# Patient Record
Sex: FEMALE | Race: WHITE | Employment: OTHER | URBAN - METROPOLITAN AREA
[De-identification: names, ages, dates, MRNs, and addresses within clinical notes are randomized per-mention and may not be internally consistent; named-entity substitution may affect disease eponyms.]

---

## 2014-01-13 LAB — COLONOSCOPY, EXTERNAL: NORMAL

## 2017-01-20 DIAGNOSIS — R60.0 BILATERAL EDEMA OF LOWER EXTREMITY: ICD-10-CM

## 2017-01-20 RX ORDER — FUROSEMIDE 20 MG/1
20 TABLET ORAL DAILY
Qty: 90 TAB | Refills: 1 | Status: SHIPPED | OUTPATIENT
Start: 2017-01-20 | End: 2017-07-03 | Stop reason: SDUPTHER

## 2017-01-20 NOTE — TELEPHONE ENCOUNTER
This pharmacy faxed over request for the following prescriptions to be filled:   Medication requested :   Requested Prescriptions     Pending Prescriptions Disp Refills    furosemide (LASIX) 20 mg tablet 90 Tab 1     Sig: Take 1 Tab by mouth daily.  Indications: Edema     PCP: Dr. Jose D Patiño or Print: [facility]  Mail order or Local pharmacy: [phone]    Scheduled appointment if not seen by current providers in office: LOV 10/26/16

## 2017-04-14 DIAGNOSIS — M62.838 MUSCLE SPASM: ICD-10-CM

## 2017-04-14 DIAGNOSIS — I10 ESSENTIAL HYPERTENSION WITH GOAL BLOOD PRESSURE LESS THAN 130/85: ICD-10-CM

## 2017-04-14 RX ORDER — MELOXICAM 15 MG/1
TABLET ORAL
Qty: 90 TAB | Refills: 0 | Status: SHIPPED | OUTPATIENT
Start: 2017-04-14 | End: 2017-07-13 | Stop reason: SDUPTHER

## 2017-04-14 RX ORDER — LISINOPRIL 10 MG/1
TABLET ORAL
Qty: 90 TAB | Refills: 0 | Status: SHIPPED | OUTPATIENT
Start: 2017-04-14 | End: 2017-07-13 | Stop reason: SDUPTHER

## 2017-05-22 ENCOUNTER — OFFICE VISIT (OUTPATIENT)
Dept: FAMILY MEDICINE CLINIC | Age: 67
End: 2017-05-22

## 2017-05-22 VITALS
RESPIRATION RATE: 14 BRPM | SYSTOLIC BLOOD PRESSURE: 122 MMHG | HEART RATE: 70 BPM | DIASTOLIC BLOOD PRESSURE: 70 MMHG | OXYGEN SATURATION: 98 % | TEMPERATURE: 97.6 F

## 2017-05-22 DIAGNOSIS — M25.50 ARTHRALGIA, UNSPECIFIED JOINT: Primary | ICD-10-CM

## 2017-05-22 RX ORDER — AMITRIPTYLINE HYDROCHLORIDE 25 MG/1
TABLET, FILM COATED ORAL
COMMUNITY
End: 2017-05-30

## 2017-05-22 RX ORDER — ACETAMINOPHEN AND CODEINE PHOSPHATE 300; 30 MG/1; MG/1
1 TABLET ORAL
COMMUNITY
End: 2017-06-01

## 2017-05-22 NOTE — PROGRESS NOTES
Sofie Rowan is a 69 y.o. female  presents for hip shoulder wrist and ankles and feet. She has appt with rheumatology in am.      Allergies   Allergen Reactions    Celebrex [Celecoxib] Swelling    Humira [Adalimumab] Other (comments)     Lupus    Optiray 320 [Ioversol] Hives and Itching     Pt states when she gets iv contrast she itches a little from hives?  Penicillins Hives    Sulfa (Sulfonamide Antibiotics) Hives    Sulfur Swelling     Outpatient Prescriptions Marked as Taking for the 5/22/17 encounter (Office Visit) with Grady Morris MD   Medication Sig Dispense Refill    amitriptyline (ELAVIL) 25 mg tablet Take  by mouth nightly.  acetaminophen-codeine (TYLENOL-CODEINE #3) 300-30 mg per tablet Take 1 Tab by mouth every four (4) hours as needed for Pain.  meloxicam (MOBIC) 15 mg tablet TAKE 1 TABLET DAILY 90 Tab 0    lisinopril (PRINIVIL, ZESTRIL) 10 mg tablet TAKE 1 TABLET DAILY 90 Tab 0    furosemide (LASIX) 20 mg tablet Take 1 Tab by mouth daily. Indications: Edema 90 Tab 1    TOFACITINIB CITRATE (XELJANZ PO) Take 10 mg by mouth two (2) times a day.  MELATONIN PO Take  by mouth. 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      Social History Social History    Marital status:      Spouse name: N/A    Number of children: N/A    Years of education: N/A     Social History Main Topics    Smoking status: Never Smoker    Smokeless tobacco: Never Used    Alcohol use No    Drug use: No    Sexual activity: Not Asked     Other Topics Concern    None     Social History Narrative     Family History   Problem Relation Age of Onset    Other Other      Orthopedic (Sister)    Arthritis-osteo Sister     Cancer Neg Hx     Diabetes Neg Hx     Heart Disease Neg Hx     Heart Attack Neg Hx     Hypertension Neg Hx     Stroke Neg Hx     Malignant Hyperthermia Neg Hx     Pseudocholinesterase Deficiency Neg Hx     Delayed Awakening Neg Hx     Post-op Nausea/Vomiting Neg Hx     Emergence Delirium Neg Hx     Post-op Cognitive Dysfunction Neg Hx         Review of Systems   Constitutional: Negative for chills and fever. Cardiovascular: Negative for chest pain. Gastrointestinal: Negative for nausea and vomiting. Neurological: Negative for headaches. Vitals:    05/22/17 1442   BP: 122/70   Pulse: 70   Resp: 14   Temp: 97.6 °F (36.4 °C)   TempSrc: Oral   SpO2: 98%   PainSc:  10 - Worst pain ever   PainLoc: Hip       Physical Exam   Constitutional: She is oriented to person, place, and time and well-developed, well-nourished, and in no distress. Neck: Normal range of motion. Neck supple. Cardiovascular: Normal rate, regular rhythm and normal heart sounds. Pulmonary/Chest: Effort normal and breath sounds normal.   Neurological: She is alert and oriented to person, place, and time. Skin: Skin is warm and dry. Nursing note and vitals reviewed. Assessment/Plan      ICD-10-CM ICD-9-CM    1. Arthralgia, unspecified joint M25.50 719.40      Follow up with rheumatology and ortho. I have discussed the diagnosis with the patient and the intended plan of care as seen in the above orders.  The patient has received an after-visit summary and questions were answered concerning future plans. I have discussed medication, side effects, and warnings with the patient in detail. The patient verbalized understanding and is in agreement with the plan of care. The patient will contact the office with any additional concerns.       Follow-up Disposition:  Return if symptoms worsen or fail to improve.  lab results and schedule of future lab studies reviewed with patient    Franc Murphy MD

## 2017-05-22 NOTE — PATIENT INSTRUCTIONS
Joint Pain: Care Instructions  Your Care Instructions  Many people have small aches and pains from overuse or injury to muscles and joints. Joint injuries often happen during sports or recreation, work tasks, or projects around the home. An overuse injury can happen when you put too much stress on a joint or when you do an activity that stresses the joint over and over, such as using the computer or rowing a boat. You can take action at home to help your muscles and joints get better. You should feel better in 1 to 2 weeks, but it can take 3 months or more to heal completely. Follow-up care is a key part of your treatment and safety. Be sure to make and go to all appointments, and call your doctor if you are having problems. It's also a good idea to know your test results and keep a list of the medicines you take. How can you care for yourself at home? · Do not put weight on the injured joint for at least a day or two. · For the first day or two after an injury, do not take hot showers or baths, and do not use hot packs. The heat could make swelling worse. · Put ice or a cold pack on the sore joint for 10 to 20 minutes at a time. Try to do this every 1 to 2 hours for the next 3 days (when you are awake) or until the swelling goes down. Put a thin cloth between the ice and your skin. · Wrap the injury in an elastic bandage. Do not wrap it too tightly because this can cause more swelling. · Prop up the sore joint on a pillow when you ice it or anytime you sit or lie down during the next 3 days. Try to keep it above the level of your heart. This will help reduce swelling. · Take an over-the-counter pain medicine, such as acetaminophen (Tylenol), ibuprofen (Advil, Motrin), or naproxen (Aleve). Read and follow all instructions on the label. · After 1 or 2 days of rest, begin moving the joint gently.  While the joint is still healing, you can begin to exercise using activities that do not strain or hurt the painful joint. When should you call for help? Call your doctor now or seek immediate medical care if:  · You have signs of infection, such as:  ¨ Increased pain, swelling, warmth, and redness. ¨ Red streaks leading from the joint. ¨ A fever. Watch closely for changes in your health, and be sure to contact your doctor if:  · Your movement or symptoms are not getting better after 1 to 2 weeks of home treatment. Where can you learn more? Go to http://camilo-robert.info/. Enter P205 in the search box to learn more about \"Joint Pain: Care Instructions. \"  Current as of: May 23, 2016  Content Version: 11.2  © 1003-1552 [employer]. Care instructions adapted under license by [employer] (which disclaims liability or warranty for this information). If you have questions about a medical condition or this instruction, always ask your healthcare professional. [address] any warranty or liability for your use of this information.

## 2017-05-22 NOTE — MR AVS SNAPSHOT
Visit Information Date & Time Provider Department Dept. Phone Encounter #  
 5/22/2017  2:15 PM Yoana Leonard MD [facility] [phone] [id number] Follow-up Instructions Return if symptoms worsen or fail to improve. Upcoming Health Maintenance Date Due Hepatitis C Screening 1950 DTaP/Tdap/Td series (1 - Tdap) 3/10/1969 FOBT Q 1 YEAR AGE 50-75 3/10/1998 GLAUCOMA SCREENING Q2Y 3/10/2013 Pneumococcal 65+ Low/Medium Risk (2 of 2 - PPSV23) 10/31/2015 BREAST CANCER SCRN MAMMOGRAM 6/5/2017 MEDICARE YEARLY EXAM 6/11/2017 INFLUENZA AGE 9 TO ADULT 8/1/2017 Allergies as of 5/22/2017  Review Complete On: 5/22/2017 By: Yoana Leonard MD  
  
 Severity Noted Reaction Type Reactions Celebrex [Celecoxib] High 11/06/2014   Systemic Swelling Humira [Adalimumab]  06/27/2013    Other (comments) Lupus Optiray 320 [Ioversol]  02/16/2013   Not Verified Hives, Itching Pt states when she gets iv contrast she itches a little from hives? Penicillins  02/19/2013    Hives Sulfa (Sulfonamide Antibiotics)  05/18/2015    Hives Sulfur  02/19/2013    Swelling Current Immunizations  Never Reviewed Name Date Pneumococcal Vaccine (Unspecified Type) 10/31/2010 Not reviewed this visit You Were Diagnosed With   
  
 Codes Comments Arthralgia, unspecified joint    -  Primary ICD-10-CM: M25.50 ICD-9-CM: 719.40 Vitals BP Pulse Temp Resp SpO2 OB Status 122/70 (BP 1 Location: Right arm, BP Patient Position: Sitting) 70 97.6 °F (36.4 °C) (Oral) 14 98% Hysterectomy Smoking Status Never Smoker Vitals History Preferred Pharmacy Pharmacy Name Phone [address] [facility] [phone] Your Updated Medication List  
  
   
This list is accurate as of: 5/22/17  3:04 PM.  Always use your most recent med list.  
  
  
  
  
 amitriptyline 25 mg tablet Commonly known as:  ELAVIL Take  by mouth nightly. BENADRYL 25 mg capsule Generic drug:  diphenhydrAMINE Take 25 mg by mouth every six (6) hours as needed. calcium carbonate 500 mg calcium (1,250 mg) tablet Commonly known as:  OS-BARBARA  
1,500 mg. COLACE 100 mg capsule Generic drug:  docusate sodium Take 100 mg by mouth daily as needed. cyclobenzaprine 10 mg tablet Commonly known as:  FLEXERIL Take 1 Tab by mouth three (3) times daily as needed. Indications: RA  
  
 ethinyl estradiol-etonogestrel 0.12-0.015 mg/24 hr vaginal ring Commonly known as:  NUVARING  
1 Each. folic acid 1 mg tablet Commonly known as:  Google Take 1 mg by mouth three (3) times daily. furosemide 20 mg tablet Commonly known as:  LASIX Take 1 Tab by mouth daily. Indications: Edema  
  
 linaclotide 290 mcg Cap capsule Commonly known as:  Alejandrina Noon Take 1 capsule by mouth daily. Indications: IBS  
  
 lisinopril 10 mg tablet Commonly known as:  PRINIVIL, ZESTRIL  
TAKE 1 TABLET DAILY MELATONIN PO Take  by mouth.  
  
 meloxicam 15 mg tablet Commonly known as:  MOBIC  
TAKE 1 TABLET DAILY  
  
 methotrexate 25 mg/mL chemo injection 12.5 mg.  
  
 omeprazole 20 mg capsule Commonly known as:  PRILOSEC Take 20 mg by mouth two (2) times a day. Pt instructed to take am of surgery. Indications: GASTROESOPHAGEAL REFLUX  
  
 ondansetron 8 mg disintegrating tablet Commonly known as:  ZOFRAN ODT Take 1 tablet by mouth every eight (8) hours as needed for Nausea. ONE-A-DAY MAXIMUM FORMULA PO Take 1 Tab by Mouth Once a Day. oxybutynin chloride XL 10 mg CR tablet Commonly known as:  DITROPAN XL Take 1 Tab by mouth daily. Indications: INCREASED URINARY FREQUENCY  
  
 sucralfate 100 mg/mL suspension Commonly known as:  Vera Nehemias Take 5 mL by mouth four (4) times daily. TYLENOL PO Take 200 mg by mouth. TYLENOL-CODEINE #3 300-30 mg per tablet Generic drug:  acetaminophen-codeine Take 1 Tab by mouth every four (4) hours as needed for Pain. VITAMIN D3 1,000 unit tablet Generic drug:  cholecalciferol Take 1,000 Units by mouth daily. XELJANZ PO Take 10 mg by mouth two (2) times a day. zolpidem 5 mg tablet Commonly known as:  AMBIEN Take 1 tablet by mouth nightly as needed for Sleep. Take 1 Tab by Mouth Nightly As Needed. ZyrTEC 10 mg Cap Generic drug:  Cetirizine Take  by mouth. Follow-up Instructions Return if symptoms worsen or fail to improve. Patient Instructions Joint Pain: Care Instructions Your Care Instructions Many people have small aches and pains from overuse or injury to muscles and joints. Joint injuries often happen during sports or recreation, work tasks, or projects around the home. An overuse injury can happen when you put too much stress on a joint or when you do an activity that stresses the joint over and over, such as using the computer or rowing a boat. You can take action at home to help your muscles and joints get better. You should feel better in 1 to 2 weeks, but it can take 3 months or more to heal completely. Follow-up care is a key part of your treatment and safety. Be sure to make and go to all appointments, and call your doctor if you are having problems. It's also a good idea to know your test results and keep a list of the medicines you take. How can you care for yourself at home? · Do not put weight on the injured joint for at least a day or two. · For the first day or two after an injury, do not take hot showers or baths, and do not use hot packs. The heat could make swelling worse. · Put ice or a cold pack on the sore joint for 10 to 20 minutes at a time.  Try to do this every 1 to 2 hours for the next 3 days (when you are awake) or until the swelling goes down. Put a thin cloth between the ice and your skin. · Wrap the injury in an elastic bandage. Do not wrap it too tightly because this can cause more swelling. · Prop up the sore joint on a pillow when you ice it or anytime you sit or lie down during the next 3 days. Try to keep it above the level of your heart. This will help reduce swelling. · Take an over-the-counter pain medicine, such as acetaminophen (Tylenol), ibuprofen (Advil, Motrin), or naproxen (Aleve). Read and follow all instructions on the label. · After 1 or 2 days of rest, begin moving the joint gently. While the joint is still healing, you can begin to exercise using activities that do not strain or hurt the painful joint. When should you call for help? Call your doctor now or seek immediate medical care if: 
· You have signs of infection, such as: 
¨ Increased pain, swelling, warmth, and redness. ¨ Red streaks leading from the joint. ¨ A fever. Watch closely for changes in your health, and be sure to contact your doctor if: 
· Your movement or symptoms are not getting better after 1 to 2 weeks of home treatment. Where can you learn more? Go to http://camilo-robert.info/. Enter P205 in the search box to learn more about \"Joint Pain: Care Instructions. \" Current as of: May 23, 2016 Content Version: 11.2 © 9228-7377 [employer]. Care instructions adapted under license by [employer] (which disclaims liability or warranty for this information). If you have questions about a medical condition or this instruction, always ask your healthcare professional. [address] any warranty or liability for your use of this information. Introducing [facility]! Dear Margret Class: Thank you for requesting a [employer] account. Our records indicate that you already have an active [employer] account.   You can access your account anytime at [email or web address] Did you know that you can access your hospital and ER discharge instructions at any time in [employer]? You can also review all of your test results from your hospital stay or ER visit. Additional Information If you have questions, please visit the Frequently Asked Questions section of the [employer] website at [email or web address] Remember, [employer] is NOT to be used for urgent needs. For medical emergencies, dial 911. Now available from your iPhone and Android! Please provide this summary of care documentation to your next provider. Your primary care clinician is listed as [address]. If you have any questions after today's visit, please call [phone].

## 2017-05-26 ENCOUNTER — HOSPITAL ENCOUNTER (OUTPATIENT)
Dept: PREADMISSION TESTING | Age: 67
Discharge: HOME | End: 2017-05-26
Attending: ORTHOPAEDIC SURGERY
Payer: MEDICARE

## 2017-05-26 LAB
ANION GAP BLD CALC-SCNC: 8 MMOL/L (ref 3–18)
APPEARANCE UR: CLEAR
BACTERIA SPEC CULT: NORMAL
BILIRUB UR QL: NEGATIVE
BUN SERPL-MCNC: 16 MG/DL (ref 7–18)
BUN/CREAT SERPL: 17 (ref 12–20)
CALCIUM SERPL-MCNC: 9 MG/DL (ref 8.5–10.1)
CHLORIDE SERPL-SCNC: 98 MMOL/L (ref 100–108)
CO2 SERPL-SCNC: 32 MMOL/L (ref 21–32)
COLOR UR: YELLOW
CREAT SERPL-MCNC: 0.94 MG/DL (ref 0.6–1.3)
ERYTHROCYTE [DISTWIDTH] IN BLOOD BY AUTOMATED COUNT: 12.8 % (ref 11.6–14.5)
GLUCOSE SERPL-MCNC: 96 MG/DL (ref 74–99)
GLUCOSE UR STRIP.AUTO-MCNC: NEGATIVE MG/DL
HCT VFR BLD AUTO: 40.6 % (ref 35–45)
HGB BLD-MCNC: 13.6 G/DL (ref 12–16)
HGB UR QL STRIP: NEGATIVE
KETONES UR QL STRIP.AUTO: NEGATIVE MG/DL
LEUKOCYTE ESTERASE UR QL STRIP.AUTO: NEGATIVE
MCH RBC QN AUTO: 31.6 PG (ref 24–34)
MCHC RBC AUTO-ENTMCNC: 33.5 G/DL (ref 31–37)
MCV RBC AUTO: 94.4 FL (ref 74–97)
NITRITE UR QL STRIP.AUTO: NEGATIVE
PH UR STRIP: 6.5 [PH] (ref 5–8)
PLATELET # BLD AUTO: 318 K/UL (ref 135–420)
PMV BLD AUTO: 9.1 FL (ref 9.2–11.8)
POTASSIUM SERPL-SCNC: 3.8 MMOL/L (ref 3.5–5.5)
PROT UR STRIP-MCNC: NEGATIVE MG/DL
RBC # BLD AUTO: 4.3 M/UL (ref 4.2–5.3)
SERVICE CMNT-IMP: NORMAL
SODIUM SERPL-SCNC: 138 MMOL/L (ref 136–145)
SP GR UR REFRACTOMETRY: 1.01 (ref 1–1.03)
UROBILINOGEN UR QL STRIP.AUTO: 0.2 EU/DL (ref 0.2–1)
WBC # BLD AUTO: 7 K/UL (ref 4.6–13.2)

## 2017-05-26 PROCEDURE — 85027 COMPLETE CBC AUTOMATED: CPT | Performed by: ORTHOPAEDIC SURGERY

## 2017-05-26 PROCEDURE — 87086 URINE CULTURE/COLONY COUNT: CPT | Performed by: ORTHOPAEDIC SURGERY

## 2017-05-26 PROCEDURE — 87641 MR-STAPH DNA AMP PROBE: CPT | Performed by: ORTHOPAEDIC SURGERY

## 2017-05-26 PROCEDURE — 81003 URINALYSIS AUTO W/O SCOPE: CPT | Performed by: ORTHOPAEDIC SURGERY

## 2017-05-26 PROCEDURE — 36415 COLL VENOUS BLD VENIPUNCTURE: CPT | Performed by: ORTHOPAEDIC SURGERY

## 2017-05-26 PROCEDURE — 80048 BASIC METABOLIC PNL TOTAL CA: CPT | Performed by: ORTHOPAEDIC SURGERY

## 2017-05-26 PROCEDURE — 86900 BLOOD TYPING SEROLOGIC ABO: CPT | Performed by: ORTHOPAEDIC SURGERY

## 2017-05-27 LAB
ABO + RH BLD: NORMAL
BLOOD GROUP ANTIBODIES SERPL: NORMAL
SPECIMEN EXP DATE BLD: NORMAL

## 2017-05-28 LAB
BACTERIA SPEC CULT: ABNORMAL
SERVICE CMNT-IMP: ABNORMAL

## 2017-05-30 ENCOUNTER — HOSPITAL ENCOUNTER (OUTPATIENT)
Dept: PREADMISSION TESTING | Age: 67
Discharge: HOME | DRG: 470 | End: 2017-05-30
Payer: MEDICARE

## 2017-05-30 DIAGNOSIS — M16.11 PRIMARY OSTEOARTHRITIS OF RIGHT HIP: ICD-10-CM

## 2017-05-30 PROBLEM — M35.00 SICCA SYNDROME (HCC): Chronic | Status: ACTIVE | Noted: 2017-05-30

## 2017-05-30 PROBLEM — Z87.39 H/O CALCIUM PYROPHOSPHATE DEPOSITION DISEASE (CPPD): Chronic | Status: ACTIVE | Noted: 2017-05-30

## 2017-05-30 PROBLEM — M06.9 RA (RHEUMATOID ARTHRITIS) (HCC): Chronic | Status: ACTIVE | Noted: 2017-05-30

## 2017-05-30 PROBLEM — K58.9 IBS (IRRITABLE BOWEL SYNDROME): Chronic | Status: ACTIVE | Noted: 2017-05-30

## 2017-05-30 LAB
ATRIAL RATE: 72 BPM
CALCULATED P AXIS, ECG09: 12 DEGREES
CALCULATED R AXIS, ECG10: 48 DEGREES
CALCULATED T AXIS, ECG11: 43 DEGREES
DIAGNOSIS, 93000: NORMAL
P-R INTERVAL, ECG05: 150 MS
Q-T INTERVAL, ECG07: 400 MS
QRS DURATION, ECG06: 76 MS
QTC CALCULATION (BEZET), ECG08: 438 MS
VENTRICULAR RATE, ECG03: 72 BPM

## 2017-05-30 RX ORDER — SODIUM CHLORIDE 0.9 % (FLUSH) 0.9 %
5-10 SYRINGE (ML) INJECTION EVERY 8 HOURS
Status: CANCELLED | OUTPATIENT
Start: 2017-05-30

## 2017-05-30 RX ORDER — SODIUM CHLORIDE 0.9 % (FLUSH) 0.9 %
5-10 SYRINGE (ML) INJECTION AS NEEDED
Status: CANCELLED | OUTPATIENT
Start: 2017-05-30

## 2017-05-31 ENCOUNTER — ANESTHESIA EVENT (OUTPATIENT)
Dept: SURGERY | Age: 67
DRG: 470 | End: 2017-05-31
Payer: MEDICARE

## 2017-05-31 ENCOUNTER — HOSPITAL ENCOUNTER (INPATIENT)
Age: 67
LOS: 1 days | Discharge: HOME HEALTH | DRG: 470 | End: 2017-06-01
Attending: ORTHOPAEDIC SURGERY | Admitting: ORTHOPAEDIC SURGERY
Payer: MEDICARE

## 2017-05-31 ENCOUNTER — APPOINTMENT (OUTPATIENT)
Dept: GENERAL RADIOLOGY | Age: 67
DRG: 470 | End: 2017-05-31
Attending: PHYSICIAN ASSISTANT
Payer: MEDICARE

## 2017-05-31 ENCOUNTER — ANESTHESIA (OUTPATIENT)
Dept: SURGERY | Age: 67
DRG: 470 | End: 2017-05-31
Payer: MEDICARE

## 2017-05-31 PROCEDURE — 77030018846 HC SOL IRR STRL H20 ICUM -A: Performed by: ORTHOPAEDIC SURGERY

## 2017-05-31 PROCEDURE — 74011000258 HC RX REV CODE- 258: Performed by: PHYSICIAN ASSISTANT

## 2017-05-31 PROCEDURE — 97530 THERAPEUTIC ACTIVITIES: CPT

## 2017-05-31 PROCEDURE — 97161 PT EVAL LOW COMPLEX 20 MIN: CPT

## 2017-05-31 PROCEDURE — 97116 GAIT TRAINING THERAPY: CPT

## 2017-05-31 PROCEDURE — 73501 X-RAY EXAM HIP UNI 1 VIEW: CPT

## 2017-05-31 PROCEDURE — 74011000250 HC RX REV CODE- 250

## 2017-05-31 PROCEDURE — 77030011640 HC PAD GRND REM COVD -A: Performed by: ORTHOPAEDIC SURGERY

## 2017-05-31 PROCEDURE — 77030018836 HC SOL IRR NACL ICUM -A: Performed by: ORTHOPAEDIC SURGERY

## 2017-05-31 PROCEDURE — 74011250637 HC RX REV CODE- 250/637: Performed by: PHYSICIAN ASSISTANT

## 2017-05-31 PROCEDURE — 77030027138 HC INCENT SPIROMETER -A: Performed by: ORTHOPAEDIC SURGERY

## 2017-05-31 PROCEDURE — 65270000029 HC RM PRIVATE

## 2017-05-31 PROCEDURE — 74011250636 HC RX REV CODE- 250/636

## 2017-05-31 PROCEDURE — 77030008459 HC STPLR SKN COOP -B: Performed by: ORTHOPAEDIC SURGERY

## 2017-05-31 PROCEDURE — 76210000016 HC OR PH I REC 1 TO 1.5 HR: Performed by: ORTHOPAEDIC SURGERY

## 2017-05-31 PROCEDURE — 74011250637 HC RX REV CODE- 250/637: Performed by: SPECIALIST

## 2017-05-31 PROCEDURE — 74011250636 HC RX REV CODE- 250/636: Performed by: PHYSICIAN ASSISTANT

## 2017-05-31 PROCEDURE — 77010033678 HC OXYGEN DAILY

## 2017-05-31 PROCEDURE — C1776 JOINT DEVICE (IMPLANTABLE): HCPCS | Performed by: ORTHOPAEDIC SURGERY

## 2017-05-31 PROCEDURE — 77030027355 HC HNDPC IRR SURGLAV STRY -B: Performed by: ORTHOPAEDIC SURGERY

## 2017-05-31 PROCEDURE — C9290 INJ, BUPIVACAINE LIPOSOME: HCPCS | Performed by: ORTHOPAEDIC SURGERY

## 2017-05-31 PROCEDURE — 77030012508 HC MSK AIRWY LMA AMBU -A: Performed by: SPECIALIST

## 2017-05-31 PROCEDURE — 77030018673: Performed by: ORTHOPAEDIC SURGERY

## 2017-05-31 PROCEDURE — 77030013728 HC IRR FEM CNL STRY -B: Performed by: ORTHOPAEDIC SURGERY

## 2017-05-31 PROCEDURE — 76060000034 HC ANESTHESIA 1.5 TO 2 HR: Performed by: ORTHOPAEDIC SURGERY

## 2017-05-31 PROCEDURE — 74011250636 HC RX REV CODE- 250/636: Performed by: SPECIALIST

## 2017-05-31 PROCEDURE — 76010000153 HC OR TIME 1.5 TO 2 HR: Performed by: ORTHOPAEDIC SURGERY

## 2017-05-31 PROCEDURE — 77030032490 HC SLV COMPR SCD KNE COVD -B: Performed by: ORTHOPAEDIC SURGERY

## 2017-05-31 PROCEDURE — 74011000250 HC RX REV CODE- 250: Performed by: PHYSICIAN ASSISTANT

## 2017-05-31 PROCEDURE — 0SR904Z REPLACEMENT OF RIGHT HIP JOINT WITH CERAMIC ON POLYETHYLENE SYNTHETIC SUBSTITUTE, OPEN APPROACH: ICD-10-PCS | Performed by: ORTHOPAEDIC SURGERY

## 2017-05-31 PROCEDURE — 77030034479 HC ADH SKN CLSR PRINEO J&J -B: Performed by: ORTHOPAEDIC SURGERY

## 2017-05-31 PROCEDURE — 77030016547 HC BLD SAW SAG1 STRY -B: Performed by: ORTHOPAEDIC SURGERY

## 2017-05-31 PROCEDURE — 77030031139 HC SUT VCRL2 J&J -A: Performed by: ORTHOPAEDIC SURGERY

## 2017-05-31 PROCEDURE — 77030010507 HC ADH SKN DERMBND J&J -B: Performed by: ORTHOPAEDIC SURGERY

## 2017-05-31 PROCEDURE — 77030037875 HC DRSG MEPILEX <16IN BORD MOLN -A: Performed by: ORTHOPAEDIC SURGERY

## 2017-05-31 PROCEDURE — 74011000250 HC RX REV CODE- 250: Performed by: ORTHOPAEDIC SURGERY

## 2017-05-31 PROCEDURE — 74011000258 HC RX REV CODE- 258: Performed by: ORTHOPAEDIC SURGERY

## 2017-05-31 PROCEDURE — 77030020782 HC GWN BAIR PAWS FLX 3M -B: Performed by: ORTHOPAEDIC SURGERY

## 2017-05-31 PROCEDURE — 74011250636 HC RX REV CODE- 250/636: Performed by: ORTHOPAEDIC SURGERY

## 2017-05-31 DEVICE — STEM FEM SZ 6 L107MM 130DEG STD OFFSET CALCAR HIP GRIPTION: Type: IMPLANTABLE DEVICE | Site: HIP | Status: FUNCTIONAL

## 2017-05-31 DEVICE — HEAD FEM DIA36MM +8MM OFFSET 12/14 TAPR HIP CERAMIC BIOLOX: Type: IMPLANTABLE DEVICE | Site: HIP | Status: FUNCTIONAL

## 2017-05-31 DEVICE — COMPONENT TOT HIP PRIMARY CERM ALTRX: Type: IMPLANTABLE DEVICE | Site: HIP | Status: FUNCTIONAL

## 2017-05-31 DEVICE — CUP ACET DIA52MM HIP GRIPTION PRI CEMENTLESS FIX SECT SER: Type: IMPLANTABLE DEVICE | Site: HIP | Status: FUNCTIONAL

## 2017-05-31 DEVICE — ELIMINATOR H APEX FOR 48-60MM PINN HIP SHELL: Type: IMPLANTABLE DEVICE | Site: HIP | Status: FUNCTIONAL

## 2017-05-31 DEVICE — LINER ACET OD52MM ID36MM HIP ALTRX PINN: Type: IMPLANTABLE DEVICE | Site: HIP | Status: FUNCTIONAL

## 2017-05-31 RX ORDER — SODIUM CHLORIDE, SODIUM LACTATE, POTASSIUM CHLORIDE, CALCIUM CHLORIDE 600; 310; 30; 20 MG/100ML; MG/100ML; MG/100ML; MG/100ML
125 INJECTION, SOLUTION INTRAVENOUS CONTINUOUS
Status: DISCONTINUED | OUTPATIENT
Start: 2017-05-31 | End: 2017-05-31 | Stop reason: HOSPADM

## 2017-05-31 RX ORDER — LIDOCAINE HYDROCHLORIDE 20 MG/ML
INJECTION, SOLUTION EPIDURAL; INFILTRATION; INTRACAUDAL; PERINEURAL AS NEEDED
Status: DISCONTINUED | OUTPATIENT
Start: 2017-05-31 | End: 2017-05-31 | Stop reason: HOSPADM

## 2017-05-31 RX ORDER — OXYCODONE AND ACETAMINOPHEN 5; 325 MG/1; MG/1
1 TABLET ORAL AS NEEDED
Status: DISCONTINUED | OUTPATIENT
Start: 2017-05-31 | End: 2017-05-31 | Stop reason: HOSPADM

## 2017-05-31 RX ORDER — ONDANSETRON 4 MG/1
4 TABLET, ORALLY DISINTEGRATING ORAL
Status: DISCONTINUED | OUTPATIENT
Start: 2017-05-31 | End: 2017-06-01 | Stop reason: HOSPADM

## 2017-05-31 RX ORDER — FENTANYL CITRATE 50 UG/ML
INJECTION, SOLUTION INTRAMUSCULAR; INTRAVENOUS AS NEEDED
Status: DISCONTINUED | OUTPATIENT
Start: 2017-05-31 | End: 2017-05-31 | Stop reason: HOSPADM

## 2017-05-31 RX ORDER — GLYCOPYRROLATE 0.2 MG/ML
INJECTION INTRAMUSCULAR; INTRAVENOUS AS NEEDED
Status: DISCONTINUED | OUTPATIENT
Start: 2017-05-31 | End: 2017-05-31 | Stop reason: HOSPADM

## 2017-05-31 RX ORDER — MIDAZOLAM HYDROCHLORIDE 1 MG/ML
INJECTION, SOLUTION INTRAMUSCULAR; INTRAVENOUS AS NEEDED
Status: DISCONTINUED | OUTPATIENT
Start: 2017-05-31 | End: 2017-05-31 | Stop reason: HOSPADM

## 2017-05-31 RX ORDER — LANOLIN ALCOHOL/MO/W.PET/CERES
3 CREAM (GRAM) TOPICAL
Status: DISCONTINUED | OUTPATIENT
Start: 2017-05-31 | End: 2017-06-01 | Stop reason: HOSPADM

## 2017-05-31 RX ORDER — SODIUM CHLORIDE 0.9 % (FLUSH) 0.9 %
5-10 SYRINGE (ML) INJECTION AS NEEDED
Status: DISCONTINUED | OUTPATIENT
Start: 2017-05-31 | End: 2017-05-31 | Stop reason: HOSPADM

## 2017-05-31 RX ORDER — KETOROLAC TROMETHAMINE 30 MG/ML
INJECTION, SOLUTION INTRAMUSCULAR; INTRAVENOUS AS NEEDED
Status: DISCONTINUED | OUTPATIENT
Start: 2017-05-31 | End: 2017-05-31 | Stop reason: HOSPADM

## 2017-05-31 RX ORDER — ZOLPIDEM TARTRATE 5 MG/1
5 TABLET ORAL
Status: DISCONTINUED | OUTPATIENT
Start: 2017-05-31 | End: 2017-06-01 | Stop reason: HOSPADM

## 2017-05-31 RX ORDER — ONDANSETRON 2 MG/ML
INJECTION INTRAMUSCULAR; INTRAVENOUS AS NEEDED
Status: DISCONTINUED | OUTPATIENT
Start: 2017-05-31 | End: 2017-05-31 | Stop reason: HOSPADM

## 2017-05-31 RX ORDER — SODIUM CHLORIDE 0.9 % (FLUSH) 0.9 %
5-10 SYRINGE (ML) INJECTION AS NEEDED
Status: DISCONTINUED | OUTPATIENT
Start: 2017-05-31 | End: 2017-06-01 | Stop reason: HOSPADM

## 2017-05-31 RX ORDER — DIPHENHYDRAMINE HCL 25 MG
25 CAPSULE ORAL
Status: DISCONTINUED | OUTPATIENT
Start: 2017-05-31 | End: 2017-06-01 | Stop reason: HOSPADM

## 2017-05-31 RX ORDER — HYDROMORPHONE HYDROCHLORIDE 2 MG/ML
INJECTION, SOLUTION INTRAMUSCULAR; INTRAVENOUS; SUBCUTANEOUS AS NEEDED
Status: DISCONTINUED | OUTPATIENT
Start: 2017-05-31 | End: 2017-05-31 | Stop reason: HOSPADM

## 2017-05-31 RX ORDER — ONDANSETRON 2 MG/ML
4 INJECTION INTRAMUSCULAR; INTRAVENOUS ONCE
Status: DISCONTINUED | OUTPATIENT
Start: 2017-05-31 | End: 2017-05-31 | Stop reason: HOSPADM

## 2017-05-31 RX ORDER — SODIUM CHLORIDE 0.9 % (FLUSH) 0.9 %
5-10 SYRINGE (ML) INJECTION EVERY 8 HOURS
Status: DISCONTINUED | OUTPATIENT
Start: 2017-05-31 | End: 2017-06-01 | Stop reason: HOSPADM

## 2017-05-31 RX ORDER — ACETAMINOPHEN 325 MG/1
650 TABLET ORAL
Status: DISCONTINUED | OUTPATIENT
Start: 2017-05-31 | End: 2017-06-01 | Stop reason: HOSPADM

## 2017-05-31 RX ORDER — HYDROMORPHONE HYDROCHLORIDE 2 MG/ML
0.5 INJECTION, SOLUTION INTRAMUSCULAR; INTRAVENOUS; SUBCUTANEOUS
Status: COMPLETED | OUTPATIENT
Start: 2017-05-31 | End: 2017-05-31

## 2017-05-31 RX ORDER — SODIUM CHLORIDE, SODIUM LACTATE, POTASSIUM CHLORIDE, CALCIUM CHLORIDE 600; 310; 30; 20 MG/100ML; MG/100ML; MG/100ML; MG/100ML
50 INJECTION, SOLUTION INTRAVENOUS CONTINUOUS
Status: DISCONTINUED | OUTPATIENT
Start: 2017-05-31 | End: 2017-05-31 | Stop reason: HOSPADM

## 2017-05-31 RX ORDER — HYDROMORPHONE HYDROCHLORIDE 2 MG/1
2-4 TABLET ORAL
Status: DISCONTINUED | OUTPATIENT
Start: 2017-05-31 | End: 2017-06-01 | Stop reason: HOSPADM

## 2017-05-31 RX ORDER — BISACODYL 5 MG
5 TABLET, DELAYED RELEASE (ENTERIC COATED) ORAL DAILY PRN
Status: DISCONTINUED | OUTPATIENT
Start: 2017-05-31 | End: 2017-06-01 | Stop reason: HOSPADM

## 2017-05-31 RX ORDER — DEXTROSE 50 % IN WATER (D50W) INTRAVENOUS SYRINGE
25-50 AS NEEDED
Status: DISCONTINUED | OUTPATIENT
Start: 2017-05-31 | End: 2017-05-31 | Stop reason: HOSPADM

## 2017-05-31 RX ORDER — OXYCODONE HYDROCHLORIDE 5 MG/1
5 TABLET ORAL ONCE
Status: COMPLETED | OUTPATIENT
Start: 2017-05-31 | End: 2017-05-31

## 2017-05-31 RX ORDER — MAGNESIUM SULFATE 100 %
4 CRYSTALS MISCELLANEOUS AS NEEDED
Status: DISCONTINUED | OUTPATIENT
Start: 2017-05-31 | End: 2017-05-31 | Stop reason: HOSPADM

## 2017-05-31 RX ORDER — SODIUM CHLORIDE, SODIUM LACTATE, POTASSIUM CHLORIDE, CALCIUM CHLORIDE 600; 310; 30; 20 MG/100ML; MG/100ML; MG/100ML; MG/100ML
75 INJECTION, SOLUTION INTRAVENOUS CONTINUOUS
Status: DISPENSED | OUTPATIENT
Start: 2017-05-31 | End: 2017-06-01

## 2017-05-31 RX ORDER — FENTANYL CITRATE 50 UG/ML
25 INJECTION, SOLUTION INTRAMUSCULAR; INTRAVENOUS
Status: DISCONTINUED | OUTPATIENT
Start: 2017-05-31 | End: 2017-05-31

## 2017-05-31 RX ORDER — FUROSEMIDE 20 MG/1
20 TABLET ORAL
Status: DISCONTINUED | OUTPATIENT
Start: 2017-06-01 | End: 2017-06-01 | Stop reason: HOSPADM

## 2017-05-31 RX ORDER — CALCIUM CARBONATE 500(1250)
1500 TABLET ORAL DAILY
Status: DISCONTINUED | OUTPATIENT
Start: 2017-05-31 | End: 2017-06-01 | Stop reason: HOSPADM

## 2017-05-31 RX ORDER — PROPOFOL 10 MG/ML
INJECTION, EMULSION INTRAVENOUS AS NEEDED
Status: DISCONTINUED | OUTPATIENT
Start: 2017-05-31 | End: 2017-05-31 | Stop reason: HOSPADM

## 2017-05-31 RX ORDER — DEXAMETHASONE SODIUM PHOSPHATE 4 MG/ML
INJECTION, SOLUTION INTRA-ARTICULAR; INTRALESIONAL; INTRAMUSCULAR; INTRAVENOUS; SOFT TISSUE AS NEEDED
Status: DISCONTINUED | OUTPATIENT
Start: 2017-05-31 | End: 2017-05-31 | Stop reason: HOSPADM

## 2017-05-31 RX ORDER — NALOXONE HYDROCHLORIDE 0.4 MG/ML
0.4 INJECTION, SOLUTION INTRAMUSCULAR; INTRAVENOUS; SUBCUTANEOUS AS NEEDED
Status: DISCONTINUED | OUTPATIENT
Start: 2017-05-31 | End: 2017-06-01 | Stop reason: HOSPADM

## 2017-05-31 RX ORDER — TRAMADOL HYDROCHLORIDE 100 MG/1
100 TABLET, EXTENDED RELEASE ORAL DAILY
COMMUNITY
End: 2017-06-22 | Stop reason: SDUPTHER

## 2017-05-31 RX ORDER — PREGABALIN 25 MG/1
25 CAPSULE ORAL
Status: COMPLETED | OUTPATIENT
Start: 2017-05-31 | End: 2017-05-31

## 2017-05-31 RX ORDER — LISINOPRIL 5 MG/1
10 TABLET ORAL DAILY
Status: DISCONTINUED | OUTPATIENT
Start: 2017-06-01 | End: 2017-06-01 | Stop reason: HOSPADM

## 2017-05-31 RX ORDER — TRAMADOL HYDROCHLORIDE 100 MG/1
100 TABLET, EXTENDED RELEASE ORAL DAILY
Status: DISCONTINUED | OUTPATIENT
Start: 2017-05-31 | End: 2017-06-01 | Stop reason: HOSPADM

## 2017-05-31 RX ORDER — ACETAMINOPHEN 10 MG/ML
1000 INJECTION, SOLUTION INTRAVENOUS ONCE
Status: COMPLETED | OUTPATIENT
Start: 2017-05-31 | End: 2017-05-31

## 2017-05-31 RX ORDER — CYCLOBENZAPRINE HCL 10 MG
10 TABLET ORAL
Status: DISCONTINUED | OUTPATIENT
Start: 2017-05-31 | End: 2017-06-01 | Stop reason: HOSPADM

## 2017-05-31 RX ORDER — NALOXONE HYDROCHLORIDE 0.4 MG/ML
0.1 INJECTION, SOLUTION INTRAMUSCULAR; INTRAVENOUS; SUBCUTANEOUS
Status: DISCONTINUED | OUTPATIENT
Start: 2017-05-31 | End: 2017-05-31 | Stop reason: HOSPADM

## 2017-05-31 RX ORDER — ASPIRIN 325 MG
325 TABLET ORAL 2 TIMES DAILY
Status: DISCONTINUED | OUTPATIENT
Start: 2017-05-31 | End: 2017-06-01 | Stop reason: HOSPADM

## 2017-05-31 RX ADMIN — HYDROMORPHONE HYDROCHLORIDE 4 MG: 2 TABLET ORAL at 18:13

## 2017-05-31 RX ADMIN — ASPIRIN 325 MG ORAL TABLET 325 MG: 325 PILL ORAL at 21:50

## 2017-05-31 RX ADMIN — SODIUM CHLORIDE, SODIUM LACTATE, POTASSIUM CHLORIDE, AND CALCIUM CHLORIDE 75 ML/HR: 600; 310; 30; 20 INJECTION, SOLUTION INTRAVENOUS at 21:51

## 2017-05-31 RX ADMIN — DEXAMETHASONE SODIUM PHOSPHATE 4 MG: 4 INJECTION, SOLUTION INTRA-ARTICULAR; INTRALESIONAL; INTRAMUSCULAR; INTRAVENOUS; SOFT TISSUE at 07:37

## 2017-05-31 RX ADMIN — CYCLOBENZAPRINE HYDROCHLORIDE 10 MG: 10 TABLET, FILM COATED ORAL at 21:50

## 2017-05-31 RX ADMIN — SODIUM CHLORIDE, SODIUM LACTATE, POTASSIUM CHLORIDE, AND CALCIUM CHLORIDE: 600; 310; 30; 20 INJECTION, SOLUTION INTRAVENOUS at 08:04

## 2017-05-31 RX ADMIN — ONDANSETRON 4 MG: 2 INJECTION INTRAMUSCULAR; INTRAVENOUS at 07:37

## 2017-05-31 RX ADMIN — GLYCOPYRROLATE 0.2 MG: 0.2 INJECTION INTRAMUSCULAR; INTRAVENOUS at 07:27

## 2017-05-31 RX ADMIN — KETOROLAC TROMETHAMINE 30 MG: 30 INJECTION, SOLUTION INTRAMUSCULAR; INTRAVENOUS at 08:52

## 2017-05-31 RX ADMIN — HYDROMORPHONE HYDROCHLORIDE 4 MG: 2 TABLET ORAL at 12:54

## 2017-05-31 RX ADMIN — FENTANYL CITRATE 50 MCG: 50 INJECTION, SOLUTION INTRAMUSCULAR; INTRAVENOUS at 08:22

## 2017-05-31 RX ADMIN — FENTANYL CITRATE 100 MCG: 50 INJECTION, SOLUTION INTRAMUSCULAR; INTRAVENOUS at 07:35

## 2017-05-31 RX ADMIN — HYDROMORPHONE HYDROCHLORIDE 1 MG: 2 INJECTION, SOLUTION INTRAMUSCULAR; INTRAVENOUS; SUBCUTANEOUS at 07:35

## 2017-05-31 RX ADMIN — CLINDAMYCIN PHOSPHATE 900 MG: 150 INJECTION, SOLUTION, CONCENTRATE INTRAVENOUS at 12:54

## 2017-05-31 RX ADMIN — HYDROMORPHONE HYDROCHLORIDE 0.5 MG: 2 INJECTION INTRAMUSCULAR; INTRAVENOUS; SUBCUTANEOUS at 09:55

## 2017-05-31 RX ADMIN — LIDOCAINE HYDROCHLORIDE 80 MG: 20 INJECTION, SOLUTION EPIDURAL; INFILTRATION; INTRACAUDAL; PERINEURAL at 07:35

## 2017-05-31 RX ADMIN — CLINDAMYCIN PHOSPHATE 900 MG: 150 INJECTION, SOLUTION, CONCENTRATE INTRAVENOUS at 21:49

## 2017-05-31 RX ADMIN — FENTANYL CITRATE 50 MCG: 50 INJECTION, SOLUTION INTRAMUSCULAR; INTRAVENOUS at 08:37

## 2017-05-31 RX ADMIN — HYDROMORPHONE HYDROCHLORIDE 0.5 MG: 2 INJECTION INTRAMUSCULAR; INTRAVENOUS; SUBCUTANEOUS at 09:35

## 2017-05-31 RX ADMIN — MIDAZOLAM HYDROCHLORIDE 2 MG: 1 INJECTION, SOLUTION INTRAMUSCULAR; INTRAVENOUS at 07:27

## 2017-05-31 RX ADMIN — FENTANYL CITRATE 50 MCG: 50 INJECTION, SOLUTION INTRAMUSCULAR; INTRAVENOUS at 08:17

## 2017-05-31 RX ADMIN — ACETAMINOPHEN 1000 MG: 10 INJECTION, SOLUTION INTRAVENOUS at 07:27

## 2017-05-31 RX ADMIN — CYCLOBENZAPRINE HYDROCHLORIDE 10 MG: 10 TABLET, FILM COATED ORAL at 11:13

## 2017-05-31 RX ADMIN — HYDROMORPHONE HYDROCHLORIDE 4 MG: 2 TABLET ORAL at 21:50

## 2017-05-31 RX ADMIN — SODIUM CHLORIDE, SODIUM LACTATE, POTASSIUM CHLORIDE, AND CALCIUM CHLORIDE 125 ML/HR: 600; 310; 30; 20 INJECTION, SOLUTION INTRAVENOUS at 06:21

## 2017-05-31 RX ADMIN — PREGABALIN 25 MG: 25 CAPSULE ORAL at 06:58

## 2017-05-31 RX ADMIN — PROPOFOL 200 MG: 10 INJECTION, EMULSION INTRAVENOUS at 07:35

## 2017-05-31 RX ADMIN — SODIUM CHLORIDE, SODIUM LACTATE, POTASSIUM CHLORIDE, AND CALCIUM CHLORIDE 50 ML/HR: 600; 310; 30; 20 INJECTION, SOLUTION INTRAVENOUS at 09:42

## 2017-05-31 RX ADMIN — OXYCODONE HYDROCHLORIDE 5 MG: 5 TABLET ORAL at 06:58

## 2017-05-31 RX ADMIN — HYDROMORPHONE HYDROCHLORIDE 0.5 MG: 2 INJECTION INTRAMUSCULAR; INTRAVENOUS; SUBCUTANEOUS at 09:45

## 2017-05-31 RX ADMIN — SODIUM CHLORIDE 900 MG: 900 INJECTION, SOLUTION INTRAVENOUS at 07:30

## 2017-05-31 RX ADMIN — HYDROMORPHONE HYDROCHLORIDE 0.5 MG: 2 INJECTION INTRAMUSCULAR; INTRAVENOUS; SUBCUTANEOUS at 09:25

## 2017-05-31 NOTE — PROGRESS NOTES
conducted an initial consultation and Spiritual Assessment for Jus Bobo, who is a 69 y.o.,female. According to the patients chart Hoah[employer]ism Affiliation is: Congregation. The reason the Patient came to the hospital is:   Patient Active Problem List    Diagnosis Date Noted    H/O calcium pyrophosphate deposition disease (CPPD) 05/30/2017    IBS (irritable bowel syndrome) 05/30/2017    RA (rheumatoid arthritis) ([facility] [address] 05/30/2017    Sicca syndrome ([facility] 05/30/2017    Osteoarthritis of right hip 05/30/2017    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 Provided information about Spiritual Care Services. Offered prayer and assurance of continued prayers on patients behalf. Plan:  Chaplains will continue to follow and will provide pastoral care as needed or requested.  recommends bedside caregivers page  on duty if patient shows signs of acute spiritual or emotional distress. Father CHARLY [address] - Office

## 2017-05-31 NOTE — PROGRESS NOTES
Problem: Mobility Impaired (Adult and Pediatric)  Goal: *Acute Goals and Plan of Care (Insert Text)  STG (2 days):  1. Pt will be independent with all bed mobility and supine<>sit transfers for d/c.  2. Pt will be independent w/ all sit<>stand transfers WBAT w/ RW so she can stand to get to the bathroom. 3. Pt will be able to demonstrate as well as verbalize understanding of HEP. LTG (5 days):  1. Pt will be able to amb 150ft WBAT w/ RW and supervision so she can get from her car to her home. 2. Pt will be able to ascend/descend at least 6 steps WBAT w/ supervision and using both railings so she can get into her home. PHYSICAL THERAPY EVALUATION     Patient: Tino Wagoner (64 y.o. female)  Date: 5/31/2017  Primary Diagnosis: RIGHT HIP OSTEOARTHRITIS  Osteoarthritis of right hip  Procedure(s) (LRB):  RIGHT TOTAL HIP ARTHROPLASTY -  ANTERIOR APPROACH    W/NAVIGATION - SPEC POP (Right) Day of Surgery   Precautions:  Fall, WBAT      ASSESSMENT :  Based on the objective data described below, the patient presents with increased pain, decreased AROM/strength, decreased bed mobility/transfers, and decreased ability to amb independently due to recent R KELLY. Pt rated her pain a 7/10 on numerical pain scale, however the pain was not in her hip it was in her back from her previous L1-L4 fusion surgery. Pt has also previously had a L total shld which she stated makes it hard for her to use a walker because she has \"ruined\" the surgery and normally has shld pain now. Pt has problems gripping onto the walker due to decreased hand sensation which is normal for her. Pt was able to gt train 60ft WBAT w/ RW, GB, WBAT and CGA with c/o increases in pain and stiffness in her back and L shld. Pt was returned to supine in the bed, all needs within reach, SCDs and ice pack applied to R hip. Nurse Chio woo and  present. Recommend [place] after d/c.      Patient will benefit from skilled intervention to address the above impairments. Patients rehabilitation potential is considered to be Good  Factors which may influence rehabilitation potential include:   [ ]         None noted  [ ]         Mental ability/status  [ ]         Medical condition  [ ]         Home/family situation and support systems  [ ]         Safety awareness  [X]         Pain tolerance/management  [ ]         Other:        PLAN :  Recommendations and Planned Interventions:  [X]           Bed Mobility Training             [ ]    Neuromuscular Re-Education  [X]           Transfer Training                   [ ]    Orthotic/Prosthetic Training  [X]           Gait Training                          [ ]    Modalities  [X]           Therapeutic Exercises          [ ]    Edema Management/Control  [X]           Therapeutic Activities            [X]    Patient and Family Training/Education  [ ]           Other (comment):     Frequency/Duration: Patient will be followed by physical therapy twice daily to address goals. Discharge Recommendations: Home Health  Further Equipment Recommendations for Discharge: N/A       SUBJECTIVE:   Patient stated My back just keeps getting tight from the surgery.       OBJECTIVE DATA SUMMARY:       Past Medical History:   Diagnosis Date    Abdominal abscess ([facility] [address] 2009    Arthritis     Rheumatoid    Autoimmune disease ([facility] [address] [facility]     Medically induced Lupus    Back pain     Chronic pain     lower back    Colitis     Diverticulitis     Failed back syndrome     GERD (gastroesophageal reflux disease)     Hypertension     when on cyclosporins    Ill-defined condition     large torus roof of mouth    Irritable bowel syndrome     Osteoporosis     Pneumonia     Seizures ([facility] [address] 6-1-2008    one episode- after high dose of epinepherine     Past Surgical History:   Procedure Laterality Date    HX ABDOMINAL WALL DEFECT REPAIR      HX APPENDECTOMY      HX BREAST REDUCTION      HX CATARACT REMOVAL Bilateral     HX GI      repair rectal prolaspe    HX GYN      dermoid cyst    HX HEENT      tonsillectomy    HX HYSTERECTOMY  1976    HX LUMBAR FUSION      x 2    HX ORTHOPAEDIC Bilateral     CTR    HX ORTHOPAEDIC Bilateral     arthroplasty thumbs    HX ORTHOPAEDIC Left     Ankle     HX SALPINGO-OOPHORECTOMY Bilateral     HX SHOULDER REPLACEMENT Left     HX UROLOGICAL      bladder repair     Barriers to Learning/Limitations: None  Compensate with: visual, verbal, tactile, kinesthetic cues/model  Prior Level of Function/Home Situation:   Home Situation  Home Environment: Private residence  # Steps to Enter: 6  Rails to Enter: Yes  Hand Rails : Bilateral  One/Two Story Residence: Two story, live on 1st floor  Living Alone: No  Support Systems: Spouse/Significant Other/Partner  Patient Expects to be Discharged to[de-identified] Private residence  Current DME Used/Available at Home: Walker, rolling  Critical Behavior:  Neurologic State: Appropriate for age  Orientation Level: Appropriate for age;Oriented X4  Cognition: Follows commands  Safety/Judgement: Awareness of environment; Insight into deficits  Psychosocial  Patient Behaviors: Calm; Cooperative; Talkative  Family  Behaviors: Calm; Cooperative;Supportive  Skin Condition/Temp: Dry;Warm  Family  Behaviors: Calm; Cooperative;Supportive  Skin Integrity: Incision (comment) (R hip)  Skin Integumentary  Skin Color: Appropriate for ethnicity  Skin Condition/Temp: Dry;Warm  Skin Integrity: Incision (comment) (R hip)  Turgor: Non-tenting   Strength:    Strength: Generally decreased, functional  Tone & Sensation:   Tone: Normal  Sensation: Intact  Range Of Motion:  AROM: Generally decreased, functional  Functional Mobility:  Bed Mobility:     Supine to Sit: Contact guard assistance; Additional time (vc)  Sit to Supine: Minimum assistance (vc)  Scooting: Contact guard assistance; Additional time (vc)  Transfers:  Sit to Stand: Minimum assistance; Additional time (vc)  Stand to Sit: Contact guard assistance; Additional time (vc)  Balance:   Sitting: Intact  Standing: Intact; With support  Ambulation/Gait Training:  Distance (ft): 60 Feet (ft)  Assistive Device: Walker, rolling;Gait belt  Ambulation - Level of Assistance: Contact guard assistance  Gait Abnormalities: Antalgic;Decreased step clearance  Right Side Weight Bearing: As tolerated   Base of Support: Shift to left  Stance: Right decreased  Speed/Socorro: Slow  Step Length: Left shortened;Right shortened  Swing Pattern: Right asymmetrical;Left asymmetrical   Interventions: Safety awareness training;Verbal cues  Therapeutic Exercises:   HEP issued per MD protocol. Pain:  Pain Scale 1: Numeric (0 - 10)  Pain Intensity 1: 7  Pain Location 1: Back  Pain Orientation 1: Lower  Pain Description 1: Tightness  Pain Intervention(s) 1: Medication (see MAR) (flexeril)  Activity Tolerance:   fair  Please refer to the flowsheet for vital signs taken during this treatment. After treatment:   [ ]         Patient left in no apparent distress sitting up in chair  [X]         Patient left in no apparent distress in bed  [X]         Call bell left within reach  [X]         Nursing notified  [X]         Caregiver present  [ ]         Bed alarm activated      COMMUNICATION/EDUCATION:   [X]         Fall prevention education was provided and the patient/caregiver indicated understanding. [X]         Patient/family have participated as able in goal setting and plan of care. [X]         Patient/family agree to work toward stated goals and plan of care. [ ]         Patient understands intent and goals of therapy, but is neutral about his/her participation. [ ]         Patient is unable to participate in goal setting and plan of care.      Thank you for this referral.  Mariia Cohen, PT   Time Calculation: 40 mins  Eval Complexity: History: HIGH Complexity :3+ comorbidities / personal factors will impact the outcome/ POC Exam:MEDIUM Complexity : 3 Standardized tests and measures addressing body structure, function, activity limitation and / or participation in recreation  Presentation: LOW Complexity : Stable, uncomplicated  Clinical Decision Making:Low Complexity amb >30ft Overall Complexity:LOW

## 2017-05-31 NOTE — ANESTHESIA PREPROCEDURE EVALUATION
Anesthetic History   No history of anesthetic complications            Review of Systems / Medical History  Patient summary reviewed, nursing notes reviewed and pertinent labs reviewed    Pulmonary  Within defined limits                 Neuro/Psych     seizures (isolated/med reaction/not an active issue - per chart): well controlled         Cardiovascular    Hypertension: well controlled              Exercise tolerance: >4 METS     GI/Hepatic/Renal     GERD: well controlled           Endo/Other        Arthritis     Other Findings   Comments: Autoimmune  Chronic pain/failed back syndrome           Physical Exam    Airway  Mallampati: II  TM Distance: 4 - 6 cm  Neck ROM: normal range of motion   Mouth opening: Normal     Cardiovascular               Dental    Dentition: Caps/crowns     Pulmonary                 Abdominal         Other Findings            Anesthetic Plan    ASA: 2  Anesthesia type: general          Induction: Intravenous  Anesthetic plan and risks discussed with: Patient and Spouse      Extensive caps/soft palate variant

## 2017-05-31 NOTE — INTERVAL H&P NOTE
H&P Update:  [place] Ciaran was seen and examined. History and physical has been reviewed. The patient has been examined. There have been no significant clinical changes since the completion of the originally dated History and Physical.  Patient identified by surgeon; surgical site was confirmed by patient and surgeon.     Signed By: Sujit Abbasi MD     May 31, 2017 7:15 AM

## 2017-05-31 NOTE — ANESTHESIA POSTPROCEDURE EVALUATION
Post-Anesthesia Evaluation and Assessment    Cardiovascular Function/Vital Signs  Visit Vitals    /63    Pulse 62    Temp 37.2 °C (99 °F)    Resp 14    Ht 5' 3.5\" (1.613 m)    Wt 70.3 kg (155 lb)    SpO2 97%    BMI 27.03 kg/m2       Patient is status post Procedure(s):  RIGHT TOTAL HIP ARTHROPLASTY -  ANTERIOR APPROACH    W/NAVIGATION - SPEC POP. Nausea/Vomiting: Controlled. Postoperative hydration reviewed and adequate. Pain:  Pain Scale 1: FLACC (05/31/17 1008)  Pain Intensity 1: 0 (05/31/17 1008)   Managed. Neurological Status:   Neuro (WDL): Within Defined Limits (05/31/17 0627)   At baseline. Mental Status and Level of Consciousness: Baseline and appropriate for discharge. Pulmonary Status:   O2 Device: Nasal cannula (05/31/17 1008)   Adequate oxygenation and airway patent. Complications related to anesthesia: None    Post-anesthesia assessment completed. No concerns. Patient has met all discharge requirements.     Signed By: Hawa Larson MD    May 31, 2017

## 2017-05-31 NOTE — PERIOP NOTES
Patient accepted by Luc Raman RN, no further questions, Blood pressure 105/59, pulse 76, temperature 98.6 °F (37 °C), resp. rate 12, height 5' 3.5\" (1.613 m), weight 70.3 kg (155 lb), SpO2 95 %.

## 2017-05-31 NOTE — ROUTINE PROCESS
TRANSFER - IN REPORT:    Verbal report received from Maryana Wilson RN on Terrell Vel  being received from PACU for routine post - op. Report consisted of patients Situation, Background, Assessment and   Recommendations(SBAR). Information from the following report(s) SBAR, Kardex, OR Summary, Intake/Output and MAR was reviewed with the receiving nurse. Opportunity for questions and clarification was provided. Assessment to be completed upon patients arrival to unit and care assumed.

## 2017-05-31 NOTE — OP NOTES
RIGHT COMPUTER NAVIGATED DIRECT ANTERIOR HIP REPLACEMENT    Patient: Shauna Nelson MRN: [id number]  CSN: [id number]   YOB: 1950  Age: 69 y.o. Sex: female      Date of Surgery:5/31/2017    PREOPERATIVE DIAGNOSES   RIGHT HIP OSTEOARTHRITIS      POSTOPERATIVE DIAGNOSES   RIGHT HIP OSTEOARTHRITIS    PROCEDURE: Right press fit computer navigated direct anterior total hip arthroplasty using the [employer]. IMPLANTS:   Implant Name Type Inv. Item Serial No.  Lot No. LRB No. Used Action   CUP ACET SECTOR GRIPTION 52MM -- TI - [id number]  CUP ACET SECTOR GRIPTION 52MM -- TI  [facility] [employer]TH[employer] [id number] Right 1 Implanted   LINER ACET PINN NEUT [id number] -- ALTRX - [id number]  LINER ACET PINN NEUT [id number] -- ALTRX  [facility] ORTHOPEDICS [id number] Right 1 Implanted   PLUG ACET APCL H ELIM POS STP --  - [id number]  PLUG ACET APCL H ELIM POS STP --   [facility] ORTHOPED[employer] [id number] Right 1 Implanted   STEM FEM BPS SZ 6 STD OFFSET -- TRI-LOCK - [id number]  STEM FEM BPS SZ 6 STD OFFSET -- TRI-LOCK  [facility] ORTHOPEDICS [id number] Right 1 Implanted   HEAD FEM CER ARTC EZ 36M+8.5 -- [employer] DELTA - [id number]   HEAD FEM CER ARTC EZ 36M+8.5 -- Denia Miyamoto   [facility] ORTHOPEDICS [id number] Right 1 Implanted       SURGEON: Kurtis Garsia MD    ASSISTANTS: Roxanne Lai PA-C    ANESTHESIA: General    SPECIMENS TO PATHOLOGY:  * No specimens in log *    ESTIMATED BLOOD LOSS: 150cc    FINDINGS: Same    COMPLICATIONS: None    INDICATIONS:  A 69 y.o.  female with right severe coxarthrosis documented by clinical exam and x-ray. TThe patient has bone-on-bone arthritis by x-rays and has failed all conservative interventions including medications, weight loss, physical therapy, relative rest, ambulatory aids and injections.   The patient now presents for a right total hip arthroplasty due to constant pain with activities of daily living and diminished safety due to patients pain.  The patients operative leg has a -3 mm leg length discrepency clinically. The patient does feel that the operative leg is [place] than the nonoperative leg. OPERATION:  The patient was brought into the operating theater, and after adequate appropriate anesthesia the patient was placed on the Hector table. The right hip was prepped and draped for typical computer navigated anterior hip surgery. The opposite iliac crest had 2 small incisions made for the two 4.0mm Shantz half pins that were placed in the iliac crest using the [employer] Navigation guide. The pelvis tracker was then mounted to the guide. The pelvis was registered as well as the left and the right ASIS. Another small incision was made on the lateral ipsilateral lower leg just above the knee. A 2.0mm drill bit was used to make a hole in the lateral cortex and then leg length were measured using this hole and the pointer tracker. This leg length was verified. The analgesic cocktail used for the case was 50cc of .25% Marcaine with epinephrine mixed with 20cc of Exparel and 30cc of NS ( total of 100cc ). This was injected in the skin as well as the various muscle muscle groups encountered during the procedure using all 100cc's. A 9 cm incision was then made, 3 cm lateral to the anterior superior iliac spine, and 1 cm distal. The incision was deepened down to the fascia of the tensor fascia esequiel muscle, where the fascia was incised the length of the wound. A Cobra was placed just lateral to the anterior inferior iliac spine and just lateral to the capsule of the hip. The tensor fascia muscle was then retracted with the Charly, and the rectus femoris was retracted medially with another Charly. The ascending branches of the lateral femoral circumflex vessels were then clamped and electrocauterized. Using a Basilio elevator, the soft tissue was elevated off the anterior part of the femoral capsule, and a Cobra retractor was placed medially.  An anterolateral capsulotomy was then performed, from the acetabulum to the intertrochanteric line. The lateral capsule was excised, and the anterior capsule was elevated off the medial neck. The leg was then slightly externally rotated with traction and cut 1 fingerbreadth above the lesser trochanter. The corkscrew was inserted into the femoral head and it was removed easily rotating the head 180 degrees. A Cobra retractor was placed behind the acetabulum. A skinny Hohmann retractor was placed on the anterior part of the acetabulum rim, and then the labrum and any osteophytes around the acetabulum were resected. The acetabulum was registered using the pointer tracker. The pulvinar in the fovea was resected, and then the acetabulum was reamed in 45 degrees of abduction and the patient's natural anteversion. The reamer was medialized to the base of the fovea and then widened to 1mm less than the actual cup to be implanted. There was good peripheral fit with good bleeding bone. An  appropriately sized acetabular cup was selected to be implanted. The acetabulum was Simpulse lavage irrigated and then dried with a sponge stick. The cup was then seated fully with excellent purchase and good stability. The [employer] Navigation system helped select the appropriate abduction and abduction as well as using the patients anatomic landmarks. The final abduction was 37 degrees and the anteversion was 16 degrees. The anterior lip of the cup was just inside the natural acetabulum. The hole eliminator was then placed, and the appropriately sized acetabular liner was then Kulkarni-tapered into position. No screws were used in the acetabular cup(6.5 cancellous screws). Attention was now turned to the femur. The femoral hook was placed behind the femur. Traction was taken off the leg, and the hip was maximally externally rotated, adducted and extended. The hip was then brought up into the wound as maximally as possible.  A Mccann retractor was placed on the medial neck, and a large Hohmann was placed around the greater trochanter. The capsule, the obturator internus and the piriformis were then released from the inside of the greater trochanter, from anterior to posterior. The patient had excellent mobility of the femur. The bone closest to the greater trochanter, at the femoral neck, was then removed with a rongeur. A box osteotome was then used to open the canal, then the lateralizer, the starter broach and finally by the zero broach. This was then broached up to the appropriately size progressively, following the anteversion of the posterior neck of the femur. Once the broach was seated completely the calcar was planed to make the femoral neck cut smooth and even. There was excellent stability on torquing the femoral broach in the femoral canal. The patient was trialed with a  neck and with different neck lengths. The femur was reduced in the acetabulum and the hip was checked for stability clinically and the [employer] Navigation system was used for leg lengths. The appropriately sized femoral head gave excellent anterior stability. The hip could be rotated externally 90 degrees at the knee and placing a bone hook behind the femoral neck it could not be dislocated anteriorly. The leg length was +4 mm compared to pre-incision measurement. These components were selected for implantation. All trial components were removed. The femur was Simpulse lavaged, and  the appropriately sized femoral stem was impacted down the femur, with excellent purchase and rotational stability. The appropriately sized femoral head was Kulkarni tapered on top of the femoral component, reduced into the socket, and the patient had the exact same stability characteristics and leg lengths as noted previously. The wound was irrigated out. The fascia of the tensor muscle was closed with a running locking #1 Vicryl.  The skin was closed with inverted 2-0 Vicryls and then dermabonded ( Dermabond Ivan ) in 2 layers. The wound was dressed with a Mepilex dressing. The 3 small stab incisions used for the [employer] system were Dermabonded closed. The patient returned to the recovery room awake and in stable condition. All instrument, sponge, and needle counts were correct.     Joaquín Cortes MD  5/31/2017

## 2017-05-31 NOTE — PERIOP NOTES
Consulted with patient on plan of care to go to room and switch over to PO pain meds after next dosage of IV Dilaudid and she agreed with this plan of care

## 2017-05-31 NOTE — PERIOP NOTES
Pt. Offered restroom while in pre-op, declined at this present time. Patient placed on Angelica Paws for a minimum of 30 min in  Preop.

## 2017-05-31 NOTE — IP AVS SNAPSHOT
Current Discharge Medication List  
  
START taking these medications Dose & Instructions Dispensing Information Comments Morning Noon Evening Bedtime  
 aspirin 325 mg tablet Commonly known as:  ASPIRIN Your last dose was: Your next dose is:    
   
   
 Dose:  325 mg Take 1 Tab by mouth two (2) times a day. Take for 3 weeks for DVT prophylaxis. Quantity:  42 Tab Refills:  0 HYDROmorphone 2 mg tablet Commonly known as:  DILAUDID Your last dose was: Your next dose is:    
   
   
 Dose:  2-4 mg Take 1-2 Tabs by mouth every four (4) hours as needed for Pain. Max Daily Amount: 24 mg. Quantity:  60 Tab Refills:  0 CONTINUE these medications which have CHANGED Dose & Instructions Dispensing Information Comments Morning Noon Evening Bedtime  
 cyclobenzaprine 10 mg tablet Commonly known as:  FLEXERIL What changed:  when to take this Your last dose was: Your next dose is:    
   
   
 Dose:  10 mg Take 1 Tab by mouth three (3) times daily as needed. Indications: RA  
 Quantity:  90 Tab Refills:  1  
     
   
   
   
  
 furosemide 20 mg tablet Commonly known as:  LASIX What changed:   
- when to take this 
- reasons to take this Your last dose was: Your next dose is:    
   
   
 Dose:  20 mg Take 1 Tab by mouth daily. Indications: Edema Quantity:  90 Tab Refills:  1 CONTINUE these medications which have NOT CHANGED Dose & Instructions Dispensing Information Comments Morning Noon Evening Bedtime  
 calcium carbonate 500 mg calcium (1,250 mg) tablet Commonly known as:  OS-BARBARA Your last dose was: Your next dose is:    
   
   
 Dose:  1500 mg Take 1,500 mg by mouth daily. Refills:  0  
     
   
   
   
  
 COLACE 100 mg capsule Generic drug:  docusate sodium Your last dose was: Your next dose is:    
   
   
 Dose:  100 mg Take 100 mg by mouth daily. Refills:  0  
     
   
   
   
  
 lisinopril 10 mg tablet Commonly known as:  Adithya Ojeda Your last dose was: Your next dose is: TAKE 1 TABLET DAILY Quantity:  90 Tab Refills:  0 MELATONIN PO Your last dose was: Your next dose is:    
   
   
 Dose:  3 mg Take 3 mg by mouth as needed. Refills:  0  
     
   
   
   
  
 meloxicam 15 mg tablet Commonly known as:  MOBIC Your last dose was: Your next dose is: TAKE 1 TABLET DAILY Quantity:  90 Tab Refills:  0  
     
   
   
   
  
 traMADol 100 mg Tb24 Commonly known as:  ULTRAM-ER Your last dose was: Your next dose is:    
   
   
 Dose:  100 mg Take 100 mg by mouth daily. Refills:  0  
     
   
   
   
  
 TYLENOL PO Your last dose was: Your next dose is:    
   
   
 Dose:  500 mg Take 500 mg by mouth every six (6) hours as needed (pain maintanence). Refills:  0 STOP taking these medications ONE-A-DAY MAXIMUM FORMULA PO  
   
  
 TYLENOL-CODEINE #3 300-30 mg per tablet Generic drug:  acetaminophen-codeine XELJANZ 5 mg tab Generic drug:  tofacitinib Where to Get Your Medications Information on where to get these meds will be given to you by the nurse or doctor. ! Ask your nurse or doctor about these medications  
  aspirin 325 mg tablet HYDROmorphone 2 mg tablet

## 2017-05-31 NOTE — IP AVS SNAPSHOT
Eliceo Dillon 
 
 
 [address] [facility] [address] 
[phone] Patient: Ronel Carreon MRN: [id number] You are allergic to the following Allergen Reactions Celebrex (Celecoxib) Swelling Shellfish Derived Swelling \"makes me feel puffy\" Sulfa (Sulfonamide Antibiotics) Hives Swelling Lips swelling Humira (Adalimumab) Other (comments) Lupus Optiray 320 (Ioversol) Hives Itching Pt states when she gets iv contrast she itches a little from hives? Penicillins Hives Recent Documentation Height Weight BMI OB Status Smoking Status 1.613 m 70.3 kg 27.03 kg/m2 Hysterectomy Never Smoker Emergency Contacts Name Discharge Info Relation Home Work Mobile Zia Jeffries DISCHARGE CAREGIVER [3] Spouse [3] [phone] About your hospitalization You were admitted on:  May 31, 2017 You last received care in the:  [facility] [address] You were discharged on:  June 1, 2017 Unit phone number:  [phone] Why you were hospitalized Your primary diagnosis was:  Osteoarthritis Of Right Hip Your diagnoses also included:  H/O Calcium Pyrophosphate Deposition Disease (Cppd), Ibs (Irritable Bowel Syndrome), Ra (Rheumatoid Arthritis) (Hcc), Sicca Syndrome (Hcc) Providers Seen During Your Hospitalizations Provider Role Specialty Primary office phone Emil Barillas MD Attending Provider Orthopedic Surgery [phone] Your Primary Care Physician (PCP) Primary Care Physician Office Phone Office Fax Ethel Pump [phone] Follow-up Information Follow up With Details Comments Contact Info Emil Barillas MD On 6/12/2017 Follow up appointment @ 11:00am [address] [place] [address] 
[phone] Imelda Edwards MD   [address] [facility] [address] [place] [address] [phone] Current Discharge Medication List  
  
START taking these medications Dose & Instructions Dispensing Information Comments Morning Noon Evening Bedtime  
 aspirin 325 mg tablet Commonly known as:  ASPIRIN Your last dose was: Your next dose is:    
   
   
 Dose:  325 mg Take 1 Tab by mouth two (2) times a day. Take for 3 weeks for DVT prophylaxis. Quantity:  42 Tab Refills:  0 HYDROmorphone 2 mg tablet Commonly known as:  DILAUDID Your last dose was: Your next dose is:    
   
   
 Dose:  2-4 mg Take 1-2 Tabs by mouth every four (4) hours as needed for Pain. Max Daily Amount: 24 mg. Quantity:  60 Tab Refills:  0 CONTINUE these medications which have CHANGED Dose & Instructions Dispensing Information Comments Morning Noon Evening Bedtime  
 cyclobenzaprine 10 mg tablet Commonly known as:  FLEXERIL What changed:  when to take this Your last dose was: Your next dose is:    
   
   
 Dose:  10 mg Take 1 Tab by mouth three (3) times daily as needed. Indications: RA  
 Quantity:  90 Tab Refills:  1  
     
   
   
   
  
 furosemide 20 mg tablet Commonly known as:  LASIX What changed:   
- when to take this 
- reasons to take this Your last dose was: Your next dose is:    
   
   
 Dose:  20 mg Take 1 Tab by mouth daily. Indications: Edema Quantity:  90 Tab Refills:  1 CONTINUE these medications which have NOT CHANGED Dose & Instructions Dispensing Information Comments Morning Noon Evening Bedtime  
 calcium carbonate 500 mg calcium (1,250 mg) tablet Commonly known as:  OS-BARBARA Your last dose was: Your next dose is:    
   
   
 Dose:  1500 mg Take 1,500 mg by mouth daily. Refills:  0  
     
   
   
   
  
 COLACE 100 mg capsule Generic drug:  docusate sodium Your last dose was: Your next dose is:    
   
   
 Dose:  100 mg Take 100 mg by mouth daily. Refills:  0  
     
   
   
   
  
 lisinopril 10 mg tablet Commonly known as:  Barbarann Plunk Your last dose was: Your next dose is: TAKE 1 TABLET DAILY Quantity:  90 Tab Refills:  0 MELATONIN PO Your last dose was: Your next dose is:    
   
   
 Dose:  3 mg Take 3 mg by mouth as needed. Refills:  0  
     
   
   
   
  
 meloxicam 15 mg tablet Commonly known as:  MOBIC Your last dose was: Your next dose is: TAKE 1 TABLET DAILY Quantity:  90 Tab Refills:  0  
     
   
   
   
  
 traMADol 100 mg Tb24 Commonly known as:  ULTRAM-ER Your last dose was: Your next dose is:    
   
   
 Dose:  100 mg Take 100 mg by mouth daily. Refills:  0  
     
   
   
   
  
 TYLENOL PO Your last dose was: Your next dose is:    
   
   
 Dose:  500 mg Take 500 mg by mouth every six (6) hours as needed (pain maintanence). Refills:  0 STOP taking these medications ONE-A-DAY MAXIMUM FORMULA PO  
   
  
 TYLENOL-CODEINE #3 300-30 mg per tablet Generic drug:  acetaminophen-codeine XELJANZ 5 mg tab Generic drug:  tofacitinib Where to Get Your Medications Information on where to get these meds will be given to you by the nurse or doctor. ! Ask your nurse or doctor about these medications  
  aspirin 325 mg tablet HYDROmorphone 2 mg tablet Discharge Instructions Susana Cosme III, MD Chad Alvine, PA-C Lower Extremity Surgery Discharge Instructions Please take the time to review the following instructions before you leave the hospital and use them as guidelines during your recovery from surgery. If you have any questions you may contact my office at [phone]. Wound Care/Dressing Changes: You may change your dressing as needed. Beginning the 2 days after you are discharged from the hospital you can change your dressing daily. A big, bulky dressing isn't necessary as long as there isn't any drainage from the incisions. You will be shown how to change the dressings properly by the home health aids as well. There will be a piece of tape over your incision that resembles gauze. This tape should stay on and you should not attempt to remove it. Once you begin to get this wet in the shower this will begin to fall off on its own, although it will take days. Do not apply antibiotic ointment to your incisions. Showering/Bathing: You may shower 2 days after surgery. Your dressing may be removed for showering. You may get your incisions wet in the shower. Don't vigorously scrub the area where your incisions are. Please pat dry the incision. Apply a clean, dry dressing after drying off the area of your incisions. Don't take a tub bath, get in a swimming pool or Jacuzzi until the incisions are completely healed, and you are seen in the office by Dr. Angela Turpin. Do not soak your incisions under water. Do not get the dressing wet. Once you remove it two days from surgery, you may get the incision wet if there is no drainage. Weight Bearing Status/Braces/Activity: If you have had a total knee replacement you may weight bear as tolerated with the knee immobilizer in place. Use crutches, cane, or walker as needed. You should sleep in your knee immobilizer. You need to begin working on range of motion early after your surgery. It is very important to work on extension (straighthening) the knee. You will be advanced with walking and range of motion by physical therapy at home. If you have had a total hip replacement you may weight bear as tolerated.   Physical therapy with come by your house to help you perform exercises and work on strength and range of motion. Ice/Elevation: 
 
Continue ice and elevation consistently for 48 hours after surgery. If you have had a total knee replacement when elevating your knee elevate it on about 4 pillows to be sure it is above your heart. After 48 hours, you should ice your knee 3 times per day, for 20 minutes at a time for the next 5 days. After one week from surgery, you may use ice and elevation as needed for pain and swelling. Diet: 
 
You may advance to your regular diet as tolerated. Medication: 
 
1. You will be given a prescription for pain medications when you are discharged from the hospital.  Take the medication as needed according to the directions on the prescription bottle. Possible side effects of the medication include dizziness, headache, nausea, vomiting, constipation and urinary retention. If you experience any of these side effects call the office so that we can assist you in relieving them. Discontinue the use of the pain medication if you develop itching, rash, shortness of breath or difficulties swallowing. If these symptoms become severe or aren't relieved by discontinuing the medication you should seek immediate medical attention. Refills of pain medication are authorized during office hours only (8AM - 5PM Mon thru Fri). 2. If you were prescribed Percocet/oxycodone or Dilaudid/hydromorphone you must have a written prescription. These medications legally cannot be called in to a pharmacy. 3. Do not take Tylenol in addition to your pain medication as most of the pain medication already contains Tylenol. Exceptions include Dilaudid/hydromorphone, Demerol/meperidine and roxicodone. Do not exceed 3000 mg of Tylenol per day. Ex:  (hydrocodone 5/325mg = 325 mg of Tylenol) 4. You should use Aspirin 325 mg twice daily for 21 days from the date of your surgery.   This will help to prevent blood clots from forming in your legs.  This needs to be started the day after your surgery and home healthcare will teach you how this is done. If you are taking another medication such as Xarelto as discussed with Dr. Shayla Soria this should also be started the day after the surgery. 5. You may resume the medications you were taking prior to your surgery, unless otherwise specified in your discharge instructions. Pain medication may change the effects of any antidepressant medication. If you have any questions about possible interactions between your regular medications and the pain medication you should consult the physician who prescribes your regular medications. 6. If you had a total joint as an outpatient procedure and did not spend the night in the hospital, Dr. Shayla Soria has written for an oral antibiotic that you should take as instructed. This antibiotic is generally Keflex or Clindamycin. If you spent the night in the hospital the antibiotic was given to you through the IV and there is nothing else to take orally. Follow Up Appointment: If you are unsure of your follow-up appointment date and time, please call [phone]. Please let our  know you are scheduling a post-op appointment. Most appointments should be between 7-14 days after your surgery. Physical Therapy: 
 
   Physical therapy will be discussed with you at your first follow-up appointment with Dr. Shayla Soria. You don't need to begin physical therapy prior to that visit. You are to participate with [address] [place] [address] as arranged pre-operatively in the convience of your own home. Important signs and symptoms: 
 
If any of the following signs and symptoms occurs, you should contact Dr. Shayla Soria' office. Please be advised if a problem arises which you feel required immediate medical attention or your are unable to contact Dr. Shayla Soria' office you should seek immediate medical attention. Signs and symptoms to watch for include: 1. A sudden increase in swelling and/or redness or warmth at the area your surgery was performed which isn't relieved by rest, ice and elevation. 2. Oral temperature greater than 101.5 degrees for 12 hours or more which isn't relieved by an increase in fluid intake and taking two Tylenol every 4-6 hours. Do not exceed 3000 mg of Tylenol per day. 3. Excessive drainage from your incisions or drainage that hasn't stopped by 72 hours. 4. Calf pian, tenderness, redness or swelling which isn't relieved with rest and elevation. 5. Fever, chills, shortness of breath, chest pain, nausea, vomiting or other signs and symptoms which are of concern to you. Discharge Orders None ACO Transitions of Care Introducing [facility] [address] Lidia Fischer offers a voluntary care coordination program to provide high quality service and care to [facility] fee-for-service beneficiaries. Isidra Guy was designed to help you enhance your health and well-being through the following services: ? Transitions of Care  support for individuals who are transitioning from one care setting to another (example: Hospital to home). ? Chronic and Complex Care Coordination  support for individuals and caregivers of those with serious or chronic illnesses or with more than one chronic (ongoing) condition and those who take a number of different medications. If you meet specific medical criteria, a [facility] [address] may call you directly to coordinate your care with your primary care physician and your other care providers. For questions about the [facility] programs, please, contact your physicians office. For general questions or additional information about Accountable Care Organizations: 
Please visit www.medicare.gov/acos. html or call 1-800-MEDICARE (2-965.958.2416) TTY users should call 3-587.307.9578. Introducing [facility]! Keaton Osorio: Thank you for requesting a [employer] account. Our records indicate that you already have an active [employer] account. You can access your account anytime at [email or web address] Did you know that you can access your hospital and ER discharge instructions at any time in [employer]? You can also review all of your test results from your hospital stay or ER visit. Additional Information If you have questions, please visit the Frequently Asked Questions section of the [employer] website at [email or web address] Remember, [employer] is NOT to be used for urgent needs. For medical emergencies, dial 911. Now available from your iPhone and Android! General Information Please provide this summary of care documentation to your next provider. Patient Signature:  ____________________________________________________________ Date:  ____________________________________________________________  
  
Lashell Sanabria Provider Signature:  ____________________________________________________________ Date:  ____________________________________________________________

## 2017-05-31 NOTE — PERIOP NOTES
Patient rates level of pain at 10/10, relaxed in body appearence, easy respirations, BP less then pre indcution

## 2017-05-31 NOTE — PERIOP NOTES
TRANSFER - OUT REPORT:    Verbal report given to [address] Farooq RN(name) on Louisa Ehrenberg  being transferred to [facility], (unit) for routine progression of care       Report consisted of patients Situation, Background, Assessment and   Recommendations(SBAR). Information from the following report(s) SBAR, Procedure Summary, Intake/Output, MAR and Recent Results was reviewed with the receiving nurse. Lines:   Peripheral IV 69/88/15 Left Cephalic (Active)   Site Assessment Clean, dry, & intact 5/31/2017  9:30 AM   Phlebitis Assessment 0 5/31/2017  9:30 AM   Infiltration Assessment 0 5/31/2017  9:30 AM   Dressing Status Clean, dry, & intact 5/31/2017  9:30 AM   Dressing Type Tape;Transparent 5/31/2017  9:30 AM   Hub Color/Line Status Infusing 5/31/2017  9:30 AM        Opportunity for questions and clarification was provided. Patient transported with:   O2 @ 2 liters   Also reviewed history as recorded in EMR, EBL and pain management in PACU.

## 2017-05-31 NOTE — PERIOP NOTES
Room is 218 and nurse reviewing Dony Martinez, is the nurse.  Family briefed via [address] PACU at least 1 hour

## 2017-06-01 VITALS
WEIGHT: 155 LBS | HEART RATE: 83 BPM | TEMPERATURE: 98.9 F | RESPIRATION RATE: 18 BRPM | SYSTOLIC BLOOD PRESSURE: 91 MMHG | BODY MASS INDEX: 26.46 KG/M2 | OXYGEN SATURATION: 93 % | DIASTOLIC BLOOD PRESSURE: 54 MMHG | HEIGHT: 64 IN

## 2017-06-01 LAB
ANION GAP BLD CALC-SCNC: 7 MMOL/L (ref 3–18)
BUN SERPL-MCNC: 16 MG/DL (ref 7–18)
BUN/CREAT SERPL: 20 (ref 12–20)
CALCIUM SERPL-MCNC: 7.7 MG/DL (ref 8.5–10.1)
CHLORIDE SERPL-SCNC: 100 MMOL/L (ref 100–108)
CO2 SERPL-SCNC: 28 MMOL/L (ref 21–32)
CREAT SERPL-MCNC: 0.82 MG/DL (ref 0.6–1.3)
GLUCOSE SERPL-MCNC: 116 MG/DL (ref 74–99)
HCT VFR BLD AUTO: 24.9 % (ref 35–45)
HGB BLD-MCNC: 8.4 G/DL (ref 12–16)
POTASSIUM SERPL-SCNC: 3.9 MMOL/L (ref 3.5–5.5)
SODIUM SERPL-SCNC: 135 MMOL/L (ref 136–145)

## 2017-06-01 PROCEDURE — 97530 THERAPEUTIC ACTIVITIES: CPT

## 2017-06-01 PROCEDURE — 97110 THERAPEUTIC EXERCISES: CPT

## 2017-06-01 PROCEDURE — 74011250636 HC RX REV CODE- 250/636: Performed by: ORTHOPAEDIC SURGERY

## 2017-06-01 PROCEDURE — 85018 HEMOGLOBIN: CPT | Performed by: PHYSICIAN ASSISTANT

## 2017-06-01 PROCEDURE — 97535 SELF CARE MNGMENT TRAINING: CPT

## 2017-06-01 PROCEDURE — 80048 BASIC METABOLIC PNL TOTAL CA: CPT | Performed by: PHYSICIAN ASSISTANT

## 2017-06-01 PROCEDURE — 36415 COLL VENOUS BLD VENIPUNCTURE: CPT | Performed by: PHYSICIAN ASSISTANT

## 2017-06-01 PROCEDURE — 77030027138 HC INCENT SPIROMETER -A

## 2017-06-01 PROCEDURE — 97165 OT EVAL LOW COMPLEX 30 MIN: CPT

## 2017-06-01 PROCEDURE — 74011250637 HC RX REV CODE- 250/637: Performed by: PHYSICIAN ASSISTANT

## 2017-06-01 PROCEDURE — 97116 GAIT TRAINING THERAPY: CPT

## 2017-06-01 RX ORDER — HYDROMORPHONE HYDROCHLORIDE 2 MG/1
2-4 TABLET ORAL
Qty: 60 TAB | Refills: 0 | Status: SHIPPED | OUTPATIENT
Start: 2017-06-01 | End: 2017-09-12 | Stop reason: ALTCHOICE

## 2017-06-01 RX ORDER — HYDROMORPHONE HYDROCHLORIDE 2 MG/ML
1 INJECTION, SOLUTION INTRAMUSCULAR; INTRAVENOUS; SUBCUTANEOUS
Status: DISCONTINUED | OUTPATIENT
Start: 2017-06-01 | End: 2017-06-01

## 2017-06-01 RX ORDER — ASPIRIN 325 MG
325 TABLET ORAL 2 TIMES DAILY
Qty: 42 TAB | Refills: 0 | Status: SHIPPED | OUTPATIENT
Start: 2017-06-01 | End: 2017-09-12 | Stop reason: ALTCHOICE

## 2017-06-01 RX ORDER — KETOROLAC TROMETHAMINE 15 MG/ML
15 INJECTION, SOLUTION INTRAMUSCULAR; INTRAVENOUS EVERY 6 HOURS
Status: DISCONTINUED | OUTPATIENT
Start: 2017-06-01 | End: 2017-06-01

## 2017-06-01 RX ORDER — KETOROLAC TROMETHAMINE 15 MG/ML
15 INJECTION, SOLUTION INTRAMUSCULAR; INTRAVENOUS
Status: DISCONTINUED | OUTPATIENT
Start: 2017-06-01 | End: 2017-06-01 | Stop reason: HOSPADM

## 2017-06-01 RX ADMIN — HYDROMORPHONE HYDROCHLORIDE 4 MG: 2 TABLET ORAL at 12:08

## 2017-06-01 RX ADMIN — CYCLOBENZAPRINE HYDROCHLORIDE 10 MG: 10 TABLET, FILM COATED ORAL at 08:47

## 2017-06-01 RX ADMIN — Medication 1500 MG: at 08:48

## 2017-06-01 RX ADMIN — KETOROLAC TROMETHAMINE 15 MG: 15 INJECTION, SOLUTION INTRAMUSCULAR; INTRAVENOUS at 08:48

## 2017-06-01 RX ADMIN — KETOROLAC TROMETHAMINE 15 MG: 15 INJECTION, SOLUTION INTRAMUSCULAR; INTRAVENOUS at 00:44

## 2017-06-01 RX ADMIN — HYDROMORPHONE HYDROCHLORIDE 4 MG: 2 TABLET ORAL at 06:40

## 2017-06-01 RX ADMIN — HYDROMORPHONE HYDROCHLORIDE 2 MG: 2 TABLET ORAL at 02:08

## 2017-06-01 RX ADMIN — ASPIRIN 325 MG ORAL TABLET 325 MG: 325 PILL ORAL at 08:47

## 2017-06-01 NOTE — PROGRESS NOTES
Progress Note      Patient: Ale Lemons               Sex: female          DOA: 5/31/2017     YOB: 1950      Age:  69 y.o.        LOS:  LOS: 1 day     Status Post: Procedure(s):  RIGHT TOTAL HIP ARTHROPLASTY -  ANTERIOR APPROACH    W/[facility] POP  Surgery Date: 5/31/2017            Subjective:     Ale Lemons is a 69 y.o. female without c/o nausea. Pain reasonably well controlled with PO medications. Patient denies any complaint of calf pain/ SOB or difficulty breathing. Objective:      Visit Vitals    /75 (BP 1 Location: Right arm, BP Patient Position: At rest)    Pulse 75    Temp 97.9 °F (36.6 °C)    Resp 18    Ht 5' 3.5\" (1.613 m)    Wt 70.3 kg (155 lb)    SpO2 98%    BMI 27.03 kg/m2       Physical Exam:   Dressing:  clean, dry, intact  Wiggles Toes/Ankle  Foot sensation intact to light touch  No foot edema/ +1 Posterior Tibial Pulse  Leg Lengths appear equal  Calf soft, supple and non tender. Negative Homans sign    Xray - Looks good    Intake and Output:  Current Shift:  05/31 1901 - 06/01 0700  In: 2090.2 [P.O.:450; I.V.:1640.2]  Out: 800 [Urine:800]  Last three shifts:  05/30 0701 - 05/31 1900  In: 3000 [P.O.:800; I.V.:2200]  Out: 12 [Urine:300]  Voiding Status:  + void without need for Paige catheter    Lab/Data Reviewed:  Recent Labs      06/01/17   0217   HGB  8.4*   HCT  24.9*   NA  135*   K  3.9   BUN  16   CREA  0.82   GLU  116*         Medications Reviewed    Assessment/Plan     Principal Problem:    Osteoarthritis of right hip (5/30/2017)    Active Problems:    H/O calcium pyrophosphate deposition disease (CPPD) (5/30/2017)      IBS (irritable bowel syndrome) (5/30/2017)      RA (rheumatoid arthritis) ([facility] [address] (5/30/2017)      Sicca syndrome ([facility] [address] (5/30/2017)        · Change IV to Saline Lock. · Discharge Planning for home. · Begin DVT Prophylaxis - Aspirin 325 mg twice daily   · Continue PT WBAT, ROM as tolerated.   Continue exercises hourly using the physical therapy exercises sheet. · Discharge home today. The patient was seen and examined by Dr. Deloris Finn today as well and he is in agreement with above.

## 2017-06-01 NOTE — DISCHARGE INSTRUCTIONS
Austen Dorsey III, MD Hank Moos, PA-C    Lower Extremity Surgery  Discharge Instructions      Please take the time to review the following instructions before you leave the hospital and use them as guidelines during your recovery from surgery. If you have any questions you may contact my office at [phone]. Wound Care/Dressing Changes:    [x]   You may change your dressing as needed. Beginning the 2 days after you are discharged from the hospital you can change your dressing daily. A big, bulky dressing isn't necessary as long as there isn't any drainage from the incisions. You will be shown how to change the dressings properly by the home health aids as well. There will be a piece of tape over your incision that resembles gauze. This tape should stay on and you should not attempt to remove it. Once you begin to get this wet in the shower this will begin to fall off on its own, although it will take days. Do not apply antibiotic ointment to your incisions. Showering/Bathing:    [x]   You may shower 2 days after surgery. Your dressing may be removed for showering. You may get your incisions wet in the shower. Don't vigorously scrub the area where your incisions are. Please pat dry the incision. Apply a clean, dry dressing after drying off the area of your incisions. Don't take a tub bath, get in a swimming pool or Jacuzzi until the incisions are completely healed, and you are seen in the office by Dr. Tiburcio Perez. Do not soak your incisions under water. []   Do not get the dressing wet. Once you remove it two days from surgery, you may get the incision wet if there is no drainage. Weight Bearing Status/Braces/Activity:    []   If you have had a total knee replacement you may weight bear as tolerated with the knee immobilizer in place. Use crutches, cane, or walker as needed. You should sleep in your knee immobilizer.   You need to begin working on range of motion early after your surgery. It is very important to work on extension (straighthening) the knee. You will be advanced with walking and range of motion by physical therapy at home. [x]   If you have had a total hip replacement you may weight bear as tolerated. Physical therapy with come by your house to help you perform exercises and work on strength and range of motion. Ice/Elevation:    Continue ice and elevation consistently for 48 hours after surgery. If you have had a total knee replacement when elevating your knee elevate it on about 4 pillows to be sure it is above your heart. After 48 hours, you should ice your knee 3 times per day, for 20 minutes at a time for the next 5 days. After one week from surgery, you may use ice and elevation as needed for pain and swelling. Diet:    You may advance to your regular diet as tolerated. Medication:    1. You will be given a prescription for pain medications when you are discharged from the hospital.  Take the medication as needed according to the directions on the prescription bottle. Possible side effects of the medication include dizziness, headache, nausea, vomiting, constipation and urinary retention. If you experience any of these side effects call the office so that we can assist you in relieving them. Discontinue the use of the pain medication if you develop itching, rash, shortness of breath or difficulties swallowing. If these symptoms become severe or aren't relieved by discontinuing the medication you should seek immediate medical attention. Refills of pain medication are authorized during office hours only (8AM - 5PM Mon thru Fri). 2. If you were prescribed Percocet/oxycodone or Dilaudid/hydromorphone you must have a written prescription. These medications legally cannot be called in to a pharmacy. 3. Do not take Tylenol in addition to your pain medication as most of the pain medication already contains Tylenol.   Exceptions include Dilaudid/hydromorphone, Demerol/meperidine and roxicodone. Do not exceed 3000 mg of Tylenol per day. Ex:  (hydrocodone 5/325mg = 325 mg of Tylenol)  4. You should use Aspirin 325 mg twice daily for 21 days from the date of your surgery. This will help to prevent blood clots from forming in your legs. This needs to be started the day after your surgery and home healthcare will teach you how this is done. If you are taking another medication such as Xarelto as discussed with Dr. Mariana Stokes this should also be started the day after the surgery. 5. You may resume the medications you were taking prior to your surgery, unless otherwise specified in your discharge instructions. Pain medication may change the effects of any antidepressant medication. If you have any questions about possible interactions between your regular medications and the pain medication you should consult the physician who prescribes your regular medications. 6. If you had a total joint as an outpatient procedure and did not spend the night in the hospital, Dr. Mariana Stokes has written for an oral antibiotic that you should take as instructed. This antibiotic is generally Keflex or Clindamycin. If you spent the night in the hospital the antibiotic was given to you through the IV and there is nothing else to take orally. Follow Up Appointment:    If you are unsure of your follow-up appointment date and time, please call [phone]. Please let our  know you are scheduling a post-op appointment. Most appointments should be between 7-14 days after your surgery. Physical Therapy:    [x]   Physical therapy will be discussed with you at your first follow-up appointment with Dr. Mariana Stokes. You don't need to begin physical therapy prior to that visit. You are to participate with [address] [place] [address] as arranged pre-operatively in the convience of your own home.     Important signs and symptoms:    If any of the following signs and symptoms occurs, you should contact Dr. Toña Montesinos' office. Please be advised if a problem arises which you feel required immediate medical attention or your are unable to contact Dr. Toña Montesinos' office you should seek immediate medical attention. Signs and symptoms to watch for include:  1. A sudden increase in swelling and/or redness or warmth at the area your surgery was performed which isn't relieved by rest, ice and elevation. 2. Oral temperature greater than 101.5 degrees for 12 hours or more which isn't relieved by an increase in fluid intake and taking two Tylenol every 4-6 hours. Do not exceed 3000 mg of Tylenol per day. 3. Excessive drainage from your incisions or drainage that hasn't stopped by 72 hours. 4. Calf pian, tenderness, redness or swelling which isn't relieved with rest and elevation. 5. Fever, chills, shortness of breath, chest pain, nausea, vomiting or other signs and symptoms which are of concern to you.

## 2017-06-01 NOTE — PROGRESS NOTES
2145 - Patient in bed at this time. Patient A&Ox4, RA. Denies chest pain and SOB. 39J IV to let cephalic  intact and patent. SCD compression device bilaterally. Mepilex dressing to right hip CDI. Denies numbness. Tingling to right foot prior to admission. Pain 6/10 with a tolerable level of 4/10, pain medication administered per MAR. Pt educated on IS use, q2h rounds, pain management, CHG wipes and \"Up for Meals\". Pt verbalized understanding, no concerns voiced. Call bell within reach, bed in lowest position. 2340 - Pt c/o pain to left hip, ankles and back. Pt stated she take medication for RA and has not been taking that due to surgery on top of other medical issues. Informed pt I will contact doctor to request Toradol. 2344 - Paged doctor at this time. 5 - Doctor paged again at this time. 1 - Spoke with Dr. Roopa Sultana at this time. Telephone orders given for Toradol 15mg IV q6h and Dilaudid 1mg IV q4h for breakthrough pain. 65 - Spoke with Pharmacy regarding allergy to Celebrex. Pt is taking Meloxicam at home which is in the same family. Instructed to ask pt what the exact reactions is to Celebrex. [address] - Spoke with pt regarding Celebrex allergy. Pt states she just has general swelling that she believes is due to the Sulfa in Celebrex. Informed pt of the issues with Celebrex, Toradol and Mobic being in the same family. Pt stated she is willing to try Toradol. Informed pt I will speak with pharmacy again regarding issue with allergy. 65 - Spoke with pharmacist \"Low\". Informed that Mobic is more structurally close to Celebrex then Toradol. Instructed that if  pt is able to take Motrin then pt should be able to take Toradol. 5 - Spoke with pt regarding Motrin, pt stated that she is able to take Motrin. She also stated that she had a lot of medical issues going on at the time when she was taking Celebrex and had already had hives from another medication she had been on.  Pt is unsure if Celebrex allergy is truly from the Celebrex. Pt is willing to try Toradol will monitor pt for side effects. 3785 - Pt instructed to alert nurse if she experiences any swelling or side effects. Pt verbalized understanding. Will continue to monitor pt.    0122 - Pt remains free of swelling or any side effects. Patient ambulated OOB to BR with steady gait. No concerns voiced. Call bell within reach, bed in lowest position. 0206 - Patient rated pain 4/10, pain medication administered per MAR. No other concerns voiced at this time. Call bell within reach, bed in lowest position. 9363 - Patient ambulated OOB to BR with steady gait. No concerns voiced. Call bell within reach, bed in lowest position. 8784 - Patient ambulated OOB to BR with steady gait. Pain rated 2/10. Pt medicated prior to PT. Up in chair. No concerns voiced. Call bell and telephone within reach.

## 2017-06-01 NOTE — ROUTINE PROCESS
Bedside and Verbal shift change report given to SANTIAGO Plata RN by Tonya Logan RN. Report included the following information SBAR, Kardex, OR Summary, Intake/Output and MAR.

## 2017-06-01 NOTE — PROGRESS NOTES
2247 [facility] care of pt at this time. Pt in  Bed , eating breakfast with no signs of distress. Pt left with call light within reach and encouraged to call for assistance. 0845 Head to toe assessment completed at this time  Patient is A&OX4. Pt denies N/V chest pain and SOB or distress. Pt is calm and cooperative. Pedal pulses are present. Capillary refill less than 3 seconds. Skin in warm and dry with mepilex dressing on right hip and is CDI. Lungs are clear bilaterally. Patient instructed on use and reason for incentive spirometer. Bowel sounds are active. Abdomen is soft and non-tender. SCDS in place bilaterally . Positive dorsalis pedis pulse, sensation, warm. Tingling to left leg prior to admission  , denies numbness to lower extremities. 18 g needle in the R Cephalic. Pain scale explained to patient. Reasons for taking PRN meds explained to patient. Patient instructed to call for prn when needed. Pain level is 2 and 6 while moving. And medicated per MAR. Patient was oriented to call bell and bed function. Will continue to monitor      1210 Pt state pain as 3/10. Pt received medication as per MAR. Potential medication side effects explained to patient, patient verbalizes understanding, opportunities for questions provided. Patient stable, no apparent distress at this time, bed in locked position, call bell within reach. 1217 Dual AVS reviewed with cee NELSON RN. All medications reviewed individually with patient. Opportunities for questions and concerns provided. Patient discharged via car  Patient's arm band appropriate.

## 2017-06-01 NOTE — PROGRESS NOTES
Met with pt prior to discharge. Pt has spouse available to assist once home and has RW. FOC offered and pt chose [facility]  4187 for follow up; referral placed with CMS. Care Management Interventions  PCP Verified by CM:  Yes  Transition of Care Consult (CM Consult): [address]: No  Reason Outside [place]: Physician referred to specific agency ([facility])  Discharge Durable Medical Equipment: No (has RW)  Physical Therapy Consult: Yes  Occupational Therapy Consult: Yes  Current Support Network: Lives with Spouse, Own Home  Confirm Follow Up Transport: Family  Plan discussed with Pt/Family/Caregiver: Yes  Freedom of Choice Offered: Yes  Discharge Location  Discharge Placement: Home with home health

## 2017-06-01 NOTE — PROGRESS NOTES
1031 - Patient arrives to unit at this time. Admission completed at this time. Patient is A/O x 4. IV to left arm intact and patent. SCDs applied bilaterally. Mepilex dressing to right hip CDI. Reports numbness to bilateral legs which is baseline due to past back surgery. Pedal pulses palpable. Pain 7/10. Patient was oriented to the room to include use of call bell, meal ordering, and use of incentive spirometer. Patient was given explanation of \" up for dinner\" program and has verbalized understanding. Phone and call bell left within reach. Plan of care for the day addressed with patient. Educated on pain medication availability and possible side effects. 1508 - Patient ambulated to toilet at this time. 1935 - Bedside and Verbal shift change report given to Shalom Cui RN by Parminder Bedolla RN. Report included the following information SBAR, Kardex, OR Summary, Intake/Output and MAR.

## 2017-06-01 NOTE — PROGRESS NOTES
Problem: Self Care Deficits Care Plan (Adult)  Goal: *Acute Goals and Plan of Care (Insert Text)  Outcome: Resolved/Met Date Met:  06/01/17  OCCUPATIONAL THERAPY EVALUATION/DISCHARGE     Patient: Rhianna Corrales (2 y.o. female)  Date: 6/1/2017  Primary Diagnosis: RIGHT HIP OSTEOARTHRITIS  Osteoarthritis of right hip  Procedure(s) (LRB):  RIGHT TOTAL HIP ARTHROPLASTY -  ANTERIOR APPROACH    W/NAVIGATION - SPEC POP (Right) 1 Day Post-Op   Precautions:   Fall, WBAT      ASSESSMENT AND RECOMMENDATIONS:  Based on the objective data described below, the patient presents with ability to perform ADL tasks at baseline level. Pt supine in bed upon entering, agreeable to therapy. Pt completed supine to sit transfer with supervision. While seated EOB, pt donned tank top with set up assistance. Pt thread BLEs into underwear with supervision. Pt completed sit to stand transfer with supervision. While standing, pt pulled underwear up around hips with supervision. Pt completed functional/bathroom mobility, toilet transfer, all aspects of toileting, and grooming while standing at sink with supervision and verbal cues using RW. Pt doffed underwear with supervision. Pt donned dress with set up assistance. Pt returned to supine in bed with needs in reach. Pt with no further OT/ADL concerns at this time. Skilled occupational therapy is not indicated at this time. Education: Role of OT in acute care, plan of care, home safety, safety using RW during bathroom ADLs/mobility     Discharge Recommendations: Home Health  Further Equipment Recommendations for Discharge: N/A        SUBJECTIVE:   Patient stated Since you are from Occupational Therapy, I have trouble with my hands, my writing is jerky.       OBJECTIVE DATA SUMMARY:       Past Medical History:   Diagnosis Date    Abdominal abscess ([facility] [address] 2009    Arthritis       Rheumatoid    Autoimmune disease ([facility] [address]       Medically induced Lupus    Back pain      Chronic pain       lower back    Colitis      Diverticulitis      Failed back syndrome      GERD (gastroesophageal reflux disease)      Hypertension       when on cyclosporins    Ill-defined condition       large torus roof of mouth    Irritable bowel syndrome      Osteoporosis      Pneumonia      Seizures ([facility] [address] 6-1-2008     one episode- after high dose of epinepherine     Past Surgical History:   Procedure Laterality Date    HX ABDOMINAL WALL DEFECT REPAIR        HX APPENDECTOMY        HX BREAST REDUCTION        HX CATARACT REMOVAL Bilateral      HX GI         repair rectal prolaspe    HX GYN         dermoid cyst    HX HEENT         tonsillectomy    HX HYSTERECTOMY   1976    HX LUMBAR FUSION         x 2    HX ORTHOPAEDIC Bilateral       CTR    HX ORTHOPAEDIC Bilateral       arthroplasty thumbs    HX ORTHOPAEDIC Left       Ankle     HX SALPINGO-OOPHORECTOMY Bilateral      HX SHOULDER REPLACEMENT Left      HX UROLOGICAL         bladder repair     Barriers to Learning/Limitations: None  Compensate with: visual, verbal, tactile, kinesthetic cues/model  GCODES(GO):Self Care  Current  CI= 1-19%   Goal  CI= 1-19%   D/C  CI= 1-19%. The severity rating is based on the Other modified barthel index  Prior Level of Function/Home Situation: I with ADLs  Home Situation  Home Environment: Private residence  # Steps to Enter: 6  Rails to Enter: Yes  Hand Rails : Bilateral  One/Two Story Residence: Two story, live on 1st floor  Living Alone: No  Support Systems: Spouse/Significant Other/Partner  Patient Expects to be Discharged to[de-identified] Private residence  Current DME Used/Available at Home: [employer]     Cognitive/Behavioral Status:  Neurologic State: Alert  Orientation Level: Oriented X4  Cognition: Follows commands  Safety/Judgement: Fall prevention  Coordination:  Coordination: Within functional limits  Fine Motor Skills-Upper: Left Intact; Right Intact    Gross Motor Skills-Upper: Left Intact; Right Intact  Balance:  Sitting: Intact  Standing: Intact; With support  Strength:  Strength: Within functional limits  Tone & Sensation:  Tone: Normal  Sensation: Intact     Range of Motion:  AROM: Within functional limits     Functional Mobility and Transfers for ADLs:  Bed Mobility:  Supine to Sit: Supervision  Sit to Supine: Supervision  Scooting: Supervision  Transfers:  Sit to Stand: Supervision              Toilet Transfer : Supervision              Bathroom Mobility: Supervision/set up  ADL Assessment:  Oral Facial Hygiene/Grooming: Supervision     Upper Body Dressing: Supervision     Lower Body Dressing: Supervision     Toileting: Supervision     ADL Intervention:  Grooming  Grooming Assistance: Supervision/set up  Washing Face: Supervision/set-up  Washing Hands: Supervision/set-up     Upper Body Dressing Assistance  Dressing Assistance: Supervision/set-up  Pullover Shirt: Supervision/set-up     Lower Body Dressing Assistance  Dressing Assistance: Supervision/set up  Underpants: Supervision/set-up  Leg Crossed Method Used: No  Position Performed: Bending forward method;Seated edge of bed     Toileting  Toileting Assistance: Supervision/set up  Bladder Hygiene: Supervision/set-up  Clothing Management: Supervision/set-up     Cognitive Retraining  Safety/Judgement: Fall prevention     Pain:  Pre-treatment: 5  Post-treatment: 5  Activity Tolerance:   good  Please refer to the flowsheet for vital signs taken during this treatment. After treatment:   [ ]  Patient left in no apparent distress sitting up in chair  [X]  Patient left in no apparent distress in bed  [X]  Call bell left within reach  [ ]  Nursing notified  [ ]  Caregiver present  [ ]  Bed alarm activated      COMMUNICATION/EDUCATION:   Communication/Collaboration:  [X]      Home safety education was provided and the patient/caregiver indicated understanding. [X]      Patient/family have participated as able and agree with findings and recommendations.   [ ] Patient is unable to participate in plan of care at this time.      David Mazariegos MS OTR/L  Time Calculation: 38 mins

## 2017-06-02 ENCOUNTER — PATIENT OUTREACH (OUTPATIENT)
Dept: FAMILY MEDICINE CLINIC | Age: 67
End: 2017-06-02

## 2017-06-02 NOTE — PROGRESS NOTES
S/P THE [facility] discharge on 6/1/17 post right hip replacement    Contacted patient for post hospital follow up. Spoke with , who report that patient is asleep. This writer introduce self, role and reason for call.  report that home health nurse visited patient this morning and patient is currently asleep because \"she had a rough night. \"  Requested that this NN call back after 1300 today.

## 2017-06-05 ENCOUNTER — PATIENT OUTREACH (OUTPATIENT)
Dept: FAMILY MEDICINE CLINIC | Age: 67
End: 2017-06-05

## 2017-06-05 NOTE — PROGRESS NOTES
NNTOCIP  Patient admitted to [facility] on 5/31/17 to 6/1/17 for total right hip replacement. .      Course of hospitalization:  Patient underwent total right hip replacement on 5/31/17. Discharged home on 6/1/17 with home health. Pertinent labs:  Please see labs from 5/31/17. Hgb = 8.4 on 5/31/17; was 13.6 on 5/26/17. HCT= 24.9 on 5/31/17; was 40.6 on 5/26/17. Inpatient Consults: PT & OT    Discharge diagnoses:   See above    RRAT Score: [address] [facility] admissions in past year: >/= 1  ED admissions in past year: </= 1    Contacted patient for hospital follow up. Introduced self, role and reason for call. Verified 2 patient identifiers. Patient reports: she is doing \"very well. \" Stated that she is ambulating with a cane. Reports night-time T-max of 100.2 that returns to normal after taking Tylenol. Stated that home health nurse visited on Friday, 62/17 and will be visiting again today, 6/5/17. Patient denies: persistent fever and /or temperature of 100.3 or greater, bleeding or other drainage from incision site and/or other issues or concerns. ADL's:  Feeds self: self  Ambulates: yes    Self grooming: yes  Toileting: yes    DME: cane, walker, raised toilet seats and shower stool. Resources/Support: spouse    AMD: not on file    Reconciled home medications and reviewed allergies. Instructed to bring all medications with her to next appointment. Educated patient to monitor and report the following Red flags: persistent fever and/or temperature of 100.3 or grater, bleeding or other drainage from incision site or any new or concerning symptoms. Patient verbalized understanding of information discussed and is aware of  when to seek medical attention from PCP, urgent care or ED. Opportunity to ask questions was provided. Contact information was offered for future reference or further questions; patient declined stating that she prefers if NN call her instead.     Appointments:  Orthopedic surgeon on 6/12/17 at 11:00 am.  Offered appointment with PCP. Patient declined. Reported that she saw PCP, Dr. Acacia Thomas a week ago and will call if she need to see him. Patient aware of appointment with orthopedic surgeon on 6/12/17 and will arrange transportation. Potential Barriers to care  No apparent barriers to care identified at this time. Adherence to previous treatment and likelihood for follow-up:  Patient verbalized understanding of discharge instructions and need for follow up. Plan of Care/Goals:  Patient will contact surgeon of night-time fever persists. This represents Transitions of Care. Nurse Navigator spoke with patient within 1-2 business day(s) of discharge. Patient decline transition of care follow- up appointment with PCP. Patient has  appointment scheduled to follow-up with orthopedic surgeon, Dr. Kendrick Mcpherson on 6/12/17 at 11:00 am which is within 7-14 days of discharge.

## 2017-06-13 ENCOUNTER — PATIENT OUTREACH (OUTPATIENT)
Dept: FAMILY MEDICINE CLINIC | Age: 67
End: 2017-06-13

## 2017-06-13 DIAGNOSIS — M62.838 MUSCLE SPASM: ICD-10-CM

## 2017-06-13 RX ORDER — TRAMADOL HYDROCHLORIDE 100 MG/1
100 TABLET, EXTENDED RELEASE ORAL DAILY
OUTPATIENT
Start: 2017-06-13

## 2017-06-13 RX ORDER — CYCLOBENZAPRINE HCL 10 MG
10 TABLET ORAL
Qty: 90 TAB | Refills: 1 | OUTPATIENT
Start: 2017-06-13

## 2017-06-13 NOTE — TELEPHONE ENCOUNTER
This patient contacted office for the following prescriptions to be filled:    Medication requested :   Requested Prescriptions     Pending Prescriptions Disp Refills    traMADol (ULTRAM-ER) 100 mg Tb24       Sig: Take 1 Tab by mouth daily. Max Daily Amount: 100 mg.  cyclobenzaprine (FLEXERIL) 10 mg tablet 90 Tab 1     Sig: Take 1 Tab by mouth three (3) times daily as needed. Indications: RA       PCP: Dr. Spangler Pages or Print: Pharmacy: [facility] for TRAMADOL,   Mail order or Local pharmacy: Mail order [facility] for Flexeril   And [facility] for [facility]. Scheduled appointment if not seen by current providers in office: last appointment was 5/22/17, no future appointment has been scheduled at this time.

## 2017-06-13 NOTE — PROGRESS NOTES
Patient admitted to [facility] for a total right hip replacement on 5/31/17 to 6/1/17. Indication: osteoarthritis of right hip     Contacted patient for two week follow up. Verified 2 patient identifiers. Introduced self, role and reason for call. Patient reports: that she is doing well. Stated that she is now walking without the aid of cane or walker and home health nurse is coming today to discharge her from home nursing service. Stated that she will be doing 2 more days of home PT on Wednesday and Friday before moving to outpatient rehab center. Report that she is looking forward to PT sessions at outpatient rehab center where she will have more equipment available for exercise. Additionally, stated that she kept appointment with orthopedic surgeon, Dr. Muriel Osuna on 6/12/17; reported that her incision site is completely closed. Stated that she will continue to have her  drive her around until she is no longer using Tramadol for pain. Patient denies: pain, fever, dizziness, fast heart beat. Denied questions or concerns. Patient encouraged to continue to monitor for any new or concerning symptoms and to notify surgeon or PCP with any cocnerns.

## 2017-06-14 NOTE — TELEPHONE ENCOUNTER
Spoke with patient. She c/o having a fever last night of 101 and she has recently had sugery. I advised patient to call the surgeon's office. She said 'well they don't seem concerned until it's 102.' Patient wanted to make an appt to be seen here. I let her know that dr Heather Fagan was out of the office until next week and we had no available appointments with the other provider. Pt was given number to [place] location. Pt expressed clear understanding and had no further questions. Pt was also instructed to go ER if sxs worsened.

## 2017-06-15 ENCOUNTER — PATIENT MESSAGE (OUTPATIENT)
Dept: FAMILY MEDICINE CLINIC | Age: 67
End: 2017-06-15

## 2017-06-15 ENCOUNTER — TELEPHONE (OUTPATIENT)
Dept: FAMILY MEDICINE CLINIC | Age: 67
End: 2017-06-15

## 2017-06-15 NOTE — TELEPHONE ENCOUNTER
From: Alva Cardona  To: Terrence Davenport MD  Sent: 6/15/2017 12:20 AM EDT  Subject: Non-Urgent Medical Question    I am continuing to have a 99.5 degree temperature 2 weeks after total hip replacement. This is at night, for a couple hours. .but ruins my sleep and frankly worrisome. My surgeon is out of office to June 30, you are out of office to next week which leaves me with few options other than ER visit to [facility]. I will not go to [facility] for care, its too far to drive. I have an appointment to see you about this FEVER next thursday at 10:45 and for pain medication to help with the discomfort. Also, should I have urine and blood work done PRIOR to visit, as Wellness check is scheduled. I may cancel the wellness if its not possible to have blood work for cholestrol done prior see chart for last cholestrol test(never).

## 2017-06-20 ENCOUNTER — PATIENT OUTREACH (OUTPATIENT)
Dept: FAMILY MEDICINE CLINIC | Age: 67
End: 2017-06-20

## 2017-06-20 NOTE — PROGRESS NOTES
NN health screenings:    Mrs. Francisco Javier Jensen acknowledged being due for mammogram and she has appointment with Dr. Kassie Wolfe this Thursday and will obtain referral for mammogram then. Has had total hysterectomy in the past so is \"no longer due for pap smears\". Had colonoscopy a few years ago by Dr. Susan Wray so i have requested a report copy be sent to Dr. Kassie Wolfe.

## 2017-06-22 ENCOUNTER — OFFICE VISIT (OUTPATIENT)
Dept: FAMILY MEDICINE CLINIC | Age: 67
End: 2017-06-22

## 2017-06-22 VITALS
OXYGEN SATURATION: 96 % | RESPIRATION RATE: 15 BRPM | HEART RATE: 87 BPM | TEMPERATURE: 98.9 F | WEIGHT: 157 LBS | SYSTOLIC BLOOD PRESSURE: 124 MMHG | DIASTOLIC BLOOD PRESSURE: 78 MMHG | HEIGHT: 64 IN | BODY MASS INDEX: 26.8 KG/M2

## 2017-06-22 DIAGNOSIS — Z02.89 PAIN MANAGEMENT CONTRACT AGREEMENT: ICD-10-CM

## 2017-06-22 DIAGNOSIS — M62.838 MUSCLE SPASM: ICD-10-CM

## 2017-06-22 DIAGNOSIS — R50.81 FEVER IN OTHER DISEASES: ICD-10-CM

## 2017-06-22 DIAGNOSIS — M25.551 PAIN OF RIGHT HIP JOINT: Primary | ICD-10-CM

## 2017-06-22 RX ORDER — TRAMADOL HYDROCHLORIDE 100 MG/1
100 TABLET, EXTENDED RELEASE ORAL DAILY
Qty: 90 TAB | Refills: 1 | Status: SHIPPED | OUTPATIENT
Start: 2017-06-22 | End: 2018-05-22 | Stop reason: SDUPTHER

## 2017-06-22 RX ORDER — CYCLOBENZAPRINE HCL 10 MG
10 TABLET ORAL
Qty: 90 TAB | Refills: 1 | Status: SHIPPED | OUTPATIENT
Start: 2017-06-22 | End: 2018-05-22 | Stop reason: ALTCHOICE

## 2017-06-22 NOTE — PATIENT INSTRUCTIONS
Joint Pain: Care Instructions  Your Care Instructions  Many people have small aches and pains from overuse or injury to muscles and joints. Joint injuries often happen during sports or recreation, work tasks, or projects around the home. An overuse injury can happen when you put too much stress on a joint or when you do an activity that stresses the joint over and over, such as using the computer or rowing a boat. You can take action at home to help your muscles and joints get better. You should feel better in 1 to 2 weeks, but it can take 3 months or more to heal completely. Follow-up care is a key part of your treatment and safety. Be sure to make and go to all appointments, and call your doctor if you are having problems. It's also a good idea to know your test results and keep a list of the medicines you take. How can you care for yourself at home? · Do not put weight on the injured joint for at least a day or two. · For the first day or two after an injury, do not take hot showers or baths, and do not use hot packs. The heat could make swelling worse. · Put ice or a cold pack on the sore joint for 10 to 20 minutes at a time. Try to do this every 1 to 2 hours for the next 3 days (when you are awake) or until the swelling goes down. Put a thin cloth between the ice and your skin. · Wrap the injury in an elastic bandage. Do not wrap it too tightly because this can cause more swelling. · Prop up the sore joint on a pillow when you ice it or anytime you sit or lie down during the next 3 days. Try to keep it above the level of your heart. This will help reduce swelling. · Take an over-the-counter pain medicine, such as acetaminophen (Tylenol), ibuprofen (Advil, Motrin), or naproxen (Aleve). Read and follow all instructions on the label. · After 1 or 2 days of rest, begin moving the joint gently.  While the joint is still healing, you can begin to exercise using activities that do not strain or hurt the painful joint. When should you call for help? Call your doctor now or seek immediate medical care if:  · You have signs of infection, such as:  ¨ Increased pain, swelling, warmth, and redness. ¨ Red streaks leading from the joint. ¨ A fever. Watch closely for changes in your health, and be sure to contact your doctor if:  · Your movement or symptoms are not getting better after 1 to 2 weeks of home treatment. Where can you learn more? Go to http://camilo-robert.info/. Enter P205 in the search box to learn more about \"Joint Pain: Care Instructions. \"  Current as of: March 21, 2017  Content Version: 11.3  © 8250-1484 [employer]. Care instructions adapted under license by [employer] (which disclaims liability or warranty for this information). If you have questions about a medical condition or this instruction, always ask your healthcare professional. [address] any warranty or liability for your use of this information.

## 2017-06-22 NOTE — MR AVS SNAPSHOT
Visit Information Date & Time Provider Department Dept. Phone Encounter #  
 6/22/2017 10:45 AM Rebeca Lara MD [facility] [phone] [id number] Follow-up Instructions Return if symptoms worsen or fail to improve. Upcoming Health Maintenance Date Due Hepatitis C Screening 1950 FOBT Q 1 YEAR AGE 50-75 3/10/1998 GLAUCOMA SCREENING Q2Y 3/10/2013 Pneumococcal 65+ Low/Medium Risk (2 of 2 - PPSV23) 10/31/2015 BREAST CANCER SCRN MAMMOGRAM 6/5/2017 MEDICARE YEARLY EXAM 6/11/2017 INFLUENZA AGE 9 TO ADULT 8/1/2017 DTaP/Tdap/Td series (2 - Td) 11/15/2017 Allergies as of 6/22/2017  Review Complete On: 6/22/2017 By: Rebeca Lara MD  
  
 Severity Noted Reaction Type Reactions Celebrex [Celecoxib] High 11/06/2014   Systemic Swelling Shellfish Derived High 05/30/2017   Systemic Swelling \"makes me feel puffy\" Sulfa (Sulfonamide Antibiotics) High 05/18/2015    Hives, Swelling Lips swelling Humira [Adalimumab]  06/27/2013    Other (comments) Lupus Optiray 320 [Ioversol]  02/16/2013   Not Verified Hives, Itching Pt states when she gets iv contrast she itches a little from hives? Penicillins  02/19/2013    Hives Current Immunizations  Never Reviewed Name Date Influenza Vaccine 11/15/2007 12:00 AM  
 Pneumococcal Vaccine (Unspecified Type) 10/31/2010 Tdap 11/15/2007 12:00 AM  
  
 Not reviewed this visit You Were Diagnosed With   
  
 Codes Comments Pain of right hip joint    -  Primary ICD-10-CM: M25.551 ICD-9-CM: 719.45 Muscle spasm     ICD-10-CM: Y66.890 ICD-9-CM: 728.85 Pain management contract agreement     ICD-10-CM: Z02.89 ICD-9-CM: V68.89 Vitals BP Pulse Temp Resp Height(growth percentile) 124/78 (BP 1 Location: Right arm, BP Patient Position: Sitting) 87 98.9 °F (37.2 °C) (Temporal) 15 5' 3.5\" (1.613 m) Weight(growth percentile) SpO2 BMI OB Status Smoking Status 157 lb (71.2 kg) 96% 27.38 kg/m2 Hysterectomy Never Smoker Vitals History BMI and BSA Data Body Mass Index Body Surface Area  
 27.38 kg/m 2 1.79 m 2 Preferred Pharmacy Pharmacy Name [address] [phone] Your Updated Medication List  
  
   
This list is accurate as of: 6/22/17 11:24 AM.  Always use your most recent med list.  
  
  
  
  
 aspirin 325 mg tablet Commonly known as:  ASPIRIN Take 1 Tab by mouth two (2) times a day. Take for 3 weeks for DVT prophylaxis. calcium carbonate 500 mg calcium (1,250 mg) tablet Commonly known as:  OS-BARBARA Take 1,500 mg by mouth daily. COLACE 100 mg capsule Generic drug:  docusate sodium Take 100 mg by mouth daily. cyclobenzaprine 10 mg tablet Commonly known as:  FLEXERIL Take 1 Tab by mouth three (3) times daily as needed. Indications: RA  
  
 furosemide 20 mg tablet Commonly known as:  LASIX Take 1 Tab by mouth daily. Indications: Edema HYDROmorphone 2 mg tablet Commonly known as:  DILAUDID Take 1-2 Tabs by mouth every four (4) hours as needed for Pain. Max Daily Amount: 24 mg.  
  
 lisinopril 10 mg tablet Commonly known as:  PRINIVIL, ZESTRIL  
TAKE 1 TABLET DAILY MELATONIN PO Take 3 mg by mouth as needed. meloxicam 15 mg tablet Commonly known as:  MOBIC  
TAKE 1 TABLET DAILY  
  
 traMADol 100 mg Tb24 Commonly known as:  ULTRAM-ER Take 1 Tab by mouth daily. Max Daily Amount: 100 mg. Indications: Chronic Pain TYLENOL PO Take 500 mg by mouth every six (6) hours as needed (pain maintanence). Prescriptions Printed Refills  
 traMADol (ULTRAM-ER) 100 mg Tb24 1 Sig: Take 1 Tab by mouth daily. Max Daily Amount: 100 mg. Indications: Chronic Pain Class: Print Route: Oral  
  
Prescriptions Sent to Pharmacy Refills  
 cyclobenzaprine (FLEXERIL) 10 mg tablet 1 Sig: Take 1 Tab by mouth three (3) times daily as needed. Indications: RA  
 Class: Normal  
 Pharmacy: [address], [address] Ph #: [phone] Route: Oral  
  
Follow-up Instructions Return if symptoms worsen or fail to improve. Patient Instructions Joint Pain: Care Instructions Your Care Instructions Many people have small aches and pains from overuse or injury to muscles and joints. Joint injuries often happen during sports or recreation, work tasks, or projects around the home. An overuse injury can happen when you put too much stress on a joint or when you do an activity that stresses the joint over and over, such as using the computer or rowing a boat. You can take action at home to help your muscles and joints get better. You should feel better in 1 to 2 weeks, but it can take 3 months or more to heal completely. Follow-up care is a key part of your treatment and safety. Be sure to make and go to all appointments, and call your doctor if you are having problems. It's also a good idea to know your test results and keep a list of the medicines you take. How can you care for yourself at home? · Do not put weight on the injured joint for at least a day or two. · For the first day or two after an injury, do not take hot showers or baths, and do not use hot packs. The heat could make swelling worse. · Put ice or a cold pack on the sore joint for 10 to 20 minutes at a time. Try to do this every 1 to 2 hours for the next 3 days (when you are awake) or until the swelling goes down. Put a thin cloth between the ice and your skin. · Wrap the injury in an elastic bandage. Do not wrap it too tightly because this can cause more swelling. · Prop up the sore joint on a pillow when you ice it or anytime you sit or lie down during the next 3 days.  Try to keep it above the level of your heart. This will help reduce swelling. · Take an over-the-counter pain medicine, such as acetaminophen (Tylenol), ibuprofen (Advil, Motrin), or naproxen (Aleve). Read and follow all instructions on the label. · After 1 or 2 days of rest, begin moving the joint gently. While the joint is still healing, you can begin to exercise using activities that do not strain or hurt the painful joint. When should you call for help? Call your doctor now or seek immediate medical care if: 
· You have signs of infection, such as: 
¨ Increased pain, swelling, warmth, and redness. ¨ Red streaks leading from the joint. ¨ A fever. Watch closely for changes in your health, and be sure to contact your doctor if: 
· Your movement or symptoms are not getting better after 1 to 2 weeks of home treatment. Where can you learn more? Go to http://camilo-robert.info/. Enter P205 in the search box to learn more about \"Joint Pain: Care Instructions. \" Current as of: March 21, 2017 Content Version: 11.3 © 8557-9411 [employer] Care instructions adapted under license by [employer] (which disclaims liability or warranty for this information). If you have questions about a medical condition or this instruction, always ask your healthcare professional. [address] any warranty or liability for your use of this information. Introducing [facility]! Dear Jeffrey Valero: Thank you for requesting a [employer] account. Our records indicate that you already have an active [employer] account. You can access your account anytime at [email or web address]. [employer]/[facility] Did you know that you can access your hospital and ER discharge instructions at any time in [employer]? You can also review all of your test results from your hospital stay or ER visit. Additional Information If you have questions, please visit the Frequently Asked Questions section of the [employer] website at [email or web address] Remember, Cog[employer]ion is NOT to be used for urgent needs. For medical emergencies, dial 911. Now available from your iPhone and Android! Please provide this summary of care documentation to your next provider. Your primary care clinician is listed as [id number] [facility]. If you have any questions after today's visit, please call [phone].

## 2017-06-22 NOTE — PROGRESS NOTES
Chief Complaint   Patient presents with    Medication Refill     Pt reports having ongoing fever of around 99.5. Is unable to take RA meds d/t fever. Pt recently had hip replacement. C/o right shoulder pain. 1. Have you been to the ER, urgent care clinic since your last visit? Hospitalized since your last visit? No    2. Have you seen or consulted any other health care providers outside of the [address] [place] [address] since your last visit? Include any pap smears or colon screening.  No

## 2017-06-22 NOTE — PROGRESS NOTES
Gregorio Parra is a 69 y.o. female  presents for follow up. She has no complaints after her right hip surgery. She does however have intermittent fevers. HPI:   Patient has osteoarthritis , primarily affecting the shoulders, hips and hips. Pain control:           Current : improved   0/10           Worst pain over last week        6/10           Least pain over the last week   0/10  Activities of Daily Living:            well controlled            Are you able to do your normal daily activities?   all day   Patient Goals            Functional:  good            Pain: none  Adverse side effects:             Since starting your pain medicine are you experiencing any of the following?              ( Examples: Constipation, fatigue, lapses in memory, depression or sexual                        Dysfunction)   Is a Pain management Contract in the patient's chart? yes  Aberrant behaviors:              none(Early refill requests, lost prescriptions, lost medicine)  Pill count:             Is it consistent with patient's prescription? {Yes/No:30435:L: \"yes\"  Last urine drug screen:     [facility] Prescription Monitoring Program check performed:  yes        Treatment Care Team:  Patient Care Team:  Jenna Servin MD as PCP - General ([facility])  Brad Gilliam RN as Ambulatory Care Navigator ([facility])  Follow up contact scheduled for 1-4 weeks: yes      Allergies   Allergen Reactions    Celebrex [Celecoxib] Swelling    Shellfish Derived Swelling     \"makes me feel puffy\"    Sulfa (Sulfonamide Antibiotics) Hives and Swelling     Lips swelling    Humira [Adalimumab] Other (comments)     Lupus    Optiray 320 [Ioversol] Hives and Itching     Pt states when she gets iv contrast she itches a little from hives?     Penicillins Hives     Outpatient Prescriptions Marked as Taking for the 6/22/17 encounter (Office Visit) with Jenna Servin MD   Medication Sig Dispense Refill    aspirin (ASPIRIN) 325 mg tablet Take 1 Tab by mouth two (2) times a day. Take for 3 weeks for DVT prophylaxis. 42 Tab 0    HYDROmorphone (DILAUDID) 2 mg tablet Take 1-2 Tabs by mouth every four (4) hours as needed for Pain. Max Daily Amount: 24 mg. 60 Tab 0    traMADol (ULTRAM-ER) 100 mg Tb24 Take 100 mg by mouth daily.  meloxicam (MOBIC) 15 mg tablet TAKE 1 TABLET DAILY 90 Tab 0    lisinopril (PRINIVIL, ZESTRIL) 10 mg tablet TAKE 1 TABLET DAILY 90 Tab 0    furosemide (LASIX) 20 mg tablet Take 1 Tab by mouth daily. Indications: Edema (Patient taking differently: Take 20 mg by mouth daily as needed. Indications: Edema) 90 Tab 1    cyclobenzaprine (FLEXERIL) 10 mg tablet Take 1 Tab by mouth three (3) times daily as needed. Indications: RA (Patient taking differently: Take 10 mg by mouth three (3) times daily. Indications: RA) 90 Tab 1    calcium carbonate (OS-BARBARA) 500 mg calcium (1,250 mg) tablet Take 1,500 mg by mouth daily.  MELATONIN PO Take 3 mg by mouth as needed.  ACETAMINOPHEN (TYLENOL PO) Take 500 mg by mouth every six (6) hours as needed (pain maintanence).  docusate sodium (COLACE) 100 mg capsule Take 100 mg by mouth daily.        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    H/O calcium pyrophosphate deposition disease (CPPD) Z87.39    IBS (irritable bowel syndrome) K58.9    RA (rheumatoid arthritis) ([address] M06.9    Sicca syndrome ([facility] M35.00    Osteoarthritis of right hip M16.11     Past Medical History:   Diagnosis Date    Abdominal abscess ([address] 2009    Arthritis     Rheumatoid    Autoimmune disease ([facility]     Medically induced Lupus    Back pain     Chronic pain     lower back    Colitis     Diverticulitis     Failed back syndrome     GERD (gastroesophageal reflux disease)     Hypertension     when on cyclosporins    Ill-defined condition     large torus roof of mouth    Irritable bowel syndrome     Osteoporosis     Pneumonia     Seizures ([facility] 6-1-2008    one episode- after high dose of epinepherine     Social History     Social History    Marital status:      Spouse name: N/A    Number of children: N/A    Years of education: N/A     Social History Main Topics    Smoking status: Never Smoker    Smokeless tobacco: Never Used    Alcohol use No    Drug use: No    Sexual activity: No     Other Topics Concern    None     Social History Narrative     Family History   Problem Relation Age of Onset    Other Other      Orthopedic (Sister)    Arthritis-osteo Sister     Cancer Neg Hx     Diabetes Neg Hx     Heart Disease Neg Hx     Heart Attack Neg Hx     Hypertension Neg Hx     Stroke Neg Hx     Malignant Hyperthermia Neg Hx     Pseudocholinesterase Deficiency Neg Hx     Delayed Awakening Neg Hx     Post-op Nausea/Vomiting Neg Hx     Emergence Delirium Neg Hx     Post-op Cognitive Dysfunction Neg Hx         Review of Systems   Constitutional: Negative for chills and fever. Cardiovascular: Negative for chest pain and palpitations. Gastrointestinal: Negative for nausea and vomiting. Musculoskeletal: Positive for joint pain (left shoulder and right hip). Vitals:    06/22/17 1106   BP: 124/78   Pulse: 87   Resp: 15   Temp: 98.9 °F (37.2 °C)   TempSrc: Temporal   SpO2: 96%   Weight: 157 lb (71.2 kg)   Height: 5' 3.5\" (1.613 m)   PainSc:   7   PainLoc: Hip       Physical Exam   Constitutional: She is oriented to person, place, and time and well-developed, well-nourished, and in no distress. Cardiovascular: Normal rate and regular rhythm. Pulmonary/Chest: Effort normal and breath sounds normal.   Neurological: She is alert and oriented to person, place, and time. Skin: Skin is warm and dry. Nursing note and vitals reviewed.       Assessment/Plan ICD-10-CM ICD-9-CM    1. Pain of right hip joint M25.551 719.45 traMADol (ULTRAM-ER) 100 mg Tb24   2. Muscle spasm M62.838 728.85 cyclobenzaprine (FLEXERIL) 10 mg tablet   3. Pain management contract agreement Z02.89 V68.89      I have discussed the diagnosis with the patient and the intended plan of care as seen in the above orders. The patient has received an after-visit summary and questions were answered concerning future plans. I have discussed medication, side effects, and warnings with the patient in detail. The patient verbalized understanding and is in agreement with the plan of care. The patient will contact the office with any additional concerns.       Follow-up Disposition: Not on Donovan Hernandez MD

## 2017-06-23 ENCOUNTER — LAB ONLY (OUTPATIENT)
Dept: FAMILY MEDICINE CLINIC | Age: 67
End: 2017-06-23

## 2017-06-23 ENCOUNTER — HOSPITAL ENCOUNTER (OUTPATIENT)
Dept: LAB | Age: 67
Discharge: HOME | End: 2017-06-23
Payer: MEDICARE

## 2017-06-23 DIAGNOSIS — M62.838 MUSCLE SPASM: ICD-10-CM

## 2017-06-23 DIAGNOSIS — R50.81 FEVER IN OTHER DISEASES: ICD-10-CM

## 2017-06-23 DIAGNOSIS — Z01.89 ROUTINE LAB DRAW: Primary | ICD-10-CM

## 2017-06-23 LAB
BASOPHILS # BLD AUTO: 0 K/UL (ref 0–0.06)
BASOPHILS # BLD: 0 % (ref 0–2)
DIFFERENTIAL METHOD BLD: ABNORMAL
EOSINOPHIL # BLD: 0.4 K/UL (ref 0–0.4)
EOSINOPHIL NFR BLD: 6 % (ref 0–5)
ERYTHROCYTE [DISTWIDTH] IN BLOOD BY AUTOMATED COUNT: 13.4 % (ref 11.6–14.5)
HCT VFR BLD AUTO: 38.9 % (ref 35–45)
HGB BLD-MCNC: 12.3 G/DL (ref 12–16)
LYMPHOCYTES # BLD AUTO: 39 % (ref 21–52)
LYMPHOCYTES # BLD: 2.3 K/UL (ref 0.9–3.6)
MCH RBC QN AUTO: 31.4 PG (ref 24–34)
MCHC RBC AUTO-ENTMCNC: 31.6 G/DL (ref 31–37)
MCV RBC AUTO: 99.2 FL (ref 74–97)
MONOCYTES # BLD: 0.4 K/UL (ref 0.05–1.2)
MONOCYTES NFR BLD AUTO: 7 % (ref 3–10)
NEUTS SEG # BLD: 2.9 K/UL (ref 1.8–8)
NEUTS SEG NFR BLD AUTO: 48 % (ref 40–73)
PLATELET # BLD AUTO: 381 K/UL (ref 135–420)
PMV BLD AUTO: 9.6 FL (ref 9.2–11.8)
RBC # BLD AUTO: 3.92 M/UL (ref 4.2–5.3)
WBC # BLD AUTO: 6 K/UL (ref 4.6–13.2)

## 2017-06-23 PROCEDURE — 80061 LIPID PANEL: CPT | Performed by: FAMILY MEDICINE

## 2017-06-23 PROCEDURE — 80053 COMPREHEN METABOLIC PANEL: CPT | Performed by: FAMILY MEDICINE

## 2017-06-23 PROCEDURE — 85025 COMPLETE CBC W/AUTO DIFF WBC: CPT | Performed by: FAMILY MEDICINE

## 2017-06-23 NOTE — PROGRESS NOTES
Patient came into office today for lab draw. Patient identified by name and date of birth. Performed venipuncture in patients right AC. Patient tolerated procedure well.

## 2017-06-24 LAB
ALBUMIN SERPL BCP-MCNC: 4.1 G/DL (ref 3.4–5)
ALBUMIN/GLOB SERPL: 1.4 {RATIO} (ref 0.8–1.7)
ALP SERPL-CCNC: 115 U/L (ref 45–117)
ALT SERPL-CCNC: 12 U/L (ref 13–56)
ANION GAP BLD CALC-SCNC: 13 MMOL/L (ref 3–18)
AST SERPL W P-5'-P-CCNC: 13 U/L (ref 15–37)
BILIRUB SERPL-MCNC: 0.3 MG/DL (ref 0.2–1)
BUN SERPL-MCNC: 20 MG/DL (ref 7–18)
BUN/CREAT SERPL: 23 (ref 12–20)
CALCIUM SERPL-MCNC: 8.8 MG/DL (ref 8.5–10.1)
CHLORIDE SERPL-SCNC: 102 MMOL/L (ref 100–108)
CHOLEST SERPL-MCNC: 160 MG/DL
CO2 SERPL-SCNC: 23 MMOL/L (ref 21–32)
CREAT SERPL-MCNC: 0.86 MG/DL (ref 0.6–1.3)
GLOBULIN SER CALC-MCNC: 2.9 G/DL (ref 2–4)
GLUCOSE SERPL-MCNC: 87 MG/DL (ref 74–99)
HDLC SERPL-MCNC: 65 MG/DL (ref 40–60)
HDLC SERPL: 2.5 {RATIO} (ref 0–5)
LDLC SERPL CALC-MCNC: 82.4 MG/DL (ref 0–100)
LIPID PROFILE,FLP: ABNORMAL
POTASSIUM SERPL-SCNC: 4.6 MMOL/L (ref 3.5–5.5)
PROT SERPL-MCNC: 7 G/DL (ref 6.4–8.2)
SODIUM SERPL-SCNC: 138 MMOL/L (ref 136–145)
TRIGL SERPL-MCNC: 63 MG/DL (ref ?–150)
VLDLC SERPL CALC-MCNC: 12.6 MG/DL

## 2017-06-28 ENCOUNTER — OFFICE VISIT (OUTPATIENT)
Dept: FAMILY MEDICINE CLINIC | Age: 67
End: 2017-06-28

## 2017-06-28 VITALS
HEART RATE: 78 BPM | TEMPERATURE: 98.6 F | HEIGHT: 64 IN | SYSTOLIC BLOOD PRESSURE: 118 MMHG | RESPIRATION RATE: 16 BRPM | DIASTOLIC BLOOD PRESSURE: 80 MMHG | OXYGEN SATURATION: 98 %

## 2017-06-28 DIAGNOSIS — Z13.39 SCREENING FOR ALCOHOLISM: ICD-10-CM

## 2017-06-28 DIAGNOSIS — Z12.31 ENCOUNTER FOR SCREENING MAMMOGRAM FOR MALIGNANT NEOPLASM OF BREAST: ICD-10-CM

## 2017-06-28 DIAGNOSIS — J01.20 SUBACUTE ETHMOIDAL SINUSITIS: ICD-10-CM

## 2017-06-28 DIAGNOSIS — Z71.89 ACP (ADVANCE CARE PLANNING): ICD-10-CM

## 2017-06-28 DIAGNOSIS — Z00.00 ROUTINE GENERAL MEDICAL EXAMINATION AT A HEALTH CARE FACILITY: Primary | ICD-10-CM

## 2017-06-28 RX ORDER — CIPROFLOXACIN 250 MG/1
250 TABLET, FILM COATED ORAL EVERY 12 HOURS
Qty: 20 TAB | Refills: 0 | Status: SHIPPED | OUTPATIENT
Start: 2017-06-28 | End: 2017-07-08

## 2017-06-28 NOTE — ACP (ADVANCE CARE PLANNING)
Advance Care Planning (ACP) Provider Conversation Snapshot    Date of ACP Conversation: 06/28/17  Persons included in Conversation:  patient  Length of ACP Conversation in minutes:  16 minutes    Authorized Decision Maker (if patient is incapable of making informed decisions):    This person is:   Healthcare Agent/Medical Power of  under Advance Directive          For Patients with Decision Making Capacity:   Values/Goals: Exploration of values, goals, and preferences if recovery is not expected, even with continued medical treatment in the event of:  Imminent death  Severe, permanent brain injury    Conversation Outcomes / Follow-Up Plan:   Recommended completion of Advance Directive form after review of ACP materials and conversation with prospective healthcare agent

## 2017-06-28 NOTE — PROGRESS NOTES
This is a Subsequent Medicare Annual Wellness Visit providing Personalized Prevention Plan Services (PPPS) (Performed 12 months after initial AWV and PPPS )    I have reviewed the patient's medical history in detail and updated the computerized patient record. History     Past Medical History:   Diagnosis Date    Abdominal abscess ([facility] [address] 2009    Arthritis     Rheumatoid    Autoimmune disease ([facility] [address]     Medically induced Lupus    Back pain     Chronic pain     lower back    Colitis     Diverticulitis     Failed back syndrome     GERD (gastroesophageal reflux disease)     Hypertension     when on cyclosporins    Ill-defined condition     large torus roof of mouth    Irritable bowel syndrome     Osteoporosis     Pneumonia     Seizures ([facility] [address] 6-1-2008    one episode- after high dose of epinepherine      Past Surgical History:   Procedure Laterality Date    HX ABDOMINAL WALL DEFECT REPAIR      HX APPENDECTOMY      HX BREAST REDUCTION      HX CATARACT REMOVAL Bilateral     HX GI      repair rectal prolaspe    HX GYN      dermoid cyst    HX HEENT      tonsillectomy    HX HYSTERECTOMY  1976    HX LUMBAR FUSION      x 2    HX ORTHOPAEDIC Bilateral     CTR    HX ORTHOPAEDIC Bilateral     arthroplasty thumbs    HX ORTHOPAEDIC Left     Ankle     HX SALPINGO-OOPHORECTOMY Bilateral     HX SHOULDER REPLACEMENT Left     HX UROLOGICAL      bladder repair     Current Outpatient Prescriptions   Medication Sig Dispense Refill    traMADol (ULTRAM-ER) 100 mg Tb24 Take 1 Tab by mouth daily. Max Daily Amount: 100 mg. Indications: Chronic Pain 90 Tab 1    cyclobenzaprine (FLEXERIL) 10 mg tablet Take 1 Tab by mouth three (3) times daily as needed. Indications: RA 90 Tab 1    aspirin (ASPIRIN) 325 mg tablet Take 1 Tab by mouth two (2) times a day. Take for 3 weeks for DVT prophylaxis. 42 Tab 0    HYDROmorphone (DILAUDID) 2 mg tablet Take 1-2 Tabs by mouth every four (4) hours as needed for Pain.  Max Daily Amount: 24 mg. 60 Tab 0    meloxicam (MOBIC) 15 mg tablet TAKE 1 TABLET DAILY 90 Tab 0    lisinopril (PRINIVIL, ZESTRIL) 10 mg tablet TAKE 1 TABLET DAILY 90 Tab 0    furosemide (LASIX) 20 mg tablet Take 1 Tab by mouth daily. Indications: Edema (Patient taking differently: Take 20 mg by mouth daily as needed. Indications: Edema) 90 Tab 1    calcium carbonate (OS-BARBARA) 500 mg calcium (1,250 mg) tablet Take 1,500 mg by mouth daily.  MELATONIN PO Take 3 mg by mouth as needed.  ACETAMINOPHEN (TYLENOL PO) Take 500 mg by mouth every six (6) hours as needed (pain maintanence).  docusate sodium (COLACE) 100 mg capsule Take 100 mg by mouth daily. Allergies   Allergen Reactions    Celebrex [Celecoxib] Swelling    Shellfish Derived Swelling     \"makes me feel puffy\"    Sulfa (Sulfonamide Antibiotics) Hives and Swelling     Lips swelling    Humira [Adalimumab] Other (comments)     Lupus    Optiray 320 [Ioversol] Hives and Itching     Pt states when she gets iv contrast she itches a little from hives?     Penicillins Hives     Family History   Problem Relation Age of Onset    Other Other      Orthopedic (Sister)    Arthritis-osteo Sister     Cancer Neg Hx     Diabetes Neg Hx     Heart Disease Neg Hx     Heart Attack Neg Hx     Hypertension Neg Hx     Stroke Neg Hx     Malignant Hyperthermia Neg Hx     Pseudocholinesterase Deficiency Neg Hx     Delayed Awakening Neg Hx     Post-op Nausea/Vomiting Neg Hx     Emergence Delirium Neg Hx     Post-op Cognitive Dysfunction Neg Hx      Social History   Substance Use Topics    Smoking status: Never Smoker    Smokeless tobacco: Never Used    Alcohol use No     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    H/O calcium pyrophosphate deposition disease (CPPD) Z87.39    IBS (irritable bowel syndrome) K58.9    RA (rheumatoid arthritis) ([facility]) M06.9    Sicca syndrome ([facility]) M35.00    Osteoarthritis of right hip M16.11    Pain management contract agreement Z02.89       Depression Risk Factor Screening:     PHQ over the last two weeks 6/13/2017   PHQ Not Done -   Little interest or pleasure in doing things Not at all   Feeling down, depressed or hopeless Not at all   Total Score PHQ 2 0     Alcohol Risk Factor Screening: On any occasion during the past 3 months, have you had more than 3 drinks containing alcohol? No    Do you average more than 7 drinks per week? No        Functional Ability and Level of Safety:     Hearing Loss   none    Activities of Daily Living   Self-care. Requires assistance with: no ADLs    Fall Risk   Fall Risk Assessment, last 12 mths 6/13/2017   Able to walk? Yes   Fall in past 12 months? Yes   Fall with injury? No   Number of falls in past 12 months 1   Fall Risk Score 1     Abuse Screen   Patient is not abused    Review of Systems   Pertinent items are noted in HPI. Physical Examination     Evaluation of Cognitive Function:  Mood/affect:  sad  Appearance: age appropriate  Family member/caregiver input: none      Patient Care Team:  Nano Mcneal MD as PCP - General (Family Practice)  Inocente Willoughby RN as Ambulatory Care Navigator [facility])    Advice/Referrals/Counseling   Education and counseling provided:  Screening Mammography      Assessment/Plan   current treatment plan is effective, no change in therapy. Patient will get pneumonia shot at a later time. Copies of 5 yr plan given to patient.     Nano Mcneal MD

## 2017-06-28 NOTE — PATIENT INSTRUCTIONS
Medicare Wellness Visit, Female    The best way to live healthy is to have a healthy lifestyle by eating a well-balanced diet, exercising regularly, limiting alcohol and stopping smoking. Regular physical exams and screening tests are another way to keep healthy. Preventive exams provided by your health care provider can find health problems before they become diseases or illnesses. Preventive services including immunizations, screening tests, monitoring and exams can help you take care of your own health. All people over age 72 should have a pneumovax  and and a prevnar shot to prevent pneumonia. These are once in a lifetime unless you and your provider decide differently. All people over 65 should have a yearly flu shot and a tetanus vaccine every 10 years. A bone mass density to screen for osteoporosis or thinning of the bones should be done every 2 years after 65. Screening for diabetes mellitus with a blood sugar test should be done every year. Glaucoma is a disease of the eye due to increased ocular pressure that can lead to blindness and it should be done every year by an eye professional.    Cardiovascular screening tests that check for elevated lipids (fatty part of blood) which can lead to heart disease and strokes should be done every 5 years. Colorectal screening that evaluates for blood or polyps in your colon should be done yearly as a stool test or every five years as a flexible sigmoidoscope or every 10 years as a colonoscopy up to age 76. Breast cancer screening with a mammogram is recommended biennially  for women age 54-69. Screening for cervical cancer with a pap smear and pelvic exam is recommended for women after age 72 years every 2 years up to age 79 or when the provider and patient decide to stop. If there is a history of cervical abnormalities or other increased risk for cancer then the test is recommended yearly.     Hepatitis C screening is also recommended for anyone born between [birth year] through [address]. A shingles vaccine is also recommended once in a lifetime after age 61. Your Medicare Wellness Exam is recommended annually. Here is a list of your current Health Maintenance items with a due date:  Health Maintenance Due   Topic Date Due    Hepatitis C Test  1950    Stool testing for trace blood  03/10/1998    Glaucoma Screening   03/10/2013    Breast Cancer Screening  06/05/2017    Annual Well Visit  06/11/2017     Mammo ordered. Advance Directives: Care Instructions  Your Care Instructions  An advance directive is a legal way to state your wishes at the end of your life. It tells your family and your doctor what to do if you can no longer say what you want. There are two main types of advance directives. You can change them any time that your wishes change. · A living will tells your family and your doctor your wishes about life support and other treatment. · A durable power of  for health care lets you name a person to make treatment decisions for you when you can't speak for yourself. This person is called a health care agent. If you do not have an advance directive, decisions about your medical care may be made by a doctor or a  who doesn't know you. It may help to think of an advance directive as a gift to the people who care for you. If you have one, they won't have to make tough decisions by themselves. Follow-up care is a key part of your treatment and safety. Be sure to make and go to all appointments, and call your doctor if you are having problems. It's also a good idea to know your test results and keep a list of the medicines you take. How can you care for yourself at home? · Discuss your wishes with your loved ones and your doctor. This way, there are no surprises. · Many states have a unique form. Or you might use a universal form that has been approved by many states.  This kind of form can sometimes be completed and stored online. Your electronic copy will then be available wherever you have a connection to the Internet. In most cases, doctors will respect your wishes even if you have a form from a different state. · You don't need a  to do an advance directive. But you may want to get legal advice. · Think about these questions when you prepare an advance directive:  ¨ Who do you want to make decisions about your medical care if you are not able to? Many people choose a family member or close friend. ¨ Do you know enough about life support methods that might be used? If not, talk to your doctor so you understand. ¨ What are you most afraid of that might happen? You might be afraid of having pain, losing your independence, or being kept alive by machines. ¨ Where would you prefer to die? Choices include your home, a hospital, or a nursing home. ¨ Would you like to have information about hospice care to support you and your family? ¨ Do you want to donate organs when you die? ¨ Do you want certain Zoroastrian practices performed before you die? If so, put your wishes in the advance directive. · Read your advance directive every year, and make changes as needed. When should you call for help? Be sure to contact your doctor if you have any questions. Where can you learn more? Go to http://camilo-robert.info/. Enter R264 in the search box to learn more about \"Advance Directives: Care Instructions. \"  Current as of: November 17, 2016  Content Version: 11.3  © 6610-0327 [employer]. Care instructions adapted under license by [employer] (which disclaims liability or warranty for this information). If you have questions about a medical condition or this instruction, always ask your healthcare professional. [place] [address] any warranty or liability for your use of this information.

## 2017-06-28 NOTE — MR AVS SNAPSHOT
Visit Information Date & Time Provider Department Dept. Phone Encounter #  
 6/28/2017  5:00 PM Geri Desai MD [facility] [phone] [id number] Follow-up Instructions Return if symptoms worsen or fail to improve. Upcoming Health Maintenance Date Due Hepatitis C Screening 1950 FOBT Q 1 YEAR AGE 50-75 3/10/1998 GLAUCOMA SCREENING Q2Y 3/10/2013 BREAST CANCER SCRN MAMMOGRAM 6/5/2017 MEDICARE YEARLY EXAM 6/11/2017 Pneumococcal 65+ Low/Medium Risk (2 of 2 - PPSV23) 8/25/2017* INFLUENZA AGE 9 TO ADULT 8/1/2017 DTaP/Tdap/Td series (2 - Td) 11/15/2017 *Topic was postponed. The date shown is not the original due date. Allergies as of 6/28/2017  Review Complete On: 6/28/2017 By: Geri Desai MD  
  
 Severity Noted Reaction Type Reactions Celebrex [Celecoxib] High 11/06/2014   Systemic Swelling Shellfish Derived High 05/30/2017   Systemic Swelling \"makes me feel puffy\" Sulfa (Sulfonamide Antibiotics) High 05/18/2015    Hives, Swelling Lips swelling Humira [Adalimumab]  06/27/2013    Other (comments) Lupus Optiray 320 [Ioversol]  02/16/2013   Not Verified Hives, Itching Pt states when she gets iv contrast she itches a little from hives? Penicillins  02/19/2013    Hives Current Immunizations  Never Reviewed Name Date Influenza Vaccine 11/15/2007 12:00 AM  
 Pneumococcal Vaccine (Unspecified Type) 10/31/2010 Tdap 11/15/2007 12:00 AM  
  
 Not reviewed this visit You Were Diagnosed With   
  
 Codes Comments Routine general medical examination at a health care facility    -  Primary ICD-10-CM: Z00.00 ICD-9-CM: V70.0 Screening for alcoholism     ICD-10-CM: Z13.89 ICD-9-CM: V79.1 Encounter for screening mammogram for malignant neoplasm of breast     ICD-10-CM: Z12.31 
ICD-9-CM: V76.12   
 ACP (advance care planning)     ICD-10-CM: Z71.89 ICD-9-CM: V65.49 Vitals BP Pulse Temp Resp Height(growth percentile) SpO2  
 118/80 (BP 1 Location: Left arm, BP Patient Position: Sitting) 78 98.6 °F (37 °C) (Temporal) 16 5' 3.5\" (1.613 m) 98% OB Status Smoking Status Hysterectomy Never Smoker Vitals History Preferred Pharmacy Pharmacy Name Phone [address] [facility] [phone] Your Updated Medication List  
  
   
This list is accurate as of: 6/28/17  5:13 PM.  Always use your most recent med list.  
  
  
  
  
 aspirin 325 mg tablet Commonly known as:  ASPIRIN Take 1 Tab by mouth two (2) times a day. Take for 3 weeks for DVT prophylaxis. calcium carbonate 500 mg calcium (1,250 mg) tablet Commonly known as:  OS-BARBARA Take 1,500 mg by mouth daily. COLACE 100 mg capsule Generic drug:  docusate sodium Take 100 mg by mouth daily. cyclobenzaprine 10 mg tablet Commonly known as:  FLEXERIL Take 1 Tab by mouth three (3) times daily as needed. Indications: RA  
  
 furosemide 20 mg tablet Commonly known as:  LASIX Take 1 Tab by mouth daily. Indications: Edema HYDROmorphone 2 mg tablet Commonly known as:  DILAUDID Take 1-2 Tabs by mouth every four (4) hours as needed for Pain. Max Daily Amount: 24 mg.  
  
 lisinopril 10 mg tablet Commonly known as:  PRINIVIL, ZESTRIL  
TAKE 1 TABLET DAILY MELATONIN PO Take 3 mg by mouth as needed. meloxicam 15 mg tablet Commonly known as:  MOBIC  
TAKE 1 TABLET DAILY  
  
 traMADol 100 mg Tb24 Commonly known as:  ULTRAM-ER Take 1 Tab by mouth daily. Max Daily Amount: 100 mg. Indications: Chronic Pain TYLENOL PO Take 500 mg by mouth every six (6) hours as needed (pain maintanence). Follow-up Instructions Return if symptoms worsen or fail to improve. To-Do List   
 07/01/2017 Imaging:  [facility] MAMMO BI SCREENING INCL CAD Patient Instructions Medicare Wellness Visit, Female The best way to live healthy is to have a healthy lifestyle by eating a well-balanced diet, exercising regularly, limiting alcohol and stopping smoking. Regular physical exams and screening tests are another way to keep healthy. Preventive exams provided by your health care provider can find health problems before they become diseases or illnesses. Preventive services including immunizations, screening tests, monitoring and exams can help you take care of your own health. All people over age 72 should have a pneumovax  and and a prevnar shot to prevent pneumonia. These are once in a lifetime unless you and your provider decide differently. All people over 65 should have a yearly flu shot and a tetanus vaccine every 10 years. A bone mass density to screen for osteoporosis or thinning of the bones should be done every 2 years after 65. Screening for diabetes mellitus with a blood sugar test should be done every year. Glaucoma is a disease of the eye due to increased ocular pressure that can lead to blindness and it should be done every year by an eye professional. 
 
Cardiovascular screening tests that check for elevated lipids (fatty part of blood) which can lead to heart disease and strokes should be done every 5 years. Colorectal screening that evaluates for blood or polyps in your colon should be done yearly as a stool test or every five years as a flexible sigmoidoscope or every 10 years as a colonoscopy up to age 76. Breast cancer screening with a mammogram is recommended biennially  for women age 54-69. Screening for cervical cancer with a pap smear and pelvic exam is recommended for women after age 72 years every 2 years up to age 79 or when the provider and patient decide to stop. If there is a history of cervical abnormalities or other increased risk for cancer then the test is recommended yearly. Hepatitis C screening is also recommended for anyone born between [birth year] through [address]. A shingles vaccine is also recommended once in a lifetime after age 61. Your Medicare Wellness Exam is recommended annually. Here is a list of your current Health Maintenance items with a due date: 
Health Maintenance Due Topic Date Due  
 Hepatitis C Test  1950  Stool testing for trace blood  03/10/1998  Glaucoma Screening   03/10/2013  Breast Cancer Screening  06/05/2017 [facility] Annual Well Visit  06/11/2017 Mammo ordered. Advance Directives: Care Instructions Your Care Instructions An advance directive is a legal way to state your wishes at the end of your life. It tells your family and your doctor what to do if you can no longer say what you want. There are two main types of advance directives. You can change them any time that your wishes change. · A living will tells your family and your doctor your wishes about life support and other treatment. · A durable power of  for health care lets you name a person to make treatment decisions for you when you can't speak for yourself. This person is called a health care agent. If you do not have an advance directive, decisions about your medical care may be made by a doctor or a  who doesn't know you. It may help to think of an advance directive as a gift to the people who care for you. If you have one, they won't have to make tough decisions by themselves. Follow-up care is a key part of your treatment and safety. Be sure to make and go to all appointments, and call your doctor if you are having problems. It's also a good idea to know your test results and keep a list of the medicines you take. How can you care for yourself at home? · Discuss your wishes with your loved ones and your doctor. This way, there are no surprises. · Many states have a unique form.  Or you might use a universal form that has been approved by many states. This kind of form can sometimes be completed and stored online. Your electronic copy will then be available wherever you have a connection to the Internet. In most cases, doctors will respect your wishes even if you have a form from a different state. · You don't need a  to do an advance directive. But you may want to get legal advice. · Think about these questions when you prepare an advance directive: ¨ Who do you want to make decisions about your medical care if you are not able to? Many people choose a family member or close friend. ¨ Do you know enough about life support methods that might be used? If not, talk to your doctor so you understand. ¨ What are you most afraid of that might happen? You might be afraid of having pain, losing your independence, or being kept alive by machines. ¨ Where would you prefer to die? Choices include your home, a hospital, or a nursing home. ¨ Would you like to have information about hospice care to support you and your family? ¨ Do you want to donate organs when you die? ¨ Do you want certain Anabaptist practices performed before you die? If so, put your wishes in the advance directive. · Read your advance directive every year, and make changes as needed. When should you call for help? Be sure to contact your doctor if you have any questions. Where can you learn more? Go to http://camilo-robert.info/. Enter R264 in the search box to learn more about \"Advance Directives: Care Instructions. \" Current as of: November 17, 2016 Content Version: 11.3 © 0567-7700 [employer]. Care instructions adapted under license by [employer] (which disclaims liability or warranty for this information).  If you have questions about a medical condition or this instruction, always ask your healthcare professional. [address] any warranty or liability for your use of this information. Introducing [facility]! Dear Jurline Goodpasture: Thank you for requesting a blabfeed account. Our records indicate that you already have an active blabfeed account. You can access your account anytime at [email or web address] Did you know that you can access your hospital and ER discharge instructions at any time in blabfeed? You can also review all of your test results from your hospital stay or ER visit. Additional Information If you have questions, please visit the Frequently Asked Questions section of the blabfeed website at [email or web address] Remember, blabfeed is NOT to be used for urgent needs. For medical emergencies, dial 911. Now available from your iPhone and Android! Please provide this summary of care documentation to your next provider. Your primary care clinician is listed as [id number] [address] [facility]. If you have any questions after today's visit, please call [phone].

## 2017-06-30 ENCOUNTER — PATIENT OUTREACH (OUTPATIENT)
Dept: FAMILY MEDICINE CLINIC | Age: 67
End: 2017-06-30

## 2017-06-30 NOTE — PROGRESS NOTES
S/P THE [facility] discharge on 6/1/17 post right hip replacement    Placed phone call to patient for follow up. No answer. Obtain recording that voice mailbox is not set-up. . Will attempt to contact at a later date.

## 2017-07-03 ENCOUNTER — PATIENT OUTREACH (OUTPATIENT)
Dept: FAMILY MEDICINE CLINIC | Age: 67
End: 2017-07-03

## 2017-07-03 DIAGNOSIS — R60.0 BILATERAL EDEMA OF LOWER EXTREMITY: ICD-10-CM

## 2017-07-03 NOTE — PROGRESS NOTES
[facility] for a total right hip replacement on 5/31/17 to 6/1/17. Attempted to reach patient for LESLEE follow-up. Obtain recordings that voice mailbox is full at mobile phone and not set up on home phone. Will attempt to reach patient at a later date.

## 2017-07-05 ENCOUNTER — PATIENT OUTREACH (OUTPATIENT)
Dept: FAMILY MEDICINE CLINIC | Age: 67
End: 2017-07-05

## 2017-07-05 ENCOUNTER — TELEPHONE (OUTPATIENT)
Dept: FAMILY MEDICINE CLINIC | Age: 67
End: 2017-07-05

## 2017-07-05 RX ORDER — FUROSEMIDE 20 MG/1
TABLET ORAL
Qty: 90 TAB | Refills: 0 | Status: SHIPPED | OUTPATIENT
Start: 2017-07-05 | End: 2017-10-03 | Stop reason: SDUPTHER

## 2017-07-05 NOTE — LETTER
7/5/2017 1:37 PM 
 
Ms. Gupta Marker [address] [address] Dear  Ms. Deja Nader, 
 
I tried to reach you by phone and was not successful in doing so. I wanted to follow-up from our last conversation. I can be reached at [phone] if you have questions. Thanks for allowing me to participate in managing your healthcare. I hope that you are doing well and I look forward to assisting you in the future. Sincerely, 
 
Maryellen Rushing, RN   
 
Maryellen Rushing, MS, BSN, RN-BC Nurse [address] [place], [place] [address] [place], [address] Office: [phone]     Fax: [phone] Email: [email or web address]

## 2017-07-05 NOTE — TELEPHONE ENCOUNTER
Pt has been made aware, per dr Nicole Willis, to continue taking current antibiotic. Pt expressed clear understanding and had no further questions.

## 2017-07-05 NOTE — TELEPHONE ENCOUNTER
Patient called stating she has an acute bacterial infection. She had a C-Reactive protein according to her test from [facility]. She stated she is currently on an antibiotic from Dr. Anuja Zhu, but wants to know will that be enough to rid of the infection. She would like a return call.

## 2017-07-10 ENCOUNTER — CLINICAL SUPPORT (OUTPATIENT)
Dept: FAMILY MEDICINE CLINIC | Age: 67
End: 2017-07-10

## 2017-07-10 DIAGNOSIS — Z23 ENCOUNTER FOR IMMUNIZATION: Primary | ICD-10-CM

## 2017-07-10 NOTE — PROGRESS NOTES
Pt received pneumonia shot. Consent signed. All issues/concerns discussed. Name/ verified. No reaction noted. Pt tolerated well.

## 2017-07-12 ENCOUNTER — HOSPITAL ENCOUNTER (OUTPATIENT)
Dept: NUCLEAR MEDICINE | Age: 67
Discharge: HOME | End: 2017-07-12
Attending: ORTHOPAEDIC SURGERY
Payer: MEDICARE

## 2017-07-12 DIAGNOSIS — M25.512 LEFT SHOULDER PAIN: ICD-10-CM

## 2017-07-12 PROCEDURE — 78805 NM INFLAM PROC LTD: CPT

## 2017-07-13 DIAGNOSIS — I10 ESSENTIAL HYPERTENSION WITH GOAL BLOOD PRESSURE LESS THAN 130/85: ICD-10-CM

## 2017-07-13 DIAGNOSIS — M62.838 MUSCLE SPASM: ICD-10-CM

## 2017-07-13 RX ORDER — LISINOPRIL 10 MG/1
TABLET ORAL
Qty: 90 TAB | Refills: 1 | Status: SHIPPED | OUTPATIENT
Start: 2017-07-13 | End: 2018-05-22 | Stop reason: SDUPTHER

## 2017-07-13 RX ORDER — MELOXICAM 15 MG/1
TABLET ORAL
Qty: 90 TAB | Refills: 1 | Status: SHIPPED | OUTPATIENT
Start: 2017-07-13 | End: 2017-10-26

## 2017-07-14 ENCOUNTER — HOSPITAL ENCOUNTER (OUTPATIENT)
Dept: NUCLEAR MEDICINE | Age: 67
Discharge: HOME | End: 2017-07-14
Attending: ORTHOPAEDIC SURGERY
Payer: MEDICARE

## 2017-07-14 DIAGNOSIS — M25.512 LEFT SHOULDER PAIN: ICD-10-CM

## 2017-07-14 PROCEDURE — 78315 BONE IMAGING 3 PHASE: CPT

## 2017-07-25 ENCOUNTER — OFFICE VISIT (OUTPATIENT)
Dept: FAMILY MEDICINE CLINIC | Age: 67
End: 2017-07-25

## 2017-07-25 VITALS
DIASTOLIC BLOOD PRESSURE: 72 MMHG | TEMPERATURE: 97.9 F | SYSTOLIC BLOOD PRESSURE: 120 MMHG | HEART RATE: 74 BPM | OXYGEN SATURATION: 98 % | RESPIRATION RATE: 16 BRPM

## 2017-07-25 DIAGNOSIS — M25.512 CHRONIC LEFT SHOULDER PAIN: ICD-10-CM

## 2017-07-25 DIAGNOSIS — M25.552 LEFT HIP PAIN: ICD-10-CM

## 2017-07-25 DIAGNOSIS — R51.9 SINUS HEADACHE: Primary | ICD-10-CM

## 2017-07-25 DIAGNOSIS — G89.29 CHRONIC LEFT SHOULDER PAIN: ICD-10-CM

## 2017-07-25 RX ORDER — CETIRIZINE HCL 10 MG
TABLET ORAL
COMMUNITY
End: 2017-10-24

## 2017-07-25 NOTE — PROGRESS NOTES
Casi Mendoza is a 69 y.o. female  presents for follow up. She has pain in left shoulder. She has sinus drainage and assoc headache. Allergies   Allergen Reactions    Celebrex [Celecoxib] Swelling    Shellfish Derived Swelling     \"makes me feel puffy\"    Sulfa (Sulfonamide Antibiotics) Hives and Swelling     Lips swelling    Humira [Adalimumab] Other (comments)     Lupus    Optiray 320 [Ioversol] Hives and Itching     Pt states when she gets iv contrast she itches a little from hives?  Penicillins Hives     Outpatient Prescriptions Marked as Taking for the 7/25/17 encounter (Office Visit) with Deepika Clark MD   Medication Sig Dispense Refill    cetirizine (ZYRTEC) 10 mg tablet Take  by mouth.  meloxicam (MOBIC) 15 mg tablet TAKE 1 TABLET DAILY 90 Tab 1    lisinopril (PRINIVIL, ZESTRIL) 10 mg tablet TAKE 1 TABLET DAILY 90 Tab 1    furosemide (LASIX) 20 mg tablet TAKE 1 TABLET DAILY FOR EDEMA 90 Tab 0    traMADol (ULTRAM-ER) 100 mg Tb24 Take 1 Tab by mouth daily. Max Daily Amount: 100 mg. Indications: Chronic Pain 90 Tab 1    cyclobenzaprine (FLEXERIL) 10 mg tablet Take 1 Tab by mouth three (3) times daily as needed. Indications: RA 90 Tab 1    aspirin (ASPIRIN) 325 mg tablet Take 1 Tab by mouth two (2) times a day. Take for 3 weeks for DVT prophylaxis. 42 Tab 0    HYDROmorphone (DILAUDID) 2 mg tablet Take 1-2 Tabs by mouth every four (4) hours as needed for Pain. Max Daily Amount: 24 mg. 60 Tab 0    calcium carbonate (OS-BARBARA) 500 mg calcium (1,250 mg) tablet Take 1,500 mg by mouth daily.  MELATONIN PO Take 3 mg by mouth as needed.  ACETAMINOPHEN (TYLENOL PO) Take 500 mg by mouth every six (6) hours as needed (pain maintanence).  docusate sodium (COLACE) 100 mg capsule Take 100 mg by mouth daily.        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    H/O calcium pyrophosphate deposition disease (CPPD) Z87.39    IBS (irritable bowel syndrome) K58.9    RA [facility]rheumatoid arthritis) ([facility]) M06.9    Sicca syndrome ([facility]) M35.00    Osteoarthritis of right hip M16.11    Pain management contract agreement Z02.89    ACP (advance care planning) Z71.89     Past Medical History:   Diagnosis Date    Abdominal abscess ([facility] [address] 2009    Arthritis     Rheumatoid    Autoimmune disease ([facility] [address]     Medically induced Lupus    Back pain     Chronic pain     lower back    Colitis     Diverticulitis     Failed back syndrome     GERD (gastroesophageal reflux disease)     Hypertension     when on cyclosporins    Ill-defined condition     large torus roof of mouth    Irritable bowel syndrome     Osteoporosis     Pneumonia     Seizures ([facility] [address] 6-1-2008    one episode- after high dose of epinepherine     Social History     Social History    Marital status:      Spouse name: N/A    Number of children: N/A    Years of education: N/A     Social History Main Topics    Smoking status: Never Smoker    Smokeless tobacco: Never Used    Alcohol use No    Drug use: No    Sexual activity: No     Other Topics Concern    Not on file     Social History Narrative     Family History   Problem Relation Age of Onset    Other Other      Orthopedic (Sister)    Arthritis-osteo Sister     Cancer Neg Hx     Diabetes Neg Hx     Heart Disease Neg Hx     Heart Attack Neg Hx     Hypertension Neg Hx     Stroke Neg Hx     Malignant Hyperthermia Neg Hx     Pseudocholinesterase Deficiency Neg Hx     Delayed Awakening Neg Hx     Post-op Nausea/Vomiting Neg Hx     Emergence Delirium Neg Hx     Post-op Cognitive Dysfunction Neg Hx         Review of Systems   Constitutional: Negative for chills and fever. Gastrointestinal: Negative for nausea and vomiting. Skin: Negative for rash. Vitals:    07/25/17 0912   BP: 120/72   Pulse: 74   Resp: 16   Temp: 97.9 °F (36.6 °C)   TempSrc: Temporal   SpO2: 98%   PainSc:   0 - No pain       Physical Exam   Constitutional: She is well-developed, well-nourished, and in no distress. Eyes: Conjunctivae are normal. Pupils are equal, round, and reactive to light. Cardiovascular: Normal rate, regular rhythm and normal heart sounds. Pulmonary/Chest: Effort normal and breath sounds normal.   Nursing note and vitals reviewed. Assessment/Plan      ICD-10-CM ICD-9-CM    1. Sinus headache R51 784.0 XR SINUSES PARANASAL MIN 3 V   2. Chronic left shoulder pain M25.512 719.41     G89.29 338.29    3. Left hip pain M25.552 719.45 REFERRAL TO PHYSICAL THERAPY     I have discussed the diagnosis with the patient and the intended plan of care as seen in the above orders. The patient has received an after-visit summary and questions were answered concerning future plans. I have discussed medication, side effects, and warnings with the patient in detail. The patient verbalized understanding and is in agreement with the plan of care. The patient will contact the office with any additional concerns.       Follow-up Disposition:  Return if symptoms worsen or fail to improve.  lab results and schedule of future lab studies reviewed with patient    Deepkia Bethea MD

## 2017-07-25 NOTE — PROGRESS NOTES
Chief Complaint   Patient presents with    Sinus Infection     recurring sinus infections     Referral Request     1. Have you been to the ER, urgent care clinic since your last visit? Hospitalized since your last visit? No    2. Have you seen or consulted any other health care providers outside of the [address] [place] [address] since your last visit? Include any pap smears or colon screening.  No

## 2017-07-25 NOTE — PATIENT INSTRUCTIONS
Chronic Sinusitis: Care Instructions  Your Care Instructions    Sinusitis is an infection of the lining of the sinus cavities in your head. It causes pain and pressure in your head and face. Sinusitis can be short-term (acute) or long-term (chronic). Chronic sinusitis lasts 12 weeks or longer. It is often caused by a bacterial or fungal infection. Other things, such as allergies, may also be involved. Chronic sinusitis may be hard to treat. It can lead to permanent changes in the mucous membranes that line the sinuses. It may make future sinus infections more likely. The infection may take some time to treat. Antibiotics are usually used if the infection is caused by bacteria. You may also need to use a corticosteroid nasal spray. If the infection is not cured after you try two or more different antibiotics, you may want to talk with your doctor about surgery or allergy testing. If the sinusitis is caused by a fungal infection, you may need to take antifungals or other medicines. You may also need surgery. Follow-up care is a key part of your treatment and safety. Be sure to make and go to all appointments, and call your doctor if you are having problems. It's also a good idea to know your test results and keep a list of the medicines you take. How can you care for yourself at home? Medicines  · Be safe with medicines. Take your medicines exactly as prescribed. Call your doctor if you think you are having a problem with your medicine. You will get more details on the specific medicines your doctor prescribes. · Take your antibiotics as directed. Do not stop taking them just because you feel better. You need to take the full course of antibiotics. · Your doctor may recommend a corticosteroid nasal spray, wash, drops, or pills. Take this medicine exactly as prescribed. At home  · Breathe warm, moist air. You can use a steamy shower, a hot bath, or a sink filled with hot water. Avoid cold, dry air.  Using a humidifier in your home may help. Follow the instructions for cleaning the machine. · Use saline (saltwater) nasal washes every day. This helps keep your nasal passages open. It also can wash out mucus and bacteria. ¨ You can buy saline nose drops at a grocery store or drugstore. ¨ You can make your own at home. Add 1 teaspoon of salt and 1 teaspoon of baking soda to 2 cups of distilled water. If you make your own, fill a bulb syringe with the solution. Then insert the tip into your nostril and squeeze gently. Fabiola Miller your nose. · Put a warm, wet towel or a warm gel pack on your face 3 or 4 times a day. Leave it on 5 to 10 minutes each time. · Do not smoke or breathe secondhand smoke. Smoking can make sinusitis worse. If you need help quitting, talk to your doctor about stop-smoking programs and medicines. These can increase your chances of quitting for good. When should you call for help? Call your doctor now or seek immediate medical care if:  · You have new or worse symptoms of infection, such as:  ¨ Increased pain, swelling, warmth, or redness. ¨ Red streaks leading from the area. ¨ Pus draining from the area. ¨ A fever. Watch closely for changes in your health, and be sure to contact your doctor if:  · The mucus from your nose becomes thicker (like pus) or has new blood in it. · You do not get better as expected. Where can you learn more? Go to http://camilo-robert.info/. Enter O616 in the search box to learn more about \"Chronic Sinusitis: Care Instructions. \"  Current as of: July 29, 2016  Content Version: 11.3  © 2366-8301 [employer]. Care instructions adapted under license by [employer] (which disclaims liability or warranty for this information). If you have questions about a medical condition or this instruction, always ask your healthcare professional. [address] any warranty or liability for your use of this information.

## 2017-08-01 ENCOUNTER — HOSPITAL ENCOUNTER (OUTPATIENT)
Dept: PHYSICAL THERAPY | Age: 67
Discharge: HOME | End: 2017-08-01
Payer: MEDICARE

## 2017-08-01 PROCEDURE — G8978 MOBILITY CURRENT STATUS: HCPCS

## 2017-08-01 PROCEDURE — 97110 THERAPEUTIC EXERCISES: CPT

## 2017-08-01 PROCEDURE — 97162 PT EVAL MOD COMPLEX 30 MIN: CPT

## 2017-08-01 PROCEDURE — G8979 MOBILITY GOAL STATUS: HCPCS

## 2017-08-01 NOTE — PROGRESS NOTES
PT DAILY TREATMENT NOTE - [facility]     Patient Name: Fredrick Keene  Date:2017  : 1950  [x]  Patient  Verified  [place]or: [place] MED[place] / Plan: [place] MEDICARE PART A & B / Product Type: Medicare /    In time:10:04  Out time:10:55  Total Treatment Time (min): 51  Total Timed Codes (min): 10  1:1 Treatment Time ([address] only): 46   Visit #: 1 of 10    Treatment Area: Pain in left hip [M25.552]    SUBJECTIVE  Pain Level (0-10 scale): 3  Any medication changes, allergies to medications, adverse drug reactions, diagnosis change, or new procedure performed?: [x] No    [] Yes (see summary sheet for update)  Subjective functional status/changes:   [] No changes reported  See POC    OBJECTIVE    40 min [x]Eval                  []Re-Eval     10 min Therapeutic Exercise:  [] See flow sheet : HEP instruction and demonstration, pt education regarding anatomy and physiology of the LEs and spine and how it relates to the pt's condition. Rationale: increase ROM and increase strength to improve the patients ability to tolerate ADLs    1 min Manual Therapy: left LE pull for left innominate upslip    Rationale: decrease pain, increase ROM, increase tissue extensibility and increase postural awareness to improve tolerance to ADLs          With   [] TE   [] TA   [] neuro   [] other: Patient Education: [x] Review HEP    [] Progressed/Changed HEP based on:   [] positioning   [] body mechanics   [] transfers   [] heat/ice application    [] other:      Other Objective/Functional Measures: See evaluation. Improvement in pelvic alignment noted after manual interventions. Pain Level (0-10 scale) post treatment: 0    ASSESSMENT/Changes in Function: Pt given HEP handout to perform. Pt understood exercises in HEP handout. Pt states she's had a low grade fever since the surgery and had pus coming from the scar.  Therapist observed the scar and no signs/symptoms of infection were noted (scar seemed healed), only mild swelling distal to the scar on the right hip/thigh. Educated pt to contact her MD/go to ER if the fever worsens and pt understood. Pt states she had blood tests done 2 weeks ago and these tests were negative for infection. Pt demonstrated decreased AROM, decreased strength, tenderness to palpation, impaired pelvic alignment (left upslip), and decreased t/s kyphosis and l/s lordosis. Pt would benefit from physical therapy to improve the above impairments to help the pt return to performing ADLs, functional and recreational activities.      Patient will continue to benefit from skilled PT services to modify and progress therapeutic interventions, address functional mobility deficits, address ROM deficits, address strength deficits, analyze and address soft tissue restrictions, analyze and cue movement patterns, analyze and modify body mechanics/ergonomics, address imbalance/dizziness and instruct in home and community integration to attain remaining goals. [x]  See Plan of Care  []  See progress note/recertification  []  See Discharge Summary         Progress towards goals / Updated goals:  Short Term Goals: To be accomplished in 2 treatments:  1. Pt will report compliance and independence to [facility] to help the pt manage their pain and symptoms. Eval: established   Long Term Goals: To be accomplished in 10 treatments:  1. Pt will increase FOTO score to 50 points to improve ability to perform ADLs. Eval: 40 points   2. Pt will increase MMT right hip ABD and flex to 4/5, right hip ABD and flex to 4+/5 to improve ability to sail on her boat with less pain. Eval: right hip ABD and flex 4-/5, left hip ABD and flex 4/5  3. Pt will report a decrease in at worst left hip/SIJ pain to 4/10 to improve tolerance to prolonged sitting. Eval: 6/10 at worst  4. Pt will report being able to ambulate for 10 mins with minimal to no increase in left hip/SIJ symptoms to improve pt's ability to walk around a store with more ease.    Eval: reports increased pain < 5 mins of walking.      PLAN  [x]  Upgrade activities as tolerated     [x]  Continue plan of care  [x]  Update interventions per flow sheet       []  Discharge due to:_  []  Other:_      Garrett Amezquita, SAQIB 8/1/2017  10:45 AM    Future Appointments  Date Time Provider Jonathan Colmenares   8/1/2017 10:00 AM Garrett Amezquita, PT Alexander Jeans

## 2017-08-01 NOTE — PROGRESS NOTES
[facility] at [address] [facility]  [address]  Phone: [phone]      Fax:  [phone]    Plan of Care/ Statement of Necessity for Physical Therapy Services  Patient name: Candy Nelson Start of Care: 2017   Referral source: Shaun Torres MD : 1950    Medical Diagnosis: Pain in left hip [M25.552]   Onset Date: 2017    Treatment Diagnosis: left hip pain, left SIJ pain   Prior Hospitalization: see medical history Provider#: [id number]   Medications: Verified on Patient summary List    Comorbidities: osteoporosis, arthritis, RA, thyroid problems, L1-5 fusion , right [facility] 2017, hx of knee and ankle surgeries, total shoulder in left UE . Prior Level of Function: Reports having most of her pain on her right hip and in the low back before the surgery. The Plan of Care and following information is based on the information from the initial evaluation. Assessment/ key information:   Pt is a 69year old female who presents to therapy today with left hip pain and left SIJ pain. Pt states that her symptoms in her left hip and SIJ began after her right THR in 2017. Pt reports being in therapy for her right THR for 5 sessions after the surgery (not including home health therapy). Pt states that her left SIJ \"locks up\" and has to squat to \"make it unlock\". Pt reports increased pain with walking and after sailing. Pt demonstrated decreased AROM, decreased strength, tenderness to palpation, impaired pelvic alignment (left upslip), and decreased t/s kyphosis and l/s lordosis. Pt would benefit from physical therapy to improve the above impairments to help the pt return to performing ADLs, functional and recreational activities.      Evaluation Complexity History HIGH Complexity :3+ comorbidities / personal factors will impact the outcome/ POC ; Examination HIGH Complexity : 4+ Standardized tests and measures addressing body structure, function, activity limitation and / or participation in recreation  ;Presentation MEDIUM Complexity : Evolving with changing characteristics  ; Clinical Decision Making MEDIUM Complexity : FOTO score of 26-74  Overall Complexity Rating: MEDIUM  Problem List: pain affecting function, decrease ROM, decrease strength, impaired gait/ balance, decrease ADL/ functional abilitiies, decrease activity tolerance, decrease flexibility/ joint mobility and decrease transfer abilities   Treatment Plan may include any combination of the following: Therapeutic exercise, Therapeutic activities, Neuromuscular re-education, Physical agent/modality, Gait/balance training, Manual therapy, Patient education and Functional mobility training  Patient / Family readiness to learn indicated by: asking questions, trying to perform skills and interest  Persons(s) to be included in education: patient (P)  Barriers to Learning/Limitations: None  Patient Goal (s): myofascial release, ultrasound, deep tissue massage to break up scar tissue, ongoing from previous problem  Patient Self Reported Health Status: good  Rehabilitation Potential: fair secondary to previous medial history    Short Term Goals: To be accomplished in 2 treatments:  1. Pt will report compliance and independence to [facility] to help the pt manage their pain and symptoms. Long Term Goals: To be accomplished in 10 treatments:  1. Pt will increase FOTO score to 50 points to improve ability to perform ADLs. 2. Pt will increase MMT right hip ABD and flex to 4/5, right hip ABD and flex to 4+/5 to improve ability to sail on her boat with less pain. 3. Pt will report a decrease in at worst left hip/SIJ pain to 4/10 to improve tolerance to prolonged sitting. 4. Pt will report being able to ambulate for 10 mins with minimal to no increase in left hip/SIJ symptoms to improve pt's ability to walk around a store with more ease.      Frequency / Duration: Patient to be seen 2 times per week for 10 treatments. Patient/ Caregiver education and instruction: Diagnosis, prognosis, self care, activity modification and exercises   [x]  Plan of care has been reviewed with PTA    G-Codes (GP)  Mobility   Current  CL= 60-79%   Goal  CK= 40-59%    The severity rating is based on clinical judgment and the FOTO score. Certification Period: 8/1/2017-8/30/2017  Idalia Spurling, PT 8/1/2017 10:45 AM  _____________________________________________________________________  I certify that the above Therapy Services are being furnished while the patient is under my care. I agree with the treatment plan and certify that this therapy is necessary.     Physician's Signature:____________________  Date:__________Time:______    Please sign and return to [facility] at [address] [facility] [address]  Phone: [phone]      Fax:  [phone]

## 2017-08-04 ENCOUNTER — OFFICE VISIT (OUTPATIENT)
Dept: FAMILY MEDICINE CLINIC | Age: 67
End: 2017-08-04

## 2017-08-04 ENCOUNTER — HOSPITAL ENCOUNTER (OUTPATIENT)
Dept: PHYSICAL THERAPY | Age: 67
Discharge: HOME | End: 2017-08-04
Payer: MEDICARE

## 2017-08-04 VITALS
HEART RATE: 73 BPM | WEIGHT: 157 LBS | OXYGEN SATURATION: 99 % | SYSTOLIC BLOOD PRESSURE: 144 MMHG | RESPIRATION RATE: 16 BRPM | HEIGHT: 64 IN | BODY MASS INDEX: 26.8 KG/M2 | DIASTOLIC BLOOD PRESSURE: 84 MMHG

## 2017-08-04 DIAGNOSIS — G89.29 CHRONIC LEFT-SIDED LOW BACK PAIN WITHOUT SCIATICA: ICD-10-CM

## 2017-08-04 DIAGNOSIS — I10 ESSENTIAL HYPERTENSION: Primary | ICD-10-CM

## 2017-08-04 DIAGNOSIS — M54.50 CHRONIC LEFT-SIDED LOW BACK PAIN WITHOUT SCIATICA: ICD-10-CM

## 2017-08-04 PROCEDURE — 97110 THERAPEUTIC EXERCISES: CPT

## 2017-08-04 NOTE — PROGRESS NOTES
Tyshawn Valente is a 69 y.o. female  presents for left hip pain. She has tried otc meds but it is getting worse. She has assoc left ankle swelling. Sxs have been intermittent over months. Allergies   Allergen Reactions    Celebrex [Celecoxib] Swelling    Shellfish Derived Swelling     \"makes me feel puffy\"    Sulfa (Sulfonamide Antibiotics) Hives and Swelling     Lips swelling    Humira [Adalimumab] Other (comments)     Lupus    Optiray 320 [Ioversol] Hives and Itching     Pt states when she gets iv contrast she itches a little from hives?  Penicillins Hives     Outpatient Prescriptions Marked as Taking for the 8/4/17 encounter (Office Visit) with Andrea Pratt MD   Medication Sig Dispense Refill    cetirizine (ZYRTEC) 10 mg tablet Take  by mouth.  meloxicam (MOBIC) 15 mg tablet TAKE 1 TABLET DAILY 90 Tab 1    lisinopril (PRINIVIL, ZESTRIL) 10 mg tablet TAKE 1 TABLET DAILY 90 Tab 1    furosemide (LASIX) 20 mg tablet TAKE 1 TABLET DAILY FOR EDEMA 90 Tab 0    traMADol (ULTRAM-ER) 100 mg Tb24 Take 1 Tab by mouth daily. Max Daily Amount: 100 mg. Indications: Chronic Pain 90 Tab 1    cyclobenzaprine (FLEXERIL) 10 mg tablet Take 1 Tab by mouth three (3) times daily as needed. Indications: RA 90 Tab 1    aspirin (ASPIRIN) 325 mg tablet Take 1 Tab by mouth two (2) times a day. Take for 3 weeks for DVT prophylaxis. 42 Tab 0    HYDROmorphone (DILAUDID) 2 mg tablet Take 1-2 Tabs by mouth every four (4) hours as needed for Pain. Max Daily Amount: 24 mg. 60 Tab 0    calcium carbonate (OS-BARBARA) 500 mg calcium (1,250 mg) tablet Take 1,500 mg by mouth daily.  MELATONIN PO Take 3 mg by mouth as needed.  ACETAMINOPHEN (TYLENOL PO) Take 500 mg by mouth every six (6) hours as needed (pain maintanence).  docusate sodium (COLACE) 100 mg capsule Take 100 mg by mouth daily.        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 [address] 2009    Arthritis     Rheumatoid    Autoimmune disease ([facility] [address]     Medically induced Lupus    Back pain     Chronic pain     lower back    Colitis     Diverticulitis     Failed back syndrome     GERD (gastroesophageal reflux disease)     Hypertension     when on cyclosporins    Ill-defined condition     large torus roof of mouth    Irritable bowel syndrome     Osteoporosis     Pneumonia     Seizures ([facility]) 6-1-2008    one episode- after high dose of epinepherine     Social History     Social History    Marital status:      Spouse name: N/A    Number of children: N/A    Years of education: N/A     Social History Main Topics    Smoking status: Never Smoker    Smokeless tobacco: Never Used    Alcohol use No    Drug use: No    Sexual activity: No     Other Topics Concern    None     Social History Narrative     Family History   Problem Relation Age of Onset    Other Other      Orthopedic (Sister)    Arthritis-osteo Sister     Cancer Neg Hx     Diabetes Neg Hx     Heart Disease Neg Hx     Heart Attack Neg Hx     Hypertension Neg Hx     Stroke Neg Hx     Malignant Hyperthermia Neg Hx     Pseudocholinesterase Deficiency Neg Hx     Delayed Awakening Neg Hx     Post-op Nausea/Vomiting Neg Hx     Emergence Delirium Neg Hx     Post-op Cognitive Dysfunction Neg Hx         Review of Systems   Constitutional: Negative for chills and fever.   Cardiovascular: Negative for chest pain and palpitations. Gastrointestinal: Negative for diarrhea, nausea and vomiting. Musculoskeletal: Positive for joint pain. Negative for back pain, falls, myalgias and neck pain. Psychiatric/Behavioral: Negative. Vitals:    08/04/17 0934   BP: 144/84   Pulse: 73   Resp: 16   SpO2: 99%   Weight: 157 lb (71.2 kg)   Height: 5' 3.5\" (1.613 m)   PainSc:   4   PainLoc: Hip       Physical Exam   Constitutional: She is oriented to person, place, and time and well-developed, well-nourished, and in no distress. Cardiovascular: Normal rate and regular rhythm. Pulmonary/Chest: Effort normal.   Musculoskeletal: She exhibits edema and tenderness (left SI joint and swelling left ankle). Neurological: She is alert and oriented to person, place, and time. Skin: Skin is warm and dry. Psychiatric: Mood, memory, affect and judgment normal.   Nursing note and vitals reviewed. Assessment/Plan      ICD-10-CM ICD-9-CM    1. Essential hypertension I10 401.9    2. Chronic left-sided low back pain without sciatica M54.5 724.2     G89.29 338.29      Continue current tx and follow up with ortho    I have discussed the diagnosis with the patient and the intended plan of care as seen in the above orders. The patient has received an after-visit summary and questions were answered concerning future plans. I have discussed medication, side effects, and warnings with the patient in detail. The patient verbalized understanding and is in agreement with the plan of care. The patient will contact the office with any additional concerns. Follow-up Disposition:  Return in about 1 month (around 9/4/2017).   lab results and schedule of future lab studies reviewed with patient      Katherin Segura MD

## 2017-08-04 NOTE — PROGRESS NOTES
PT DAILY TREATMENT NOTE - [facility]     Patient Name: Faisla Del Rio  Date:2017  : 1950  [x]  Patient  Verified  Payor: Xander Beltranjames / Plan: [place] & [place] / Product Type: Medicare /    In time: 8:33  Out time:9:13  Total Treatment Time (min): 40  Total Timed Codes (min): 40  1:1 Treatment Time ( [address] only): 20   Visit #: 2 of 10    Treatment Area: Pain in left hip [M25.552]    SUBJECTIVE  Pain Level (0-10 scale): 2  Any medication changes, allergies to medications, adverse drug reactions, diagnosis change, or new procedure performed?: [x] No    [] Yes (see summary sheet for update)  Subjective functional status/changes:   [] No changes reported  \"After the leg pull, my left ankle swelled up because of my condition. I put KT tape on it and it helped with it\"    OBJECTIVE    26 min Therapeutic Exercise:  [x] See flow sheet :   Rationale: increase ROM and increase strength to improve the patients ability to tolerate ADLs    10 min Neuromuscular Re-education:  [x]  See flow sheet :   Rationale: increase ROM and increase strength  to improve the patients ability to tolerate ADLs     4 min Manual Therapy: pelvic alignment assessment, left LE pull to correct left upslip. Rationale: decrease pain, increase ROM and increase tissue extensibility to improve activity tolerance      With   [] TE   [] TA   [] neuro   [] other: Patient Education: [x] Review HEP    [] Progressed/Changed HEP based on:   [] positioning   [] body mechanics   [] transfers   [] heat/ice application    [] other:      Other Objective/Functional Measures: Initiated exercises/interventions in flow sheet. Performed left LE pull with therapist's hands above the ankle on the lower leg and pt reported no pain/adverse affect with this. Improvement in pelvic alignment after the left LE pull.       Pain Level (0-10 scale) post treatment: 4     ASSESSMENT/Changes in Function: Pt did not given a specific joint/body part with the 4/10 post session pain level. Pt reported increased pain in the mid back with tband Y's with the orange tband. Reported improvement in this pain with the yellow tband. Pt stated that she liked the SBx3 exercises. No pain reported with other exercises. Mild/minimal increased swelling noted in the left ankle per visual observation. (KT tape is on the left ankle). Limited left hip ER AROM noted during piriformis stretch. Continue POC as tolerated. Patient will continue to benefit from skilled PT services to modify and progress therapeutic interventions, address functional mobility deficits, address ROM deficits, address strength deficits, analyze and address soft tissue restrictions, analyze and cue movement patterns, analyze and modify body mechanics/ergonomics, assess and modify postural abnormalities and instruct in home and community integration to attain remaining goals. []  See Plan of Care  []  See progress note/recertification  []  See Discharge Summary         Progress towards goals / Updated goals:  Short Term Goals: To be accomplished in 2 treatments:  1. Pt will report compliance and independence to [facility] to help the pt manage their pain and symptoms.                       Eval: established   Long Term Goals: To be accomplished in 10 treatments:  1. Pt will increase FOTO score to 50 points to improve ability to perform ADLs. Eval: 40 points   2. Pt will increase MMT right hip ABD and flex to 4/5, right hip ABD and flex to 4+/5 to improve ability to sail on her boat with less pain. Eval: right hip ABD and flex 4-/5, left hip ABD and flex 4/5  3. Pt will report a decrease in at worst left hip/SIJ pain to 4/10 to improve tolerance to prolonged sitting. Eval: 6/10 at worst  4. Pt will report being able to ambulate for 10 mins with minimal to no increase in left hip/SIJ symptoms to improve pt's ability to walk around a store with more ease. Eval: reports increased pain < 5 mins of walking.      PLAN  [x]  Upgrade activities as tolerated     [x]  Continue plan of care  [x]  Update interventions per flow sheet       []  Discharge due to:_  []  Other:_      Maxwell Booth, SAQIB 8/4/2017  8:40 AM    Future Appointments  Date Time Provider Jonathan Colmenares   8/4/2017 8:30 AM Maxwell Booth, PT [facility]   8/4/2017 9:45 AM [facility]   8/7/2017 9:30 AM DREW Arteaga   8/11/2017 8:30 AM Maxwell Booth, SAQIB [place]

## 2017-08-04 NOTE — MR AVS SNAPSHOT
Visit Information Date & Time Provider Department Dept. Phone Encounter #  
 8/4/2017  9:45 AM Mariama Ballesteros, [address] [id number] Follow-up Instructions Return in about 1 month (around 9/4/2017). Upcoming Health Maintenance Date Due Hepatitis C Screening 1950 GLAUCOMA SCREENING Q2Y 3/10/2013 BREAST CANCER SCRN MAMMOGRAM 6/5/2017 INFLUENZA AGE 9 TO ADULT 8/1/2017 DTaP/Tdap/Td series (2 - Td) 11/15/2017 MEDICARE YEARLY EXAM 6/29/2018 Pneumococcal 65+ Low/Medium Risk (2 of 2 - PPSV23) 7/10/2018 COLONOSCOPY 1/13/2024 Allergies as of 8/4/2017  Review Complete On: 8/1/2017 By: Carter Cueva, PT Severity Noted Reaction Type Reactions Celebrex [Celecoxib] High 11/06/2014   Systemic Swelling Shellfish Derived High 05/30/2017   Systemic Swelling \"makes me feel puffy\" Sulfa (Sulfonamide Antibiotics) High 05/18/2015    Hives, Swelling Lips swelling Humira [Adalimumab]  06/27/2013    Other (comments) Lupus Optiray 320 [Ioversol]  02/16/2013   Not Verified Hives, Itching Pt states when she gets iv contrast she itches a little from hives? Penicillins  02/19/2013    Hives Current Immunizations  Never Reviewed Name Date Influenza Vaccine 11/15/2007 12:00 AM  
 Pneumococcal Conjugate (PCV-13) 7/10/2017 Pneumococcal Vaccine (Unspecified Type) 10/31/2010 Tdap 11/15/2007 12:00 AM  
  
 Not reviewed this visit You Were Diagnosed With   
  
 Codes Comments Essential hypertension    -  Primary ICD-10-CM: I10 
ICD-9-CM: 401.9 Chronic left-sided low back pain without sciatica     ICD-10-CM: M54.5, G89.29 ICD-9-CM: 724.2, 338.29 Vitals BP Pulse Resp Height(growth percentile) Weight(growth percentile) SpO2  
 144/84 (BP 1 Location: Left arm, BP Patient Position: Sitting) 73 16 5' 3.5\" (1.613 m) 157 lb (71.2 kg) 99% BMI OB Status Smoking Status 27.38 kg/m2 Hysterectomy Never Smoker BMI and BSA Data Body Mass Index Body Surface Area  
 27.38 kg/m 2 1.79 m 2 Preferred Pharmacy Pharmacy Name Phone [address] [facility] [phone] Your Updated Medication List  
  
   
This list is accurate as of: 8/4/17  9:51 AM.  Always use your most recent med list.  
  
  
  
  
 aspirin 325 mg tablet Commonly known as:  ASPIRIN Take 1 Tab by mouth two (2) times a day. Take for 3 weeks for DVT prophylaxis. calcium carbonate 500 mg calcium (1,250 mg) tablet Commonly known as:  OS-BARBARA Take 1,500 mg by mouth daily. COLACE 100 mg capsule Generic drug:  docusate sodium Take 100 mg by mouth daily. cyclobenzaprine 10 mg tablet Commonly known as:  FLEXERIL Take 1 Tab by mouth three (3) times daily as needed. Indications: RA  
  
 furosemide 20 mg tablet Commonly known as:  LASIX TAKE 1 TABLET DAILY FOR EDEMA HYDROmorphone 2 mg tablet Commonly known as:  DILAUDID Take 1-2 Tabs by mouth every four (4) hours as needed for Pain. Max Daily Amount: 24 mg.  
  
 lisinopril 10 mg tablet Commonly known as:  PRINIVIL, ZESTRIL  
TAKE 1 TABLET DAILY MELATONIN PO Take 3 mg by mouth as needed. meloxicam 15 mg tablet Commonly known as:  MOBIC  
TAKE 1 TABLET DAILY  
  
 traMADol 100 mg Tb24 Commonly known as:  ULTRAM-ER Take 1 Tab by mouth daily. Max Daily Amount: 100 mg. Indications: Chronic Pain TYLENOL PO Take 500 mg by mouth every six (6) hours as needed (pain maintanence). ZyrTEC 10 mg tablet Generic drug:  cetirizine Take  by mouth. Follow-up Instructions Return in about 1 month (around 9/4/2017). To-Do List   
 08/07/2017 9:30 AM  
  Appointment with Reyes Palin, PTA at [address] [place] [address] ([phone])  
  
 08/11/2017 8:30 AM  
 Appointment with Mari Hawthorne PT at [address] ([phone]) Patient Instructions Back Pain: Care Instructions Your Care Instructions Back pain has many possible causes. It is often related to problems with muscles and ligaments of the back. It may also be related to problems with the nerves, discs, or bones of the back. Moving, lifting, standing, sitting, or sleeping in an awkward way can strain the back. Sometimes you don't notice the injury until later. Arthritis is another common cause of back pain. Although it may hurt a lot, back pain usually improves on its own within several weeks. Most people recover in 12 weeks or less. Using good home treatment and being careful not to stress your back can help you feel better sooner. Follow-up care is a key part of your treatment and safety. Be sure to make and go to all appointments, and call your doctor if you are having problems. Its also a good idea to know your test results and keep a list of the medicines you take. How can you care for yourself at home? · Sit or lie in positions that are most comfortable and reduce your pain. Try one of these positions when you lie down: ¨ Lie on your back with your knees bent and supported by large pillows. ¨ Lie on the floor with your legs on the seat of a sofa or chair. Arn Closs on your side with your knees and hips bent and a pillow between your legs. ¨ Lie on your stomach if it does not make pain worse. · Do not sit up in bed, and avoid soft couches and twisted positions. Bed rest can help relieve pain at first, but it delays healing. Avoid bed rest after the first day of back pain. · Change positions every 30 minutes. If you must sit for long periods of time, take breaks from sitting. Get up and walk around, or lie in a comfortable position.  
· Try using a heating pad on a low or medium setting for 15 to 20 minutes every 2 or 3 hours. Try a warm shower in place of one session with the heating pad. · You can also try an ice pack for 10 to 15 minutes every 2 to 3 hours. Put a thin cloth between the ice pack and your skin. · Take pain medicines exactly as directed. ¨ If the doctor gave you a prescription medicine for pain, take it as prescribed. ¨ If you are not taking a prescription pain medicine, ask your doctor if you can take an over-the-counter medicine. · Take short walks several times a day. You can start with 5 to 10 minutes, 3 or 4 times a day, and work up to longer walks. Walk on level surfaces and avoid hills and stairs until your back is better. · Return to work and other activities as soon as you can. Continued rest without activity is usually not good for your back. · To prevent future back pain, do exercises to stretch and strengthen your back and stomach. Learn how to use good posture, safe lifting techniques, and proper body mechanics. When should you call for help? Call your doctor now or seek immediate medical care if: 
· You have new or worsening numbness in your legs. · You have new or worsening weakness in your legs. (This could make it hard to stand up.) · You lose control of your bladder or bowels. Watch closely for changes in your health, and be sure to contact your doctor if: 
· Your pain gets worse. · You are not getting better after 2 weeks. Where can you learn more? Go to http://camilo-robert.info/. Enter J136 in the search box to learn more about \"Back Pain: Care Instructions. \" Current as of: March 21, 2017 Content Version: 11.3 © 3448-4387 [employer]. Care instructions adapted under license by [employer] (which disclaims liability or warranty for this information).  If you have questions about a medical condition or this instruction, always ask your healthcare professional. [employer] disclaims any warranty or liability for your use of this information. Introducing [facility] & HEALTH SERVICES! Dear Betty Almaraz: Thank you for requesting a EndPlay account. Our records indicate that you already have an active EndPlay account. You can access your account anytime at [email or web address] Did you know that you can access your hospital and ER discharge instructions at any time in EndPlay? You can also review all of your test results from your hospital stay or ER visit. Additional Information If you have questions, please visit the Frequently Asked Questions section of the EndPlay website at [email or web address] Remember, EndPlay is NOT to be used for urgent needs. For medical emergencies, dial 911. Now available from your iPhone and Android! Please provide this summary of care documentation to your next provider. Your primary care clinician is listed as [address]. If you have any questions after today's visit, please call [phone].

## 2017-08-04 NOTE — PATIENT INSTRUCTIONS
Back Pain: Care Instructions  Your Care Instructions    Back pain has many possible causes. It is often related to problems with muscles and ligaments of the back. It may also be related to problems with the nerves, discs, or bones of the back. Moving, lifting, standing, sitting, or sleeping in an awkward way can strain the back. Sometimes you don't notice the injury until later. Arthritis is another common cause of back pain. Although it may hurt a lot, back pain usually improves on its own within several weeks. Most people recover in 12 weeks or less. Using good home treatment and being careful not to stress your back can help you feel better sooner. Follow-up care is a key part of your treatment and safety. Be sure to make and go to all appointments, and call your doctor if you are having problems. Its also a good idea to know your test results and keep a list of the medicines you take. How can you care for yourself at home? · Sit or lie in positions that are most comfortable and reduce your pain. Try one of these positions when you lie down:  ¨ Lie on your back with your knees bent and supported by large pillows. ¨ Lie on the floor with your legs on the seat of a sofa or chair. Terressa Gutter on your side with your knees and hips bent and a pillow between your legs. ¨ Lie on your stomach if it does not make pain worse. · Do not sit up in bed, and avoid soft couches and twisted positions. Bed rest can help relieve pain at first, but it delays healing. Avoid bed rest after the first day of back pain. · Change positions every 30 minutes. If you must sit for long periods of time, take breaks from sitting. Get up and walk around, or lie in a comfortable position. · Try using a heating pad on a low or medium setting for 15 to 20 minutes every 2 or 3 hours. Try a warm shower in place of one session with the heating pad. · You can also try an ice pack for 10 to 15 minutes every 2 to 3 hours.  Put a thin cloth between the ice pack and your skin. · Take pain medicines exactly as directed. ¨ If the doctor gave you a prescription medicine for pain, take it as prescribed. ¨ If you are not taking a prescription pain medicine, ask your doctor if you can take an over-the-counter medicine. · Take short walks several times a day. You can start with 5 to 10 minutes, 3 or 4 times a day, and work up to longer walks. Walk on level surfaces and avoid hills and stairs until your back is better. · Return to work and other activities as soon as you can. Continued rest without activity is usually not good for your back. · To prevent future back pain, do exercises to stretch and strengthen your back and stomach. Learn how to use good posture, safe lifting techniques, and proper body mechanics. When should you call for help? Call your doctor now or seek immediate medical care if:  · You have new or worsening numbness in your legs. · You have new or worsening weakness in your legs. (This could make it hard to stand up.)  · You lose control of your bladder or bowels. Watch closely for changes in your health, and be sure to contact your doctor if:  · Your pain gets worse. · You are not getting better after 2 weeks. Where can you learn more? Go to http://camilo-robert.info/. Enter P256 in the search box to learn more about \"Back Pain: Care Instructions. \"  Current as of: March 21, 2017  Content Version: 11.3  © 6922-9192 [email or web address]. Care instructions adapted under license by [employer] (which disclaims liability or warranty for this information). If you have questions about a medical condition or this instruction, always ask your healthcare professional. [address] any warranty or liability for your use of this information.

## 2017-08-07 ENCOUNTER — HOSPITAL ENCOUNTER (OUTPATIENT)
Dept: PHYSICAL THERAPY | Age: 67
Discharge: HOME | End: 2017-08-07
Payer: MEDICARE

## 2017-08-07 PROCEDURE — 97110 THERAPEUTIC EXERCISES: CPT

## 2017-08-07 NOTE — PROGRESS NOTES
PT DAILY TREATMENT NOTE - [facility]     Patient Name: Kerrie Quispe  Date:2017  : 1950  [x]  Patient  Verified  Payor: [place] MEDICARE / Plan: [place] MEDICARE PART A & B / Product Type: Medicare /    In time:9:30  Out time:10:05  Total Treatment Time (min): 35  Total Timed Codes (min): 35  1:1 Treatment Time ( [address] only): 35   Visit #: 3  10    Treatment Area: Pain in left hip [M25.552]    SUBJECTIVE  Pain Level (0-10 scale): 3/10  Any medication changes, allergies to medications, adverse drug reactions, diagnosis change, or new procedure performed?: [x] No    [] Yes (see summary sheet for update)  Subjective functional status/changes:   [] No changes reported  \"I was hurting a lot last time after therapy, so I didn't do much. \"    OBJECTIVE  25 min Therapeutic Exercise:  [] See flow sheet :   Rationale: increase ROM and increase strength to improve the patients ability to perform ADLs without difficulty. 10 min Neuromuscular Re-education:  []  See flow sheet :S.B ex. Per flow. Rationale: increase ROM, increase strength, improve coordination and improve balance  to improve the patients ability to perform ADLs without difficulty. With   [] TE   [] TA   [] neuro   [] other: Patient Education: [x] Review HEP    [] Progressed/Changed HEP based on:   [] positioning   [] body mechanics   [] transfers   [] heat/ice application    [] other:      Other Objective/Functional Measures:      Pain Level (0-10 scale) post treatment: 3-4/10    ASSESSMENT/Changes in Function: Added seated S.B ex. Per flow sheet to help increase core strength with increasing pain. Pt. Reported increased pain with the attempt of almost all ex. Pt reported a slight increase in p! After therapy today.     Patient will continue to benefit from skilled PT services to modify and progress therapeutic interventions, address functional mobility deficits, address ROM deficits, address strength deficits and analyze and address soft tissue restrictions to attain remaining goals. []  See Plan of Care  []  See progress note/recertification  []  See Discharge Summary         Progress towards goals / Updated goals:  Short Term Goals: To be accomplished in 2 treatments:  1. Pt will report compliance and independence to HEP to help the pt manage their pain and symptoms.                       Eval: established   Long Term Goals: To be accomplished in 10 treatments:  1. Pt will increase FOTO score to 50 points to improve ability to perform ADLs. Eval: 40 points   2. Pt will increase MMT right hip ABD and flex to 4/5, right hip ABD and flex to 4+/5 to improve ability to sail on her boat with less pain. Eval: right hip ABD and flex 4-/5, left hip ABD and flex 4/5  3. Pt will report a decrease in at worst left hip/SIJ pain to 4/10 to improve tolerance to prolonged sitting. Eval: 6/10 at worst  4. Pt will report being able to ambulate for 10 mins with minimal to no increase in left hip/SIJ symptoms to improve pt's ability to walk around a store with more ease. Eval: reports increased pain < 5 mins of walking.      PLAN  [x]  Upgrade activities as tolerated     [x]  Continue plan of care  []  Update interventions per flow sheet       []  Discharge due to:_  []  Other:_      Delfin Jasso PTA 8/7/2017  7:55 AM    Future Appointments  Date Time Provider Jonathan Colmenares   8/7/2017 9:30 AM Delfin Jasso PTA [facility]   8/11/2017 8:30 AM Roya Powell, PT Bobbi Khalil

## 2017-08-10 ENCOUNTER — HOSPITAL ENCOUNTER (OUTPATIENT)
Dept: PHYSICAL THERAPY | Age: 67
Discharge: HOME | End: 2017-08-10
Payer: MEDICARE

## 2017-08-10 PROCEDURE — 97112 NEUROMUSCULAR REEDUCATION: CPT

## 2017-08-10 NOTE — PROGRESS NOTES
PT DAILY TREATMENT NOTE - [facility]     Patient Name: Gisselle Santa  Date:8/10/2017  : 1950  [x]  Patient  Verified  Payor: VA MEDICARE / Plan: VA MEDICARE PART A & B / Product Type: Medicare /    In time: 9:05   Out time: 9:43  Total Treatment Time (min): 38  Total Timed Codes (min): 38  1:1 Treatment Time ( [address] only): 38  Visit #: 4 of 10    Treatment Area: Pain in left hip [M25.552]    SUBJECTIVE  Pain Level (0-10 scale): 6-7  Any medication changes, allergies to medications, adverse drug reactions, diagnosis change, or new procedure performed?: [x] No    [] Yes (see summary sheet for update)  Subjective functional status/changes:   [] No changes reported  \"I was brushing the dog last night and my back is hurting. I even wore my back brace while doing this. I could not sleep well and I had to take my medication for it. I had trouble sleeping because of it\"    OBJECTIVE     4 min Therapeutic Exercise:  [x] See flow sheet : exercises, pt education on activity level. Rationale: increase ROM and increase strength to improve the patients ability to perform ADLs     29 min Neuromuscular Re-education:  [x]  See flow sheet : SB exercises   Rationale: increase ROM, increase strength, improve coordination and improve balance  to improve the patients ability to tolerate ADLs    5 min Manual Therapy:  Pelvic/leg length assessment, left LE pull to correct upslip. Rationale: decrease pain, increase ROM and increase tissue extensibility to improve activity tolerance     With   [] TE   [] TA   [] neuro   [] other: Patient Education: [x] Review HEP    [] Progressed/Changed HEP based on:   [] positioning   [] body mechanics   [] transfers   [] heat/ice application    [] other:      Other Objective/Functional Measures: Improvement in pelvic alignment noted after manual interventions and post session.     Pain Level (0-10 scale) post treatment: 5-6    ASSESSMENT/Changes in Function: Pt reported that her pain felt better post session. Pt continues to lack core strength and stability as noted with impaired pelvic alignment. We will plan on continuing therapy at this time to improve pelvic alignment and core strength to help improve pt's symptoms. Continue POC as tolerated. Patient will continue to benefit from skilled PT services to modify and progress therapeutic interventions, address functional mobility deficits, address ROM deficits, address strength deficits and analyze and address soft tissue restrictions to attain remaining goals. []  See Plan of Care  []  See progress note/recertification  []  See Discharge Summary         Progress towards goals / Updated goals:  Short Term Goals: To be accomplished in 2 treatments:  1. Pt will report compliance and independence to [facility] to help the pt manage their pain and symptoms.                       Eval: established   Long Term Goals: To be accomplished in 10 treatments:  1. Pt will increase FOTO score to 50 points to improve ability to perform ADLs. Eval: 40 points   2. Pt will increase MMT right hip ABD and flex to 4/5, right hip ABD and flex to 4+/5 to improve ability to sail on her boat with less pain. Eval: right hip ABD and flex 4-/5, left hip ABD and flex 4/5  3. Pt will report a decrease in at worst left hip/SIJ pain to 4/10 to improve tolerance to prolonged sitting. Eval: 6/10 at worst  Current: 6-7 pre-session today 8/10/2017  4. Pt will report being able to ambulate for 10 mins with minimal to no increase in left hip/SIJ symptoms to improve pt's ability to walk around a store with more ease. Eval: reports increased pain < 5 mins of walking. PLAN  [x]  Upgrade activities as tolerated     [x]  Continue plan of care  [x]  Update interventions per flow sheet       []  Discharge due to:_  []  Other:_      Carter Cueva PT 8/10/2017  9:50 AM    No future appointments.

## 2017-08-11 ENCOUNTER — APPOINTMENT (OUTPATIENT)
Dept: PHYSICAL THERAPY | Age: 67
End: 2017-08-11
Payer: MEDICARE

## 2017-08-14 ENCOUNTER — APPOINTMENT (OUTPATIENT)
Dept: PHYSICAL THERAPY | Age: 67
End: 2017-08-14
Payer: MEDICARE

## 2017-08-15 ENCOUNTER — TELEPHONE (OUTPATIENT)
Dept: FAMILY MEDICINE CLINIC | Age: 67
End: 2017-08-15

## 2017-08-15 DIAGNOSIS — M25.552 LEFT HIP PAIN: Primary | ICD-10-CM

## 2017-08-15 NOTE — TELEPHONE ENCOUNTER
Mrs. Anita Weinstein came in requesting a new order for physical therapy. It would be for left hip and left lower back. She withdrew herself from [facility] [address] in [place]. She would like to go to [facility] in [place]. Phone number for their office is [phone].

## 2017-08-16 ENCOUNTER — APPOINTMENT (OUTPATIENT)
Dept: PHYSICAL THERAPY | Age: 67
End: 2017-08-16
Payer: MEDICARE

## 2017-08-18 NOTE — TELEPHONE ENCOUNTER
New PT referral has been signed will close [facility] to [facility] and have patient set up with [facility] per her request.

## 2017-08-28 NOTE — PROGRESS NOTES
[facility] at [address] [facility]  [address]  Phone: [phone]      Fax:  [phone]    Discharge Summary    Patient name: Jose Thomson Start of Care: 2017   Referral source: Flora Carrera MD : 1950                         Medical Diagnosis: Pain in left hip [M25.552] Onset Date: 2017                         Treatment Diagnosis: left hip pain, left SIJ pain   Prior Hospitalization: see medical history Provider#: [id number]   Medications: Verified on Patient summary List    Comorbidities: osteoporosis, arthritis, RA, thyroid problems, L1-5 fusion , right [facility] 2017, hx of knee and ankle surgeries, total shoulder in left UE . Prior Level of Function: Reports having most of her pain on her right hip and in the low back before the surgery. Visits from Start of Care: 4    Missed Visits: 0  Reporting Period : 2017 to 8/10/2017    Assessment/ Summary of Care:   Pt was seen for 4 therapy sessions. Per telephone log, on 8/15/2017, pt called to d/c. Pt then came into the clinic and cancelled all appointments secondary to pt wanting to switch to another clinic. Pt is therefore d/c'ed from therapy and unable to reassess goals at this time.      RECOMMENDATIONS:  [x]Discontinue therapy: []Patient has reached or is progressing toward set goals      [x]Patient is non-compliant or has abdicated      []Due to lack of appreciable progress towards set goals    Feilsha Menchaca, SAQIB 2017 3:08 PM

## 2017-08-29 ENCOUNTER — TELEPHONE (OUTPATIENT)
Dept: FAMILY MEDICINE CLINIC | Age: 67
End: 2017-08-29

## 2017-08-29 NOTE — TELEPHONE ENCOUNTER
Gregg Lyla Lal called stating her new physical therapist is concerned and has requested the PCP order a Bone Scan for the spine area. She states she has osteopenia and arthritis in the spine, along with microfractures. She is aware Dr. Maxine Daly doesn't return to the office until 8/30/17.

## 2017-08-30 DIAGNOSIS — M06.9 RHEUMATOID ARTHRITIS, INVOLVING UNSPECIFIED SITE, UNSPECIFIED RHEUMATOID FACTOR PRESENCE: Primary | Chronic | ICD-10-CM

## 2017-08-30 NOTE — TELEPHONE ENCOUNTER
Patient calling again!! She states its very important she get a bone scan ordered per the physical therapist. Please advise.

## 2017-08-31 DIAGNOSIS — G89.29 CHRONIC MIDLINE LOW BACK PAIN WITHOUT SCIATICA: Primary | ICD-10-CM

## 2017-08-31 DIAGNOSIS — M54.50 CHRONIC MIDLINE LOW BACK PAIN WITHOUT SCIATICA: Primary | ICD-10-CM

## 2017-08-31 NOTE — TELEPHONE ENCOUNTER
Pt states it's not a dexa scan that needs to be done. She needs a bone scan to show for microfratures.

## 2017-09-07 ENCOUNTER — HOSPITAL ENCOUNTER (OUTPATIENT)
Dept: BONE DENSITY | Age: 67
Discharge: HOME | End: 2017-09-07
Attending: FAMILY MEDICINE
Payer: MEDICARE

## 2017-09-07 DIAGNOSIS — M06.9 RHEUMATOID ARTHRITIS, INVOLVING UNSPECIFIED SITE, UNSPECIFIED RHEUMATOID FACTOR PRESENCE: Chronic | ICD-10-CM

## 2017-09-07 PROCEDURE — 77080 DXA BONE DENSITY AXIAL: CPT

## 2017-09-08 ENCOUNTER — PATIENT OUTREACH (OUTPATIENT)
Dept: FAMILY MEDICINE CLINIC | Age: 67
End: 2017-09-08

## 2017-09-08 DIAGNOSIS — M85.80 OSTEOPENIA, UNSPECIFIED LOCATION: Primary | ICD-10-CM

## 2017-09-08 RX ORDER — ALENDRONATE SODIUM 70 MG/1
70 TABLET ORAL
Qty: 12 TAB | Refills: 0 | Status: SHIPPED | OUTPATIENT
Start: 2017-09-08 | End: 2017-09-12 | Stop reason: ALTCHOICE

## 2017-09-11 ENCOUNTER — HOSPITAL ENCOUNTER (OUTPATIENT)
Dept: NUCLEAR MEDICINE | Age: 67
Discharge: HOME | End: 2017-09-11
Attending: FAMILY MEDICINE
Payer: MEDICARE

## 2017-09-11 DIAGNOSIS — M54.50 CHRONIC MIDLINE LOW BACK PAIN WITHOUT SCIATICA: ICD-10-CM

## 2017-09-11 DIAGNOSIS — G89.29 CHRONIC MIDLINE LOW BACK PAIN WITHOUT SCIATICA: ICD-10-CM

## 2017-09-11 PROCEDURE — 78305 BONE IMAGING MULTIPLE AREAS: CPT

## 2017-09-12 ENCOUNTER — OFFICE VISIT (OUTPATIENT)
Dept: FAMILY MEDICINE CLINIC | Age: 67
End: 2017-09-12

## 2017-09-12 VITALS
TEMPERATURE: 98 F | RESPIRATION RATE: 14 BRPM | HEART RATE: 69 BPM | OXYGEN SATURATION: 95 % | DIASTOLIC BLOOD PRESSURE: 72 MMHG | SYSTOLIC BLOOD PRESSURE: 110 MMHG

## 2017-09-12 DIAGNOSIS — Z23 NEED FOR TDAP VACCINATION: ICD-10-CM

## 2017-09-12 DIAGNOSIS — Z11.59 NEED FOR HEPATITIS C SCREENING TEST: ICD-10-CM

## 2017-09-12 DIAGNOSIS — M85.89 OSTEOPENIA OF MULTIPLE SITES: Primary | ICD-10-CM

## 2017-09-12 RX ORDER — ALENDRONATE SODIUM 70 MG/1
70 TABLET ORAL
Qty: 12 TAB | Refills: 0 | Status: SHIPPED | OUTPATIENT
Start: 2017-09-12 | End: 2017-10-19 | Stop reason: ALTCHOICE

## 2017-09-12 NOTE — MR AVS SNAPSHOT
Visit Information Date & Time Provider Department Dept. Phone Encounter #  
 9/12/2017  1:00 PM Guy De Leon MD [facility] [phone] [id number] Follow-up Instructions Return if symptoms worsen or fail to improve. Upcoming Health Maintenance Date Due Hepatitis C Screening 1950 GLAUCOMA SCREENING Q2Y 3/10/2013 DTaP/Tdap/Td series (2 - Td) 11/15/2017 MEDICARE YEARLY EXAM 6/29/2018 Pneumococcal 65+ Low/Medium Risk (2 of 2 - PPSV23) 7/10/2018 BREAST CANCER SCRN MAMMOGRAM 7/11/2019 COLONOSCOPY 1/13/2024 Allergies as of 9/12/2017  Review Complete On: 9/12/2017 By: Guy De Leon MD  
  
 Severity Noted Reaction Type Reactions Celebrex [Celecoxib] High 11/06/2014   Systemic Swelling Shellfish Derived High 05/30/2017   Systemic Swelling \"makes me feel puffy\" Sulfa (Sulfonamide Antibiotics) High 05/18/2015    Hives, Swelling Lips swelling Humira [Adalimumab]  06/27/2013    Other (comments) Lupus Optiray 320 [Ioversol]  02/16/2013   Not Verified Hives, Itching Pt states when she gets iv contrast she itches a little from hives? Penicillins  02/19/2013    Hives Current Immunizations  Never Reviewed Name Date Influenza Vaccine 11/15/2007 12:00 AM  
 Pneumococcal Conjugate (PCV-13) 7/10/2017 Pneumococcal Vaccine (Unspecified Type) 10/31/2010 Tdap  Incomplete, 11/15/2007 12:00 AM  
  
 Not reviewed this visit You Were Diagnosed With   
  
 Codes Comments Osteopenia of multiple sites    -  Primary ICD-10-CM: M85.89 ICD-9-CM: 733.90 Need for Tdap vaccination     ICD-10-CM: Y31 ICD-9-CM: V06.1 Need for hepatitis C screening test     ICD-10-CM: Z11.59 
ICD-9-CM: V73.89 Vitals BP Pulse Temp Resp SpO2 OB Status 110/72 (BP 1 Location: Left arm, BP Patient Position: Sitting) 69 98 °F (36.7 °C) (Oral) 14 95% Hysterectomy Smoking Status Never Smoker Vitals History Preferred Pharmacy Pharmacy Name Phone [address] [facility] [phone] Your Updated Medication List  
  
   
This list is accurate as of: 9/12/17  1:31 PM.  Always use your most recent med list.  
  
  
  
  
 alendronate 70 mg tablet Commonly known as:  FOSAMAX Take 1 Tab by mouth every seven (7) days. calcium carbonate 500 mg calcium (1,250 mg) tablet Commonly known as:  OS-BARBARA Take 1,500 mg by mouth daily. COLACE 100 mg capsule Generic drug:  docusate sodium Take 100 mg by mouth daily. cyclobenzaprine 10 mg tablet Commonly known as:  FLEXERIL Take 1 Tab by mouth three (3) times daily as needed. Indications: RA  
  
 furosemide 20 mg tablet Commonly known as:  LASIX TAKE 1 TABLET DAILY FOR EDEMA  
  
 lisinopril 10 mg tablet Commonly known as:  PRINIVIL, ZESTRIL  
TAKE 1 TABLET DAILY MELATONIN PO Take 3 mg by mouth as needed. meloxicam 15 mg tablet Commonly known as:  MOBIC  
TAKE 1 TABLET DAILY  
  
 traMADol 100 mg Tb24 Commonly known as:  ULTRAM-ER Take 1 Tab by mouth daily. Max Daily Amount: 100 mg. Indications: Chronic Pain TYLENOL PO Take 500 mg by mouth every six (6) hours as needed (pain maintanence). ZyrTEC 10 mg tablet Generic drug:  cetirizine Take  by mouth. Prescriptions Sent to Pharmacy Refills  
 alendronate (FOSAMAX) 70 mg tablet 0 Sig: Take 1 Tab by mouth every seven (7) days. Class: Normal  
 Pharmacy: [address], [address] [place] [address] #: [phone] Route: Oral  
  
We Performed the Following TETANUS, DIPHTHERIA TOXOIDS AND ACELLULAR PERTUSSIS VACCINE (TDAP), IN INDIVIDS. >=7, IM [id number] CPT(R)] Follow-up Instructions Return if symptoms worsen or fail to improve. To-Do List   
 09/12/2017   Lab:  HEPATITIS C AB   
  
  
 Patient Instructions Learning About Osteopenia What is osteopenia? Osteopenia is a decrease in thickness, or density, in bones. That means the bones become thinner and weaker. It is much more common in women than in men. It is an early form of osteoporosis, a condition in which the bones are so thin and weak that they can break easily. Having osteopenia means that there is a greater risk that you may get osteoporosis. It also means that you are more likely to break a bone than someone who does not have osteopenia. Osteopenia doesn't cause any symptoms. It's usually found with a type of X-ray called a bone density test. 
What increases the risk for osteopenia? Things that increase your risk include: 
· Having a family history of osteoporosis. · Being thin. · Being white or . · Getting too little physical activity. · Smoking. · Drinking too much alcohol often. · Using certain medicines such as steroids. How can you prevent osteoporosis? There are things you can do to slow down osteopenia and prevent osteoporosis. Certain lifestyle changes will help slow the loss of bone density. · Eat food that has plenty of calcium and vitamin D. Yogurt, cheese, milk, and dark green vegetables are high in calcium. Eggs, fatty fish, cereal, and fortified milk are high in vitamin D. 
· Talk to your doctor about taking a calcium supplement that has vitamin D in it. · Get regular exercise. ¨ Do 30 minutes of weight-bearing exercise on most days of the week. Walking, jogging, stair climbing, and dancing are good choices. ¨ Do resistance exercises with weights or elastic bands 2 or 3 days a week. · Limit alcohol to 2 drinks a day for men and 1 drink a day for women. Too much alcohol can cause health problems. · Do not smoke. Smoking can make bones thin faster. If you need help quitting, talk to your doctor about stop-smoking programs and medicines. These can increase your chances of quitting for good. Prescription medicines are available for treating bone thinning. But these are more often used to treat osteoporosis. Follow-up care is a key part of your treatment and safety. Be sure to make and go to all appointments, and call your doctor if you are having problems. It's also a good idea to know your test results and keep a list of the medicines you take. Where can you learn more? Go to http://camilo-robert.info/. Enter M189 in the search box to learn more about \"Learning About Osteopenia. \" Current as of: November 1, 2016 Content Version: 11.3 © 6411-9882 [employer]. Care instructions adapted under license by [employer] (which disclaims liability or warranty for this information). If you have questions about a medical condition or this instruction, always ask your healthcare professional. [address] any warranty or liability for your use of this information. Introducing [facility]! Dear Chidi Oswald: Thank you for requesting a IP[employer] account. Our records indicate that you already have an active IPXI account. You can access your account anytime at [email or web address] [email or web address] Did you know that you can access your hospital and ER discharge instructions at any time in IP[employer]? You can also review all of your test results from your hospital stay or ER visit. Additional Information If you have questions, please visit the Frequently Asked Questions section of the IP[employer] website at [email or web address] [email or web address]/. Remember, [employer] is NOT to be used for urgent needs. For medical emergencies, dial 911. Now available from your iPhone and Android! Please provide this summary of care documentation to your next provider. Your primary care clinician is listed as [address]. If you have any questions after today's visit, please call [phone].

## 2017-09-12 NOTE — PROGRESS NOTES
Patient here to discuss her scan that was done on yesterday. 1. Have you been to the ER, urgent care clinic since your last visit? Hospitalized since your last visit? No    2. Have you seen or consulted any other health care providers outside of the [address] [place] [address] since your last visit? Include any pap smears or colon screening. Yes When: 8/2017 Where: Dr. Vanita Tristan Reason for visit: back pain/injections  Health Maintenance reviewed - Patient declined flu vaccine for today, she will get her tdap today and her Hep C screen, will request glaucoma screen.

## 2017-09-12 NOTE — PROGRESS NOTES
Kenisha Enriquez is a 69 y.o. female  presents for follow up. No new complaints. She has multiple joint pains. Allergies   Allergen Reactions    Celebrex [Celecoxib] Swelling    Shellfish Derived Swelling     \"makes me feel puffy\"    Sulfa (Sulfonamide Antibiotics) Hives and Swelling     Lips swelling    Humira [Adalimumab] Other (comments)     Lupus    Optiray 320 [Ioversol] Hives and Itching     Pt states when she gets iv contrast she itches a little from hives?  Penicillins Hives     Outpatient Prescriptions Marked as Taking for the 9/12/17 encounter (Office Visit) with Rupal Mcallister MD   Medication Sig Dispense Refill    alendronate (FOSAMAX) 70 mg tablet Take 1 Tab by mouth every seven (7) days. 12 Tab 0    meloxicam (MOBIC) 15 mg tablet TAKE 1 TABLET DAILY 90 Tab 1    lisinopril (PRINIVIL, ZESTRIL) 10 mg tablet TAKE 1 TABLET DAILY 90 Tab 1    traMADol (ULTRAM-ER) 100 mg Tb24 Take 1 Tab by mouth daily. Max Daily Amount: 100 mg. Indications: Chronic Pain 90 Tab 1    cyclobenzaprine (FLEXERIL) 10 mg tablet Take 1 Tab by mouth three (3) times daily as needed. Indications: RA 90 Tab 1    calcium carbonate (OS-BARBARA) 500 mg calcium (1,250 mg) tablet Take 1,500 mg by mouth daily.  MELATONIN PO Take 3 mg by mouth as needed.  ACETAMINOPHEN (TYLENOL PO) Take 500 mg by mouth every six (6) hours as needed (pain maintanence).  docusate sodium (COLACE) 100 mg capsule Take 100 mg by mouth daily.        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    H/O calcium pyrophosphate deposition disease (CPPD) Z87.39    IBS (irritable bowel syndrome) K58.9    RA (rheumatoid arthritis) ([facility] [address] M06.9    Sicca syndrome (HCC) M35.00    Osteoarthritis of right hip M16.11    Pain management contract agreement Z02.89    ACP (advance care planning) Z71.89    Chronic left shoulder pain M25.512, G89.29    Osteopenia M85.80    Osteopenia of multiple sites M85.89     Past Medical History:   Diagnosis Date    Abdominal abscess ([facility] 2009    Arthritis     Rheumatoid    Autoimmune disease ([facility]     Medically induced Lupus    Back pain     Chronic pain     lower back    Colitis     Diverticulitis     Failed back syndrome     GERD (gastroesophageal reflux disease)     Hypertension     when on cyclosporins    Ill-defined condition     large torus roof of mouth    Irritable bowel syndrome     Osteoporosis     Pneumonia     Seizures ([facility] 6-1-2008    one episode- after high dose of epinepherine     Social History     Social History    Marital status:      Spouse name: N/A    Number of children: N/A    Years of education: N/A     Social History Main Topics    Smoking status: Never Smoker    Smokeless tobacco: Never Used    Alcohol use No    Drug use: No    Sexual activity: No     Other Topics Concern    None     Social History Narrative     Family History   Problem Relation Age of Onset    Other Other      Orthopedic (Sister)    Arthritis-osteo Sister     Cancer Neg Hx     Diabetes Neg Hx     Heart Disease Neg Hx     Heart Attack Neg Hx     Hypertension Neg Hx     Stroke Neg Hx     Malignant Hyperthermia Neg Hx     Pseudocholinesterase Deficiency Neg Hx     Delayed Awakening Neg Hx     Post-op Nausea/Vomiting Neg Hx     Emergence Delirium Neg Hx     Post-op Cognitive Dysfunction Neg Hx         Review of Systems   Constitutional: Negative for chills and fever. Cardiovascular: Negative for chest pain and palpitations. Gastrointestinal: Negative for constipation, diarrhea, nausea and vomiting. Musculoskeletal: Positive for back pain, joint pain and neck pain.      Vitals:    09/12/17 1302   BP: 110/72   Pulse: 69   Resp: 14   Temp: 98 °F (36.7 °C)   TempSrc: Oral SpO2: 95%   PainSc:   7       Physical Exam   Constitutional: She is well-developed, well-nourished, and in no distress. Neck: Normal range of motion. Neck supple. Cardiovascular: Normal rate, regular rhythm, normal heart sounds and intact distal pulses. Pulmonary/Chest: Effort normal and breath sounds normal.   Neurological: She is alert. Skin: Skin is warm and dry. Psychiatric: Mood, memory, affect and judgment normal.   Nursing note and vitals reviewed. Assessment/Plan      ICD-10-CM ICD-9-CM    1. Osteopenia of multiple sites M85.89 733.90 alendronate (FOSAMAX) 70 mg tablet   2. Need for Tdap vaccination Z23 V06.1 TETANUS, DIPHTHERIA TOXOIDS AND ACELLULAR PERTUSSIS VACCINE (TDAP), IN INDIVIDS. >=7, IM   3. Need for hepatitis C screening test Z11.59 V73.89 HEPATITIS C AB     I have discussed the diagnosis with the patient and the intended plan of care as seen in the above orders. The patient has received an after-visit summary and questions were answered concerning future plans. I have discussed medication, side effects, and warnings with the patient in detail. The patient verbalized understanding and is in agreement with the plan of care. The patient will contact the office with any additional concerns.       Follow-up Disposition:  Return if symptoms worsen or fail to improve.  lab results and schedule of future lab studies reviewed with patient    Clyde Guevara MD

## 2017-09-12 NOTE — PATIENT INSTRUCTIONS
Learning About Osteopenia  What is osteopenia? Osteopenia is a decrease in thickness, or density, in bones. That means the bones become thinner and weaker. It is much more common in women than in men. It is an early form of osteoporosis, a condition in which the bones are so thin and weak that they can break easily. Having osteopenia means that there is a greater risk that you may get osteoporosis. It also means that you are more likely to break a bone than someone who does not have osteopenia. Osteopenia doesn't cause any symptoms. It's usually found with a type of X-ray called a bone density test.  What increases the risk for osteopenia? Things that increase your risk include:  · Having a family history of osteoporosis. · Being thin. · Being white or . · Getting too little physical activity. · Smoking. · Drinking too much alcohol often. · Using certain medicines such as steroids. How can you prevent osteoporosis? There are things you can do to slow down osteopenia and prevent osteoporosis. Certain lifestyle changes will help slow the loss of bone density. · Eat food that has plenty of calcium and vitamin D. Yogurt, cheese, milk, and dark green vegetables are high in calcium. Eggs, fatty fish, cereal, and fortified milk are high in vitamin D.  · Talk to your doctor about taking a calcium supplement that has vitamin D in it. · Get regular exercise. ¨ Do 30 minutes of weight-bearing exercise on most days of the week. Walking, jogging, stair climbing, and dancing are good choices. ¨ Do resistance exercises with weights or elastic bands 2 or 3 days a week. · Limit alcohol to 2 drinks a day for men and 1 drink a day for women. Too much alcohol can cause health problems. · Do not smoke. Smoking can make bones thin faster. If you need help quitting, talk to your doctor about stop-smoking programs and medicines. These can increase your chances of quitting for good.   Prescription medicines are available for treating bone thinning. But these are more often used to treat osteoporosis. Follow-up care is a key part of your treatment and safety. Be sure to make and go to all appointments, and call your doctor if you are having problems. It's also a good idea to know your test results and keep a list of the medicines you take. Where can you learn more? Go to http://camilo-robert.info/. Enter L156 in the search box to learn more about \"Learning About Osteopenia. \"  Current as of: November 1, 2016  Content Version: 11.3  © 4252-6854 [employer]. Care instructions adapted under license by [employer] (which disclaims liability or warranty for this information). If you have questions about a medical condition or this instruction, always ask your healthcare professional. [address] any warranty or liability for your use of this information.

## 2017-09-14 DIAGNOSIS — M85.89 OSTEOPENIA OF MULTIPLE SITES: Primary | ICD-10-CM

## 2017-09-14 RX ORDER — SUCRALFATE 1 G/10ML
1 SUSPENSION ORAL 4 TIMES DAILY
Qty: 414 ML | Refills: 2 | Status: SHIPPED | OUTPATIENT
Start: 2017-09-14 | End: 2018-11-20

## 2017-09-25 ENCOUNTER — HOSPITAL ENCOUNTER (OUTPATIENT)
Dept: LAB | Age: 67
Discharge: HOME | End: 2017-09-25
Payer: MEDICARE

## 2017-09-25 ENCOUNTER — OFFICE VISIT (OUTPATIENT)
Dept: FAMILY MEDICINE CLINIC | Age: 67
End: 2017-09-25

## 2017-09-25 VITALS
DIASTOLIC BLOOD PRESSURE: 80 MMHG | RESPIRATION RATE: 16 BRPM | OXYGEN SATURATION: 98 % | HEIGHT: 60 IN | HEART RATE: 68 BPM | SYSTOLIC BLOOD PRESSURE: 138 MMHG

## 2017-09-25 DIAGNOSIS — M06.9 RHEUMATOID ARTHRITIS, INVOLVING UNSPECIFIED SITE, UNSPECIFIED RHEUMATOID FACTOR PRESENCE: Primary | Chronic | ICD-10-CM

## 2017-09-25 DIAGNOSIS — G89.29 OTHER CHRONIC PAIN: ICD-10-CM

## 2017-09-25 DIAGNOSIS — Z11.59 NEED FOR HEPATITIS C SCREENING TEST: ICD-10-CM

## 2017-09-25 PROCEDURE — 86803 HEPATITIS C AB TEST: CPT | Performed by: FAMILY MEDICINE

## 2017-09-25 RX ORDER — HYDROCODONE BITARTRATE AND ACETAMINOPHEN 5; 325 MG/1; MG/1
2 TABLET ORAL
Qty: 60 TAB | Refills: 0 | Status: SHIPPED | OUTPATIENT
Start: 2017-09-25 | End: 2017-10-24

## 2017-09-25 NOTE — MR AVS SNAPSHOT
Visit Information Date & Time Provider Department Dept. Phone Encounter #  
 9/25/2017  9:15 AM Herrera Oswald, [address] [id number] Follow-up Instructions Return if symptoms worsen or fail to improve. Upcoming Health Maintenance Date Due Hepatitis C Screening 1950 GLAUCOMA SCREENING Q2Y 3/10/2013 MEDICARE YEARLY EXAM 6/29/2018 Pneumococcal 65+ Low/Medium Risk (2 of 2 - PPSV23) 7/10/2018 BREAST CANCER SCRN MAMMOGRAM 7/11/2019 COLONOSCOPY 1/13/2024 DTaP/Tdap/Td series (3 - Td) 9/14/2027 Allergies as of 9/25/2017  Review Complete On: 9/25/2017 By: Herrera Oswald MD  
  
 Severity Noted Reaction Type Reactions Celebrex [Celecoxib] High 11/06/2014   Systemic Swelling Shellfish Derived High 05/30/2017   Systemic Swelling \"makes me feel puffy\" Sulfa (Sulfonamide Antibiotics) High 05/18/2015    Hives, Swelling Lips swelling Humira [Adalimumab]  06/27/2013    Other (comments) Lupus Optiray 320 [Ioversol]  02/16/2013   Not Verified Hives, Itching Pt states when she gets iv contrast she itches a little from hives? Penicillins  02/19/2013    Hives Current Immunizations  Never Reviewed Name Date Influenza Vaccine 11/15/2007 12:00 AM  
 Pneumococcal Conjugate (PCV-13) 7/10/2017 Pneumococcal Vaccine (Unspecified Type) 10/31/2010 Tdap 9/14/2017, 11/15/2007 12:00 AM  
  
 Not reviewed this visit You Were Diagnosed With   
  
 Codes Comments Rheumatoid arthritis, involving unspecified site, unspecified rheumatoid factor presence ([facility] [address]    -  Primary ICD-10-CM: M06.9 ICD-9-CM: 714.0 Other chronic pain     ICD-10-CM: G89.29 ICD-9-CM: 338.29 Vitals BP Pulse Resp Height(growth percentile) SpO2 OB Status 138/80 (BP 1 Location: Left arm, BP Patient Position: Sitting) 68 16 5' 0.35\" (1.533 m) 98% Hysterectomy Smoking Status Never Smoker Vitals History Preferred Pharmacy Pharmacy Name Phone [address] [phone] Your Updated Medication List  
  
   
This list is accurate as of: 9/25/17  9:47 AM.  Always use your most recent med list.  
  
  
  
  
 alendronate 70 mg tablet Commonly known as:  FOSAMAX Take 1 Tab by mouth every seven (7) days. calcium carbonate 500 mg calcium (1,250 mg) tablet Commonly known as:  OS-BARBARA Take 1,500 mg by mouth daily. COLACE 100 mg capsule Generic drug:  docusate sodium Take 100 mg by mouth daily. cyclobenzaprine 10 mg tablet Commonly known as:  FLEXERIL Take 1 Tab by mouth three (3) times daily as needed. Indications: RA  
  
 furosemide 20 mg tablet Commonly known as:  LASIX TAKE 1 TABLET DAILY FOR EDEMA HYDROcodone-acetaminophen 5-325 mg per tablet Commonly known as:  Winsome Maganaman Take 2 Tabs by mouth every six (6) hours as needed for Pain. Max Daily Amount: 8 Tabs. lisinopril 10 mg tablet Commonly known as:  PRINIVIL, ZESTRIL  
TAKE 1 TABLET DAILY MELATONIN PO Take 3 mg by mouth as needed. meloxicam 15 mg tablet Commonly known as:  MOBIC  
TAKE 1 TABLET DAILY  
  
 sucralfate 100 mg/mL suspension Commonly known as:  Evonnie Bane Take 5 mL by mouth four (4) times daily. traMADol 100 mg Tb24 Commonly known as:  ULTRAM-ER Take 1 Tab by mouth daily. Max Daily Amount: 100 mg. Indications: Chronic Pain ZyrTEC 10 mg tablet Generic drug:  cetirizine Take  by mouth. Prescriptions Printed Refills HYDROcodone-acetaminophen (NORCO) 5-325 mg per tablet 0 Sig: Take 2 Tabs by mouth every six (6) hours as needed for Pain. Max Daily Amount: 8 Tabs. Class: Print Route: Oral  
  
Follow-up Instructions Return if symptoms worsen or fail to improve. Patient Instructions Chronic Pain: Care Instructions Your Care Instructions Chronic pain is pain that lasts a long time (months or even years) and may or may not have a clear cause. It is different from acute pain, which usually does have a clear causelike an injury or illnessand gets better over time. Chronic pain: 
· Lasts over time but may vary from day to day. · Does not go away despite efforts to end it. · May disrupt your sleep and lead to fatigue. · May cause depression or anxiety. · May make your muscles tense, causing more pain. · Can disrupt your work, hobbies, home life, and relationships with friends and family. Chronic pain is a very real condition. It is not just in your head. Treatment can help and usually includes several methods used together, such as medicines, physical therapy, exercise, and other treatments. Learning how to relax and changing negative thought patterns can also help you cope. Chronic pain is complex. Taking an active role in your treatment will help you better manage your pain. Tell your doctor if you have trouble dealing with your pain. You may have to try several things before you find what works best for you. Follow-up care is a key part of your treatment and safety. Be sure to make and go to all appointments, and call your doctor if you are having problems. Its also a good idea to know your test results and keep a list of the medicines you take. How can you care for yourself at home? · Pace yourself. Break up large jobs into smaller tasks. Save harder tasks for days when you have less pain, or go back and forth between hard tasks and easier ones. Take rest breaks. · Relax, and reduce stress. Relaxation techniques such as deep breathing or meditation can help. · Keep moving. Gentle, daily exercise can help reduce pain over the long run. Try low- or no-impact exercises such as walking, swimming, and stationary biking. Do stretches to stay flexible. · Try heat, cold packs, and massage. · Get enough sleep. Chronic pain can make you tired and drain your energy. Talk with your doctor if you have trouble sleeping because of pain. · Think positive. Your thoughts can affect your pain level. Do things that you enjoy to distract yourself when you have pain instead of focusing on the pain. See a movie, read a book, listen to music, or spend time with a friend. · If you think you are depressed, talk to your doctor about treatment. · Keep a daily pain diary. Record how your moods, thoughts, sleep patterns, activities, and medicine affect your pain. You may find that your pain is worse during or after certain activities or when you are feeling a certain emotion. Having a record of your pain can help you and your doctor find the best ways to treat your pain. · Take pain medicines exactly as directed. ¨ If the doctor gave you a prescription medicine for pain, take it as prescribed. ¨ If you are not taking a prescription pain medicine, ask your doctor if you can take an over-the-counter medicine. Reducing constipation caused by pain medicine · Include fruits, vegetables, beans, and whole grains in your diet each day. These foods are high in fiber. · Drink plenty of fluids, enough so that your urine is light yellow or clear like water. If you have kidney, heart, or liver disease and have to limit fluids, talk with your doctor before you increase the amount of fluids you drink. · If your doctor recommends it, get more exercise. Walking is a good choice. Bit by bit, increase the amount you walk every day. Try for at least 30 minutes on most days of the week. · Schedule time each day for a bowel movement. A daily routine may help. Take your time and do not strain when having a bowel movement. When should you call for help? Call your doctor now or seek immediate medical care if: 
· Your pain gets worse or is out of control. · You feel down or blue, or you do not enjoy things like you once did.  You may be depressed, which is common in people with chronic pain. Depression can be treated. · You have vomiting or cramps for more than 2 hours. Watch closely for changes in your health, and be sure to contact your doctor if: 
· You cannot sleep because of pain. · You are very worried or anxious about your pain. · You have trouble taking your pain medicine. · You have any concerns about your pain medicine. · You have trouble with bowel movements, such as: 
¨ No bowel movement in 3 days. ¨ Blood in the anal area, in your stool, or on the toilet paper. ¨ Diarrhea for more than 24 hours. Where can you learn more? Go to http://camilo-robert.info/. Enter N004 in the search box to learn more about \"Chronic Pain: Care Instructions. \" Current as of: October 14, 2016 Content Version: 11.3 © 0173-3125 [employer]. Care instructions adapted under license by [employer] (which disclaims liability or warranty for this information). If you have questions about a medical condition or this instruction, always ask your healthcare professional. [address] any warranty or liability for your use of this information. Introducing [facility] & HE[facility]! Dear Violeta Velez: Thank you for requesting a Arrogene account. Our records indicate that you already have an active Arrogene account. You can access your account anytime at [email or web address] Did you know that you can access your hospital and ER discharge instructions at any time in Arrogene? You can also review all of your test results from your hospital stay or ER visit. Additional Information If you have questions, please visit the Frequently Asked Questions section of the Arrogene website at [email or web address] Remember, Arrogene is NOT to be used for urgent needs. For medical emergencies, dial 911. Now available from your iPhone and Android! Please provide this summary of care documentation to your next provider. Your primary care clinician is listed as [id number] [facility]. If you have any questions after today's visit, please call [phone].

## 2017-09-25 NOTE — PATIENT INSTRUCTIONS

## 2017-09-25 NOTE — PROGRESS NOTES
Chief Complaint   Patient presents with    Hip Pain     left hip pain down into the left leg    Other     pt reports having 2 spots on right leg      1. Have you been to the ER, urgent care clinic since your last visit? Hospitalized since your last visit? No    2. Have you seen or consulted any other health care providers outside of the [facility] since your last visit? Include any pap smears or colon screening.  No

## 2017-09-25 NOTE — PROGRESS NOTES
Adolfo Kitchen is a 69 y.o. female  presents for left hip pain. She is on Tylenol and Tramadol and pain is worse. HPI:   Patient has hip pain , primarily affecting the hips. Pain control:           Current : no significant medication side effects noted   9/10           Worst pain over last week        10/10           Least pain over the last week   5/10  Activities of Daily Living:            reasonably well controlled            Are you able to do your normal daily activities?   intermittent  Patient Goals            Functional:  yes            Pain: less  Adverse side effects:             Since starting your pain medicine are you experiencing any of the following?              ( Examples: Constipation, fatigue, lapses in memory, depression or sexual                        Dysfunction)   none   Is a Pain management Contract in the patient's chart? yes  Aberrant behaviors:              none(Early refill requests, lost prescriptions, lost medicine)  Pill count:             Is it consistent with patient's prescription? {Yes/No:09047:L: \"yes\"  Last urine drug screen:     [facility] Prescription Monitoring Program check performed:  yes        Treatment Care Team:  Patient Care Team:  Rebel Muñoz MD as PCP - General (Family Practice)  Follow up contact scheduled for 1-4 weeks: yes      Allergies   Allergen Reactions    Celebrex [Celecoxib] Swelling    Shellfish Derived Swelling     \"makes me feel puffy\"    Sulfa (Sulfonamide Antibiotics) Hives and Swelling     Lips swelling    Humira [Adalimumab] Other (comments)     Lupus    Optiray 320 [Ioversol] Hives and Itching     Pt states when she gets iv contrast she itches a little from hives?     Penicillins Hives     Outpatient Prescriptions Marked as Taking for the 9/25/17 encounter (Office Visit) with Rebel Muñoz MD   Medication Sig Dispense Refill    HYDROcodone-acetaminophen (NORCO) 5-325 mg per tablet Take 2 Tabs by mouth every six (6) hours as needed for Pain. Max Daily Amount: 8 Tabs. 60 Tab 0    sucralfate (CARAFATE) 100 mg/mL suspension Take 5 mL by mouth four (4) times daily. 414 mL 2    alendronate (FOSAMAX) 70 mg tablet Take 1 Tab by mouth every seven (7) days. 12 Tab 0    cetirizine (ZYRTEC) 10 mg tablet Take  by mouth.  meloxicam (MOBIC) 15 mg tablet TAKE 1 TABLET DAILY 90 Tab 1    lisinopril (PRINIVIL, ZESTRIL) 10 mg tablet TAKE 1 TABLET DAILY 90 Tab 1    furosemide (LASIX) 20 mg tablet TAKE 1 TABLET DAILY FOR EDEMA 90 Tab 0    traMADol (ULTRAM-ER) 100 mg Tb24 Take 1 Tab by mouth daily. Max Daily Amount: 100 mg. Indications: Chronic Pain 90 Tab 1    cyclobenzaprine (FLEXERIL) 10 mg tablet Take 1 Tab by mouth three (3) times daily as needed. Indications: RA 90 Tab 1    calcium carbonate (OS-BARBARA) 500 mg calcium (1,250 mg) tablet Take 1,500 mg by mouth daily.  MELATONIN PO Take 3 mg by mouth as needed.  docusate sodium (COLACE) 100 mg capsule Take 100 mg by mouth daily.        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    H/O calcium pyrophosphate deposition disease (CPPD) Z87.39    IBS (irritable bowel syndrome) K58.9    RA (rheumatoid arthritis) ([facility] [address] M06.9    Sicca syndrome (HCC) M35.00    Osteoarthritis of right hip M16.11    Pain management contract agreement Z02.89    ACP (advance care planning) Z71.89    Chronic left shoulder pain M25.512, G89.29    Osteopenia M85.80    Osteopenia of multiple sites M85.89     Past Medical History:   Diagnosis Date    Abdominal abscess ([facility] [address] 2009    Arthritis     Rheumatoid    Autoimmune disease ([facility] [address]     Medically induced Lupus    Back pain     Chronic pain     lower back    Colitis     Diverticulitis     Failed back syndrome     GERD (gastroesophageal reflux disease)     Hypertension     when on cyclosporins    Ill-defined condition     large torus roof of mouth    Irritable bowel syndrome     Osteoporosis     Pneumonia     Seizures ([facility] 6-1-2008    one episode- after high dose of epinepherine     Social History     Social History    Marital status:      Spouse name: N/A    Number of children: N/A    Years of education: N/A     Social History Main Topics    Smoking status: Never Smoker    Smokeless tobacco: Never Used    Alcohol use No    Drug use: No    Sexual activity: No     Other Topics Concern    None     Social History Narrative     Family History   Problem Relation Age of Onset    Other Other      Orthopedic (Sister)    Arthritis-osteo Sister     Cancer Neg Hx     Diabetes Neg Hx     Heart Disease Neg Hx     Heart Attack Neg Hx     Hypertension Neg Hx     Stroke Neg Hx     Malignant Hyperthermia Neg Hx     Pseudocholinesterase Deficiency Neg Hx     Delayed Awakening Neg Hx     Post-op Nausea/Vomiting Neg Hx     Emergence Delirium Neg Hx     Post-op Cognitive Dysfunction Neg Hx         Review of Systems   Constitutional: Negative for chills and fever. Cardiovascular: Negative for chest pain and palpitations. Musculoskeletal: Positive for joint pain (both hips left worse than right.). Negative for back pain. Skin: Negative for itching and rash. Vitals:    09/25/17 0925   BP: 138/80   Pulse: 68   Resp: 16   SpO2: 98%   Height: 5' 0.35\" (1.533 m)   PainSc:   6   PainLoc: Back       Physical Exam   Constitutional: She is well-developed, well-nourished, and in no distress. Cardiovascular: Normal rate, regular rhythm and normal heart sounds. Pulmonary/Chest: Effort normal and breath sounds normal.   Neurological: She is alert. Skin: Skin is warm and dry. Psychiatric: Mood, memory, affect and judgment normal.   Nursing note and vitals reviewed. Assessment/Plan      ICD-10-CM ICD-9-CM    1.  Rheumatoid arthritis, involving unspecified site, unspecified rheumatoid factor presence (HCC) M06.9 714.0 HYDROcodone-acetaminophen (NORCO) 5-325 mg per tablet   2. Other chronic pain G89.29 338.29      I have discussed the diagnosis with the patient and the intended plan of care as seen in the above orders. The patient has received an after-visit summary and questions were answered concerning future plans. I have discussed medication, side effects, and warnings with the patient in detail. The patient verbalized understanding and is in agreement with the plan of care. The patient will contact the office with any additional concerns. Follow-up Disposition:  Return if symptoms worsen or fail to improve.       Rebel Muñoz MD

## 2017-09-26 LAB
HCV AB SER IA-ACNC: 0.07 INDEX
HCV AB SERPL QL IA: NEGATIVE
HCV COMMENT,HCGAC: NORMAL

## 2017-09-28 ENCOUNTER — TELEPHONE (OUTPATIENT)
Dept: FAMILY MEDICINE CLINIC | Age: 67
End: 2017-09-28

## 2017-09-29 NOTE — TELEPHONE ENCOUNTER
Tried calling patient back at [phone] VM not setup     Home number-VM not setup and Mobile number-went straight to . Left message for patient at home number requesting return call.

## 2017-10-03 DIAGNOSIS — R60.0 BILATERAL EDEMA OF LOWER EXTREMITY: ICD-10-CM

## 2017-10-03 DIAGNOSIS — Z12.31 ENCOUNTER FOR SCREENING MAMMOGRAM FOR MALIGNANT NEOPLASM OF BREAST: ICD-10-CM

## 2017-10-03 RX ORDER — FUROSEMIDE 20 MG/1
TABLET ORAL
Qty: 90 TAB | Refills: 0 | Status: SHIPPED | OUTPATIENT
Start: 2017-10-03 | End: 2017-10-24

## 2017-10-04 ENCOUNTER — TELEPHONE (OUTPATIENT)
Dept: FAMILY MEDICINE CLINIC | Age: 67
End: 2017-10-04

## 2017-10-09 ENCOUNTER — TELEPHONE (OUTPATIENT)
Dept: FAMILY MEDICINE CLINIC | Age: 67
End: 2017-10-09

## 2017-10-09 NOTE — TELEPHONE ENCOUNTER
Conversation   (Oldest Message First)   Dorothy Valentin LPN       [birth year] 2:99 AM   Note      [facility] with [facility] called to inform Dr. Tika Kendall that patient was prescribed Ultram ER filled 06/29/17 and refilled 09/06/17 off formulary ger[facility]ic [facility] and not the Ultram ER. Need a new RX for Ultram ER. [phone]            Xavi called back from [address] He said what had happened was 'patient's generic Ultram had been mislabeled and the generic form [employer]olt had been shipped to her. It has immediate release, unlike Ultram ER which is extended'. A new verbal order is needed from dr Johan Crawford to sent out the tramadol. They will retrieve the medication from the patient and will also call her to inform her what has happened. Per dr isbell's verbal order with readback     traMADol (ULTRAM-ER) 100 mg Tb24 [[id number]]   Order Details   Dose: 100 mg Route: Oral Frequency: DAILY   Indications of Use: Chronic Pain   Dispense Quantity:  90 Tab Refills:  1 Fills Remaining:  --           Sig: Take 1 Tab by mouth daily. Max Daily Amount: 100 mg. Indications: Chronic Pain        90 day supply w/0 refills. Xavi repeated back order and stated understanding.

## 2017-10-19 ENCOUNTER — OFFICE VISIT (OUTPATIENT)
Dept: FAMILY MEDICINE CLINIC | Age: 67
End: 2017-10-19

## 2017-10-19 VITALS
DIASTOLIC BLOOD PRESSURE: 80 MMHG | SYSTOLIC BLOOD PRESSURE: 130 MMHG | HEIGHT: 60 IN | WEIGHT: 157 LBS | OXYGEN SATURATION: 99 % | HEART RATE: 66 BPM | RESPIRATION RATE: 16 BRPM | BODY MASS INDEX: 30.82 KG/M2

## 2017-10-19 DIAGNOSIS — M25.552 LEFT HIP PAIN: ICD-10-CM

## 2017-10-19 DIAGNOSIS — M85.89 OSTEOPENIA OF MULTIPLE SITES: Primary | ICD-10-CM

## 2017-10-19 RX ORDER — RISEDRONATE SODIUM 150 MG/1
150 TABLET, FILM COATED ORAL
Qty: 1 TAB | Refills: 2 | Status: SHIPPED | OUTPATIENT
Start: 2017-10-19 | End: 2017-10-24

## 2017-10-19 NOTE — PATIENT INSTRUCTIONS
Learning About Osteopenia  What is osteopenia? Osteopenia is a decrease in thickness, or density, in bones. That means the bones become thinner and weaker. It is much more common in women than in men. It is an early form of osteoporosis, a condition in which the bones are so thin and weak that they can break easily. Having osteopenia means that there is a greater risk that you may get osteoporosis. It also means that you are more likely to break a bone than someone who does not have osteopenia. Osteopenia doesn't cause any symptoms. It's usually found with a type of X-ray called a bone density test.  What increases the risk for osteopenia? Things that increase your risk include:  · Having a family history of osteoporosis. · Being thin. · Being white or . · Getting too little physical activity. · Smoking. · Drinking too much alcohol often. · Using certain medicines such as steroids. How can you prevent osteoporosis? There are things you can do to slow down osteopenia and prevent osteoporosis. Certain lifestyle changes will help slow the loss of bone density. · Eat food that has plenty of calcium and vitamin D. Yogurt, cheese, milk, and dark green vegetables are high in calcium. Eggs, fatty fish, cereal, and fortified milk are high in vitamin D.  · Talk to your doctor about taking a calcium supplement that has vitamin D in it. · Get regular exercise. ¨ Do 30 minutes of weight-bearing exercise on most days of the week. Walking, jogging, stair climbing, and dancing are good choices. ¨ Do resistance exercises with weights or elastic bands 2 or 3 days a week. · Limit alcohol to 2 drinks a day for men and 1 drink a day for women. Too much alcohol can cause health problems. · Do not smoke. Smoking can make bones thin faster. If you need help quitting, talk to your doctor about stop-smoking programs and medicines. These can increase your chances of quitting for good.   Prescription medicines are available for treating bone thinning. But these are more often used to treat osteoporosis. Follow-up care is a key part of your treatment and safety. Be sure to make and go to all appointments, and call your doctor if you are having problems. It's also a good idea to know your test results and keep a list of the medicines you take. Where can you learn more? Go to http://camilo-robert.info/. Enter A485 in the search box to learn more about \"Learning About Osteopenia. \"  Current as of: November 1, 2016  Content Version: 11.3  © 8860-1611 [employer]. Care instructions adapted under license by [employer] (which disclaims liability or warranty for this information). If you have questions about a medical condition or this instruction, always ask your healthcare professional. [address] any warranty or liability for your use of this information.

## 2017-10-19 NOTE — PROGRESS NOTES
Gerard Henao is a 69 y.o. female  presents for left hip pain. She states that she has to use a walker to ambulate. No weakness or numbness. She has appt with ortho in 4 days. She also states that she can not tolerate Fosamax. Allergies   Allergen Reactions    Celebrex [Celecoxib] Swelling    Shellfish Derived Swelling     \"makes me feel puffy\"    Sulfa (Sulfonamide Antibiotics) Hives and Swelling     Lips swelling    Humira [Adalimumab] Other (comments)     Lupus    Optiray 320 [Ioversol] Hives and Itching     Pt states when she gets iv contrast she itches a little from hives?  Penicillins Hives     Outpatient Prescriptions Marked as Taking for the 10/19/17 encounter (Office Visit) with Layla Francisco MD   Medication Sig Dispense Refill    risedronate (ACTONEL) 150 mg tablet Take 1 Tab by mouth every thirty (30) days. 1 Tab 2    furosemide (LASIX) 20 mg tablet TAKE 1 TABLET DAILY FOR EDEMA 90 Tab 0    sucralfate (CARAFATE) 100 mg/mL suspension Take 5 mL by mouth four (4) times daily. 414 mL 2    cetirizine (ZYRTEC) 10 mg tablet Take  by mouth.  meloxicam (MOBIC) 15 mg tablet TAKE 1 TABLET DAILY 90 Tab 1    lisinopril (PRINIVIL, ZESTRIL) 10 mg tablet TAKE 1 TABLET DAILY 90 Tab 1    traMADol (ULTRAM-ER) 100 mg Tb24 Take 1 Tab by mouth daily. Max Daily Amount: 100 mg. Indications: Chronic Pain 90 Tab 1    cyclobenzaprine (FLEXERIL) 10 mg tablet Take 1 Tab by mouth three (3) times daily as needed. Indications: RA 90 Tab 1    calcium carbonate (OS-BARBARA) 500 mg calcium (1,250 mg) tablet Take 1,500 mg by mouth daily.  MELATONIN PO Take 3 mg by mouth as needed.  docusate sodium (COLACE) 100 mg capsule Take 100 mg by mouth daily.        Patient Active Problem List   Diagnosis Code    DDD (degenerative disc disease), lumbar M51.36    Spondylolisthesis of lumbar region M43.16    Status post lumbar surgery Z98.890    Preop cardiovascular exam Z01.810    Fibrosis of left subtalar joint [id number]    H/O calcium pyrophosphate deposition disease (CPPD) Z87.39    IBS (irritable bowel syndrome) K58.9    RA (rheumatoid arthritis) ([facility]) M06.9    Sicca syndrome ([facility]) M35.00    Osteoarthritis of right hip M16.11    Pain management contract agreement Z02.89    ACP (advance care planning) Z71.89    Chronic left shoulder pain M25.512, G89.29    Osteopenia M85.80    Osteopenia of multiple sites M85.89     Past Medical History:   Diagnosis Date    Abdominal abscess ([facility] 2009    Arthritis     Rheumatoid    Autoimmune disease ([facility] [address] [facility]     Medically induced Lupus    Back pain     Chronic pain     lower back    Colitis     Diverticulitis     Failed back syndrome     GERD (gastroesophageal reflux disease)     Hypertension     when on cyclosporins    Ill-defined condition     large torus roof of mouth    Irritable bowel syndrome     Osteoporosis     Pneumonia     Seizures ([facility] [address] 6-1-2008    one episode- after high dose of epinepherine     Social History     Social History    Marital status:      Spouse name: N/A    Number of children: N/A    Years of education: N/A     Social History Main Topics    Smoking status: Never Smoker    Smokeless tobacco: Never Used    Alcohol use No    Drug use: No    Sexual activity: No     Other Topics Concern    None     Social History Narrative     Family History   Problem Relation Age of Onset    Other Other      Orthopedic (Sister)    Arthritis-osteo Sister     Cancer Neg Hx     Diabetes Neg Hx     Heart Disease Neg Hx     Heart Attack Neg Hx     Hypertension Neg Hx     Stroke Neg Hx     Malignant Hyperthermia Neg Hx     Pseudocholinesterase Deficiency Neg Hx     Delayed Awakening Neg Hx     Post-op Nausea/Vomiting Neg Hx     Emergence Delirium Neg Hx     Post-op Cognitive Dysfunction Neg Hx         Review of Systems   Constitutional: Negative for chills and fever.    Gastrointestinal: Negative for constipation, diarrhea, nausea and vomiting. Vitals:    10/19/17 1024   BP: 130/80   Pulse: 66   Resp: 16   SpO2: 99%   Weight: 157 lb (71.2 kg)   Height: 5' 0.35\" (1.533 m)   PainSc:   8   PainLoc: Hip       Physical Exam   Constitutional: She is oriented to person, place, and time and well-developed, well-nourished, and in no distress. Neurological: She is alert and oriented to person, place, and time. Skin: Skin is warm and dry. Psychiatric: Mood, memory, affect and judgment normal.   Nursing note and vitals reviewed. Assessment/Plan      ICD-10-CM ICD-9-CM    1. Osteopenia of multiple sites M85.89 733.90 risedronate (ACTONEL) 150 mg tablet   2. Left hip pain M25.552 719.45      Will follow up with ortho  I have discussed the diagnosis with the patient and the intended plan of care as seen in the above orders. The patient has received an after-visit summary and questions were answered concerning future plans. I have discussed medication, side effects, and warnings with the patient in detail. The patient verbalized understanding and is in agreement with the plan of care. The patient will contact the office with any additional concerns.       Follow-up Disposition:  Return if symptoms worsen or fail to improve.  lab results and schedule of future lab studies reviewed with patient    Lisa Mcclain MD

## 2017-10-19 NOTE — MR AVS SNAPSHOT
Visit Information Date & Time Provider Department Dept. Phone Encounter #  
 10/19/2017 10:45 AM Terrence Wallcae MD [address] [id number] Follow-up Instructions Return if symptoms worsen or fail to improve. Your Appointments 10/19/2017 10:45 AM  
SAME DAY with Terrence Wallace MD  
[facility] [address]) Appt Note: hip pain, wants surgery, asked to see D.r [address] [facility] [address] [place] [address]  
[phone]  
  
   
 [address] [place] [address] Upcoming Health Maintenance Date Due  
 GLAUCOMA SCREENING Q2Y 5/6/2016 MEDICARE YEARLY EXAM 6/29/2018 Pneumococcal 65+ Low/Medium Risk (2 of 2 - PPSV23) 7/10/2018 BREAST CANCER SCRN MAMMOGRAM 7/11/2019 COLONOSCOPY 1/13/2024 DTaP/Tdap/Td series (3 - Td) 9/14/2027 Allergies as of 10/19/2017  Review Complete On: 10/19/2017 By: Maura Merino LPN Severity Noted Reaction Type Reactions Celebrex [Celecoxib] High 11/06/2014   Systemic Swelling Shellfish Derived High 05/30/2017   Systemic Swelling \"makes me feel puffy\" Sulfa (Sulfonamide Antibiotics) High 05/18/2015    Hives, Swelling Lips swelling Humira [Adalimumab]  06/27/2013    Other (comments) Lupus Optiray 320 [Ioversol]  02/16/2013   Not Verified Hives, Itching Pt states when she gets iv contrast she itches a little from hives? Penicillins  02/19/2013    Hives Current Immunizations  Never Reviewed Name Date Influenza Vaccine 11/15/2007 12:00 AM  
 Pneumococcal Conjugate (PCV-13) 7/10/2017 Pneumococcal Vaccine (Unspecified Type) 10/31/2010 Tdap 9/14/2017, 11/15/2007 12:00 AM  
  
 Not reviewed this visit You Were Diagnosed With   
  
 Codes Comments Osteopenia of multiple sites    -  Primary ICD-10-CM: M85.89 ICD-9-CM: 733.90 Vitals BP Pulse Resp Height(growth percentile) Weight(growth percentile) SpO2  
 130/80 (BP 1 Location: Left arm, BP Patient Position: Sitting) 66 16 5' 0.35\" (1.533 m) 157 lb (71.2 kg) 99% BMI OB Status Smoking Status 30.31 kg/m2 Hysterectomy Never Smoker BMI and BSA Data Body Mass Index Body Surface Area  
 30.31 kg/m 2 1.74 m 2 Preferred Pharmacy Pharmacy Name Phone [facility] [address] [place] [address], [address] [place] [address] Rd [phone] Your Updated Medication List  
  
   
This list is accurate as of: 10/19/17 10:37 AM.  Always use your most recent med list.  
  
  
  
  
 calcium carbonate 500 mg calcium (1,250 mg) tablet Commonly known as:  OS-BARBARA Take 1,500 mg by mouth daily. COLACE 100 mg capsule Generic drug:  docusate sodium Take 100 mg by mouth daily. cyclobenzaprine 10 mg tablet Commonly known as:  FLEXERIL Take 1 Tab by mouth three (3) times daily as needed. Indications: RA  
  
 furosemide 20 mg tablet Commonly known as:  LASIX TAKE 1 TABLET DAILY FOR EDEMA HYDROcodone-acetaminophen 5-325 mg per tablet Commonly known as:  Thelbert Moorland Take 2 Tabs by mouth every six (6) hours as needed for Pain. Max Daily Amount: 8 Tabs. lisinopril 10 mg tablet Commonly known as:  PRINIVIL, ZESTRIL  
TAKE 1 TABLET DAILY MELATONIN PO Take 3 mg by mouth as needed. meloxicam 15 mg tablet Commonly known as:  MOBIC  
TAKE 1 TABLET DAILY  
  
 risedronate 150 mg tablet Commonly known as:  ACTONEL Take 1 Tab by mouth every thirty (30) days. sucralfate 100 mg/mL suspension Commonly known as:  Shahzad Avers Take 5 mL by mouth four (4) times daily. traMADol 100 mg Tb24 Commonly known as:  ULTRAM-ER Take 1 Tab by mouth daily. Max Daily Amount: 100 mg. Indications: Chronic Pain ZyrTEC 10 mg tablet Generic drug:  cetirizine Take  by mouth. Prescriptions Sent to Pharmacy Refills  
 risedronate (ACTONEL) 150 mg tablet 2 Sig: Take 1 Tab by mouth every thirty (30) days. Class: Normal  
 Pharmacy: [facility], [address] [facility] #: [phone] Route: Oral  
  
Follow-up Instructions Return if symptoms worsen or fail to improve. Patient Instructions Learning About Osteopenia What is osteopenia? Osteopenia is a decrease in thickness, or density, in bones. That means the bones become thinner and weaker. It is much more common in women than in men. It is an early form of osteoporosis, a condition in which the bones are so thin and weak that they can break easily. Having osteopenia means that there is a greater risk that you may get osteoporosis. It also means that you are more likely to break a bone than someone who does not have osteopenia. Osteopenia doesn't cause any symptoms. It's usually found with a type of X-ray called a bone density test. 
What increases the risk for osteopenia? Things that increase your risk include: 
· Having a family history of osteoporosis. · Being thin. · Being white or . · Getting too little physical activity. · Smoking. · Drinking too much alcohol often. · Using certain medicines such as steroids. How can you prevent osteoporosis? There are things you can do to slow down osteopenia and prevent osteoporosis. Certain lifestyle changes will help slow the loss of bone density. · Eat food that has plenty of calcium and vitamin D. Yogurt, cheese, milk, and dark green vegetables are high in calcium. Eggs, fatty fish, cereal, and fortified milk are high in vitamin D. 
· Talk to your doctor about taking a calcium supplement that has vitamin D in it. · Get regular exercise. ¨ Do 30 minutes of weight-bearing exercise on most days of the week. Walking, jogging, stair climbing, and dancing are good choices.  
¨ Do resistance exercises with weights or elastic bands 2 or 3 days a week. 
· Limit alcohol to 2 drinks a day for men and 1 drink a day for women. Too much alcohol can cause health problems. · Do not smoke. Smoking can make bones thin faster. If you need help quitting, talk to your doctor about stop-smoking programs and medicines. These can increase your chances of quitting for good. Prescription medicines are available for treating bone thinning. But these are more often used to treat osteoporosis. Follow-up care is a key part of your treatment and safety. Be sure to make and go to all appointments, and call your doctor if you are having problems. It's also a good idea to know your test results and keep a list of the medicines you take. Where can you learn more? Go to http://camilo-robert.info/. Enter F512 in the search box to learn more about \"Learning About Osteopenia. \" Current as of: November 1, 2016 Content Version: 11.3 © 3941-2731 [employer]. Care instructions adapted under license by [employer] (which disclaims liability or warranty for this information). If you have questions about a medical condition or this instruction, always ask your healthcare professional. [place] [address] any warranty or liability for your use of this information. Introducing [facility]! Dear Elicia Hubbard: Thank you for requesting a [employer] account. Our records indicate that you already have an active [employer] account. You can access your account anytime at [email or web address] Did you know that you can access your hospital and ER discharge instructions at any time in [employer]? You can also review all of your test results from your hospital stay or ER visit. Additional Information If you have questions, please visit the Frequently Asked Questions section of the [employer] website at [email or web address] Remember, [employer] is NOT to be used for urgent needs.  For medical emergencies, dial 911. Now available from your iPhone and Android! Please provide this summary of care documentation to your next provider. Your primary care clinician is listed as [address]. If you have any questions after today's visit, please call [phone].

## 2017-10-20 ENCOUNTER — PATIENT OUTREACH (OUTPATIENT)
Dept: FAMILY MEDICINE CLINIC | Age: 67
End: 2017-10-20

## 2017-10-20 NOTE — PROGRESS NOTES
health screeing:    Noted in  that mammogram was done July of this year and is due again July of 2019.  updated.

## 2017-10-23 ENCOUNTER — HOSPITAL ENCOUNTER (OUTPATIENT)
Dept: PREADMISSION TESTING | Age: 67
Discharge: HOME | DRG: 470 | End: 2017-10-23
Payer: MEDICARE

## 2017-10-23 DIAGNOSIS — M16.12 PRIMARY OSTEOARTHRITIS OF LEFT HIP: ICD-10-CM

## 2017-10-23 LAB
ANION GAP SERPL CALC-SCNC: 6 MMOL/L (ref 3–18)
APPEARANCE UR: CLEAR
ATRIAL RATE: 70 BPM
BACTERIA SPEC CULT: NORMAL
BACTERIA SPEC CULT: NORMAL
BACTERIA URNS QL MICRO: ABNORMAL /HPF
BILIRUB UR QL: NEGATIVE
BUN SERPL-MCNC: 16 MG/DL (ref 7–18)
BUN/CREAT SERPL: 17 (ref 12–20)
CALCIUM SERPL-MCNC: 9.3 MG/DL (ref 8.5–10.1)
CALCULATED P AXIS, ECG09: 26 DEGREES
CALCULATED R AXIS, ECG10: 60 DEGREES
CALCULATED T AXIS, ECG11: 51 DEGREES
CHLORIDE SERPL-SCNC: 98 MMOL/L (ref 100–108)
CO2 SERPL-SCNC: 32 MMOL/L (ref 21–32)
COLOR UR: YELLOW
CREAT SERPL-MCNC: 0.92 MG/DL (ref 0.6–1.3)
DIAGNOSIS, 93000: NORMAL
EPITH CASTS URNS QL MICRO: ABNORMAL /LPF (ref 0–5)
ERYTHROCYTE [DISTWIDTH] IN BLOOD BY AUTOMATED COUNT: 13.6 % (ref 11.6–14.5)
GLUCOSE SERPL-MCNC: 102 MG/DL (ref 74–99)
GLUCOSE UR STRIP.AUTO-MCNC: NEGATIVE MG/DL
HCT VFR BLD AUTO: 39.4 % (ref 35–45)
HGB BLD-MCNC: 13.1 G/DL (ref 12–16)
HGB UR QL STRIP: NEGATIVE
KETONES UR QL STRIP.AUTO: NEGATIVE MG/DL
LEUKOCYTE ESTERASE UR QL STRIP.AUTO: ABNORMAL
MCH RBC QN AUTO: 30.8 PG (ref 24–34)
MCHC RBC AUTO-ENTMCNC: 33.2 G/DL (ref 31–37)
MCV RBC AUTO: 92.5 FL (ref 74–97)
NITRITE UR QL STRIP.AUTO: NEGATIVE
P-R INTERVAL, ECG05: 156 MS
PH UR STRIP: 7.5 [PH] (ref 5–8)
PLATELET # BLD AUTO: 264 K/UL (ref 135–420)
PMV BLD AUTO: 9 FL (ref 9.2–11.8)
POTASSIUM SERPL-SCNC: 4.4 MMOL/L (ref 3.5–5.5)
PROT UR STRIP-MCNC: NEGATIVE MG/DL
Q-T INTERVAL, ECG07: 380 MS
QRS DURATION, ECG06: 70 MS
QTC CALCULATION (BEZET), ECG08: 410 MS
RBC # BLD AUTO: 4.26 M/UL (ref 4.2–5.3)
RBC #/AREA URNS HPF: ABNORMAL /HPF (ref 0–5)
SERVICE CMNT-IMP: NORMAL
SODIUM SERPL-SCNC: 136 MMOL/L (ref 136–145)
SP GR UR REFRACTOMETRY: 1.01 (ref 1–1.03)
UROBILINOGEN UR QL STRIP.AUTO: 0.2 EU/DL (ref 0.2–1)
VENTRICULAR RATE, ECG03: 70 BPM
WBC # BLD AUTO: 5.7 K/UL (ref 4.6–13.2)
WBC URNS QL MICRO: ABNORMAL /HPF (ref 0–5)

## 2017-10-24 ENCOUNTER — HOSPITAL ENCOUNTER (OUTPATIENT)
Dept: PREADMISSION TESTING | Age: 67
Discharge: HOME | DRG: 470 | End: 2017-10-24
Payer: MEDICARE

## 2017-10-24 ENCOUNTER — ANESTHESIA EVENT (OUTPATIENT)
Dept: SURGERY | Age: 67
DRG: 470 | End: 2017-10-24
Payer: MEDICARE

## 2017-10-24 PROBLEM — M16.12 OSTEOARTHRITIS OF LEFT HIP: Chronic | Status: ACTIVE | Noted: 2017-10-24

## 2017-10-24 LAB
BACTERIA SPEC CULT: NORMAL
SERVICE CMNT-IMP: NORMAL

## 2017-10-24 RX ORDER — SODIUM CHLORIDE 0.9 % (FLUSH) 0.9 %
5-10 SYRINGE (ML) INJECTION EVERY 8 HOURS
Status: CANCELLED | OUTPATIENT
Start: 2017-10-24

## 2017-10-24 RX ORDER — SODIUM CHLORIDE 0.9 % (FLUSH) 0.9 %
5-10 SYRINGE (ML) INJECTION AS NEEDED
Status: CANCELLED | OUTPATIENT
Start: 2017-10-24

## 2017-10-24 NOTE — H&P
History and Physical        Patient: Carlos Fragoso               Sex: female          DOA: (Not on file)         YOB: 1950      Age:  69 y.o.        LOS:  LOS: 0 days        HPI:     Colin Velez is in for followup of her right KELLY  from four and a half months ago and is now here with progressive left hip DJD. The patient's right hip is doing great but the left hip has gotten more and more problematic. Her pain is in the left groin and radiates into the buttocks. She has been worked up for her back for this because her x-rays have been pretty normal.  A recent MRI of her left hip showed significant advanced arthritic change. She is now here today for evaluation and treatment and is trying to get her left hip fixed. They want to travel [place] in the next month or so and are trying to get everything arranged as soon as possible. An AP pelvis x-ray from last month was reviewed and shows the right KELLY in good position. Her left hip has normal appearing structure. Her leg lengths appear equal by x-ray standards or slightly shorter on the left. We added four millimeters to her leg on the right when we did her KELLY.       Past Medical History:   Diagnosis Date    Abdominal abscess ([address] 2009    Arthritis     Rheumatoid    Autoimmune disease ([address]     Medically induced Lupus    Back pain     Chronic pain     lower back    Colitis     Diverticulitis     Failed back syndrome     GERD (gastroesophageal reflux disease)     Hypertension     when on cyclosporins    Ill-defined condition     large torus roof of mouth    Irritable bowel syndrome     Osteoporosis     Pneumonia     Seizures ([address] 6-1-2008    one episode- after high dose of epinepherine       Past Surgical History:   Procedure Laterality Date    HX ABDOMINAL WALL DEFECT REPAIR      HX APPENDECTOMY      HX BREAST REDUCTION      HX CATARACT REMOVAL Bilateral     HX GI      repair rectal prolaspe    HX GYN dermoid cyst    HX HEENT      tonsillectomy    HX HYSTERECTOMY  1976    HX LUMBAR FUSION      x 2    HX ORTHOPAEDIC Bilateral     CTR    HX ORTHOPAEDIC Bilateral     arthroplasty thumbs    HX ORTHOPAEDIC Left     Ankle     HX SALPINGO-OOPHORECTOMY Bilateral     HX SHOULDER REPLACEMENT Left     HX UROLOGICAL      bladder repair       Family History   Problem Relation Age of Onset    Other Other      Orthopedic (Sister)    Arthritis-osteo Sister     Cancer Neg Hx     Diabetes Neg Hx     Heart Disease Neg Hx     Heart Attack Neg Hx     Hypertension Neg Hx     Stroke Neg Hx     Malignant Hyperthermia Neg Hx     Pseudocholinesterase Deficiency Neg Hx     Delayed Awakening Neg Hx     Post-op Nausea/Vomiting Neg Hx     Emergence Delirium Neg Hx     Post-op Cognitive Dysfunction Neg Hx        Social History     Social History    Marital status:      Spouse name: N/A    Number of children: N/A    Years of education: N/A     Social History Main Topics    Smoking status: Never Smoker    Smokeless tobacco: Never Used    Alcohol use No    Drug use: No    Sexual activity: No     Other Topics Concern    Not on file     Social History Narrative       Prior to Admission medications    Medication Sig Start Date End Date Taking? Authorizing Provider   risedronate (ACTONEL) 150 mg tablet Take 1 Tab by mouth every thirty (30) days. 10/19/17   Issac Wade MD   furosemide (LASIX) 20 mg tablet TAKE 1 TABLET DAILY FOR EDEMA 10/3/17   Issac Wade MD   HYDROcodone-acetaminophen [facility]) 5-325 mg per tablet Take 2 Tabs by mouth every six (6) hours as needed for Pain. Max Daily Amount: 8 Tabs. 9/25/17   Issac Wade MD   sucralfate (CARAFATE) 100 mg/mL suspension Take 5 mL by mouth four (4) times daily. 9/14/17   Issac Wade MD   cetirizine (ZYRTEC) 10 mg tablet Take  by mouth.     Historical Provider   meloxicam (MOBIC) 15 mg tablet TAKE 1 TABLET DAILY 7/13/17   Issac Wade MD   lisinopril (PRINIVIL, ZESTRIL) 10 mg tablet TAKE 1 TABLET DAILY 7/13/17   Rosio Mireles MD   traMADol (ULTRAM-ER) 100 mg Tb24 Take 1 Tab by mouth daily. Max Daily Amount: 100 mg. Indications: Chronic Pain 6/22/17   Rosio Mireles MD   cyclobenzaprine (FLEXERIL) 10 mg tablet Take 1 Tab by mouth three (3) times daily as needed. Indications: RA 6/22/17   Rosio Mireles MD   calcium carbonate (OS-BARBARA) 500 mg calcium (1,250 mg) tablet Take 1,500 mg by mouth daily. Historical Provider   MELATONIN PO Take 3 mg by mouth as needed. Historical Provider   docusate sodium (COLACE) 100 mg capsule Take 100 mg by mouth daily. Historical Provider       Allergies   Allergen Reactions    Celebrex [Celecoxib] Swelling    Shellfish Derived Swelling     \"makes me feel puffy\"    Sulfa (Sulfonamide Antibiotics) Hives and Swelling     Lips swelling    Humira [Adalimumab] Other (comments)     Lupus    Optiray 320 [Ioversol] Hives and Itching     Pt states when she gets iv contrast she itches a little from hives?  Penicillins Hives       Review of Systems  A comprehensive review of systems was negative except for that written in the History of Present Illness. Physical Exam:      There were no vitals taken for this visit. Physical Exam:  Physical exam shows that her left hip has pain as I rotate it internally or externally. The pain is referred back to the left groin and the buttocks region. She does not notice a leg length discrepancy. Examination of the left  hip shows the patient is nontender to palpation. The skin is normal with no contusions or wounds. There is no pain at the greater trochanters. There are no restrictions of hip mobility on internal and external rotation. As the iliotibial band is stretched, there is no pain. The patient has normal light touch sensation in all dermatomal patterns. There is normal motor strength to heel and toe walking.   There is negative straight leg raising bilaterally to 90 degrees in the seated position. The patient has good capillary refill. Assessment/Plan     Principal Problem:    Osteoarthritis of left hip (10/24/2017)    Active Problems:    DDD (degenerative disc disease), lumbar (6/28/2013)      Spondylolisthesis of lumbar region (6/28/2013)      H/O calcium pyrophosphate deposition disease (CPPD) (5/30/2017)      IBS (irritable bowel syndrome) (5/30/2017)      RA (rheumatoid arthritis) ([facility] [address] (5/30/2017)      Sicca syndrome ([facility] [address] (5/30/2017)      Pain management contract agreement (6/22/2017)      Osteopenia (9/8/2017)      Dr. Cynthia Daley scheduled her for a left direct anterior total hip arthroplasty. He talked to the patient about the risks, the alternatives, and the benefits including infection, pain, bleeding, and DVT, the same as when we did her right side. We will keep her in the hospital overnight. She wants to use Dilaudid for postoperative pain use because that was the best medication for her. She will use  Aspirin 325mg twice daily to be filled ahead of time for DVT prophylaxis and perioperative antibiotics. He told her we will try to keep her leg lengths equal or add as many millimeters necessary to keep her stable. She understands all the same risks and is willing to proceed. She would like this done this week if we can add her to the schedule and if not we will add her next week. He will see her again two weeks postop.

## 2017-10-25 ENCOUNTER — ANESTHESIA (OUTPATIENT)
Dept: SURGERY | Age: 67
DRG: 470 | End: 2017-10-25
Payer: MEDICARE

## 2017-10-25 ENCOUNTER — APPOINTMENT (OUTPATIENT)
Dept: GENERAL RADIOLOGY | Age: 67
DRG: 470 | End: 2017-10-25
Attending: PHYSICIAN ASSISTANT
Payer: MEDICARE

## 2017-10-25 ENCOUNTER — HOSPITAL ENCOUNTER (INPATIENT)
Age: 67
LOS: 1 days | Discharge: HOME HEALTH | DRG: 470 | End: 2017-10-26
Attending: ORTHOPAEDIC SURGERY | Admitting: ORTHOPAEDIC SURGERY
Payer: MEDICARE

## 2017-10-25 LAB
ABO + RH BLD: NORMAL
BLOOD GROUP ANTIBODIES SERPL: NORMAL
SPECIMEN EXP DATE BLD: NORMAL

## 2017-10-25 PROCEDURE — 74011250637 HC RX REV CODE- 250/637: Performed by: ANESTHESIOLOGY

## 2017-10-25 PROCEDURE — C9290 INJ, BUPIVACAINE LIPOSOME: HCPCS | Performed by: ORTHOPAEDIC SURGERY

## 2017-10-25 PROCEDURE — 77030013708 HC HNDPC SUC IRR PULS STRY –B: Performed by: ORTHOPAEDIC SURGERY

## 2017-10-25 PROCEDURE — 74011000250 HC RX REV CODE- 250

## 2017-10-25 PROCEDURE — 76060000034 HC ANESTHESIA 1.5 TO 2 HR: Performed by: ORTHOPAEDIC SURGERY

## 2017-10-25 PROCEDURE — 74011250636 HC RX REV CODE- 250/636: Performed by: ORTHOPAEDIC SURGERY

## 2017-10-25 PROCEDURE — 73501 X-RAY EXAM HIP UNI 1 VIEW: CPT

## 2017-10-25 PROCEDURE — 77030012508 HC MSK AIRWY LMA AMBU -A: Performed by: ANESTHESIOLOGY

## 2017-10-25 PROCEDURE — 65270000029 HC RM PRIVATE

## 2017-10-25 PROCEDURE — 77030018836 HC SOL IRR NACL ICUM -A: Performed by: ORTHOPAEDIC SURGERY

## 2017-10-25 PROCEDURE — 74011000258 HC RX REV CODE- 258: Performed by: ORTHOPAEDIC SURGERY

## 2017-10-25 PROCEDURE — 77030020782 HC GWN BAIR PAWS FLX 3M -B: Performed by: ORTHOPAEDIC SURGERY

## 2017-10-25 PROCEDURE — 77030026044 HC TIP IRR PULS STRY -A: Performed by: ORTHOPAEDIC SURGERY

## 2017-10-25 PROCEDURE — 77030034479 HC ADH SKN CLSR PRINEO J&J -B: Performed by: ORTHOPAEDIC SURGERY

## 2017-10-25 PROCEDURE — 77030010507 HC ADH SKN DERMBND J&J -B: Performed by: ORTHOPAEDIC SURGERY

## 2017-10-25 PROCEDURE — 76010000153 HC OR TIME 1.5 TO 2 HR: Performed by: ORTHOPAEDIC SURGERY

## 2017-10-25 PROCEDURE — 77030018673: Performed by: ORTHOPAEDIC SURGERY

## 2017-10-25 PROCEDURE — 77030032490 HC SLV COMPR SCD KNE COVD -B: Performed by: ORTHOPAEDIC SURGERY

## 2017-10-25 PROCEDURE — 77030031139 HC SUT VCRL2 J&J -A: Performed by: ORTHOPAEDIC SURGERY

## 2017-10-25 PROCEDURE — 74011250636 HC RX REV CODE- 250/636

## 2017-10-25 PROCEDURE — 77030011640 HC PAD GRND REM COVD -A: Performed by: ORTHOPAEDIC SURGERY

## 2017-10-25 PROCEDURE — 74011250636 HC RX REV CODE- 250/636: Performed by: PHYSICIAN ASSISTANT

## 2017-10-25 PROCEDURE — 74011250636 HC RX REV CODE- 250/636: Performed by: ANESTHESIOLOGY

## 2017-10-25 PROCEDURE — 77030037875 HC DRSG MEPILEX <16IN BORD MOLN -A: Performed by: ORTHOPAEDIC SURGERY

## 2017-10-25 PROCEDURE — 76210000000 HC OR PH I REC 2 TO 2.5 HR: Performed by: ORTHOPAEDIC SURGERY

## 2017-10-25 PROCEDURE — 77030027138 HC INCENT SPIROMETER -A: Performed by: ORTHOPAEDIC SURGERY

## 2017-10-25 PROCEDURE — 0SRB04Z REPLACEMENT OF LEFT HIP JOINT WITH CERAMIC ON POLYETHYLENE SYNTHETIC SUBSTITUTE, OPEN APPROACH: ICD-10-PCS | Performed by: ORTHOPAEDIC SURGERY

## 2017-10-25 PROCEDURE — 74011250637 HC RX REV CODE- 250/637: Performed by: PHYSICIAN ASSISTANT

## 2017-10-25 PROCEDURE — 86900 BLOOD TYPING SEROLOGIC ABO: CPT | Performed by: ORTHOPAEDIC SURGERY

## 2017-10-25 PROCEDURE — 74011000250 HC RX REV CODE- 250: Performed by: ORTHOPAEDIC SURGERY

## 2017-10-25 PROCEDURE — C1776 JOINT DEVICE (IMPLANTABLE): HCPCS | Performed by: ORTHOPAEDIC SURGERY

## 2017-10-25 PROCEDURE — 77030038010: Performed by: ORTHOPAEDIC SURGERY

## 2017-10-25 PROCEDURE — 77030018846 HC SOL IRR STRL H20 ICUM -A: Performed by: ORTHOPAEDIC SURGERY

## 2017-10-25 DEVICE — CUP ACET DIA52MM HIP GRIPTION PRI CEMENTLESS FIX SECT SER: Type: IMPLANTABLE DEVICE | Site: HIP | Status: FUNCTIONAL

## 2017-10-25 DEVICE — COMPONENT TOT HIP PRIMARY CERM ALTRX: Type: IMPLANTABLE DEVICE | Site: HIP | Status: FUNCTIONAL

## 2017-10-25 DEVICE — STEM FEM SZ 6 L107MM 130DEG STD OFFSET CALCAR HIP GRIPTION: Type: IMPLANTABLE DEVICE | Site: HIP | Status: FUNCTIONAL

## 2017-10-25 DEVICE — ELIMINATOR H APEX FOR 48-60MM PINN HIP SHELL: Type: IMPLANTABLE DEVICE | Site: HIP | Status: FUNCTIONAL

## 2017-10-25 DEVICE — HEAD FEM DIA36MM +1.5MM OFFSET 12/14 TAPR HIP CERAMIC: Type: IMPLANTABLE DEVICE | Site: HIP | Status: FUNCTIONAL

## 2017-10-25 DEVICE — LINER ACET OD52MM ID36MM HIP ALTRX PINN: Type: IMPLANTABLE DEVICE | Site: HIP | Status: FUNCTIONAL

## 2017-10-25 RX ORDER — MELATONIN
1000
Status: DISCONTINUED | OUTPATIENT
Start: 2017-10-26 | End: 2017-10-26 | Stop reason: HOSPADM

## 2017-10-25 RX ORDER — FLUMAZENIL 0.1 MG/ML
0.2 INJECTION INTRAVENOUS
Status: DISCONTINUED | OUTPATIENT
Start: 2017-10-25 | End: 2017-10-25 | Stop reason: HOSPADM

## 2017-10-25 RX ORDER — POLYETHYLENE GLYCOL 3350 17 G/17G
17 POWDER, FOR SOLUTION ORAL DAILY
Status: DISCONTINUED | OUTPATIENT
Start: 2017-10-26 | End: 2017-10-26 | Stop reason: HOSPADM

## 2017-10-25 RX ORDER — OXYCODONE HYDROCHLORIDE 5 MG/1
5 TABLET ORAL ONCE
Status: COMPLETED | OUTPATIENT
Start: 2017-10-25 | End: 2017-10-25

## 2017-10-25 RX ORDER — SODIUM CHLORIDE, SODIUM LACTATE, POTASSIUM CHLORIDE, CALCIUM CHLORIDE 600; 310; 30; 20 MG/100ML; MG/100ML; MG/100ML; MG/100ML
125 INJECTION, SOLUTION INTRAVENOUS CONTINUOUS
Status: DISPENSED | OUTPATIENT
Start: 2017-10-25 | End: 2017-10-26

## 2017-10-25 RX ORDER — CALCIUM CARBONATE 500(1250)
1500 TABLET ORAL DAILY
Status: DISCONTINUED | OUTPATIENT
Start: 2017-10-26 | End: 2017-10-26 | Stop reason: HOSPADM

## 2017-10-25 RX ORDER — BISACODYL 5 MG
5 TABLET, DELAYED RELEASE (ENTERIC COATED) ORAL DAILY PRN
Status: DISCONTINUED | OUTPATIENT
Start: 2017-10-25 | End: 2017-10-26 | Stop reason: HOSPADM

## 2017-10-25 RX ORDER — ONDANSETRON 2 MG/ML
INJECTION INTRAMUSCULAR; INTRAVENOUS AS NEEDED
Status: DISCONTINUED | OUTPATIENT
Start: 2017-10-25 | End: 2017-10-25 | Stop reason: HOSPADM

## 2017-10-25 RX ORDER — PROPOFOL 10 MG/ML
INJECTION, EMULSION INTRAVENOUS AS NEEDED
Status: DISCONTINUED | OUTPATIENT
Start: 2017-10-25 | End: 2017-10-25 | Stop reason: HOSPADM

## 2017-10-25 RX ORDER — CLINDAMYCIN PHOSPHATE 900 MG/50ML
900 INJECTION, SOLUTION INTRAVENOUS EVERY 8 HOURS
Status: COMPLETED | OUTPATIENT
Start: 2017-10-25 | End: 2017-10-26

## 2017-10-25 RX ORDER — LISINOPRIL 5 MG/1
10 TABLET ORAL DAILY
Status: DISCONTINUED | OUTPATIENT
Start: 2017-10-26 | End: 2017-10-26 | Stop reason: HOSPADM

## 2017-10-25 RX ORDER — SODIUM CHLORIDE 0.9 % (FLUSH) 0.9 %
5-10 SYRINGE (ML) INJECTION EVERY 8 HOURS
Status: DISCONTINUED | OUTPATIENT
Start: 2017-10-25 | End: 2017-10-26 | Stop reason: HOSPADM

## 2017-10-25 RX ORDER — FENTANYL CITRATE 50 UG/ML
INJECTION, SOLUTION INTRAMUSCULAR; INTRAVENOUS AS NEEDED
Status: DISCONTINUED | OUTPATIENT
Start: 2017-10-25 | End: 2017-10-25 | Stop reason: HOSPADM

## 2017-10-25 RX ORDER — ACETAMINOPHEN 10 MG/ML
1000 INJECTION, SOLUTION INTRAVENOUS ONCE
Status: COMPLETED | OUTPATIENT
Start: 2017-10-25 | End: 2017-10-25

## 2017-10-25 RX ORDER — NALOXONE HYDROCHLORIDE 0.4 MG/ML
0.4 INJECTION, SOLUTION INTRAMUSCULAR; INTRAVENOUS; SUBCUTANEOUS AS NEEDED
Status: DISCONTINUED | OUTPATIENT
Start: 2017-10-25 | End: 2017-10-26 | Stop reason: HOSPADM

## 2017-10-25 RX ORDER — ASPIRIN 325 MG
325 TABLET ORAL 2 TIMES DAILY
Status: DISCONTINUED | OUTPATIENT
Start: 2017-10-25 | End: 2017-10-26 | Stop reason: HOSPADM

## 2017-10-25 RX ORDER — HYDROMORPHONE HYDROCHLORIDE 2 MG/ML
0.5 INJECTION, SOLUTION INTRAMUSCULAR; INTRAVENOUS; SUBCUTANEOUS
Status: COMPLETED | OUTPATIENT
Start: 2017-10-25 | End: 2017-10-25

## 2017-10-25 RX ORDER — DEXAMETHASONE SODIUM PHOSPHATE 4 MG/ML
INJECTION, SOLUTION INTRA-ARTICULAR; INTRALESIONAL; INTRAMUSCULAR; INTRAVENOUS; SOFT TISSUE AS NEEDED
Status: DISCONTINUED | OUTPATIENT
Start: 2017-10-25 | End: 2017-10-25 | Stop reason: HOSPADM

## 2017-10-25 RX ORDER — ZOLPIDEM TARTRATE 5 MG/1
5 TABLET ORAL
Status: DISCONTINUED | OUTPATIENT
Start: 2017-10-25 | End: 2017-10-26 | Stop reason: HOSPADM

## 2017-10-25 RX ORDER — ACETAMINOPHEN 325 MG/1
650 TABLET ORAL
Status: DISCONTINUED | OUTPATIENT
Start: 2017-10-25 | End: 2017-10-26 | Stop reason: HOSPADM

## 2017-10-25 RX ORDER — EPHEDRINE SULFATE/0.9% NACL/PF 25 MG/5 ML
SYRINGE (ML) INTRAVENOUS AS NEEDED
Status: DISCONTINUED | OUTPATIENT
Start: 2017-10-25 | End: 2017-10-25 | Stop reason: HOSPADM

## 2017-10-25 RX ORDER — CLINDAMYCIN PHOSPHATE 900 MG/50ML
900 INJECTION, SOLUTION INTRAVENOUS ONCE
Status: COMPLETED | OUTPATIENT
Start: 2017-10-25 | End: 2017-10-25

## 2017-10-25 RX ORDER — SODIUM CHLORIDE, SODIUM LACTATE, POTASSIUM CHLORIDE, CALCIUM CHLORIDE 600; 310; 30; 20 MG/100ML; MG/100ML; MG/100ML; MG/100ML
1000 INJECTION, SOLUTION INTRAVENOUS CONTINUOUS
Status: DISCONTINUED | OUTPATIENT
Start: 2017-10-25 | End: 2017-10-25 | Stop reason: HOSPADM

## 2017-10-25 RX ORDER — SODIUM CHLORIDE 0.9 % (FLUSH) 0.9 %
5-10 SYRINGE (ML) INJECTION AS NEEDED
Status: DISCONTINUED | OUTPATIENT
Start: 2017-10-25 | End: 2017-10-26 | Stop reason: HOSPADM

## 2017-10-25 RX ORDER — CLINDAMYCIN PHOSPHATE 900 MG/50ML
900 INJECTION, SOLUTION INTRAVENOUS EVERY 8 HOURS
Status: DISCONTINUED | OUTPATIENT
Start: 2017-10-25 | End: 2017-10-25

## 2017-10-25 RX ORDER — SUCRALFATE 1 G/10ML
0.5 SUSPENSION ORAL 4 TIMES DAILY
Status: DISCONTINUED | OUTPATIENT
Start: 2017-10-25 | End: 2017-10-26 | Stop reason: HOSPADM

## 2017-10-25 RX ORDER — HYDROMORPHONE HYDROCHLORIDE 2 MG/1
2-4 TABLET ORAL
Status: DISCONTINUED | OUTPATIENT
Start: 2017-10-25 | End: 2017-10-26 | Stop reason: HOSPADM

## 2017-10-25 RX ORDER — SODIUM CHLORIDE 0.9 % (FLUSH) 0.9 %
5-10 SYRINGE (ML) INJECTION AS NEEDED
Status: DISCONTINUED | OUTPATIENT
Start: 2017-10-25 | End: 2017-10-25 | Stop reason: HOSPADM

## 2017-10-25 RX ORDER — CYCLOBENZAPRINE HCL 10 MG
10 TABLET ORAL
Status: DISCONTINUED | OUTPATIENT
Start: 2017-10-25 | End: 2017-10-26 | Stop reason: HOSPADM

## 2017-10-25 RX ORDER — SODIUM CHLORIDE, SODIUM LACTATE, POTASSIUM CHLORIDE, CALCIUM CHLORIDE 600; 310; 30; 20 MG/100ML; MG/100ML; MG/100ML; MG/100ML
125 INJECTION, SOLUTION INTRAVENOUS CONTINUOUS
Status: DISCONTINUED | OUTPATIENT
Start: 2017-10-25 | End: 2017-10-25

## 2017-10-25 RX ORDER — TRAMADOL HYDROCHLORIDE 100 MG/1
100 TABLET, FILM COATED, EXTENDED RELEASE ORAL DAILY
Status: DISCONTINUED | OUTPATIENT
Start: 2017-10-26 | End: 2017-10-26

## 2017-10-25 RX ORDER — SODIUM CHLORIDE, SODIUM LACTATE, POTASSIUM CHLORIDE, CALCIUM CHLORIDE 600; 310; 30; 20 MG/100ML; MG/100ML; MG/100ML; MG/100ML
75 INJECTION, SOLUTION INTRAVENOUS CONTINUOUS
Status: DISCONTINUED | OUTPATIENT
Start: 2017-10-25 | End: 2017-10-26 | Stop reason: HOSPADM

## 2017-10-25 RX ORDER — MIDAZOLAM HYDROCHLORIDE 1 MG/ML
INJECTION, SOLUTION INTRAMUSCULAR; INTRAVENOUS AS NEEDED
Status: DISCONTINUED | OUTPATIENT
Start: 2017-10-25 | End: 2017-10-25 | Stop reason: HOSPADM

## 2017-10-25 RX ORDER — DEXTROSE 50 % IN WATER (D50W) INTRAVENOUS SYRINGE
25-50 AS NEEDED
Status: DISCONTINUED | OUTPATIENT
Start: 2017-10-25 | End: 2017-10-25 | Stop reason: HOSPADM

## 2017-10-25 RX ORDER — ONDANSETRON 4 MG/1
4 TABLET, ORALLY DISINTEGRATING ORAL
Status: DISCONTINUED | OUTPATIENT
Start: 2017-10-25 | End: 2017-10-26 | Stop reason: HOSPADM

## 2017-10-25 RX ORDER — NALOXONE HYDROCHLORIDE 0.4 MG/ML
0.1 INJECTION, SOLUTION INTRAMUSCULAR; INTRAVENOUS; SUBCUTANEOUS AS NEEDED
Status: DISCONTINUED | OUTPATIENT
Start: 2017-10-25 | End: 2017-10-25 | Stop reason: HOSPADM

## 2017-10-25 RX ORDER — INSULIN LISPRO 100 [IU]/ML
INJECTION, SOLUTION INTRAVENOUS; SUBCUTANEOUS ONCE
Status: DISCONTINUED | OUTPATIENT
Start: 2017-10-25 | End: 2017-10-25 | Stop reason: HOSPADM

## 2017-10-25 RX ORDER — DIPHENHYDRAMINE HCL 25 MG
25 CAPSULE ORAL
Status: DISCONTINUED | OUTPATIENT
Start: 2017-10-25 | End: 2017-10-26 | Stop reason: HOSPADM

## 2017-10-25 RX ORDER — LIDOCAINE HYDROCHLORIDE 20 MG/ML
INJECTION, SOLUTION EPIDURAL; INFILTRATION; INTRACAUDAL; PERINEURAL AS NEEDED
Status: DISCONTINUED | OUTPATIENT
Start: 2017-10-25 | End: 2017-10-25 | Stop reason: HOSPADM

## 2017-10-25 RX ORDER — MAGNESIUM SULFATE 100 %
4 CRYSTALS MISCELLANEOUS AS NEEDED
Status: DISCONTINUED | OUTPATIENT
Start: 2017-10-25 | End: 2017-10-25 | Stop reason: HOSPADM

## 2017-10-25 RX ADMIN — SODIUM CHLORIDE, SODIUM LACTATE, POTASSIUM CHLORIDE, AND CALCIUM CHLORIDE 75 ML/HR: 600; 310; 30; 20 INJECTION, SOLUTION INTRAVENOUS at 20:00

## 2017-10-25 RX ADMIN — FENTANYL CITRATE 25 MCG: 50 INJECTION, SOLUTION INTRAMUSCULAR; INTRAVENOUS at 13:40

## 2017-10-25 RX ADMIN — SODIUM CHLORIDE 500 ML: 900 INJECTION, SOLUTION INTRAVENOUS at 20:00

## 2017-10-25 RX ADMIN — CLINDAMYCIN PHOSPHATE 900 MG: 900 INJECTION, SOLUTION INTRAVENOUS at 23:21

## 2017-10-25 RX ADMIN — FENTANYL CITRATE 25 MCG: 50 INJECTION, SOLUTION INTRAMUSCULAR; INTRAVENOUS at 13:25

## 2017-10-25 RX ADMIN — OXYCODONE HYDROCHLORIDE 5 MG: 5 TABLET ORAL at 12:22

## 2017-10-25 RX ADMIN — HYDROMORPHONE HYDROCHLORIDE 4 MG: 2 TABLET ORAL at 16:09

## 2017-10-25 RX ADMIN — SODIUM CHLORIDE, SODIUM LACTATE, POTASSIUM CHLORIDE, AND CALCIUM CHLORIDE 125 ML/HR: 600; 310; 30; 20 INJECTION, SOLUTION INTRAVENOUS at 12:09

## 2017-10-25 RX ADMIN — ACETAMINOPHEN 1000 MG: 10 INJECTION, SOLUTION INTRAVENOUS at 12:56

## 2017-10-25 RX ADMIN — HYDROMORPHONE HYDROCHLORIDE 0.5 MG: 2 INJECTION INTRAMUSCULAR; INTRAVENOUS; SUBCUTANEOUS at 15:03

## 2017-10-25 RX ADMIN — FENTANYL CITRATE 25 MCG: 50 INJECTION, SOLUTION INTRAMUSCULAR; INTRAVENOUS at 14:05

## 2017-10-25 RX ADMIN — DEXAMETHASONE SODIUM PHOSPHATE 4 MG: 4 INJECTION, SOLUTION INTRA-ARTICULAR; INTRALESIONAL; INTRAMUSCULAR; INTRAVENOUS; SOFT TISSUE at 13:10

## 2017-10-25 RX ADMIN — HYDROMORPHONE HYDROCHLORIDE 4 MG: 2 TABLET ORAL at 20:11

## 2017-10-25 RX ADMIN — HYDROMORPHONE HYDROCHLORIDE 0.5 MG: 2 INJECTION INTRAMUSCULAR; INTRAVENOUS; SUBCUTANEOUS at 15:34

## 2017-10-25 RX ADMIN — HYDROMORPHONE HYDROCHLORIDE 0.5 MG: 2 INJECTION INTRAMUSCULAR; INTRAVENOUS; SUBCUTANEOUS at 15:51

## 2017-10-25 RX ADMIN — FENTANYL CITRATE 25 MCG: 50 INJECTION, SOLUTION INTRAMUSCULAR; INTRAVENOUS at 13:46

## 2017-10-25 RX ADMIN — ASPIRIN 325 MG ORAL TABLET 325 MG: 325 PILL ORAL at 20:11

## 2017-10-25 RX ADMIN — SODIUM CHLORIDE, SODIUM LACTATE, POTASSIUM CHLORIDE, AND CALCIUM CHLORIDE 1000 ML: 600; 310; 30; 20 INJECTION, SOLUTION INTRAVENOUS at 17:24

## 2017-10-25 RX ADMIN — SODIUM CHLORIDE, SODIUM LACTATE, POTASSIUM CHLORIDE, AND CALCIUM CHLORIDE 1000 ML: 600; 310; 30; 20 INJECTION, SOLUTION INTRAVENOUS at 15:35

## 2017-10-25 RX ADMIN — FENTANYL CITRATE 25 MCG: 50 INJECTION, SOLUTION INTRAMUSCULAR; INTRAVENOUS at 13:02

## 2017-10-25 RX ADMIN — FENTANYL CITRATE 25 MCG: 50 INJECTION, SOLUTION INTRAMUSCULAR; INTRAVENOUS at 14:15

## 2017-10-25 RX ADMIN — HYDROMORPHONE HYDROCHLORIDE 0.5 MG: 2 INJECTION INTRAMUSCULAR; INTRAVENOUS; SUBCUTANEOUS at 15:15

## 2017-10-25 RX ADMIN — Medication 10 MG: at 13:28

## 2017-10-25 RX ADMIN — SODIUM CHLORIDE, SODIUM LACTATE, POTASSIUM CHLORIDE, AND CALCIUM CHLORIDE: 600; 310; 30; 20 INJECTION, SOLUTION INTRAVENOUS at 13:40

## 2017-10-25 RX ADMIN — PROPOFOL 30 MG: 10 INJECTION, EMULSION INTRAVENOUS at 13:03

## 2017-10-25 RX ADMIN — FENTANYL CITRATE 25 MCG: 50 INJECTION, SOLUTION INTRAMUSCULAR; INTRAVENOUS at 13:45

## 2017-10-25 RX ADMIN — ONDANSETRON 4 MG: 2 INJECTION INTRAMUSCULAR; INTRAVENOUS at 13:10

## 2017-10-25 RX ADMIN — LIDOCAINE HYDROCHLORIDE 60 MG: 20 INJECTION, SOLUTION EPIDURAL; INFILTRATION; INTRACAUDAL; PERINEURAL at 13:02

## 2017-10-25 RX ADMIN — PROPOFOL 150 MG: 10 INJECTION, EMULSION INTRAVENOUS at 13:02

## 2017-10-25 RX ADMIN — FENTANYL CITRATE 25 MCG: 50 INJECTION, SOLUTION INTRAMUSCULAR; INTRAVENOUS at 13:42

## 2017-10-25 RX ADMIN — MIDAZOLAM HYDROCHLORIDE 2 MG: 1 INJECTION, SOLUTION INTRAMUSCULAR; INTRAVENOUS at 12:56

## 2017-10-25 RX ADMIN — Medication 10 MG: at 13:20

## 2017-10-25 RX ADMIN — CLINDAMYCIN PHOSPHATE 900 MG: 900 INJECTION, SOLUTION INTRAVENOUS at 13:05

## 2017-10-25 RX ADMIN — FENTANYL CITRATE 50 MCG: 50 INJECTION, SOLUTION INTRAMUSCULAR; INTRAVENOUS at 14:40

## 2017-10-25 RX ADMIN — FENTANYL CITRATE 50 MCG: 50 INJECTION, SOLUTION INTRAMUSCULAR; INTRAVENOUS at 14:37

## 2017-10-25 NOTE — INTERVAL H&P NOTE
H&P Update:  Naveen Crockett was seen and examined. History and physical has been reviewed. The patient has been examined. There have been no significant clinical changes since the completion of the originally dated History and Physical.  Patient identified by surgeon; surgical site was confirmed by patient and surgeon.     Signed By: Durga Watts MD     October 25, 2017 12:01 PM

## 2017-10-25 NOTE — IP AVS SNAPSHOT
Stephanie Cordon 
 
 
 [address] [facility] [address] 
[phone] Patient: Erika Sanchez MRN: [id number] UL:9/26/8255 About your hospitalization You were admitted on:  October 25, 2017 You last received care in the:  THE [facility] [address] You were discharged on:  October 26, 2017 Why you were hospitalized Your primary diagnosis was:  Osteoarthritis Of Left Hip Your diagnoses also included:  Ddd (Degenerative Disc Disease), Lumbar, H/O Calcium Pyrophosphate Deposition Disease (Cppd), Ibs (Irritable Bowel Syndrome), Osteopenia, Pain Management Contract Agreement, Ra (Rheumatoid Arthritis) (Hcc), Sicca Syndrome (Hcc), Spondylolisthesis Of Lumbar Region Things You Need To Do (next 8 weeks) Follow up with Alexis Turner MD  
  
Phone:  [phone] Where:  [address] [facility],  [address], [address] Follow up with [address] [place] to continue managing your healthcare needs. Where:  [phone] Monday Nov 06, 2017 Follow up with Tasneem Montelongo MD  
Follow up appointment @ 8:45am  
  
Phone:  [phone] Where:  [address], [address] [facility] Discharge Orders None A check alejandro indicates which time of day the medication should be taken. My Medications STOP taking these medications   
 meloxicam 15 mg tablet Commonly known as:  MOBIC  
   
  
 XELJANZ 5 mg tab Generic drug:  tofacitinib TAKE these medications as instructed Instructions Each Dose to Equal  
 Morning Noon Evening Bedtime  
 aspirin 325 mg tablet Commonly known as:  ASPIRIN Your last dose was: Your next dose is: Take 1 Tab by mouth two (2) times a day. Take for 3 weeks for DVT prophylaxis. 325 mg  
    
   
   
   
  
 calcium carbonate 500 mg calcium (1,250 mg) tablet Commonly known as:  OS-BARBARA Your last dose was: Your next dose is: Take 1,500 mg by mouth daily. 1500 mg  
    
   
   
   
  
 cyclobenzaprine 10 mg tablet Commonly known as:  FLEXERIL Your last dose was: Your next dose is: Take 1 Tab by mouth three (3) times daily as needed. Indications: RA  
 10 mg HYDROmorphone 2 mg tablet Commonly known as:  DILAUDID Your last dose was: Your next dose is: Take 1-2 Tabs by mouth every four (4) hours as needed for Pain. Max Daily Amount: 24 mg.  
 2-4 mg  
    
   
   
   
  
 lisinopril 10 mg tablet Commonly known as:  Alexei Lights Your last dose was: Your next dose is: TAKE 1 TABLET DAILY MIRALAX 17 gram packet Generic drug:  polyethylene glycol Your last dose was: Your next dose is: Take 17 g by mouth daily. 17 g  
    
   
   
   
  
 naloxone 2 mg/actuation Spry Commonly known as:  [employer] Your last dose was: Your next dose is:    
   
   
 Use 1 spray intranasally into 1 nostril. Use a new Narcan nasal spray for subsequent doses and administer into alternating nostrils. May repeat every 2 to 3 minutes as needed. sucralfate 100 mg/mL suspension Commonly known as:  Moran Me Your last dose was: Your next dose is: Take 5 mL by mouth four (4) times daily. 1 tsp  
    
   
   
   
  
 traMADol 100 mg Tb24 Commonly known as:  ULTRAM-ER Your last dose was: Your next dose is: Take 1 Tab by mouth daily. Max Daily Amount: 100 mg. Indications: Chronic Pain 100 mg  
    
   
   
   
  
 TYLENOL EXTRA STRENGTH 500 mg tablet Generic drug:  acetaminophen Your last dose was: Your next dose is: Take 1,000 mg by mouth every six (6) hours as needed for Pain. 1000 mg  
    
   
   
   
  
 VITAMIN D3 1,000 unit tablet Generic drug:  cholecalciferol Your last dose was: Your next dose is: Take 1,000 Units by mouth five (5) days a week. 1000 Units Where to Get Your Medications Information on where to get these meds will be given to you by the nurse or doctor. ! Ask your nurse or doctor about these medications  
  aspirin 325 mg tablet HYDROmorphone 2 mg tablet  
 naloxone 2 mg/actuation Spry Discharge Instructions Mary Conti III, MD Alyson Plumb, PA-C Lower Extremity Surgery Discharge Instructions Please take the time to review the following instructions before you leave the hospital and use them as guidelines during your recovery from surgery. If you have any questions you may contact my office at [phone]. Wound Care/Dressing Changes: You may change your dressing as needed. Beginning the 2 days after you are discharged from the hospital you can change your dressing daily. A big, bulky dressing isn't necessary as long as there isn't any drainage from the incisions. You will be shown how to change the dressings properly by the home health aids as well. There will be a piece of tape over your incision that resembles gauze. This tape should stay on and you should not attempt to remove it. Once you begin to get this wet in the shower this will begin to fall off on its own, although it will take days. Do not apply antibiotic ointment to your incisions. Showering/Bathing: You may shower 2 days after surgery. Your dressing may be removed for showering. You may get your incisions wet in the shower. Don't vigorously scrub the area where your incisions are. Please pat dry the incision. Apply a clean, dry dressing after drying off the area of your incisions.   Don't take a tub bath, get in a swimming pool or Jacuzzi until the incisions are completely healed, and you are seen in the office by  Janina Londono. Do not soak your incisions under water. Do not get the dressing wet. Once you remove it two days from surgery, you may get the incision wet if there is no drainage. Weight Bearing Status/Braces/Activity: If you have had a total knee replacement you may weight bear as tolerated with the knee immobilizer in place. Use crutches, cane, or walker as needed. You should sleep in your knee immobilizer. You need to begin working on range of motion early after your surgery. It is very important to work on extension (straighthening) the knee. You will be advanced with walking and range of motion by physical therapy at home. If you have had a total hip replacement you may weight bear as tolerated. Physical therapy with come by your house to help you perform exercises and work on strength and range of motion. Ice/Elevation: 
 
Continue ice and elevation consistently for 48 hours after surgery. If you have had a total knee replacement when elevating your knee elevate it on about 4 pillows to be sure it is above your heart. After 48 hours, you should ice your knee 3 times per day, for 20 minutes at a time for the next 5 days. After one week from surgery, you may use ice and elevation as needed for pain and swelling. Diet: 
 
You may advance to your regular diet as tolerated. Medication: 
 
1. You will be given a prescription for pain medications when you are discharged from the hospital.  Take the medication as needed according to the directions on the prescription bottle. Possible side effects of the medication include dizziness, headache, nausea, vomiting, constipation and urinary retention. If you experience any of these side effects call the office so that we can assist you in relieving them. Discontinue the use of the pain medication if you develop itching, rash, shortness of breath or difficulties swallowing.   If these symptoms become severe or aren't relieved by discontinuing the medication you should seek immediate medical attention. Refills of pain medication are authorized during office hours only (8AM - 5PM Mon thru Fri). 2. If you were prescribed Percocet/oxycodone or Dilaudid/hydromorphone you must have a written prescription. These medications legally cannot be called in to a pharmacy. 3. Do not take Tylenol in addition to your pain medication as most of the pain medication already contains Tylenol. Exceptions include Dilaudid/hydromorphone, Demerol/meperidine and roxicodone. Do not exceed 3000 mg of Tylenol per day. Ex:  (hydrocodone 5/325mg = 325 mg of Tylenol) 4. You should use Aspirin 325 mg twice daily for 21 days from the date of your surgery. This will help to prevent blood clots from forming in your legs. This needs to be started the day after your surgery and home healthcare will teach you how this is done. If you are taking another medication such as Xarelto as discussed with Dr. Mike Reyes this should also be started the day after the surgery. 5. You may resume the medications you were taking prior to your surgery, unless otherwise specified in your discharge instructions. Pain medication may change the effects of any antidepressant medication. If you have any questions about possible interactions between your regular medications and the pain medication you should consult the physician who prescribes your regular medications. 6. If you had a total joint as an outpatient procedure and did not spend the night in the hospital, Dr. Mike Reyes has written for an oral antibiotic that you should take as instructed. This antibiotic is generally Keflex or Clindamycin. If you spent the night in the hospital the antibiotic was given to you through the IV and there is nothing else to take orally. Follow Up Appointment: If you are unsure of your follow-up appointment date and time, please call [phone]. Please let our  know you are scheduling a post-op appointment. Most appointments should be between 7-14 days after your surgery. Physical Therapy: 
 
   Physical therapy will be discussed with you at your first follow-up appointment with Dr. Ivan Calderon. You don't need to begin physical therapy prior to that visit. You are to participate with [address] [place] [address] as arranged pre-operatively in the convience of your own home. Important signs and symptoms: 
 
If any of the following signs and symptoms occurs, you should contact Dr. Ivan Calderon' office. Please be advised if a problem arises which you feel required immediate medical attention or your are unable to contact Dr. Ivan Calderon' office you should seek immediate medical attention. Signs and symptoms to watch for include: 1. A sudden increase in swelling and/or redness or warmth at the area your surgery was performed which isn't relieved by rest, ice and elevation. 2. Oral temperature greater than 101.5 degrees for 12 hours or more which isn't relieved by an increase in fluid intake and taking two Tylenol every 4-6 hours. Do not exceed 3000 mg of Tylenol per day. 3. Excessive drainage from your incisions or drainage that hasn't stopped by 72 hours. 4. Calf pian, tenderness, redness or swelling which isn't relieved with rest and elevation. 5. Fever, chills, shortness of breath, chest pain, nausea, vomiting or other signs and symptoms which are of concern to you. Patient armband removed and shredded ACO Transitions of Care Introducing [facility] [address] offers a voluntary care coordination program to provide high quality service and care to [facility] fee-for-service beneficiaries. Eva Maynard was designed to help you enhance your health and well-being through the following services: ? Transitions of Care  support for individuals who are transitioning from one care setting to another (example: Hospital to home). ? Chronic and Complex Care Coordination  support for individuals and caregivers of those with serious or chronic illnesses or with more than one chronic (ongoing) condition and those who take a number of different medications. If you meet specific medical criteria, a [facility] may call you directly to coordinate your care with your primary care physician and your other care providers. For questions about the [facility] programs, please, contact your physicians office. For general questions or additional information about Accountable Care Organizations: 
Please visit www.medicare.gov/acos. html or call 1-800-MEDICARE (8-905.556.8118) TTY users should call 0-467.145.6103. Introducing [facility] & HEALTH SERVICES! Dear Anay Ramírez: Thank you for requesting a [employer] account. Our records indicate that you already have an active [employer] account. You can access your account anytime at [email or web address] Did you know that you can access your hospital and ER discharge instructions at any time in [employer]? You can also review all of your test results from your hospital stay or ER visit. Additional Information If you have questions, please visit the Frequently Asked Questions section of the [employer] website at [email or web address] Remember, [employer] is NOT to be used for urgent needs. For medical emergencies, dial 911. Now available from your iPhone and Android! Providers Seen During Your Hospitalization Provider Specialty Primary office phone Turner [place], [place] [facility] [phone] Your Primary Care Physician (PCP) Primary Care Physician Office Phone Office Fax [place] Babinski [phone] You are allergic to the following Allergen Reactions Celebrex (Celecoxib) Swelling Shellfish Derived Swelling \"makes me feel puffy\" Sulfa (Sulfonamide Antibiotics) Hives Swelling Lips swelling Humira (Adalimumab) Other (comments) Lupus Optiray 320 (Ioversol) Hives Itching Pt states when she gets iv contrast she itches a little from hives? Penicillins Hives Recent Documentation Height Weight BMI OB Status Smoking Status 1.6 m 74.6 kg 29.12 kg/m2 Hysterectomy Never Smoker Emergency Contacts Name Discharge Info Relation Home Work Mobile Zia Jeffries DISCHARGE CAREGIVER [3] Spouse [3] [phone] Patient Belongings The following personal items are in your possession at time of discharge: 
     Visual Aid: None      Home Medications: None   Jewelry: None  Clothing:  (to [employer])    Other Valuables: Crutches (wojciech odell) Please provide this summary of care documentation to your next provider. Signatures-by signing, you are acknowledging that this After Visit Summary has been reviewed with you and you have received a copy. Patient Signature:  ____________________________________________________________ Date:  ____________________________________________________________  
  
Rebecca Artist Provider Signature:  ____________________________________________________________ Date:  ____________________________________________________________

## 2017-10-25 NOTE — PERIOP NOTES
Patient transferred to room 215, ortho 2 south via bed. NC at 2 LPM. PIV with IVF infusing per orders. Blood pressure 94/68, pulse 87, temperature 97.2 °F (36.2 °C), resp. rate 16, height 5' 3\" (1.6 m), weight 74.6 kg (164 lb 6 oz), SpO2 100 %.  at bedside. CHARLY Mayo RN at bedside. Will page  to notify about current BP's.

## 2017-10-25 NOTE — PERIOP NOTES
TRANSFER - IN REPORT:    Verbal report received from SIS Irvin RN and Radha CRNA(name) on Meena Martinez  being received from OR(unit) for routine post - op      Report consisted of patients Situation, Background, Assessment and   Recommendations(SBAR). Information from the following report(s) SBAR, OR Summary, Procedure Summary, Intake/Output and MAR was reviewed with the receiving nurse. Opportunity for questions and clarification was provided. Assessment completed upon patients arrival to unit and care assumed.

## 2017-10-25 NOTE — PERIOP NOTES
Notified David DOUGHERTY and notify him of current BP's. Notify him of pre induction /84, and preop BP of 110/79. Patient stated she took her BP meds this morning. Otherwise asymptomatic, VS stable. He said to give 500 ml bolus of NS , hold BP meds until BP stable and continue to monitor. Read back and verified. Notified CHARLY Mayo RN.

## 2017-10-25 NOTE — ANESTHESIA PREPROCEDURE EVALUATION
Anesthetic History   No history of anesthetic complications            Review of Systems / Medical History  Patient summary reviewed, nursing notes reviewed and pertinent labs reviewed    Pulmonary  Within defined limits                 Neuro/Psych     seizures: well controlled         Cardiovascular    Hypertension: well controlled            Pertinent negatives: No past MI, CAD and CHF  Exercise tolerance: >4 METS     GI/Hepatic/Renal     GERD: well controlled          Comments: Mostly due to medicines. Endo/Other  Within defined limits      Arthritis     Other Findings   Comments: History of surgery on left shoulder and ankle. She requested  extreme caution when moving her.           Physical Exam    Airway  Mallampati: II  TM Distance: 4 - 6 cm  Neck ROM: normal range of motion   Mouth opening: Normal     Cardiovascular    Rhythm: regular  Rate: normal         Dental      Comments: Torus and overbite and many caps, implants throughout   Pulmonary  Breath sounds clear to auscultation               Abdominal  GI exam deferred       Other Findings            Anesthetic Plan    ASA: 2  Anesthesia type: general          Induction: Intravenous  Anesthetic plan and risks discussed with: Patient

## 2017-10-25 NOTE — PERIOP NOTES
Paged  through on call system to notify of current BP's upon arrival to room 215, [address] [facility]. Awaiting call back.

## 2017-10-25 NOTE — PERIOP NOTES
Reviewed PTA medication list with patient/caregiver and patient/caregiver denies any additional medications.

## 2017-10-25 NOTE — PERIOP NOTES
Notified DR. Baron Keen patient has received 2mg of Dilaudid IV, repositioned, positive reinforcement and ice pack on left hip. She has had no relief, moves constantly in bed, seems anxious and says \" It didn't feel like this last time. She said to bring family back and it's ok to give her the dilaudid tablet ordered for the floor by , then can send to her room. Will continue to monitor.

## 2017-10-25 NOTE — PERIOP NOTES
TRANSFER - OUT REPORT:    Verbal report given to CHARLY Mayo RN(name) on Gina Su  being transferred to [address] [place] [address], room 215(unit) for routine progression of care       Report consisted of patients Situation, Background, Assessment and   Recommendations(SBAR). Information from the following report(s) SBAR, OR Summary, Procedure Summary, Intake/Output and MAR was reviewed with the receiving nurse. Lines:   Peripheral IV 10/25/17 Left Hand (Active)   Site Assessment Clean, dry, & intact 10/25/2017  4:45 PM   Phlebitis Assessment 0 10/25/2017  4:45 PM   Infiltration Assessment 0 10/25/2017  4:45 PM   Dressing Status Clean, dry, & intact 10/25/2017  4:45 PM   Dressing Type Transparent;Tape 10/25/2017  4:45 PM   Hub Color/Line Status Green; Infusing;Patent 10/25/2017  4:45 PM        Opportunity for questions and clarification was provided.       Patient transported with:   O2 @ 2 liters  Registered Nurse  [facility]

## 2017-10-25 NOTE — ANESTHESIA POSTPROCEDURE EVALUATION
Post-Anesthesia Evaluation & Assessment    Visit Vitals    /47 (BP 1 Location: Right arm, BP Patient Position: At rest)    Pulse 83    Temp 37.8 °C (100 °F)    Resp 14    Ht 5' 3\" (1.6 m)    Wt 74.6 kg (164 lb 6 oz)    SpO2 100%    BMI 29.12 kg/m2       No untreated/active PONV    Post-operative hydration adequate. Adequate post-operative analgesia per PACU discharge criteria    Mental status & level of consciousness: alert and oriented x 3    Respiratory status: patent unassisted airway     No apparent anesthetic complications requiring additional post-anesthetic care    Patient has met all discharge requirements.             Elissa Tsai MD

## 2017-10-25 NOTE — OP NOTES
LEFT COMPUTER NAVIGATED DIRECT ANTERIOR HIP REPLACEMENT    Patient: Song Sweet MRN: [id number]  CSN: [id number]   YOB: 1950  Age: 69 y.o. Sex: female      Date of Surgery:10/25/2017    PREOPERATIVE DIAGNOSES:LEFT HIP OSTEOARTHRITIS      POSTOPERATIVE DIAGNOSES:LEFT HIP OSTEOARTHRITIS    PROCEDURE: Left press fit computer navigated direct anterior total hip arthroplasty using the [employer]. IMPLANTS:   Implant Name Type Inv. Item Serial No.  Lot No. LRB No. Used Action   LINER ACET PINN NEUT [id number] -- ALTRX - [id number]  LINER ACET PINN NEUT [id number] -- ALTRX  [facility] ORTHOPEDICS [id number] Left 1 Implanted   PLUG ACET APCL H ELIM POS STP --  - [id number]  PLUG ACET APCL H ELIM POS STP --   [facility] ORTHOPEDICS [id number] Left 1 Implanted   CUP ACET SECTOR GRIPTION 52MM -- TI - [id number]  CUP ACET SECTOR GRIPTION 52MM -- TI  [facility] [id number] Left 1 Implanted   STEM FEM BPS SZ 6 STD OFFSET -- TRI-LOCK - [id number]  STEM FEM BPS SZ 6 STD OFFSET -- TRI-LOCK  [facility] [id number] Left 1 Implanted   HEAD FEM 36MM +1.5MM NK -- BIOLOX DELTA - [id number]   HEAD FEM 36MM +1.5MM NK -- BIOLOX DELTA   [facility] ORTHOPEDICS [id number] Left 1 Implanted       SURGEON: Turner Davis MD    ASSISTANTS: Deacon Mora PA-C    ANESTHESIA: General    SPECIMENS TO PATHOLOGY: * No specimens in log *    ESTIMATED BLOOD LOSS: 150cc    FINDINGS: Same    COMPLICATIONS: None    INDICATIONS:  A 69 y.o.  female with left severe coxarthrosis documented by clinical exam and x-ray. The patient has bone-on-bone arthritis by x-rays and has failed all conservative interventions including medications, weight loss, physical therapy, relative rest, ambulatory aids and injections. The patient now presents for a left total hip arthroplasty due to constant pain with activities of daily living and diminished safety due to patients pain.   The patients operative leg has a 0 to -4 mm leg length discrepency clinically. The patient does not feel that the operative leg is [place] than the nonoperative leg. OPERATION:  The patient was brought into the operating theater, and after adequate appropriate anesthesia the patient was placed on the [place] table. The left hip was prepped and draped for typical computer navigated anterior hip surgery. The opposite iliac crest had 2 small incisions made for the two 4.0mm Shantz half pins that were placed in the iliac crest using the ODIN Navigation guide. The pelvis tracker was then mounted to the guide. The pelvis was registered as well as the left and the right ASIS. Another small incision was made on the lateral ipsilateral lower leg just above the knee. A 2.0mm drill bit was used to make a hole in the lateral cortex and then leg length were measured using this hole and the pointer tracker. This leg length was verified. The analgesic cocktail used for the case was 50cc of .25% Marcaine with epinephrine mixed with 20cc of Exparel and 30cc of NS ( total of 100cc ). This was injected in the skin as well as the various muscle muscle groups encountered during the procedure using all 100cc's. A 9 cm incision was then made, 3 cm lateral to the anterior superior iliac spine, and 1 cm distal. The incision was deepened down to the fascia of the tensor fascia esequiel muscle, where the fascia was incised the length of the wound. A Cobra was placed just lateral to the anterior inferior iliac spine and just lateral to the capsule of the hip. The tensor fascia muscle was then retracted with the Charly, and the rectus femoris was retracted medially with another Charly. The ascending branches of the lateral femoral circumflex vessels were then clamped and electrocauterized. Using a Basilio elevator, the soft tissue was elevated off the anterior part of the femoral capsule, and a Cobra retractor was placed medially.  An anterolateral capsulotomy was then performed, from the acetabulum to the intertrochanteric line. The lateral capsule was excised, and the anterior capsule was elevated off the medial neck. The leg was then slightly externally rotated with traction and cut 1 fingerbreadth above the lesser trochanter. The corkscrew was inserted into the femoral head and it was removed easily rotating the head 180 degrees. A Cobra retractor was placed behind the acetabulum. A skinny Hohmann retractor was placed on the anterior part of the acetabulum rim, and then the labrum and any osteophytes around the acetabulum were resected. The acetabulum was registered using the pointer tracker. The pulvinar in the fovea was resected, and then the acetabulum was reamed in 45 degrees of abduction and the patient's natural anteversion. The reamer was medialized to the base of the fovea and then widened to 1mm less than the actual cup to be implanted. There was good peripheral fit with good bleeding bone. An appropriately sized acetabular cup was selected to be implanted. The acetabulum was Simpulse lavage irrigated and then dried with a sponge stick. The cup was then seated fully with excellent purchase and good stability. The [employer] Navigation system helped select the appropriate abduction and abduction as well as using the patients anatomic landmarks. The final abduction was 41 degrees and the anteversion was 17 degrees. The anterior lip of the cup was just inside the natural acetabulum. The hole eliminator was then placed, and the appropriately sized acetabular liner was then Kulkarni-tapered into position. No screws were used in the acetabular cup(6.5 cancellous screws). Attention was now turned to the femur. The femoral hook was placed behind the femur. Traction was taken off the leg, and the hip was maximally externally rotated, adducted and extended. The hip was then brought up into the wound as maximally as possible.  A Mccann retractor was placed on the medial neck, and a large Hohmann was placed around the greater trochanter. The capsule, the obturator internus and the piriformis were then released from the inside of the greater trochanter, from anterior to posterior. The patient had excellent mobility of hthe femur. The bone closest to the greater trochanter, at the femoral neck, was then removed with a rongeur. A box osteotome was then used to open the canal, then the lateralizer, the starter broach and finally by the zero broach. This was then broached up to the appropriate size progressively, following the anteversion of the posterior neck of the femur. Once the broach was seated completely the calcar was planed to make the femoral neck cut smooth and even. There was excellent stability on torquing the femoral broach in the femoral canal. The patient was trialed with a appropriately sized femoral head with different neck lengths. The femur was reduced in the acetabulum and the hip was checked for stability clinically and the [employer] Navigation system was used for leg lengths. The appropriately sized femoral head gave excellent anterior stability. The hip could be rotated externally 90 degrees at the knee and placing a bone hook behind the femoral neck it could not be dislocated anteriorly. The leg length was +3 mm compared to pre-incision measurement. These components were selected for implantation. All trial components were removed. The femur was Simpulse lavaged, and the appropriately sized femoral stem was impacted down the femur, with excellent purchase and rotational stability. The appropriately sized femoral head was Kulkarni tapered on top of the femoral component, reduced into the socket, and the patient had the exact same stability characteristics and leg lengths as noted previously. The wound was irrigated out. The fascia of the tensor muscle was closed with a running locking #1 Vicryl.  The skin was closed with inverted 2-0 Vicryls and then dermabonded ( Dermabond Ivan ) in 2 layers. The wound was dressed with a Mepilex dressing. The 3 small stab incisions used for the [employer] system were Dermabonded closed. The patient returned to the recovery room awake and in stable condition. All instrument, sponge, and needle counts were correct.      Angelo Edwards MD  10/25/2017

## 2017-10-26 VITALS
RESPIRATION RATE: 16 BRPM | BODY MASS INDEX: 29.12 KG/M2 | TEMPERATURE: 98.8 F | DIASTOLIC BLOOD PRESSURE: 67 MMHG | OXYGEN SATURATION: 94 % | WEIGHT: 164.38 LBS | HEIGHT: 63 IN | HEART RATE: 120 BPM | SYSTOLIC BLOOD PRESSURE: 101 MMHG

## 2017-10-26 LAB
ANION GAP SERPL CALC-SCNC: 7 MMOL/L (ref 3–18)
BUN SERPL-MCNC: 11 MG/DL (ref 7–18)
BUN/CREAT SERPL: 13 (ref 12–20)
CALCIUM SERPL-MCNC: 8.1 MG/DL (ref 8.5–10.1)
CHLORIDE SERPL-SCNC: 106 MMOL/L (ref 100–108)
CO2 SERPL-SCNC: 29 MMOL/L (ref 21–32)
CREAT SERPL-MCNC: 0.86 MG/DL (ref 0.6–1.3)
GLUCOSE SERPL-MCNC: 138 MG/DL (ref 74–99)
HCT VFR BLD AUTO: 25.4 % (ref 35–45)
HGB BLD-MCNC: 8.5 G/DL (ref 12–16)
POTASSIUM SERPL-SCNC: 4.1 MMOL/L (ref 3.5–5.5)
SODIUM SERPL-SCNC: 142 MMOL/L (ref 136–145)

## 2017-10-26 PROCEDURE — 74011250637 HC RX REV CODE- 250/637: Performed by: PHYSICIAN ASSISTANT

## 2017-10-26 PROCEDURE — 97162 PT EVAL MOD COMPLEX 30 MIN: CPT

## 2017-10-26 PROCEDURE — 97530 THERAPEUTIC ACTIVITIES: CPT

## 2017-10-26 PROCEDURE — 74011250636 HC RX REV CODE- 250/636: Performed by: ORTHOPAEDIC SURGERY

## 2017-10-26 PROCEDURE — 97116 GAIT TRAINING THERAPY: CPT

## 2017-10-26 PROCEDURE — 36415 COLL VENOUS BLD VENIPUNCTURE: CPT | Performed by: PHYSICIAN ASSISTANT

## 2017-10-26 PROCEDURE — 80048 BASIC METABOLIC PNL TOTAL CA: CPT | Performed by: PHYSICIAN ASSISTANT

## 2017-10-26 PROCEDURE — 85018 HEMOGLOBIN: CPT | Performed by: PHYSICIAN ASSISTANT

## 2017-10-26 RX ORDER — HYDROMORPHONE HYDROCHLORIDE 2 MG/1
2-4 TABLET ORAL
Qty: 60 TAB | Refills: 0 | Status: SHIPPED | OUTPATIENT
Start: 2017-10-26 | End: 2018-05-22 | Stop reason: ALTCHOICE

## 2017-10-26 RX ORDER — ASPIRIN 325 MG
325 TABLET ORAL 2 TIMES DAILY
Qty: 42 TAB | Refills: 0 | Status: SHIPPED | OUTPATIENT
Start: 2017-10-26 | End: 2018-11-20

## 2017-10-26 RX ORDER — TRAMADOL HYDROCHLORIDE 50 MG/1
100 TABLET ORAL
Status: DISCONTINUED | OUTPATIENT
Start: 2017-10-26 | End: 2017-10-26

## 2017-10-26 RX ORDER — TRAMADOL HYDROCHLORIDE 50 MG/1
50 TABLET ORAL
Status: DISCONTINUED | OUTPATIENT
Start: 2017-10-26 | End: 2017-10-26 | Stop reason: HOSPADM

## 2017-10-26 RX ADMIN — HYDROMORPHONE HYDROCHLORIDE 4 MG: 2 TABLET ORAL at 14:18

## 2017-10-26 RX ADMIN — CYCLOBENZAPRINE HYDROCHLORIDE 10 MG: 10 TABLET, FILM COATED ORAL at 15:33

## 2017-10-26 RX ADMIN — POLYETHYLENE GLYCOL 3350 17 G: 17 POWDER, FOR SOLUTION ORAL at 08:53

## 2017-10-26 RX ADMIN — ACETAMINOPHEN 650 MG: 325 TABLET ORAL at 11:28

## 2017-10-26 RX ADMIN — SUCRALFATE 0.5 G: 1 SUSPENSION ORAL at 08:52

## 2017-10-26 RX ADMIN — CLINDAMYCIN PHOSPHATE 900 MG: 900 INJECTION, SOLUTION INTRAVENOUS at 07:35

## 2017-10-26 RX ADMIN — CALCIUM CARBONATE 1500 MG: 1250 TABLET ORAL at 08:53

## 2017-10-26 RX ADMIN — SUCRALFATE 0.5 G: 1 SUSPENSION ORAL at 15:33

## 2017-10-26 RX ADMIN — ZOLPIDEM TARTRATE 5 MG: 5 TABLET ORAL at 02:38

## 2017-10-26 RX ADMIN — CYCLOBENZAPRINE HYDROCHLORIDE 10 MG: 10 TABLET, FILM COATED ORAL at 07:35

## 2017-10-26 RX ADMIN — SUCRALFATE 0.5 G: 1 SUSPENSION ORAL at 17:56

## 2017-10-26 RX ADMIN — HYDROMORPHONE HYDROCHLORIDE 4 MG: 2 TABLET ORAL at 17:56

## 2017-10-26 RX ADMIN — HYDROMORPHONE HYDROCHLORIDE 4 MG: 2 TABLET ORAL at 00:14

## 2017-10-26 RX ADMIN — ACETAMINOPHEN 650 MG: 325 TABLET ORAL at 17:55

## 2017-10-26 RX ADMIN — TRAMADOL HYDROCHLORIDE 50 MG: 50 TABLET, FILM COATED ORAL at 08:55

## 2017-10-26 RX ADMIN — ACETAMINOPHEN 650 MG: 325 TABLET ORAL at 02:38

## 2017-10-26 RX ADMIN — HYDROMORPHONE HYDROCHLORIDE 4 MG: 2 TABLET ORAL at 05:52

## 2017-10-26 RX ADMIN — HYDROMORPHONE HYDROCHLORIDE 4 MG: 2 TABLET ORAL at 10:01

## 2017-10-26 RX ADMIN — ASPIRIN 325 MG ORAL TABLET 325 MG: 325 PILL ORAL at 08:51

## 2017-10-26 NOTE — PROGRESS NOTES
Patient was visited by AMINA. Volunteer followed up with patient and/or family and reported no needs to this . Chaplains will continue to follow and will provide pastoral care as needed or requested. [address], M.Div.   Board Certified   [phone] - Office

## 2017-10-26 NOTE — ROUTINE PROCESS
1950- Bedside and Verbal shift change report given to Fiorella Link RN by Virgen Skelton RN. Report included the following information SBAR, Kardex, OR Summary, Intake/Output and MAR.

## 2017-10-26 NOTE — PROGRESS NOTES
Chart reviewed, met with pt and spouse at bedside. Pt plans discharge home, spouse available to assist. [address] offered and pt chose [facility] [phone] for follow up; referral placed with CMS. Pt has RW for home. Care Management Interventions  PCP Verified by CM:  Yes  Transition of Care Consult (CM Consult): [address]: No  Reason Outside [place]: Patient already serviced by other home care/hospice agency ([facility])  Discharge Durable Medical Equipment: No  Physical Therapy Consult: Yes  Occupational Therapy Consult: Yes  Current Support Network: Lives with Spouse, Own Home  Confirm Follow Up Transport: Family  Plan discussed with Pt/Family/Caregiver: Yes  Freedom of Choice Offered: Yes  Discharge Location  Discharge Placement: Home with home health

## 2017-10-26 NOTE — PROGRESS NOTES
Problem: Mobility Impaired (Adult and Pediatric)  Goal: *Acute Goals and Plan of Care (Insert Text)  Physical Therapy Goals  Initiated 10/26/2017  to be met within 2-4 days  STG's:  1. Bed mobility:  Supine to/from sit with S/CGA in prep for ADL activity. 2. Transfers:  Sit to/from stand with S in prep for gait. 3. Standing/Ambulation Balance:  Good with LRAD for safe transfers and gait. LTG's  1. Ambulation:  Ambulate 100 ft.with S with LRAD for home mobility at discharge. 2. Patient Education:  S with HEP for home performance accurately at discharge. 3. Step Negotiation: Ascend/Descend 3-5 steps with CGA with HR for home navigation as indicated. Outcome: Progressing Towards Goal  physical Therapy EVALUATION    Patient: Fish Andre (77 y.o. female)  Date: 10/26/2017  Primary Diagnosis: LEFT HIP OSTEOARTHRITIS  Osteoarthritis of left hip  Procedure(s) (LRB):  LEFT TOTAL HIP ARTHROPLASTY/ANTERIOR W/NAVIGATION **SPEC POP** (Left) 1 Day Post-Op   Precautions:Fall, WBAT    ASSESSMENT :  Based on the objective data described below, the patient presents with decreased independence in functional mobility with regard to bed mobility, transfers, gait, balance and activity tolerance due to decreased ROM/motor performance following above surgery. Patient reports stabbing pain 5-6/10 during session and c/o inability to get comfortable in bed or move L LE. Pt appears distressed and states this surgery \"has not been like the other one at all. \"    Pt able to transition to sit EOB/prefers not to have PT assist her d/t h/o pseudogout L ankle and therefore self assist L LE off bed. Required CGA/additional time for transfers to [facility]. Completed hygiene care with supervision/CGA. Able to participate in GT/RW/WBAT L LE 50ft using slow, antalgic, step to gt. Difficulty advancing L LE initially; improved by end of session and did not require PT assist during session.    Pt returned to bed and after several changes of position, was left supine with LE's flexed, pillow between knees. All needs in reach and [facility] notified. Will continue in pm. Recommend HHPT for follow up physical therapy upon discharge to reach maximal level of independence/safety with functional mobility. Pt Education: pt educated in safety/technique during functional mobility tasks     Patient will benefit from skilled intervention to address the above impairments. Patients rehabilitation potential is considered to be Good  Factors which may influence rehabilitation potential include:   []         None noted  []         Mental ability/status  []         Medical condition  []         Home/family situation and support systems  []         Safety awareness  [x]         Pain tolerance/management  []         Other:      PLAN :  Recommendations and Planned Interventions:  [x]           Bed Mobility Training             []    Neuromuscular Re-Education  [x]           Transfer Training                   []    Orthotic/Prosthetic Training  [x]           Gait Training                          []    Modalities  [x]           Therapeutic Exercises          []    Edema Management/Control  [x]           Therapeutic Activities            [x]    Patient and Family Training/Education  []           Other (comment):    Frequency/Duration: Patient will be followed by physical therapy twice daily to address goals. Discharge Recommendations: Home Health  Further Equipment Recommendations for Discharge: N/A     SUBJECTIVE:   Patient stated I haven't had any sleep; this one is just not like the other one.  c/o stabbing pain mid thigh.     OBJECTIVE DATA SUMMARY:     Past Medical History:   Diagnosis Date    Abdominal abscess ([facility] [address] 2009    Arthritis     Rheumatoid    Autoimmune disease ([facility] [address]     Medically induced Lupus    Back pain     Chronic pain     lower back    Colitis     Diverticulitis     Failed back syndrome     GERD (gastroesophageal reflux disease)     Hypertension     when on cyclosporins    Ill-defined condition     large torus roof of mouth    Irritable bowel syndrome     Osteoporosis     Pneumonia     [facility]     Seizures ([facility] [address] 6-1-2008    one episode- after high dose of epinepherine     Past Surgical History:   Procedure Laterality Date    HX ABDOMINAL WALL DEFECT REPAIR      HX APPENDECTOMY      HX BREAST REDUCTION      HX CATARACT REMOVAL Bilateral     HX GI      repair rectal prolaspe    HX GYN      dermoid cyst    HX HEENT      tonsillectomy    HX HYSTERECTOMY  1976    HX LUMBAR FUSION      x 2    HX ORTHOPAEDIC Bilateral     CTR    HX ORTHOPAEDIC Bilateral     arthroplasty thumbs    HX ORTHOPAEDIC Left     Ankle     HX OTHER SURGICAL Left 1985    vein stripping    HX SALPINGO-OOPHORECTOMY Bilateral     HX SHOULDER REPLACEMENT Left     HX UROLOGICAL      bladder repair     Barriers to Learning/Limitations: None  Compensate with: N/A  Prior Level of Function/Home Situation: pt reports doing well following THR AA in June on R hip. Ambulating with independence PTA  Home Situation  Home Environment: Private residence  # Steps to Enter: 6  Rails to Enter: Yes  Hand Rails : Bilateral  One/Two Story Residence: Two story, live on 1st floor  Lift Chair Available: No  Living Alone: No  Support Systems: Spouse/Significant Other/Partner, Child(aiden)  Patient Expects to be Discharged to[de-identified] Private residence  Current DME Used/Available at Home: Carollee Cashing, straight, Walker, rolling, Wheelchair, Commode, bedside, Shower chair  Tub or Shower Type: Tub  Critical Behavior:  Neurologic State: Alert; Appropriate for age  Orientation Level: Oriented X4  Cognition: Follows commands  Psychosocial  Patient Behaviors: Calm; Cooperative  Purposeful Interaction: Yes  Pt Identified Daily Priority: Clinical issues (comment)  Caritas Process: Establish trust;Teaching/learning; Attend basic human needs;Create healing environment  Caring Interventions: Reassure  Reassure: Therapeutic listening; Informing; Acceptance;Caring rounds  Skin Condition/Temp: Dry;Warm  Skin Integrity: Incision (comment)  Skin Integumentary  Skin Color: Appropriate for ethnicity  Skin Condition/Temp: Dry;Warm  Skin Integrity: Incision (comment)  Turgor: Non-tenting  Hair Growth: Present  Varicosities: Absent  Strength:    Strength: Generally decreased, functional  Tone & Sensation:   Tone: Normal  Sensation: Intact (LT)  Range Of Motion:  AROM: Generally decreased, functional  Functional Mobility:  Bed Mobility:  Supine to Sit: Contact guard assistance;Supervision; Additional time  Sit to Supine: Minimum assistance;Stand-by asssistance; Additional time  Scooting: Contact guard assistance  Transfers:  Sit to Stand: Contact guard assistance; Additional time  Stand to Sit: Contact guard assistance; Additional time  Bed to Chair: Contact guard assistance; Additional time (to [facility])  Balance:   Sitting: Intact  Standing: With support; Intact  Ambulation/Gait Training:  Distance (ft): 50 Feet (ft)  Assistive Device: Gait belt;Walker, rolling  Ambulation - Level of Assistance: Minimal assistance  Gait Description (WDL): Exceptions to WDL  Gait Abnormalities: Antalgic;Decreased step clearance; Step to gait  Left Side Weight Bearing: As tolerated  Stance: Left decreased  Speed/Socorro: Slow  Step Length: Right shortened;Left shortened  Swing Pattern: Right asymmetrical;Left asymmetrical  Interventions: Safety awareness training;Verbal cues  Pain:  Pain Scale 1: Numeric (0 - 10)  Pain Intensity 1: 5  Pain Location 1: Hip  Pain Orientation 1: Left  Pain Description 1: Aching  Pain Intervention(s) 1: Medication (see MAR)  Activity Tolerance:   Fair   Please refer to the flowsheet for vital signs taken during this treatment.   After treatment:   []         Patient left in no apparent distress sitting up in chair  [x]         Patient left in no apparent distress in bed  [x]         Call bell left within reach  [x]         Nursing notified  []         Caregiver present  []         Bed alarm activated    COMMUNICATION/EDUCATION:   [x]         Fall prevention education was provided and the patient/caregiver indicated understanding. [x]         Patient/family have participated as able in goal setting and plan of care. [x]         Patient/family agree to work toward stated goals and plan of care. []         Patient understands intent and goals of therapy, but is neutral about his/her participation. []         Patient is unable to participate in goal setting and plan of care. Eval Complexity: History: HIGH Complexity :3+ comorbidities / personal factors will impact the outcome/ POC Exam:LOW Complexity : 1-2 Standardized tests and measures addressing body structure, function, activity limitation and / or participation in recreation  Presentation: MEDIUM Complexity : Evolving with changing characteristics  Clinical Decision Making:Medium Complexity amb >30ft with RW/CGAWBAT L LE-difficulty advancing L LE initially Overall Complexity:MEDIUM    G-Codes (GP)  Mobility  [id number] Current  CJ= 20-39%   Goal  CI= 1-19%    The severity rating is based on the functional mobility assessment.     Thank you for this referral.  Peter Petit, PT   Time Calculation: 55 mins

## 2017-10-26 NOTE — PROGRESS NOTES
Problem: Mobility Impaired (Adult and Pediatric)  Goal: *Acute Goals and Plan of Care (Insert Text)  Physical Therapy Goals  Initiated 10/26/2017  to be met within 2-4 days  STG's:  1. Bed mobility:  Supine to/from sit with S/CGA in prep for ADL activity. 2. Transfers:  Sit to/from stand with S in prep for gait. 3. Standing/Ambulation Balance:  Good with LRAD for safe transfers and gait. LTG's  1. Ambulation:  Ambulate 100 ft.with S with LRAD for home mobility at discharge. 2. Patient Education:  S with HEP for home performance accurately at discharge. 3. Step Negotiation: Ascend/Descend 3-5 steps with CGA with HR for home navigation as indicated. Outcome: Resolved/Met Date Met: 10/26/17  physical Therapy TREATMENT/DISCHARGE    Patient: Robert Kate (77 y.o. female)  Date: 10/26/2017  Diagnosis: LEFT HIP OSTEOARTHRITIS  Osteoarthritis of left hip Osteoarthritis of left hip  Procedure(s) (LRB):  LEFT TOTAL HIP ARTHROPLASTY/ANTERIOR W/NAVIGATION **SPEC POP** (Left) 1 Day Post-Op  Precautions: Fall, WBAT, L LE  Chart, physical therapy assessment, plan of care and goals were reviewed. ASSESSMENT:  Pt found seated in chair at bedside on PT arrival.  Spouse present. Pt eager to go home, however, continues to c/o that mobility/pain with current surgery is not going as well as with previous R THR in June. Pt demonstrates transfers from chair with supervision. Performed GT/RW/WBAT L LE with slow, antalgic gem 105ft total demonstrating step to gt. Initially tending to drag L foot, however, improved to clearance with vc after first few ft. Pt educated in step negotiation and completed same with bilateral HR's/CGA. Pt observed and will assist as needed upon return home. Required SBA assist to Left toes during descent. Pt returned to room and initially left up in chair with nurse present. Pt returned subsequently to assist pt sit to supine from side of bed.   Spouse present and performed same with PT observing. Pt ready for continued PT via HHPT to reach maximal independence with functional mobility/strengthening L LE. Progression toward goals:  [x]      Goals met  [x]      Improving appropriately and progressing toward goals  []      Improving slowly and progressing toward goals  []      Not making progress toward goals and plan of care will be adjusted     PLAN:  Patient will be discharged from physical therapy at this time. Rationale for discharge:  [x] Goals Achieved/progressing  [] Plateau Reached  [] Patient not participating in therapy  [] Other:  Discharge Recommendations:  Home Health  Further Equipment Recommendations for Discharge:  N/A     SUBJECTIVE:   Patient stated ths surgery has been horrible for me. I think they put the gel in the wrong place.     OBJECTIVE DATA SUMMARY:   Critical Behavior:  Neurologic State: Alert, Appropriate for age  Orientation Level: Oriented X4  Cognition: Follows commands  Functional Mobility Training:  Bed Mobility:  Supine to Sit: Supervision (per nurse Bisi Joel report)  Sit to Supine: Minimum assistance;Stand-by asssistance (spouse demonstrates ability to assist )  Scooting: Contact guard assistance  Transfers:  Sit to Stand: Supervision  Stand to Sit: Supervision  Balance:  Sitting: Intact  Standing: With support; Intact  Ambulation/Gait Training:  Distance (ft): 105 Feet (ft)  Assistive Device: Gait belt;Walker, rolling  Ambulation - Level of Assistance: Supervision  Gait Description (WDL): Exceptions to WDL  Gait Abnormalities: Antalgic;Decreased step clearance; Step to gait  Left Side Weight Bearing: As tolerated  Stance: Right decreased  Speed/Socorro: Slow  Step Length: Right shortened;Left shortened  Swing Pattern: Right asymmetrical;Left asymmetrical  Interventions: Safety awareness training;Verbal cues  Stairs:  Number of Stairs Trained: 4  Stairs - Level of Assistance: Contact guard assistance   Rail Use: Both   Therapeutic Exercises:   AP's as tolerated  Pain:  Pain Scale 1: Numeric (0 - 10)  Pain Intensity 1: 8  Pain Location 1: Hip  Pain Orientation 1: Left  Pain Description 1: Aching  Pain Intervention(s) 1: Medication (see MAR)  Activity Tolerance:   Fair   Please refer to the flowsheet for vital signs taken during this treatment. After treatment:   [x] Patient left in no apparent distress sitting up in chair (returned to observe spouse assist pt from EOB to supine)  [] Patient left in no apparent distress in bed  [x] Call bell left within reach  [x] Nursing notified  [x] Caregiver present  [] Bed alarm activated  Mary Ann Sosa, PT   Time Calculation: 28 mins     G-Codes (GP)  Mobility    Goal  CI= 1-19%  D/C  CI= 1-19%  The severity rating is based on the functional mobility assessment.

## 2017-10-26 NOTE — ROUTINE PROCESS
Bedside and Verbal shift change report given to CHARLY Mayo RN (oncoming nurse) by Jerome Montelongo RN (offgoing nurse). Report included the following information SBAR, Procedure Summary, Intake/Output, MAR and Recent Results.

## 2017-10-26 NOTE — WOUND CARE
Patient seen by wound care for monthly hospital prevalence. No skins issues noted at this time. Wound care available if needed.

## 2017-10-26 NOTE — PROGRESS NOTES
Progress Note      Patient: Meena Martinez               Sex: female          DOA: 10/25/2017     YOB: 1950      Age:  69 y.o.        LOS:  LOS: 1 day     Status Post: Procedure(s):  LEFT TOTAL HIP ARTHROPLASTY/ANTERIOR W/NAVIGATION [facility] POP**  Surgery Date: 10/25/2017            Subjective:     Meena Martinez is a 69 y.o. female without c/o nausea. Pain reasonably well controlled with PO medications. She does complain of some back pain which is chronic. She is under the care of Dr. Beth Vieira for this. Patient denies any complaint of calf pain/ SOB or difficulty breathing. Objective:      Visit Vitals    BP 99/63 (BP 1 Location: Right arm, BP Patient Position: At rest;Supine)    Pulse 94    Temp 98.6 °F (37 °C)    Resp 18    Ht 5' 3\" (1.6 m)    Wt 74.6 kg (164 lb 6 oz)    SpO2 97%    BMI 29.12 kg/m2       Physical Exam:   Dressing:  clean, dry, intact  Wiggles Toes/Ankle  Foot sensation intact to light touch  No foot edema/ +1 Posterior Tibial Pulse  Leg Lengths appear equal  Calf soft, supple and non tender. Negative Homans sign    Xray - Looks good    Intake and Output:  Current Shift:  10/25 1901 - 10/26 0700  In: 9084 [P.O.:300; I.V.:1059]  Out: 1750 [PXLKY:4258]  Last three shifts:  10/24 0701 - 10/25 1900  In: 1500 [P.O.:100;  I.V.:1400]  Out: 550 [Urine:400]  Voiding Status:  + void without need for Paige catheter    Lab/Data Reviewed:  Recent Labs      10/26/17   0110   HGB  8.5*   HCT  25.4*   NA  142   K  4.1   BUN  11   CREA  0.86   GLU  138*         Medications Reviewed    Assessment/Plan     Principal Problem:    Osteoarthritis of left hip (10/24/2017)    Active Problems:    DDD (degenerative disc disease), lumbar (6/28/2013)      Spondylolisthesis of lumbar region (6/28/2013)      H/O calcium pyrophosphate deposition disease (CPPD) (5/30/2017)      IBS (irritable bowel syndrome) (5/30/2017)      RA (rheumatoid arthritis) ([facility] (5/30/2017)      Sicca syndrome ([facility] (5/30/2017)      Pain management contract agreement (6/22/2017)      Osteopenia (9/8/2017)        · Change IV to Saline Lock. · Discharge Planning for home. · Begin DVT Prophylaxis - Aspirin 325 mg twice daily   · Continue PT WBAT, ROM as tolerated. Continue exercises hourly using the physical therapy exercises sheet. · Discharge home today. · Will monitor BP. If hypotension will bolus. The patient was seen and examined by Dr. Radha Conti today as well and he is in agreement with above.

## 2017-10-26 NOTE — PHYSICIAN ADVISORY
I reviewed this case as it involves a Medicare patient that has a discharge order placed and the patient has not stayed at least two midnights. After chart review, I recommend that this patient stays as inpatient status . Documentation on admission that supports the expectation of at least a 2 midnight stay include: pt had a procedure on the Inpt Only List.  Expectation of a stay for at least 2 midnights was reasonable.     Latha Yanes MD, MBA, [facility]  Chief Medical Officer, System Physician [place].

## 2017-10-26 NOTE — PROGRESS NOTES
2000 - Assumed care of patient at this time. Patient is alert and oriented x 4. Patient denies chest pain, shortness of breath, or numbness or tingling in the upper or lower extremities. Mepilex dressing on the left hip is clean dry and intact. Mepilex dressing on the right hip has a small amount of breakthrough drainage that I have circled to monitor. SCDs in place on the patient. 18g IV in the left hand is patent and infusing LR @ 75mL/hr. Patient currently experiencing 4/10 pain. Bed is in lowest position with the wheels locked. Siderails x 3 are up. Call bell within reach. Patient is currently resting in bed in no apparent distress. 2015 - Head to toe assessment performed at this time. Patient has no complaints of chest pain or shortness of breath. Denies any numbness or tingling in extremities. Lungs are clear to auscultation. Bowel sounds are present in all 4 quadrants. Patient educated how to  actively manage their pain. Patient encouraged to use the incentive spirometer. Patient educated on the side effects of medications. Explained the importance of ambulation. Patient left in bed with call light within reach, bed in lowest position with wheels locked, and siderails x 3 up. Will continue to monitor. 2100 - Spoke with Dr. Shwetha Carrillo concerning the patient's blood pressure. He said to maintain close observation of the patient's vital signs. He did not recommend another bolus. He did not change the current rate of her IV fluids infusion. He said to encourage the patient to increase their PO intake of fluids. He said if the blood pressure did not show signs of improvement to call him back. 0400 - Patient's blood pressure continues to be low. Additionally, her lab results showed an H&H of 8.5 and 24.5. These results showed a significant drop from labs on 10/26/17 which showed an H&H of 13.4 and 39.5. Will continue to monitor patient's blood pressure.   She is not complaining of any dizziness or other symptoms. Will consult with Dr. Nida Velasco' LADONNA Irving first thing in the morning. 1310 Karen Mendes with LADONNA [facility] about patient's blood pressure and lab results. He said that as long as she was not symptomatic, there was nothing else to do be done. 1552 - Patient OOB to a chair for breakfast.    0715 - Patient can no longer tolerate sitting up in the chair due to significant pain in her hip. Her pain is a 7/10 at this time. Patient returned to bed.

## 2017-10-26 NOTE — PROGRESS NOTES
0800-Patient complaining of \"excrutiating pain. \"  Patient takes tramadol at home but did not bring it with her and it is listed as a patient med. Will call pharmacy to see if we have the drug and supply it for her. Will call Dr. Mike Reyes to see if they will either increase the dose or frequency of paitent's PRn dilaudid and switch to NS from LR due to patient's low BP of 122/60, which was as low as 89/42 at 0312.    0816-left message for Dr. Mike Reyes at his office. He is in surgery today but they will pass the message to call. 0820-Took telephone order with readback for Tylenol 650 Q6 PO round the clock. Due to patient's low BP Lalita Dent does not want to change the pain med frequency or dosage, and does not want to switch fluids to NS as patient's BP is getting into normal range. Called pharmacy to get tramadol supplied as patient did not bring in her tramadol. Placed telephone order with readback for 100 mg tramadol Q6, patient already had order for tylenol 650 Q6.  1130-Patient complaining that her inner left thigh muscle \"will not do what I want it to. \"  She reports weakness upon ambulating. This nurse spoke with Krystal Reed from PT who reported patient is having difficulty advancing and that she requires additional time to perform tasks. She is also favoring her left side. Patient also is concerned about her BP and is concerned that she may need to have her blood redrawn to see if her H/H has fallen more since it was drawn overnight. 1200-Paged Altaf DOUGHERTY to report patient's difficulty advancing and issue with left inner thigh. 1210-Spoke with Altaf DOUGHERTY concerning patient's difficulty with left leg and her concern about her H/H. Per Altaf Donahue, we will not be redrawing or giving blood today but if she is still here tomorrow the labs can be run again overnight. He says her inner thigh weakness is normal after her surgery and she needs to work with PT.   Continue to give ordered fluids, no bolus ordered at this time. 1720-Returned call to LADONNA Longo to inform him that patient's BP was improved, she had passed PT and she wanted to be discharged. She already has a discharge order in the system. [employer] service to send message to call. 1740-Received call from oncall doctor and relayed message that patient had passed PT, v/s had improved,  and she was being discharged. 1807-Discharge instructions reviewed with patient at this time. Opportunity for questions and clarification was provided. Patient has verbalized understanding. Patient was provided with care notes to include side effects of RX's. Arm bands removed and shredded. AVS reviewed with Teena Santos. .    IV removed. Dressing CDI to left hip, smaller mepilex to right hip shows small amount of breakthrough bleeding. Jose Angel Napoles

## 2017-10-26 NOTE — PROGRESS NOTES
Problem: Falls - Risk of  Goal: *Absence of Falls  Document Fernando Fall Risk and appropriate interventions in the flowsheet.    Outcome: Progressing Towards Goal  Fall Risk Interventions:  Mobility Interventions: PT Consult for mobility concerns, PT Consult for assist device competence, Utilize walker, cane, or other assitive device    Mentation Interventions: Adequate sleep, hydration, pain control    Medication Interventions: Patient to call before getting OOB, Teach patient to arise slowly    Elimination Interventions: Call light in reach, Patient to call for help with toileting needs, Toileting schedule/hourly rounds

## 2017-10-26 NOTE — DISCHARGE INSTRUCTIONS
Missy Duong III, MD  [facility], PA-C    Lower Extremity Surgery  Discharge Instructions      Please take the time to review the following instructions before you leave the hospital and use them as guidelines during your recovery from surgery. If you have any questions you may contact my office at [phone]. Wound Care/Dressing Changes:    [x]   You may change your dressing as needed. Beginning the 2 days after you are discharged from the hospital you can change your dressing daily. A big, bulky dressing isn't necessary as long as there isn't any drainage from the incisions. You will be shown how to change the dressings properly by the home health aids as well. There will be a piece of tape over your incision that resembles gauze. This tape should stay on and you should not attempt to remove it. Once you begin to get this wet in the shower this will begin to fall off on its own, although it will take days. Do not apply antibiotic ointment to your incisions. Showering/Bathing:    [x]   You may shower 2 days after surgery. Your dressing may be removed for showering. You may get your incisions wet in the shower. Don't vigorously scrub the area where your incisions are. Please pat dry the incision. Apply a clean, dry dressing after drying off the area of your incisions. Don't take a tub bath, get in a swimming pool or Jacuzzi until the incisions are completely healed, and you are seen in the office by Dr. Jasmyne Soto. Do not soak your incisions under water. []   Do not get the dressing wet. Once you remove it two days from surgery, you may get the incision wet if there is no drainage. Weight Bearing Status/Braces/Activity:    []   If you have had a total knee replacement you may weight bear as tolerated with the knee immobilizer in place. Use crutches, cane, or walker as needed. You should sleep in your knee immobilizer.   You need to begin working on range of motion early after your surgery. It is very important to work on extension (straighthening) the knee. You will be advanced with walking and range of motion by physical therapy at home. [x]   If you have had a total hip replacement you may weight bear as tolerated. Physical therapy with come by your house to help you perform exercises and work on strength and range of motion. Ice/Elevation:    Continue ice and elevation consistently for 48 hours after surgery. If you have had a total knee replacement when elevating your knee elevate it on about 4 pillows to be sure it is above your heart. After 48 hours, you should ice your knee 3 times per day, for 20 minutes at a time for the next 5 days. After one week from surgery, you may use ice and elevation as needed for pain and swelling. Diet:    You may advance to your regular diet as tolerated. Medication:    1. You will be given a prescription for pain medications when you are discharged from the hospital.  Take the medication as needed according to the directions on the prescription bottle. Possible side effects of the medication include dizziness, headache, nausea, vomiting, constipation and urinary retention. If you experience any of these side effects call the office so that we can assist you in relieving them. Discontinue the use of the pain medication if you develop itching, rash, shortness of breath or difficulties swallowing. If these symptoms become severe or aren't relieved by discontinuing the medication you should seek immediate medical attention. Refills of pain medication are authorized during office hours only (8AM - 5PM Mon thru Fri). 2. If you were prescribed Percocet/oxycodone or Dilaudid/hydromorphone you must have a written prescription. These medications legally cannot be called in to a pharmacy. 3. Do not take Tylenol in addition to your pain medication as most of the pain medication already contains Tylenol.   Exceptions include Dilaudid/hydromorphone, Demerol/meperidine and roxicodone. Do not exceed 3000 mg of Tylenol per day. Ex:  (hydrocodone 5/325mg = 325 mg of Tylenol)  4. You should use Aspirin 325 mg twice daily for 21 days from the date of your surgery. This will help to prevent blood clots from forming in your legs. This needs to be started the day after your surgery and home healthcare will teach you how this is done. If you are taking another medication such as Xarelto as discussed with Dr. Cynthia Daley this should also be started the day after the surgery. 5. You may resume the medications you were taking prior to your surgery, unless otherwise specified in your discharge instructions. Pain medication may change the effects of any antidepressant medication. If you have any questions about possible interactions between your regular medications and the pain medication you should consult the physician who prescribes your regular medications. 6. If you had a total joint as an outpatient procedure and did not spend the night in the hospital, Dr. Cynthia Daley has written for an oral antibiotic that you should take as instructed. This antibiotic is generally Keflex or Clindamycin. If you spent the night in the hospital the antibiotic was given to you through the IV and there is nothing else to take orally. Follow Up Appointment:    If you are unsure of your follow-up appointment date and time, please call [phone]. Please let our  know you are scheduling a post-op appointment. Most appointments should be between 7-14 days after your surgery. Physical Therapy:    [x]   Physical therapy will be discussed with you at your first follow-up appointment with Dr. Cynthia Daley. You don't need to begin physical therapy prior to that visit. You are to participate with [address] [place] [address] as arranged pre-operatively in the convience of your own home.     Important signs and symptoms:    If any of the following signs and symptoms occurs, you should contact Dr. Neisha Velazco' office. Please be advised if a problem arises which you feel required immediate medical attention or your are unable to contact Dr. Neisha Velazco' office you should seek immediate medical attention. Signs and symptoms to watch for include:  1. A sudden increase in swelling and/or redness or warmth at the area your surgery was performed which isn't relieved by rest, ice and elevation. 2. Oral temperature greater than 101.5 degrees for 12 hours or more which isn't relieved by an increase in fluid intake and taking two Tylenol every 4-6 hours. Do not exceed 3000 mg of Tylenol per day. 3. Excessive drainage from your incisions or drainage that hasn't stopped by 72 hours. 4. Calf pian, tenderness, redness or swelling which isn't relieved with rest and elevation. 5. Fever, chills, shortness of breath, chest pain, nausea, vomiting or other signs and symptoms which are of concern to you.   Patient armband removed and shredded

## 2017-10-26 NOTE — PROGRESS NOTES
1418  Pt was medicated with Dilaudid 4 mg po for pain level 5/10. Pt was assisted to [facility]. Pt voided 550 ml of clear yellow urine. Pt was assisted back in bed.

## 2017-10-26 NOTE — ROUTINE PROCESS
6543 - Bedside report received from Jesus 11. Patient in bed at this time. Pain 6/10. Plan of care for the day addressed with the patient.

## 2017-10-26 NOTE — PROGRESS NOTES
conducted an initial consultation and Spiritual Assessment for Victorina Patricia, who is a 69 y.o.,female. According to the patients chart [employer] Affiliation is: Spiritism. The reason the Patient came to the hospital is:   Patient Active Problem List    Diagnosis Date Noted    Osteoarthritis of left hip 10/24/2017    Osteopenia of multiple sites 09/12/2017    Osteopenia 09/08/2017    Chronic left shoulder pain 07/25/2017    ACP (advance care planning) 06/28/2017    Pain management contract agreement 06/22/2017    H/O calcium pyrophosphate deposition disease (CPPD) 05/30/2017    IBS (irritable bowel syndrome) 05/30/2017    RA (rheumatoid arthritis) ([facility] 05/30/2017    Sicca syndrome ([facility] 05/30/2017    Osteoarthritis of right hip 05/30/2017    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 Provided information about Spiritual Care Services. Offered prayer and assurance of continued prayers on patients behalf. Offered Sacrament of the [address] Plan:  Chaplains will continue to follow and will provide pastoral care as needed or requested.  recommends bedside caregivers page  on duty if patient shows signs of acute spiritual or emotional distress. Father CHARLY Jaquez [facility] - Office

## 2017-10-27 ENCOUNTER — PATIENT OUTREACH (OUTPATIENT)
Dept: FAMILY MEDICINE CLINIC | Age: 67
End: 2017-10-27

## 2017-10-30 NOTE — PROGRESS NOTES
S/P THE [facility]E [facility] on 10/25/17 to 10/26/17 for left total hip replacement    Contacted patient for post hospital discharge follow up. No answer. Obtain recording that phone's voice mailbox is not set up yet. Unable to leave message. Will attempt to contact at a later time.

## 2017-10-31 ENCOUNTER — OFFICE VISIT (OUTPATIENT)
Dept: FAMILY MEDICINE CLINIC | Age: 67
End: 2017-10-31

## 2017-10-31 VITALS
HEART RATE: 78 BPM | SYSTOLIC BLOOD PRESSURE: 118 MMHG | RESPIRATION RATE: 12 BRPM | DIASTOLIC BLOOD PRESSURE: 81 MMHG | BODY MASS INDEX: 28.35 KG/M2 | HEIGHT: 63 IN | OXYGEN SATURATION: 98 % | WEIGHT: 160 LBS | TEMPERATURE: 97.7 F

## 2017-10-31 DIAGNOSIS — D64.89 ANEMIA DUE TO OTHER CAUSE, NOT CLASSIFIED: Primary | ICD-10-CM

## 2017-10-31 RX ORDER — DIAZEPAM 5 MG/1
TABLET ORAL
Refills: 0 | COMMUNITY
Start: 2017-10-10 | End: 2018-05-22 | Stop reason: ALTCHOICE

## 2017-10-31 RX ORDER — METHYLPREDNISOLONE 4 MG/1
TABLET ORAL
Refills: 0 | COMMUNITY
Start: 2017-09-28 | End: 2018-05-22 | Stop reason: ALTCHOICE

## 2017-10-31 RX ORDER — HYDROCODONE BITARTRATE AND ACETAMINOPHEN 5; 325 MG/1; MG/1
TABLET ORAL
Refills: 0 | COMMUNITY
Start: 2017-09-25 | End: 2018-05-22 | Stop reason: ALTCHOICE

## 2017-10-31 RX ORDER — RISEDRONATE SODIUM 150 MG/1
TABLET, FILM COATED ORAL
Refills: 0 | COMMUNITY
Start: 2017-10-19 | End: 2018-05-22 | Stop reason: ALTCHOICE

## 2017-10-31 RX ORDER — FERROUS SULFATE 325(65) MG
325 TABLET, DELAYED RELEASE (ENTERIC COATED) ORAL
Qty: 100 TAB | Refills: 2 | Status: SHIPPED | OUTPATIENT
Start: 2017-10-31 | End: 2017-11-02 | Stop reason: CLARIF

## 2017-10-31 RX ORDER — CEPHALEXIN 500 MG/1
CAPSULE ORAL
Refills: 1 | COMMUNITY
Start: 2017-08-14 | End: 2018-08-31 | Stop reason: ALTCHOICE

## 2017-10-31 NOTE — PROGRESS NOTES
Luis Edmondson is a 69 y.o. female  presents for follow up after surgery. She was seen 2 days after surgery by nurse. She was scheduled for PT and OT. She has been seeing spots when she stands. No chest pains or SOB. Allergies   Allergen Reactions    Celebrex [Celecoxib] Swelling    Shellfish Derived Swelling     \"makes me feel puffy\"    Sulfa (Sulfonamide Antibiotics) Hives and Swelling     Lips swelling    Humira [Adalimumab] Other (comments)     Lupus    Optiray 320 [Ioversol] Hives and Itching     Pt states when she gets iv contrast she itches a little from hives?  Penicillins Hives     Outpatient Prescriptions Marked as Taking for the 10/31/17 encounter (Office Visit) with Clayton Ellsworth MD   Medication Sig Dispense Refill    cephALEXin (KEFLEX) 500 mg capsule take 4 capsules by mouth ONE HOUR PRIOR TO PROCEDURE  1    diazePAM (VALIUM) 5 mg tablet take 1 tablet (5MG) by mouth 1/2 HOUR PRIOR TO MRI; MUST HAVE   0    HYDROcodone-acetaminophen (NORCO) 5-325 mg per tablet TAKE 2 TABLETS EVERY 6 HOURS AS NEEDED FOR PAIN. .. MAX 8 PER DAY  0    methylPREDNISolone (MEDROL DOSEPACK) 4 mg tablet take as directed on pack  0    risedronate (ACTONEL) 150 mg tablet take 1 tablet by mouth every month  0    ferrous sulfate (IRON) 325 mg (65 mg iron) EC tablet Take 1 Tab by mouth three (3) times daily (with meals). 100 Tab 2    HYDROmorphone (DILAUDID) 2 mg tablet Take 1-2 Tabs by mouth every four (4) hours as needed for Pain. Max Daily Amount: 24 mg. 60 Tab 0    aspirin (ASPIRIN) 325 mg tablet Take 1 Tab by mouth two (2) times a day. Take for 3 weeks for DVT prophylaxis. 42 Tab 0    naloxone (NARCAN) 2 mg/actuation spry Use 1 spray intranasally into 1 nostril. Use a new Narcan nasal spray for subsequent doses and administer into alternating nostrils. May repeat every 2 to 3 minutes as needed.  1 Package 0    acetaminophen (TYLENOL EXTRA STRENGTH) 500 mg tablet Take 1,000 mg by mouth every six (6) hours as needed for Pain.  cholecalciferol (VITAMIN D3) 1,000 unit tablet Take 1,000 Units by mouth five (5) days a week.  polyethylene glycol (MIRALAX) 17 gram packet Take 17 g by mouth daily.  sucralfate (CARAFATE) 100 mg/mL suspension Take 5 mL by mouth four (4) times daily. 414 mL 2    traMADol (ULTRAM-ER) 100 mg Tb24 Take 1 Tab by mouth daily. Max Daily Amount: 100 mg. Indications: Chronic Pain 90 Tab 1    cyclobenzaprine (FLEXERIL) 10 mg tablet Take 1 Tab by mouth three (3) times daily as needed. Indications: RA 90 Tab 1    calcium carbonate (OS-BARBARA) 500 mg calcium (1,250 mg) tablet Take 1,500 mg by mouth daily.        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    H/O calcium pyrophosphate deposition disease (CPPD) Z87.39    IBS (irritable bowel syndrome) K58.9    RA (rheumatoid arthritis) ([address] M06.9    Sicca syndrome (HCC) M35.00    Osteoarthritis of right hip M16.11    Pain management contract agreement Z02.89    ACP (advance care planning) Z71.89    Chronic left shoulder pain M25.512, G89.29    Osteopenia M85.80    Osteopenia of multiple sites M85.89    Osteoarthritis of left hip M16.12     Past Medical History:   Diagnosis Date    Abdominal abscess ([address] 2009    Arthritis     Rheumatoid    Autoimmune disease ([address]     Medically induced Lupus    Back pain     Chronic pain     lower back    Colitis     Diverticulitis     Failed back syndrome     GERD (gastroesophageal reflux disease)     Hypertension     when on cyclosporins    Ill-defined condition     large torus roof of mouth    Irritable bowel syndrome     Osteoporosis     Pneumonia     RA (rheumatoid arthritis) ([address]     Seizures ([address] 6-1-2008    one episode- after high dose of epinepherine Social History     Social History    Marital status:      Spouse name: N/A    Number of children: N/A    Years of education: N/A     Social History Main Topics    Smoking status: Never Smoker    Smokeless tobacco: Never Used    Alcohol use No    Drug use: No    Sexual activity: No     Other Topics Concern    Not on file     Social History Narrative     Family History   Problem Relation Age of Onset    Other Other      Orthopedic (Sister)    Arthritis-osteo Sister     Cancer Neg Hx     Diabetes Neg Hx     Heart Disease Neg Hx     Heart Attack Neg Hx     Hypertension Neg Hx     Stroke Neg Hx     Malignant Hyperthermia Neg Hx     Pseudocholinesterase Deficiency Neg Hx     Delayed Awakening Neg Hx     Post-op Nausea/Vomiting Neg Hx     Emergence Delirium Neg Hx     Post-op Cognitive Dysfunction Neg Hx         Review of Systems   Constitutional: Negative for chills and fever. Cardiovascular: Negative for chest pain and palpitations. Gastrointestinal: Negative for constipation, diarrhea, nausea and vomiting. Vitals:    10/31/17 0805   BP: 118/81   Pulse: 78   Resp: 12   Temp: 97.7 °F (36.5 °C)   TempSrc: Oral   SpO2: 98%   Weight: 160 lb (72.6 kg)   Height: 5' 3\" (1.6 m)   PainSc:   0 - No pain       Physical Exam   Constitutional: She is oriented to person, place, and time and well-developed, well-nourished, and in no distress. Cardiovascular: Normal rate and regular rhythm. Pulmonary/Chest: Effort normal and breath sounds normal.   Neurological: She is alert and oriented to person, place, and time. Skin: Skin is warm and dry. Psychiatric: Mood, memory, affect and judgment normal.   Nursing note and vitals reviewed. Assessment/Plan      ICD-10-CM ICD-9-CM    1.  Anemia due to other cause, not classified D64.89 285.8 ferrous sulfate (IRON) 325 mg (65 mg iron) EC tablet     I have discussed the diagnosis with the patient and the intended plan of care as seen in the above orders. The patient has received an after-visit summary and questions were answered concerning future plans. I have discussed medication, side effects, and warnings with the patient in detail. The patient verbalized understanding and is in agreement with the plan of care. The patient will contact the office with any additional concerns. Follow-up Disposition:  Return in about 13 days (around 11/13/2017).   lab results and schedule of future lab studies reviewed with patient    Sara Ahuja MD

## 2017-10-31 NOTE — PROGRESS NOTES
Chief Complaint   Patient presents with    Follow-up     hip surgery follow up     1. Have you been to the ER, urgent care clinic since your last visit? Hospitalized since your last visit? Yes When: 10/26/17 [facility] Reason for visit: left hip replacement    2. Have you seen or consulted any other health care providers outside of the [address] [place] [address] since your last visit? Include any pap smears or colon screening.  No

## 2017-10-31 NOTE — MR AVS SNAPSHOT
Visit Information Date & Time Provider Department Dept. Phone Encounter #  
 10/31/2017  8:00 AM Edna Mason MD [address] [id number] Follow-up Instructions Return in about 13 days (around 11/13/2017). Upcoming Health Maintenance Date Due  
 GLAUCOMA SCREENING Q2Y 5/6/2016 MEDICARE YEARLY EXAM 6/29/2018 Pneumococcal 65+ Low/Medium Risk (2 of 2 - PPSV23) 7/10/2018 BREAST CANCER SCRN MAMMOGRAM 7/11/2019 COLONOSCOPY 1/13/2024 DTaP/Tdap/Td series (3 - Td) 9/14/2027 Allergies as of 10/31/2017  Review Complete On: 10/31/2017 By: Edna Mason MD  
  
 Severity Noted Reaction Type Reactions Celebrex [Celecoxib] High 11/06/2014   Systemic Swelling Shellfish Derived High 05/30/2017   Systemic Swelling \"makes me feel puffy\" Sulfa (Sulfonamide Antibiotics) High 05/18/2015    Hives, Swelling Lips swelling Humira [Adalimumab]  06/27/2013    Other (comments) Lupus Optiray 320 [Ioversol]  02/16/2013   Not Verified Hives, Itching Pt states when she gets iv contrast she itches a little from hives? Penicillins  02/19/2013    Hives Current Immunizations  Never Reviewed Name Date Influenza Vaccine 11/15/2007 12:00 AM  
 Pneumococcal Conjugate (PCV-13) 7/10/2017 Pneumococcal Vaccine (Unspecified Type) 10/31/2010 Tdap 9/14/2017, 11/15/2007 12:00 AM  
  
 Not reviewed this visit You Were Diagnosed With   
  
 Codes Comments Anemia due to other cause, not classified    -  Primary ICD-10-CM: F57.79 ICD-9-CM: 853. 8 Vitals BP Pulse Temp Resp Height(growth percentile) Weight(growth percentile) 118/81 (BP 1 Location: Right arm, BP Patient Position: Sitting) 78 97.7 °F (36.5 °C) (Oral) 12 5' 3\" (1.6 m) 160 lb (72.6 kg) SpO2 BMI OB Status Smoking Status 98% 28.34 kg/m2 Hysterectomy Never Smoker BMI and BSA Data  Body Mass Index Body Surface Area  
 28.34 kg/m 2 1.8 m 2  
 Preferred Pharmacy Pharmacy Name Phone [facility] [address], [address] [facility] [phone] Your Updated Medication List  
  
   
This list is accurate as of: 10/31/17  8:21 AM.  Always use your most recent med list.  
  
  
  
  
 aspirin 325 mg tablet Commonly known as:  ASPIRIN Take 1 Tab by mouth two (2) times a day. Take for 3 weeks for DVT prophylaxis. calcium carbonate 500 mg calcium (1,250 mg) tablet Commonly known as:  OS-BARBARA Take 1,500 mg by mouth daily. cephALEXin 500 mg capsule Commonly known as:  KEFLEX  
take 4 capsules by mouth ONE HOUR PRIOR TO PROCEDURE  
  
 cyclobenzaprine 10 mg tablet Commonly known as:  FLEXERIL Take 1 Tab by mouth three (3) times daily as needed. Indications: RA  
  
 diazePAM 5 mg tablet Commonly known as:  VALIUM  
take 1 tablet (5MG) by mouth 1/2 HOUR PRIOR TO MRI; MUST HAVE   
  
 ferrous sulfate 325 mg (65 mg iron) EC tablet Commonly known as:  IRON Take 1 Tab by mouth three (3) times daily (with meals). HYDROcodone-acetaminophen 5-325 mg per tablet Commonly known as:  NORCO  
TAKE 2 TABLETS EVERY 6 HOURS AS NEEDED FOR PAIN. .. MAX 8 PER DAY HYDROmorphone 2 mg tablet Commonly known as:  DILAUDID Take 1-2 Tabs by mouth every four (4) hours as needed for Pain. Max Daily Amount: 24 mg.  
  
 lisinopril 10 mg tablet Commonly known as:  PRINIVIL, ZESTRIL  
TAKE 1 TABLET DAILY  
  
 methylPREDNISolone 4 mg tablet Commonly known as:  MEDROL DOSEPACK  
take as directed on pack MIRALAX 17 gram packet Generic drug:  polyethylene glycol Take 17 g by mouth daily. naloxone 2 mg/actuation Spry Commonly known as:  [employer] Use 1 spray intranasally into 1 nostril. Use a new Narcan nasal spray for subsequent doses and administer into alternating nostrils. May repeat every 2 to 3 minutes as needed. risedronate 150 mg tablet Commonly known as:  ACTONEL  
 take 1 tablet by mouth every month  
  
 sucralfate 100 mg/mL suspension Commonly known as:  Bisi Kirks Take 5 mL by mouth four (4) times daily. traMADol 100 mg Tb24 Commonly known as:  ULTRAM-ER Take 1 Tab by mouth daily. Max Daily Amount: 100 mg. Indications: Chronic Pain TYLENOL EXTRA STRENGTH 500 mg tablet Generic drug:  acetaminophen Take 1,000 mg by mouth every six (6) hours as needed for Pain. VITAMIN D3 1,000 unit tablet Generic drug:  cholecalciferol Take 1,000 Units by mouth five (5) days a week. Prescriptions Sent to Pharmacy Refills  
 ferrous sulfate (IRON) 325 mg (65 mg iron) EC tablet 2 Sig: Take 1 Tab by mouth three (3) times daily (with meals). Class: Normal  
 [facility]: [id number] [address] #: [phone] Route: Oral  
  
Follow-up Instructions Return in about 13 days (around 11/13/2017). Patient Instructions Iron Deficiency Anemia: Care Instructions Your Care Instructions Anemia means that you do not have enough red blood cells. Red blood cells carry oxygen around your body. When you have anemia, it can make you pale, weak, and tired. Many things can cause anemia. The most common cause is loss of blood. This can happen if you have heavy menstrual periods. It can also happen if you have bleeding in your stomach or bowel. You can also get anemia if you don't have enough iron in your diet or if it's hard for your body to absorb iron. In some cases, pregnancy causes anemia. That's because a pregnant woman needs more iron. Your doctor may do more tests to find the cause of your anemia. If a disease or other health problem is causing it, your doctor will treat that problem. It's important to follow up with your doctor to make sure that your iron level returns to normal. 
Follow-up care is a key part of your treatment and safety.  Be sure to make and go to all appointments, and call your doctor if you are having problems. It's also a good idea to know your test results and keep a list of the medicines you take. How can you care for yourself at home? · If your doctor recommended iron pills, take them as directed. ¨ Try to take the pills on an empty stomach. You can do this about 1 hour before or 2 hours after meals. But you may need to take iron with food to avoid an upset stomach. ¨ Do not take antacids or drink milk or anything with caffeine within 2 hours of when you take your iron. They can keep your body from absorbing the iron well. ¨ Vitamin C helps your body absorb iron. You may want to take iron pills with a glass of orange juice or some other food high in vitamin C. 
¨ Iron pills may cause stomach problems. These include heartburn, nausea, diarrhea, constipation, and cramps. It can help to drink plenty of fluids and include fruits, vegetables, and fiber in your diet. ¨ It's normal for iron pills to make your stool a greenish or grayish black. But internal bleeding can also cause dark stool. So it's important to tell your doctor about any color changes. ¨ Call your doctor if you think you are having a problem with your iron pills. Even after you start to feel better, it will take several months for your body to build up its supply of iron. ¨ If you miss a pill, don't take a double dose. ¨ Keep iron pills out of the reach of small children. Too much iron can be very dangerous. · Eat foods with a lot of iron. These include red meat, shellfish, poultry, and eggs. They also include beans, raisins, whole-grain bread, and leafy green vegetables. · Steam your vegetables. This is the best way to prepare them if you want to get as much iron as possible. · Be safe with medicines. Do not take nonsteroidal anti-inflammatory pain relievers unless your doctor tells you to. These include aspirin, naproxen (Aleve), and ibuprofen (Advil, Motrin). · Liquid iron can stain your teeth. But you can mix it with water or juice and drink it with a straw. Then it won't get on your teeth. When should you call for help? Call 911 anytime you think you may need emergency care. For example, call if: 
? · You passed out (lost consciousness). ?Call your doctor now or seek immediate medical care if: 
? · You are short of breath. ? · You are dizzy or light-headed, or you feel like you may faint. ? · You have new or worse bleeding. ? Watch closely for changes in your health, and be sure to contact your doctor if: 
? · You feel weaker or more tired than usual.  
? · You do not get better as expected. Where can you learn more? Go to http://camilo-robert.info/. Enter U601 in the search box to learn more about \"Iron Deficiency Anemia: Care Instructions. \" Current as of: October 13, 2016 Content Version: 11.4 © 3149-1074 [employer]. Care instructions adapted under license by [employer] (which disclaims liability or warranty for this information). If you have questions about a medical condition or this instruction, always ask your healthcare professional. [place] [address] any warranty or liability for your use of this information. Introducing [facility]! Dear Jordin Pack: Thank you for requesting a [employer] account. Our records indicate that you already have an active [employer] account. You can access your account anytime at [email or web address]. [employer]/Accolade Did you know that you can access your hospital and ER discharge instructions at any time in [employer]? You can also review all of your test results from your hospital stay or ER visit. Additional Information If you have questions, please visit the Frequently Asked Questions section of the [employer] website at [email or web address] Remember, [employer] is NOT to be used for urgent needs.  For medical emergencies, dial 911. Now available from your iPhone and Android! Please provide this summary of care documentation to your next provider. Your primary care clinician is listed as [address]. If you have any questions after today's visit, please call [phone].

## 2017-10-31 NOTE — PATIENT INSTRUCTIONS
Iron Deficiency Anemia: Care Instructions  Your Care Instructions    Anemia means that you do not have enough red blood cells. Red blood cells carry oxygen around your body. When you have anemia, it can make you pale, weak, and tired. Many things can cause anemia. The most common cause is loss of blood. This can happen if you have heavy menstrual periods. It can also happen if you have bleeding in your stomach or bowel. You can also get anemia if you don't have enough iron in your diet or if it's hard for your body to absorb iron. In some cases, pregnancy causes anemia. That's because a pregnant woman needs more iron. Your doctor may do more tests to find the cause of your anemia. If a disease or other health problem is causing it, your doctor will treat that problem. It's important to follow up with your doctor to make sure that your iron level returns to normal.  Follow-up care is a key part of your treatment and safety. Be sure to make and go to all appointments, and call your doctor if you are having problems. It's also a good idea to know your test results and keep a list of the medicines you take. How can you care for yourself at home? · If your doctor recommended iron pills, take them as directed. ¨ Try to take the pills on an empty stomach. You can do this about 1 hour before or 2 hours after meals. But you may need to take iron with food to avoid an upset stomach. ¨ Do not take antacids or drink milk or anything with caffeine within 2 hours of when you take your iron. They can keep your body from absorbing the iron well. ¨ Vitamin C helps your body absorb iron. You may want to take iron pills with a glass of orange juice or some other food high in vitamin C.  ¨ Iron pills may cause stomach problems. These include heartburn, nausea, diarrhea, constipation, and cramps. It can help to drink plenty of fluids and include fruits, vegetables, and fiber in your diet.   ¨ It's normal for iron pills to make your stool a greenish or grayish black. But internal bleeding can also cause dark stool. So it's important to tell your doctor about any color changes. ¨ Call your doctor if you think you are having a problem with your iron pills. Even after you start to feel better, it will take several months for your body to build up its supply of iron. ¨ If you miss a pill, don't take a double dose. ¨ Keep iron pills out of the reach of small children. Too much iron can be very dangerous. · Eat foods with a lot of iron. These include red meat, shellfish, poultry, and eggs. They also include beans, raisins, whole-grain bread, and leafy green vegetables. · Steam your vegetables. This is the best way to prepare them if you want to get as much iron as possible. · Be safe with medicines. Do not take nonsteroidal anti-inflammatory pain relievers unless your doctor tells you to. These include aspirin, naproxen (Aleve), and ibuprofen (Advil, Motrin). · Liquid iron can stain your teeth. But you can mix it with water or juice and drink it with a straw. Then it won't get on your teeth. When should you call for help? Call 911 anytime you think you may need emergency care. For example, call if:  ? · You passed out (lost consciousness). ?Call your doctor now or seek immediate medical care if:  ? · You are short of breath. ? · You are dizzy or light-headed, or you feel like you may faint. ? · You have new or worse bleeding. ? Watch closely for changes in your health, and be sure to contact your doctor if:  ? · You feel weaker or more tired than usual.   ? · You do not get better as expected. Where can you learn more? Go to http://camilo-robert.info/. Enter V829 in the search box to learn more about \"Iron Deficiency Anemia: Care Instructions. \"  Current as of: October 13, 2016  Content Version: 11.4  © 4070-3560 [employer].  Care instructions adapted under license by [employer] (which disclaims liability or warranty for this information). If you have questions about a medical condition or this instruction, always ask your healthcare professional. [address] any warranty or liability for your use of this information.

## 2017-11-02 ENCOUNTER — OFFICE VISIT (OUTPATIENT)
Dept: FAMILY MEDICINE CLINIC | Age: 67
End: 2017-11-02

## 2017-11-02 VITALS
BODY MASS INDEX: 29.95 KG/M2 | SYSTOLIC BLOOD PRESSURE: 124 MMHG | WEIGHT: 169 LBS | DIASTOLIC BLOOD PRESSURE: 74 MMHG | RESPIRATION RATE: 16 BRPM | TEMPERATURE: 97.3 F | HEART RATE: 76 BPM | OXYGEN SATURATION: 99 % | HEIGHT: 63 IN

## 2017-11-02 DIAGNOSIS — M06.9 RHEUMATOID ARTHRITIS INVOLVING LEFT HIP, UNSPECIFIED RHEUMATOID FACTOR PRESENCE: Chronic | ICD-10-CM

## 2017-11-02 DIAGNOSIS — R19.7 DIARRHEA, UNSPECIFIED TYPE: ICD-10-CM

## 2017-11-02 DIAGNOSIS — D50.8 OTHER IRON DEFICIENCY ANEMIA: Primary | ICD-10-CM

## 2017-11-02 RX ORDER — FERROUS GLUCONATE 324(37.5)
324 TABLET ORAL
Qty: 100 TAB | Refills: 2 | Status: SHIPPED | OUTPATIENT
Start: 2017-11-02 | End: 2017-11-13 | Stop reason: ALTCHOICE

## 2017-11-02 NOTE — PROGRESS NOTES
Armaan Winkler is a 69 y.o. female  presents for diarrhea. She has had it since starting Fe tabs. No fever or chills nausea or vomiting. No blood in stool. Allergies   Allergen Reactions    Celebrex [Celecoxib] Swelling    Shellfish Derived Swelling     \"makes me feel puffy\"    Sulfa (Sulfonamide Antibiotics) Hives and Swelling     Lips swelling    Humira [Adalimumab] Other (comments)     Lupus    Optiray 320 [Ioversol] Hives and Itching     Pt states when she gets iv contrast she itches a little from hives?  Penicillins Hives     Outpatient Prescriptions Marked as Taking for the 11/2/17 encounter (Office Visit) with Janessa Douglas MD   Medication Sig Dispense Refill    ferrous gluconate 324 mg (37.5 mg iron) tablet Take 1 Tab by mouth three (3) times daily (with meals). 100 Tab 2    cephALEXin (KEFLEX) 500 mg capsule take 4 capsules by mouth ONE HOUR PRIOR TO PROCEDURE  1    diazePAM (VALIUM) 5 mg tablet take 1 tablet (5MG) by mouth 1/2 HOUR PRIOR TO MRI; MUST HAVE   0    HYDROcodone-acetaminophen (NORCO) 5-325 mg per tablet TAKE 2 TABLETS EVERY 6 HOURS AS NEEDED FOR PAIN. .. MAX 8 PER DAY  0    methylPREDNISolone (MEDROL DOSEPACK) 4 mg tablet take as directed on pack  0    risedronate (ACTONEL) 150 mg tablet take 1 tablet by mouth every month  0    HYDROmorphone (DILAUDID) 2 mg tablet Take 1-2 Tabs by mouth every four (4) hours as needed for Pain. Max Daily Amount: 24 mg. 60 Tab 0    aspirin (ASPIRIN) 325 mg tablet Take 1 Tab by mouth two (2) times a day. Take for 3 weeks for DVT prophylaxis. 42 Tab 0    naloxone (NARCAN) 2 mg/actuation spry Use 1 spray intranasally into 1 nostril. Use a new Narcan nasal spray for subsequent doses and administer into alternating nostrils. May repeat every 2 to 3 minutes as needed. 1 Package 0    acetaminophen (TYLENOL EXTRA STRENGTH) 500 mg tablet Take 1,000 mg by mouth every six (6) hours as needed for Pain.       cholecalciferol (VITAMIN D3) 1,000 unit tablet Take 1,000 Units by mouth five (5) days a week.  polyethylene glycol (MIRALAX) 17 gram packet Take 17 g by mouth daily.  sucralfate (CARAFATE) 100 mg/mL suspension Take 5 mL by mouth four (4) times daily. 414 mL 2    lisinopril (PRINIVIL, ZESTRIL) 10 mg tablet TAKE 1 TABLET DAILY 90 Tab 1    traMADol (ULTRAM-ER) 100 mg Tb24 Take 1 Tab by mouth daily. Max Daily Amount: 100 mg. Indications: Chronic Pain 90 Tab 1    cyclobenzaprine (FLEXERIL) 10 mg tablet Take 1 Tab by mouth three (3) times daily as needed. Indications: RA 90 Tab 1    calcium carbonate (OS-BARBARA) 500 mg calcium (1,250 mg) tablet Take 1,500 mg by mouth daily.        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    H/O calcium pyrophosphate deposition disease (CPPD) Z87.39    IBS (irritable bowel syndrome) K58.9    RA (rheumatoid arthritis) ([address] M06.9    Sicca syndrome (HCC) M35.00    Osteoarthritis of right hip M16.11    Pain management contract agreement Z02.89    ACP (advance care planning) Z71.89    Chronic left shoulder pain M25.512, G89.29    Osteopenia M85.80    Osteopenia of multiple sites M85.89    Osteoarthritis of left hip M16.12     Past Medical History:   Diagnosis Date    Abdominal abscess ([address] 2009    Arthritis     Rheumatoid    Autoimmune disease ([address]     Medically induced Lupus    Back pain     Chronic pain     lower back    Colitis     Diverticulitis     Failed back syndrome     GERD (gastroesophageal reflux disease)     Hypertension     when on cyclosporins    Ill-defined condition     large torus roof of mouth    Irritable bowel syndrome     Osteoporosis     Pneumonia     RA (rheumatoid arthritis) ([address]     Seizures ([address] 6-1-2008    one episode- after high dose of epinepherine     Social History     Social History    Marital status:      Spouse name: N/A    Number of children: N/A    Years of education: N/A     Social History Main Topics    Smoking status: Never Smoker    Smokeless tobacco: Never Used    Alcohol use No    Drug use: No    Sexual activity: No     Other Topics Concern    None     Social History Narrative     Family History   Problem Relation Age of Onset    Other Other      Orthopedic (Sister)    Arthritis-osteo Sister     Cancer Neg Hx     Diabetes Neg Hx     Heart Disease Neg Hx     Heart Attack Neg Hx     Hypertension Neg Hx     Stroke Neg Hx     Malignant Hyperthermia Neg Hx     Pseudocholinesterase Deficiency Neg Hx     Delayed Awakening Neg Hx     Post-op Nausea/Vomiting Neg Hx     Emergence Delirium Neg Hx     Post-op Cognitive Dysfunction Neg Hx         Review of Systems   Constitutional: Negative for chills and fever. Cardiovascular: Negative for chest pain and palpitations. Gastrointestinal: Negative for constipation, diarrhea, nausea and vomiting. Vitals:    11/02/17 1151   BP: 124/74   Pulse: 76   Resp: 16   Temp: 97.3 °F (36.3 °C)   SpO2: 99%   Weight: 169 lb (76.7 kg)   Height: 5' 3\" (1.6 m)   PainSc:   4       Physical Exam   Constitutional: She is oriented to person, place, and time and well-developed, well-nourished, and in no distress. Cardiovascular: Normal rate, regular rhythm and normal heart sounds. Pulmonary/Chest: Effort normal and breath sounds normal.   Abdominal: Soft. Bowel sounds are normal. There is no tenderness. There is no guarding. Neurological: She is alert and oriented to person, place, and time. Skin: Skin is warm and dry. Nursing note and vitals reviewed. Assessment/Plan      ICD-10-CM ICD-9-CM    1. Other iron deficiency anemia D50.8 280.8 ferrous gluconate 324 mg (37.5 mg iron) tablet   2. Diarrhea, unspecified type R19.7 787.91 Will switch iron formulation.      3. Rheumatoid arthritis involving left hip, unspecified rheumatoid factor presence ([facility]) M06.9 714.0 Stable      I have discussed the diagnosis with the patient and the intended plan of care as seen in the above orders. The patient has received an after-visit summary and questions were answered concerning future plans. I have discussed medication, side effects, and warnings with the patient in detail. The patient verbalized understanding and is in agreement with the plan of care. The patient will contact the office with any additional concerns.       Follow-up Disposition:  Return if symptoms worsen or fail to improve.  lab results and schedule of future lab studies reviewed with patient    Clayton Ellsworth MD

## 2017-11-02 NOTE — PATIENT INSTRUCTIONS
Iron Deficiency Anemia: Care Instructions  Your Care Instructions    Anemia means that you do not have enough red blood cells. Red blood cells carry oxygen around your body. When you have anemia, it can make you pale, weak, and tired. Many things can cause anemia. The most common cause is loss of blood. This can happen if you have heavy menstrual periods. It can also happen if you have bleeding in your stomach or bowel. You can also get anemia if you don't have enough iron in your diet or if it's hard for your body to absorb iron. In some cases, pregnancy causes anemia. That's because a pregnant woman needs more iron. Your doctor may do more tests to find the cause of your anemia. If a disease or other health problem is causing it, your doctor will treat that problem. It's important to follow up with your doctor to make sure that your iron level returns to normal.  Follow-up care is a key part of your treatment and safety. Be sure to make and go to all appointments, and call your doctor if you are having problems. It's also a good idea to know your test results and keep a list of the medicines you take. How can you care for yourself at home? · If your doctor recommended iron pills, take them as directed. ¨ Try to take the pills on an empty stomach. You can do this about 1 hour before or 2 hours after meals. But you may need to take iron with food to avoid an upset stomach. ¨ Do not take antacids or drink milk or anything with caffeine within 2 hours of when you take your iron. They can keep your body from absorbing the iron well. ¨ Vitamin C helps your body absorb iron. You may want to take iron pills with a glass of orange juice or some other food high in vitamin C.  ¨ Iron pills may cause stomach problems. These include heartburn, nausea, diarrhea, constipation, and cramps. It can help to drink plenty of fluids and include fruits, vegetables, and fiber in your diet.   ¨ It's normal for iron pills to make your stool a greenish or grayish black. But internal bleeding can also cause dark stool. So it's important to tell your doctor about any color changes. ¨ Call your doctor if you think you are having a problem with your iron pills. Even after you start to feel better, it will take several months for your body to build up its supply of iron. ¨ If you miss a pill, don't take a double dose. ¨ Keep iron pills out of the reach of small children. Too much iron can be very dangerous. · Eat foods with a lot of iron. These include red meat, shellfish, poultry, and eggs. They also include beans, raisins, whole-grain bread, and leafy green vegetables. · Steam your vegetables. This is the best way to prepare them if you want to get as much iron as possible. · Be safe with medicines. Do not take nonsteroidal anti-inflammatory pain relievers unless your doctor tells you to. These include aspirin, naproxen (Aleve), and ibuprofen (Advil, Motrin). · Liquid iron can stain your teeth. But you can mix it with water or juice and drink it with a straw. Then it won't get on your teeth. When should you call for help? Call 911 anytime you think you may need emergency care. For example, call if:  ? · You passed out (lost consciousness). ?Call your doctor now or seek immediate medical care if:  ? · You are short of breath. ? · You are dizzy or light-headed, or you feel like you may faint. ? · You have new or worse bleeding. ? Watch closely for changes in your health, and be sure to contact your doctor if:  ? · You feel weaker or more tired than usual.   ? · You do not get better as expected. Where can you learn more? Go to http://camilo-robert.info/. Enter Q449 in the search box to learn more about \"Iron Deficiency Anemia: Care Instructions. \"  Current as of: October 13, 2016  Content Version: 11.4  © 5759-7681 [employer].  Care instructions adapted under license by [employer] (which disclaims liability or warranty for this information). If you have questions about a medical condition or this instruction, always ask your healthcare professional. [address] any warranty or liability for your use of this information.

## 2017-11-02 NOTE — MR AVS SNAPSHOT
Visit Information Date & Time Provider Department Dept. Phone Encounter #  
 11/2/2017 11:45 AM [facility] [id number] Follow-up Instructions Return if symptoms worsen or fail to improve. Your Appointments 11/13/2017  9:00 AM  
Follow Up with Gilmer Arora MD  
[facility]) Appt Note: f/u on hip surgery/blood levels [facility] [address] [place] [address]  
[phone]  
  
   
 [address] [place] [address] Upcoming Health Maintenance Date Due  
 GLAUCOMA SCREENING Q2Y 5/6/2016 MEDICARE YEARLY EXAM 6/29/2018 Pneumococcal 65+ Low/Medium Risk (2 of 2 - PPSV23) 7/10/2018 BREAST CANCER SCRN MAMMOGRAM 7/11/2019 COLONOSCOPY 1/13/2024 DTaP/Tdap/Td series (3 - Td) 9/14/2027 Allergies as of 11/2/2017  Review Complete On: 11/2/2017 By: Kevin Robles LPN Severity Noted Reaction Type Reactions Celebrex [Celecoxib] High 11/06/2014   Systemic Swelling Shellfish Derived High 05/30/2017   Systemic Swelling \"makes me feel puffy\" Sulfa (Sulfonamide Antibiotics) High 05/18/2015    Hives, Swelling Lips swelling Humira [Adalimumab]  06/27/2013    Other (comments) Lupus Optiray 320 [Ioversol]  02/16/2013   Not Verified Hives, Itching Pt states when she gets iv contrast she itches a little from hives? Penicillins  02/19/2013    Hives Current Immunizations  Never Reviewed Name Date Influenza Vaccine 11/15/2007 12:00 AM  
 Pneumococcal Conjugate (PCV-13) 7/10/2017 Pneumococcal Vaccine (Unspecified Type) 10/31/2010 Tdap 9/14/2017, 11/15/2007 12:00 AM  
  
 Not reviewed this visit You Were Diagnosed With   
  
 Codes Comments Other iron deficiency anemia    -  Primary ICD-10-CM: D50.8 ICD-9-CM: 280.8 Vitals BP Pulse Temp Resp Height(growth percentile) Weight(growth percentile) 124/74 76 97.3 °F (36.3 °C) 16 5' 3\" (1.6 m) 169 lb (76.7 kg) SpO2 BMI OB Status Smoking Status 99% 29.94 kg/m2 Hysterectomy Never Smoker Vitals History BMI and BSA Data Body Mass Index Body Surface Area  
 29.94 kg/m 2 1.85 m 2 Preferred Pharmacy Pharmacy Name Phone [facility] [phone] [facility] [phone] Your Updated Medication List  
  
   
This list is accurate as of: 11/2/17 11:59 AM.  Always use your most recent med list.  
  
  
  
  
 aspirin 325 mg tablet Commonly known as:  ASPIRIN Take 1 Tab by mouth two (2) times a day. Take for 3 weeks for DVT prophylaxis. calcium carbonate 500 mg calcium (1,250 mg) tablet Commonly known as:  OS-BARBARA Take 1,500 mg by mouth daily. cephALEXin 500 mg capsule Commonly known as:  KEFLEX  
take 4 capsules by mouth ONE HOUR PRIOR TO PROCEDURE  
  
 cyclobenzaprine 10 mg tablet Commonly known as:  FLEXERIL Take 1 Tab by mouth three (3) times daily as needed. Indications: RA  
  
 diazePAM 5 mg tablet Commonly known as:  VALIUM  
take 1 tablet (5MG) by mouth 1/2 HOUR PRIOR TO MRI; MUST HAVE   
  
 ferrous gluconate 324 mg (37.5 mg iron) tablet Take 1 Tab by mouth three (3) times daily (with meals). HYDROcodone-acetaminophen 5-325 mg per tablet Commonly known as:  NORCO  
TAKE 2 TABLETS EVERY 6 HOURS AS NEEDED FOR PAIN. .. MAX 8 PER DAY HYDROmorphone 2 mg tablet Commonly known as:  DILAUDID Take 1-2 Tabs by mouth every four (4) hours as needed for Pain. Max Daily Amount: 24 mg.  
  
 lisinopril 10 mg tablet Commonly known as:  PRINIVIL, ZESTRIL  
TAKE 1 TABLET DAILY  
  
 methylPREDNISolone 4 mg tablet Commonly known as:  MEDROL DOSEPACK  
take as directed on pack MIRALAX 17 gram packet Generic drug:  polyethylene glycol Take 17 g by mouth daily. naloxone 2 mg/actuation Spry Commonly known as:  [employer] Use 1 spray intranasally into 1 nostril. Use a new Narcan nasal spray for subsequent doses and administer into alternating nostrils. May repeat every 2 to 3 minutes as needed. risedronate 150 mg tablet Commonly known as:  ACTONEL  
take 1 tablet by mouth every month  
  
 sucralfate 100 mg/mL suspension Commonly known as:  Lary Luster Take 5 mL by mouth four (4) times daily. traMADol 100 mg Tb24 Commonly known as:  ULTRAM-ER Take 1 Tab by mouth daily. Max Daily Amount: 100 mg. Indications: Chronic Pain TYLENOL EXTRA STRENGTH 500 mg tablet Generic drug:  acetaminophen Take 1,000 mg by mouth every six (6) hours as needed for Pain. VITAMIN D3 1,000 unit tablet Generic drug:  cholecalciferol Take 1,000 Units by mouth five (5) days a week. Prescriptions Sent to Pharmacy Refills  
 ferrous gluconate 324 mg (37.5 mg iron) tablet 2 Sig: Take 1 Tab by mouth three (3) times daily (with meals). Class: Normal  
 [facility]acy: [id number] [address] #: [phone] Route: Oral  
  
Follow-up Instructions Return if symptoms worsen or fail to improve. Patient Instructions Iron Deficiency Anemia: Care Instructions Your Care Instructions Anemia means that you do not have enough red blood cells. Red blood cells carry oxygen around your body. When you have anemia, it can make you pale, weak, and tired. Many things can cause anemia. The most common cause is loss of blood. This can happen if you have heavy menstrual periods. It can also happen if you have bleeding in your stomach or bowel. You can also get anemia if you don't have enough iron in your diet or if it's hard for your body to absorb iron. In some cases, pregnancy causes anemia. That's because a pregnant woman needs more iron. Your doctor may do more tests to find the cause of your anemia. If a disease or other health problem is causing it, your doctor will treat that problem. It's important to follow up with your doctor to make sure that your iron level returns to normal. 
Follow-up care is a key part of your treatment and safety. Be sure to make and go to all appointments, and call your doctor if you are having problems. It's also a good idea to know your test results and keep a list of the medicines you take. How can you care for yourself at home? · If your doctor recommended iron pills, take them as directed. ¨ Try to take the pills on an empty stomach. You can do this about 1 hour before or 2 hours after meals. But you may need to take iron with food to avoid an upset stomach. ¨ Do not take antacids or drink milk or anything with caffeine within 2 hours of when you take your iron. They can keep your body from absorbing the iron well. ¨ Vitamin C helps your body absorb iron. You may want to take iron pills with a glass of orange juice or some other food high in vitamin C. 
¨ Iron pills may cause stomach problems. These include heartburn, nausea, diarrhea, constipation, and cramps. It can help to drink plenty of fluids and include fruits, vegetables, and fiber in your diet. ¨ It's normal for iron pills to make your stool a greenish or grayish black. But internal bleeding can also cause dark stool. So it's important to tell your doctor about any color changes. ¨ Call your doctor if you think you are having a problem with your iron pills. Even after you start to feel better, it will take several months for your body to build up its supply of iron. ¨ If you miss a pill, don't take a double dose. ¨ Keep iron pills out of the reach of small children. Too much iron can be very dangerous. · Eat foods with a lot of iron. These include red meat, shellfish, poultry, and eggs.  They also include beans, raisins, whole-grain bread, and leafy green vegetables. · Steam your vegetables. This is the best way to prepare them if you want to get as much iron as possible. · Be safe with medicines. Do not take nonsteroidal anti-inflammatory pain relievers unless your doctor tells you to. These include aspirin, naproxen (Aleve), and ibuprofen (Advil, Motrin). · Liquid iron can stain your teeth. But you can mix it with water or juice and drink it with a straw. Then it won't get on your teeth. When should you call for help? Call 911 anytime you think you may need emergency care. For example, call if: 
? · You passed out (lost consciousness). ?Call your doctor now or seek immediate medical care if: 
? · You are short of breath. ? · You are dizzy or light-headed, or you feel like you may faint. ? · You have new or worse bleeding. ? Watch closely for changes in your health, and be sure to contact your doctor if: 
? · You feel weaker or more tired than usual.  
? · You do not get better as expected. Where can you learn more? Go to http://camilo-robert.info/. Enter Q117 in the search box to learn more about \"Iron Deficiency Anemia: Care Instructions. \" Current as of: October 13, 2016 Content Version: 11.4 © 7101-1612 [employer]. Care instructions adapted under license by [employer] (which disclaims liability or warranty for this information). If you have questions about a medical condition or this instruction, always ask your healthcare professional. [place] [address] any warranty or liability for your use of this information. Introducing [facility]! Dear Ron Jimenez: Thank you for requesting a Touristlink account. Our records indicate that you already have an active Touristlink account. You can access your account anytime at [email or web address] Did you know that you can access your hospital and ER discharge instructions at any time in Roambi? You can also review all of your test results from your hospital stay or ER visit. Additional Information If you have questions, please visit the Frequently Asked Questions section of the Roambi website at [email or web address] Remember, Roambi is NOT to be used for urgent needs. For medical emergencies, dial 911. Now available from your iPhone and Android! Please provide this summary of care documentation to your next provider. Your primary care clinician is listed as [id number] [facility]. If you have any questions after today's visit, please call [phone].

## 2017-11-02 NOTE — PROGRESS NOTES
Chief Complaint   Patient presents with    Diarrhea     reports that the iron upset her stomach     1. Have you been to the ER, urgent care clinic since your last visit? Hospitalized since your last visit?no    2. Have you seen or consulted any other health care providers outside of the [address] [place] [address] since your last visit? Include any pap smears or colon screening.  No

## 2017-11-03 ENCOUNTER — PATIENT OUTREACH (OUTPATIENT)
Dept: FAMILY MEDICINE CLINIC | Age: 67
End: 2017-11-03

## 2017-11-03 NOTE — PROGRESS NOTES
S/P THE [facility] on 10/25/17 to 10/26/17 for left total hip replacement    No phone service available to complete second LESLEE call attempt on 10/30/17. Patient scheduled and completed post surgery follow-up for anemia with PCP on 10/31/17 and 11/2/17 respectively. Patient has home niharika services for wound care and PT/OT. Patient is receiving home health services from [facility] and informed provider that home care services started 2 days after she arrived home from the hospital. Will continue to monitor and contact patient for follow-up at later date. Patient has appointment scheduled to follow-up with PCP on 11/13/17 @ 0900.

## 2017-11-08 ENCOUNTER — PATIENT OUTREACH (OUTPATIENT)
Dept: FAMILY MEDICINE CLINIC | Age: 67
End: 2017-11-08

## 2017-11-08 NOTE — PROGRESS NOTES
S/P THE KIMMIE [facility] on 10/25/17 to 10/26/17 for left total hip replacement    Contacted patient for post hospital discharge follow up. No answer. Obtain recording that phone's voice mailbox is not set up. Unable to leave message. During conversation with patient's  on 6/13/17, he reported that [phone] was his number and [phone] was patient's number. This writer spoke to patient on 6/13/17 at this number and since then has not been able to reach her with multiple call attempts and a letter.   Will route call to provider

## 2017-11-13 ENCOUNTER — OFFICE VISIT (OUTPATIENT)
Dept: FAMILY MEDICINE CLINIC | Age: 67
End: 2017-11-13

## 2017-11-13 ENCOUNTER — HOSPITAL ENCOUNTER (OUTPATIENT)
Dept: LAB | Age: 67
Discharge: HOME | End: 2017-11-13
Payer: MEDICARE

## 2017-11-13 VITALS
HEART RATE: 88 BPM | HEIGHT: 63 IN | DIASTOLIC BLOOD PRESSURE: 72 MMHG | RESPIRATION RATE: 15 BRPM | SYSTOLIC BLOOD PRESSURE: 120 MMHG | OXYGEN SATURATION: 99 %

## 2017-11-13 DIAGNOSIS — D64.89 ANEMIA DUE TO OTHER CAUSE, NOT CLASSIFIED: Primary | ICD-10-CM

## 2017-11-13 DIAGNOSIS — M85.80 OSTEOPENIA, UNSPECIFIED LOCATION: ICD-10-CM

## 2017-11-13 DIAGNOSIS — T75.3XXS MOTION SICKNESS, SEQUELA: ICD-10-CM

## 2017-11-13 DIAGNOSIS — D64.89 ANEMIA DUE TO OTHER CAUSE, NOT CLASSIFIED: ICD-10-CM

## 2017-11-13 LAB
BASOPHILS # BLD: 0 K/UL (ref 0–0.06)
BASOPHILS NFR BLD: 0 % (ref 0–2)
DIFFERENTIAL METHOD BLD: ABNORMAL
EOSINOPHIL # BLD: 0.3 K/UL (ref 0–0.4)
EOSINOPHIL NFR BLD: 5 % (ref 0–5)
ERYTHROCYTE [DISTWIDTH] IN BLOOD BY AUTOMATED COUNT: 15.7 % (ref 11.6–14.5)
HCT VFR BLD AUTO: 34.7 % (ref 35–45)
HGB BLD-MCNC: 10.4 G/DL (ref 12–16)
LYMPHOCYTES # BLD: 1.9 K/UL (ref 0.9–3.6)
LYMPHOCYTES NFR BLD: 34 % (ref 21–52)
MCH RBC QN AUTO: 30.1 PG (ref 24–34)
MCHC RBC AUTO-ENTMCNC: 30 G/DL (ref 31–37)
MCV RBC AUTO: 100.6 FL (ref 74–97)
MONOCYTES # BLD: 0.4 K/UL (ref 0.05–1.2)
MONOCYTES NFR BLD: 7 % (ref 3–10)
NEUTS SEG # BLD: 3 K/UL (ref 1.8–8)
NEUTS SEG NFR BLD: 54 % (ref 40–73)
PLATELET # BLD AUTO: 477 K/UL (ref 135–420)
PMV BLD AUTO: 9.4 FL (ref 9.2–11.8)
RBC # BLD AUTO: 3.45 M/UL (ref 4.2–5.3)
WBC # BLD AUTO: 5.5 K/UL (ref 4.6–13.2)

## 2017-11-13 PROCEDURE — 85025 COMPLETE CBC W/AUTO DIFF WBC: CPT | Performed by: FAMILY MEDICINE

## 2017-11-13 RX ORDER — FERROUS FUMARATE 324(106)MG
TABLET ORAL
Qty: 100 TAB | Refills: 1 | Status: SHIPPED | OUTPATIENT
Start: 2017-11-13 | End: 2018-11-20

## 2017-11-13 RX ORDER — ALENDRONATE SODIUM 70 MG/1
TABLET ORAL
Qty: 12 TAB | Refills: 0 | Status: SHIPPED | OUTPATIENT
Start: 2017-11-13 | End: 2018-07-24 | Stop reason: ALTCHOICE

## 2017-11-13 RX ORDER — MECLIZINE HYDROCHLORIDE 25 MG/1
25 TABLET ORAL
Qty: 60 TAB | Refills: 1 | Status: SHIPPED | OUTPATIENT
Start: 2017-11-13 | End: 2017-11-23

## 2017-11-13 NOTE — MR AVS SNAPSHOT
Visit Information Date & Time Provider Department Dept. Phone Encounter #  
 11/13/2017 11:30 AM Terrence Wallace, [address] [id number] Upcoming Health Maintenance Date Due  
 GLAUCOMA SCREENING Q2Y 5/6/2016 MEDICARE YEARLY EXAM 6/29/2018 Pneumococcal 65+ Low/Medium Risk (2 of 2 - PPSV23) 7/10/2018 BREAST CANCER SCRN MAMMOGRAM 7/11/2019 COLONOSCOPY 1/13/2024 DTaP/Tdap/Td series (3 - Td) 9/14/2027 Allergies as of 11/13/2017  Review Complete On: 11/13/2017 By: Maura Merino LPN Severity Noted Reaction Type Reactions Celebrex [Celecoxib] High 11/06/2014   Systemic Swelling Shellfish Derived High 05/30/2017   Systemic Swelling \"makes me feel puffy\" Sulfa (Sulfonamide Antibiotics) High 05/18/2015    Hives, Swelling Lips swelling Humira [Adalimumab]  06/27/2013    Other (comments) Lupus Optiray 320 [Ioversol]  02/16/2013   Not Verified Hives, Itching Pt states when she gets iv contrast she itches a little from hives? Penicillins  02/19/2013    Hives Current Immunizations  Never Reviewed Name Date Influenza Vaccine 11/15/2007 12:00 AM  
 Pneumococcal Conjugate (PCV-13) 7/10/2017 Pneumococcal Vaccine (Unspecified Type) 10/31/2010 Tdap 9/14/2017, 11/15/2007 12:00 AM  
  
 Not reviewed this visit You Were Diagnosed With   
  
 Codes Comments Anemia due to other cause, not classified    -  Primary ICD-10-CM: Q38.00 ICD-9-CM: 226. 8 Vitals BP Pulse Resp Height(growth percentile) SpO2 OB Status 120/72 88 15 5' 3\" (1.6 m) 99% Hysterectomy Smoking Status Never Smoker Vitals History Preferred Pharmacy Pharmacy Name Phone [facility], [address] [facility] [phone] Your Updated Medication List  
  
   
This list is accurate as of: 11/13/17 12:03 PM.  Always use your most recent med list.  
 aspirin 325 mg tablet Commonly known as:  ASPIRIN Take 1 Tab by mouth two (2) times a day. Take for 3 weeks for DVT prophylaxis. calcium carbonate 500 mg calcium (1,250 mg) tablet Commonly known as:  OS-BARBARA Take 1,500 mg by mouth daily. cephALEXin 500 mg capsule Commonly known as:  KEFLEX  
take 4 capsules by mouth ONE HOUR PRIOR TO PROCEDURE  
  
 cyclobenzaprine 10 mg tablet Commonly known as:  FLEXERIL Take 1 Tab by mouth three (3) times daily as needed. Indications: RA  
  
 diazePAM 5 mg tablet Commonly known as:  VALIUM  
take 1 tablet (5MG) by mouth 1/2 HOUR PRIOR TO MRI; MUST HAVE   
  
 ferrous fumarate 324 mg (106 mg iron) Tab One a day HYDROcodone-acetaminophen 5-325 mg per tablet Commonly known as:  NORCO  
TAKE 2 TABLETS EVERY 6 HOURS AS NEEDED FOR PAIN. .. MAX 8 PER DAY HYDROmorphone 2 mg tablet Commonly known as:  DILAUDID Take 1-2 Tabs by mouth every four (4) hours as needed for Pain. Max Daily Amount: 24 mg.  
  
 lisinopril 10 mg tablet Commonly known as:  PRINIVIL, ZESTRIL  
TAKE 1 TABLET DAILY  
  
 methylPREDNISolone 4 mg tablet Commonly known as:  MEDROL DOSEPACK  
take as directed on pack MIRALAX 17 gram packet Generic drug:  polyethylene glycol Take 17 g by mouth daily. naloxone 2 mg/actuation Spry Commonly known as:  [employer] Use 1 spray intranasally into 1 nostril. Use a new Narcan nasal spray for subsequent doses and administer into alternating nostrils. May repeat every 2 to 3 minutes as needed. risedronate 150 mg tablet Commonly known as:  ACTONEL  
take 1 tablet by mouth every month  
  
 sucralfate 100 mg/mL suspension Commonly known as:  Ressie Flavin Take 5 mL by mouth four (4) times daily. traMADol 100 mg Tb24 Commonly known as:  ULTRAM-ER Take 1 Tab by mouth daily. Max Daily Amount: 100 mg. Indications: Chronic Pain TYLENOL EXTRA STRENGTH 500 mg tablet Generic drug:  acetaminophen Take 1,000 mg by mouth every six (6) hours as needed for Pain. VITAMIN D3 1,000 unit tablet Generic drug:  cholecalciferol Take 1,000 Units by mouth five (5) days a week. Prescriptions Printed Refills  
 ferrous fumarate 324 mg (106 mg iron) tab 1 Sig: One a day Class: Print To-Do List   
 11/13/2017 Lab:  CBC WITH AUTOMATED DIFF Patient Instructions Iron Deficiency Anemia: Care Instructions Your Care Instructions Anemia means that you do not have enough red blood cells. Red blood cells carry oxygen around your body. When you have anemia, it can make you pale, weak, and tired. Many things can cause anemia. The most common cause is loss of blood. This can happen if you have heavy menstrual periods. It can also happen if you have bleeding in your stomach or bowel. You can also get anemia if you don't have enough iron in your diet or if it's hard for your body to absorb iron. In some cases, pregnancy causes anemia. That's because a pregnant woman needs more iron. Your doctor may do more tests to find the cause of your anemia. If a disease or other health problem is causing it, your doctor will treat that problem. It's important to follow up with your doctor to make sure that your iron level returns to normal. 
Follow-up care is a key part of your treatment and safety. Be sure to make and go to all appointments, and call your doctor if you are having problems. It's also a good idea to know your test results and keep a list of the medicines you take. How can you care for yourself at home? · If your doctor recommended iron pills, take them as directed. ¨ Try to take the pills on an empty stomach. You can do this about 1 hour before or 2 hours after meals. But you may need to take iron with food to avoid an upset stomach.  
¨ Do not take antacids or drink milk or anything with caffeine within 2 hours of when you take your iron. They can keep your body from absorbing the iron well. ¨ Vitamin C helps your body absorb iron. You may want to take iron pills with a glass of orange juice or some other food high in vitamin C. 
¨ Iron pills may cause stomach problems. These include heartburn, nausea, diarrhea, constipation, and cramps. It can help to drink plenty of fluids and include fruits, vegetables, and fiber in your diet. ¨ It's normal for iron pills to make your stool a greenish or grayish black. But internal bleeding can also cause dark stool. So it's important to tell your doctor about any color changes. ¨ Call your doctor if you think you are having a problem with your iron pills. Even after you start to feel better, it will take several months for your body to build up its supply of iron. ¨ If you miss a pill, don't take a double dose. ¨ Keep iron pills out of the reach of small children. Too much iron can be very dangerous. · Eat foods with a lot of iron. These include red meat, shellfish, poultry, and eggs. They also include beans, raisins, whole-grain bread, and leafy green vegetables. · Steam your vegetables. This is the best way to prepare them if you want to get as much iron as possible. · Be safe with medicines. Do not take nonsteroidal anti-inflammatory pain relievers unless your doctor tells you to. These include aspirin, naproxen (Aleve), and ibuprofen (Advil, Motrin). · Liquid iron can stain your teeth. But you can mix it with water or juice and drink it with a straw. Then it won't get on your teeth. When should you call for help? Call 911 anytime you think you may need emergency care. For example, call if: 
? · You passed out (lost consciousness). ?Call your doctor now or seek immediate medical care if: 
? · You are short of breath. ? · You are dizzy or light-headed, or you feel like you may faint. ? · You have new or worse bleeding. ?Watch closely for changes in your health, and be sure to contact your doctor if: 
? · You feel weaker or more tired than usual.  
? · You do not get better as expected. Where can you learn more? Go to http://camilo-robert.info/. Enter A681 in the search box to learn more about \"Iron Deficiency Anemia: Care Instructions. \" Current as of: October 13, 2016 Content Version: 11.4 © 4294-7883 [employer]. Care instructions adapted under license by [employer] (which disclaims liability or warranty for this information). If you have questions about a medical condition or this instruction, always ask your healthcare professional. [address] any warranty or liability for your use of this information. Introducing [facility]! Dear Marcelle Golden: Thank you for requesting a Dat[employer] account. Our records indicate that you already have an active Dat[employer] account. You can access your account anytime at [email or web address]. [email or web address] Did you know that you can access your hospital and ER discharge instructions at any time in Dat[employer]? You can also review all of your test results from your hospital stay or ER visit. Additional Information If you have questions, please visit the Frequently Asked Questions section of the Dat[employer]ne website at [email or web address] Remember, Dat[employer]ne is NOT to be used for urgent needs. For medical emergencies, dial 911. Now available from your iPhone and Android! Please provide this summary of care documentation to your next provider. Your primary care clinician is listed as [address]. If you have any questions after today's visit, please call [phone].

## 2017-11-13 NOTE — PATIENT INSTRUCTIONS
Iron Deficiency Anemia: Care Instructions  Your Care Instructions    Anemia means that you do not have enough red blood cells. Red blood cells carry oxygen around your body. When you have anemia, it can make you pale, weak, and tired. Many things can cause anemia. The most common cause is loss of blood. This can happen if you have heavy menstrual periods. It can also happen if you have bleeding in your stomach or bowel. You can also get anemia if you don't have enough iron in your diet or if it's hard for your body to absorb iron. In some cases, pregnancy causes anemia. That's because a pregnant woman needs more iron. Your doctor may do more tests to find the cause of your anemia. If a disease or other health problem is causing it, your doctor will treat that problem. It's important to follow up with your doctor to make sure that your iron level returns to normal.  Follow-up care is a key part of your treatment and safety. Be sure to make and go to all appointments, and call your doctor if you are having problems. It's also a good idea to know your test results and keep a list of the medicines you take. How can you care for yourself at home? · If your doctor recommended iron pills, take them as directed. ¨ Try to take the pills on an empty stomach. You can do this about 1 hour before or 2 hours after meals. But you may need to take iron with food to avoid an upset stomach. ¨ Do not take antacids or drink milk or anything with caffeine within 2 hours of when you take your iron. They can keep your body from absorbing the iron well. ¨ Vitamin C helps your body absorb iron. You may want to take iron pills with a glass of orange juice or some other food high in vitamin C.  ¨ Iron pills may cause stomach problems. These include heartburn, nausea, diarrhea, constipation, and cramps. It can help to drink plenty of fluids and include fruits, vegetables, and fiber in your diet.   ¨ It's normal for iron pills to make your stool a greenish or grayish black. But internal bleeding can also cause dark stool. So it's important to tell your doctor about any color changes. ¨ Call your doctor if you think you are having a problem with your iron pills. Even after you start to feel better, it will take several months for your body to build up its supply of iron. ¨ If you miss a pill, don't take a double dose. ¨ Keep iron pills out of the reach of small children. Too much iron can be very dangerous. · Eat foods with a lot of iron. These include red meat, shellfish, poultry, and eggs. They also include beans, raisins, whole-grain bread, and leafy green vegetables. · Steam your vegetables. This is the best way to prepare them if you want to get as much iron as possible. · Be safe with medicines. Do not take nonsteroidal anti-inflammatory pain relievers unless your doctor tells you to. These include aspirin, naproxen (Aleve), and ibuprofen (Advil, Motrin). · Liquid iron can stain your teeth. But you can mix it with water or juice and drink it with a straw. Then it won't get on your teeth. When should you call for help? Call 911 anytime you think you may need emergency care. For example, call if:  ? · You passed out (lost consciousness). ?Call your doctor now or seek immediate medical care if:  ? · You are short of breath. ? · You are dizzy or light-headed, or you feel like you may faint. ? · You have new or worse bleeding. ? Watch closely for changes in your health, and be sure to contact your doctor if:  ? · You feel weaker or more tired than usual.   ? · You do not get better as expected. Where can you learn more? Go to http://camilo-robert.info/. Enter E692 in the search box to learn more about \"Iron Deficiency Anemia: Care Instructions. \"  Current as of: October 13, 2016  Content Version: 11.4  © 5109-7432 [employer]wise, [employer].  Care instructions adapted under license by [employer] (which disclaims liability or warranty for this information). If you have questions about a medical condition or this instruction, always ask your healthcare professional. [address] any warranty or liability for your use of this information.

## 2017-11-13 NOTE — PROGRESS NOTES
Carlos Fragoso is a 69 y.o. female  presents for follow up. She needs refills. She also states that iron pills upsets her stomach. Allergies   Allergen Reactions    Celebrex [Celecoxib] Swelling    Shellfish Derived Swelling     \"makes me feel puffy\"    Sulfa (Sulfonamide Antibiotics) Hives and Swelling     Lips swelling    Humira [Adalimumab] Other (comments)     Lupus    Optiray 320 [Ioversol] Hives and Itching     Pt states when she gets iv contrast she itches a little from hives?  Penicillins Hives     Outpatient Prescriptions Marked as Taking for the 11/13/17 encounter (Office Visit) with Felicia Webber MD   Medication Sig Dispense Refill    ferrous fumarate 324 mg (106 mg iron) tab One a day 100 Tab 1    meclizine (ANTIVERT) 25 mg tablet Take 1 Tab by mouth three (3) times daily as needed for up to 10 days. Indications: MOTION SICKNESS 60 Tab 1    cephALEXin (KEFLEX) 500 mg capsule take 4 capsules by mouth ONE HOUR PRIOR TO PROCEDURE  1    diazePAM (VALIUM) 5 mg tablet take 1 tablet (5MG) by mouth 1/2 HOUR PRIOR TO MRI; MUST HAVE   0    HYDROcodone-acetaminophen (NORCO) 5-325 mg per tablet TAKE 2 TABLETS EVERY 6 HOURS AS NEEDED FOR PAIN. .. MAX 8 PER DAY  0    methylPREDNISolone (MEDROL DOSEPACK) 4 mg tablet take as directed on pack  0    risedronate (ACTONEL) 150 mg tablet take 1 tablet by mouth every month  0    aspirin (ASPIRIN) 325 mg tablet Take 1 Tab by mouth two (2) times a day. Take for 3 weeks for DVT prophylaxis. 42 Tab 0    acetaminophen (TYLENOL EXTRA STRENGTH) 500 mg tablet Take 1,000 mg by mouth every six (6) hours as needed for Pain.  cholecalciferol (VITAMIN D3) 1,000 unit tablet Take 1,000 Units by mouth five (5) days a week.  polyethylene glycol (MIRALAX) 17 gram packet Take 17 g by mouth daily.  sucralfate (CARAFATE) 100 mg/mL suspension Take 5 mL by mouth four (4) times daily.  414 mL 2    lisinopril (PRINIVIL, ZESTRIL) 10 mg tablet TAKE 1 TABLET DAILY 90 Tab 1    traMADol (ULTRAM-ER) 100 mg Tb24 Take 1 Tab by mouth daily. Max Daily Amount: 100 mg. Indications: Chronic Pain 90 Tab 1    cyclobenzaprine (FLEXERIL) 10 mg tablet Take 1 Tab by mouth three (3) times daily as needed. Indications: RA 90 Tab 1    calcium carbonate (OS-BARBARA) 500 mg calcium (1,250 mg) tablet Take 1,500 mg by mouth daily.        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    H/O calcium pyrophosphate deposition disease (CPPD) Z87.39    IBS (irritable bowel syndrome) K58.9    RA (rheumatoid arthritis) ([facility] M06.9    Sicca syndrome (HCC) M35.00    Osteoarthritis of right hip M16.11    Pain management contract agreement Z02.89    ACP (advance care planning) Z71.89    Chronic left shoulder pain M25.512, G89.29    Osteopenia M85.80    Osteopenia of multiple sites M85.89    Osteoarthritis of left hip M16.12     Past Medical History:   Diagnosis Date    Abdominal abscess ([address] 2009    Arthritis     Rheumatoid    Autoimmune disease ([address]     Medically induced Lupus    Back pain     Chronic pain     lower back    Colitis     Diverticulitis     Failed back syndrome     GERD (gastroesophageal reflux disease)     Hypertension     when on cyclosporins    Ill-defined condition     large torus roof of mouth    Irritable bowel syndrome     Osteoporosis     Pneumonia     RA (rheumatoid arthritis) ([address]     Seizures ([facility] [address] 6-1-2008    one episode- after high dose of epinepherine     Social History     Social History    Marital status:      Spouse name: N/A    Number of children: N/A    Years of education: N/A     Social History Main Topics    Smoking status: Never Smoker    Smokeless tobacco: Never Used    Alcohol use No    Drug use: No    Sexual activity: No     Other Topics Concern    None     Social History Narrative     Family History   Problem Relation Age of Onset    Other Other      Orthopedic (Sister)    Arthritis-osteo Sister     Cancer Neg Hx     Diabetes Neg Hx     Heart Disease Neg Hx     Heart Attack Neg Hx     Hypertension Neg Hx     Stroke Neg Hx     Malignant Hyperthermia Neg Hx     Pseudocholinesterase Deficiency Neg Hx     Delayed Awakening Neg Hx     Post-op Nausea/Vomiting Neg Hx     Emergence Delirium Neg Hx     Post-op Cognitive Dysfunction Neg Hx         Review of Systems   Constitutional: Negative for chills and fever. Cardiovascular: Negative for chest pain and palpitations. Gastrointestinal: Positive for nausea. Negative for abdominal pain, blood in stool, constipation, diarrhea, melena and vomiting. Psychiatric/Behavioral: Negative. Vitals:    11/13/17 1154   BP: 120/72   Pulse: 88   Resp: 15   SpO2: 99%   Height: 5' 3\" (1.6 m)   PainSc:   2       Physical Exam   Constitutional: She is oriented to person, place, and time and well-developed, well-nourished, and in no distress. Musculoskeletal: Normal range of motion. She exhibits no edema or tenderness. Neurological: She is alert and oriented to person, place, and time. Gait normal.   Skin: Skin is warm and dry. Psychiatric: Mood, memory, affect and judgment normal.   Nursing note and vitals reviewed. Assessment/Plan      ICD-10-CM ICD-9-CM    1. Anemia due to other cause, not classified D64.89 285.8 CBC WITH AUTOMATED DIFF      ferrous fumarate 324 mg (106 mg iron) tab   2. Motion sickness, sequela T75. 3XXS 909.4 meclizine (ANTIVERT) 25 mg tablet     I have discussed the diagnosis with the patient and the intended plan of care as seen in the above orders. The patient has received an after-visit summary and questions were answered concerning future plans. I have discussed medication, side effects, and warnings with the patient in detail.  The patient verbalized understanding and is in agreement with the plan of care. The patient will contact the office with any additional concerns.       Follow-up Disposition:  Return if symptoms worsen or fail to improve.  lab results and schedule of future lab studies reviewed with patient    Layla Francisco MD

## 2017-11-13 NOTE — PROGRESS NOTES
Pt is f/u for iron levels. She has been taking iron pills. She also needs script for meclizine     1. Have you been to the ER, urgent care clinic since your last visit? Hospitalized since your last visit? No    2. Have you seen or consulted any other health care providers outside of the [address] [place] [address] since your last visit? Include any pap smears or colon screening.  No

## 2017-11-14 ENCOUNTER — TELEPHONE (OUTPATIENT)
Dept: FAMILY MEDICINE CLINIC | Age: 67
End: 2017-11-14

## 2017-11-14 NOTE — TELEPHONE ENCOUNTER
Gregg Kyra Vegas called about her lab results she had done 11/13/17. She would like a return call to her home number at [phone].

## 2017-11-15 ENCOUNTER — PATIENT OUTREACH (OUTPATIENT)
Dept: FAMILY MEDICINE CLINIC | Age: 67
End: 2017-11-15

## 2017-11-15 NOTE — PROGRESS NOTES
Transitions of Care Coordination    Follow up for scheduled admission to [facility] 10/25-10/26/17 for a left THR. The patient was discharged to home with [facility]. The patient attended an appointment with Dr. Heaven Carlson on 11/13/17. Per visit note VSS. Ferrous fumarate was refilled and Meclizine added to medications for motion sickness. A CBC was done which shows improving H/H. Reached patient and identified self/role. Ms Shefali Zuniga stated \"I'm getting around better. My hip doesn't hurt as much. I'm taking Tramadol for pain now. \"    Patient stated she attended an appointment with her surgeon, Dr. Alverto Tao, on 11/13/17. Per patient [facility] did not show up after surgery. Patient contacted surgeon and was cleared to go to outpatient PT and has now completed therapy. Patient stated she is able to go up and down stairs and is using cane for safe ambulation when out in public. Taking medications as directed. Voiced no questions, concerns or unmet needs. Will follow. Goals Addressed             Most Recent       Post Hospitalization     Attends follow-up appointments as directed. On track (11/15/2017)             Attended appointment with Dr. Heaven Carlson 10/31, 11/2 and 11/13/17.  Understands red flags post discharge. On track (11/15/2017)             11/15/17-Voiced understanding.

## 2017-11-20 RX ORDER — MELOXICAM 15 MG/1
TABLET ORAL
Qty: 90 TAB | Refills: 1 | Status: SHIPPED | OUTPATIENT
Start: 2017-11-20 | End: 2018-05-22 | Stop reason: SDUPTHER

## 2017-11-21 ENCOUNTER — TELEPHONE (OUTPATIENT)
Dept: FAMILY MEDICINE CLINIC | Age: 67
End: 2017-11-21

## 2017-11-24 RX ORDER — ZOLPIDEM TARTRATE 10 MG/1
5 TABLET ORAL
Qty: 30 TAB | Refills: 1 | Status: SHIPPED | OUTPATIENT
Start: 2017-11-24 | End: 2018-09-25 | Stop reason: SDUPTHER

## 2017-11-29 ENCOUNTER — PATIENT OUTREACH (OUTPATIENT)
Dept: FAMILY MEDICINE CLINIC | Age: 67
End: 2017-11-29

## 2017-11-29 NOTE — PROGRESS NOTES
Transitions of Care Coordination    Follow up for scheduled admission to [facility] 10/25-10/26/17 for a left THR. The patient was discharged to home with [facility]. However, [facility] did not contact the patient or provide service in the home. The patient attended follow up appointments as directed and also attended out patient physical therapy which has completed. Goals met. Episode resolved:  No hospitalization or ED visit 30 days from discharge on 10/26/17. Goals Addressed             Most Recent       Post Hospitalization     Attends follow-up appointments as directed. On track (11/29/2017)             Attended appointment with Dr. Jenelle Chandler 10/31, 11/2 and 11/13/17.  Understands red flags post discharge. On track (11/29/2017)             11/15/17-Voiced understanding.

## 2017-12-22 ENCOUNTER — PATIENT OUTREACH (OUTPATIENT)
Dept: FAMILY MEDICINE CLINIC | Age: 67
End: 2017-12-22

## 2018-05-22 ENCOUNTER — OFFICE VISIT (OUTPATIENT)
Dept: FAMILY MEDICINE CLINIC | Age: 68
End: 2018-05-22

## 2018-05-22 VITALS
HEIGHT: 63 IN | OXYGEN SATURATION: 95 % | TEMPERATURE: 98.1 F | WEIGHT: 161 LBS | HEART RATE: 74 BPM | BODY MASS INDEX: 28.53 KG/M2 | DIASTOLIC BLOOD PRESSURE: 60 MMHG | SYSTOLIC BLOOD PRESSURE: 116 MMHG | RESPIRATION RATE: 14 BRPM

## 2018-05-22 DIAGNOSIS — I10 ESSENTIAL HYPERTENSION WITH GOAL BLOOD PRESSURE LESS THAN 130/85: ICD-10-CM

## 2018-05-22 DIAGNOSIS — M16.11 PRIMARY OSTEOARTHRITIS OF RIGHT HIP: Primary | Chronic | ICD-10-CM

## 2018-05-22 DIAGNOSIS — M25.551 PAIN OF RIGHT HIP JOINT: ICD-10-CM

## 2018-05-22 RX ORDER — TRAMADOL HYDROCHLORIDE 50 MG/1
50 TABLET ORAL
Qty: 30 TAB | Refills: 0 | Status: SHIPPED | OUTPATIENT
Start: 2018-05-22 | End: 2018-07-24 | Stop reason: ALTCHOICE

## 2018-05-22 RX ORDER — LISINOPRIL 10 MG/1
TABLET ORAL
Qty: 90 TAB | Refills: 1 | Status: SHIPPED | OUTPATIENT
Start: 2018-05-22 | End: 2018-11-02 | Stop reason: SDUPTHER

## 2018-05-22 RX ORDER — MELOXICAM 15 MG/1
TABLET ORAL
Qty: 90 TAB | Refills: 1 | Status: SHIPPED | OUTPATIENT
Start: 2018-05-22 | End: 2018-11-02 | Stop reason: SDUPTHER

## 2018-05-22 RX ORDER — TRAMADOL HYDROCHLORIDE 100 MG/1
100 TABLET, EXTENDED RELEASE ORAL DAILY
Qty: 90 TAB | Refills: 1 | Status: SHIPPED | OUTPATIENT
Start: 2018-05-22 | End: 2018-07-18 | Stop reason: DRUGHIGH

## 2018-05-22 NOTE — MR AVS SNAPSHOT
[facility] [address] [place] [address] 
[phone] Patient: Raymond Goldstein MRN: [id number] Visit Information Date & Time Provider Department Dept. Phone Encounter #  
 5/22/2018  2:00 PM lCyde Guevara MD [facility] [phone] [id number] Follow-up Instructions Return if symptoms worsen or fail to improve. Upcoming Health Maintenance Date Due  
 GLAUCOMA SCREENING Q2Y 5/6/2016 BREAST CANCER SCRN MAMMOGRAM 7/11/2018 MEDICARE YEARLY EXAM 6/29/2018 Pneumococcal 65+ Low/Medium Risk (2 of 2 - PPSV23) 7/10/2018 Influenza Age 5 to Adult 8/1/2018 COLONOSCOPY 1/13/2024 DTaP/Tdap/Td series (3 - Td) 9/14/2027 Allergies as of 5/22/2018  Review Complete On: 5/22/2018 By: Clyde Guevara MD  
  
 Severity Noted Reaction Type Reactions Celebrex [Celecoxib] High 11/06/2014   Systemic Swelling Shellfish Derived High 05/30/2017   Systemic Swelling \"makes me feel puffy\" Sulfa (Sulfonamide Antibiotics) High 05/18/2015    Hives, Swelling Lips swelling Humira [Adalimumab]  06/27/2013    Other (comments) Lupus Optiray 320 [Ioversol]  02/16/2013   Not Verified Hives, Itching Pt states when she gets iv contrast she itches a little from hives? Penicillins  02/19/2013    Hives Current Immunizations  Never Reviewed Name Date Influenza Vaccine 11/15/2007 12:00 AM  
 Pneumococcal Conjugate (PCV-13) 7/10/2017 Pneumococcal Vaccine (Unspecified Type) 10/31/2010 Tdap 9/14/2017, 11/15/2007 12:00 AM  
  
 Not reviewed this visit You Were Diagnosed With   
  
 Codes Comments Primary osteoarthritis of right hip    -  Primary ICD-10-CM: M16.11 
ICD-9-CM: 715.15 Pain of right hip joint     ICD-10-CM: M25.551 ICD-9-CM: 719.45 Essential hypertension with goal blood pressure less than 130/85     ICD-10-CM: I10 
ICD-9-CM: 401.9 Vitals BP Pulse Temp Resp Height(growth percentile) Weight(growth percentile) 116/60 74 98.1 °F (36.7 °C) (Oral) 14 5' 3\" (1.6 m) 161 lb (73 kg) SpO2 BMI OB Status Smoking Status 95% 28.52 kg/m2 Hysterectomy Never Smoker BMI and BSA Data Body Mass Index Body Surface Area 28.52 kg/m 2 1.8 m 2 Preferred Pharmacy Pharmacy Name Phone Lilli Gant, [facility] [phone] Your Updated Medication List  
  
   
This list is accurate as of 5/22/18  2:54 PM.  Always use your most recent med list.  
  
  
  
  
 alendronate 70 mg tablet Commonly known as:  FOSAMAX TAKE 1 TABLET EVERY 7 DAYS  
  
 aspirin 325 mg tablet Commonly known as:  ASPIRIN Take 1 Tab by mouth two (2) times a day. Take for 3 weeks for DVT prophylaxis. calcium carbonate 500 mg calcium (1,250 mg) tablet Commonly known as:  OS-BARBARA Take 1,500 mg by mouth daily. cephALEXin 500 mg capsule Commonly known as:  Ro Munoz  
take 4 capsules by mouth ONE HOUR PRIOR TO PROCEDURE  
  
 ferrous fumarate 324 mg (106 mg iron) Tab One a day  
  
 lisinopril 10 mg tablet Commonly known as:  PRINIVIL, ZESTRIL  
TAKE 1 TABLET DAILY  
  
 meloxicam 15 mg tablet Commonly known as:  MOBIC  
TAKE 1 TABLET DAILY MIRALAX 17 gram packet Generic drug:  polyethylene glycol Take 17 g by mouth daily. naloxone 2 mg/actuation Spry Commonly known as:  [employer] Use 1 spray intranasally into 1 nostril. Use a new Narcan nasal spray for subsequent doses and administer into alternating nostrils. May repeat every 2 to 3 minutes as needed. sucralfate 100 mg/mL suspension Commonly known as:  Doreatha Perez Take 5 mL by mouth four (4) times daily. * traMADol 100 mg Tb24 Commonly known as:  ULTRAM-ER Take 1 Tab by mouth daily. Max Daily Amount: 100 mg. Indications: Chronic Pain * traMADol 50 mg tablet Commonly known as:  ULTRAM  
Take 1 Tab by mouth every six (6) hours as needed for Pain. Max Daily Amount: 200 mg.  
  
 TYLENOL EXTRA STRENGTH 500 mg tablet Generic drug:  acetaminophen Take 1,000 mg by mouth every six (6) hours as needed for Pain. VITAMIN D3 1,000 unit tablet Generic drug:  cholecalciferol Take 1,000 Units by mouth five (5) days a week. zolpidem 10 mg tablet Commonly known as:  AMBIEN Take 0.5 Tabs by mouth nightly as needed for Sleep. Max Daily Amount: 5 mg.  
  
 * Notice: This list has 2 medication(s) that are the same as other medications prescribed for you. Read the directions carefully, and ask your doctor or other care provider to review them with you. Prescriptions Printed Refills  
 traMADol (ULTRAM-ER) 100 mg Tb24 1 Sig: Take 1 Tab by mouth daily. Max Daily Amount: 100 mg. Indications: Chronic Pain Class: Print Route: Oral  
 traMADol (ULTRAM) 50 mg tablet 0 Sig: Take 1 Tab by mouth every six (6) hours as needed for Pain. Max Daily Amount: 200 mg. Class: Print Route: Oral  
  
Prescriptions Sent to Pharmacy Refills  
 lisinopril (PRINIVIL, ZESTRIL) 10 mg tablet 1 Sig: TAKE 1 TABLET DAILY Class: Normal  
 Pharmacy: [address] Ph #: [phone]  
 meloxicam (MOBIC) 15 mg tablet 1 Sig: TAKE 1 TABLET DAILY Class: Normal  
 Pharmacy: [address] [place] [address] Ph #: [phone] Follow-up Instructions Return if symptoms worsen or fail to improve. Patient Instructions Arthritis: Care Instructions Your Care Instructions Arthritis, also called osteoarthritis, is a breakdown of the cartilage that cushions your joints. When the cartilage wears down, your bones rub against each other. This causes pain and stiffness.  Many people have some arthritis as they age. Arthritis most often affects the joints of the spine, hands, hips, knees, or feet. You can take simple measures to protect your joints, ease your pain, and help you stay active. Follow-up care is a key part of your treatment and safety. Be sure to make and go to all appointments, and call your doctor if you are having problems. It's also a good idea to know your test results and keep a list of the medicines you take. How can you care for yourself at home? · Stay at a healthy weight. Being overweight puts extra strain on your joints. · Talk to your doctor or physical therapist about exercises that will help ease joint pain. ¨ Stretch. You may enjoy gentle forms of yoga to help keep your joints and muscles flexible. ¨ Walk instead of jog. Other types of exercise that are less stressful on the joints include riding a bicycle, swimming, jackie chi, or water exercise. ¨ Lift weights. Strong muscles help reduce stress on your joints. Stronger thigh muscles, for example, take some of the stress off of the knees and hips. Learn the right way to lift weights so you do not make joint pain worse. · Take your medicines exactly as prescribed. Call your doctor if you think you are having a problem with your medicine. · Take pain medicines exactly as directed. ¨ If the doctor gave you a prescription medicine for pain, take it as prescribed. ¨ If you are not taking a prescription pain medicine, ask your doctor if you can take an over-the-counter medicine. · Use a cane, crutch, walker, or another device if you need help to get around. These can help rest your joints. You also can use other things to make life easier, such as a higher toilet seat and padded handles on kitchen utensils. · Do not sit in low chairs, which can make it hard to get up. · Put heat or cold on your sore joints as needed. Use whichever helps you most. You also can take turns with hot and cold packs. ¨ Apply heat 2 or 3 times a day for 20 to 30 minutes-using a heating pad, hot shower, or hot pack-to relieve pain and stiffness. ¨ Put ice or a cold pack on your sore joint for 10 to 20 minutes at a time. Put a thin cloth between the ice and your skin. When should you call for help? Call your doctor now or seek immediate medical care if: 
? · You have sudden swelling, warmth, or pain in any joint. ? · You have joint pain and a fever or rash. ? · You have such bad pain that you cannot use a joint. ? Watch closely for changes in your health, and be sure to contact your doctor if: 
? · You have mild joint symptoms that continue even with more than 6 weeks of care at home. ? · You have stomach pain or other problems with your medicine. Where can you learn more? Go to http://camiloMediaSpikerobert.info/. Enter B014 in the search box to learn more about \"Arthritis: Care Instructions. \" Current as of: October 31, 2016 Content Version: 11.4 © 5132-4175 [employer]. Care instructions adapted under license by [employer] (which disclaims liability or warranty for this information). If you have questions about a medical condition or this instruction, always ask your healthcare professional. [address] any warranty or liability for your use of this information. Introducing [facility]! Dear Betty Almaraz: Thank you for requesting a Privy account. Our records indicate that you already have an active Privy account. You can access your account anytime at [email or web address] Did you know that you can access your hospital and ER discharge instructions at any time in Privy? You can also review all of your test results from your hospital stay or ER visit. Additional Information If you have questions, please visit the Frequently Asked Questions section of the Privy website at [email or web address] Remember, MyChart is NOT to be used for urgent needs. For medical emergencies, dial 911. Now available from your iPhone and Android! Please provide this summary of care documentation to your next provider. Your primary care clinician is listed as [id number] [facility]. If you have any questions after today's visit, please call [phone].

## 2018-05-22 NOTE — PROGRESS NOTES
Patient here for f/u on her chronic conditions she is asking for refills on her medications today and would like her Tramadol 100 mg sent to [facility] and a Tramadol 50 mg prescription to be filled locally. 1. Have you been to the ER, urgent care clinic since your last visit? Hospitalized since your last visit? Yes When: [id number] Where: [address] Reason for visit: fall  2. Have you seen or consulted any other health care providers outside of the [address] [place] [address] since your last visit? Include any pap smears or colon screening.  No  Health Maintenance reviewed - Will address at her [facility].

## 2018-05-22 NOTE — PATIENT INSTRUCTIONS
Arthritis: Care Instructions  Your Care Instructions  Arthritis, also called osteoarthritis, is a breakdown of the cartilage that cushions your joints. When the cartilage wears down, your bones rub against each other. This causes pain and stiffness. Many people have some arthritis as they age. Arthritis most often affects the joints of the spine, hands, hips, knees, or feet. You can take simple measures to protect your joints, ease your pain, and help you stay active. Follow-up care is a key part of your treatment and safety. Be sure to make and go to all appointments, and call your doctor if you are having problems. It's also a good idea to know your test results and keep a list of the medicines you take. How can you care for yourself at home? · Stay at a healthy weight. Being overweight puts extra strain on your joints. · Talk to your doctor or physical therapist about exercises that will help ease joint pain. ¨ Stretch. You may enjoy gentle forms of yoga to help keep your joints and muscles flexible. ¨ Walk instead of jog. Other types of exercise that are less stressful on the joints include riding a bicycle, swimming, jackie chi, or water exercise. ¨ Lift weights. Strong muscles help reduce stress on your joints. Stronger thigh muscles, for example, take some of the stress off of the knees and hips. Learn the right way to lift weights so you do not make joint pain worse. · Take your medicines exactly as prescribed. Call your doctor if you think you are having a problem with your medicine. · Take pain medicines exactly as directed. ¨ If the doctor gave you a prescription medicine for pain, take it as prescribed. ¨ If you are not taking a prescription pain medicine, ask your doctor if you can take an over-the-counter medicine. · Use a cane, crutch, walker, or another device if you need help to get around. These can help rest your joints.  You also can use other things to make life easier, such as a higher toilet seat and padded handles on kitchen utensils. · Do not sit in low chairs, which can make it hard to get up. · Put heat or cold on your sore joints as needed. Use whichever helps you most. You also can take turns with hot and cold packs. ¨ Apply heat 2 or 3 times a day for 20 to 30 minutes-using a heating pad, hot shower, or hot pack-to relieve pain and stiffness. ¨ Put ice or a cold pack on your sore joint for 10 to 20 minutes at a time. Put a thin cloth between the ice and your skin. When should you call for help? Call your doctor now or seek immediate medical care if:  ? · You have sudden swelling, warmth, or pain in any joint. ? · You have joint pain and a fever or rash. ? · You have such bad pain that you cannot use a joint. ? Watch closely for changes in your health, and be sure to contact your doctor if:  ? · You have mild joint symptoms that continue even with more than 6 weeks of care at home. ? · You have stomach pain or other problems with your medicine. Where can you learn more? Go to http://camilo-robert.info/. Enter F090 in the search box to learn more about \"Arthritis: Care Instructions. \"  Current as of: October 31, 2016  Content Version: 11.4  © 3908-6207 [employer]. Care instructions adapted under license by [employer] (which disclaims liability or warranty for this information). If you have questions about a medical condition or this instruction, always ask your healthcare professional. [place] [address] any warranty or liability for your use of this information.

## 2018-05-22 NOTE — PROGRESS NOTES
Ava Kenyon is a 77 y.o. female  presents for follow up and refill of meds. HPI:   Patient has hip pain , primarily affecting the hips. Pain control:           Current : stable   3/10           Worst pain over last week        8/10           Least pain over the last week   3/10  Activities of Daily Living:            improved            Are you able to do your normal daily activities?   intermittent  Patient Goals            Functional:  yes            Pain: less  Adverse side effects:             Since starting your pain medicine are you experiencing any of the following?              ( Examples: Constipation, fatigue, lapses in memory, depression or sexual                        Dysfunction)   No   Is a Pain management Contract in the patient's chart? no  Aberrant behaviors:              none(Early refill requests, lost prescriptions, lost medicine)  Pill count:             Is it consistent with patient's prescription? {Yes/No:[id number]:L: \"yes\"  Last urine drug screen:     [facility] Prescription Monitoring Program check performed:  yes        Treatment Care Team:  Patient Care Team:  Mariam Silvestre MD as PCP - General (Family Practice)  Stephanie Whalen RN as Ambulatory Care Navigator  Follow up contact scheduled for 1-4 weeks: yes      Allergies   Allergen Reactions    Celebrex [Celecoxib] Swelling    Shellfish Derived Swelling     \"makes me feel puffy\"    Sulfa (Sulfonamide Antibiotics) Hives and Swelling     Lips swelling    Humira [Adalimumab] Other (comments)     Lupus    Optiray 320 [Ioversol] Hives and Itching     Pt states when she gets iv contrast she itches a little from hives?  Penicillins Hives     Outpatient Prescriptions Marked as Taking for the 5/22/18 encounter (Office Visit) with Mariam Silvestre MD   Medication Sig Dispense Refill    zolpidem (AMBIEN) 10 mg tablet Take 0.5 Tabs by mouth nightly as needed for Sleep.  Max Daily Amount: 5 mg. 30 Tab 1    meloxicam (MOBIC) 15 mg tablet TAKE 1 TABLET DAILY 90 Tab 1    aspirin (ASPIRIN) 325 mg tablet Take 1 Tab by mouth two (2) times a day. Take for 3 weeks for DVT prophylaxis. 42 Tab 0    acetaminophen (TYLENOL EXTRA STRENGTH) 500 mg tablet Take 1,000 mg by mouth every six (6) hours as needed for Pain.  cholecalciferol (VITAMIN D3) 1,000 unit tablet Take 1,000 Units by mouth five (5) days a week.  polyethylene glycol (MIRALAX) 17 gram packet Take 17 g by mouth daily.  lisinopril (PRINIVIL, ZESTRIL) 10 mg tablet TAKE 1 TABLET DAILY 90 Tab 1    traMADol (ULTRAM-ER) 100 mg Tb24 Take 1 Tab by mouth daily. Max Daily Amount: 100 mg. Indications: Chronic Pain 90 Tab 1    calcium carbonate (OS-BARBARA) 500 mg calcium (1,250 mg) tablet Take 1,500 mg by mouth daily.        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    H/O calcium pyrophosphate deposition disease (CPPD) Z87.39    IBS (irritable bowel syndrome) K58.9    RA (rheumatoid arthritis) ([facility] M06.9    Sicca syndrome (HCC) M35.00    Osteoarthritis of right hip M16.11    Pain management contract agreement Z02.89    ACP (advance care planning) Z71.89    Chronic left shoulder pain M25.512, G89.29    Osteopenia M85.80    Osteopenia of multiple sites M85.89    Osteoarthritis of left hip M16.12     Past Medical History:   Diagnosis Date    Abdominal abscess 2009    Arthritis     Rheumatoid    Autoimmune disease ([facility] [address]     Medically induced Lupus    Back pain     Chronic pain     lower back    Colitis     Diverticulitis     Failed back syndrome     GERD (gastroesophageal reflux disease)     Hypertension     when on cyclosporins    Ill-defined condition     large torus roof of mouth    Irritable bowel syndrome     Osteoporosis     Pneumonia     RA [facility]oid [facility]) ([facility]     Seizures ([facility] 6-1-2008    one episode- after high dose of epinepherine     Social History     Social History    Marital status:      Spouse name: N/A    Number of children: N/A    Years of education: N/A     Social History Main Topics    Smoking status: Never Smoker    Smokeless tobacco: Never Used    Alcohol use No    Drug use: No    Sexual activity: No     Other Topics Concern    None     Social History Narrative     Family History   Problem Relation Age of Onset    Other Other      Orthopedic (Sister)    Arthritis-osteo Sister     Cancer Neg Hx     Diabetes Neg Hx     Heart Disease Neg Hx     Heart Attack Neg Hx     Hypertension Neg Hx     Stroke Neg Hx     Malignant Hyperthermia Neg Hx     Pseudocholinesterase Deficiency Neg Hx     Delayed Awakening Neg Hx     Post-op Nausea/Vomiting Neg Hx     Emergence Delirium Neg Hx     Post-op Cognitive Dysfunction Neg Hx         Review of Systems   Constitutional: Negative for chills and fever. Cardiovascular: Negative for chest pain and palpitations. Gastrointestinal: Negative for constipation, diarrhea, nausea and vomiting. Psychiatric/Behavioral: Negative. Vitals:    05/22/18 1432   BP: 116/60   Pulse: 74   Resp: 14   Temp: 98.1 °F (36.7 °C)   TempSrc: Oral   SpO2: 95%   Weight: 161 lb (73 kg)   Height: 5' 3\" (1.6 m)   PainSc:   5   PainLoc: Back       Physical Exam   Constitutional: She is oriented to person, place, and time and well-developed, well-nourished, and in no distress. Cardiovascular: Normal rate, regular rhythm and normal heart sounds. Pulmonary/Chest: Effort normal and breath sounds normal.   Neurological: She is alert and oriented to person, place, and time. Skin: Skin is warm and dry. Psychiatric: Mood, memory, affect and judgment normal.   Nursing note and vitals reviewed. Assessment/Plan      ICD-10-CM ICD-9-CM    1.  Pain of right hip joint M25.551 719.45 traMADol (ULTRAM-ER) 100 mg Tb24   2. Essential hypertension with goal blood pressure less than 130/85 I10 401.9 lisinopril (PRINIVIL, ZESTRIL) 10 mg tablet     I have discussed the diagnosis with the patient and the intended plan of care as seen in the above orders. The patient has received an after-visit summary and questions were answered concerning future plans. I have discussed medication, side effects, and warnings with the patient in detail. The patient verbalized understanding and is in agreement with the plan of care. The patient will contact the office with any additional concerns.       Follow-up Disposition:  Return if symptoms worsen or fail to improve.  lab results and schedule of future lab studies reviewed with patient    Lakeisha Bustillos MD

## 2018-06-04 ENCOUNTER — TELEPHONE (OUTPATIENT)
Dept: FAMILY MEDICINE CLINIC | Age: 68
End: 2018-06-04

## 2018-06-04 NOTE — TELEPHONE ENCOUNTER
Spoke with Gregg Rober Lazaro, she seen her orthopedic and they confirmed her T-12 is broken. She will be trying to get the images for Dr. Calvin Braun to review. She wanted to inform him of the current result at hand.

## 2018-06-14 ENCOUNTER — OFFICE VISIT (OUTPATIENT)
Dept: FAMILY MEDICINE CLINIC | Age: 68
End: 2018-06-14

## 2018-06-14 VITALS
SYSTOLIC BLOOD PRESSURE: 124 MMHG | HEIGHT: 63 IN | HEART RATE: 88 BPM | WEIGHT: 161 LBS | DIASTOLIC BLOOD PRESSURE: 76 MMHG | RESPIRATION RATE: 15 BRPM | TEMPERATURE: 97.9 F | BODY MASS INDEX: 28.53 KG/M2

## 2018-06-14 DIAGNOSIS — M45.9 RHEUMATOID ARTHRITIS INVOLVING VERTEBRA WITH POSITIVE RHEUMATOID FACTOR (HCC): Primary | Chronic | ICD-10-CM

## 2018-06-14 RX ORDER — CYCLOBENZAPRINE HCL 10 MG
10 TABLET ORAL 2 TIMES DAILY
Qty: 60 TAB | Refills: 1 | Status: SHIPPED | OUTPATIENT
Start: 2018-06-14 | End: 2018-08-05 | Stop reason: SDUPTHER

## 2018-06-14 RX ORDER — PREDNISONE 10 MG/1
TABLET ORAL
Qty: 21 TAB | Refills: 0 | Status: SHIPPED | OUTPATIENT
Start: 2018-06-14 | End: 2018-07-24 | Stop reason: ALTCHOICE

## 2018-06-14 NOTE — MR AVS SNAPSHOT
[facility] [address] [place] [address] Road 
[phone] Patient: Tyshawn Valente MRN: [id number]:[id number] Visit Information Date & Time Provider Department Dept. Phone Encounter #  
 6/14/2018  1:45 PM Andrea Pratt MD [facility] [phone] [id number] Follow-up Instructions Return if symptoms worsen or fail to improve. Upcoming Health Maintenance Date Due  
 GLAUCOMA SCREENING Q2Y 5/6/2016 BREAST CANCER SCRN MAMMOGRAM 7/11/2018 MEDICARE YEARLY EXAM 6/29/2018 Pneumococcal 65+ Low/Medium Risk (2 of 2 - PPSV23) 7/10/2018 Influenza Age 5 to Adult 8/1/2018 COLONOSCOPY 1/13/2024 DTaP/Tdap/Td series (3 - Td) 9/14/2027 Allergies as of 6/14/2018  Review Complete On: 6/14/2018 By: Andrea Pratt MD  
  
 Severity Noted Reaction Type Reactions Celebrex [Celecoxib] High 11/06/2014   Systemic Swelling Shellfish Derived High 05/30/2017   Systemic Swelling \"makes me feel puffy\" Sulfa (Sulfonamide Antibiotics) High 05/18/2015    Hives, Swelling Lips swelling Humira [Adalimumab]  06/27/2013    Other (comments) Lupus Optiray 320 [Ioversol]  02/16/2013   Not Verified Hives, Itching Pt states when she gets iv contrast she itches a little from hives? Penicillins  02/19/2013    Hives Current Immunizations  Never Reviewed Name Date Influenza Vaccine 11/15/2007 12:00 AM  
 Pneumococcal Conjugate (PCV-13) 7/10/2017 Pneumococcal Vaccine (Unspecified Type) 10/31/2010 Tdap 9/14/2017, 11/15/2007 12:00 AM  
  
 Not reviewed this visit You Were Diagnosed With   
  
 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  
  
   
This list is accurate as of 6/14/18  2:03 PM.  Always use your most recent med list.  
  
  
  
  
 alendronate 70 mg tablet Commonly known as:  FOSAMAX TAKE 1 TABLET EVERY 7 DAYS  
  
 aspirin 325 mg tablet Commonly known as:  ASPIRIN Take 1 Tab by mouth two (2) times a day. Take for 3 weeks for DVT prophylaxis. calcium carbonate 500 mg calcium (1,250 mg) tablet Commonly known as:  OS-BARBARA Take 1,500 mg by mouth daily. cephALEXin 500 mg capsule Commonly known as:  KEFLEX  
take 4 capsules by mouth ONE HOUR PRIOR TO PROCEDURE  
  
 cyclobenzaprine 10 mg tablet Commonly known as:  FLEXERIL Take 1 Tab by mouth two (2) times a day. ferrous fumarate 324 mg (106 mg iron) Tab One a day  
  
 lisinopril 10 mg tablet Commonly known as:  PRINIVIL, ZESTRIL  
TAKE 1 TABLET DAILY  
  
 meloxicam 15 mg tablet Commonly known as:  MOBIC  
TAKE 1 TABLET DAILY MIRALAX 17 gram packet Generic drug:  polyethylene glycol Take 17 g by mouth daily. naloxone 2 mg/actuation Spry Commonly known as:  [employer] Use 1 spray intranasally into 1 nostril. Use a new Narcan nasal spray for subsequent doses and administer into alternating nostrils. May repeat every 2 to 3 minutes as needed. predniSONE 10 mg dose pack Commonly known as:  STERAPRED DS See administration instruction per 10mg dose pack  
  
 sucralfate 100 mg/mL suspension Commonly known as:  Illa Hoe Take 5 mL by mouth four (4) times daily. * traMADol 100 mg Tb24 Commonly known as:  ULTRAM-ER  
 Take 1 Tab by mouth daily. Max Daily Amount: 100 mg. Indications: Chronic Pain * traMADol 50 mg tablet Commonly known as:  ULTRAM  
Take 1 Tab by mouth every six (6) hours as needed for Pain. Max Daily Amount: 200 mg.  
  
 TYLENOL EXTRA STRENGTH 500 mg tablet Generic drug:  acetaminophen Take 1,000 mg by mouth every six (6) hours as needed for Pain. VITAMIN D3 1,000 unit tablet Generic drug:  cholecalciferol Take 1,000 Units by mouth five (5) days a week. zolpidem 10 mg tablet Commonly known as:  AMBIEN Take 0.5 Tabs by mouth nightly as needed for Sleep. Max Daily Amount: 5 mg.  
  
 * Notice: This list has 2 medication(s) that are the same as other medications prescribed for you. Read the directions carefully, and ask your doctor or other care provider to review them with you. Prescriptions Sent to Pharmacy Refills  
 predniSONE (STERAPRED DS) 10 mg dose pack 0 Sig: See administration instruction per 10mg dose pack Class: Normal  
 [id number] [address] [place] [address] Ph #: [phone]  
 cyclobenzaprine (FLEXERIL) 10 mg tablet 1 Sig: Take 1 Tab by mouth two (2) times a day. Class: Normal  
 [id number] [address] Ph #: [phone] Route: Oral  
  
Follow-up Instructions Return if symptoms worsen or fail to improve. Patient Instructions Rheumatoid Arthritis Diet: Care Instructions Your Care Instructions The best diet for people with rheumatoid arthritis is a healthy, balanced diet that is low in saturated fat and salt and high in fiber and complex carbohydrate (whole grains, beans, fruits, and vegetables). Fish oil (omega-3 fatty acids) has a modest effect in reducing inflammation, and eating fish may improve symptoms.  
People who have rheumatoid arthritis have a high risk of developing osteoporosis. To help prevent this disease, get plenty of calcium and vitamin D. Follow-up care is a key part of your treatment and safety. Be sure to make and go to all appointments, and call your doctor if you are having problems. It's also a good idea to know your test results and keep a list of the medicines you take. How can you care for yourself at home? · Try to eat at least 2 servings of fish each week. Oily fish, which contain omega-3 fatty acids, include: ¨ Tuna. ¨ [place]. ¨ Mackerel. [address] trout. ¨ Herring. ¨ Sardines. · If you're pregnant, talk to your doctor about eating fish. Pregnant women shouldn't eat certain types of fish that have high mercury content. · You can get calcium and vitamin D by drinking milk fortified with vitamin D. Four glasses of milk a day provide about 1,200 milligrams (mg) of calcium. Other common foods with calcium: ¨ Yogurt (plain or low-fat). An 8-ounce serving provides 415 mg of calcium. ¨ Cheddar cheese. A 1½-ounce serving provides 306 mg. 
¨ Milk (skim, 2%, or whole). A 1-cup serving provides about 300 mg. [address] (1% milk fat). A 1-cup serving provides 138 mg. 
· If you can't eat or drink dairy foods, you can get calcium and vitamin D from: ¨ Calcium-fortified orange juice. A 1-cup serving provides 500 mg of calcium. ¨ Calcium-enriched soy milk. A 1-cup serving provides 282 mg of calcium. ¨ Almonds. A 1-ounce serving (about 24 nuts) provides 75 mg of calcium. ¨ Canned salmon. A 3-ounce serving provides 180 mg of calcium. ¨ Tofu (firm, made with calcium sulfate). A ½-cup serving provides 204 mg. · You may need to take a calcium supplement to make sure you are getting the calcium you need. Where can you learn more? Go to http://camilo-robert.info/. Enter Q201 in the search box to learn more about \"Rheumatoid Arthritis Diet: Care Instructions. \" Current as of: May 12, 2017 Content Version: 11.4 © 4072-8589 Healthwise, Incorporated. Care instructions adapted under license by [employer] (which disclaims liability or warranty for this information). If you have questions about a medical condition or this instruction, always ask your healthcare professional. [address] any warranty or liability for your use of this information. Introducing [facility] & HEALTH SERVICES! Dear Cole Shepard: Thank you for requesting a [employer] account. Our records indicate that you already have an active [employer] account. You can access your account anytime at [email or web address] Did you know that you can access your hospital and ER discharge instructions at any time in [employer]? You can also review all of your test results from your hospital stay or ER visit. Additional Information If you have questions, please visit the Frequently Asked Questions section of the [employer] website at [email or web address] Remember, [employer] is NOT to be used for urgent needs. For medical emergencies, dial 911. Now available from your iPhone and Android! Please provide this summary of care documentation to your next provider. Your primary care clinician is listed as [address]. If you have any questions after today's visit, please call [phone].

## 2018-06-14 NOTE — PROGRESS NOTES
Chief Complaint   Patient presents with    Unable To Void     Pt would like to discuss adding a steroid pack for the break in her back. 1. Have you been to the ER, urgent care clinic since your last visit? Hospitalized since your last visit? No    2. Have you seen or consulted any other health care providers outside of the [address] [place] [address] since your last visit? Include any pap smears or colon screening.  No

## 2018-06-14 NOTE — PROGRESS NOTES
Erendira Simons is a 77 y.o. female  presents for follow up. She has not been able to void appropriately. She can void after lying down. Allergies   Allergen Reactions    Celebrex [Celecoxib] Swelling    Shellfish Derived Swelling     \"makes me feel puffy\"    Sulfa (Sulfonamide Antibiotics) Hives and Swelling     Lips swelling    Humira [Adalimumab] Other (comments)     Lupus    Optiray 320 [Ioversol] Hives and Itching     Pt states when she gets iv contrast she itches a little from hives?  Penicillins Hives     Outpatient Prescriptions Marked as Taking for the 6/14/18 encounter (Office Visit) with Linda Nolan MD   Medication Sig Dispense Refill    traMADol (ULTRAM-ER) 100 mg Tb24 Take 1 Tab by mouth daily. Max Daily Amount: 100 mg. Indications: Chronic Pain 90 Tab 1    lisinopril (PRINIVIL, ZESTRIL) 10 mg tablet TAKE 1 TABLET DAILY 90 Tab 1    meloxicam (MOBIC) 15 mg tablet TAKE 1 TABLET DAILY 90 Tab 1    traMADol (ULTRAM) 50 mg tablet Take 1 Tab by mouth every six (6) hours as needed for Pain. Max Daily Amount: 200 mg. 30 Tab 0    zolpidem (AMBIEN) 10 mg tablet Take 0.5 Tabs by mouth nightly as needed for Sleep. Max Daily Amount: 5 mg. 30 Tab 1    alendronate (FOSAMAX) 70 mg tablet TAKE 1 TABLET EVERY 7 DAYS 12 Tab 0    ferrous fumarate 324 mg (106 mg iron) tab One a day 100 Tab 1    cephALEXin (KEFLEX) 500 mg capsule take 4 capsules by mouth ONE HOUR PRIOR TO PROCEDURE  1    aspirin (ASPIRIN) 325 mg tablet Take 1 Tab by mouth two (2) times a day. Take for 3 weeks for DVT prophylaxis. 42 Tab 0    naloxone (NARCAN) 2 mg/actuation spry Use 1 spray intranasally into 1 nostril. Use a new Narcan nasal spray for subsequent doses and administer into alternating nostrils. May repeat every 2 to 3 minutes as needed. 1 Package 0    acetaminophen (TYLENOL EXTRA STRENGTH) 500 mg tablet Take 1,000 mg by mouth every six (6) hours as needed for Pain.       cholecalciferol (VITAMIN D3) 1,000 unit tablet Take 1,000 Units by mouth five (5) days a week.  polyethylene glycol (MIRALAX) 17 gram packet Take 17 g by mouth daily.  sucralfate (CARAFATE) 100 mg/mL suspension Take 5 mL by mouth four (4) times daily. 414 mL 2    calcium carbonate (OS-BARBARA) 500 mg calcium (1,250 mg) tablet Take 1,500 mg by mouth daily.        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    H/O calcium pyrophosphate deposition disease (CPPD) Z87.39    IBS (irritable bowel syndrome) K58.9    RA (rheumatoid arthritis) ([address] M06.9    Sicca syndrome (HCC) M35.00    Osteoarthritis of right hip M16.11    Pain management contract agreement Z02.89    ACP (advance care planning) Z71.89    Chronic left shoulder pain M25.512, G89.29    Osteopenia M85.80    Osteopenia of multiple sites M85.89    Osteoarthritis of left hip M16.12     Past Medical History:   Diagnosis Date    Abdominal abscess 2009    Arthritis     Rheumatoid    Autoimmune disease ([address]     Medically induced Lupus    Back pain     Chronic pain     lower back    Colitis     Diverticulitis     Failed back syndrome     GERD (gastroesophageal reflux disease)     Hypertension     when on cyclosporins    Ill-defined condition     large torus roof of mouth    Irritable bowel syndrome     Osteoporosis     Pneumonia     RA [facility]umatoid arthritis) [facility]     Seizures ([facility] 6-1-2008    one episode- after high dose of epinepherine     Social History     Social History    Marital status:      Spouse name: N/A    Number of children: N/A    Years of education: N/A     Social History Main Topics    Smoking status: Never Smoker    Smokeless tobacco: Never Used    Alcohol use No    Drug use: No    Sexual activity: No     Other Topics Concern    None     Social History Narrative     Family History   Problem Relation Age of Onset    Other Other      Orthopedic (Sister)    Arthritis-osteo Sister     Cancer Neg Hx     Diabetes Neg Hx     Heart Disease Neg Hx     Heart Attack Neg Hx     Hypertension Neg Hx     Stroke Neg Hx     Malignant Hyperthermia Neg Hx     Pseudocholinesterase Deficiency Neg Hx     Delayed Awakening Neg Hx     Post-op Nausea/Vomiting Neg Hx     Emergence Delirium Neg Hx     Post-op Cognitive Dysfunction Neg Hx         Review of Systems   Constitutional: Negative for chills and fever. Cardiovascular: Negative for chest pain and palpitations. Gastrointestinal: Negative for constipation, diarrhea, nausea and vomiting. Musculoskeletal: Positive for back pain and joint pain. Skin: Negative for rash. Vitals:    06/14/18 1342   BP: 124/76   Pulse: 88   Resp: 15   Temp: 97.9 °F (36.6 °C)   Weight: 161 lb (73 kg)   Height: 5' 3\" (1.6 m)   PainSc:   0 - No pain       Physical Exam   Constitutional: She is oriented to person, place, and time and well-developed, well-nourished, and in no distress. Pulmonary/Chest: Effort normal and breath sounds normal.   Neurological: She is alert and oriented to person, place, and time. Skin: Skin is warm and dry. Nursing note and vitals reviewed. Assessment/Plan      ICD-10-CM ICD-9-CM    1. 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 The patient has received an after-visit summary and questions were answered concerning future plans. I have discussed medication, side effects, and warnings with the patient in detail. The patient verbalized understanding and is in agreement with the plan of care.  The patient will contact the office with any additional concerns.       Follow-up Disposition:  Return if symptoms worsen or fail to improve.  lab results and schedule of future lab studies reviewed with patient    Kiet Gerber MD

## 2018-06-14 NOTE — PATIENT INSTRUCTIONS
Rheumatoid Arthritis Diet: Care Instructions  Your Care Instructions    The best diet for people with rheumatoid arthritis is a healthy, balanced diet that is low in saturated fat and salt and high in fiber and complex carbohydrate (whole grains, beans, fruits, and vegetables). Fish oil (omega-3 fatty acids) has a modest effect in reducing inflammation, and eating fish may improve symptoms. People who have rheumatoid arthritis have a high risk of developing osteoporosis. To help prevent this disease, get plenty of calcium and vitamin D. Follow-up care is a key part of your treatment and safety. Be sure to make and go to all appointments, and call your doctor if you are having problems. It's also a good idea to know your test results and keep a list of the medicines you take. How can you care for yourself at home? · Try to eat at least 2 servings of fish each week. Oily fish, which contain omega-3 fatty acids, include:  ¨ Tuna. ¨ [place]. ¨ Mackerel. [address] trout. ¨ Herring. ¨ S[place]ines. · If you're pregnant, talk to your doctor about eating fish. Pregnant women shouldn't eat certain types of fish that have high mercury content. · You can get calcium and vitamin D by drinking milk fortified with vitamin D. Four glasses of milk a day provide about 1,200 milligrams (mg) of calcium. Other common foods with calcium:  ¨ Yogurt (plain or low-fat). An 8-ounce serving provides 415 mg of calcium. ¨ Cheddar cheese. A 1½-ounce serving provides 306 mg.  ¨ Milk (skim, 2%, or whole). A 1-cup serving provides about 300 mg. [address] (1% milk fat). A 1-cup serving provides 138 mg.  · If you can't eat or drink dairy foods, you can get calcium and vitamin D from:  ¨ Calcium-fortified orange juice. A 1-cup serving provides 500 mg of calcium. ¨ Calcium-enriched soy milk. A 1-cup serving provides 282 mg of calcium. ¨ Almonds. A 1-ounce serving (about 24 nuts) provides 75 mg of calcium. ¨ Canned salmon.  A 3-ounce serving provides 180 mg of calcium. ¨ Tofu (firm, made with calcium sulfate). A ½-cup serving provides 204 mg. · You may need to take a calcium supplement to make sure you are getting the calcium you need. Where can you learn more? Go to http://camilo-robert.info/. Enter Q201 in the search box to learn more about \"Rheumatoid Arthritis Diet: Care Instructions. \"  Current as of: May 12, 2017  Content Version: 11.4  © 4371-7634 [employer]. Care instructions adapted under license by [employer] (which disclaims liability or warranty for this information). If you have questions about a medical condition or this instruction, always ask your healthcare professional. [address] any warranty or liability for your use of this information.

## 2018-07-12 ENCOUNTER — TELEPHONE (OUTPATIENT)
Dept: FAMILY MEDICINE CLINIC | Age: 68
End: 2018-07-12

## 2018-07-12 NOTE — TELEPHONE ENCOUNTER
Patient asking for Neurology recommendation having walking, mobility issues, patient lives in [place] please advise.

## 2018-07-12 NOTE — TELEPHONE ENCOUNTER
Patient is asking who Dr. Brannon Prudent would recommend to send her to for neurology. She is still having Mobility, walking, and balance issues so she wants someone who specializes in this. She doesn't need ins ref, just recommendation.  Please call her at [phone]

## 2018-07-13 NOTE — TELEPHONE ENCOUNTER
Patient called again, states she is leaving at 3:15 and would like to know who he recommends to be able to call and make an appt today.

## 2018-07-13 NOTE — TELEPHONE ENCOUNTER
Dr. Kanchan Daniel has an opening on July 20 at 5 or 145 in [facility]. I called and spoke with patient. I gave her the number to call and schedule. She repeated back and verbalized understanding.

## 2018-07-18 ENCOUNTER — OFFICE VISIT (OUTPATIENT)
Dept: FAMILY MEDICINE CLINIC | Age: 68
End: 2018-07-18

## 2018-07-18 VITALS
RESPIRATION RATE: 14 BRPM | HEART RATE: 70 BPM | SYSTOLIC BLOOD PRESSURE: 122 MMHG | TEMPERATURE: 98.1 F | OXYGEN SATURATION: 97 % | DIASTOLIC BLOOD PRESSURE: 84 MMHG

## 2018-07-18 DIAGNOSIS — Z98.890 STATUS POST LUMBAR SURGERY: Primary | ICD-10-CM

## 2018-07-18 DIAGNOSIS — M25.551 PAIN OF RIGHT HIP JOINT: ICD-10-CM

## 2018-07-18 RX ORDER — TRAMADOL HYDROCHLORIDE 100 MG/1
100 TABLET, EXTENDED RELEASE ORAL 2 TIMES DAILY
Qty: 120 TAB | Refills: 1 | Status: SHIPPED | OUTPATIENT
Start: 2018-07-18 | End: 2018-08-16

## 2018-07-18 NOTE — PATIENT INSTRUCTIONS

## 2018-07-18 NOTE — PROGRESS NOTES
Erika Sanchez is a 77 y.o. female  presents for persistent back pain. She has neurology appt for next week. States that ultram is not taking care of pain. HPI:   Patient has osteoarthritis , primarily affecting the back. Pain control:           Current : borderline controlled   8/10           Worst pain over last week        10/10           Least pain over the last week   5/10  Activities of Daily Living:            reasonably well controlled            Are you able to do your normal daily activities?   intermittent or no  Patient Goals            Functional:  yes            Pain: less  Adverse side effects:             Since starting your pain medicine are you experiencing any of the following?              ( Examples: Constipation, fatigue, lapses in memory, depression or sexual                        Dysfunction)   No   Is a Pain management Contract in the patient's chart? yes  Aberrant behaviors:              none(Early refill requests, lost prescriptions, lost medicine)  Pill count:             Is it consistent with patient's prescription? {Yes/No:[id number]:L: \"yes\"  Last urine drug screen:     [facility] Prescription Monitoring Program check performed:  yes        Treatment Care Team:  Patient Care Team:  Alexis Turner MD as PCP - General (Family Practice)  Sully Loaiza RN as Ambulatory Care Navigator  Follow up contact scheduled for 1-4 weeks: yes      Allergies   Allergen Reactions    Celebrex [Celecoxib] Swelling    Shellfish Derived Swelling     \"makes me feel puffy\"    Sulfa (Sulfonamide Antibiotics) Hives and Swelling     Lips swelling    Humira [Adalimumab] Other (comments)     Lupus    Optiray 320 [Ioversol] Hives and Itching     Pt states when she gets iv contrast she itches a little from hives?     Penicillins Hives     Outpatient Prescriptions Marked as Taking for the 7/18/18 encounter (Office Visit) with Alexis Turner MD   Medication Sig Dispense Refill    predniSONE (STERAPRED DS) 10 mg dose pack See administration instruction per 10mg dose pack 21 Tab 0    cyclobenzaprine (FLEXERIL) 10 mg tablet Take 1 Tab by mouth two (2) times a day. 60 Tab 1    traMADol (ULTRAM-ER) 100 mg Tb24 Take 1 Tab by mouth daily. Max Daily Amount: 100 mg. Indications: Chronic Pain 90 Tab 1    lisinopril (PRINIVIL, ZESTRIL) 10 mg tablet TAKE 1 TABLET DAILY 90 Tab 1    meloxicam (MOBIC) 15 mg tablet TAKE 1 TABLET DAILY 90 Tab 1    traMADol (ULTRAM) 50 mg tablet Take 1 Tab by mouth every six (6) hours as needed for Pain. Max Daily Amount: 200 mg. 30 Tab 0    zolpidem (AMBIEN) 10 mg tablet Take 0.5 Tabs by mouth nightly as needed for Sleep. Max Daily Amount: 5 mg. 30 Tab 1    alendronate (FOSAMAX) 70 mg tablet TAKE 1 TABLET EVERY 7 DAYS 12 Tab 0    ferrous fumarate 324 mg (106 mg iron) tab One a day 100 Tab 1    cephALEXin (KEFLEX) 500 mg capsule take 4 capsules by mouth ONE HOUR PRIOR TO PROCEDURE  1    aspirin (ASPIRIN) 325 mg tablet Take 1 Tab by mouth two (2) times a day. Take for 3 weeks for DVT prophylaxis. 42 Tab 0    naloxone (NARCAN) 2 mg/actuation spry Use 1 spray intranasally into 1 nostril. Use a new Narcan nasal spray for subsequent doses and administer into alternating nostrils. May repeat every 2 to 3 minutes as needed. 1 Package 0    acetaminophen (TYLENOL EXTRA STRENGTH) 500 mg tablet Take 1,000 mg by mouth every six (6) hours as needed for Pain.  cholecalciferol (VITAMIN D3) 1,000 unit tablet Take 1,000 Units by mouth five (5) days a week.  polyethylene glycol (MIRALAX) 17 gram packet Take 17 g by mouth daily.  sucralfate (CARAFATE) 100 mg/mL suspension Take 5 mL by mouth four (4) times daily. 414 mL 2    calcium carbonate (OS-BARBARA) 500 mg calcium (1,250 mg) tablet Take 1,500 mg by mouth daily.        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    H/O calcium pyrophosphate deposition disease (CPPD) Z87.39    IBS (irritable bowel syndrome) K58.9    RA (rheumatoid arthritis) ([facility]) M06.9    Sicca syndrome ([facility]) M35.00    Osteoarthritis of right hip M16.11    Pain management contract agreement Z02.89    ACP (advance care planning) Z71.89    Chronic left shoulder pain M25.512, G89.29    Osteopenia M85.80    Osteopenia of multiple sites M85.89    Osteoarthritis of left hip M16.12     Past Medical History:   Diagnosis Date    Abdominal abscess 2009    Arthritis     Rheumatoid    Autoimmune disease ([facility]     Medically induced Lupus    Back pain     Chronic pain     lower back    Colitis     Diverticulitis     Failed back syndrome     GERD (gastroesophageal reflux disease)     Hypertension     when on cyclosporins    Ill-defined condition     large torus roof of mouth    Irritable bowel syndrome     Osteoporosis     Pneumonia     RA (rheumatoid arthritis) ([facility])     Seizures ([facility] [address] 6-1-2008    one episode- after high dose of epinepherine     Social History     Social History    Marital status:      Spouse name: N/A    Number of children: N/A    Years of education: N/A     Social History Main Topics    Smoking status: Never Smoker    Smokeless tobacco: Never Used    Alcohol use No    Drug use: No    Sexual activity: No     Other Topics Concern    None     Social History Narrative     Family History   Problem Relation Age of Onset    Other Other      Orthopedic (Sister)    Arthritis-osteo Sister     Cancer Neg Hx     Diabetes Neg Hx     Heart Disease Neg Hx     Heart Attack Neg Hx     Hypertension Neg Hx     Stroke Neg Hx     Malignant Hyperthermia Neg Hx     Pseudocholinesterase Deficiency Neg Hx     Delayed Awakening Neg Hx     Post-op Nausea/Vomiting Neg Hx     Emergence Delirium Neg Hx     Post-op Cognitive Dysfunction Neg Hx Review of Systems   Constitutional: Negative for chills and fever. Cardiovascular: Negative for chest pain. Gastrointestinal: Negative for nausea and vomiting. Genitourinary: Negative. Musculoskeletal: Positive for back pain. Negative for myalgias. Psychiatric/Behavioral: Negative. Vitals:    07/18/18 1634   BP: 122/84   Pulse: 70   Resp: 14   Temp: 98.1 °F (36.7 °C)   TempSrc: Oral   SpO2: 97%   PainSc:   6   PainLoc: Back       Physical Exam   Constitutional: She is oriented to person, place, and time and well-developed, well-nourished, and in no distress. Cardiovascular: Normal rate and regular rhythm. Pulmonary/Chest: Effort normal and breath sounds normal.   Neurological: She is alert and oriented to person, place, and time. Skin: Skin is warm and dry. Psychiatric: Mood, memory, affect and judgment normal.   Nursing note and vitals reviewed. Assessment/Plan      ICD-10-CM ICD-9-CM    1. Status post lumbar surgery Z98.890 V45.89    2. Pain of right hip joint M25.551 719.45 traMADol (ULTRAM-ER) 100 mg Tb24   I have discussed the diagnosis with the patient and the intended plan of care as seen in the above orders. The patient has received an after-visit summary and questions were answered concerning future plans. I have discussed medication, side effects, and warnings with the patient in detail. The patient verbalized understanding and is in agreement with the plan of care. The patient will contact the office with any additional concerns.       Follow-up Disposition:  Return if symptoms worsen or fail to improve.  lab results and schedule of future lab studies reviewed with patient    Milton Gentile MD

## 2018-07-18 NOTE — MR AVS SNAPSHOT
[facility] [address] [place] [address] 
[phone] Patient: Robert Kate MRN: [id number] Visit Information Date & Time Provider Department Dept. Phone Encounter #  
 7/18/2018  4:30 PM Bee Mcnair MD [facility] [phone] [id number] Follow-up Instructions Return if symptoms worsen or fail to improve. Upcoming Health Maintenance Date Due  
 GLAUCOMA SCREENING Q2Y 5/6/2016 MEDICARE YEARLY EXAM 6/29/2018 Pneumococcal 65+ Low/Medium Risk (2 of 2 - PPSV23) 7/10/2018 BREAST CANCER SCRN MAMMOGRAM 7/11/2018 Influenza Age 5 to Adult 8/1/2018 COLONOSCOPY 1/13/2024 DTaP/Tdap/Td series (3 - Td) 9/14/2027 Allergies as of 7/18/2018  Review Complete On: 7/18/2018 By: Bee Mcnair MD  
  
 Severity Noted Reaction Type Reactions Celebrex [Celecoxib] High 11/06/2014   Systemic Swelling Shellfish Derived High 05/30/2017   Systemic Swelling \"makes me feel puffy\" Sulfa (Sulfonamide Antibiotics) High 05/18/2015    Hives, Swelling Lips swelling Humira [Adalimumab]  06/27/2013    Other (comments) Lupus Optiray 320 [Ioversol]  02/16/2013   Not Verified Hives, Itching Pt states when she gets iv contrast she itches a little from hives? Penicillins  02/19/2013    Hives Current Immunizations  Never Reviewed Name Date Influenza Vaccine 11/15/2007 12:00 AM  
 Pneumococcal Conjugate (PCV-13) 7/10/2017 Pneumococcal Vaccine (Unspecified Type) 10/31/2010 Tdap 9/14/2017, 11/15/2007 12:00 AM  
  
 Not reviewed this visit You Were Diagnosed With   
  
 Codes Comments Status post lumbar surgery    -  Primary ICD-10-CM: V81.970 ICD-9-CM: V45.89 Pain of right hip joint     ICD-10-CM: M25.551 ICD-9-CM: 719.45 Vitals BP Pulse Temp Resp SpO2 OB Status 122/84 70 98.1 °F (36.7 °C) (Oral) 14 97% Hysterectomy Smoking Status Never Smoker Vitals History Preferred Pharmacy Pharmacy Name Phone [facility] [address], [address] [place] [phone] Your Updated Medication List  
  
   
This list is accurate as of 7/18/18  4:47 PM.  Always use your most recent med list.  
  
  
  
  
 alendronate 70 mg tablet Commonly known as:  FOSAMAX TAKE 1 TABLET EVERY 7 DAYS  
  
 aspirin 325 mg tablet Commonly known as:  ASPIRIN Take 1 Tab by mouth two (2) times a day. Take for 3 weeks for DVT prophylaxis. calcium carbonate 500 mg calcium (1,250 mg) tablet Commonly known as:  OS-BARBARA Take 1,500 mg by mouth daily. cephALEXin 500 mg capsule Commonly known as:  KEFLEX  
take 4 capsules by mouth ONE HOUR PRIOR TO PROCEDURE  
  
 cyclobenzaprine 10 mg tablet Commonly known as:  FLEXERIL Take 1 Tab by mouth two (2) times a day. ferrous fumarate 324 mg (106 mg iron) Tab One a day  
  
 lisinopril 10 mg tablet Commonly known as:  PRINIVIL, ZESTRIL  
TAKE 1 TABLET DAILY  
  
 meloxicam 15 mg tablet Commonly known as:  MOBIC  
TAKE 1 TABLET DAILY MIRALAX 17 gram packet Generic drug:  polyethylene glycol Take 17 g by mouth daily. naloxone 2 mg/actuation Spry Commonly known as:  [employer] Use 1 spray intranasally into 1 nostril. Use a new Narcan nasal spray for subsequent doses and administer into alternating nostrils. May repeat every 2 to 3 minutes as needed. predniSONE 10 mg dose pack Commonly known as:  STERAPRED DS See administration instruction per 10mg dose pack  
  
 sucralfate 100 mg/mL suspension Commonly known as:  Bibi Deep Take 5 mL by mouth four (4) times daily. * traMADol 50 mg tablet Commonly known as:  ULTRAM  
Take 1 Tab by mouth every six (6) hours as needed for Pain. Max Daily Amount: 200 mg.  
  
 * traMADol 100 mg Tb24 Commonly known as:  ULTRAM-ER  
 Take 1 Tab by mouth two (2) times a day. Max Daily Amount: 200 mg. Indications: Chronic Pain TYLENOL EXTRA STRENGTH 500 mg tablet Generic drug:  acetaminophen Take 1,000 mg by mouth every six (6) hours as needed for Pain. VITAMIN D3 1,000 unit tablet Generic drug:  cholecalciferol Take 1,000 Units by mouth five (5) days a week. zolpidem 10 mg tablet Commonly known as:  AMBIEN Take 0.5 Tabs by mouth nightly as needed for Sleep. Max Daily Amount: 5 mg.  
  
 * Notice: This list has 2 medication(s) that are the same as other medications prescribed for you. Read the directions carefully, and ask your doctor or other care provider to review them with you. Prescriptions Printed Refills  
 traMADol (ULTRAM-ER) 100 mg Tb24 1 Sig: Take 1 Tab by mouth two (2) times a day. Max Daily Amount: 200 mg. Indications: Chronic Pain Class: Print Route: Oral  
  
Follow-up Instructions Return if symptoms worsen or fail to improve. Patient Instructions Chronic Pain: Care Instructions Your Care Instructions Chronic pain is pain that lasts a long time (months or even years) and may or may not have a clear cause. It is different from acute pain, which usually does have a clear cause-like an injury or illness-and gets better over time. Chronic pain: 
· Lasts over time but may vary from day to day. · Does not go away despite efforts to end it. · May disrupt your sleep and lead to fatigue. · May cause depression or anxiety. · May make your muscles tense, causing more pain. · Can disrupt your work, hobbies, home life, and relationships with friends and family. Chronic pain is a very real condition. It is not just in your head. Treatment can help and usually includes several methods used together, such as medicines, physical therapy, exercise, and other treatments. Learning how to relax and changing negative thought patterns can also help you cope. Chronic pain is complex. Taking an active role in your treatment will help you better manage your pain. Tell your doctor if you have trouble dealing with your pain. You may have to try several things before you find what works best for you. Follow-up care is a key part of your treatment and safety. Be sure to make and go to all appointments, and call your doctor if you are having problems. It's also a good idea to know your test results and keep a list of the medicines you take. How can you care for yourself at home? · Pace yourself. Break up large jobs into smaller tasks. Save harder tasks for days when you have less pain, or go back and forth between hard tasks and easier ones. Take rest breaks. · Relax, and reduce stress. Relaxation techniques such as deep breathing or meditation can help. · Keep moving. Gentle, daily exercise can help reduce pain over the long run. Try low- or no-impact exercises such as walking, swimming, and stationary biking. Do stretches to stay flexible. · Try heat, cold packs, and massage. · Get enough sleep. Chronic pain can make you tired and drain your energy. Talk with your doctor if you have trouble sleeping because of pain. · Think positive. Your thoughts can affect your pain level. Do things that you enjoy to distract yourself when you have pain instead of focusing on the pain. See a movie, read a book, listen to music, or spend time with a friend. · If you think you are depressed, talk to your doctor about treatment. · Keep a daily pain diary. Record how your moods, thoughts, sleep patterns, activities, and medicine affect your pain. You may find that your pain is worse during or after certain activities or when you are feeling a certain emotion. Having a record of your pain can help you and your doctor find the best ways to treat your pain. · Take pain medicines exactly as directed. ¨ If the doctor gave you a prescription medicine for pain, take it as prescribed. ¨ If you are not taking a prescription pain medicine, ask your doctor if you can take an over-the-counter medicine. Reducing constipation caused by pain medicine · Include fruits, vegetables, beans, and whole grains in your diet each day. These foods are high in fiber. · Drink plenty of fluids, enough so that your urine is light yellow or clear like water. If you have kidney, heart, or liver disease and have to limit fluids, talk with your doctor before you increase the amount of fluids you drink. · If your doctor recommends it, get more exercise. Walking is a good choice. Bit by bit, increase the amount you walk every day. Try for at least 30 minutes on most days of the week. · Schedule time each day for a bowel movement. A daily routine may help. Take your time and do not strain when having a bowel movement. When should you call for help? Call your doctor now or seek immediate medical care if: 
  · Your pain gets worse or is out of control.  
  · You feel down or blue, or you do not enjoy things like you once did. You may be depressed, which is common in people with chronic pain. Depression can be treated.  
  · You have vomiting or cramps for more than 2 hours.  
 Watch closely for changes in your health, and be sure to contact your doctor if: 
  · You cannot sleep because of pain.  
  · You are very worried or anxious about your pain.  
  · You have trouble taking your pain medicine.  
  · You have any concerns about your pain medicine.  
  · You have trouble with bowel movements, such as: 
¨ No bowel movement in 3 days. ¨ Blood in the anal area, in your stool, or on the toilet paper. ¨ Diarrhea for more than 24 hours. Where can you learn more? Go to http://camilo-robert.info/. Enter N004 in the search box to learn more about \"Chronic Pain: Care Instructions. \" Current as of: October 9, 2017 Content Version: 11.7 © 3334-0989 [employer]wise[employer]. Care instructions adapted under license by [employer] (which disclaims liability or warranty for this information). If you have questions about a medical condition or this instruction, always ask your healthcare professional. [address] any warranty or liability for your use of this information. Introducing [facility]! Dear Coral Simons: Thank you for requesting a ExecNote account. Our records indicate that you already have an active ExecNote account. You can access your account anytime at [email or web address] Did you know that you can access your hospital and ER discharge instructions at any time in ExecNote? You can also review all of your test results from your hospital stay or ER visit. Additional Information If you have questions, please visit the Frequently Asked Questions section of the ExecNote website at [email or web address] Remember, ExecNote is NOT to be used for urgent needs. For medical emergencies, dial 911. Now available from your iPhone and Android! Please provide this summary of care documentation to your next provider. Your primary care clinician is listed as [address]. If you have any questions after today's visit, please call [phone].

## 2018-07-18 NOTE — PROGRESS NOTES
Patient c/o upper back pain she says she is taking 100 mg of Tramadol ER in the morning but this is not holding her through the night. Has an appointment with Neurology on 7/25/18. 1. Have you been to the ER, urgent care clinic since your last visit? Hospitalized since your last visit? No  2. Have you seen or consulted any other health care providers outside of the [address] [place] [address] since your last visit? Include any pap smears or colon screening. No  Medication reconciliation has been completed with patient. Care team discussed/updated as well as pharmacy. Care everywhere has been ran. Health Maintenance reviewed - Will discuss at the next visit.

## 2018-07-23 ENCOUNTER — TELEPHONE (OUTPATIENT)
Dept: FAMILY MEDICINE CLINIC | Age: 68
End: 2018-07-23

## 2018-07-23 NOTE — TELEPHONE ENCOUNTER
A user error has taken place: encounter opened in error, closed for administrative reasons. Patient made an appt.

## 2018-07-24 ENCOUNTER — OFFICE VISIT (OUTPATIENT)
Dept: FAMILY MEDICINE CLINIC | Age: 68
End: 2018-07-24

## 2018-07-24 VITALS
DIASTOLIC BLOOD PRESSURE: 60 MMHG | SYSTOLIC BLOOD PRESSURE: 110 MMHG | HEART RATE: 71 BPM | RESPIRATION RATE: 14 BRPM | TEMPERATURE: 97.9 F | OXYGEN SATURATION: 97 %

## 2018-07-24 DIAGNOSIS — M51.36 DDD (DEGENERATIVE DISC DISEASE), LUMBAR: ICD-10-CM

## 2018-07-24 DIAGNOSIS — Z98.890 STATUS POST LUMBAR SURGERY: Primary | ICD-10-CM

## 2018-07-24 DIAGNOSIS — M85.80 OSTEOPENIA, UNSPECIFIED LOCATION: ICD-10-CM

## 2018-07-24 NOTE — PROGRESS NOTES
Patient states she has been put on Prolia injection and has notice some redness on her face is not sure if this his being caused by the Prolia injection or Lupus, she also would like to talk with dr. Schumacher about her back pain. 1. Have you been to the ER, urgent care clinic since your last visit? Hospitalized since your last visit? No  2. Have you seen or consulted any other health care providers outside of the [facility] since your last visit? Include any pap smears or colon screening. No     Medication reconciliation has been completed with patient. Care team discussed/updated as well as pharmacy. Care everywhere has been ran.

## 2018-07-24 NOTE — PROGRESS NOTES
Madhuri Murray is a 77 y.o. female  presents for follow up. She continues with persistent back pain. Allergies   Allergen Reactions    Celebrex [Celecoxib] Swelling    Shellfish Derived Swelling     \"makes me feel puffy\"    Sulfa (Sulfonamide Antibiotics) Hives and Swelling     Lips swelling    Humira [Adalimumab] Other (comments)     Lupus    Optiray 320 [Ioversol] Hives and Itching     Pt states when she gets iv contrast she itches a little from hives?  Penicillins Hives     Outpatient Prescriptions Marked as Taking for the 7/24/18 encounter (Office Visit) with Wanda Rizzo MD   Medication Sig Dispense Refill    denosumab (PROLIA) 60 mg/mL injection 60 mg by SubCUTAneous route.  traMADol (ULTRAM-ER) 100 mg Tb24 Take 1 Tab by mouth two (2) times a day. Max Daily Amount: 200 mg. Indications: Chronic Pain 120 Tab 1    cyclobenzaprine (FLEXERIL) 10 mg tablet Take 1 Tab by mouth two (2) times a day. 60 Tab 1    lisinopril (PRINIVIL, ZESTRIL) 10 mg tablet TAKE 1 TABLET DAILY 90 Tab 1    meloxicam (MOBIC) 15 mg tablet TAKE 1 TABLET DAILY 90 Tab 1    zolpidem (AMBIEN) 10 mg tablet Take 0.5 Tabs by mouth nightly as needed for Sleep. Max Daily Amount: 5 mg. 30 Tab 1    ferrous fumarate 324 mg (106 mg iron) tab One a day 100 Tab 1    cephALEXin (KEFLEX) 500 mg capsule take 4 capsules by mouth ONE HOUR PRIOR TO PROCEDURE  1    aspirin (ASPIRIN) 325 mg tablet Take 1 Tab by mouth two (2) times a day. Take for 3 weeks for DVT prophylaxis. 42 Tab 0    acetaminophen (TYLENOL EXTRA STRENGTH) 500 mg tablet Take 1,000 mg by mouth every six (6) hours as needed for Pain.  cholecalciferol (VITAMIN D3) 1,000 unit tablet Take 1,000 Units by mouth five (5) days a week.  polyethylene glycol (MIRALAX) 17 gram packet Take 17 g by mouth daily.  sucralfate (CARAFATE) 100 mg/mL suspension Take 5 mL by mouth four (4) times daily.  414 mL 2    calcium carbonate (OS-BARBARA) 500 mg calcium (1,250 mg) tablet Take 1,500 mg by mouth daily.        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    H/O calcium pyrophosphate deposition disease (CPPD) Z87.39    IBS (irritable bowel syndrome) K58.9    RA (rheumatoid arthritis) ([facility] [address] M06.9    Sicca syndrome (HCC) M35.00    Osteoarthritis of right hip M16.11    Pain management contract agreement Z02.89    ACP (advance care planning) Z71.89    Chronic left shoulder pain M25.512, G89.29    Osteopenia M85.80    Osteopenia of multiple sites M85.89    Osteoarthritis of left hip M16.12     Past Medical History:   Diagnosis Date    Abdominal abscess 2009    Arthritis     Rheumatoid    Autoimmune disease ([facility] [address]     Medically induced Lupus    Back pain     Chronic pain     lower back    Colitis     Diverticulitis     Failed back syndrome     GERD (gastroesophageal reflux disease)     Hypertension     when on cyclosporins    Ill-defined condition     large torus roof of mouth    Irritable bowel syndrome     Osteoporosis     Pneumonia     RA (rheumatoid arthritis) ([facility] [address]     Seizures ([facility] [address] 6-1-2008    one episode- after high dose of epinepherine     Social History     Social History    Marital status:      Spouse name: N/A    Number of children: N/A    Years of education: N/A     Social History Main Topics    Smoking status: Never Smoker    Smokeless tobacco: Never Used    Alcohol use No    Drug use: No    Sexual activity: No     Other Topics Concern    None     Social History Narrative     Family History   Problem Relation Age of Onset    Other Other      Orthopedic (Sister)    Arthritis-osteo Sister     Cancer Neg Hx     Diabetes Neg Hx     Heart Disease Neg Hx     Heart Attack Neg Hx     Hypertension Neg Hx     Stroke Neg Hx  Malignant Hyperthermia Neg Hx     Pseudocholinesterase Deficiency Neg Hx     Delayed Awakening Neg Hx     Post-op Nausea/Vomiting Neg Hx     Emergence Delirium Neg Hx     Post-op Cognitive Dysfunction Neg Hx         Review of Systems   Constitutional: Negative for chills and fever. Cardiovascular: Negative for chest pain and palpitations. Gastrointestinal: Negative for constipation, diarrhea, nausea and vomiting. Musculoskeletal: Positive for back pain. Negative for myalgias and neck pain. Vitals:    07/24/18 1328   BP: 110/60   Pulse: 71   Resp: 14   Temp: 97.9 °F (36.6 °C)   TempSrc: Oral   SpO2: 97%   PainSc:   0 - No pain       Physical Exam   Constitutional: She is oriented to person, place, and time and well-developed, well-nourished, and in no distress. HENT:   Nose: Nose normal.   Mouth/Throat: Oropharynx is clear and moist.   Neck: Normal range of motion. Neck supple. Cardiovascular: Normal rate, regular rhythm and normal heart sounds. Pulmonary/Chest: Effort normal and breath sounds normal.   Musculoskeletal: Normal range of motion. Neurological: She is alert and oriented to person, place, and time. Skin: Skin is warm and dry. Psychiatric: Mood, memory, affect and judgment normal.   Nursing note and vitals reviewed. Assessment/Plan      ICD-10-CM ICD-9-CM    1. Status post lumbar surgery Z98.890 V45.89 Will follow up with spine center. 2. DDD (degenerative disc disease), lumbar M51.36 722.52 Stable    3. Osteopenia, unspecified location M85.80 733.90 On Prolia     I have discussed the diagnosis with the patient and the intended plan of care as seen in the above orders. The patient has received an after-visit summary and questions were answered concerning future plans. I have discussed medication, side effects, and warnings with the patient in detail. The patient verbalized understanding and is in agreement with the plan of care.  The patient will contact the office with any additional concerns.       Follow-up Disposition:  Return if symptoms worsen or fail to improve.  lab results and schedule of future lab studies reviewed with patient    Spike Vargas MD

## 2018-07-24 NOTE — PATIENT INSTRUCTIONS
Learning About Degenerative Disc Disease  What is degenerative disc disease? Degenerative disc disease is not really a disease. It's a term used to describe the normal changes in your spinal discs as you age. Spinal discs are small, spongy discs that separate the bones (vertebrae) that make up the spine. The discs act as shock absorbers for the spine, so it can flex, bend, and twist.  Degenerative disc disease can take place in one or more places along the spine. It most often occurs in the discs in the lower back and the neck. What causes it? As we age, our spinal discs break down, or degenerate. This breakdown causes the symptoms of degenerative disc disease in some people. When the discs break down, they can lose fluid and dry out, and their outer layers can have tiny cracks or tears. This leads to less padding and less space between the bones in the spine. The body reacts to this by making bony growths on the spine called bone spurs. These spurs can press on the spinal nerve roots or spinal cord. This can cause pain and can affect how well the nerves work. What are the symptoms? Many people with degenerative disc disease have no pain. But others have severe pain or other symptoms that limit their activities. Some of the most common symptoms are:  · Pain in the back or neck. Where the pain occurs depends on which discs are affected. · Pain that gets worse when you move, such as bending over, reaching up, or twisting. · Pain that may occur in the rear end (buttocks), arm, or leg if a nerve is pinched. · Numbness or tingling in your arm or leg. How is it diagnosed? A doctor can often diagnose degenerative disc disease while doing a physical exam. If your exam shows no signs of a serious condition, imaging tests (such as an X-ray) aren't likely to help your doctor find the cause of your symptoms.   Sometimes degenerative disc disease is found when an X-ray is taken for another reason, such as an injury or other health problem. But even if the doctor finds degenerative disc disease, that doesn't always mean that you will have symptoms. How is it treated? There are several things you can do at home to manage pain from this problem. · To relieve pain, use ice or heat (whichever feels better) on the affected area. ¨ Put ice or a cold pack on the area for 10 to 20 minutes at a time. Put a thin cloth between the ice and your skin. ¨ Put a warm water bottle, a heating pad set on low, or a warm cloth on your back. Put a thin cloth between the heating pad and your skin. Do not go to sleep with a heating pad on your skin. · Ask your doctor if you can take acetaminophen (such as Tylenol) or nonsteroidal anti-inflammatory drugs, such as ibuprofen or naproxen. Your doctor can prescribe stronger medicines if needed. Be safe with medicines. Read and follow all instructions on the label. · Get some exercise every day. Exercise is one of the best ways to help your back feel better and stay better. It's best to start each exercise slowly. You may notice a little soreness, and that's okay. But if an exercise makes your pain worse, stop doing it. Here are things you can try:  ¨ Walking. It's the simplest and maybe the best activity for your back. It gets your blood moving and helps your muscles stay strong. ¨ Exercises that gently stretch and strengthen your stomach, back, and leg muscles. The stronger those muscles are, the better they're able to protect your back. If you have constant or severe pain in your back or spine, you may need other treatments, such as physical therapy. In some cases, your doctor may suggest surgery. Follow-up care is a key part of your treatment and safety. Be sure to make and go to all appointments, and call your doctor if you are having problems. It's also a good idea to know your test results and keep a list of the medicines you take. Where can you learn more?   Go to http://camilo-robert.info/. Enter F442 in the search box to learn more about \"Learning About Degenerative Disc Disease. \"  Current as of: November 29, 2017  Content Version: 11.7  © 9825-3372 [employer]. Care instructions adapted under license by [employer] (which disclaims liability or warranty for this information). If you have questions about a medical condition or this instruction, always ask your healthcare professional. [place] [address] any warranty or liability for your use of this information.

## 2018-07-24 NOTE — MR AVS SNAPSHOT
[facility] [address] [place] [address] 
[phone] Patient: Aleah Houston MRN: [id number] Visit Information Date & Time Provider Department Dept. Phone Encounter #  
 7/24/2018  1:15 PM Jewel Keane MD [facility] [phone] [id number] Follow-up Instructions Return if symptoms worsen or fail to improve. Upcoming Health Maintenance Date Due  
 GLAUCOMA SCREENING Q2Y 5/6/2016 MEDICARE YEARLY EXAM 6/29/2018 Pneumococcal 65+ Low/Medium Risk (2 of 2 - PPSV23) 7/10/2018 BREAST CANCER SCRN MAMMOGRAM 7/11/2018 Influenza Age 5 to Adult 8/1/2018 COLONOSCOPY 1/13/2024 DTaP/Tdap/Td series (3 - Td) 9/14/2027 Allergies as of 7/24/2018  Review Complete On: 7/24/2018 By: Gregory García Severity Noted Reaction Type Reactions Celebrex [Celecoxib] High 11/06/2014   Systemic Swelling Shellfish Derived High 05/30/2017   Systemic Swelling \"makes me feel puffy\" Sulfa (Sulfonamide Antibiotics) High 05/18/2015    Hives, Swelling Lips swelling Humira [Adalimumab]  06/27/2013    Other (comments) Lupus Optiray 320 [Ioversol]  02/16/2013   Not Verified Hives, Itching Pt states when she gets iv contrast she itches a little from hives? Penicillins  02/19/2013    Hives Current Immunizations  Never Reviewed Name Date Influenza Vaccine 11/15/2007 12:00 AM  
 Pneumococcal Conjugate (PCV-13) 7/10/2017 Pneumococcal Vaccine (Unspecified Type) 10/31/2010 Tdap 9/14/2017, 11/15/2007 12:00 AM  
  
 Not reviewed this visit You Were Diagnosed With   
  
 Codes Comments Status post lumbar surgery    -  Primary ICD-10-CM: F00.630 ICD-9-CM: V45.89 DDD (degenerative disc disease), lumbar     ICD-10-CM: M51.36 
ICD-9-CM: 722.52 Osteopenia, unspecified location     ICD-10-CM: M85.80 ICD-9-CM: 733.90 Vitals BP Pulse Temp Resp SpO2 OB Status 110/60 71 97.9 °F (36.6 °C) (Oral) 14 97% Hysterectomy Smoking Status Never Smoker Vitals History Preferred Pharmacy Pharmacy Name Phone [facility] [address], [address] Dora Dent [phone] Your Updated Medication List  
  
   
This list is accurate as of 7/24/18  1:46 PM.  Always use your most recent med list.  
  
  
  
  
 aspirin 325 mg tablet Commonly known as:  ASPIRIN Take 1 Tab by mouth two (2) times a day. Take for 3 weeks for DVT prophylaxis. calcium carbonate 500 mg calcium (1,250 mg) tablet Commonly known as:  OS-BARBARA Take 1,500 mg by mouth daily. cephALEXin 500 mg capsule Commonly known as:  KEFLEX  
take 4 capsules by mouth ONE HOUR PRIOR TO PROCEDURE  
  
 cyclobenzaprine 10 mg tablet Commonly known as:  FLEXERIL Take 1 Tab by mouth two (2) times a day. ferrous fumarate 324 mg (106 mg iron) Tab One a day  
  
 lisinopril 10 mg tablet Commonly known as:  PRINIVIL, ZESTRIL  
TAKE 1 TABLET DAILY  
  
 meloxicam 15 mg tablet Commonly known as:  MOBIC  
TAKE 1 TABLET DAILY MIRALAX 17 gram packet Generic drug:  polyethylene glycol Take 17 g by mouth daily. naloxone 2 mg/actuation Spry Commonly known as:  [employer] Use 1 spray intranasally into 1 nostril. Use a new Narcan nasal spray for subsequent doses and administer into alternating nostrils. May repeat every 2 to 3 minutes as needed. PROLIA 60 mg/mL injection Generic drug:  denosumab 60 mg by SubCUTAneous route. sucralfate 100 mg/mL suspension Commonly known as:  Finney Stalling Take 5 mL by mouth four (4) times daily. traMADol 100 mg Tb24 Commonly known as:  ULTRAM-ER Take 1 Tab by mouth two (2) times a day. Max Daily Amount: 200 mg. Indications: Chronic Pain TYLENOL EXTRA STRENGTH 500 mg tablet Generic drug:  acetaminophen Take 1,000 mg by mouth every six (6) hours as needed for Pain. VITAMIN D3 1,000 unit tablet Generic drug:  cholecalciferol Take 1,000 Units by mouth five (5) days a week. zolpidem 10 mg tablet Commonly known as:  AMBIEN Take 0.5 Tabs by mouth nightly as needed for Sleep. Max Daily Amount: 5 mg. Follow-up Instructions Return if symptoms worsen or fail to improve. Patient Instructions Learning About Degenerative Disc Disease What is degenerative disc disease? Degenerative disc disease is not really a disease. It's a term used to describe the normal changes in your spinal discs as you age. Spinal discs are small, spongy discs that separate the bones (vertebrae) that make up the spine. The discs act as shock absorbers for the spine, so it can flex, bend, and twist. 
Degenerative disc disease can take place in one or more places along the spine. It most often occurs in the discs in the lower back and the neck. What causes it? As we age, our spinal discs break down, or degenerate. This breakdown causes the symptoms of degenerative disc disease in some people. When the discs break down, they can lose fluid and dry out, and their outer layers can have tiny cracks or tears. This leads to less padding and less space between the bones in the spine. The body reacts to this by making bony growths on the spine called bone spurs. These spurs can press on the spinal nerve roots or spinal cord. This can cause pain and can affect how well the nerves work. What are the symptoms? Many people with degenerative disc disease have no pain. But others have severe pain or other symptoms that limit their activities. Some of the most common symptoms are: 
· Pain in the back or neck. Where the pain occurs depends on which discs are affected. · Pain that gets worse when you move, such as bending over, reaching up, or twisting. · Pain that may occur in the rear end (buttocks), arm, or leg if a nerve is pinched. · Numbness or tingling in your arm or leg. How is it diagnosed? A doctor can often diagnose degenerative disc disease while doing a physical exam. If your exam shows no signs of a serious condition, imaging tests (such as an X-ray) aren't likely to help your doctor find the cause of your symptoms. Sometimes degenerative disc disease is found when an X-ray is taken for another reason, such as an injury or other health problem. But even if the doctor finds degenerative disc disease, that doesn't always mean that you will have symptoms. How is it treated? There are several things you can do at home to manage pain from this problem. · To relieve pain, use ice or heat (whichever feels better) on the affected area. ¨ Put ice or a cold pack on the area for 10 to 20 minutes at a time. Put a thin cloth between the ice and your skin. ¨ Put a warm water bottle, a heating pad set on low, or a warm cloth on your back. Put a thin cloth between the heating pad and your skin. Do not go to sleep with a heating pad on your skin. · Ask your doctor if you can take acetaminophen (such as Tylenol) or nonsteroidal anti-inflammatory drugs, such as ibuprofen or naproxen. Your doctor can prescribe stronger medicines if needed. Be safe with medicines. Read and follow all instructions on the label. · Get some exercise every day. Exercise is one of the best ways to help your back feel better and stay better. It's best to start each exercise slowly. You may notice a little soreness, and that's okay. But if an exercise makes your pain worse, stop doing it. Here are things you can try: ¨ Walking. It's the simplest and maybe the best activity for your back. It gets your blood moving and helps your muscles stay strong.  
¨ Exercises that gently stretch and strengthen your stomach, back, and leg muscles. The stronger those muscles are, the better they're able to protect your back. If you have constant or severe pain in your back or spine, you may need other treatments, such as physical therapy. In some cases, your doctor may suggest surgery. Follow-up care is a key part of your treatment and safety. Be sure to make and go to all appointments, and call your doctor if you are having problems. It's also a good idea to know your test results and keep a list of the medicines you take. Where can you learn more? Go to http://camilo-robert.info/. Enter D132 in the search box to learn more about \"Learning About Degenerative Disc Disease. \" Current as of: November 29, 2017 Content Version: 11.7 © 7378-8129 [employer]. Care instructions adapted under license by [employer] (which disclaims liability or warranty for this information). If you have questions about a medical condition or this instruction, always ask your healthcare professional. [place] [address] any warranty or liability for your use of this information. Introducing \[facility] [place] [facility]\"" & HEALTH SERVICES! Dear Jordin Pack: Thank you for requesting a Aloompa account. Our records indicate that you already have an active Aloompa account. You can access your account anytime at [email or web address] Did you know that you can access your hospital and ER discharge instructions at any time in Aloompa? You can also review all of your test results from your hospital stay or ER visit. Additional Information If you have questions, please visit the Frequently Asked Questions section of the Aloompa website at [email or web address] Remember, Aloompa is NOT to be used for urgent needs. For medical emergencies, dial 911. Now available from your iPhone and Android! Please provide this summary of care documentation to your next provider. Your primary care clinician is listed as [id number] [facility]. If you have any questions after today's visit, please call [phone].

## 2018-07-30 ENCOUNTER — TELEPHONE (OUTPATIENT)
Dept: FAMILY MEDICINE CLINIC | Age: 68
End: 2018-07-30

## 2018-07-30 NOTE — TELEPHONE ENCOUNTER
Mrs. Flowersdusty Avendanos came in to request Dr. Schumacher order a CT Scan of thoracic/lumbar back & one for pelvic pain.

## 2018-08-02 DIAGNOSIS — Z98.890 STATUS POST LUMBAR SURGERY: Primary | ICD-10-CM

## 2018-08-05 DIAGNOSIS — M45.9 RHEUMATOID ARTHRITIS INVOLVING VERTEBRA WITH POSITIVE RHEUMATOID FACTOR (HCC): Chronic | ICD-10-CM

## 2018-08-06 RX ORDER — CYCLOBENZAPRINE HCL 10 MG
TABLET ORAL
Qty: 60 TAB | Refills: 1 | Status: SHIPPED | OUTPATIENT
Start: 2018-08-06 | End: 2018-08-16

## 2018-08-09 ENCOUNTER — HOSPITAL ENCOUNTER (OUTPATIENT)
Dept: CT IMAGING | Age: 68
Discharge: HOME | End: 2018-08-09
Attending: FAMILY MEDICINE
Payer: MEDICARE

## 2018-08-09 DIAGNOSIS — Z98.890 STATUS POST LUMBAR SURGERY: ICD-10-CM

## 2018-08-09 PROCEDURE — 72131 CT LUMBAR SPINE W/O DYE: CPT

## 2018-08-16 ENCOUNTER — OFFICE VISIT (OUTPATIENT)
Dept: FAMILY MEDICINE CLINIC | Age: 68
End: 2018-08-16

## 2018-08-16 VITALS
OXYGEN SATURATION: 96 % | SYSTOLIC BLOOD PRESSURE: 108 MMHG | DIASTOLIC BLOOD PRESSURE: 68 MMHG | HEIGHT: 63 IN | BODY MASS INDEX: 29.55 KG/M2 | TEMPERATURE: 98 F | WEIGHT: 166.8 LBS | RESPIRATION RATE: 12 BRPM | HEART RATE: 65 BPM

## 2018-08-16 DIAGNOSIS — Z98.890 STATUS POST LUMBAR SURGERY: Primary | ICD-10-CM

## 2018-08-16 RX ORDER — TRAMADOL HYDROCHLORIDE 50 MG/1
50 TABLET ORAL
Qty: 120 TAB | Refills: 1 | Status: SHIPPED | OUTPATIENT
Start: 2018-08-16 | End: 2018-12-06

## 2018-08-16 RX ORDER — TRAMADOL HYDROCHLORIDE 50 MG/1
50 TABLET ORAL
Qty: 40 TAB | Refills: 0 | Status: SHIPPED | OUTPATIENT
Start: 2018-08-16 | End: 2018-08-31 | Stop reason: SDUPTHER

## 2018-08-16 NOTE — PATIENT INSTRUCTIONS
Arthritis: Care Instructions  Your Care Instructions  Arthritis, also called osteoarthritis, is a breakdown of the cartilage that cushions your joints. When the cartilage wears down, your bones rub against each other. This causes pain and stiffness. Many people have some arthritis as they age. Arthritis most often affects the joints of the spine, hands, hips, knees, or feet. You can take simple measures to protect your joints, ease your pain, and help you stay active. Follow-up care is a key part of your treatment and safety. Be sure to make and go to all appointments, and call your doctor if you are having problems. It's also a good idea to know your test results and keep a list of the medicines you take. How can you care for yourself at home? · Stay at a healthy weight. Being overweight puts extra strain on your joints. · Talk to your doctor or physical therapist about exercises that will help ease joint pain. ¨ Stretch. You may enjoy gentle forms of yoga to help keep your joints and muscles flexible. ¨ Walk instead of jog. Other types of exercise that are less stressful on the joints include riding a bicycle, swimming, jackie chi, or water exercise. ¨ Lift weights. Strong muscles help reduce stress on your joints. Stronger thigh muscles, for example, take some of the stress off of the knees and hips. Learn the right way to lift weights so you do not make joint pain worse. · Take your medicines exactly as prescribed. Call your doctor if you think you are having a problem with your medicine. · Take pain medicines exactly as directed. ¨ If the doctor gave you a prescription medicine for pain, take it as prescribed. ¨ If you are not taking a prescription pain medicine, ask your doctor if you can take an over-the-counter medicine. · Use a cane, crutch, walker, or another device if you need help to get around. These can help rest your joints.  You also can use other things to make life easier, such as a higher toilet seat and padded handles on kitchen utensils. · Do not sit in low chairs, which can make it hard to get up. · Put heat or cold on your sore joints as needed. Use whichever helps you most. You also can take turns with hot and cold packs. ¨ Apply heat 2 or 3 times a day for 20 to 30 minutes-using a heating pad, hot shower, or hot pack-to relieve pain and stiffness. ¨ Put ice or a cold pack on your sore joint for 10 to 20 minutes at a time. Put a thin cloth between the ice and your skin. When should you call for help? Call your doctor now or seek immediate medical care if:    · You have sudden swelling, warmth, or pain in any joint.     · You have joint pain and a fever or rash.     · You have such bad pain that you cannot use a joint.    Watch closely for changes in your health, and be sure to contact your doctor if:    · You have mild joint symptoms that continue even with more than 6 weeks of care at home.     · You have stomach pain or other problems with your medicine. Where can you learn more? Go to http://camilo-robert.info/. Enter Z346 in the search box to learn more about \"Arthritis: Care Instructions. \"  Current as of: October 10, 2017  Content Version: 11.7  © 6848-4405 [employer]. Care instructions adapted under license by [employer] (which disclaims liability or warranty for this information). If you have questions about a medical condition or this instruction, always ask your healthcare professional. [place] [address] any warranty or liability for your use of this information.

## 2018-08-16 NOTE — MR AVS SNAPSHOT
[facility] [address] [place] [address] 
[phone] Patient: Hans Gracia MRN: [id number] FSI:[id number] Visit Information Date & Time Provider Department Dept. Phone Encounter #  
 8/16/2018  8:30 AM Lisa Mcclain MD [address] [id number] Follow-up Instructions Return if symptoms worsen or fail to improve. Upcoming Health Maintenance Date Due  
 GLAUCOMA SCREENING Q2Y 5/6/2016 MEDICARE YEARLY EXAM 6/29/2018 Pneumococcal 65+ Low/Medium Risk (2 of 2 - PPSV23) 7/10/2018 Influenza Age 5 to Adult 8/1/2018 BREAST CANCER SCRN MAMMOGRAM 7/18/2019 COLONOSCOPY 1/13/2024 DTaP/Tdap/Td series (3 - Td) 9/14/2027 Allergies as of 8/16/2018  Review Complete On: 8/16/2018 By: Lisa Mcclain MD  
  
 Severity Noted Reaction Type Reactions Celebrex [Celecoxib] High 11/06/2014   Systemic Swelling Shellfish Derived High 05/30/2017   Systemic Swelling \"makes me feel puffy\" Sulfa (Sulfonamide Antibiotics) High 05/18/2015    Hives, Swelling Lips swelling Humira [Adalimumab]  06/27/2013    Other (comments) Lupus Optiray 320 [Ioversol]  02/16/2013   Not Verified Hives, Itching Pt states when she gets iv contrast she itches a little from hives? Penicillins  02/19/2013    Hives Current Immunizations  Never Reviewed Name Date Influenza Vaccine 11/15/2007 12:00 AM  
 Pneumococcal Conjugate (PCV-13) 7/10/2017 Pneumococcal Vaccine (Unspecified Type) 10/31/2010 Tdap 9/14/2017, 11/15/2007 12:00 AM  
  
 Not reviewed this visit You Were Diagnosed With   
  
 Codes Comments Status post lumbar surgery    -  Primary ICD-10-CM: C58.398 ICD-9-CM: V45.89 Vitals BP Pulse Temp Resp Height(growth percentile) Weight(growth percentile) 108/68 65 98 °F (36.7 °C) (Oral) 12 5' 3\" (1.6 m) 166 lb 12.8 oz (75.7 kg) SpO2 BMI OB Status Smoking Status 96% 29.55 kg/m2 Hysterectomy Never Smoker Vitals History BMI and BSA Data Body Mass Index Body Surface Area  
 29.55 kg/m 2 1.83 m 2 Preferred Pharmacy Pharmacy Name Phone [facility] [address], [address] [phone] Your Updated Medication List  
  
   
This list is accurate as of 8/16/18  8:36 AM.  Always use your most recent med list.  
  
  
  
  
 aspirin 325 mg tablet Commonly known as:  ASPIRIN Take 1 Tab by mouth two (2) times a day. Take for 3 weeks for DVT prophylaxis. calcium carbonate 500 mg calcium (1,250 mg) tablet Commonly known as:  OS-BARBARA Take 1,500 mg by mouth daily. cephALEXin 500 mg capsule Commonly known as:  Vickie Pastor  
take 4 capsules by mouth ONE HOUR PRIOR TO PROCEDURE  
  
 ferrous fumarate 324 mg (106 mg iron) Tab One a day  
  
 lisinopril 10 mg tablet Commonly known as:  PRINIVIL, ZESTRIL  
TAKE 1 TABLET DAILY  
  
 meloxicam 15 mg tablet Commonly known as:  MOBIC  
TAKE 1 TABLET DAILY MIRALAX 17 gram packet Generic drug:  polyethylene glycol Take 17 g by mouth daily. naloxone 2 mg/actuation Spry Commonly known as:  [facility] Use 1 spray intranasally into 1 nostril. Use a new Narcan nasal spray for subsequent doses and administer into alternating nostrils. May repeat every 2 to 3 minutes as needed. PROLIA 60 mg/mL injection Generic drug:  denosumab 60 mg by SubCUTAneous route. sucralfate 100 mg/mL suspension Commonly known as:  Jovanny Vasquez Take 5 mL by mouth four (4) times daily. * traMADol 50 mg tablet Commonly known as:  ULTRAM  
Take 1 Tab by mouth every six (6) hours as needed for Pain. Max Daily Amount: 200 mg.  
  
 * traMADol 50 mg tablet Commonly known as:  ULTRAM  
Take 1 Tab by mouth every six (6) hours as needed for Pain. Max Daily Amount: 200 mg. TYLENOL EXTRA STRENGTH 500 mg tablet Generic drug:  acetaminophen Take 1,000 mg by mouth every six (6) hours as needed for Pain. VITAMIN D3 1,000 unit tablet Generic drug:  cholecalciferol Take 1,000 Units by mouth five (5) days a week. zolpidem 10 mg tablet Commonly known as:  AMBIEN Take 0.5 Tabs by mouth nightly as needed for Sleep. Max Daily Amount: 5 mg.  
  
 * Notice: This list has 2 medication(s) that are the same as other medications prescribed for you. Read the directions carefully, and ask your doctor or other care provider to review them with you. Prescriptions Printed Refills  
 traMADol (ULTRAM) 50 mg tablet 1 Sig: Take 1 Tab by mouth every six (6) hours as needed for Pain. Max Daily Amount: 200 mg. Class: Print Route: Oral  
 traMADol (ULTRAM) 50 mg tablet 0 Sig: Take 1 Tab by mouth every six (6) hours as needed for Pain. Max Daily Amount: 200 mg. Class: Print Route: Oral  
  
Follow-up Instructions Return if symptoms worsen or fail to improve. Patient Instructions Arthritis: Care Instructions Your Care Instructions Arthritis, also called osteoarthritis, is a breakdown of the cartilage that cushions your joints. When the cartilage wears down, your bones rub against each other. This causes pain and stiffness. Many people have some arthritis as they age. Arthritis most often affects the joints of the spine, hands, hips, knees, or feet. You can take simple measures to protect your joints, ease your pain, and help you stay active. Follow-up care is a key part of your treatment and safety. Be sure to make and go to all appointments, and call your doctor if you are having problems. It's also a good idea to know your test results and keep a list of the medicines you take. How can you care for yourself at home? · Stay at a healthy weight. Being overweight puts extra strain on your joints. · Talk to your doctor or physical therapist about exercises that will help ease joint pain. ¨ Stretch. You may enjoy gentle forms of yoga to help keep your joints and muscles flexible. ¨ Walk instead of jog. Other types of exercise that are less stressful on the joints include riding a bicycle, swimming, jackie chi, or water exercise. ¨ Lift weights. Strong muscles help reduce stress on your joints. Stronger thigh muscles, for example, take some of the stress off of the knees and hips. Learn the right way to lift weights so you do not make joint pain worse. · Take your medicines exactly as prescribed. Call your doctor if you think you are having a problem with your medicine. · Take pain medicines exactly as directed. ¨ If the doctor gave you a prescription medicine for pain, take it as prescribed. ¨ If you are not taking a prescription pain medicine, ask your doctor if you can take an over-the-counter medicine. · Use a cane, crutch, walker, or another device if you need help to get around. These can help rest your joints. You also can use other things to make life easier, such as a higher toilet seat and padded handles on kitchen utensils. · Do not sit in low chairs, which can make it hard to get up. · Put heat or cold on your sore joints as needed. Use whichever helps you most. You also can take turns with hot and cold packs. ¨ Apply heat 2 or 3 times a day for 20 to 30 minutes-using a heating pad, hot shower, or hot pack-to relieve pain and stiffness. ¨ Put ice or a cold pack on your sore joint for 10 to 20 minutes at a time. Put a thin cloth between the ice and your skin. When should you call for help?  
Call your doctor now or seek immediate medical care if: 
  · You have sudden swelling, warmth, or pain in any joint.  
  · You have joint pain and a fever or rash.  
  · You have such bad pain that you cannot use a joint.  
 Watch closely for changes in your health, and be sure to contact your doctor if: 
  · You have mild joint symptoms that continue even with more than 6 weeks of care at home.  
  · You have stomach pain or other problems with your medicine. Where can you learn more? Go to http://camilo-robert.info/. Enter D287 in the search box to learn more about \"Arthritis: Care Instructions. \" Current as of: October 10, 2017 Content Version: 11.7 © 3268-3358 [employer] Care instructions adapted under license by [employer] (which disclaims liability or warranty for this information). If you have questions about a medical condition or this instruction, always ask your healthcare professional. [address] any warranty or liability for your use of this information. Introducing [facility]! Dear Jhonatan Myles: Thank you for requesting a Xillian[employer] account. Our records indicate that you already have an active Xillian[employer] account. You can access your account anytime at [email or web address] Did you know that you can access your hospital and ER discharge instructions at any time in XillianTV? You can also review all of your test results from your hospital stay or ER visit. Additional Information If you have questions, please visit the Frequently Asked Questions section of the [employer] website at [email or web address] Remember, XillianTV is NOT to be used for urgent needs. For medical emergencies, dial 911. Now available from your iPhone and Android! Please provide this summary of care documentation to your next provider. Your primary care clinician is listed as [address]. If you have any questions after today's visit, please call [phone].

## 2018-08-16 NOTE — PROGRESS NOTES
Carlos Fragoso is a 77 y.o. female  presents for reprint of her tramadol RX. She mailed it in but it was for ER he [facility] will not pay for ER. Will rewrite for generic tramadol 50. Allergies   Allergen Reactions    Celebrex [Celecoxib] Swelling    Shellfish Derived Swelling     \"makes me feel puffy\"    Sulfa (Sulfonamide Antibiotics) Hives and Swelling     Lips swelling    Humira [Adalimumab] Other (comments)     Lupus    Optiray 320 [Ioversol] Hives and Itching     Pt states when she gets iv contrast she itches a little from hives?  Penicillins Hives     Outpatient Prescriptions Marked as Taking for the 8/16/18 encounter (Office Visit) with Feliica Webber MD   Medication Sig Dispense Refill    traMADol (ULTRAM) 50 mg tablet Take 1 Tab by mouth every six (6) hours as needed for Pain. Max Daily Amount: 200 mg. 120 Tab 1    traMADol (ULTRAM) 50 mg tablet Take 1 Tab by mouth every six (6) hours as needed for Pain. Max Daily Amount: 200 mg. 40 Tab 0    denosumab (PROLIA) 60 mg/mL injection 60 mg by SubCUTAneous route.  lisinopril (PRINIVIL, ZESTRIL) 10 mg tablet TAKE 1 TABLET DAILY 90 Tab 1    meloxicam (MOBIC) 15 mg tablet TAKE 1 TABLET DAILY 90 Tab 1    zolpidem (AMBIEN) 10 mg tablet Take 0.5 Tabs by mouth nightly as needed for Sleep. Max Daily Amount: 5 mg. 30 Tab 1    ferrous fumarate 324 mg (106 mg iron) tab One a day 100 Tab 1    aspirin (ASPIRIN) 325 mg tablet Take 1 Tab by mouth two (2) times a day. Take for 3 weeks for DVT prophylaxis. 42 Tab 0    acetaminophen (TYLENOL EXTRA STRENGTH) 500 mg tablet Take 1,000 mg by mouth every six (6) hours as needed for Pain.  cholecalciferol (VITAMIN D3) 1,000 unit tablet Take 1,000 Units by mouth five (5) days a week.  polyethylene glycol (MIRALAX) 17 gram packet Take 17 g by mouth daily.  sucralfate (CARAFATE) 100 mg/mL suspension Take 5 mL by mouth four (4) times daily.  414 mL 2    calcium carbonate (OS-BARBARA) 500 mg calcium (1,250 mg) tablet Take 1,500 mg by mouth daily.        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    H/O calcium pyrophosphate deposition disease (CPPD) Z87.39    IBS (irritable bowel syndrome) K58.9    RA (rheumatoid arthritis) ([address] M06.9    Sicca syndrome (HCC) M35.00    Osteoarthritis of right hip M16.11    Pain management contract agreement Z02.89    ACP (advance care planning) Z71.89    Chronic left shoulder pain M25.512, G89.29    Osteopenia M85.80    Osteopenia of multiple sites M85.89    Osteoarthritis of left hip M16.12     Past Medical History:   Diagnosis Date    Abdominal abscess 2009    Arthritis     Rheumatoid    Autoimmune disease ([address]     Medically induced Lupus    Back pain     Chronic pain     lower back    Colitis     Diverticulitis     Failed back syndrome     GERD (gastroesophageal reflux disease)     Hypertension     when on cyclosporins    Ill-defined condition     large torus roof of mouth    Irritable bowel syndrome     Osteoporosis     Pneumonia     RA ([facility]umatoid arthritis) [facility]     Seizures ([facility] 6-1-2008    one episode- after high dose of epinepherine     Social History     Social History    Marital status:      Spouse name: N/A    Number of children: N/A    Years of education: N/A     Social History Main Topics    Smoking status: Never Smoker    Smokeless tobacco: Never Used    Alcohol use No    Drug use: No    Sexual activity: No     Other Topics Concern    None     Social History Narrative     Family History   Problem Relation Age of Onset    Other Other      Orthopedic (Sister)    Arthritis-osteo Sister     Cancer Neg Hx     Diabetes Neg Hx     Heart Disease Neg Hx     Heart Attack Neg Hx     Hypertension Neg Hx     Stroke Neg Hx     Malignant Hyperthermia Neg Hx     Pseudocholinesterase Deficiency Neg Hx     Delayed Awakening Neg Hx     Post-op Nausea/Vomiting Neg Hx     Emergence Delirium Neg Hx     Post-op Cognitive Dysfunction Neg Hx         Review of Systems   Constitutional: Negative for chills and fever. Cardiovascular: Negative for chest pain and palpitations. Gastrointestinal: Negative for nausea and vomiting. Musculoskeletal: Positive for back pain, joint pain and neck pain. Neurological: Positive for sensory change. Negative for dizziness, speech change, focal weakness, seizures, loss of consciousness and headaches. Psychiatric/Behavioral: Negative. Vitals:    08/16/18 0808   BP: 108/68   Pulse: 65   Resp: 12   Temp: 98 °F (36.7 °C)   TempSrc: Oral   SpO2: 96%   Weight: 166 lb 12.8 oz (75.7 kg)   Height: 5' 3\" (1.6 m)   PainSc:   3   PainLoc: Back       Physical Exam   Constitutional: She is oriented to person, place, and time and well-developed, well-nourished, and in no distress. Neurological: She is alert and oriented to person, place, and time. Skin: Skin is warm and dry. Psychiatric: Mood, memory, affect and judgment normal.   Nursing note and vitals reviewed. Assessment/Plan      ICD-10-CM ICD-9-CM    1. Status post lumbar surgery Z98.890 V45.89 traMADol (ULTRAM) 50 mg tablet      traMADol (ULTRAM) 50 mg tablet     I have discussed the diagnosis with the patient and the intended plan of care as seen in the above orders. The patient has received an after-visit summary and questions were answered concerning future plans. I have discussed medication, side effects, and warnings with the patient in detail. The patient verbalized understanding and is in agreement with the plan of care. The patient will contact the office with any additional concerns.     Scans reviewed with patient    Follow-up Disposition:  Return if symptoms worsen or fail to improve.  lab results and schedule of future lab studies reviewed with patient    Rossi Delgado MD

## 2018-08-16 NOTE — PROGRESS NOTES
Patient here for f/u on her back pain and her CT results. Her prescription for Tramadol  mg they will not cover this medication, she will need a new prescription for Tramadol 100 mg tab is asking for a 90 day to send to [facility] and a 30 day for local pharmacy. 1. Have you been to the ER, urgent care clinic since your last visit? Hospitalized since your last visit? No  2. Have you seen or consulted any other health care providers outside of the [address] [place] [address] since your last visit? Include any pap smears or colon screening. Yes When: August 2018 Where: Dr. Venkatesh Ko (ortho) Reason for visit: back pain    Medication reconciliation has been completed with patient. Care team discussed/updated as well as pharmacy. Care everywhere has been ran. Health Maintenance reviewed - Will request report for Glaucoma asked to set up [address], will discuss need for pneumonia vaccine with provider.

## 2018-08-30 ENCOUNTER — TELEPHONE (OUTPATIENT)
Dept: FAMILY MEDICINE CLINIC | Age: 68
End: 2018-08-30

## 2018-08-30 NOTE — TELEPHONE ENCOUNTER
Mrs. Mi Womack called requesting a msg be given to Dr. Tamera Cunha about her thyroid levels. She went to see her OB/GYN and they did labs on her. Vit D3, Calcium, Thyroid. She informed me the ob provider told her that her thyroid levels are very elevated. With these results she could have a possible tumor. She is very concerned thinking this could be causing the pain she is having. She states she is seeing Dr. Maite Douglas, Endocrinology tomorrow. She will keep us updated with the condition.

## 2018-08-31 ENCOUNTER — OFFICE VISIT (OUTPATIENT)
Dept: ORTHOPEDIC SURGERY | Age: 68
End: 2018-08-31

## 2018-08-31 VITALS
SYSTOLIC BLOOD PRESSURE: 109 MMHG | HEIGHT: 63 IN | BODY MASS INDEX: 29.59 KG/M2 | OXYGEN SATURATION: 95 % | WEIGHT: 167 LBS | RESPIRATION RATE: 18 BRPM | HEART RATE: 88 BPM | TEMPERATURE: 98.4 F | DIASTOLIC BLOOD PRESSURE: 77 MMHG

## 2018-08-31 DIAGNOSIS — S22.089A CLOSED FRACTURE OF TWELFTH THORACIC VERTEBRA, UNSPECIFIED FRACTURE MORPHOLOGY, INITIAL ENCOUNTER (HCC): ICD-10-CM

## 2018-08-31 DIAGNOSIS — Z98.890 HISTORY OF SURGICAL PROCEDURE: ICD-10-CM

## 2018-08-31 DIAGNOSIS — M43.16 SPONDYLOLISTHESIS OF LUMBAR REGION: ICD-10-CM

## 2018-08-31 DIAGNOSIS — R29.898: ICD-10-CM

## 2018-08-31 DIAGNOSIS — S22.009A CLOSED FRACTURE OF THORACIC VERTEBRA, UNSPECIFIED FRACTURE MORPHOLOGY, UNSPECIFIED THORACIC VERTEBRAL LEVEL, INITIAL ENCOUNTER (HCC): ICD-10-CM

## 2018-08-31 DIAGNOSIS — M80.00XA POSTMENOPAUSAL OSTEOPOROSIS WITH PATHOLOGICAL FRACTURE: ICD-10-CM

## 2018-08-31 DIAGNOSIS — R10.9 ABDOMINAL PAIN, UNSPECIFIED ABDOMINAL LOCATION: ICD-10-CM

## 2018-08-31 DIAGNOSIS — M54.89 MIDLINE BACK PAIN, UNSPECIFIED BACK LOCATION, UNSPECIFIED CHRONICITY: ICD-10-CM

## 2018-08-31 DIAGNOSIS — Z98.890 HX OF LAMINECTOMY: ICD-10-CM

## 2018-08-31 DIAGNOSIS — M54.6 THORACIC SPINE PAIN: ICD-10-CM

## 2018-08-31 DIAGNOSIS — R07.9 CHEST PAIN, UNSPECIFIED TYPE: Primary | ICD-10-CM

## 2018-08-31 DIAGNOSIS — M54.6 PAIN IN THORACIC SPINE: ICD-10-CM

## 2018-08-31 NOTE — MR AVS SNAPSHOT
[address] [place] [address] [address] [place] [address] 
[phone] Patient: Lis Hernandez MRN: [id number] NPF:[id number] Visit Information Date & Time Provider Department Dept. Phone Encounter #  
 8/31/2018  1:30 PM Malachi Benavidez MD [facility] [phone] [id number] Follow-up Instructions Return for MRI/CT f/u. Follow-up and Disposition History Upcoming Health Maintenance Date Due  
 MEDICARE YEARLY EXAM 6/29/2018 Pneumococcal 65+ Low/Medium Risk (2 of 2 - PPSV23) 7/10/2018 Influenza Age 5 to Adult 8/1/2018 BREAST CANCER SCRN MAMMOGRAM 7/18/2019 GLAUCOMA SCREENING Q2Y 8/11/2020 COLONOSCOPY 1/13/2024 DTaP/Tdap/Td series (3 - Td) 9/14/2027 Allergies as of 8/31/2018  Review Complete On: 8/31/2018 By: Malachi Benavidez MD  
  
 Severity Noted Reaction Type Reactions Celebrex [Celecoxib] High 11/06/2014   Systemic Swelling Shellfish Derived High 05/30/2017   Systemic Swelling \"makes me feel puffy\" Sulfa (Sulfonamide Antibiotics) High 05/18/2015    Hives, Swelling Lips swelling Humira [Adalimumab]  06/27/2013    Other (comments) Lupus Optiray 320 [Ioversol]  02/16/2013   Not Verified Hives, Itching Pt states when she gets iv contrast she itches a little from hives? Penicillins  02/19/2013    Hives Current Immunizations  Never Reviewed Name Date Influenza Vaccine 11/15/2007 12:00 AM  
 Pneumococcal Conjugate (PCV-13) 7/10/2017 Pneumococcal Vaccine (Unspecified Type) 10/31/2010 Tdap 9/14/2017, 11/15/2007 12:00 AM  
  
 Not reviewed this visit You Were Diagnosed With   
  
 Codes Comments Spondylolisthesis of lumbar region    -  Primary ICD-10-CM: M43.16 
ICD-9-CM: 738.4 L1-2 Hx of laminectomy     ICD-10-CM: Z98.890 ICD-9-CM: V45.89 Thoracic spine pain     ICD-10-CM: M54.6 ICD-9-CM: 724.1 Vitals BP Pulse Temp Resp Height(growth percentile) Weight(growth percentile) 109/77 88 98.4 °F (36.9 °C) 18 5' 3\" (1.6 m) 167 lb (75.8 kg) SpO2 BMI OB Status Smoking Status 95% 29.58 kg/m2 Hysterectomy Never Smoker BMI and BSA Data Body Mass Index Body Surface Area  
 29.58 kg/m 2 1.84 m 2 Preferred Pharmacy Pharmacy Name Phone [facility] [address], [address] [place] [phone] Your Updated Medication List  
  
   
This list is accurate as of 8/31/18  3:52 PM.  Always use your most recent med list.  
  
  
  
  
 aspirin 325 mg tablet Commonly known as:  ASPIRIN Take 1 Tab by mouth two (2) times a day. Take for 3 weeks for DVT prophylaxis. calcium carbonate 500 mg calcium (1,250 mg) tablet Commonly known as:  OS-BARBARA Take 1,500 mg by mouth daily. ferrous fumarate 324 mg (106 mg iron) Tab One a day  
  
 lisinopril 10 mg tablet Commonly known as:  PRINIVIL, ZESTRIL  
TAKE 1 TABLET DAILY  
  
 meloxicam 15 mg tablet Commonly known as:  MOBIC  
TAKE 1 TABLET DAILY MIRALAX 17 gram packet Generic drug:  polyethylene glycol Take 17 g by mouth daily. naloxone 2 mg/actuation Spry Commonly known as:  [employer] Use 1 spray intranasally into 1 nostril. Use a new Narcan nasal spray for subsequent doses and administer into alternating nostrils. May repeat every 2 to 3 minutes as needed. PROLIA 60 mg/mL injection Generic drug:  denosumab 60 mg by SubCUTAneous route. sucralfate 100 mg/mL suspension Commonly known as:  Bibi Deep Take 5 mL by mouth four (4) times daily. traMADol 50 mg tablet Commonly known as:  ULTRAM  
Take 1 Tab by mouth every six (6) hours as needed for Pain. Max Daily Amount: 200 mg.  
  
 TYLENOL EXTRA STRENGTH 500 mg tablet Generic drug:  acetaminophen Take 1,000 mg by mouth every six (6) hours as needed for Pain. VITAMIN D3 1,000 unit tablet Generic drug:  cholecalciferol Take 1,000 Units by mouth five (5) days a week. zolpidem 10 mg tablet Commonly known as:  AMBIEN Take 0.5 Tabs by mouth nightly as needed for Sleep. Max Daily Amount: 5 mg. We Performed the Following AMB POC XRAY, SPINE, LUMBOSACRAL; 4+ [id number] CPT(R)] POC XRAY, SPINE; THOR-LUMB SP, 2 VIEW [49400 CPT(R)] Follow-up Instructions Return for MRI/CT f/u. To-Do List   
 08/31/2018 Imaging:  CT CHEST ABD PELV WO CONT   
  
 09/10/2018 7:30 AM  
  Appointment with [facility] CT RM 1 at [facility] ([phone]) ORAL CONTRAST PREP   2 [place] from radiology  no later than 24 hours prior to study date and time. DIET RESTRICTIONS  Nothing to eat 4 hours prior to study Drink plenty of clear liquids May to take meds May drink oral contrast if directed  GENERAL INSTRUCTIONS  If you were given premedications for IV contrast to take prior to having your study, please arrange to have someone drive you to your appointment. If you have had a creatinine level drawn within the past 30 days, please bring most recent results to your appt. MEDICATIONS  Bring a complete list of all medications you are currently taking to include prescriptions, over-the-counter meds, herbals, vitamins & any dietary supplements. RELATED STUDY INFORMATION  Bring any films, CD's, and reports related with you on the day of your exam.  This only includes studies done outside of [address] [place] [address], [place] [address], [place], and [facility]. QUESTIONS  Notify the CT Department if you have any questions concerning your study. [place] - [phone] [facility] [place] [facility] - [phone] Patient Instructions Learning About How to Have a Healthy Back What causes back pain? Back pain is often caused by overuse, strain, or injury.  For example, people often hurt their backs playing sports or working in the yard, being jolted in a car accident, or lifting something too heavy. Aging plays a part too. Your bones and muscles tend to lose strength as you age, which makes injury more likely. The spongy discs between the bones of the spine (vertebrae) may suffer from wear and tear and no longer provide enough cushion between the bones. A disc that bulges or breaks open (herniated disc) can press on nerves, causing back pain. In some people, back pain is the result of arthritis, broken vertebrae caused by bone loss (osteoporosis), illness, or a spine problem. Although most people have back pain at one time or another, there are steps you can take to make it less likely. How can you have a healthy back? Reduce stress on your back through good posture Slumping or slouching alone may not cause low back pain. But after the back has been strained or injured, bad posture can make pain worse. · Sleep in a position that maintains your back's normal curves and on a mattress that feels comfortable. Sleep on your side with a pillow between your knees, or sleep on your back with a pillow under your knees. These positions can reduce strain on your back. · Stand and sit up straight. \"Good posture\" generally means your ears, shoulders, and hips are in a straight line. · If you must stand for a long time, put one foot on a stool, ledge, or box. Switch feet every now and then. · Sit in a chair that is low enough to let you place both feet flat on the floor with both knees nearly level with your hips. If your chair or desk is too high, use a footrest to raise your knees. Place a small pillow, a rolled-up towel, or a lumbar roll in the curve of your back if you need extra support. · Try a kneeling chair, which helps tilt your hips forward. This takes pressure off your lower back. · Try sitting on an exercise ball.  It can rock from side to side, which helps keep your back loose. · When driving, keep your knees nearly level with your hips. Sit straight, and drive with both hands on the steering wheel. Your arms should be in a slightly bent position. Reduce stress on your back through careful lifting · Squat down, bending at the hips and knees only. If you need to, put one knee to the floor and extend your other knee in front of you, bent at a right angle (half kneeling). · Press your chest straight forward. This helps keep your upper back straight while keeping a slight arch in your low back. · Hold the load as close to your body as possible, at the level of your belly button (navel). · Use your feet to change direction, taking small steps. · Lead with your hips as you change direction. Keep your shoulders in line with your hips as you move. · Set down your load carefully, squatting with your knees and hips only. Exercise and stretch your back · Do some exercise on most days of the week, if your doctor says it is okay. You can walk, run, swim, or cycle. · Stretch your back muscles. Here are a few exercises to try: ¨ Lie on your back, and gently pull one bent knee to your chest. Put that foot back on the floor, and then pull the other knee to your chest. 
¨ Do pelvic tilts. Lie on your back with your knees bent. Tighten your stomach muscles. Pull your belly button (navel) in and up toward your ribs. You should feel like your back is pressing to the floor and your hips and pelvis are slightly lifting off the floor. Hold for 6 seconds while breathing smoothly. ¨ Sit with your back flat against a wall. · Keep your core muscles strong. The muscles of your back, belly (abdomen), and buttocks support your spine. ¨ Pull in your belly and imagine pulling your navel toward your spine. Hold this for 6 seconds, then relax. Remember to keep breathing normally as you tense your muscles. ¨ Do curl-ups. Always do them with your knees bent.  Keep your low back on the floor, and curl your shoulders toward your knees using a smooth, slow motion. Keep your arms folded across your chest. If this bothers your neck, try putting your hands behind your neck (not your head), with your elbows spread apart. ¨ Lie on your back with your knees bent and your feet flat on the floor. Tighten your belly muscles, and then push with your feet and raise your buttocks up a few inches. Hold this position 6 seconds as you continue to breathe normally, then lower yourself slowly to the floor. Repeat 8 to 12 times. ¨ If you like group exercise, try Pilates or yoga. These classes have poses that strengthen the core muscles. Lead a healthy lifestyle · Stay at a healthy weight to avoid strain on your back. · Do not smoke. Smoking increases the risk of osteoporosis, which weakens the spine. If you need help quitting, talk to your doctor about stop-smoking programs and medicines. These can increase your chances of quitting for good. Where can you learn more? Go to http://camilo-robert.info/. Enter L315 in the search box to learn more about \"Learning About How to Have a Healthy Back. \" Current as of: November 29, 2017 Content Version: 11.7 © 0359-3572 [employer]. Care instructions adapted under license by [employer] (which disclaims liability or warranty for this information). If you have questions about a medical condition or this instruction, always ask your healthcare professional. [place] [address] any warranty or liability for your use of this information. Introducing [facility] & HEALTH SERVICES! Dear Obed Jones: Thank you for requesting a [employer] account. Our records indicate that you already have an active [employer] account. You can access your account anytime at [email or web address] Did you know that you can access your hospital and ER discharge instructions at any time in [employer]? You can also review all of your test results from your hospital stay or ER visit. Additional Information If you have questions, please visit the Frequently Asked Questions section of the [employer] website at [email or web address] Remember, [employer] is NOT to be used for urgent needs. For medical emergencies, dial 911. Now available from your iPhone and Android! Please provide this summary of care documentation to your next provider. Your primary care clinician is listed as [id number] [facility]. If you have any questions after today's visit, please call [phone].
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
## 2018-08-31 NOTE — PATIENT INSTRUCTIONS
Learning About How to Have a Healthy Back What causes back pain? Back pain is often caused by overuse, strain, or injury. For example, people often hurt their backs playing sports or working in the yard, being jolted in a car accident, or lifting something too heavy. Aging plays a part too. Your bones and muscles tend to lose strength as you age, which makes injury more likely. The spongy discs between the bones of the spine (vertebrae) may suffer from wear and tear and no longer provide enough cushion between the bones. A disc that bulges or breaks open (herniated disc) can press on nerves, causing back pain. In some people, back pain is the result of arthritis, broken vertebrae caused by bone loss (osteoporosis), illness, or a spine problem. Although most people have back pain at one time or another, there are steps you can take to make it less likely. How can you have a healthy back? Reduce stress on your back through good posture Slumping or slouching alone may not cause low back pain. But after the back has been strained or injured, bad posture can make pain worse. · Sleep in a position that maintains your back's normal curves and on a mattress that feels comfortable. Sleep on your side with a pillow between your knees, or sleep on your back with a pillow under your knees. These positions can reduce strain on your back. · Stand and sit up straight. \"Good posture\" generally means your ears, shoulders, and hips are in a straight line. · If you must stand for a long time, put one foot on a stool, ledge, or box. Switch feet every now and then. · Sit in a chair that is low enough to let you place both feet flat on the floor with both knees nearly level with your hips. If your chair or desk is too high, use a footrest to raise your knees. Place a small pillow, a rolled-up towel, or a lumbar roll in the curve of your back if you need extra support. · Try a kneeling chair, which helps tilt your hips forward. This takes pressure off your lower back. · Try sitting on an exercise ball. It can rock from side to side, which helps keep your back loose. · When driving, keep your knees nearly level with your hips. Sit straight, and drive with both hands on the steering wheel. Your arms should be in a slightly bent position. Reduce stress on your back through careful lifting · Squat down, bending at the hips and knees only. If you need to, put one knee to the floor and extend your other knee in front of you, bent at a right angle (half kneeling). · Press your chest straight forward. This helps keep your upper back straight while keeping a slight arch in your low back. · Hold the load as close to your body as possible, at the level of your belly button (navel). · Use your feet to change direction, taking small steps. · Lead with your hips as you change direction. Keep your shoulders in line with your hips as you move. · Set down your load carefully, squatting with your knees and hips only. Exercise and stretch your back · Do some exercise on most days of the week, if your doctor says it is okay. You can walk, run, swim, or cycle. · Stretch your back muscles. Here are a few exercises to try: ¨ Lie on your back, and gently pull one bent knee to your chest. Put that foot back on the floor, and then pull the other knee to your chest. 
¨ Do pelvic tilts. Lie on your back with your knees bent. Tighten your stomach muscles. Pull your belly button (navel) in and up toward your ribs. You should feel like your back is pressing to the floor and your hips and pelvis are slightly lifting off the floor. Hold for 6 seconds while breathing smoothly. ¨ Sit with your back flat against a wall. · Keep your core muscles strong. The muscles of your back, belly (abdomen), and buttocks support your spine. ¨ Pull in your belly and imagine pulling your navel toward your spine. Hold this for 6 seconds, then relax. Remember to keep breathing normally as you tense your muscles. ¨ Do curl-ups. Always do them with your knees bent. Keep your low back on the floor, and curl your shoulders toward your knees using a smooth, slow motion. Keep your arms folded across your chest. If this bothers your neck, try putting your hands behind your neck (not your head), with your elbows spread apart. ¨ Lie on your back with your knees bent and your feet flat on the floor. Tighten your belly muscles, and then push with your feet and raise your buttocks up a few inches. Hold this position 6 seconds as you continue to breathe normally, then lower yourself slowly to the floor. Repeat 8 to 12 times. ¨ If you like group exercise, try Pilates or yoga. These classes have poses that strengthen the core muscles. Lead a healthy lifestyle · Stay at a healthy weight to avoid strain on your back. · Do not smoke. Smoking increases the risk of osteoporosis, which weakens the spine. If you need help quitting, talk to your doctor about stop-smoking programs and medicines. These can increase your chances of quitting for good. Where can you learn more? Go to http://camilo-robert.info/. Enter L315 in the search box to learn more about \"Learning About How to Have a Healthy Back. \" Current as of: November 29, 2017 Content Version: 11.7 © 0715-5971 [employer]. Care instructions adapted under license by [employer] (which disclaims liability or warranty for this information). If you have questions about a medical condition or this instruction, always ask your healthcare professional. [place] [address] any warranty or liability for your use of this information.

## 2018-09-10 ENCOUNTER — HOSPITAL ENCOUNTER (OUTPATIENT)
Dept: CT IMAGING | Age: 68
Discharge: HOME | End: 2018-09-10
Attending: ORTHOPAEDIC SURGERY
Payer: MEDICARE

## 2018-09-10 DIAGNOSIS — R10.9 ABDOMINAL PAIN, UNSPECIFIED ABDOMINAL LOCATION: ICD-10-CM

## 2018-09-10 DIAGNOSIS — R07.9 CHEST PAIN, UNSPECIFIED TYPE: ICD-10-CM

## 2018-09-10 PROCEDURE — 74176 CT ABD & PELVIS W/O CONTRAST: CPT

## 2018-09-25 ENCOUNTER — TELEPHONE (OUTPATIENT)
Dept: FAMILY MEDICINE CLINIC | Age: 68
End: 2018-09-25

## 2018-09-25 DIAGNOSIS — G47.09 OTHER INSOMNIA: Primary | ICD-10-CM

## 2018-09-25 RX ORDER — ZOLPIDEM TARTRATE 10 MG/1
5 TABLET ORAL
Qty: 30 TAB | Refills: 1 | Status: SHIPPED | OUTPATIENT
Start: 2018-09-25 | End: 2018-09-28 | Stop reason: SDUPTHER

## 2018-09-25 NOTE — TELEPHONE ENCOUNTER
From: Yessy Mccracken  To: Teja Viramontes MD  Sent: 9/25/2018 8:04 AM EDT  Subject: Medication Renewal Request    Original authorizing provider: MD Zeyad Walshernestina Jenny would like a refill of the following medications:  zolpidem (AMBIEN) 10 mg tablet Teja Viramontes MD]    Preferred pharmacy: [address] [place] [address]:  Can this be refilled to [facility] with a one time fill to [facility]? I am having a difficult time sleeping. .moving makes for a sore back. .I wake up. So [facility]?

## 2018-09-28 DIAGNOSIS — G47.09 OTHER INSOMNIA: ICD-10-CM

## 2018-09-28 RX ORDER — ZOLPIDEM TARTRATE 10 MG/1
5 TABLET ORAL
Qty: 30 TAB | Refills: 1 | Status: SHIPPED | OUTPATIENT
Start: 2018-09-28 | End: 2018-12-06

## 2018-09-28 NOTE — TELEPHONE ENCOUNTER
From: Mayela Weber  To: Governor Abbey MD  Sent: 9/28/2018 1:02 PM EDT  Subject: Medication Renewal Request    Original authorizing provider: Governor Abbey MD    Christopher Bo Elkinsie Eli would like a refill of the following medications:  zolpidem (AMBIEN) 10 mg tablet Governor Abbey MD]    Preferred pharmacy: [address] [place] [address]:

## 2018-10-05 ENCOUNTER — OFFICE VISIT (OUTPATIENT)
Dept: ORTHOPEDIC SURGERY | Age: 68
End: 2018-10-05

## 2018-10-05 VITALS
BODY MASS INDEX: 29.41 KG/M2 | WEIGHT: 166 LBS | SYSTOLIC BLOOD PRESSURE: 115 MMHG | TEMPERATURE: 98.1 F | RESPIRATION RATE: 18 BRPM | DIASTOLIC BLOOD PRESSURE: 68 MMHG | HEIGHT: 63 IN | HEART RATE: 85 BPM | OXYGEN SATURATION: 97 %

## 2018-10-05 DIAGNOSIS — S22.009D CLOSED FRACTURE OF THORACIC SPINE WITHOUT SPINAL CORD LESION WITH ROUTINE HEALING, SUBSEQUENT ENCOUNTER: Primary | ICD-10-CM

## 2018-10-05 DIAGNOSIS — M43.16 SPONDYLOLISTHESIS OF LUMBAR REGION: ICD-10-CM

## 2018-10-05 PROBLEM — S22.009A CLOSED FRACTURE OF THORACIC SPINE WITHOUT SPINAL CORD LESION (HCC): Status: ACTIVE | Noted: 2018-10-05

## 2018-10-05 NOTE — MR AVS SNAPSHOT
[address] [place] [address] [address] [place] [address] 
[phone] Patient: Arianna Melgar MRN: [id number] PCL:1/99/9562 Visit Information Date & Time Provider Department Dept. Phone Encounter #  
 10/5/2018 10:45 AM Berta Bedolla MD [facility] [address] [facility] [phone] [id number] Follow-up Instructions Return in about 4 weeks (around 11/2/2018) for with Dr. Emir Mejia. Upcoming Health Maintenance Date Due Shingrix Vaccine Age 50> (1 of 2) 3/10/1998 MEDICARE YEARLY EXAM 6/29/2018 Pneumococcal 65+ Low/Medium Risk (2 of 2 - PPSV23) 7/10/2018 Influenza Age 5 to Adult 8/1/2018 BREAST CANCER SCRN MAMMOGRAM 7/18/2019 GLAUCOMA SCREENING Q2Y 8/11/2020 COLONOSCOPY 1/13/2024 DTaP/Tdap/Td series (3 - Td) 9/14/2027 Allergies as of 10/5/2018  Review Complete On: 10/5/2018 By: Antonio Leonard RN Severity Noted Reaction Type Reactions Celebrex [Celecoxib] High 11/06/2014   Systemic Swelling Shellfish Derived High 05/30/2017   Systemic Swelling \"makes me feel puffy\" Sulfa (Sulfonamide Antibiotics) High 05/18/2015    Hives, Swelling Lips swelling Humira [Adalimumab]  06/27/2013    Other (comments) Lupus Optiray 320 [Ioversol]  02/16/2013   Not Verified Hives, Itching Pt states when she gets iv contrast she itches a little from hives? Penicillins  02/19/2013    Hives Current Immunizations  Never Reviewed Name Date Influenza Vaccine 11/15/2007 12:00 AM  
 Pneumococcal Conjugate (PCV-13) 7/10/2017 Pneumococcal Vaccine (Unspecified Type) 10/31/2010 Tdap 9/14/2017, 11/15/2007 12:00 AM  
  
 Not reviewed this visit You Were Diagnosed With   
  
 Codes Comments Closed fracture of thoracic spine without spinal cord lesion with routine healing, subsequent encounter    -  Primary ICD-10-CM: S22.009D ICD-9-CM: V54.17   
 Spondylolisthesis of lumbar region     ICD-10-CM: M43.16 
ICD-9-CM: 738.4 Vitals BP Pulse Temp Resp Height(growth percentile) Weight(growth percentile)  
 115/68 85 98.1 °F (36.7 °C) 18 5' 3\" (1.6 m) 166 lb (75.3 kg) SpO2 BMI OB Status Smoking Status 97% 29.41 kg/m2 Hysterectomy Never Smoker BMI and BSA Data Body Mass Index Body Surface Area  
 29.41 kg/m 2 1.83 m 2 Preferred Pharmacy Pharmacy Name Phone [facility] [address], [phone] [place]rocio [phone] Your Updated Medication List  
  
   
This list is accurate as of 10/5/18 11:57 AM.  Always use your most recent med list.  
  
  
  
  
 aspirin 325 mg tablet Commonly known as:  ASPIRIN Take 1 Tab by mouth two (2) times a day. Take for 3 weeks for DVT prophylaxis. calcium carbonate 500 mg calcium (1,250 mg) tablet Commonly known as:  OS-BARBARA Take 1,500 mg by mouth daily. ferrous fumarate 324 mg (106 mg iron) Tab One a day  
  
 lisinopril 10 mg tablet Commonly known as:  PRINIVIL, ZESTRIL  
TAKE 1 TABLET DAILY  
  
 meloxicam 15 mg tablet Commonly known as:  MOBIC  
TAKE 1 TABLET DAILY MIRALAX 17 gram packet Generic drug:  polyethylene glycol Take 17 g by mouth daily. naloxone 2 mg/actuation Spry Commonly known as:  [employer] Use 1 spray intranasally into 1 nostril. Use a new Narcan nasal spray for subsequent doses and administer into alternating nostrils. May repeat every 2 to 3 minutes as needed. PROLIA 60 mg/mL injection Generic drug:  denosumab 60 mg by SubCUTAneous route. sucralfate 100 mg/mL suspension Commonly known as:  Lisy Sheets Take 5 mL by mouth four (4) times daily. traMADol 50 mg tablet Commonly known as:  ULTRAM  
Take 1 Tab by mouth every six (6) hours as needed for Pain. Max Daily Amount: 200 mg.  
  
 TYLENOL EXTRA STRENGTH 500 mg tablet Generic drug:  acetaminophen Take 1,000 mg by mouth every six (6) hours as needed for Pain. VITAMIN D3 1,000 unit tablet Generic drug:  cholecalciferol Take 1,000 Units by mouth five (5) days a week. zolpidem 10 mg tablet Commonly known as:  AMBIEN Take 0.5 Tabs by mouth nightly as needed for Sleep. Max Daily Amount: 5 mg. Indications: SLEEP-ONSET INSOMNIA Follow-up Instructions Return in about 4 weeks (around 11/2/2018) for with Dr. Elie Hunter. Introducing [facility]! Dear Nirmala Pearce: Thank you for requesting a [employer] account. Our records indicate that you already have an active [employer] account. You can access your account anytime at [email or web address] Did you know that you can access your hospital and ER discharge instructions at any time in [employer] You can also review all of your test results from your hospital stay or ER visit. Additional Information If you have questions, please visit the Frequently Asked Questions section of the [employer] website at [email or web address] Remember, [employer] is NOT to be used for urgent needs. For medical emergencies, dial 911. Now available from your iPhone and Android! Please provide this summary of care documentation to your next provider. Your primary care clinician is listed as [address]. If you have any questions after today's visit, please call [phone].

## 2018-10-05 NOTE — PROGRESS NOTES
[facility] [address]. 
[address], [address] Phone: [phone] Fax: [phone] PROGRESS NOTE Patient: Ben Wang                MRN: [id number]       SSN: [id number] YOB: 1950        AGE: 77 y.o. SEX: female Body mass index is 29.41 kg/(m^2). PCP: Bryan Walker MD 
10/05/18 Chief Complaint Patient presents with  Back Pain Lower  Hip Pain Left  Follow-up MRI HISTORY OF PRESENT ILLNESS, RADIOGRAPHS, and PLAN:  
 
HISTORY OF PRESENT ILLNESS:  Ms. Wandy Quan returns today. I reviewed her CAT scan with her. I also discussed with her the ramifications of the CAT scan and the different surgical techniques to try to address whether it is her L1-L2 instability, which I think is the source of her pain. This includes lateral approaches for interbody support and posterior approaches, and pure posterior approaches without interbody support. She has diverticulitis with significant abdominal adhesions and some hepatic hypertrophy. She would require immobilization of her left kidney, it appears, and do we do a direct lateral approach. Given her AP diameter of her abdomen, I am a little concerned about mobilizing things, direct lateral. 
 
ASSESSMENT/PLAN:   I would like to review the studies further. I have offered her second opinions at [facility] and [facility]. I will see her back in a month and we will discuss the matter further. cc:  Dr. Marco Hurd Past Medical History:  
Diagnosis Date  Abdominal abscess 2009  Arthritis Rheumatoid  Autoimmune disease ([facility] [address] Medically induced Lupus  Back pain  Chronic pain   
 lower back  Colitis  Diverticulitis  Failed back syndrome  GERD (gastroesophageal reflux disease)  Hypertension   
 when on cyclosporins  Ill-defined condition   
 large torus roof of mouth  Irritable bowel syndrome  Osteoporosis  Pneumonia  RA (rheumatoid arthritis) ([facility] [address]  Seizures ([facility] [address] 6-1-2008  
 one episode- after high dose of epinepherine Family History Problem Relation Age of Onset  Other Other Orthopedic (Sister)  Arthritis-osteo Sister  Cancer Neg Hx  Diabetes Neg Hx   
 Heart Disease Neg Hx   
 Heart Attack Neg Hx  Hypertension Neg Hx  Stroke Neg Hx  Malignant Hyperthermia Neg Hx  Pseudocholinesterase Deficiency Neg Hx  Delayed Awakening Neg Hx  Post-op Nausea/Vomiting Neg Hx  Emergence Delirium Neg Hx  Post-op Cognitive Dysfunction Neg Hx Current Outpatient Prescriptions Medication Sig Dispense Refill  zolpidem (AMBIEN) 10 mg tablet Take 0.5 Tabs by mouth nightly as needed for Sleep. Max Daily Amount: 5 mg. Indications: SLEEP-ONSET INSOMNIA 30 Tab 1  
 traMADol (ULTRAM) 50 mg tablet Take 1 Tab by mouth every six (6) hours as needed for Pain. Max Daily Amount: 200 mg. 120 Tab 1  
 denosumab (PROLIA) 60 mg/mL injection 60 mg by SubCUTAneous route.  lisinopril (PRINIVIL, ZESTRIL) 10 mg tablet TAKE 1 TABLET DAILY 90 Tab 1  
 meloxicam (MOBIC) 15 mg tablet TAKE 1 TABLET DAILY 90 Tab 1  
 ferrous fumarate 324 mg (106 mg iron) tab One a day 100 Tab 1  
 naloxone (NARCAN) 2 mg/actuation spry Use 1 spray intranasally into 1 nostril. Use a new Narcan nasal spray for subsequent doses and administer into alternating nostrils. May repeat every 2 to 3 minutes as needed. 1 Package 0  
 acetaminophen (TYLENOL EXTRA STRENGTH) 500 mg tablet Take 1,000 mg by mouth every six (6) hours as needed for Pain.  cholecalciferol (VITAMIN D3) 1,000 unit tablet Take 1,000 Units by mouth five (5) days a week.  polyethylene glycol (MIRALAX) 17 gram packet Take 17 g by mouth daily.  calcium carbonate (OS-BARBARA) 500 mg calcium (1,250 mg) tablet Take 1,500 mg by mouth daily.  aspirin (ASPIRIN) 325 mg tablet Take 1 Tab by mouth two (2) times a day. Take for 3 weeks for DVT prophylaxis. 42 Tab 0  
 sucralfate (CARAFATE) 100 mg/mL suspension Take 5 mL by mouth four (4) times daily. 414 mL 2 Allergies Allergen Reactions  Celebrex [Celecoxib] Swelling  Shellfish Derived Swelling \"makes me feel puffy\"  Sulfa (Sulfonamide Antibiotics) Hives and Swelling Lips swelling  Humira [Adalimumab] Other (comments) Lupus  Optiray 320 [Ioversol] Hives and Itching Pt states when she gets iv contrast she itches a little from hives?  Penicillins Hives Past Surgical History:  
Procedure Laterality Date  HX ABDOMINAL WALL DEFECT REPAIR    
 HX APPENDECTOMY  HX BREAST REDUCTION    
 HX CATARACT REMOVAL Bilateral   
 HX GI    
 repair rectal prolaspe  HX GYN    
 dermoid cyst  
 HX HEENT    
 tonsillectomy [address]  HX LUMBAR FUSION    
 x 2  
 HX ORTHOPAEDIC Bilateral   
 CTR  
 HX ORTHOPAEDIC Bilateral   
 arthroplasty thumbs  HX ORTHOPAEDIC Left Ankle  HX OTHER SURGICAL Left 1985  
 vein stripping  HX SALPINGO-OOPHORECTOMY Bilateral   
 HX SHOULDER REPLACEMENT Left  HX UROLOGICAL    
 bladder repair Past Medical History:  
Diagnosis Date  Abdominal abscess 2009  Arthritis Rheumatoid  Autoimmune disease ([address] Medically induced Lupus  Back pain  Chronic pain   
 lower back  Colitis  Diverticulitis  Failed back syndrome  GERD (gastroesophageal reflux disease)  Hypertension   
 when on cyclosporins  Ill-defined condition   
 large torus roof of mouth  Irritable bowel syndrome  Osteoporosis  Pneumonia  RA (rheumatoid arthritis) ([address]  Seizures ([address] 6-1-2008  
 one episode- after high dose of epinepherine Social History Social History  Marital status:  Spouse name: N/A  
 Number of children: N/A  
 Years of education: N/A Occupational History  Not on file. Social History Main Topics  Smoking status: Never Smoker  Smokeless tobacco: Never Used  Alcohol use No  
 Drug use: No  
 Sexual activity: No  
 
Other Topics Concern  Not on file Social History Narrative REVIEW OF SYSTEMS: 
 CONSTITUTIONAL SYMPTOMS:  Negative. EYES:  Negative. EARS, NOSE, THROAT AND MOUTH:  Negative. CARDIOVASCULAR:  Negative. RESPIRATORY:  Negative. GENITOURINARY: Per HPI. GASTROINTESTINAL:  Per HPI. INTEGUMENTARY (SKIN AND/OR BREAST):  Negative. MUSCULOSKELETAL: Per HPI. ENDOCRINE/RHEUMATOLOGIC:  Negative. NEUROLOGICAL:  Per HPI. HEMATOLOGIC/LYMPHATIC:  Negative. ALLERGIC/IMMUNOLOGIC:  Negative. PSYCHIATRIC:  Negative. PHYSICAL EXAMINATION:  
Visit Vitals  /68  Pulse 85  Temp 98.1 °F (36.7 °C)  Resp 18  Ht 5' 3\" (1.6 m)  Wt 166 lb (75.3 kg)  SpO2 97%  BMI 29.41 kg/m2 PAIN SCALE: 5/10 CONSTITUTIONAL: The patient is in no apparent distress and is alert and oriented x 3. HEENT: Normocephalic. Hearing grossly intact. NECK: Supple and symmetric. no tenderness, or masses were felt. RESPIRATORY: No labored breathing. CARDIOVASCULAR: The carotid pulses were normal. Peripheral pulses were 2+. CHEST: Normal AP diameter and normal contour without any kyphoscoliosis. LYMPHATIC: No lymphadenopathy was appreciated in the neck, axillae or groin. SKIN: Negative for scars, rashes, lesions, or ulcers on the right upper, right lower, left upper, left lower and trunk. NEUROLOGICAL: Alert and oriented x 3. Ambulation without assistive device. FWB. EXTREMITIES: See musculoskeletal. 
MUSCULOSKELETAL: 
? Head and Neck:  Negative for misalignment, asymmetry, crepitation, defects, tenderness masses or effusions. ? Left Upper Extremity: Inspection, percussion and palpation performed. Prices sign is negative. ? Right Upper Extremity: Inspection, percussion and palpation performed. Prices sign is negative. ? Spine, Ribs and Pelvis: Low back pain. Left hip pain. Inspection, percussion and palpation performed. Negative for misalignment, asymmetry, crepitation, defects, tenderness masses or effusions. ? Left Lower Extremity: Inspection, percussion and palpation performed. Negative straight leg raise. ? Right Lower Extremity: Inspection, percussion and palpation performed. Negative straight leg raise. SPINE EXAM: 
 
Lumbar spine: No rash, ecchymosis, or gross obliquity. No focal atrophy is noted. ASSESSMENT 
  ICD-10-CM ICD-9-CM 1. Closed fracture of thoracic spine without spinal cord lesion with routine healing, subsequent encounter S22.009D V54.17   
2. Spondylolisthesis of lumbar region M43.16 738.4 Written by Chan Anna, as dictated by Mercedes Means MD. 
 
I, Dr. Mercedes Means MD, confirm that all documentation is accurate.

## 2018-10-08 ENCOUNTER — TELEPHONE (OUTPATIENT)
Dept: FAMILY MEDICINE CLINIC | Age: 68
End: 2018-10-08

## 2018-10-12 ENCOUNTER — CLINICAL SUPPORT (OUTPATIENT)
Dept: FAMILY MEDICINE CLINIC | Age: 68
End: 2018-10-12

## 2018-10-12 DIAGNOSIS — Z23 ENCOUNTER FOR IMMUNIZATION: Primary | ICD-10-CM

## 2018-11-01 DIAGNOSIS — M25.551 PAIN OF RIGHT HIP JOINT: ICD-10-CM

## 2018-11-02 DIAGNOSIS — I10 ESSENTIAL HYPERTENSION WITH GOAL BLOOD PRESSURE LESS THAN 130/85: ICD-10-CM

## 2018-11-02 DIAGNOSIS — M16.11 PRIMARY OSTEOARTHRITIS OF RIGHT HIP: Chronic | ICD-10-CM

## 2018-11-05 RX ORDER — MELOXICAM 15 MG/1
TABLET ORAL
Qty: 90 TAB | Refills: 1 | Status: SHIPPED | OUTPATIENT
Start: 2018-11-05 | End: 2018-12-06

## 2018-11-05 RX ORDER — LISINOPRIL 10 MG/1
TABLET ORAL
Qty: 90 TAB | Refills: 1 | Status: SHIPPED | OUTPATIENT
Start: 2018-11-05 | End: 2019-09-05 | Stop reason: SDUPTHER

## 2018-11-06 ENCOUNTER — OFFICE VISIT (OUTPATIENT)
Dept: ORTHOPEDIC SURGERY | Age: 68
End: 2018-11-06

## 2018-11-06 VITALS
DIASTOLIC BLOOD PRESSURE: 79 MMHG | HEART RATE: 60 BPM | RESPIRATION RATE: 18 BRPM | SYSTOLIC BLOOD PRESSURE: 132 MMHG | TEMPERATURE: 98.3 F

## 2018-11-06 DIAGNOSIS — M53.2X6 LUMBAR SPINE INSTABILITY: Primary | ICD-10-CM

## 2018-11-06 DIAGNOSIS — M48.062 SPINAL STENOSIS OF LUMBAR REGION WITH NEUROGENIC CLAUDICATION: ICD-10-CM

## 2018-11-06 PROBLEM — M48.05 SPINAL STENOSIS, THORACOLUMBAR REGION: Status: ACTIVE | Noted: 2018-11-06

## 2018-11-06 RX ORDER — TRAMADOL HYDROCHLORIDE 100 MG/1
100 TABLET, EXTENDED RELEASE ORAL DAILY
Qty: 90 TAB | Refills: 1 | Status: SHIPPED | OUTPATIENT
Start: 2018-11-06 | End: 2018-12-06

## 2018-11-06 NOTE — PROGRESS NOTES
[address]  [address]  [address]  Phone: [phone]  Fax: [phone]  PROGRESS NOTE  Patient: Juvenal Hernandez                MRN: [id number]       SSN: [id number]  YOB: 1950        AGE: 77 y.o. SEX: female  There is no height or weight on file to calculate BMI. PCP: Tonie Geller MD  11/06/18    Chief Complaint   Patient presents with    Back Pain     FU       HISTORY OF PRESENT ILLNESS, RADIOGRAPHS, and PLAN:     HISTORY OF PRESENT ILLNESS:  Ms. Gloria Webb returns today. She is continuing to have severe back pain with radiating left leg symptoms. Evaluated by her gastroenterologist and other medical caregivers and found to be stable. Her laboratory abnormalities were felt to be related to her use of her Prolia. ASSESSMENT/PLAN:  I reviewed her studies with her and discussed the matter at length with her again. In view of her studies, I think she would be a reasonable candidate for an XLIF at L1-2 to provide anterior column support and extend her instrumentation above the thoracolumbar junction to approximately T10. Her issue at this level above was junctional degeneration, but really it was the traumatic proximal junctional instability from her fall on the boat that gave her her problems. I do not think more of a realignment procedure than restoring her previous alignment is necessary. I would not consider her at this setting a candidate for an osteotomy or something of that sort. I explained to her my surgical plan as well as options and referrals to either centers at [facility] or [facility] for other opinions as they may have other approaches and opinions on best way to deal with her problem. After extensive discussions, the patient wishes to proceed with the plan I outlined with the risks, benefits, complications, and alternatives discussed.   If we can get a solid interbody device in at L1-2, I think we can just hopefully join up with the patient's previous instrumentation posteriorly and I will not have to revise at all. That would cut the trauma down in the surgery a bit. We discussed the matter at length. The patient would like to start preparing for surgical scheduling. cc:  Dr. Deborah Marie           Past Medical History:   Diagnosis Date    Abdominal abscess 2009    Arthritis     Rheumatoid    Autoimmune disease ([facility] [address]     Medically induced Lupus    Back pain     Chronic pain     lower back    Colitis     Diverticulitis     Failed back syndrome     GERD (gastroesophageal reflux disease)     Hypertension     when on cyclosporins    Ill-defined condition     large torus roof of mouth    Irritable bowel syndrome     Osteoporosis     Pneumonia     RA (rheumatoid arthritis) (HCC)     Seizures ([facility] [address] 6-1-2008    one episode- after high dose of epinepherine       Family History   Problem Relation Age of Onset    Other Other         Orthopedic (Sister)    Arthritis-osteo Sister     Cancer Neg Hx     Diabetes Neg Hx     Heart Disease Neg Hx     Heart Attack Neg Hx     Hypertension Neg Hx     Stroke Neg Hx     Malignant Hyperthermia Neg Hx     Pseudocholinesterase Deficiency Neg Hx     Delayed Awakening Neg Hx     Post-op Nausea/Vomiting Neg Hx     Emergence Delirium Neg Hx     Post-op Cognitive Dysfunction Neg Hx        Current Outpatient Medications   Medication Sig Dispense Refill    lisinopril (PRINIVIL, ZESTRIL) 10 mg tablet TAKE 1 TABLET DAILY 90 Tab 1    meloxicam (MOBIC) 15 mg tablet TAKE 1 TABLET DAILY 90 Tab 1    zolpidem (AMBIEN) 10 mg tablet Take 0.5 Tabs by mouth nightly as needed for Sleep. Max Daily Amount: 5 mg. Indications: SLEEP-ONSET INSOMNIA 30 Tab 1    traMADol (ULTRAM) 50 mg tablet Take 1 Tab by mouth every six (6) hours as needed for Pain. Max Daily Amount: 200 mg. 120 Tab 1    denosumab (PROLIA) 60 mg/mL injection 60 mg by SubCUTAneous route.       ferrous fumarate 324 mg (106 mg iron) tab One a day 100 Tab 1    aspirin (ASPIRIN) 325 mg tablet Take 1 Tab by mouth two (2) times a day. Take for 3 weeks for DVT prophylaxis. 42 Tab 0    naloxone (NARCAN) 2 mg/actuation spry Use 1 spray intranasally into 1 nostril. Use a new Narcan nasal spray for subsequent doses and administer into alternating nostrils. May repeat every 2 to 3 minutes as needed. 1 Package 0    acetaminophen (TYLENOL EXTRA STRENGTH) 500 mg tablet Take 1,000 mg by mouth every six (6) hours as needed for Pain.  cholecalciferol (VITAMIN D3) 1,000 unit tablet Take 1,000 Units by mouth five (5) days a week.  polyethylene glycol (MIRALAX) 17 gram packet Take 17 g by mouth daily.  sucralfate (CARAFATE) 100 mg/mL suspension Take 5 mL by mouth four (4) times daily. 414 mL 2    calcium carbonate (OS-BARBARA) 500 mg calcium (1,250 mg) tablet Take 1,500 mg by mouth daily. Allergies   Allergen Reactions    Celebrex [Celecoxib] Swelling    Shellfish Derived Swelling     \"makes me feel puffy\"    Sulfa (Sulfonamide Antibiotics) Hives and Swelling     Lips swelling    Humira [Adalimumab] Other (comments)     Lupus    Optiray 320 [Ioversol] Hives and Itching     Pt states when she gets iv contrast she itches a little from hives?     Penicillins Hives       Past Surgical History:   Procedure Laterality Date    HX ABDOMINAL WALL DEFECT REPAIR      HX APPENDECTOMY      HX BREAST REDUCTION      HX CATARACT REMOVAL Bilateral     HX GI      repair rectal prolaspe    HX GYN      dermoid cyst    HX HEENT      tonsillectomy    HX HYSTERECTOMY  1976    HX LUMBAR FUSION      x 2    HX ORTHOPAEDIC Bilateral     CTR    HX ORTHOPAEDIC Bilateral     arthroplasty thumbs    HX ORTHOPAEDIC Left     Ankle     HX OTHER SURGICAL Left 1985    vein stripping    HX SALPINGO-OOPHORECTOMY Bilateral     HX SHOULDER REPLACEMENT Left     HX UROLOGICAL      bladder repair       Past Medical History: Diagnosis Date    Abdominal abscess 2009    Arthritis     Rheumatoid    [facility]     Medically induced Lupus    Back pain     Chronic pain     lower back    Colitis     Diverticulitis     Failed back syndrome     GERD (gastroesophageal reflux disease)     Hypertension     when on cyclosporins    Ill-defined condition     large torus roof of mouth    Irritable bowel syndrome     Osteoporosis     Pneumonia     RA [facility]rhe[facility]oid arthritis) ([facility])     Seizures ([facility] 6-1-2008    one episode- after high dose of epinepherine       Social History     Socioeconomic History    Marital status:      Spouse name: Not on file    Number of children: Not on file    Years of education: Not on file    Highest education level: Not on file   Social Needs    Financial resource strain: Not on file    Food insecurity - worry: Not on file    Food insecurity - inability: Not on file   [employer] needs - medical: Not on file   [employer] needs - non-medical: Not on file   Occupational History    Not on file   Tobacco Use    Smoking status: Never Smoker    Smokeless tobacco: Never Used   Substance and Sexual Activity    Alcohol use: No    Drug use: No    Sexual activity: No   Other Topics Concern    Not on file   Social History Narrative    Not on file         REVIEW OF SYSTEMS:   CONSTITUTIONAL SYMPTOMS:  Negative. EYES:  Negative. EARS, NOSE, THROAT AND MOUTH:  Negative. CARDIOVASCULAR:  Negative. RESPIRATORY:  Negative. GENITOURINARY: Per HPI. GASTROINTESTINAL:  Per HPI. INTEGUMENTARY (SKIN AND/OR BREAST):  Negative. MUSCULOSKELETAL: Per HPI.   ENDOCRINE/RHEUMATOLOGIC:  Negative. NEUROLOGICAL:  Per HPI. HEMATOLOGIC/LYMPHATIC:  Negative. ALLERGIC/IMMUNOLOGIC:  Negative. PSYCHIATRIC:  Negative.     PHYSICAL EXAMINATION:   Visit Vitals  /79 (BP 1 Location: Left arm, BP Patient Position: Sitting)   Pulse 60   Temp 98.3 °F (36.8 °C) (Oral)   Resp 18 PAIN SCALE: 4/10    CONSTITUTIONAL: The patient is in no apparent distress and is alert and oriented x 3. HEENT: Normocephalic. Hearing grossly intact. NECK: Supple and symmetric. no tenderness, or masses were felt. RESPIRATORY: No labored breathing. CARDIOVASCULAR: The carotid pulses were normal. Peripheral pulses were 2+. CHEST: Normal AP diameter and normal contour without any kyphoscoliosis. LYMPHATIC: No lymphadenopathy was appreciated in the neck, axillae or groin. SKIN:  Negative for scars, rashes, lesions, or ulcers on the right upper, right lower, left upper, left lower and trunk. NEUROLOGICAL: Alert and oriented x 3. Ambulation without assistive device. FWB. EXTREMITIES: See musculoskeletal.  MUSCULOSKELETAL:   Head and Neck:  Negative for misalignment, asymmetry, crepitation, defects, tenderness masses or effusions.  Left Upper Extremity: Inspection, percussion and palpation performed. Prices sign is negative.  Right Upper Extremity: Inspection, percussion and palpation performed. Prices sign is negative.  Spine, Ribs and Pelvis: Back pain with L leg pain. Inspection, percussion and palpation performed. Negative for misalignment, asymmetry, crepitation, defects, tenderness masses or effusions.  Left Lower Extremity: Pain. Inspection, percussion and palpation performed. Negative straight leg raise.  Right Lower Extremity: Inspection, percussion and palpation performed. Negative straight leg raise. SPINE EXAM:     Lumbar spine: No rash, ecchymosis, or gross obliquity. No focal atrophy is noted. ASSESSMENT    ICD-10-CM ICD-9-CM    1. Lumbar spine instability M53.2X6 724.9    2. Spinal stenosis of lumbar region with neurogenic claudication M48.062 724.03        Written by Alisa Leroy, as dictated by Leonel Willard MD.    I, Dr. Leonel Willard MD, confirm that all documentation is accurate.

## 2018-11-06 NOTE — PROGRESS NOTES
[address] [facility]   Pre-Surgical Worksheet    Patient: Chica Mendieta                         MRN: [id number]     Age:  77 y.o.,      Sex: female    YOB: 1950           JUAN DANIEL: November 6, 2018  PCP: Mery Velásquez MD    Allergies   Allergen Reactions    Celebrex [Celecoxib] Swelling    Shellfish Derived Swelling     \"makes me feel puffy\"    Sulfa (Sulfonamide Antibiotics) Hives and Swelling     Lips swelling    Humira [Adalimumab] Other (comments)     Lupus    Optiray 320 [Ioversol] Hives and Itching     Pt states when she gets iv contrast she itches a little from hives?  Penicillins Hives         ICD-10-CM ICD-9-CM    1. Lumbar spine instability M53.2X6 724.9    2. Spinal stenosis of lumbar region with neurogenic claudication M48.062 724.03        Surgery: 1. XLIF L1/2  2. T-10-L2 Fusion extent revise previous fusion. Pain Assessment   Pain Assessment  11/6/2018   Location of Pain Back   Location Modifiers Left   Severity of Pain 4   Quality of Pain Aching;Dull   Quality of Pain Comment -   Duration of Pain -   Frequency of Pain Constant   Aggravating Factors (No Data)   Aggravating Factors Comment laying wrong, positional changes   Limiting Behavior -   Relieving Factors Rest   Relieving Factors Comment meds   Result of Injury -   Work-Related Injury -   Type of Injury -       Visit Vitals  /79 (BP 1 Location: Left arm, BP Patient Position: Sitting)   Pulse 60   Temp 98.3 °F (36.8 °C) (Oral)   Resp 18       ADL Limits: Patient needs assistance to stand and walk at times. States doing day to day activities is quite difficult. Spine Surgery?: Yes When ? [facility]? .    Spinal Injections?: Yes  When ? [facility] Where? .    Physical Therapy?: Yes  When ? [facility] Where? .    NSAID's?: Yes    Pain Medications?: Yes  Type: Tramadol.     In Pain Management: NO, Where: ?    Current Outpatient Medications   Medication Sig    lisinopril (PRINIVIL, ZESTRIL) 10 mg tablet TAKE 1 TABLET DAILY    meloxicam (MOBIC) 15 mg tablet TAKE 1 TABLET DAILY    zolpidem (AMBIEN) 10 mg tablet Take 0.5 Tabs by mouth nightly as needed for Sleep. Max Daily Amount: 5 mg. Indications: SLEEP-ONSET INSOMNIA    traMADol (ULTRAM) 50 mg tablet Take 1 Tab by mouth every six (6) hours as needed for Pain. Max Daily Amount: 200 mg.    denosumab (PROLIA) 60 mg/mL injection 60 mg by SubCUTAneous route.  ferrous fumarate 324 mg (106 mg iron) tab One a day    aspirin (ASPIRIN) 325 mg tablet Take 1 Tab by mouth two (2) times a day. Take for 3 weeks for DVT prophylaxis.  naloxone (NARCAN) 2 mg/actuation spry Use 1 spray intranasally into 1 nostril. Use a new Narcan nasal spray for subsequent doses and administer into alternating nostrils. May repeat every 2 to 3 minutes as needed.  acetaminophen (TYLENOL EXTRA STRENGTH) 500 mg tablet Take 1,000 mg by mouth every six (6) hours as needed for Pain.  cholecalciferol (VITAMIN D3) 1,000 unit tablet Take 1,000 Units by mouth five (5) days a week.  polyethylene glycol (MIRALAX) 17 gram packet Take 17 g by mouth daily.  sucralfate (CARAFATE) 100 mg/mL suspension Take 5 mL by mouth four (4) times daily.  calcium carbonate (OS-BARBARA) 500 mg calcium (1,250 mg) tablet Take 1,500 mg by mouth daily. No current facility-administered medications for this visit.         Past Medical History:   Diagnosis Date    Abdominal abscess 2009    Arthritis     Rheumatoid    Autoimmune disease ([address]     Medically induced Lupus    Back pain     Chronic pain     lower back    Colitis     Diverticulitis     Failed back syndrome     GERD (gastroesophageal reflux disease)     Hypertension     when on cyclosporins    Ill-defined condition     large torus roof of mouth    Irritable bowel syndrome     Osteoporosis     Pneumonia     RA (rheumatoid arthritis) ([address]     Seizures ([address] 6-1-2008    one episode- after high dose of epinepherine       Past Surgical History:   Procedure Laterality Date    HX ABDOMINAL WALL DEFECT REPAIR      HX APPENDECTOMY      HX BREAST REDUCTION      HX CATARACT REMOVAL Bilateral     HX GI      repair rectal prolaspe    HX GYN      dermoid cyst    HX HEENT      tonsillectomy    HX HYSTERECTOMY  1976    HX LUMBAR FUSION      x 2    HX ORTHOPAEDIC Bilateral     CTR    HX ORTHOPAEDIC Bilateral     arthroplasty thumbs    HX ORTHOPAEDIC Left     Ankle     HX OTHER SURGICAL Left 1985    vein stripping    HX SALPINGO-OOPHORECTOMY Bilateral     HX SHOULDER REPLACEMENT Left     HX UROLOGICAL      bladder repair       Social History     Socioeconomic History    Marital status:      Spouse name: Not on file    Number of children: Not on file    Years of education: Not on file    Highest education level: Not on file   Social Needs    Financial resource strain: Not on file    Food insecurity - worry: Not on file    Food insecurity - inability: Not on file   [employer] needs - medical: Not on file   [employer] needs - non-medical: Not on file   Occupational History    Not on file   Tobacco Use    Smoking status: Never Smoker    Smokeless tobacco: Never Used   Substance and Sexual Activity    Alcohol use: No    Drug use: No    Sexual activity: No   Other Topics Concern    Not on file   Social History Narrative    Not on file

## 2018-11-19 ENCOUNTER — OFFICE VISIT (OUTPATIENT)
Dept: FAMILY MEDICINE CLINIC | Age: 68
End: 2018-11-19

## 2018-11-19 VITALS
OXYGEN SATURATION: 98 % | TEMPERATURE: 97.9 F | HEART RATE: 64 BPM | BODY MASS INDEX: 29.73 KG/M2 | WEIGHT: 167.8 LBS | SYSTOLIC BLOOD PRESSURE: 106 MMHG | DIASTOLIC BLOOD PRESSURE: 74 MMHG | HEIGHT: 63 IN

## 2018-11-19 DIAGNOSIS — R11.0 NAUSEA: Primary | ICD-10-CM

## 2018-11-19 RX ORDER — PROMETHAZINE HYDROCHLORIDE 25 MG/1
25 TABLET ORAL
Qty: 30 TAB | Refills: 1 | Status: SHIPPED | OUTPATIENT
Start: 2018-11-19 | End: 2019-01-02 | Stop reason: ALTCHOICE

## 2018-11-19 NOTE — PATIENT INSTRUCTIONS
Nausea and Vomiting: Care Instructions Your Care Instructions When you are nauseated, you may feel weak and sweaty and notice a lot of saliva in your mouth. Nausea often leads to vomiting. Most of the time you do not need to worry about nausea and vomiting, but they can be signs of other illnesses. Two common causes of nausea and vomiting are stomach flu and food poisoning. Nausea and vomiting from viral stomach flu will usually start to improve within 24 hours. Nausea and vomiting from food poisoning may last from 12 to 48 hours. The doctor has checked you carefully, but problems can develop later. If you notice any problems or new symptoms, get medical treatment right away. Follow-up care is a key part of your treatment and safety. Be sure to make and go to all appointments, and call your doctor if you are having problems. It's also a good idea to know your test results and keep a list of the medicines you take. How can you care for yourself at home? · To prevent dehydration, drink plenty of fluids, enough so that your urine is light yellow or clear like water. Choose water and other caffeine-free clear liquids until you feel better. If you have kidney, heart, or liver disease and have to limit fluids, talk with your doctor before you increase the amount of fluids you drink. · Rest in bed until you feel better. · When you are able to eat, try clear soups, mild foods, and liquids until all symptoms are gone for 12 to 48 hours. Other good choices include dry toast, crackers, cooked cereal, and gelatin dessert, such as Jell-O. When should you call for help? Call 911 anytime you think you may need emergency care. For example, call if: 
  · You passed out (lost consciousness).  
 Call your doctor now or seek immediate medical care if: 
  · You have symptoms of dehydration, such as: 
? Dry eyes and a dry mouth. ? Passing only a little dark urine. ?  Feeling thirstier than usual.  
   · You have new or worsening belly pain.  
  · You have a new or higher fever.  
  · You vomit blood or what looks like coffee grounds.  
 Watch closely for changes in your health, and be sure to contact your doctor if: 
  · You have ongoing nausea and vomiting.  
  · Your vomiting is getting worse.  
  · Your vomiting lasts longer than 2 days.  
  · You are not getting better as expected. Where can you learn more? Go to http://camilo-robert.info/. Enter 25 [id number] in the search box to learn more about \"Nausea and Vomiting: Care Instructions. \" Current as of: November 20, 2017 Content Version: 11.8 © 9000-8169 for; to (do) Centers. Care instructions adapted under license by [employer] (which disclaims liability or warranty for this information). If you have questions about a medical condition or this instruction, always ask your healthcare professional. [address] any warranty or liability for your use of this information.

## 2018-11-19 NOTE — PROGRESS NOTES
Lisa Kong is a 77 y.o. female  presents for follow up. She has several concerns. Allergies Allergen Reactions  Celebrex [Celecoxib] Swelling  Shellfish Derived Swelling \"makes me feel puffy\"  Sulfa (Sulfonamide Antibiotics) Hives and Swelling Lips swelling  Humira [Adalimumab] Other (comments) Lupus  Iodine Itching  Optiray 320 [Ioversol] Hives and Itching Pt states when she gets iv contrast she itches a little from hives?  Penicillins Hives Outpatient Medications Marked as Taking for the 11/19/18 encounter (Office Visit) with Jovanni Hudson MD  
Medication Sig Dispense Refill  Iron, Cbn & Gluc-FA-B12-C-DSS ([address]ALET 90 DUAL-IRON DELIVERY) 90-1-12-50 mg-mg-mcg-mg tab Take  by mouth.  traMADol (ULTRAM-ER) 100 mg Tb24 Take 1 Tab by mouth daily. Max Daily Amount: 100 mg. 90 Tab 1  
 lisinopril (PRINIVIL, ZESTRIL) 10 mg tablet TAKE 1 TABLET DAILY 90 Tab 1  
 meloxicam (MOBIC) 15 mg tablet TAKE 1 TABLET DAILY 90 Tab 1  
 zolpidem (AMBIEN) 10 mg tablet Take 0.5 Tabs by mouth nightly as needed for Sleep. Max Daily Amount: 5 mg. Indications: SLEEP-ONSET INSOMNIA 30 Tab 1  
 denosumab (PROLIA) 60 mg/mL injection 60 mg by SubCUTAneous route.  acetaminophen (TYLENOL EXTRA STRENGTH) 500 mg tablet Take 1,000 mg by mouth every six (6) hours as needed for Pain.  cholecalciferol (VITAMIN D3) 1,000 unit tablet Take 1,000 Units by mouth five (5) days a week.  calcium carbonate (OS-BARBARA) 500 mg calcium (1,250 mg) tablet Take 1,500 mg by mouth daily. Patient Active Problem List  
Diagnosis Code  DDD (degenerative disc disease), lumbar M51.36  Spondylolisthesis of lumbar region M43.16  
 Status post lumbar surgery Z98.890  Preop cardiovascular exam Z01.810  
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  
 IBS (irritable bowel syndrome) K58.9  RA (rheumatoid arthritis) ([facility]) M06.9  Sicca syndrome ([facility]) M35.00  
 Osteoarthritis of right hip M16.11  
 Pain management contract agreement Z02.89  
 ACP (advance care planning) Z71.89  Chronic left shoulder pain M25.512, G89.29  
 Osteopenia M85.80  Osteopenia of multiple sites M85.89  
 Osteoarthritis of left hip M16.12  
 Closed fracture of thoracic spine without spinal cord lesion ([facility] S22.009A  Spinal stenosis, thoracolumbar region M48.05  
 Lumbar spine instability M53.2X6  Spinal stenosis of lumbar region with neurogenic claudication M48.062 Past Medical History:  
Diagnosis Date  Abdominal abscess 2009  Arthritis Rheumatoid  Autoimmune disease ([facility] [address] Medically induced Lupus  Back pain  Chronic pain   
 lower back  Colitis  Diverticulitis  Failed back syndrome  GERD (gastroesophageal reflux disease)  Hypertension   
 when on cyclosporins  Ill-defined condition   
 large torus roof of mouth  Irritable bowel syndrome  Osteoporosis  Pneumonia  RA (rheumatoid arthritis) ([facility] [address]  Seizures ([facility] [address] 6-1-2008  
 one episode- after high dose of epinepherine Social History Socioeconomic History  Marital status:  Spouse name: Not on file  Number of children: Not on file  Years of education: Not on file  Highest education level: Not on file Social Needs  Financial resource strain: Not on file  Food insecurity - worry: Not on file  Food insecurity - inability: Not on file  Transportation needs - medical: Not on file  Transportation needs - non-medical: Not on file Occupational History  Not on file Tobacco Use  Smoking status: Never Smoker  Smokeless tobacco: Never Used Substance and Sexual Activity  Alcohol use: No  
 Drug use: No  
 Sexual activity: No  
Other Topics Concern  Not on file Social History Narrative  Not on file Family History Problem Relation Age of Onset  Other Other Orthopedic (Sister)  Arthritis-osteo Sister  Cancer Neg Hx  Diabetes Neg Hx   
 Heart Disease Neg Hx   
 Heart Attack Neg Hx  Hypertension Neg Hx  Stroke Neg Hx  Malignant Hyperthermia Neg Hx  Pseudocholinesterase Deficiency Neg Hx  Delayed Awakening Neg Hx  Post-op Nausea/Vomiting Neg Hx  Emergence Delirium Neg Hx  Post-op Cognitive Dysfunction Neg Hx Review of Systems Constitutional: Negative for chills, fever, malaise/fatigue and weight loss. Eyes: Negative for blurred vision. Respiratory: Negative for shortness of breath and wheezing. Cardiovascular: Negative for chest pain. Gastrointestinal: Negative for nausea and vomiting. Musculoskeletal: Negative for myalgias. Skin: Negative for rash. Neurological: Negative for weakness. Psychiatric/Behavioral: Negative. Vitals:  
 11/19/18 1037 BP: 106/74 Pulse: 64 Temp: 97.9 °F (36.6 °C) TempSrc: Oral  
SpO2: 98% Weight: 167 lb 12.8 oz (76.1 kg) Height: 5' 3\" (1.6 m) PainSc:   5 PainLoc: Back Physical Exam  
Constitutional: She is well-developed, well-nourished, and in no distress. Neurological: She is alert. Skin: Skin is warm and dry. Psychiatric: Mood, memory, affect and judgment normal.  
Nursing note and vitals reviewed. Assessment/Plan ICD-10-CM ICD-9-CM 1. Nausea R11.0 787.02 promethazine (PHENERGAN) 25 mg tablet I have discussed the diagnosis with the patient and the intended plan of care as seen in the above orders. The patient has received an after-visit summary and questions were answered concerning future plans. I have discussed medication, side effects, and warnings with the patient in detail. The patient verbalized understanding and is in agreement with the plan of care. The patient will contact the office with any additional concerns. Follow-up Disposition: Return if symptoms worsen or fail to improve. 
lab results and schedule of future lab studies reviewed with patient Ayse Galvez MD

## 2018-11-19 NOTE — PROGRESS NOTES
Patient is here with c/o nausea that she feels is related to her back pain. She has spine surgery scheduled for 12/3/18. 1. Have you been to the ER, urgent care clinic since your last visit? Hospitalized since your last visit? No 
2. Have you seen or consulted any other health care providers outside of the [address] [place] [address] since your last visit? Include any pap smears or colon screening. No 
 
Health Maintenance reviewed - Patient declined [address] today.

## 2018-11-20 ENCOUNTER — HOSPITAL ENCOUNTER (OUTPATIENT)
Dept: PREADMISSION TESTING | Age: 68
Discharge: HOME | End: 2018-11-20
Payer: MEDICARE

## 2018-11-20 DIAGNOSIS — M48.062 SPINAL STENOSIS OF LUMBAR REGION WITH NEUROGENIC CLAUDICATION: ICD-10-CM

## 2018-11-20 DIAGNOSIS — M53.2X6 LUMBAR SPINE INSTABILITY: ICD-10-CM

## 2018-11-20 LAB
ABO + RH BLD: NORMAL
ALBUMIN SERPL-MCNC: 4.4 G/DL (ref 3.4–5)
ALBUMIN/GLOB SERPL: 1.6 {RATIO} (ref 0.8–1.7)
ALP SERPL-CCNC: 59 U/L (ref 45–117)
ALT SERPL-CCNC: 20 U/L (ref 13–56)
ANION GAP SERPL CALC-SCNC: 7 MMOL/L (ref 3–18)
AST SERPL-CCNC: 17 U/L (ref 15–37)
BILIRUB SERPL-MCNC: 0.5 MG/DL (ref 0.2–1)
BLOOD GROUP ANTIBODIES SERPL: NORMAL
BUN SERPL-MCNC: 19 MG/DL (ref 7–18)
BUN/CREAT SERPL: 25 (ref 12–20)
CALCIUM SERPL-MCNC: 9.4 MG/DL (ref 8.5–10.1)
CHLORIDE SERPL-SCNC: 100 MMOL/L (ref 100–108)
CO2 SERPL-SCNC: 29 MMOL/L (ref 21–32)
CREAT SERPL-MCNC: 0.77 MG/DL (ref 0.6–1.3)
ERYTHROCYTE [DISTWIDTH] IN BLOOD BY AUTOMATED COUNT: 12.4 % (ref 11.6–14.5)
GLOBULIN SER CALC-MCNC: 2.8 G/DL (ref 2–4)
GLUCOSE SERPL-MCNC: 95 MG/DL (ref 74–99)
HCT VFR BLD AUTO: 43.5 % (ref 35–45)
HGB BLD-MCNC: 14.6 G/DL (ref 12–16)
MCH RBC QN AUTO: 31.6 PG (ref 24–34)
MCHC RBC AUTO-ENTMCNC: 33.6 G/DL (ref 31–37)
MCV RBC AUTO: 94.2 FL (ref 74–97)
PLATELET # BLD AUTO: 257 K/UL (ref 135–420)
PMV BLD AUTO: 9.8 FL (ref 9.2–11.8)
POTASSIUM SERPL-SCNC: 5.1 MMOL/L (ref 3.5–5.5)
PROT SERPL-MCNC: 7.2 G/DL (ref 6.4–8.2)
RBC # BLD AUTO: 4.62 M/UL (ref 4.2–5.3)
SODIUM SERPL-SCNC: 136 MMOL/L (ref 136–145)
SPECIMEN EXP DATE BLD: NORMAL
WBC # BLD AUTO: 6.7 K/UL (ref 4.6–13.2)

## 2018-11-20 PROCEDURE — 86901 BLOOD TYPING SEROLOGIC RH(D): CPT

## 2018-11-20 PROCEDURE — 80053 COMPREHEN METABOLIC PANEL: CPT

## 2018-11-20 PROCEDURE — 36415 COLL VENOUS BLD VENIPUNCTURE: CPT

## 2018-11-20 PROCEDURE — 85027 COMPLETE CBC AUTOMATED: CPT

## 2018-11-20 NOTE — PROGRESS NOTES
Ms Wandy Quan and spouse attended the Cervical/Spine Surgery Mandatory Pre-Operative class on  11/15/2018. Topics discussed included surgery preparation, what to expect the day of surgery, medications, physical and occupational therapy, and discharge planning. It was discussed that this is considered an elective surgery and that prior to the surgery she needs to make decisions such as arranging for help at home once she is discharged. Ms Leighton Rivera was given a Cervical Spine mandatory patient education notebook to take home. Patient educated to make sure to have any DME (front wheeled walker, shower chair or raised toilet set) in home before surgery for safety at home. Patient instructed to make sure to have a ride arranged to go home after surgery. Opportunity was given to ask questions and phone number of the Orthopaedic   was given for any questions or concerns that may arise later. She identified spouse and twin sister as their . Ms Leighton Rivera was given a tour of the orthopedic unit. She was given the opportunity to meet with Kelly Givens, Nurse Director, to discuss her care after surgery. She has some concerns about getting out of bed the day of surgery to walk. Reinforced the reason is to prevent blood clots, pneumonia, and constipation.

## 2018-11-20 NOTE — PERIOP NOTES
Mary Woodruff was here today for her PAT appointment. Health assessment was completed and instructions given regarding NPO status, medications, Hibiclens washes, and removal of all jewelry and/or body piercing. Patient stated that she has permanent makeup on eyes and lips. Instructed patient not to remove the red Blood Bank armband that was placed on their arm when the Type and Screen was drawn. Patient currently ambulating with crutches. 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 She stated she would bring walker to the hospital on the day of surgery so it can be properly adjusted by the physical therapist  for her to use post discharge. Opportunity was given to ask questions and all questions were answered. Understanding of instructions was verbalized. Patient expressed that she has a large Torus [place] on roof of mouth, Dr. Asha Poon notified. Per patient she attended Spine Class on November 15, 2018.

## 2018-11-27 ENCOUNTER — OFFICE VISIT (OUTPATIENT)
Dept: FAMILY MEDICINE CLINIC | Age: 68
End: 2018-11-27

## 2018-11-27 VITALS
HEART RATE: 69 BPM | SYSTOLIC BLOOD PRESSURE: 98 MMHG | WEIGHT: 169.4 LBS | HEIGHT: 63 IN | BODY MASS INDEX: 30.02 KG/M2 | OXYGEN SATURATION: 95 % | DIASTOLIC BLOOD PRESSURE: 60 MMHG | TEMPERATURE: 98.5 F

## 2018-11-27 DIAGNOSIS — M51.36 DDD (DEGENERATIVE DISC DISEASE), LUMBAR: ICD-10-CM

## 2018-11-27 DIAGNOSIS — M53.2X6 LUMBAR SPINE INSTABILITY: ICD-10-CM

## 2018-11-27 DIAGNOSIS — Z01.818 PRE-OP EXAMINATION: Primary | ICD-10-CM

## 2018-11-27 RX ORDER — CETIRIZINE HCL 10 MG
10 TABLET ORAL
COMMUNITY

## 2018-11-27 NOTE — PROGRESS NOTES
Vivian Mcghee is a 77 y.o. female  presents for medical clearance. No complaints. Allergies   Allergen Reactions    Celebrex [Celecoxib] Swelling    Shellfish Derived Swelling     \"makes me feel puffy\"    Sulfa (Sulfonamide Antibiotics) Hives and Swelling     Lips swelling    Humira [Adalimumab] Other (comments)     Lupus    Iodine Itching    Optiray 320 [Ioversol] Hives and Itching     Pt states when she gets iv contrast she itches a little from hives?  Penicillins Hives     Outpatient Medications Marked as Taking for the 11/27/18 encounter (Office Visit) with Radhames Brewer MD   Medication Sig Dispense Refill    cetirizine (ZYRTEC) 10 mg tablet Take  by mouth.  petrolat,wht/min oil/sod chl (DRY EYES OP) Apply  to eye.  Iron, Cbn & Gluc-FA-B12-C-DSS (FERRALET 90 DUAL-IRON DELIVERY) 90-1-12-50 mg-mg-mcg-mg tab Take  by mouth.  promethazine (PHENERGAN) 25 mg tablet Take 1 Tab by mouth every six (6) hours as needed for Nausea. 30 Tab 1    traMADol (ULTRAM-ER) 100 mg Tb24 Take 1 Tab by mouth daily. Max Daily Amount: 100 mg. 90 Tab 1    lisinopril (PRINIVIL, ZESTRIL) 10 mg tablet TAKE 1 TABLET DAILY 90 Tab 1    traMADol (ULTRAM) 50 mg tablet Take 1 Tab by mouth every six (6) hours as needed for Pain. Max Daily Amount: 200 mg. 120 Tab 1    denosumab (PROLIA) 60 mg/mL injection 60 mg by SubCUTAneous route.  acetaminophen (TYLENOL EXTRA STRENGTH) 500 mg tablet Take 1,000 mg by mouth every six (6) hours as needed for Pain.  cholecalciferol (VITAMIN D3) 1,000 unit tablet Take 1,000 Units by mouth five (5) days a week.  calcium carbonate (OS-BARBARA) 500 mg calcium (1,250 mg) tablet Take 1,500 mg by mouth daily.        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  H/O calcium pyrophosphate deposition disease (CPPD) Z87.39    IBS (irritable bowel syndrome) K58.9    RA (rheumatoid arthritis) ([facility]) M06.9    Sicca syndrome ([facility]) M35.00    Osteoarthritis of right hip M16.11    Pain management contract agreement Z02.89    ACP (advance care planning) Z71.89    Chronic left shoulder pain M25.512, G89.29    Osteopenia M85.80    Osteopenia of multiple sites M85.89    Osteoarthritis of left hip M16.12    Closed fracture of thoracic spine without spinal cord lesion ([facility] S22.009A    Spinal stenosis, thoracolumbar region M48.05    Lumbar spine instability M53.2X6    Spinal stenosis of lumbar region with neurogenic claudication M48.062     Past Medical History:   Diagnosis Date    Abdominal abscess 2009    Arthritis     Rheumatoid    Autoimmune disease ([facility]     Medically induced Lupus    Back pain     Chronic pain     lower back    Colitis     Diverticulitis     Failed back syndrome     GERD (gastroesophageal reflux disease)     Hypertension     when on cyclosporins    Ill-defined condition     large torus roof of mouth    Irritable bowel syndrome     Neuropathy     bilateral hands/wrist    Osteoporosis     Pneumonia     RA (rheumatoid arthritis) ([facility]     Seizures ([facility] [facility] 6-1-2008    one episode- after high dose of epinepherine     Social History     Socioeconomic History    Marital status:      Spouse name: Not on file    Number of children: Not on file    Years of education: Not on file    Highest education level: Not on file   Tobacco Use    Smoking status: Never Smoker    Smokeless tobacco: Never Used   Substance and Sexual Activity    Alcohol use: No    Drug use: No    Sexual activity: No     Family History   Problem Relation Age of Onset    Other Other         Orthopedic (Sister)    Arthritis-osteo Sister     Cancer Neg Hx     Diabetes Neg Hx     Heart Disease Neg Hx     Heart Attack Neg Hx     Hypertension Neg Hx     Stroke Neg Hx     Malignant Hyperthermia Neg Hx     Pseudocholinesterase Deficiency Neg Hx     Delayed Awakening Neg Hx     Post-op Nausea/Vomiting Neg Hx     Emergence Delirium Neg Hx     Post-op Cognitive Dysfunction Neg Hx         Review of Systems   Constitutional: Negative for chills, fever, malaise/fatigue and weight loss. Eyes: Negative for blurred vision. Respiratory: Negative for shortness of breath and wheezing. Cardiovascular: Negative for chest pain. Gastrointestinal: Negative for nausea and vomiting. Musculoskeletal: Negative for myalgias. Skin: Negative for rash. Neurological: Negative for weakness. Psychiatric/Behavioral: Negative. Vitals:    11/27/18 0848   BP: 98/60   Pulse: 69   Temp: 98.5 °F (36.9 °C)   TempSrc: Oral   SpO2: 95%   Weight: 169 lb 6.4 oz (76.8 kg)   Height: 5' 3\" (1.6 m)   PainSc:   3   PainLoc: Back       Physical Exam   Constitutional: She is oriented to person, place, and time and well-developed, well-nourished, and in no distress. HENT:   Head: Normocephalic. Nose: Nose normal.   Mouth/Throat: Oropharynx is clear and moist.   Eyes: Conjunctivae and EOM are normal. Pupils are equal, round, and reactive to light. Neck: Normal range of motion. Neck supple. No thyromegaly present. Cardiovascular: Normal rate, regular rhythm and normal heart sounds. Pulmonary/Chest: Effort normal and breath sounds normal.   Abdominal: Soft. Bowel sounds are normal.   Musculoskeletal: Normal range of motion. Neurological: She is alert and oriented to person, place, and time. Skin: Skin is warm and dry. Psychiatric: Mood, memory, affect and judgment normal.   Nursing note and vitals reviewed. Assessment/Plan      ICD-10-CM ICD-9-CM    1. Pre-op examination Z01.818 V72.84    2. Lumbar spine instability M53.2X6 724.9    3. DDD (degenerative disc disease), lumbar M51.36 722.52      Patient is cleared for back surgery.   Labs EKG reviewed    I have discussed the diagnosis with the patient and the intended plan of care as seen in the above orders. The patient has received an after-visit summary and questions were answered concerning future plans. I have discussed medication, side effects, and warnings with the patient in detail. The patient verbalized understanding and is in agreement with the plan of care. The patient will contact the office with any additional concerns.       Follow-up Disposition:  Return if symptoms worsen or fail to improve.  lab results and schedule of future lab studies reviewed with patient    Nicole Paz MD

## 2018-11-27 NOTE — PROGRESS NOTES
Patient here for pre op clearance to have back surgery on 12/3/18 with Dr. Jesse Soulier. She states she wants Dr. Nils Neil to make sure Dr. Jesse Soulier knows she is opiate resistant     1. Have you been to the ER, urgent care clinic since your last visit? Hospitalized since your last visit? No  2. Have you seen or consulted any other health care providers outside of the [address] [place] [address] since your last visit? Include any pap smears or colon screening.  No    Health Maintenance reviewed - Will discuss need for Shingrix with provider she has declined to do her [address] today

## 2018-11-27 NOTE — PATIENT INSTRUCTIONS
How to Prepare for Surgery  How do you prepare for surgery? Surgery can be stressful. This information will help you understand what you can expect. And it will help you safely prepare for surgery. Follow-up care is a key part of your treatment and safety. Be sure to make and go to all appointments, and call your doctor if you are having problems. It's also a good idea to know your test results and keep a list of the medicines you take. What happens before surgery?   Preparing for surgery    · Understand exactly what surgery is planned, along with the risks, benefits, and other options. · Tell your doctors ALL the medicines, vitamins, supplements, and herbal remedies you take. Some of these can increase the risk of bleeding or interact with anesthesia.     · If you take blood thinners, such as warfarin (Coumadin), clopidogrel (Plavix), or aspirin, be sure to talk to your doctor. He or she will tell you if you should stop taking these medicines before your surgery. Make sure that you understand exactly what your doctor wants you to do.     · Your doctor will tell you which medicines to take or stop before your surgery. You may need to stop taking certain medicines a week or more before surgery. So talk to your doctor as soon as you can.     · If you have an advance directive, let your doctor know. It may include a living will and a durable power of  for health care. Bring a copy to the hospital. If don't have one, you may want to prepare one. It lets your doctor and loved ones know your health care wishes. Doctors advise that everyone prepare these papers before any type of surgery or procedure. What happens on the day of surgery?    · Follow the instructions about when to stop eating and drinking. If you don't, your surgery may be canceled.  If your doctor told you to take your medicines on the day of surgery, take them with only a sip of water.     · Take a bath or shower before coming in for your surgery. Do not apply lotions, perfumes, deodorants, or nail polish.     · Do not shave the surgical site yourself.     · Take off all jewelry and piercings. And take out contact lenses, if you wear them.    At the hospital or surgery center   · Bring a picture ID.     · The area for surgery is often marked to make sure there are no errors.     · You will be kept comfortable and safe by your anesthesia provider. The anesthesia may make you sleep. Or it may just numb the area being worked on. Going home   · Be sure you have someone to drive you home. Anesthesia and pain medicine make it unsafe for you to drive.     · You will be given more specific instructions about recovering from your surgery. They will cover things like diet, wound care, follow-up care, driving, and getting back to your normal routine. When should you call your doctor? · You have questions or concerns.     · You don't understand how to prepare for your surgery.     · You become ill before the surgery (such as fever, flu, or a cold).     · You need to reschedule or have changed your mind about having the surgery. Where can you learn more? Go to http://camilo-robert.info/. Enter Q270 in the search box to learn more about \"How to Prepare for Surgery. \"  Current as of: March 29, 2018  Content Version: 11.8  © 1178-2894 [employer]. Care instructions adapted under license by [employer] (which disclaims liability or warranty for this information). If you have questions about a medical condition or this instruction, always ask your healthcare professional. [place] [address] any warranty or liability for your use of this information.
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
## 2018-12-02 ENCOUNTER — ANESTHESIA EVENT (OUTPATIENT)
Dept: SURGERY | Age: 68
DRG: 454 | End: 2018-12-02
Payer: MEDICARE

## 2018-12-03 ENCOUNTER — ANESTHESIA (OUTPATIENT)
Dept: SURGERY | Age: 68
DRG: 454 | End: 2018-12-03
Payer: MEDICARE

## 2018-12-03 ENCOUNTER — APPOINTMENT (OUTPATIENT)
Dept: GENERAL RADIOLOGY | Age: 68
DRG: 454 | End: 2018-12-03
Attending: ORTHOPAEDIC SURGERY
Payer: MEDICARE

## 2018-12-03 ENCOUNTER — HOSPITAL ENCOUNTER (INPATIENT)
Age: 68
LOS: 3 days | Discharge: HOME | DRG: 454 | End: 2018-12-06
Attending: ORTHOPAEDIC SURGERY | Admitting: ORTHOPAEDIC SURGERY
Payer: MEDICARE

## 2018-12-03 DIAGNOSIS — M48.062 SPINAL STENOSIS OF LUMBAR REGION WITH NEUROGENIC CLAUDICATION: Primary | ICD-10-CM

## 2018-12-03 PROBLEM — M48.061 SPINAL STENOSIS OF LUMBAR REGION: Status: ACTIVE | Noted: 2018-12-03

## 2018-12-03 LAB
ANION GAP SERPL CALC-SCNC: 12 MMOL/L (ref 3–18)
BUN SERPL-MCNC: 12 MG/DL (ref 7–18)
BUN/CREAT SERPL: 16 (ref 12–20)
CALCIUM SERPL-MCNC: 7.7 MG/DL (ref 8.5–10.1)
CHLORIDE SERPL-SCNC: 106 MMOL/L (ref 100–108)
CO2 SERPL-SCNC: 23 MMOL/L (ref 21–32)
CREAT SERPL-MCNC: 0.76 MG/DL (ref 0.6–1.3)
ERYTHROCYTE [DISTWIDTH] IN BLOOD BY AUTOMATED COUNT: 12.5 % (ref 11.6–14.5)
GLUCOSE SERPL-MCNC: 181 MG/DL (ref 74–99)
HCT VFR BLD AUTO: 35.5 % (ref 35–45)
HGB BLD-MCNC: 11.7 G/DL (ref 12–16)
MCH RBC QN AUTO: 31.1 PG (ref 24–34)
MCHC RBC AUTO-ENTMCNC: 33 G/DL (ref 31–37)
MCV RBC AUTO: 94.4 FL (ref 74–97)
PLATELET # BLD AUTO: 220 K/UL (ref 135–420)
PMV BLD AUTO: 9.4 FL (ref 9.2–11.8)
POTASSIUM SERPL-SCNC: 3.7 MMOL/L (ref 3.5–5.5)
RBC # BLD AUTO: 3.76 M/UL (ref 4.2–5.3)
SODIUM SERPL-SCNC: 141 MMOL/L (ref 136–145)
WBC # BLD AUTO: 12 K/UL (ref 4.6–13.2)

## 2018-12-03 PROCEDURE — C1763 CONN TISS, NON-HUMAN: HCPCS | Performed by: ORTHOPAEDIC SURGERY

## 2018-12-03 PROCEDURE — 77030012893: Performed by: ORTHOPAEDIC SURGERY

## 2018-12-03 PROCEDURE — 74011000250 HC RX REV CODE- 250: Performed by: ORTHOPAEDIC SURGERY

## 2018-12-03 PROCEDURE — 77030019908 HC STETH ESOPH SIMS -A: Performed by: ANESTHESIOLOGY

## 2018-12-03 PROCEDURE — 77030002946 HC SUT NRLN J&J -B: Performed by: ORTHOPAEDIC SURGERY

## 2018-12-03 PROCEDURE — 74011250636 HC RX REV CODE- 250/636: Performed by: NURSE ANESTHETIST, CERTIFIED REGISTERED

## 2018-12-03 PROCEDURE — 77030002996 HC SUT SLK J&J -A: Performed by: ORTHOPAEDIC SURGERY

## 2018-12-03 PROCEDURE — 0SG00A0 FUSION OF LUMBAR VERTEBRAL JOINT WITH INTERBODY FUSION DEVICE, ANTERIOR APPROACH, ANTERIOR COLUMN, OPEN APPROACH: ICD-10-PCS | Performed by: ORTHOPAEDIC SURGERY

## 2018-12-03 PROCEDURE — 77030004402 HC BUR NEUR STRY -C: Performed by: ORTHOPAEDIC SURGERY

## 2018-12-03 PROCEDURE — 80048 BASIC METABOLIC PNL TOTAL CA: CPT

## 2018-12-03 PROCEDURE — 97164 PT RE-EVAL EST PLAN CARE: CPT

## 2018-12-03 PROCEDURE — 77030034475 HC MISC IMPL SPN: Performed by: ORTHOPAEDIC SURGERY

## 2018-12-03 PROCEDURE — 77030018673: Performed by: ORTHOPAEDIC SURGERY

## 2018-12-03 PROCEDURE — 85027 COMPLETE CBC AUTOMATED: CPT

## 2018-12-03 PROCEDURE — 74011250636 HC RX REV CODE- 250/636: Performed by: ORTHOPAEDIC SURGERY

## 2018-12-03 PROCEDURE — 0W9B30Z DRAINAGE OF LEFT PLEURAL CAVITY WITH DRAINAGE DEVICE, PERCUTANEOUS APPROACH: ICD-10-PCS | Performed by: THORACIC SURGERY (CARDIOTHORACIC VASCULAR SURGERY)

## 2018-12-03 PROCEDURE — 97530 THERAPEUTIC ACTIVITIES: CPT

## 2018-12-03 PROCEDURE — 76010000137 HC OR TIME 5 TO 5.5 HR: Performed by: ORTHOPAEDIC SURGERY

## 2018-12-03 PROCEDURE — 77030013079 HC BLNKT BAIR HGGR 3M -A: Performed by: ANESTHESIOLOGY

## 2018-12-03 PROCEDURE — 77030007115 HC CGE SPN PEK CRNT NUVA -I3: Performed by: ORTHOPAEDIC SURGERY

## 2018-12-03 PROCEDURE — 71045 X-RAY EXAM CHEST 1 VIEW: CPT

## 2018-12-03 PROCEDURE — C1713 ANCHOR/SCREW BN/BN,TIS/BN: HCPCS | Performed by: ORTHOPAEDIC SURGERY

## 2018-12-03 PROCEDURE — 77030039266 HC ADH SKN EXOFIN S2SG -A: Performed by: ORTHOPAEDIC SURGERY

## 2018-12-03 PROCEDURE — 74011250637 HC RX REV CODE- 250/637: Performed by: ORTHOPAEDIC SURGERY

## 2018-12-03 PROCEDURE — 77030018836 HC SOL IRR NACL ICUM -A: Performed by: ORTHOPAEDIC SURGERY

## 2018-12-03 PROCEDURE — 0RGA071 FUSION OF THORACOLUMBAR VERTEBRAL JOINT WITH AUTOLOGOUS TISSUE SUBSTITUTE, POSTERIOR APPROACH, POSTERIOR COLUMN, OPEN APPROACH: ICD-10-PCS | Performed by: ORTHOPAEDIC SURGERY

## 2018-12-03 PROCEDURE — 77030003194 HC GRFT RECOMB BN MEDT -I1: Performed by: ORTHOPAEDIC SURGERY

## 2018-12-03 PROCEDURE — 77030037134 HC WRAP COMPR BACK THER SOLM -B: Performed by: ORTHOPAEDIC SURGERY

## 2018-12-03 PROCEDURE — 77030030409 HC DIL SPN KT M5 DISP NUVA -G: Performed by: ORTHOPAEDIC SURGERY

## 2018-12-03 PROCEDURE — 97116 GAIT TRAINING THERAPY: CPT

## 2018-12-03 PROCEDURE — 77030002916 HC SUT ETHLN J&J -A: Performed by: ORTHOPAEDIC SURGERY

## 2018-12-03 PROCEDURE — 77030027138 HC INCENT SPIROMETER -A

## 2018-12-03 PROCEDURE — 74011250637 HC RX REV CODE- 250/637: Performed by: NURSE ANESTHETIST, CERTIFIED REGISTERED

## 2018-12-03 PROCEDURE — 77030010512 HC APPL CLP LIG J&J -C: Performed by: ORTHOPAEDIC SURGERY

## 2018-12-03 PROCEDURE — 77030020782 HC GWN BAIR PAWS FLX 3M -B: Performed by: ORTHOPAEDIC SURGERY

## 2018-12-03 PROCEDURE — 77030034850: Performed by: ORTHOPAEDIC SURGERY

## 2018-12-03 PROCEDURE — 74011250636 HC RX REV CODE- 250/636

## 2018-12-03 PROCEDURE — 76060000041 HC ANESTHESIA 5 TO 5.5 HR: Performed by: ORTHOPAEDIC SURGERY

## 2018-12-03 PROCEDURE — 0ST20ZZ RESECTION OF LUMBAR VERTEBRAL DISC, OPEN APPROACH: ICD-10-PCS | Performed by: ORTHOPAEDIC SURGERY

## 2018-12-03 PROCEDURE — 36415 COLL VENOUS BLD VENIPUNCTURE: CPT

## 2018-12-03 PROCEDURE — 77030032490 HC SLV COMPR SCD KNE COVD -B: Performed by: ORTHOPAEDIC SURGERY

## 2018-12-03 PROCEDURE — 77030002933 HC SUT MCRYL J&J -A: Performed by: ORTHOPAEDIC SURGERY

## 2018-12-03 PROCEDURE — 77030003029 HC SUT VCRL J&J -B: Performed by: ORTHOPAEDIC SURGERY

## 2018-12-03 PROCEDURE — 77030027703 HC MAXCESS 4 KT DISP NUVA -H: Performed by: ORTHOPAEDIC SURGERY

## 2018-12-03 PROCEDURE — 77030019938 HC TBNG IV PCA ICUM -A: Performed by: ORTHOPAEDIC SURGERY

## 2018-12-03 PROCEDURE — 65660000004 HC RM CVT STEPDOWN

## 2018-12-03 PROCEDURE — 0SP004Z REMOVAL OF INTERNAL FIXATION DEVICE FROM LUMBAR VERTEBRAL JOINT, OPEN APPROACH: ICD-10-PCS | Performed by: ORTHOPAEDIC SURGERY

## 2018-12-03 PROCEDURE — 76210000017 HC OR PH I REC 1.5 TO 2 HR: Performed by: ORTHOPAEDIC SURGERY

## 2018-12-03 PROCEDURE — 74011000272 HC RX REV CODE- 272: Performed by: ORTHOPAEDIC SURGERY

## 2018-12-03 PROCEDURE — 74011250636 HC RX REV CODE- 250/636: Performed by: NURSE PRACTITIONER

## 2018-12-03 PROCEDURE — 74011000258 HC RX REV CODE- 258

## 2018-12-03 PROCEDURE — 77030008683 HC TU ET CUF COVD -A: Performed by: ANESTHESIOLOGY

## 2018-12-03 PROCEDURE — 77030026918 HC ADMN ST IV BLD BD -A: Performed by: ANESTHESIOLOGY

## 2018-12-03 PROCEDURE — 74011000250 HC RX REV CODE- 250

## 2018-12-03 PROCEDURE — 0RG7071 FUSION OF 2 TO 7 THORACIC VERTEBRAL JOINTS WITH AUTOLOGOUS TISSUE SUBSTITUTE, POSTERIOR APPROACH, POSTERIOR COLUMN, OPEN APPROACH: ICD-10-PCS | Performed by: ORTHOPAEDIC SURGERY

## 2018-12-03 DEVICE — BONE GRAFT KIT 7510200 INFUSE SMALL
Type: IMPLANTABLE DEVICE | Site: SPINE LUMBAR | Status: FUNCTIONAL
Brand: INFUSE® BONE GRAFT

## 2018-12-03 DEVICE — SCREW SPNL POST THORACOLUMBOSACRAL LCK CLOSE TULIP RELINE: Type: IMPLANTABLE DEVICE | Site: SPINE LUMBAR | Status: FUNCTIONAL

## 2018-12-03 DEVICE — SCREW SPNL L40MM DIA5.5MM POST THORACOLUMBOSACRAL POLYAX 2S: Type: IMPLANTABLE DEVICE | Site: SPINE LUMBAR | Status: FUNCTIONAL

## 2018-12-03 DEVICE — IMPLANTABLE DEVICE: Type: IMPLANTABLE DEVICE | Site: THORACIC | Status: FUNCTIONAL

## 2018-12-03 DEVICE — GRAFT BONE SUB 5ML PUTTY CA PHOS SYNTH GRAN BIOABSRB ATTRAX: Type: IMPLANTABLE DEVICE | Site: SPINE THORACIC | Status: FUNCTIONAL

## 2018-12-03 DEVICE — ROD SPNL LORDTC 5.5X100 MM TI RELINE-O: Type: IMPLANTABLE DEVICE | Site: THORACIC | Status: FUNCTIONAL

## 2018-12-03 DEVICE — SCREW SPNL DIA5.5MM OPN TULIP LOK RELINE: Type: IMPLANTABLE DEVICE | Site: SPINE LUMBAR | Status: FUNCTIONAL

## 2018-12-03 DEVICE — ROD SPNL LORDTC 5.5X90 MM TI RELINE-O: Type: IMPLANTABLE DEVICE | Site: THORACIC | Status: FUNCTIONAL

## 2018-12-03 DEVICE — IMPLANTABLE DEVICE: Type: IMPLANTABLE DEVICE | Site: SPINE LUMBAR | Status: FUNCTIONAL

## 2018-12-03 RX ORDER — SODIUM CHLORIDE 9 MG/ML
INJECTION, SOLUTION INTRAVENOUS
Status: DISCONTINUED | OUTPATIENT
Start: 2018-12-03 | End: 2018-12-03 | Stop reason: HOSPADM

## 2018-12-03 RX ORDER — DEXAMETHASONE SODIUM PHOSPHATE 4 MG/ML
INJECTION, SOLUTION INTRA-ARTICULAR; INTRALESIONAL; INTRAMUSCULAR; INTRAVENOUS; SOFT TISSUE AS NEEDED
Status: DISCONTINUED | OUTPATIENT
Start: 2018-12-03 | End: 2018-12-03 | Stop reason: HOSPADM

## 2018-12-03 RX ORDER — VANCOMYCIN/0.9 % SOD CHLORIDE 1 G/100 ML
1000 PLASTIC BAG, INJECTION (ML) INTRAVENOUS ONCE
Status: COMPLETED | OUTPATIENT
Start: 2018-12-04 | End: 2018-12-04

## 2018-12-03 RX ORDER — DIAZEPAM 5 MG/1
5 TABLET ORAL
Status: DISCONTINUED | OUTPATIENT
Start: 2018-12-03 | End: 2018-12-05

## 2018-12-03 RX ORDER — HYDROMORPHONE HYDROCHLORIDE 1 MG/ML
1 INJECTION, SOLUTION INTRAMUSCULAR; INTRAVENOUS; SUBCUTANEOUS
Status: DISCONTINUED | OUTPATIENT
Start: 2018-12-03 | End: 2018-12-03 | Stop reason: SDUPTHER

## 2018-12-03 RX ORDER — SUCCINYLCHOLINE CHLORIDE 20 MG/ML
INJECTION INTRAMUSCULAR; INTRAVENOUS AS NEEDED
Status: DISCONTINUED | OUTPATIENT
Start: 2018-12-03 | End: 2018-12-03 | Stop reason: HOSPADM

## 2018-12-03 RX ORDER — BISACODYL 5 MG
10 TABLET, DELAYED RELEASE (ENTERIC COATED) ORAL DAILY
Status: DISCONTINUED | OUTPATIENT
Start: 2018-12-04 | End: 2018-12-05

## 2018-12-03 RX ORDER — MIDAZOLAM HYDROCHLORIDE 1 MG/ML
INJECTION, SOLUTION INTRAMUSCULAR; INTRAVENOUS AS NEEDED
Status: DISCONTINUED | OUTPATIENT
Start: 2018-12-03 | End: 2018-12-03 | Stop reason: HOSPADM

## 2018-12-03 RX ORDER — PHENYLEPHRINE HCL IN 0.9% NACL 1 MG/10 ML
SYRINGE (ML) INTRAVENOUS AS NEEDED
Status: DISCONTINUED | OUTPATIENT
Start: 2018-12-03 | End: 2018-12-03 | Stop reason: HOSPADM

## 2018-12-03 RX ORDER — SODIUM CHLORIDE 0.9 % (FLUSH) 0.9 %
5-10 SYRINGE (ML) INJECTION EVERY 8 HOURS
Status: DISCONTINUED | OUTPATIENT
Start: 2018-12-03 | End: 2018-12-06 | Stop reason: HOSPADM

## 2018-12-03 RX ORDER — ONDANSETRON 2 MG/ML
4 INJECTION INTRAMUSCULAR; INTRAVENOUS AS NEEDED
Status: DISCONTINUED | OUTPATIENT
Start: 2018-12-03 | End: 2018-12-03 | Stop reason: SDUPTHER

## 2018-12-03 RX ORDER — DIPHENHYDRAMINE HYDROCHLORIDE 50 MG/ML
12.5 INJECTION, SOLUTION INTRAMUSCULAR; INTRAVENOUS
Status: DISCONTINUED | OUTPATIENT
Start: 2018-12-03 | End: 2018-12-03 | Stop reason: HOSPADM

## 2018-12-03 RX ORDER — ONDANSETRON 2 MG/ML
INJECTION INTRAMUSCULAR; INTRAVENOUS AS NEEDED
Status: DISCONTINUED | OUTPATIENT
Start: 2018-12-03 | End: 2018-12-03 | Stop reason: HOSPADM

## 2018-12-03 RX ORDER — LORAZEPAM 2 MG/ML
1 INJECTION INTRAMUSCULAR
Status: DISCONTINUED | OUTPATIENT
Start: 2018-12-03 | End: 2018-12-05

## 2018-12-03 RX ORDER — FAMOTIDINE 20 MG/1
20 TABLET, FILM COATED ORAL ONCE
Status: COMPLETED | OUTPATIENT
Start: 2018-12-03 | End: 2018-12-03

## 2018-12-03 RX ORDER — PROPOFOL 10 MG/ML
VIAL (ML) INTRAVENOUS
Status: DISCONTINUED | OUTPATIENT
Start: 2018-12-03 | End: 2018-12-03 | Stop reason: HOSPADM

## 2018-12-03 RX ORDER — SODIUM CHLORIDE 0.9 % (FLUSH) 0.9 %
5-10 SYRINGE (ML) INJECTION AS NEEDED
Status: DISCONTINUED | OUTPATIENT
Start: 2018-12-03 | End: 2018-12-03 | Stop reason: HOSPADM

## 2018-12-03 RX ORDER — SODIUM CHLORIDE 0.9 % (FLUSH) 0.9 %
5-10 SYRINGE (ML) INJECTION EVERY 8 HOURS
Status: DISCONTINUED | OUTPATIENT
Start: 2018-12-03 | End: 2018-12-03 | Stop reason: HOSPADM

## 2018-12-03 RX ORDER — NALOXONE HYDROCHLORIDE 0.4 MG/ML
0.1 INJECTION, SOLUTION INTRAMUSCULAR; INTRAVENOUS; SUBCUTANEOUS AS NEEDED
Status: DISCONTINUED | OUTPATIENT
Start: 2018-12-03 | End: 2018-12-06 | Stop reason: HOSPADM

## 2018-12-03 RX ORDER — BUPIVACAINE HYDROCHLORIDE 5 MG/ML
INJECTION, SOLUTION EPIDURAL; INTRACAUDAL AS NEEDED
Status: DISCONTINUED | OUTPATIENT
Start: 2018-12-03 | End: 2018-12-03 | Stop reason: HOSPADM

## 2018-12-03 RX ORDER — GLYCOPYRROLATE 0.2 MG/ML
INJECTION INTRAMUSCULAR; INTRAVENOUS AS NEEDED
Status: DISCONTINUED | OUTPATIENT
Start: 2018-12-03 | End: 2018-12-03 | Stop reason: HOSPADM

## 2018-12-03 RX ORDER — ALBUTEROL SULFATE 0.83 MG/ML
2.5 SOLUTION RESPIRATORY (INHALATION) AS NEEDED
Status: DISCONTINUED | OUTPATIENT
Start: 2018-12-03 | End: 2018-12-03 | Stop reason: HOSPADM

## 2018-12-03 RX ORDER — NALOXONE HYDROCHLORIDE 0.4 MG/ML
0.4 INJECTION, SOLUTION INTRAMUSCULAR; INTRAVENOUS; SUBCUTANEOUS AS NEEDED
Status: DISCONTINUED | OUTPATIENT
Start: 2018-12-03 | End: 2018-12-06 | Stop reason: HOSPADM

## 2018-12-03 RX ORDER — PROMETHAZINE HYDROCHLORIDE 25 MG/1
25 TABLET ORAL
Status: DISCONTINUED | OUTPATIENT
Start: 2018-12-03 | End: 2018-12-05

## 2018-12-03 RX ORDER — SODIUM CHLORIDE 0.9 % (FLUSH) 0.9 %
5-10 SYRINGE (ML) INJECTION AS NEEDED
Status: DISCONTINUED | OUTPATIENT
Start: 2018-12-03 | End: 2018-12-06 | Stop reason: HOSPADM

## 2018-12-03 RX ORDER — HYDROMORPHONE HYDROCHLORIDE 2 MG/ML
0.5 INJECTION, SOLUTION INTRAMUSCULAR; INTRAVENOUS; SUBCUTANEOUS
Status: DISCONTINUED | OUTPATIENT
Start: 2018-12-03 | End: 2018-12-03 | Stop reason: HOSPADM

## 2018-12-03 RX ORDER — SODIUM CHLORIDE, SODIUM LACTATE, POTASSIUM CHLORIDE, CALCIUM CHLORIDE 600; 310; 30; 20 MG/100ML; MG/100ML; MG/100ML; MG/100ML
75 INJECTION, SOLUTION INTRAVENOUS CONTINUOUS
Status: DISCONTINUED | OUTPATIENT
Start: 2018-12-03 | End: 2018-12-03 | Stop reason: HOSPADM

## 2018-12-03 RX ORDER — LIDOCAINE HYDROCHLORIDE 20 MG/ML
INJECTION, SOLUTION EPIDURAL; INFILTRATION; INTRACAUDAL; PERINEURAL AS NEEDED
Status: DISCONTINUED | OUTPATIENT
Start: 2018-12-03 | End: 2018-12-03 | Stop reason: HOSPADM

## 2018-12-03 RX ORDER — LORATADINE 10 MG/1
10 TABLET ORAL DAILY
Status: DISCONTINUED | OUTPATIENT
Start: 2018-12-04 | End: 2018-12-06 | Stop reason: HOSPADM

## 2018-12-03 RX ORDER — ONDANSETRON 2 MG/ML
4 INJECTION INTRAMUSCULAR; INTRAVENOUS
Status: DISCONTINUED | OUTPATIENT
Start: 2018-12-03 | End: 2018-12-05 | Stop reason: SDUPTHER

## 2018-12-03 RX ORDER — FENTANYL CITRATE 50 UG/ML
INJECTION, SOLUTION INTRAMUSCULAR; INTRAVENOUS AS NEEDED
Status: DISCONTINUED | OUTPATIENT
Start: 2018-12-03 | End: 2018-12-03 | Stop reason: HOSPADM

## 2018-12-03 RX ORDER — PROPOFOL 10 MG/ML
INJECTION, EMULSION INTRAVENOUS AS NEEDED
Status: DISCONTINUED | OUTPATIENT
Start: 2018-12-03 | End: 2018-12-03 | Stop reason: HOSPADM

## 2018-12-03 RX ORDER — VANCOMYCIN/0.9 % SOD CHLORIDE 1 G/100 ML
1 PLASTIC BAG, INJECTION (ML) INTRAVENOUS EVERY 24 HOURS
Status: DISCONTINUED | OUTPATIENT
Start: 2018-12-03 | End: 2018-12-03

## 2018-12-03 RX ORDER — DIPHENHYDRAMINE HYDROCHLORIDE 50 MG/ML
12.5 INJECTION, SOLUTION INTRAMUSCULAR; INTRAVENOUS
Status: DISCONTINUED | OUTPATIENT
Start: 2018-12-03 | End: 2018-12-06 | Stop reason: HOSPADM

## 2018-12-03 RX ORDER — SODIUM CHLORIDE 9 MG/ML
100 INJECTION, SOLUTION INTRAVENOUS CONTINUOUS
Status: DISCONTINUED | OUTPATIENT
Start: 2018-12-03 | End: 2018-12-05

## 2018-12-03 RX ORDER — POLYETHYLENE GLYCOL 3350 17 G/17G
17 POWDER, FOR SOLUTION ORAL DAILY
Status: DISCONTINUED | OUTPATIENT
Start: 2018-12-04 | End: 2018-12-06 | Stop reason: HOSPADM

## 2018-12-03 RX ORDER — HYDROMORPHONE HYDROCHLORIDE 2 MG/ML
INJECTION, SOLUTION INTRAMUSCULAR; INTRAVENOUS; SUBCUTANEOUS AS NEEDED
Status: DISCONTINUED | OUTPATIENT
Start: 2018-12-03 | End: 2018-12-03 | Stop reason: HOSPADM

## 2018-12-03 RX ORDER — OXYCODONE HYDROCHLORIDE 5 MG/1
5-10 TABLET ORAL
Status: DISCONTINUED | OUTPATIENT
Start: 2018-12-03 | End: 2018-12-04

## 2018-12-03 RX ORDER — ACETAMINOPHEN 500 MG
1000 TABLET ORAL EVERY 6 HOURS
Status: DISCONTINUED | OUTPATIENT
Start: 2018-12-03 | End: 2018-12-06 | Stop reason: HOSPADM

## 2018-12-03 RX ORDER — ONDANSETRON 2 MG/ML
4 INJECTION INTRAMUSCULAR; INTRAVENOUS ONCE
Status: DISCONTINUED | OUTPATIENT
Start: 2018-12-03 | End: 2018-12-03 | Stop reason: HOSPADM

## 2018-12-03 RX ORDER — ACETAMINOPHEN 325 MG/1
650 TABLET ORAL
Status: DISCONTINUED | OUTPATIENT
Start: 2018-12-03 | End: 2018-12-03 | Stop reason: SDUPTHER

## 2018-12-03 RX ORDER — LISINOPRIL 10 MG/1
10 TABLET ORAL DAILY
Status: DISCONTINUED | OUTPATIENT
Start: 2018-12-04 | End: 2018-12-06

## 2018-12-03 RX ORDER — VANCOMYCIN/0.9 % SOD CHLORIDE 1 G/100 ML
1 PLASTIC BAG, INJECTION (ML) INTRAVENOUS ONCE
Status: COMPLETED | OUTPATIENT
Start: 2018-12-03 | End: 2018-12-04

## 2018-12-03 RX ORDER — VANCOMYCIN HYDROCHLORIDE 1 G/20ML
INJECTION, POWDER, LYOPHILIZED, FOR SOLUTION INTRAVENOUS AS NEEDED
Status: DISCONTINUED | OUTPATIENT
Start: 2018-12-03 | End: 2018-12-03 | Stop reason: HOSPADM

## 2018-12-03 RX ORDER — NALOXONE HYDROCHLORIDE 0.4 MG/ML
0.04 INJECTION, SOLUTION INTRAMUSCULAR; INTRAVENOUS; SUBCUTANEOUS AS NEEDED
Status: DISCONTINUED | OUTPATIENT
Start: 2018-12-03 | End: 2018-12-03 | Stop reason: HOSPADM

## 2018-12-03 RX ADMIN — SODIUM CHLORIDE: 9 INJECTION, SOLUTION INTRAVENOUS at 08:22

## 2018-12-03 RX ADMIN — FENTANYL CITRATE 50 MCG: 50 INJECTION, SOLUTION INTRAMUSCULAR; INTRAVENOUS at 09:00

## 2018-12-03 RX ADMIN — SODIUM CHLORIDE: 9 INJECTION, SOLUTION INTRAVENOUS at 07:42

## 2018-12-03 RX ADMIN — HYDROMORPHONE HYDROCHLORIDE 0.5 MG: 2 INJECTION INTRAMUSCULAR; INTRAVENOUS; SUBCUTANEOUS at 14:17

## 2018-12-03 RX ADMIN — Medication 10 ML: at 06:31

## 2018-12-03 RX ADMIN — HYDROMORPHONE HYDROCHLORIDE: 10 INJECTION, SOLUTION INTRAMUSCULAR; INTRAVENOUS; SUBCUTANEOUS at 13:04

## 2018-12-03 RX ADMIN — LIDOCAINE HYDROCHLORIDE 80 MG: 20 INJECTION, SOLUTION EPIDURAL; INFILTRATION; INTRACAUDAL; PERINEURAL at 07:34

## 2018-12-03 RX ADMIN — Medication 100 MCG: at 10:14

## 2018-12-03 RX ADMIN — Medication: at 09:41

## 2018-12-03 RX ADMIN — FENTANYL CITRATE 100 MCG: 50 INJECTION, SOLUTION INTRAMUSCULAR; INTRAVENOUS at 07:34

## 2018-12-03 RX ADMIN — PROPOFOL 50 MG: 10 INJECTION, EMULSION INTRAVENOUS at 12:20

## 2018-12-03 RX ADMIN — Medication 10 ML: at 18:27

## 2018-12-03 RX ADMIN — GLYCOPYRROLATE 0.2 MG: 0.2 INJECTION INTRAMUSCULAR; INTRAVENOUS at 07:27

## 2018-12-03 RX ADMIN — Medication 100 MCG: at 08:08

## 2018-12-03 RX ADMIN — Medication 150 MCG/KG/MIN: at 08:42

## 2018-12-03 RX ADMIN — HYDROMORPHONE HYDROCHLORIDE 0.5 MG: 2 INJECTION INTRAMUSCULAR; INTRAVENOUS; SUBCUTANEOUS at 13:57

## 2018-12-03 RX ADMIN — FENTANYL CITRATE 50 MCG: 50 INJECTION, SOLUTION INTRAMUSCULAR; INTRAVENOUS at 10:00

## 2018-12-03 RX ADMIN — FENTANYL CITRATE 50 MCG: 50 INJECTION, SOLUTION INTRAMUSCULAR; INTRAVENOUS at 11:00

## 2018-12-03 RX ADMIN — FENTANYL CITRATE 50 MCG: 50 INJECTION, SOLUTION INTRAMUSCULAR; INTRAVENOUS at 08:11

## 2018-12-03 RX ADMIN — FAMOTIDINE 20 MG: 20 TABLET, FILM COATED ORAL at 06:44

## 2018-12-03 RX ADMIN — SODIUM CHLORIDE, SODIUM LACTATE, POTASSIUM CHLORIDE, AND CALCIUM CHLORIDE 50 ML/HR: 600; 310; 30; 20 INJECTION, SOLUTION INTRAVENOUS at 13:37

## 2018-12-03 RX ADMIN — ACETAMINOPHEN 1000 MG: 500 TABLET ORAL at 18:24

## 2018-12-03 RX ADMIN — FENTANYL CITRATE 50 MCG: 50 INJECTION, SOLUTION INTRAMUSCULAR; INTRAVENOUS at 10:28

## 2018-12-03 RX ADMIN — VANCOMYCIN HYDROCHLORIDE 1000 MG: 10 INJECTION, POWDER, LYOPHILIZED, FOR SOLUTION INTRAVENOUS at 06:35

## 2018-12-03 RX ADMIN — Medication 100 MCG: at 10:11

## 2018-12-03 RX ADMIN — SODIUM CHLORIDE 100 ML/HR: 900 INJECTION, SOLUTION INTRAVENOUS at 16:05

## 2018-12-03 RX ADMIN — HYDROMORPHONE HYDROCHLORIDE 1 MG: 2 INJECTION, SOLUTION INTRAMUSCULAR; INTRAVENOUS; SUBCUTANEOUS at 12:49

## 2018-12-03 RX ADMIN — DEXAMETHASONE SODIUM PHOSPHATE 4 MG: 4 INJECTION, SOLUTION INTRA-ARTICULAR; INTRALESIONAL; INTRAMUSCULAR; INTRAVENOUS; SOFT TISSUE at 07:34

## 2018-12-03 RX ADMIN — SUCCINYLCHOLINE CHLORIDE 140 MG: 20 INJECTION INTRAMUSCULAR; INTRAVENOUS at 07:34

## 2018-12-03 RX ADMIN — FENTANYL CITRATE 50 MCG: 50 INJECTION, SOLUTION INTRAMUSCULAR; INTRAVENOUS at 12:00

## 2018-12-03 RX ADMIN — ONDANSETRON 4 MG: 2 INJECTION INTRAMUSCULAR; INTRAVENOUS at 07:27

## 2018-12-03 RX ADMIN — HYDROMORPHONE HYDROCHLORIDE 0.5 MG: 2 INJECTION INTRAMUSCULAR; INTRAVENOUS; SUBCUTANEOUS at 13:42

## 2018-12-03 RX ADMIN — PROPOFOL 160 MG: 10 INJECTION, EMULSION INTRAVENOUS at 07:34

## 2018-12-03 RX ADMIN — Medication 150 MCG/KG/MIN: at 07:42

## 2018-12-03 RX ADMIN — SODIUM CHLORIDE: 9 INJECTION, SOLUTION INTRAVENOUS at 11:55

## 2018-12-03 RX ADMIN — SODIUM CHLORIDE, SODIUM LACTATE, POTASSIUM CHLORIDE, AND CALCIUM CHLORIDE: 600; 310; 30; 20 INJECTION, SOLUTION INTRAVENOUS at 10:00

## 2018-12-03 RX ADMIN — MIDAZOLAM HYDROCHLORIDE 2 MG: 1 INJECTION, SOLUTION INTRAMUSCULAR; INTRAVENOUS at 07:27

## 2018-12-03 RX ADMIN — Medication 10 ML: at 06:44

## 2018-12-03 RX ADMIN — SODIUM CHLORIDE: 9 INJECTION, SOLUTION INTRAVENOUS at 08:42

## 2018-12-03 RX ADMIN — SODIUM CHLORIDE, SODIUM LACTATE, POTASSIUM CHLORIDE, AND CALCIUM CHLORIDE: 600; 310; 30; 20 INJECTION, SOLUTION INTRAVENOUS at 07:27

## 2018-12-03 RX ADMIN — SODIUM CHLORIDE, SODIUM LACTATE, POTASSIUM CHLORIDE, AND CALCIUM CHLORIDE 75 ML/HR: 600; 310; 30; 20 INJECTION, SOLUTION INTRAVENOUS at 06:35

## 2018-12-03 NOTE — ANESTHESIA POSTPROCEDURE EVALUATION
Procedure(s): EXTREME LATERAL INTERBODY FUSION (XLIF) L1/2; T10-L2 FUSION; EXTEND - REVISE PREVIOUS FUSION/C-ARM/NUVASIVE. Anesthesia Post Evaluation Multimodal analgesia: multimodal analgesia used between 6 hours prior to anesthesia start to PACU discharge Patient location during evaluation: bedside Patient participation: complete - patient participated Level of consciousness: awake Pain management: adequate Airway patency: patent Anesthetic complications: no 
Cardiovascular status: stable Respiratory status: acceptable Hydration status: acceptable Post anesthesia nausea and vomiting:  controlled Visit Vitals /67 Pulse 91 Temp 36.7 °C (98 °F) Resp 13 Ht 5' 3\" (1.6 m) Wt 76.7 kg (169 lb) SpO2 100% BMI 29.94 kg/m²

## 2018-12-03 NOTE — PROGRESS NOTES
Loratadine 10mg was therapeutically interchanged for cetirizine per the P&T Committee approved Therapeutic Interchanges Policy.  
 
Nathalie Dueñas [facility], Pharmacist 
12/3/2018 3:42 PM

## 2018-12-03 NOTE — PERIOP NOTES
Pt's  in waiting room; updated on status. Per  \"Yes I've talked with Dr. Bertha Wren about her going to ICU\".

## 2018-12-03 NOTE — PROGRESS NOTES
Diagnostic wire inserted. Problem: Mobility Impaired (Adult and Pediatric)  Goal: *Acute Goals and Plan of Care (Insert Text)  STG (2 days):  1. Pt will be independent with all bed mobility and supine<>sit transfers for d/c.  2. Pt will be independent w/ all sit<>stand transfers WBAT w/ RW so she can stand to get to the bathroom. 3. Pt will be able to demonstrate as well as verbalize understanding of HEP. LTG (5 days):  1. Pt will be able to amb 150ft WBAT w/ RW and supervision so she can get from her car to her home. 2. Pt will be able to ascend/descend at least 6 steps WBAT w/ supervision and using both railings so she can get into her home. Outcome: Resolved/Met Date Met:  06/01/17  PHYSICAL THERAPY TREATMENT/DISCHARGE     Patient: Madison Hernandez (24 y.o. female)  Date: 6/1/2017  Diagnosis: RIGHT HIP OSTEOARTHRITIS  Osteoarthritis of right hip Osteoarthritis of right hip  Procedure(s) (LRB):  RIGHT TOTAL HIP ARTHROPLASTY -  ANTERIOR APPROACH    W/NAVIGATION - SPEC POP (Right) 1 Day Post-Op  Precautions: Fall, WBAT  Chart, physical therapy assessment, plan of care and goals were reviewed. ASSESSMENT:  Pt was walking to the bathroom with nurse upon PT arrival where she was able to void on the commode and perform own hygiene. She was then able to gt train w/ PT 320ft down the hallway WBAT w/ RW, GB, and SBA. She had some c/o of her back pain but stated that it was not getting as tight as it was yesterday. During amb she did experience some increased pain in her R ankle that she has previously had problems w/ due to a reconstruction surgery. 10 stairs were trained WBAT w/ GB and SBA w/ no increases in pain. She was able to participate in therapeutic exercises both on the EOB as well as supine in the bed. At this time pt has reached all of her goals and is safely able to amb and ascend/descend stairs at home and can be d/c from PT. Pt was left supine in the bed, all needs within reach, SCDs and ice applied to R hip.   Nurse Nevaeh Edmondson aware. Continue to recommend [place] after d/c. Progression toward goals:  [X]      Goals met  [ ]      Improving appropriately and progressing toward goals  [ ]      Improving slowly and progressing toward goals  [ ]      Not making progress toward goals and plan of care will be adjusted       PLAN:  Patient will be discharged from physical therapy at this time. Rationale for discharge:  [X] Goals Achieved  [ ] Ann Shea  [ ] Patient not participating in therapy  [ ] Other:  Discharge Recommendations:  Home Health  Further Equipment Recommendations for Discharge:  N/A       SUBJECTIVE:   Patient stated My back is feeling better today.       OBJECTIVE DATA SUMMARY:   Critical Behavior:  Neurologic State: Alert, Appropriate for age  Orientation Level: Appropriate for age, Oriented X4  Cognition: Appropriate decision making, Appropriate for age attention/concentration, Appropriate safety awareness, Follows commands  Safety/Judgement: Awareness of environment, Fall prevention, Good awareness of safety precautions, Home safety, Insight into deficits  Functional Mobility Training:  Bed Mobility:  Sit to Supine: Stand-by asssistance  Scooting: Supervision  Transfers:   Stand to Sit: Stand-by asssistance  Balance:  Sitting: Intact  Standing: Intact; With support  Ambulation/Gait Training:  Distance (ft): 320 Feet (ft)  Assistive Device: Walker, rolling;Gait belt  Ambulation - Level of Assistance: Stand-by asssistance  Gait Abnormalities: Antalgic;Decreased step clearance; Step to gait  Right Side Weight Bearing: As tolerated   Base of Support: Shift to left  Stance: Right decreased  Speed/[place]: Slow  Step Length: Left shortened;Right shortened  Swing Pattern: Right asymmetrical;Left asymmetrical   Interventions: Safety awareness training;Verbal cues  Stairs:  Number of Stairs Trained: 10  Stairs - Level of Assistance: Stand-by asssistance              Rail Use: Both  Neuro Re-Education:  Therapeutic Exercises: Pt was able to complete 10 reps of LAQs, pillow squeezes, marches, quad sets, ham sets, and heel slides w/ no c/o increased pain. Pain:  Pain Scale 1: Numeric (0 - 10)  Pain Intensity 1: 2  Pain Location 1: Back; Hip  Pain Orientation 1: Right; Lower  Pain Description 1: Aching  Pain Intervention(s) 1: Medication (see MAR); Ice  Activity Tolerance:   fair  Please refer to the flowsheet for vital signs taken during this treatment.   After treatment:   [ ] Patient left in no apparent distress sitting up in chair  [X] Patient left in no apparent distress in bed  [X] Call bell left within reach  [X] Nursing notified  [ ] Caregiver present  [ ] Bed alarm activated  Dar Garcia   Time Calculation: 23 mins

## 2018-12-03 NOTE — OP NOTES
[facility]  OPERATIVE REPORT    Kandace Cortez  MR#: [id number]  : 1950  ACCOUNT #: [de-identified]   DATE OF SERVICE: 2018    PREOPERATIVE DIAGNOSES:  Postlaminectomy syndrome, lumbar, compression fracture L2, L1-2 spondylolisthesis with stenosis, compression fracture T12. POSTOPERATIVE DIAGNOSES:  Postlaminectomy syndrome, lumbar, compression fracture L2, L1-2 spondylolisthesis with stenosis, compression fracture T12. PROCEDURES PERFORMED:  Retroperitoneal retropleural approach L1-L2 with extreme lateral interbody fusion. T11 rib resection on left, placement of chest tube by Dr. Tyler Zhang. Cosurgeon for that, Dr. Tyler Zhang of thoracic surgery. L1-2 extreme lateral interbody fusion with placement of PEEK cage and Infuse bone graft. Revision segmental spinal posterior thoracolumbar fusion T10, T11, T12, L1, L2 with NuVasive pedicle screw instrumentation, adding on to previous instrumented fusion L2-L5. Revision of segmental spinal instrumentation, L2-L5 with removal of transverse connector. Segmental spinal instrumentation, NuVasive pedicle screws T10, T11, T12, L2.     SURGEON:  Mercedes Means MD    ASSISTANT:  0    SPECIMENS REMOVED:  No specimens. ANESTHESIA:  General endotracheal.  Patient remained hemodynamically stable throughout the case. ESTIMATED TOTAL BLOOD LOSS:  150 mL. FINDINGS:  The patient had instability at L1-2 following a fall, previous L2-L5 fusion with listhesis and stenosis and instability at the L1-L2 segment. The patient had also previous compression fracture at T12. Plan was to obtain anterior column support with restoration of disk space height and anterior column stability at L1-2 with posterior fixation across the thoracolumbar junction to T10. Patient's previous instrumentation for variety of reasons was felt best if left intact in situ and use an add-on construct.   The patient initially revealed a good fixation anteriorly during the L1-2 XLIF.  Incidental entry into the left chest through a small window in the pleura was done. I had Dr. Jl Sheets of Thoracic Surgery evaluate the situation and given the patient's soft tissue quality and the continued need to manipulate the area, it was felt best to put a chest tube in as opposed to simply evacuating it and trying a pleural closure. This was done by Dr. Jl Sheets. Remainder of the anterior procedure went smoothly with excellent restoration of the anterior column. Patient then was turned and a posterior reconstruction done to T10 with solid fixation with an add-on construct. DESCRIPTION OF OPERATION:  Following induction of endotracheal anesthesia, the patient had neuro monitoring placed utilizing the NeuroVision system. The patient was turned in the lateral decubitus position, left side up. The patient was affixed to the table in the standard fashion with bony prominences well padded. The surgical interval was delineated and preoperatively had been evaluated with various modalities and appeared to be well below the diaphragmatic insertion. On mapping it out prior to incision, there was a rib, felt to be the 10th or 11th rib, most likely the 10th rib, crossing the field and to provide safe access rib resection had been planned and was to be carried out. The patient was prepped and draped in the usual fashion. Incision was made in line with the disk space. The crossing rib was delineated. Hemostasis was strictly maintained. were used to free the rib and a small rib resection done which opened up the interval.  On delineating the retroperitoneal space, small entree happened into the pleura. I was surprised to see that there was lung this low. Looking at CAT scan, I expected it to be actually retroperitoneal directly and having to concern myself with the kidney, but the lung coming down laterally, came down this low which was surprising to me given preoperative x-ray, CAT scan and MRI.   A small window through the pleura on visualizing lung, I asked Dr. Laura Rizzo of thoracic surgery to evaluate and he felt it was best for chest tube placement and this was done by Dr. Laura Rizzo. Consideration had been made to do a pleural repair, but pleural quality was not good and I felt it would be safest and best simply to use a chest tube rather than try to repair this small hole in this poor quality pleural.  Once the chest tube was placed, there was no bleeding. The wound was dry and the patient was ventilating well. There were no clinical issues. Ready access into the retroperitoneal space was then afforded. I could palpate down and visualize the disk itself, the fibers of the psoas underneath, the inferior aspect of the diaphragm was above me. Under direct visualization, I placed a guidewire, confirmed it on AP and lateral imaging and then various dilation tubes just as placeholder and then the Max Access retractor with AP and lateral imaging. NeuroVision was utilized, but there was no neurologic response as one would anticipate at L1-2. I was able to get a way back positioning with my retractor, good orthogonal imaging. Care was taken to protect all retroperitoneal structures. I could palpate the posterior abdominal wall. There was no bleeding, no issues encountered. The retractor was expanded and I could visualize the disk space. Palpation neuro probe was clean. There was no other neurologic structures in our path. An annulotomy was done followed by diskectomy. Cookie cutter was utilized to carry the disk resection to the contralateral side. This was all done with fluoroscopic guidance continually. The remainder of the diskectomy was done with graded rongeurs, pituitaries and disk resection devices until a thorough diskectomy had been done. Wendy were utilized to clean out the disk space. Dilator was utilized. Ultimately, a trial 8 x 1845 gave excellent fit and fill.   Very snug implant with resolution of the patient's listhesis, restoration of disk space height and it sat on the posterior aspect of the endplates not on the fractured anterior aspect of L2 where I was afraid any implant would be unstable. This provided great inherent stability. I filled actual implant with Infuse bone graft and tamped it firmly into place. Excellent bony apposition and traumatic stability with resolution of segmental kyphosis, inherent normalization of anatomy. Had excellent appearance in AP and lateral images. There was no bleeding. The chest wall was closed in multiple layers. Skin closed with #1 Vicryl, 2-0 Vicryl and the skin with a 4-0 Monocryl subcuticular suture and Dermabond. A   sterile occlusive dressing was placed upon the wound. Chest tube hooked up to water suction as per Lancey's direction. The patient had radiographic imaging post-procedure was excellent. Given that this stabilized the patient but just L1 with T12 having evidence of a previous compression fracture as we discussed previously, this was not what I felt to be a stable situation. The patient was turned in a prone position at this point on a spinal frame. Estimated blood loss from this initial part of the procedure was probably less than 10-25 mL. There essentially no bleeding encountered whatsoever. Once on a spinal frame in a prone position, I reviewed the radiographic imaging. The segmental lordosis appeared to improve once in a prone position to a greater degree. There was normalization of rotation across the segment and resolution of the patient's segmental listhesis. Patient was then prepped and draped in the usual fashion. Midline incision was made in line with the patient's previous incision, carrying it up to just above T10. Subperiosteal dissection done from T10 proximally to L3 delineating the 2 and 3 screws and a crosslink between that instrumentation.   As previously discussed with the patient, the patient's previous instrumentation I felt was best to be left in situ and not revised given the concerns I had about previous screw size and pedicle size present. The patient's previous fusion appeared to me to be solid. I felt it would make a good anchor point for this proximal kyphotic construct. Once the spine was delineated, soft tissue was debrided, bone cleared and prepared for instrumentation and fusion and surgical levels defined. Utilizing fluoroscopic imaging and anatomic landmark, I prepared utilizing a thoracic wall. I defined pedicle screw entry points for screws at T10, 11 and 12. Holes were made with an awl, palpated with a probe. Pedicle markers placed, confirmed with AP and lateral imaging. I then placed a five 5 x 40 mm screws at each level with good fixation. Again, these were again confirmed with AP and lateral imaging. Everything had clean neurologic monitoring throughout. At this point, given the rigid fixation I had, I was then able to use yousif-to-yousif connectors, end to end on the ends of the rods of the previous construct and on fashioning rods used to affix the newly placed screws to the previously placed construct directly with final tightening and torquing done at each segment with relatively rigid fixation. To enhance this fixation across the thoracolumbar junction, I then removed the crosslink at L1-2 and used that vacated area of the yousif to put side connectors and then placed side-to-side connector on the construct I had between the T11 and T12 screws and placed double yousif spanning between the previous construct L2-3 and the construct I just created between T11 and 12 to create additional rigidity and support across this high-stress region. I felt this was appropriate given the patient's questionable bone quality, her recent destabilization at L1-2 and a compression fracture at T12.   Once the spine was rigidly fixed and all fixation carefully torqued and rechecked and confirmed with AP and lateral images, I then, with a high speed bur and curette, decorticated the spine from T10 back down to L2 posteriorly and used a combination of cancellous autograft local and the piece of rib that had been resected from our XLIF as well as demineralized bone matrix Attrax posteriorly. An excellent amount of bone graft was afforded. I then closed the thoracodorsal fascia and the lumbodorsal fascia with #1 Vicryl and oversewed it again to create a strong fascial closure given the patient's multiple previous surgeries. Subcutaneous tissues closed with 2-0 Vicryl and the skin closed with a 4-0 Monocryl subcuticular suture and Dermabond. A sterile occlusive dressing was placed upon the wound. All counts were correct. ESTIMATED BLOOD LOSS:  150 mL. COMPLICATIONS:  No complications. No specimens. Vancomycin powder instilled in both the XLIF incisions and the posterior incisions. Both incisions irrigated with Betadine solution prior to closure. IMPLANTS:  The patient had a NuVasive PEEK cage plates at H7-7 with Infuse bone graft, a [employer] product. Had pedicle instrumentation with pedicle screws placed at T10, 11, 12 with rods extending from these screws down to the patient's previous instrumentation. The patient had a chest tube placed by Dr. Elie Scott in the left pleural cavity. No other drains were utilized. No transfusions.           Sania Salvador MD       [id number] / [address] Claros  D: 12/03/2018 12:37     T: 12/03/2018 13:52  JOB #: [id number]

## 2018-12-03 NOTE — BRIEF OP NOTE
BRIEF OPERATIVE NOTE Date of Procedure: 12/3/2018 Preoperative Diagnosis: Lumbar spine instability [M53.2X6] Spinal stenosis, lumbar region with neurogenic claudication [M48.062] Postoperative Diagnosis: Lumbar spine instability [M53.2X6] Spinal stenosis, lumbar region with neurogenic claudication [M48.062] Procedure(s): EXTREME LATERAL INTERBODY FUSION (XLIF) L1/2; T10-L2 posterior FUSION; EXTEND - REVISE PREVIOUS FUSION/Placement of chest tube on left for incidental pleural entry Surgeon(s) and Role: Adeline Dance, MD - Primary Surgical Assistant: Dr. Nara Cason- placement of Chest tube during XLIF Surgical Staff: 
Circ-1: Cristina Mandel RN 
Circ-Relief: [facility] Radiology Technician: Roel Stroud, RT Scrub Tech-1: Elealyssa Maxin Surg Asst-1: Jessica Huston Event Time In Time Out Incision Start 1846 Incision Close Anesthesia: General  
Estimated Blood Loss: 150 Specimens: * No specimens in log * Findings: collapse, instability L1/2, poor soft tissue Complications: none Implants:  
Implant Name Type Inv. Item Serial No.  Lot No. LRB No. Used Action SPACER PEEK XL L [id number] -- [employer] - [id number]  SPACER PEEK XL L [id number] -- [employer]  NUVASIVE N/A N/A 1 Implanted SCR 2S POLYAXL 5.5X40MM -- RELINE-O - [id number]  SCR 2S POLYAXL 5.5X40MM -- RELINE-O  NUVASIVE N/A N/A 6 Implanted SCR LCK OPEN [place] 5.5MM -- RELINE - [id number]  SCR LCK OPEN [place]ULIP 5.5MM -- RELINE  NUVASIVE N/A N/A 10 Implanted SCR LCK CLOSE TULIP -- RELINE - [id number]  SCR LCK CLOSE TULIP -- RELINE  NUVASIVE N/A N/A 8 Implanted End to End connector    NUVASIVE N/A N/A 2 Implanted CONN SPNE O-H ROT F/5-6 KARTIK -- RELINE - [id number]  CONN SPNE O-H ROT F/5-6 KRATIK -- RELINE  NUVASIVE N/A N/A 4 Implanted KARTIK SPNE LORDTC [id number] TI -- RELINE - [id number]  KARTIK SPNE LORDTC [id number] TI -- RELINE  NUVASIVE N/A N/A 1 Implanted KARTIK SPNE LORDTC [id number] TI -- RELINE - [id number]  KARTIK SPNE LORDTC [id number] TI -- RELINE  NUVASIVE N/A N/A 1 Implanted KARTIK SPNE LORDTC 5.5X90MM TI -- RELINE-O - [id number]  KARTIK SPNE LORDTC 5.5X90MM TI -- RELINE-O  NUVASIVE N/A N/A 2 Implanted

## 2018-12-03 NOTE — INTERVAL H&P NOTE
H&P Update: 
Rossana Mariano was seen and examined. History and physical has been reviewed. The patient has been examined.  There have been no significant clinical changes since the completion of the originally dated History and Physical. 
 
Signed By: Enola Collet, MD   
 December 3, 2018 6:25 AM

## 2018-12-03 NOTE — CONSULTS
[facility]  Pulmonary, Critical Care, and Sleep Medicine    Name: Manda Espinoza MRN: [id number]   : 1950 Hospital: [address] [place] [address] Dr   Date: 12/3/2018        Pulmonary - Initial Patient Consult      IMPRESSION:   · S/P L1-L2 fusion. Revision of segmental spinal posterior thoracolumbar fusion T10, T11, T12, L1, L2. · Left pneumothorax: s/p chest tube- CT surgery managing  · Partial left rib resection  · RA      RECOMMENDATIONS:   · Keep LISA elevated  · SpO2 goal: >92%  · Pain management per primary team- appears to be doing well so fare on PCA  · CT management per CT surgery  · IS at bedside for pulmonary toilet  · PT, prophylaxis and post op woundcare  per primary team  · Bowel regimen  · Will continue to follow       Subjective/History: This patient has been seen and evaluated at the request of Dr. Sheri Castellanos for landry-operative pneumothorax. Patient is a 77 y.o. female who underwent L1-L2 lateral interbody fusion (retroperitoneal approach). Patient sustained a left, intraoperative pneumothorax. CT surgery placed CT and performed T-11 left rib resection. Also underwent revision of segmental spinal posterior thoracolumbar fusion T10, T11, T12, L1, L2. Patient taken to ICU/SDU for monitoring overnight. Currently on dilaudid PCA    Has a history of intermittent GERD- currently not an issue.     Lines:  · Left chest tube  · Paige cath        Past Medical History:   Diagnosis Date    Abdominal abscess     Arthritis     Rheumatoid    Autoimmune disease ([address]     Medically induced Lupus    Back pain     Chronic pain     lower back    Colitis     Diverticulitis     Failed back syndrome     GERD (gastroesophageal reflux disease)     Hypertension     when on cyclosporins    Ill-defined condition     large torus roof of mouth    Irritable bowel syndrome     Neuropathy     bilateral hands/wrist    Osteoporosis     Pneumonia     RA (rheumatoid arthritis) ([address]     Seiz[address] [address] 6-1-2008    one episode- after high dose of epinepherine      Past Surgical History:   Procedure Laterality Date    HX ABDOMINAL WALL DEFECT REPAIR      HX APPENDECTOMY      HX BREAST REDUCTION      HX CATARACT REMOVAL Bilateral     HX COLONOSCOPY      HX GI      repair rectal prolaspe    HX GYN      dermoid cyst    HX HEENT      tonsillectomy    HX HYSTERECTOMY  1976    HX LUMBAR FUSION      x 2    HX ORTHOPAEDIC Bilateral     CTR    HX ORTHOPAEDIC Bilateral     arthroplasty thumbs    HX ORTHOPAEDIC Left     Ankle     HX ORTHOPAEDIC Right     \"Nerve Release Elbow\"    HX OTHER SURGICAL Left 1985    vein stripping    HX SALPINGO-OOPHORECTOMY Bilateral     HX SHOULDER REPLACEMENT Left     HX UROLOGICAL      bladder repair    TOTAL HIP ARTHROPLASTY Bilateral       Prior to Admission medications    Medication Sig Start Date End Date Taking? Authorizing Provider   cetirizine (ZYRTEC) 10 mg tablet Take  by mouth. Yes Provider, Historical   petrolat,wht/min oil/sod chl (DRY EYES OP) Apply  to eye. Yes Provider, Historical   tofacitinib citrate (XELJANZ PO) Take  by mouth. Yes Provider, Historical   Iron, Cbn & Gluc-FA-B12-C-DSS (FERRALET 90 DUAL-IRON DELIVERY) 90-1-12-50 mg-mg-mcg-mg tab Take  by mouth. Yes Provider, Historical   promethazine (PHENERGAN) 25 mg tablet Take 1 Tab by mouth every six (6) hours as needed for Nausea. 11/19/18  Yes Charity Diaz MD   traMADol (ULTRAM-ER) 100 mg Tb24 Take 1 Tab by mouth daily. Max Daily Amount: 100 mg. 11/6/18  Yes Charity Diaz MD   lisinopril (PRINIVIL, ZESTRIL) 10 mg tablet TAKE 1 TABLET DAILY 11/5/18  Yes Charity Diaz MD   meloxicam (MOBIC) 15 mg tablet TAKE 1 TABLET DAILY 11/5/18  Yes Charity Diaz MD   zolpidem (AMBIEN) 10 mg tablet Take 0.5 Tabs by mouth nightly as needed for Sleep. Max Daily Amount: 5 mg.  Indications: SLEEP-ONSET INSOMNIA 9/28/18  Yes Charity Diaz MD   traMADol Evins Regina) 50 mg tablet Take 1 Tab by mouth every six (6) hours as needed for Pain. Max Daily Amount: 200 mg. 8/16/18  Yes Karla Kimble MD   denosumab (PROLIA) 60 mg/mL injection 60 mg by SubCUTAneous route. Yes Provider, Historical   acetaminophen (TYLENOL EXTRA STRENGTH) 500 mg tablet Take 1,000 mg by mouth every six (6) hours as needed for Pain. Yes Provider, Historical   cholecalciferol (VITAMIN D3) 1,000 unit tablet Take 1,000 Units by mouth five (5) days a week. Yes Provider, Historical   calcium carbonate (OS-BARBARA) 500 mg calcium (1,250 mg) tablet Take 1,500 mg by mouth daily. Yes Provider, Historical   naloxone (NARCAN) 2 mg/actuation spry Use 1 spray intranasally into 1 nostril. Use a new Narcan nasal spray for subsequent doses and administer into alternating nostrils. May repeat every 2 to 3 minutes as needed. 10/26/17   Roland Chatterjee PA-C   polyethylene glycol (MIRALAX) 17 gram packet Take 17 g by mouth daily.     Provider, Historical     Current Facility-Administered Medications   Medication Dose Route Frequency    [START ON 12/4/2018] loratadine (CLARITIN) tablet 10 mg  10 mg Oral DAILY    [START ON 12/4/2018] lisinopril (PRINIVIL, ZESTRIL) tablet 10 mg  10 mg Oral DAILY    [START ON 12/4/2018] polyethylene glycol (MIRALAX) packet 17 g  17 g Oral DAILY    0.9% sodium chloride infusion  100 mL/hr IntraVENous CONTINUOUS    sodium chloride (NS) flush 5-10 mL  5-10 mL IntraVENous Q8H    [START ON 12/4/2018] bisacodyl (DULCOLAX) tablet 10 mg  10 mg Oral DAILY    sodium chloride (NS) flush 5-10 mL  5-10 mL IntraVENous Q8H    acetaminophen (TYLENOL) tablet 1,000 mg  1,000 mg Oral Q6H    HYDROmorphone (PF) (DILAUDID) 30 mg / 30 mL PCA   IntraVENous TITRATE    [START ON 12/4/2018] vancomycin (VANCOCIN) 1000 mg in  ml infusion  1,000 mg IntraVENous ONCE     Allergies   Allergen Reactions    Celebrex [Celecoxib] Swelling    Shellfish Derived Swelling     \"makes me feel puffy\"    Sulfa (Sulfonamide Antibiotics) Hives and Swelling     Lips swelling    Humira [Adalimumab] Other (comments)     Lupus    Iodine Itching    Optiray 320 [Ioversol] Hives and Itching     Pt states when she gets iv contrast she itches a little from hives?  Penicillins Hives      Social History     Tobacco Use    Smoking status: Never Smoker    Smokeless tobacco: Never Used   Substance Use Topics    Alcohol use: No      Family History   Problem Relation Age of Onset    Other Other         Orthopedic (Sister)    Arthritis-osteo Sister     Cancer Neg Hx     Diabetes Neg Hx     Heart Disease Neg Hx     Heart Attack Neg Hx     Hypertension Neg Hx     Stroke Neg Hx     Malignant Hyperthermia Neg Hx     Pseudocholinesterase Deficiency Neg Hx     Delayed Awakening Neg Hx     Post-op Nausea/Vomiting Neg Hx     Emergence Delirium Neg Hx     Post-op Cognitive Dysfunction Neg Hx         Review of Systems:  A comprehensive review of systems was negative except for that written in the HPI. Objective:   Vital Signs:    Visit Vitals  /74   Pulse 86   Temp 98 °F (36.7 °C)   Resp 17   Ht 5' 3\" (1.6 m)   Wt 76.7 kg (169 lb)   SpO2 98%   BMI 29.94 kg/m²       O2 Device: Nasal cannula   O2 Flow Rate (L/min): 2 l/min   Temp (24hrs), Av.8 °F (36.6 °C), Min:97.4 °F (36.3 °C), Max:98.1 °F (36.7 °C)       Intake/Output:   Last shift:       0701 -  1900  In: 2135.1 [I.V.:2135.1]  Out: 750 [Urine:600]  Last 3 shifts: No intake/output data recorded. Intake/Output Summary (Last 24 hours) at 12/3/2018 1716  Last data filed at 12/3/2018 1337  Gross per 24 hour   Intake 2135.08 ml   Output 750 ml   Net 1385.08 ml       Physical Exam:    General:  Alert, cooperative, no distress, appears stated age. Resting in ICU bed   Head:  Normocephalic, without obvious abnormality, atraumatic. Eyes:  Conjunctivae/corneas clear. PERRL, EOMs intact. Nose: Nares normal. Septum midline. Mucosa normal. No drainage or sinus tenderness.    Throat: Lips, mucosa, and tongue normal. Teeth and gums normal.   Neck: Supple, symmetrical, trachea midline, no adenopathy, thyroid: no enlargment/tenderness/nodules, no carotid bruit and no JVD. Back:   Post op changes   Lungs:   Clear to auscultation bilaterally- some splinting   Chest wall:  Left chest tube- on suction 20 cwp   Heart:  Regular rate and rhythm, S1, S2 normal, no murmur, click, rub or gallop. Abdomen:   Soft, non-tender. Bowel sounds normal. No masses,  No organomegaly. Extremities: Extremities OA changes to fingers, thumbs and hands/wrists no cyanosis or edema. Pulses: 2+ and symmetric all extremities.    Skin: Skin color, texture, turgor normal. No rashes or lesions   Lymph nodes:      Cervical, supraclavicular, nodes non-palpable   Neurologic: Grossly nonfocal- EXCEPT: pain 2-3/10, limited ROM of hands       Data:     Recent Results (from the past 24 hour(s))   CBC W/O DIFF    Collection Time: 12/03/18  1:30 PM   Result Value Ref Range    WBC 12.0 4.6 - 13.2 K/uL    RBC 3.76 (L) 4.20 - 5.30 M/uL    HGB 11.7 (L) 12.0 - 16.0 g/dL    HCT 35.5 35.0 - 45.0 %    MCV 94.4 74.0 - 97.0 FL    MCH 31.1 24.0 - 34.0 PG    MCHC 33.0 31.0 - 37.0 g/dL    RDW 12.5 11.6 - 14.5 %    PLATELET 955 840 - 634 K/uL    MPV 9.4 9.2 - 90.1 FL   METABOLIC PANEL, BASIC    Collection Time: 12/03/18  1:30 PM   Result Value Ref Range    Sodium 141 136 - 145 mmol/L    Potassium 3.7 3.5 - 5.5 mmol/L    Chloride 106 100 - 108 mmol/L    CO2 23 21 - 32 mmol/L    Anion gap 12 3.0 - 18 mmol/L    Glucose 181 (H) 74 - 99 mg/dL    BUN 12 7.0 - 18 MG/DL    Creatinine 0.76 0.6 - 1.3 MG/DL    BUN/Creatinine ratio 16 12 - 20      GFR est AA >60 >60 ml/min/1.73m2    GFR est non-AA >60 >60 ml/min/1.73m2    Calcium 7.7 (L) 8.5 - 10.1 MG/DL           Telemetry:normal sinus rhythm    Imaging:  I have personally reviewed the patients radiographs and have reviewed the reports:  CXR Results  (Last 48 hours)               12/03/18 1327  XR CHEST PORT Final result    Impression:  IMPRESSION:       Suboptimal inspiration; bibasilar interstitial opacities, infiltrate versus   atelectasis. Status post lateral left base tube thoracostomy; no pneumothorax. Narrative:  EXAM: CHEST  CPT CODE: 41079       CLINICAL INDICATION/HISTORY: Chest tube placement. COMPARISON: CT chest, abdomen pelvis of 10 and September 2018. TECHNIQUE: Single AP portable view of chest at 1312. FINDINGS: There is suboptimal inspiratory effort. Thick interstitial opacities   are noted at both lung bases, infiltrate or atelectasis. There is interval   placement of a lateral left base tube thoracostomy. There is no pneumothorax. The mediastinum is not widened but the heart is partially obscured and could be   enlarged. Posterior fixation hardware is seen from T10 distally. There is a   left humeral head prosthesis. Degenerative changes are noted at the right   shoulder joint. Other bony structures are grossly intact.                      Total clinical care time exclusive of procedures: 30 minutes    Sherie Martinez DO, [facility]    [facility]  Pulmonary, Critical Care, and Sleep Medicine

## 2018-12-03 NOTE — PROGRESS NOTES
In PACU Neuro intact Incisional pain Labs OK CXR OK- No air leak UO- good Plan To ICU/stepdown overnight observation, Chest tube management Pain control

## 2018-12-03 NOTE — PROGRESS NOTES
OR today  Retroperitoneal retropleural approach L1-L2 with extreme lateral interbody fusion. T11 rib resection on left, placement of chest tube by Dr. Sharla Valdivia. Cosurgeon for that, Dr. Sharla Valdivia of thoracic surgery. L1-2 extreme lateral interbody fusion with placement of PEEK cage and Infuse bone graft. Revision segmental spinal posterior thoracolumbar fusion T10, T11, T12, L1, L2 with NuVasive pedicle screw instrumentation, adding on to previous instrumented fusion L2-L5. Revision of segmental spinal instrumentation, L2-L5 with removal of transverse connector. Segmental spinal instrumentation, NuVasive pedicle screws T10, T11, T12, L2.  
1. Placement of left chest tube secondary to left pneumothorax VSS Currently working with PT, has some bilateral  weakness per PT Dilaudid PCA effective for pain control and pt rates incisional  pain as 3/10 Dressing to lumbar spine with quarter size area of bloody drainage Left lateral chest dressing dry Left chest tube dressing dry. Urine output = 600 cc Pt states she is pleased with the surgery Aniyah Davila, NEIDA

## 2018-12-03 NOTE — PROGRESS NOTES
Problem: Mobility Impaired (Adult and Pediatric) Goal: *Acute Goals and Plan of Care (Insert Text) Physical Therapy Goals Initiated 12/3/2018 and to be accomplished within 7 day(s) 1. Patient will move from supine to sit and sit to supine , scoot up and down and roll side to side in bed with supervision/set-up. 2.  Patient will transfer from bed to chair and chair to bed with supervision/set-up using the least restrictive device. 3.  Patient will perform sit to stand with supervision/set-up. 4.  Patient will ambulate with supervision/set-up for >150 feet with the least restrictive device. 5.  Patient will ascend/descend 5 stairs with 1-2 handrail(s) with supervision/set-up. physical Therapy EVALUATION Patient: Liliana Foster (78 y.o. female) Date: 12/3/2018 Primary Diagnosis: Lumbar spine instability [M53.2X6] Spinal stenosis, lumbar region with neurogenic claudication [M48.062] Lumbar spine instability [M53.2X6] Spinal stenosis of lumbar region [M48.061] Procedure(s) (LRB): EXTREME LATERAL INTERBODY FUSION (XLIF) L1/2; T10-L2 FUSION; EXTEND - REVISE PREVIOUS FUSION/C-ARM/NUVASIVE (N/A) Day of Surgery Precautions: Fall, Spinal    
  
ASSESSMENT : 
Patient is 78 yo F admitted to hospital for XLIF L1-L2, posterior fusion T10-L2, with left chest tube placed during surgery. Patient presents today alert and agreeable to therapy though was lethargic during session. Patient was educated on spinal precautions and performed log roll towards right with CG. Therapist attempted to assist patient at sacrum and left hip and scapula and patient states, \"Don't do that. Don't touch my hip. \"  as therapist resting hand for guarding purposes. Patient reports she's had several hip surgeries in the past and that it was painful for therapist to gently rest hand at hip. Patient assisted at B scapulae to sitting EOB and objective assessment performed.  Patient was given demo with instruction on sit <> stand transfer and gait training and transferred to standing with CG and ambulated 15ft along bed with RW as patient CT to wall suction in room. Patient demonstrated good compliance with precautions throughout session. At conclusion of session patient transferred to supine in bed and was left resting with call bell by the side and SCDs donned. Patient instructed to call for assistance if they needed to get up for any reason and denied need for further assistance. Patient demonstrates decreased strength, mobility, and endurance and will benefit from skilled intervention to address the above impairments. Patient required additional time throughout session and was resistant to therapist instructing patient not to put both hands on walker to stand. Patients rehabilitation potential is considered to be Good Factors which may influence rehabilitation potential include:  
[]         None noted 
[x]         Mental ability/status [x]         Medical condition 
[x]         Home/family situation and support systems 
[x]         Safety awareness [x]         Pain tolerance/management 
[]         Other: PLAN : 
Recommendations and Planned Interventions: 
[x]           Bed Mobility Training             [x]    Neuromuscular Re-Education 
[x]           Transfer Training                   []    Orthotic/Prosthetic Training 
[x]           Gait Training                          []    Modalities [x]           Therapeutic Exercises          []    Edema Management/Control 
[x]           Therapeutic Activities            [x]    Patient and Family Training/Education 
[]           Other (comment): Frequency/Duration: Patient will be followed by physical therapy 1-2 times per day/4-7 days per week to address goals. Discharge Recommendations: Home Health Further Equipment Recommendations for Discharge: rolling walker, transfer bench  
 
G-CODES  
 
 Mobility [id number] Current  CJ= 20-39%   Goal  CI= 1-19%. The severity rating is based on the Level of Assistance required for Functional Mobility and ADLs. 
 
 
 
G-CODES Eval Complexity: History: MEDIUM  Complexity : 1-2 comorbidities / personal factors will impact the outcome/ POC Exam:LOW Complexity : 1-2 Standardized tests and measures addressing body structure, function, activity limitation and / or participation in recreation  Presentation: LOW Complexity : Stable, uncomplicated  Clinical Decision Making:Low Complexity   Overall Complexity:LOW SUBJECTIVE:  
Patient stated I don't know my left and right. Tell me starboard or port.  Educated patient that therapist would be using anatomical and/or \"laymen's\" terms and does not know sailing terms. Also instructed on potential safety issues of therapist using terms that are not oriented to physical therapy practice. Offered tactile cues for improved communication to patient instead. OBJECTIVE DATA SUMMARY:  
 
Past Medical History:  
Diagnosis Date  Abdominal abscess 2009  Arthritis Rheumatoid  Autoimmune disease ([address] Medically induced Lupus  Back pain  Chronic pain   
 lower back  Colitis  Diverticulitis  Failed back syndrome  GERD (gastroesophageal reflux disease)  Hypertension   
 when on cyclosporins  Ill-defined condition   
 large torus roof of mouth  Irritable bowel syndrome  Neuropathy   
 bilateral hands/wrist  
 Osteoporosis  Pneumonia  RA (rheumatoid arthritis) ([address]  Seizures ([address] 6-1-2008  
 one episode- after high dose of epinepherine Past Surgical History:  
Procedure Laterality Date  HX ABDOMINAL WALL DEFECT REPAIR    
 HX APPENDECTOMY  HX BREAST REDUCTION    
 HX CATARACT REMOVAL Bilateral   
 HX COLONOSCOPY    
 HX GI    
 repair rectal prolaspe  HX GYN    
 dermoid cyst  
 HX HEENT    
 tonsillectomy [address]  HX LUMBAR FUSION    
 x 2  
 HX ORTHOPAEDIC Bilateral   
 CTR  
 HX ORTHOPAEDIC Bilateral   
 arthroplasty thumbs  HX ORTHOPAEDIC Left Ankle  HX ORTHOPAEDIC Right \"Nerve Release Elbow\"  HX OTHER SURGICAL Left 1985  
 vein stripping  HX SALPINGO-OOPHORECTOMY Bilateral   
 HX SHOULDER REPLACEMENT Left  HX UROLOGICAL    
 bladder repair  TOTAL HIP ARTHROPLASTY Bilateral   
 
Barriers to Learning/Limitations: None Compensate with: N/A Prior Level of Function/Home Situation: Patient lives in 1 story home with 5STE with  and has FWW, crutches, cane, WC, and step over tub with grab bars and elevated commode. Critical Behavior: A&Ox4, lethargic Strength:   
Strength: Generally decreased, functional 
  BLE Tone & Sensation:  
Tone: Normal 
   BLE Sensation: Intact BLE Range Of Motion: 
AROM: Generally decreased, functional 
  BLEFunctional Mobility: 
Bed Mobility: 
Rolling: Contact guard assistance Supine to Sit: Moderate assistance Sit to Supine: Minimum assistance Scooting: Contact guard assistance Transfers: 
Sit to Stand: Contact guard assistance Stand to Sit: Contact guard assistance Balance:  
  Seated good Standing impaired with support, fairAmbulation/Gait Training: 
Distance (ft): 15 Feet (ft) Assistive Device: Walker, rolling Ambulation - Level of Assistance: Supervision Base of Support: Narrowed Speed/[place]: Slow Interventions: Verbal cues; Visual/Demos Pain: 
Pt reports 3/10 pain or discomfort prior to treatment.   
Pt reports 3/10 pain or discomfort post treatment. Activity Tolerance:  
Patient requires additional time to complete tasks and perseverates on multiple surgeries in her PMH. Please refer to the flowsheet for vital signs taken during this treatment. After treatment:  
[]         Patient left in no apparent distress sitting up in chair 
[x]         Patient left in no apparent distress in bed [x]         Call bell left within reach [x]         Nursing notified [facility]) [x]         Caregiver present [x]         Bed alarm activated 
[x]         SCDs in place to NAM WEBER 
  
COMMUNICATION/EDUCATION:  
[x]         Fall prevention education was provided and the patient/caregiver indicated understanding. [x]         Patient/family have participated as able in goal setting and plan of care. [x]         Patient/family agree to work toward stated goals and plan of care. []         Patient understands intent and goals of therapy, but is neutral about his/her participation. []         Patient is unable to participate in goal setting and plan of care. Thank you for this referral. 
Madison Granados, PT Time Calculation: 47 mins

## 2018-12-03 NOTE — PERIOP NOTES
TRANSFER - OUT REPORT: 
 
Verbal report given to [facility] RN(name) on Jimena Tubbs  being transferred to ICU(unit) for routine post - op Report consisted of patients Situation, Background, Assessment and  
Recommendations(SBAR). Information from the following report(s) SBAR, Kardex, OR Summary, Procedure Summary, Intake/Output, MAR, Recent Results and Med Rec Status was reviewed with the receiving nurse. Lines:  
Peripheral IV 12/03/18 Right Hand (Active) Site Assessment Clean, dry, & intact 12/3/2018 12:53 PM  
Phlebitis Assessment 0 12/3/2018 12:53 PM  
Infiltration Assessment 0 12/3/2018 12:53 PM  
Dressing Status Clean, dry, & intact 12/3/2018 12:53 PM  
Dressing Type Tape;Transparent 12/3/2018 12:53 PM  
Hub Color/Line Status Pink; Infusing 12/3/2018 12:53 PM  
Alcohol Cap Used No 12/3/2018  6:44 AM  
   
Peripheral IV 12/03/18 Left (Active) Site Assessment Clean, dry, & intact 12/3/2018 12:53 PM  
Phlebitis Assessment 0 12/3/2018 12:53 PM  
Infiltration Assessment 0 12/3/2018 12:53 PM  
Dressing Status Clean, dry, & intact 12/3/2018 12:53 PM  
Dressing Type Tape;Transparent 12/3/2018 12:53 PM  
Hub Color/Line Status Pink;Capped 12/3/2018 12:53 PM  
Alcohol Cap Used No 12/3/2018  6:45 AM  
  
 
Opportunity for questions and clarification was provided. Patient transported with: 
 O2 @ 2 liters Registered Nurse

## 2018-12-03 NOTE — OP NOTES
CARDIAC SURGERY OPERATIVE NOTE    12/3/2018    PREOPERATIVE DIAGNOSIS:  Left pneumothorax    POSTOPERATIVE DIAGNOSIS:  Left pneumothorax    PROCEDURE:    1. Placement of left chest tube    ATTENDING SURGEON:  Kelsi Tolentino MD       ASSISTANT: Silva Tran SA. (There was no qualified resident available to assist with this operation.)      ANESTHESIA:  General with endotracheal tube. ESTIMATED BLOOD LOSS: none    IMPLANTS: none     SPECIMENS REMOVED: none    INDICATIONS:  Chica Mendieta is a 77 y.o. female who while undergoing a T10-L2 fusion had an incidental left pleurotomy. I was called to the room to evaluate and treat. FINDINGS: There was a small left pleurotomy just above the level of the diaphragm in approximately the left posterior axillary line with no obvious injury to the pulmonary parenchyma. PROCEDURE:  I arrived in the operating and Dr. Yovanny Hardy explained the situation (incidental left pleurotomy while dissecting along the posterolateral chest wall). In the wound the pleurotomy was easily visible and there appeared to be no injury to the underlying pulmonary parenchyma. There was a rib present that had bill partially resected. Several cm anterior to the incision a second shorter incision (2 cm) was created and through this incision a tonsil clamp was carefully placed, avoiding injury to the lung tissue. Through this incision a straight 24 Fr chest drain was placed without difficulty and was directed superiorly. It was secured in place with heavy silk suture and was later in the case connected to a Pleurevac canister on suction. The case was then turned back over to Dr Yovanny Hardy. I performed the entire procedure as dictated above.     Kelsi Tolentino MD

## 2018-12-03 NOTE — ANESTHESIA PREPROCEDURE EVALUATION
Anesthetic History   No history of anesthetic complications            Review of Systems / Medical History  Patient summary reviewed and pertinent labs reviewed    Pulmonary  Within defined limits                 Neuro/Psych     seizures (One episode due to Epinephrine)         Cardiovascular    Hypertension: well controlled              Exercise tolerance: >4 METS     GI/Hepatic/Renal     GERD: well controlled           Endo/Other        Arthritis (Rheumatoid arthritis)    Comments: Lupus-drug induced Other Findings              Physical Exam    Airway  Mallampati: II  TM Distance: 4 - 6 cm  Neck ROM: normal range of motion   Mouth opening: Normal     Cardiovascular               Dental  No notable dental hx       Pulmonary  Breath sounds clear to auscultation               Abdominal  GI exam deferred       Other Findings   Comments: Big soft tissue mass in roof of mouth         Anesthetic Plan    ASA: 3  Anesthesia type: general          Induction: Intravenous  Anesthetic plan and risks discussed with: Patient

## 2018-12-04 ENCOUNTER — APPOINTMENT (OUTPATIENT)
Dept: GENERAL RADIOLOGY | Age: 68
DRG: 454 | End: 2018-12-04
Attending: THORACIC SURGERY (CARDIOTHORACIC VASCULAR SURGERY)
Payer: MEDICARE

## 2018-12-04 ENCOUNTER — APPOINTMENT (OUTPATIENT)
Dept: GENERAL RADIOLOGY | Age: 68
DRG: 454 | End: 2018-12-04
Attending: PHYSICIAN ASSISTANT
Payer: MEDICARE

## 2018-12-04 PROBLEM — M48.061 LUMBAR STENOSIS: Status: ACTIVE | Noted: 2018-12-04

## 2018-12-04 LAB
ANION GAP SERPL CALC-SCNC: 8 MMOL/L (ref 3–18)
BUN SERPL-MCNC: 13 MG/DL (ref 7–18)
BUN/CREAT SERPL: 19 (ref 12–20)
CALCIUM SERPL-MCNC: 7.6 MG/DL (ref 8.5–10.1)
CHLORIDE SERPL-SCNC: 107 MMOL/L (ref 100–108)
CO2 SERPL-SCNC: 25 MMOL/L (ref 21–32)
CREAT SERPL-MCNC: 0.68 MG/DL (ref 0.6–1.3)
ERYTHROCYTE [DISTWIDTH] IN BLOOD BY AUTOMATED COUNT: 12.7 % (ref 11.6–14.5)
GLUCOSE SERPL-MCNC: 105 MG/DL (ref 74–99)
HCT VFR BLD AUTO: 35.6 % (ref 35–45)
HGB BLD-MCNC: 11.3 G/DL (ref 12–16)
MCH RBC QN AUTO: 30.6 PG (ref 24–34)
MCHC RBC AUTO-ENTMCNC: 31.7 G/DL (ref 31–37)
MCV RBC AUTO: 96.5 FL (ref 74–97)
PLATELET # BLD AUTO: 225 K/UL (ref 135–420)
PMV BLD AUTO: 9.9 FL (ref 9.2–11.8)
POTASSIUM SERPL-SCNC: 4.3 MMOL/L (ref 3.5–5.5)
RBC # BLD AUTO: 3.69 M/UL (ref 4.2–5.3)
SODIUM SERPL-SCNC: 140 MMOL/L (ref 136–145)
WBC # BLD AUTO: 8.2 K/UL (ref 4.6–13.2)

## 2018-12-04 PROCEDURE — 74011250637 HC RX REV CODE- 250/637: Performed by: ORTHOPAEDIC SURGERY

## 2018-12-04 PROCEDURE — 74011250636 HC RX REV CODE- 250/636: Performed by: ORTHOPAEDIC SURGERY

## 2018-12-04 PROCEDURE — 36415 COLL VENOUS BLD VENIPUNCTURE: CPT

## 2018-12-04 PROCEDURE — 77030038269 HC DRN EXT URIN PURWCK BARD -A

## 2018-12-04 PROCEDURE — 85027 COMPLETE CBC AUTOMATED: CPT

## 2018-12-04 PROCEDURE — 74011250637 HC RX REV CODE- 250/637: Performed by: NURSE PRACTITIONER

## 2018-12-04 PROCEDURE — 97116 GAIT TRAINING THERAPY: CPT

## 2018-12-04 PROCEDURE — 71045 X-RAY EXAM CHEST 1 VIEW: CPT

## 2018-12-04 PROCEDURE — 97165 OT EVAL LOW COMPLEX 30 MIN: CPT

## 2018-12-04 PROCEDURE — 80048 BASIC METABOLIC PNL TOTAL CA: CPT

## 2018-12-04 PROCEDURE — 65270000029 HC RM PRIVATE

## 2018-12-04 PROCEDURE — 77030011943

## 2018-12-04 PROCEDURE — 97530 THERAPEUTIC ACTIVITIES: CPT

## 2018-12-04 PROCEDURE — 97166 OT EVAL MOD COMPLEX 45 MIN: CPT

## 2018-12-04 RX ORDER — HYDROMORPHONE HYDROCHLORIDE 4 MG/1
4 TABLET ORAL
Status: DISCONTINUED | OUTPATIENT
Start: 2018-12-04 | End: 2018-12-04

## 2018-12-04 RX ORDER — HYDROMORPHONE HYDROCHLORIDE 4 MG/1
4-8 TABLET ORAL
Status: DISCONTINUED | OUTPATIENT
Start: 2018-12-04 | End: 2018-12-05

## 2018-12-04 RX ORDER — FAMOTIDINE 20 MG/1
20 TABLET, FILM COATED ORAL 2 TIMES DAILY
Status: DISCONTINUED | OUTPATIENT
Start: 2018-12-04 | End: 2018-12-04

## 2018-12-04 RX ORDER — PANTOPRAZOLE SODIUM 40 MG/1
40 TABLET, DELAYED RELEASE ORAL
Status: DISCONTINUED | OUTPATIENT
Start: 2018-12-05 | End: 2018-12-06 | Stop reason: HOSPADM

## 2018-12-04 RX ADMIN — DIAZEPAM 5 MG: 5 TABLET ORAL at 23:42

## 2018-12-04 RX ADMIN — DIPHENHYDRAMINE HYDROCHLORIDE 12.5 MG: 50 INJECTION, SOLUTION INTRAMUSCULAR; INTRAVENOUS at 08:20

## 2018-12-04 RX ADMIN — ACETAMINOPHEN 1000 MG: 500 TABLET ORAL at 23:42

## 2018-12-04 RX ADMIN — LORATADINE 10 MG: 10 TABLET ORAL at 08:20

## 2018-12-04 RX ADMIN — VANCOMYCIN HYDROCHLORIDE 1000 MG: 10 INJECTION, POWDER, LYOPHILIZED, FOR SOLUTION INTRAVENOUS at 01:59

## 2018-12-04 RX ADMIN — ACETAMINOPHEN 1000 MG: 500 TABLET ORAL at 06:13

## 2018-12-04 RX ADMIN — HYDROMORPHONE HYDROCHLORIDE 4 MG: 4 TABLET ORAL at 17:29

## 2018-12-04 RX ADMIN — FAMOTIDINE 20 MG: 20 TABLET, FILM COATED ORAL at 11:50

## 2018-12-04 RX ADMIN — DIAZEPAM 5 MG: 5 TABLET ORAL at 15:54

## 2018-12-04 RX ADMIN — ACETAMINOPHEN 1000 MG: 500 TABLET ORAL at 01:48

## 2018-12-04 RX ADMIN — ONDANSETRON 4 MG: 2 INJECTION INTRAMUSCULAR; INTRAVENOUS at 21:43

## 2018-12-04 RX ADMIN — HYDROMORPHONE HYDROCHLORIDE 4 MG: 4 TABLET ORAL at 21:36

## 2018-12-04 RX ADMIN — HYDROMORPHONE HYDROCHLORIDE 4 MG: 4 TABLET ORAL at 21:06

## 2018-12-04 RX ADMIN — HYDROMORPHONE HYDROCHLORIDE 4 MG: 4 TABLET ORAL at 12:14

## 2018-12-04 RX ADMIN — Medication 10 ML: at 16:06

## 2018-12-04 RX ADMIN — Medication 10 ML: at 22:16

## 2018-12-04 RX ADMIN — ONDANSETRON 4 MG: 2 INJECTION INTRAMUSCULAR; INTRAVENOUS at 08:20

## 2018-12-04 RX ADMIN — DIAZEPAM 5 MG: 5 TABLET ORAL at 02:54

## 2018-12-04 RX ADMIN — ACETAMINOPHEN 1000 MG: 500 TABLET ORAL at 11:50

## 2018-12-04 RX ADMIN — SODIUM CHLORIDE 100 ML/HR: 900 INJECTION, SOLUTION INTRAVENOUS at 08:20

## 2018-12-04 RX ADMIN — FAMOTIDINE 20 MG: 20 TABLET, FILM COATED ORAL at 15:54

## 2018-12-04 RX ADMIN — Medication 10 ML: at 01:15

## 2018-12-04 RX ADMIN — LORAZEPAM 1 MG: 2 INJECTION INTRAMUSCULAR; INTRAVENOUS at 19:20

## 2018-12-04 RX ADMIN — DIPHENHYDRAMINE HYDROCHLORIDE 12.5 MG: 50 INJECTION, SOLUTION INTRAMUSCULAR; INTRAVENOUS at 15:56

## 2018-12-04 NOTE — ROUTINE PROCESS
TRANSFER - OUT REPORT: 
 
Verbal report given to Ross Santiago RN(name) on Darien Kid  being transferred to stepdown 2308(unit) for routine progression of care Report consisted of patients Situation, Background, Assessment and  
Recommendations(SBAR). Information from the following report(s) SBAR, Kardex, [facility] and Recent Results was reviewed with the receiving nurse. Lines:  
Peripheral IV 12/03/18 Right Hand (Active) Site Assessment Clean, dry, & intact 12/3/2018  3:45 PM  
Phlebitis Assessment 0 12/3/2018  3:45 PM  
Infiltration Assessment 0 12/3/2018  3:45 PM  
Dressing Status Clean, dry, & intact 12/3/2018  3:45 PM  
Dressing Type Tape;Transparent 12/3/2018  3:45 PM  
Hub Color/Line Status Pink;Flushed 12/3/2018  3:45 PM  
Alcohol Cap Used Yes 12/3/2018  3:45 PM  
   
Peripheral IV 12/03/18 Left (Active) Site Assessment Clean, dry, & intact 12/3/2018  3:45 PM  
Phlebitis Assessment 0 12/3/2018  3:45 PM  
Infiltration Assessment 0 12/3/2018  3:45 PM  
Dressing Status Clean, dry, & intact 12/3/2018  3:45 PM  
Dressing Type Tape;Transparent 12/3/2018  3:45 PM  
Hub Color/Line Status Pink;Flushed 12/3/2018  3:45 PM  
Alcohol Cap Used No 12/3/2018  3:45 PM  
  
 
Opportunity for questions and clarification was provided.    
 
Patient transported with: 
 Monitor and RN

## 2018-12-04 NOTE — PROGRESS NOTES
conducted an Initial consultation and Spiritual Assessment for Viktoria Ward, who is a 77 y.o.,female. Patients Primary Language is: [place]. According to the patients EMR Evangelical Affiliation is: Sabianism. The reason the Patient came to the hospital is:  
Patient Active Problem List  
 Diagnosis Date Noted  Lumbar stenosis 12/04/2018  Spinal stenosis of lumbar region 12/03/2018  Spinal stenosis, thoracolumbar region 11/06/2018  Lumbar spine instability 11/06/2018  Spinal stenosis of lumbar region with neurogenic claudication 11/06/2018  Closed fracture of thoracic spine without spinal cord lesion ([facility] [address] 10/05/2018  Osteoarthritis of left hip 10/24/2017  Osteopenia of multiple sites 09/12/2017  Osteopenia 09/08/2017  Chronic left shoulder pain 07/25/2017  ACP (advance care planning) 06/28/2017  Pain management contract agreement 06/22/2017  H/O calcium pyrophosphate deposition disease (CPPD) 05/30/2017  IBS (irritable bowel syndrome) 05/30/2017  RA (rheumatoid arthritis) ([facility] [address] 05/30/2017  Sicca syndrome ([facility] [address] 05/30/2017  Osteoarthritis of right hip 05/30/2017  Fibrosis of left subtalar joint 11/06/2014  Preop cardiovascular exam 07/03/2013  DDD (degenerative disc disease), lumbar 06/28/2013  Spondylolisthesis of lumbar region 06/28/2013  Status post lumbar surgery 06/28/2013 The  provided the following Interventions: 
Initiated a relationship of care and support. Patient's twin sister Jamshid Harper was present at bedside. Explored issues of humberto and belief. Patient confirmed her [employer] affiliation as [address]. Listened empathically to patient, who said she did not have any spiritual concerns at this time. She said she will go home from hospital; she prefers not to go to a rehab place. Provided chaplaincy education. Offered assurance of continued prayer on patient's behalf. Chart reviewed. The following outcomes were achieved: 
Patient was educated re: [address]. Patient and sister expressed gratitude for the 's visit. Assessment: 
Patient does not have any Evangelical or cultural needs that will affect patients preferences in health care. Patient did not indicate any other spiritual or Evangelical issues which require Spiritual Care Services interventions at this time. Plan: 
Chaplains will continue to follow and will provide pastoral care as needed or requested.  recommends bedside caregivers page  on duty if patient shows signs of acute spiritual or emotional distress. The Rev. Merline [place] [address] Spiritual Care Services [address] [facility] [facility] [phone] / [facility] [phone]

## 2018-12-04 NOTE — CONSULTS
[address] [place] [address] [facility]  Pulmonary, Critical Care, and Sleep Medicine    Name: Nola Garza MRN: [id number]   : 1950 Hospital: [address] [place] [address]   Date: 2018        Pulmonary -Follow Up      IMPRESSION:   · S/P L1-L2 fusion. Revision of segmental spinal posterior thoracolumbar fusion T10, T11, T12, L1, L2. · Left pneumothorax:- Resolved s/p chest tube- 12/3/18 CT surgery managing- D/C'd today  · Partial left rib resection  · RA      RECOMMENDATIONS:   · Keep LISA elevated  · SpO2 goal: >92%  · Pain management per primary team  · IS at bedside for pulmonary toilet- use encouraged- discussed with patient and her twin sister  · PT, prophylaxis and post op woundcare  per primary team  · Bowel regimen  · No new recommendations at this time. If continues to do well will sign off tomorrow. Subjective/History: This patient has been seen and evaluated at the request of Dr. Emmalene Severance for landry-operative pneumothorax. Patient is a 77 y.o. female who underwent L1-L2 lateral interbody fusion (retroperitoneal approach). Patient sustained a left, intraoperative pneumothorax. CT surgery placed CT and performed T-11 left rib resection. Also underwent revision of segmental spinal posterior thoracolumbar fusion T10, T11, T12, L1, L2. Has a history of intermittent GERD- currently not an issue. Lines:  · Left chest tube D/C'd today   · Paige cath    18  · Patient moved to SDU overnight  · Chest tube discontinues  · Dilaudid PCA discontinues  · Patient reports some increase pain 5-6/10 with movement.   · Afebrile    Patient Vitals for the past 24 hrs:   Temp Pulse Resp BP SpO2   18 0800 98.2 °F (36.8 °C) 84 13 119/70 98 %   18 0630  75 13 113/71 98 %   18 0500  68 12 98/63 97 %   18 0454    99/68    18 0359 98 °F (36.7 °C) 73 15 98/63 98 %   18 0117  77 16 104/63 98 %   18 0000 97.9 °F (36.6 °C) 71 17 100/66 97 %   18 7610 97.9 °F (36.6 °C)  14 96/62 97 %   12/03/18 2300  79 15 96/62 98 %   12/03/18 2011 98.2 °F (36.8 °C) 90 16 119/73 98 %   12/03/18 1833  93 12 98/64 95 %   12/03/18 1822  82 10 105/62 100 %   12/03/18 1800  92 13 106/90 100 %   12/03/18 1745  89 17 97/81 100 %   12/03/18 1730  91 13 109/67 100 %   12/03/18 1715  88 17 107/65 98 %   12/03/18 1700  94 18 107/72 99 %   12/03/18 1645  88 16 116/74 98 %   12/03/18 1630  87 15 108/69 98 %   12/03/18 1615  86 17 108/74 98 %   12/03/18 1600 98.1 °F (36.7 °C) 89 16 111/76 98 %   12/03/18 1545 98.1 °F (36.7 °C) 89 17 113/70 98 %         Past Medical History:   Diagnosis Date    Abdominal abscess 2009    Arthritis     Rheumatoid    Autoimmune disease ([facility] [address]     Medically induced Lupus    Back pain     Chronic pain     lower back    Colitis     Diverticulitis     Failed back syndrome     GERD (gastroesophageal reflux disease)     Hypertension     when on cyclosporins    Ill-defined condition     large torus roof of mouth    Irritable bowel syndrome     Neuropathy     bilateral hands/wrist    Osteoporosis     Pneumonia     RA (rheumatoid arthritis) (HCC)     Seizures ([facility] [address] 6-1-2008    one episode- after high dose of epinepherine      Past Surgical History:   Procedure Laterality Date    HX ABDOMINAL WALL DEFECT REPAIR      HX APPENDECTOMY      HX BREAST REDUCTION      HX CATARACT REMOVAL Bilateral     HX COLONOSCOPY      HX GI      repair rectal prolaspe    HX GYN      dermoid cyst    HX HEENT      tonsillectomy    HX HYSTERECTOMY  1976    HX LUMBAR FUSION      x 2    HX ORTHOPAEDIC Bilateral     CTR    HX ORTHOPAEDIC Bilateral     arthroplasty thumbs    HX ORTHOPAEDIC Left     Ankle     HX ORTHOPAEDIC Right     \"Nerve Release Elbow\"    HX OTHER SURGICAL Left 1985    vein stripping    HX SALPINGO-OOPHORECTOMY Bilateral     HX SHOULDER REPLACEMENT Left     HX UROLOGICAL      bladder repair    TOTAL HIP ARTHROPLASTY Bilateral       Prior to Admission medications    Medication Sig Start Date End Date Taking? Authorizing Provider   cetirizine (ZYRTEC) 10 mg tablet Take  by mouth. Yes Provider, Historical   petrolat,wht/min oil/sod chl (DRY EYES OP) Apply  to eye. Yes Provider, Historical   tofacitinib citrate (XELJANZ PO) Take  by mouth. Yes Provider, Historical   Iron, Cbn & Gluc-FA-B12-C-DSS (FERRALET 90 DUAL-IRON DELIVERY) 90-1-12-50 mg-mg-mcg-mg tab Take  by mouth. Yes Provider, Historical   promethazine (PHENERGAN) 25 mg tablet Take 1 Tab by mouth every six (6) hours as needed for Nausea. 11/19/18  Yes Niya Barreto MD   traMADol (ULTRAM-ER) 100 mg Tb24 Take 1 Tab by mouth daily. Max Daily Amount: 100 mg. 11/6/18  Yes Niya Barreto MD   lisinopril (PRINIVIL, ZESTRIL) 10 mg tablet TAKE 1 TABLET DAILY 11/5/18  Yes Niya Barreto MD   meloxicam (MOBIC) 15 mg tablet TAKE 1 TABLET DAILY 11/5/18  Yes Niya Barreto MD   zolpidem (AMBIEN) 10 mg tablet Take 0.5 Tabs by mouth nightly as needed for Sleep. Max Daily Amount: 5 mg. Indications: SLEEP-ONSET INSOMNIA 9/28/18  Yes Niya Barreto MD   traMADol Bill Denier) 50 mg tablet Take 1 Tab by mouth every six (6) hours as needed for Pain. Max Daily Amount: 200 mg. 8/16/18  Yes Niya Barreto MD   denosumab (PROLIA) 60 mg/mL injection 60 mg by SubCUTAneous route. Yes Provider, Historical   acetaminophen (TYLENOL EXTRA STRENGTH) 500 mg tablet Take 1,000 mg by mouth every six (6) hours as needed for Pain. Yes Provider, Historical   cholecalciferol (VITAMIN D3) 1,000 unit tablet Take 1,000 Units by mouth five (5) days a week. Yes Provider, Historical   calcium carbonate (OS-BARBARA) 500 mg calcium (1,250 mg) tablet Take 1,500 mg by mouth daily. Yes Provider, Historical   naloxone (NARCAN) 2 mg/actuation spry Use 1 spray intranasally into 1 nostril. Use a new Narcan nasal spray for subsequent doses and administer into alternating nostrils. May repeat every 2 to 3 minutes as needed.  10/26/17 Beto Goldberg PA-C   polyethylene glycol (MIRALAX) 17 gram packet Take 17 g by mouth daily. Provider, Historical     Current Facility-Administered Medications   Medication Dose Route Frequency    famotidine (PEPCID) tablet 20 mg  20 mg Oral BID    loratadine (CLARITIN) tablet 10 mg  10 mg Oral DAILY    lisinopril (PRINIVIL, ZESTRIL) tablet 10 mg  10 mg Oral DAILY    polyethylene glycol (MIRALAX) packet 17 g  17 g Oral DAILY    0.9% sodium chloride infusion  100 mL/hr IntraVENous CONTINUOUS    sodium chloride (NS) flush 5-10 mL  5-10 mL IntraVENous Q8H    bisacodyl (DULCOLAX) tablet 10 mg  10 mg Oral DAILY    sodium chloride (NS) flush 5-10 mL  5-10 mL IntraVENous Q8H    acetaminophen (TYLENOL) tablet 1,000 mg  1,000 mg Oral Q6H    HYDROmorphone (PF) (DILAUDID) 30 mg / 30 mL PCA   IntraVENous TITRATE     Allergies   Allergen Reactions    Celebrex [Celecoxib] Swelling    Shellfish Derived Swelling     \"makes me feel puffy\"    Sulfa (Sulfonamide Antibiotics) Hives and Swelling     Lips swelling    Humira [Adalimumab] Other (comments)     Lupus    Iodine Itching    Optiray 320 [Ioversol] Hives and Itching     Pt states when she gets iv contrast she itches a little from hives?     Penicillins Hives      Social History     Tobacco Use    Smoking status: Never Smoker    Smokeless tobacco: Never Used   Substance Use Topics    Alcohol use: No      Family History   Problem Relation Age of Onset    Other Other         Orthopedic (Sister)    Arthritis-osteo Sister     Cancer Neg Hx     Diabetes Neg Hx     Heart Disease Neg Hx     Heart Attack Neg Hx     Hypertension Neg Hx     Stroke Neg Hx     Malignant Hyperthermia Neg Hx     Pseudocholinesterase Deficiency Neg Hx     Delayed Awakening Neg Hx     Post-op Nausea/Vomiting Neg Hx     Emergence Delirium Neg Hx     Post-op Cognitive Dysfunction Neg Hx         Review of Systems:  A comprehensive review of systems was negative except for that written in the HPI. Objective:   Vital Signs:    Visit Vitals  /70   Pulse 84   Temp 98.2 °F (36.8 °C)   Resp 13   Ht 5' 3\" (1.6 m)   Wt 80 kg (176 lb 5.9 oz)   SpO2 98%   BMI 31.24 kg/m²       O2 Device: Room air   O2 Flow Rate (L/min): 4 l/min   Temp (24hrs), Av.1 °F (36.7 °C), Min:97.9 °F (36.6 °C), Max:98.2 °F (36.8 °C)       Intake/Output:   Last shift:       07 -  190  In: 100 [I.V.:100]  Out: 440 [Urine:400]  Last 3 shifts: 1901 -  0700  In: 3926.8 [P.O.:300; I.V.:3626.8]  Out: 1341 [Urine:1100]    Intake/Output Summary (Last 24 hours) at 2018 1505  Last data filed at 2018 1136  Gross per 24 hour   Intake 1891.67 ml   Output 1031 ml   Net 860.67 ml       Physical Exam:    General:  Alert, cooperative, no distress, appears stated age. Resting in SDU bed   Head:  Normocephalic, without obvious abnormality, atraumatic. Eyes:  Conjunctivae/corneas clear. PERRL, EOMs intact. Nose: Nares normal. Septum midline. Mucosa normal. No drainage or sinus tenderness. Throat: Lips, mucosa, and tongue normal. Teeth and gums normal.   Neck: Supple, symmetrical, trachea midline, no adenopathy, thyroid: no enlargment/tenderness/nodules, no carotid bruit and no JVD. Back:   Post op changes- midline bandage in place- clean and intact   Lungs:   Clear to auscultation bilaterally-   Chest wall:  Left chest tube-  D/c and site with clean bandage   Heart:  Regular rate and rhythm, S1, S2 normal, no murmur, click, rub or gallop. Abdomen:   Soft, non-tender. Bowel sounds normal. No masses,  No organomegaly. Extremities: Extremities OA changes to fingers, thumbs and hands/wrists no cyanosis or edema.- Limited ROM of motion   Pulses: 2+ and symmetric all extremities.    Skin: Skin color, texture, turgor normal. No rashes or lesions   Lymph nodes:      Cervical, supraclavicular, nodes non-palpable   Neurologic: Grossly nonfocal- EXCEPT:for arthritics changes and decreased ROM       Data:     Recent Results (from the past 24 hour(s))   CBC W/O DIFF    Collection Time: 12/04/18  5:25 AM   Result Value Ref Range    WBC 8.2 4.6 - 13.2 K/uL    RBC 3.69 (L) 4.20 - 5.30 M/uL    HGB 11.3 (L) 12.0 - 16.0 g/dL    HCT 35.6 35.0 - 45.0 %    MCV 96.5 74.0 - 97.0 FL    MCH 30.6 24.0 - 34.0 PG    MCHC 31.7 31.0 - 37.0 g/dL    RDW 12.7 11.6 - 14.5 %    PLATELET 681 970 - 991 K/uL    MPV 9.9 9.2 - 18.7 FL   METABOLIC PANEL, BASIC    Collection Time: 12/04/18  5:25 AM   Result Value Ref Range    Sodium 140 136 - 145 mmol/L    Potassium 4.3 3.5 - 5.5 mmol/L    Chloride 107 100 - 108 mmol/L    CO2 25 21 - 32 mmol/L    Anion gap 8 3.0 - 18 mmol/L    Glucose 105 (H) 74 - 99 mg/dL    BUN 13 7.0 - 18 MG/DL    Creatinine 0.68 0.6 - 1.3 MG/DL    BUN/Creatinine ratio 19 12 - 20      GFR est AA >60 >60 ml/min/1.73m2    GFR est non-AA >60 >60 ml/min/1.73m2    Calcium 7.6 (L) 8.5 - 10.1 MG/DL           Telemetry:normal sinus rhythm    Imaging:  I have personally reviewed the patients radiographs and have reviewed the reports:  CXR Results  (Last 48 hours)               12/04/18 1155  XR CHEST PORT Final result    Impression:  IMPRESSION:       No pneumothorax status post removal of left basal chest tube. Persistent hypoinflation with bibasal subsegmental atelectasis. Narrative:  EXAM: PORTABLE CHEST 1139 hours       CLINICAL HISTORY/INDICATION: post-chest tube removal postop L1/2 interbody   fusion with bone graft for postlaminectomy syndrome with compression fracture L2   and T12, L1/2 spondylolisthesis with stenosis,           COMPARISON: Chest x-ray December 4, 2018 at 0541 hours. TECHNIQUE: Single AP view       FINDINGS:        Interval removal of the left basal chest tube. No pneumothorax. Persistent   bilateral linear bands of increased opacity. Hypoinflation. Normal pulmonary   vascularity. Hardware unchanged.            12/04/18 0541  XR CHEST PORT Final result    Impression:  IMPRESSION: No pneumothorax demonstrated. Persistent hypoinflation with mild bibasal subsegmental atelectasis. Narrative:  EXAM: PORTABLE CHEST 0541 hours       CLINICAL HISTORY/INDICATION: left chest tube placement postlaminectomy syndrome   with compression fracture L2, T12, L1/2 spondylolisthesis with stenosis, postop   L1/2 interbody fusion with bone graft,           COMPARISON: Chest x-ray December 3, 2018, October 20, 2014. TECHNIQUE: Single AP view       FINDINGS:        Left inferior lateral chest tube. No definite pneumothorax. Persistent mild   hypoinflation with mild bibasal subsegmental atelectasis. Normal pulmonary   vascularity. Normal heart size. Thoracolumbar orthopedic hardware incompletely   included on the exam. Left shoulder replacement. .           12/03/18 1327  XR CHEST PORT Final result    Impression:  IMPRESSION:       Suboptimal inspiration; bibasilar interstitial opacities, infiltrate versus   atelectasis. Status post lateral left base tube thoracostomy; no pneumothorax. Narrative:  EXAM: CHEST  CPT CODE: 51160       CLINICAL INDICATION/HISTORY: Chest tube placement. COMPARISON: CT chest, abdomen pelvis of 10 and September 2018. TECHNIQUE: Single AP portable view of chest at 1312. FINDINGS: There is suboptimal inspiratory effort. Thick interstitial opacities   are noted at both lung bases, infiltrate or atelectasis. There is interval   placement of a lateral left base tube thoracostomy. There is no pneumothorax. The mediastinum is not widened but the heart is partially obscured and could be   enlarged. Posterior fixation hardware is seen from T10 distally. There is a   left humeral head prosthesis. Degenerative changes are noted at the right   shoulder joint. Other bony structures are grossly intact.                      Total clinical care time exclusive of procedures: 30 minutes    Corby Fajardo DO, [facility]    [facility]  Pulmonary, Critical Care, and Sleep Medicine

## 2018-12-04 NOTE — PROGRESS NOTES
NUTRITION Nutrition Screen RECOMMENDATIONS / PLAN:  
 
- Add supplements: Ensure Enlive BID. - Monitor po intake and diet tolerance. - Continue RD inpatient monitoring and evaluation. NUTRITION INTERVENTIONS & DIAGNOSIS:  
 
[x] Meals/snacks: modify composition, soft solids as tolerated  
[x] Medical food supplement therapy: initiate [x] Collaboration and referral of nutrition care: referred by nursing for diet intolerance Nutrition Diagnosis: Inadequate oral intake related to difficulty tolerating regular consistency foods as evidenced by pt report of intolerance and has been consuming 50% or less of recent meals. ASSESSMENT:  
 
S/p surgery yesterday and chest tube removed today. 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 Reports fair appetite and variable meal intake. Requesting supplements. Average po intake adequate to meet patients estimated nutritional needs:   [] Yes     [x] No   [] Unable to determine at this time Diet: DIET DENTAL SOFT (SOFT SOLID) Food Allergies: shellfish Current Appetite:   [] Good     [x] Fair     [] Poor     [] Other: 
Appetite/meal intake prior to admission:   [x] Good     [x] Fair     [] Poor     [] Other: 
Feeding Limitations:  [] Swallowing difficulty    [] Chewing difficulty    [x] Other: difficulty eating tough foods due to torus at roof of mouth Current Meal Intake: No data found. BM: 12/3 Skin Integrity: back wounds Edema:   [] No     [x] Yes Pertinent Medications: Reviewed: bowel regimen, zofran, phenergan Recent Labs 12/04/18 
0525 12/03/18 
1330  141  
K 4.3 3.7  106 CO2 25 23 * 181* BUN 13 12 CREA 0.68 0.76  
CA 7.6* 7.7* Intake/Output Summary (Last 24 hours) at 12/4/2018 1459 Last data filed at 12/4/2018 1136 Gross per 24 hour Intake 1891.67 ml Output 1031 ml Net 860.67 ml  
 
 
 Anthropometrics: 
Ht Readings from Last 1 Encounters:  
12/03/18 5' 3\" (1.6 m) Last 3 Recorded Weights in this Encounter 11/20/18 1964 12/03/18 1690 12/04/18 2809 Weight: 75.8 kg (167 lb) 76.7 kg (169 lb) 80 kg (176 lb 5.9 oz) Body mass index is 31.24 kg/m². Obese, Class I Weight History: patient reports usual weight of 160 lb and denies change in weight PTA Weight Metrics 12/4/2018 12/3/2018 11/27/2018 11/19/2018 10/5/2018 8/31/2018 8/16/2018 Weight 176 lb 5.9 oz - 169 lb 6.4 oz 167 lb 12.8 oz 166 lb 167 lb 166 lb 12.8 oz BMI - 31.24 kg/m2 30.01 kg/m2 29.72 kg/m2 29.41 kg/m2 29.58 kg/m2 29.55 kg/m2 Admitting Diagnosis: Lumbar spine instability [M53.2X6] Spinal stenosis, lumbar region with neurogenic claudication [M48.062] Lumbar spine instability [M53.2X6] Spinal stenosis of lumbar region [M48.061] Lumbar spine instability [M53.2X6] Spinal stenosis of lumbar region [M48.061] Lumbar spine instability [M53.2X6] Lumbar stenosis [M48.061] Pertinent PMHx: diverticulosis, GERD, HTN, large torus roof of mouth, RA Education Needs:        [x] None identified  [] Identified - Not appropriate at this time  []  Identified and addressed - refer to education log Learning Limitations:   [x] None identified  [] Identified Cultural, Sikh & ethnic food preferences:  [x] None identified    [] Identified and addressed ESTIMATED NUTRITION NEEDS:  
 
Calories: 7653-3399 kcal (MSJx1.2-1.3) based on  [x] Actual BW 76 kg     [] IBW Protein: 61-76 gm (0.8-1 gm/kg) based on  [x] Actual BW      [] IBW Fluid: 1 mL/kcal 
  
MONITORING & EVALUATION:  
 
Nutrition Goal(s): 1. Po intake of meals will meet >75% of patient estimated nutritional needs within the next 7 days.   Outcome:  [] Met/Ongoing    []  Not Met    [x] New/Initial Goal  
 
Monitoring:   [x] Food and beverage intake   [x] Diet order   [x] Nutrition-focused physical findings   [x] Treatment/therapy   [] Weight [] Enteral nutrition intake Previous Recommendations (for follow-up assessments only):     []   Implemented       []   Not Implemented (RD to address)      [] No Longer Appropriate     [] No Recommendation Made Discharge Planning: cardiac, soft solid diet [x] Participated in care planning, discharge planning, & interdisciplinary rounds as appropriate Reddy King RD, [address] [place]  Pager: [phone]

## 2018-12-04 NOTE — PROGRESS NOTES
Problem: Self Care Deficits Care Plan (Adult) Goal: *Acute Goals and Plan of Care (Insert Text) Occupational Therapy Goals Initiated 12/4/2018 within 7 day(s). 1.  Patient will perform lower body dressing with modified independence 2. Patient will perform toilet transfer with modified independence. 3.  Patient will independently verbalize & adhere to all spinal precautions, to promote adequate healing & for added safety during ADLs. 4. Patient will complete bed mobility (logroll) and sit to stand with the LRAD, for improved ADL participation. Outcome: Progressing Towards Goal 
Occupational Therapy EVALUATION Patient: Chhaya Lofton (8 y.o. female) Date: 12/4/2018 Primary Diagnosis: Lumbar spine instability [M53.2X6] Spinal stenosis, lumbar region with neurogenic claudication [M48.062] Lumbar spine instability [M53.2X6] Spinal stenosis of lumbar region [M48.061] Lumbar spine instability [M53.2X6] Spinal stenosis of lumbar region [M48.061] Lumbar spine instability [M53.2X6] Lumbar stenosis [M48.061] Procedure(s) (LRB): EXTREME LATERAL INTERBODY FUSION (XLIF) L1/2; T10-L2 FUSION; EXTEND - REVISE PREVIOUS FUSION/C-ARM/NUVASIVE (N/A) 1 Day Post-Op Precautions: Spinal  
OT/PT co-session implemented, given the pt's functional deficits ASSESSMENT : 
Mrs. Shin Larson is a 77 yr old female admitted to the hospital 12-3-18 with lumbar spine instability & lumbar spinal stenosis with neurogenic claudication. The pt is status-post a revision to a prior spinal fusion & a T10-L2 extreme lateral interbody spinal fusion on 12-3-18. The pt is currently presenting below her baseline level of functioning for ADLs and mobility, as she requires stand-by assist for bed mobility, contact guard assist for sit to stand with a RW, min-mod assist for lower body dressing, and contact for ambulating in the room & hallways with a RW.  The pt is limited my post-surgical pain, mild deconditioning, and decreased functional strength, which compromises her ADL performance and overall functional independence. The pt was also noted to display decreased interest towards receiving education offered to her by therapists; for example, OT attempted to educate the pt on the use of sock aid & reacher for lower body dressing, however the pt quickly stated she would have her family to complete that at home. The pt will benefit from 1-2 follow up OT visits during this hospitalization to provide further ADL instruction, in order to maximize the pt's functional independence & safe transition home. Patient will benefit from skilled intervention to address the above impairments. Patients rehabilitation potential is considered to be Good Factors which may influence rehabilitation potential include:  
[]             None noted []             Mental ability/status []             Medical condition []             Home/family situation and support systems []             Safety awareness [x]             Pain tolerance [x]             Motivation PLAN : 
Recommendations and Planned Interventions: 
[x]               Self Care Training                  [x]        Therapeutic Activities [x]               Functional Mobility Training    []        Cognitive Retraining 
[x]               Therapeutic Exercises           [x]        Endurance Activities [x]               Balance Training                   []        Neuromuscular Re-Education []               Visual/Perceptual Training     [x]   Home Safety Training 
[x]               Patient Education                 [x]        Family Training/Education []               Other (comment): Frequency/Duration: Patient will be followed by occupational therapy 1-2 times per day/3-5 days per week to address goals. Discharge Recommendations: Home Health Further Equipment Recommendations for Discharge: Potential need for lower body adaptive equipment, however the pt did not appear to be interested in obtaining this equipment, as she stated her family will help her at home Barriers to Learning/Limitations: yes;  emotional 
Compensate with: visual, verbal, tactile, kinesthetic cues/model PATIENT COMPLEXITY Eval Complexity: History: LOW Complexity: Brief Chart Review Examination: MEDIUM Complexity : 3-5 performance deficits relating to physical, cognitive , or psychosocial skils that result in activity limitations and / or participation restrictions; Decision Making:MEDIUM Complexity : Patient may present with comorbidities that affect occupational performnce. Miniml to moderate modification of tasks or assistance (eg, physical or verbal ) with assesment(s) is necessary to enable patient to complete evaluation  Assessment: Medium Complexity G-CODES:  
 
Self Care  Current  CK= 40-59%  Goal  CI= 1-19%. The severity rating is based on the Level of Assistance required for Functional Mobility and ADLs. SUBJECTIVE:  
Patient stated she was not able to sit up in the chair for 1 hour, however she might attempt to do so for 15 minutes OBJECTIVE DATA SUMMARY:  
 
Past Medical History:  
Diagnosis Date  Abdominal abscess 2009  Arthritis Rheumatoid  Autoimmune disease ([address] Medically induced Lupus  Back pain  Chronic pain   
 lower back  Colitis  Diverticulitis  Failed back syndrome  GERD (gastroesophageal reflux disease)  Hypertension   
 when on cyclosporins  Ill-defined condition   
 large torus roof of mouth  Irritable bowel syndrome  Neuropathy   
 bilateral hands/wrist  
 Osteoporosis  Pneumonia  RA (rheumatoid arthritis) ([address]  Seizures ([address] 6-1-2008  
 one episode- after high dose of epinepherine Past Surgical History:  
Procedure Laterality Date  HX ABDOMINAL WALL DEFECT REPAIR    
 HX APPENDECTOMY  HX BREAST REDUCTION    
  HX CATARACT REMOVAL Bilateral   
 HX COLONOSCOPY    
 HX GI    
 repair rectal prolaspe  HX GYN    
 dermoid cyst  
 HX HEENT    
 tonsillectomy [address]  HX LUMBAR FUSION    
 x 2  
 HX ORTHOPAEDIC Bilateral   
 CTR  
 HX ORTHOPAEDIC Bilateral   
 arthroplasty thumbs  HX ORTHOPAEDIC Left Ankle  HX ORTHOPAEDIC Right \"Nerve Release Elbow\"  HX OTHER SURGICAL Left 1985  
 vein stripping  HX SALPINGO-OOPHORECTOMY Bilateral   
 HX SHOULDER REPLACEMENT Left  HX UROLOGICAL    
 bladder repair  TOTAL HIP ARTHROPLASTY Bilateral   
 
Prior Level of Function/Home Situation: Patient was independent with ADLs, household chores, ambulation, and driving. The pt enjoys sailing. Home Situation Home Environment: Private residence # Steps to Enter: 5 One/Two Story Residence: Two story Living Alone: No(Lives with her spouse.) Support Systems: Family member(s) Patient Expects to be Discharged to[de-identified] Private residence Current DME Used/Available at Home: Commode, bedside, Crutches, Walker, rolling Tub or Shower Type: Tub/Shower combination []  Right hand dominant   []  Left hand dominantCognitive/Behavioral Status: 
Neurologic State: Alert Orientation Level: Oriented X4 Cognition: Follows commands Coordination: 
Coordination: Within functional limits(BUE and BLE) Balance: 
Sitting - Static: Good (unsupported) Sitting - Dynamic: (fair+) Standing - Static: (good with RW) Standing - Dynamic : (good with RW) Strength: 
Strength: Generally decreased, functional for BUE and BLE) Tone & Sensation: 
Tone: Normal(BUE and BLE) Range of Motion: 
AROM: Within functional limits(BUE and BLE) Functional Mobility and Transfers for ADLs: 
Bed Mobility: 
  
Supine to Sit: Stand-by assistance Scooting: Stand-by assistance Transfers: 
Sit to Stand: Contact guard assistance ADL Assessment: 
Feeding: Independent Oral Facial Hygiene/Grooming: Contact guard assistance(in standing) Bathing: Minimum assistance Upper Body Dressing: Minimum assistance Lower Body Dressing: Minimum assistance Toileting: Minimum assistance Pain: 
Pt reports 6/10 pain or discomfort prior to treatment.   
Pt reports 6/10 pain or discomfort post treatment. Activity Tolerance:  
Fair+ Please refer to the flowsheet for vital signs taken during this treatment. After treatment:  
[x] Patient left in no apparent distress sitting up in chair 
[] Patient left in no apparent distress in bed 
[x] Call bell left within reach [x] Nursing notified 
[x] Sister present 
[] Bed alarm activated COMMUNICATION/EDUCATION:  
[] Home safety education was provided and the patient/caregiver indicated understanding. [x] Patient/family have participated as able in goal setting and plan of care. [x] Patient/family agree to work toward stated goals and plan of care. [] Patient understands intent and goals of therapy, but is neutral about his/her participation. [] Patient is unable to participate in goal setting and plan of care. Thank you for this referral. 
Melissa Casey OT Time Calculation: 31 mins

## 2018-12-04 NOTE — PROGRESS NOTES
Problem: Falls - Risk of 
Goal: *Absence of Falls Document Lisal Shadow Fall Risk and appropriate interventions in the flowsheet. Outcome: Progressing Towards Goal 
Fall Risk Interventions: 
Mobility Interventions: Bed/chair exit alarm, Patient to call before getting OOB Medication Interventions: Bed/chair exit alarm, Patient to call before getting OOB, Teach patient to arise slowly Elimination Interventions: Bed/chair exit alarm, Call light in reach, Patient to call for help with toileting needs, Toilet paper/wipes in reach, Toileting schedule/hourly rounds History of Falls Interventions: Bed/chair exit alarm, Consult care management for discharge planning, Investigate reason for fall, Room close to nurse's station Problem: Pressure Injury - Risk of 
Goal: *Prevention of pressure injury Document Octavio Scale and appropriate interventions in the flowsheet. Outcome: Progressing Towards Goal 
Pressure Injury Interventions: 
Sensory Interventions: Assess changes in LOC, Assess need for specialty bed, Avoid rigorous massage over bony prominences, Discuss PT/OT consult with provider Moisture Interventions: Absorbent underpads, Apply protective barrier, creams and emollients, Assess need for specialty bed, Check for incontinence Q2 hours and as needed, Internal/External urinary devices Activity Interventions: Increase time out of bed, Pressure redistribution bed/mattress(bed type), PT/OT evaluation Mobility Interventions: Assess need for specialty bed, HOB 30 degrees or less, Pressure redistribution bed/mattress (bed type), PT/OT evaluation Nutrition Interventions: Document food/fluid/supplement intake Friction and Shear Interventions: Apply protective barrier, creams and emollients, Foam dressings/transparent film/skin sealants, HOB 30 degrees or less, Transferring/repositioning devices

## 2018-12-04 NOTE — PROGRESS NOTES
[facility] Following for LEFT chest tube In bed. C/o reflux pain on PCA. A/P: 
1. Incidental pleurotomy during orthopedic procedure s/p LEFT chest tube. LEFT chest tube removed without difficulty. Keep dressing in place for 2 days and remove. Remove suture in ~7 days. 2. Other per primary and consulting Vital signs:  
Visit Vitals /70 Pulse 84 Temp 98.2 °F (36.8 °C) Resp 13 Ht 5' 3\" (1.6 m) Wt 80 kg (176 lb 5.9 oz) SpO2 98% BMI 31.24 kg/m² Temp (24hrs), Av °F (36.7 °C), Min:97.4 °F (36.3 °C), Max:98.2 °F (36.8 °C) Admission Weight: Last Weight Weight: 75.8 kg (167 lb) Weight: 80 kg (176 lb 5.9 oz) CXR: no PTX or effusion PE: 
CV: rrr +S1/S2 Chest/Lungs: clear b/l. LEFT chest tube, dressing saturated. Extre: w/d/p  Pee, PA-ARTURO CVTS 
18, 10:31 AM 
 
 
 
 
CARDIOTHORACIC ATTENDING STAFF NOTE Patient resting comfortably in bed. Chest tube removed; follow-up CXR with no ptx. Stable VS.  
Lungs clear to auscultation bilaterally. Heart with RRR. Oxygen saturation of 98%. Hct 35.6 WBC 8.2K K+ 4.3 
creat 0.7 Discussed case with PAs on our service. No apparent adverse sequelae from need for chest tube. We will sign off for now. Please feel free to call with any questions or concerns.  
 
Katerin Rowland MD

## 2018-12-04 NOTE — ROUTINE PROCESS
1930-Received telephone report via Suresh White from ICU, pt stepdown overflow and be transferred here with chest tube and PCA pump. 1950-Pt arrived with nurse, on telemetry, no air leak in chest tube, alert and oriented times four, belongings with her, NSR. BP stable. 2030-Pt resting, NAD, no SOB, BP stable, pain encouragement with PCA. 2130-Pt resting, NAD, no chest pain, no SOB,  at bedside, no air leak. 2230-Pt resting, NAD, no chest pain, NO SOB,  at bedside, no air leak. 2330-Pt resting, NAD, no chest pain, NO SOB,  at bedside, no air leak. 0130-Pt sleeping, NAD, no chest pain, no SOB,  at bedside, no air leak. 0330-Pt sleeping, NAD, no chest pain, no SOB,  at bedside, no air leak. 0530-Pt sleeping, NAD, no chest pain, no air leak, no SOB,  Chest xray performed, pt c/o pain, encouraged PCA use.  
 
0700-Bedside and verbal report given to Zoila Kat RN.

## 2018-12-04 NOTE — PROGRESS NOTES
PO day #1 
VSS, afebrile Urine output prior to vera being d/c'ed = 400 cc Due to void at 1800 Pt states she has pre-existing voiding issues Chest tube has been d/'ed and dressing over site is dry Denies shortness of breath Dressing to lumbar spine with small amount of dried blood and left flank dressing is dry Ambulated 140 cc with PT States cannot tell if back and leg pain improved yet as she needs to walk more to determine States incisional pain is controlled with PO meds C/o GERD \"acid reflux\" despite pepcid, will change to protonix.  
 
Leon Suazo, NEIDA

## 2018-12-04 NOTE — NURSE NAVIGATOR
Reason for Admission:  Lumbar spine instability [M53.2X6] Spinal stenosis, lumbar region with neurogenic claudication [M48.062] Lumbar spine instability [M53.2X6] Spinal stenosis of lumbar region [M48.061] RRAT Score:    9 Plan for utilizing home health:    Yes - Ordered Likelihood of Readmission:   LOW Transition of Care Plan:         
 
 
Initial assessment completed with patient and spouse/SO. Face sheet information confirmed:  yes. This patient lives in a single family home with spouse. Patient is not able to navigate steps as needed at this time - waiting on therapist to work with patient. Prior to hospitalization, patient was considered to be independent : yes . Cognitive status of patient: oriented to time, place, person and situation. Patient has a current ACP document on file: no The spouse will be available to transport patient home upon discharge. The patient already has crutches, walker, wheelchair,  raised toilet seat  available in the home. Patient is not currently active with home health. Patient has not ever stayed in a skilled nursing facility or rehab. This patient is on dialysis :no 
 
List of available Home Health agencies left with patient and her spouse . Freedom of choice signed: yes, for [facility]. Dr. Juancarlos Santana Lumbar protocol and [facility] referral sent to [facility] via [facility]. Spoke with Lulu Hudson at [facility]. Currently, the discharge plan is Home with [address] [place] [address] Evaristo Dunn. Care Management Interventions PCP Verified by CM: Yes Last Visit to PCP: 11/27/18 Mode of Transport at Discharge: Other (see comment)(may need transport if unable to go up stairs) Transition of Care Consult (CM Consult): Home Health Physical Therapy Consult: No 
Occupational Therapy Consult: No 
Current Support Network: Lives with Spouse, Own Home Confirm Follow Up Transport: Family Discharge Location Discharge Placement: Home with home health [facility]. BENJA RussellN, RN Care Management [phone]

## 2018-12-04 NOTE — HOME CARE
Rounded on this \"[facility]\" patient, pt states she has used [facility] for [facility] in the past , pt states [place] only services her area, pt was informed that [facility] does service her area and would be happy in future to service her if needed, pt was left with a brochure on [facility] services offered, pt still want [facility] at this time. PEE HOOPER.

## 2018-12-04 NOTE — PROGRESS NOTES
vss afeb Neuro intact Incisional pain Labs OK Plan: 
Chest tube per thoracic surgery- when out will transfer to [id number] [place] [address] Road to chair PT OT Reg diet Dc vera voiding trial 
DC PCA once chest tube removed- oral dilaudid

## 2018-12-04 NOTE — PROGRESS NOTES
Problem: Mobility Impaired (Adult and Pediatric) Goal: *Acute Goals and Plan of Care (Insert Text) Physical Therapy Goals Initiated 12/3/2018 and to be accomplished within 7 day(s) 1. Patient will move from supine to sit and sit to supine , scoot up and down and roll side to side in bed with supervision/set-up. 2.  Patient will transfer from bed to chair and chair to bed with supervision/set-up using the least restrictive device. 3.  Patient will perform sit to stand with supervision/set-up. 4.  Patient will ambulate with supervision/set-up for >150 feet with the least restrictive device. 5.  Patient will ascend/descend 5 stairs with 1-2 handrail(s) with supervision/set-up. Outcome: Progressing Towards Goal 
physical Therapy TREATMENT Patient: Jimena Tubbs (44 y.o. female) Date: 12/4/2018 Diagnosis: Lumbar spine instability [M53.2X6] Spinal stenosis, lumbar region with neurogenic claudication [M48.062] Lumbar spine instability [M53.2X6] Spinal stenosis of lumbar region [M48.061] Lumbar spine instability [M53.2X6] Spinal stenosis of lumbar region [M48.061] Lumbar spine instability [M53.2X6] Lumbar stenosis [M48.061] <principal problem not specified> Procedure(s) (LRB): EXTREME LATERAL INTERBODY FUSION (XLIF) L1/2; T10-L2 FUSION; EXTEND - REVISE PREVIOUS FUSION/C-ARM/NUVASIVE (N/A) 1 Day Post-Op Precautions:    
Chart, physical therapy assessment, plan of care and goals were reviewed. OBJECTIVE/ASSESSMENT: 
Patient presented today sidelying on R, alert and agreeable to PT treatment with sister in room. Patient seen with OT to maximize patient safety d/t multitude of co-morbidities. Patient verbalized all back precautions without need for cuing. Patient transferred to sitting EOB with SBA, would not allow PT to provide CGA despite multiple complaints that she is \"not strong enough\" to push herself up.  Patient stood from bed with RW and CGA, ambulated 140ft with RW and SBA for safety. Patient requesting Chary to turn RW when changing direction. At conclusion of session, patient left sitting up in recliner with call bell in reach and sister in room, nurse notified. Patient encouraged to sit up as long as possible/comfortably to prevent effects of bed rest, however patient adamantly states she will not sip up longer than ~15 minutes. Patient encouraged to call for nursing assistance when ready to get up. Education: Patient verbalized/demonstrated understanding 
[x]         Bed mobility [x]         Transfers 
[x]         Ambulation 
[x]         Assistive device management 
[]         Stairs [x]         Body mechanics [x]         Position change 
[x]         Activity pacing/energy conservation 
[x]         Other: back precautions Progression toward goals: 
[]      Improving appropriately and progressing toward goals [x]      Improving slowly and progressing toward goals 
[]      Not making progress toward goals and plan of care will be adjusted PLAN: 
Patient continues to benefit from skilled intervention to address the above impairments. Continue treatment per established plan of care. Discharge Recommendations:  Home Health Further Equipment Recommendations for Discharge:  N/A  
 
SUBJECTIVE:  
Patient stated Do you know everything that's wrong with me? OBJECTIVE DATA SUMMARY:  
Critical Behavior: 
Neurologic State: Alert Orientation Level: Oriented X4 Cognition: Follows commands Functional Mobility Training: 
Bed Mobility: 
Supine to Sit: Stand-by assistance Scooting: Stand-by assistance Transfers: 
Sit to Stand: Contact guard assistance Stand to Sit: Contact guard assistance Balance: 
Sitting: Impaired Sitting - Static: Good (unsupported) Sitting - Dynamic: Fair (occasional)(Fair+) Standing: Impaired; With support Standing - Static: Good Standing - Dynamic : Fair(Fair+)Ambulation/Gait Training: Distance (ft): 140 Feet (ft) Assistive Device: Walker, rolling Ambulation - Level of Assistance: Stand-by assistance(Chary for turns) Gait Abnormalities: Decreased step clearance Base of Support: Narrowed; Center of gravity altered Speed/[place]: Pace decreased (<100 feet/min) Step Length: Left shortened;Right shortened Interventions: Verbal cues; Visual/Demos Pain: 
Pre session: incisional 
Post session: incisional 
Activity Tolerance:  
Fair Please refer to the flowsheet for vital signs taken during this treatment. After treatment:  
[x] Patient left in no apparent distress sitting up in chair 
[] Patient left in no apparent distress in bed 
[x] Call bell left within reach [x] Nursing notified Celestina Carlson) [x] Caregiver present 
[] Bed alarm activated Santos Meyer Time Calculation: 33 mins Mobility [id number] Current  CI= 1-19%   Goal  CI= 1-19%. The severity rating is based on the Level of Assistance required for Functional Mobility and ADLs.

## 2018-12-05 ENCOUNTER — APPOINTMENT (OUTPATIENT)
Dept: CT IMAGING | Age: 68
DRG: 454 | End: 2018-12-05
Attending: ORTHOPAEDIC SURGERY
Payer: MEDICARE

## 2018-12-05 ENCOUNTER — APPOINTMENT (OUTPATIENT)
Dept: GENERAL RADIOLOGY | Age: 68
DRG: 454 | End: 2018-12-05
Attending: ORTHOPAEDIC SURGERY
Payer: MEDICARE

## 2018-12-05 ENCOUNTER — APPOINTMENT (OUTPATIENT)
Dept: GENERAL RADIOLOGY | Age: 68
DRG: 454 | End: 2018-12-05
Attending: NURSE PRACTITIONER
Payer: MEDICARE

## 2018-12-05 LAB
ANION GAP SERPL CALC-SCNC: 8 MMOL/L (ref 3–18)
BUN SERPL-MCNC: 6 MG/DL (ref 7–18)
BUN/CREAT SERPL: 12 (ref 12–20)
CALCIUM SERPL-MCNC: 7.3 MG/DL (ref 8.5–10.1)
CHLORIDE SERPL-SCNC: 105 MMOL/L (ref 100–108)
CO2 SERPL-SCNC: 26 MMOL/L (ref 21–32)
CREAT SERPL-MCNC: 0.5 MG/DL (ref 0.6–1.3)
ERYTHROCYTE [DISTWIDTH] IN BLOOD BY AUTOMATED COUNT: 12.5 % (ref 11.6–14.5)
GLUCOSE SERPL-MCNC: 106 MG/DL (ref 74–99)
HCT VFR BLD AUTO: 31.7 % (ref 35–45)
HGB BLD-MCNC: 10.5 G/DL (ref 12–16)
MCH RBC QN AUTO: 30.9 PG (ref 24–34)
MCHC RBC AUTO-ENTMCNC: 33.1 G/DL (ref 31–37)
MCV RBC AUTO: 93.2 FL (ref 74–97)
PLATELET # BLD AUTO: 214 K/UL (ref 135–420)
PMV BLD AUTO: 9.6 FL (ref 9.2–11.8)
POTASSIUM SERPL-SCNC: 3.7 MMOL/L (ref 3.5–5.5)
RBC # BLD AUTO: 3.4 M/UL (ref 4.2–5.3)
SODIUM SERPL-SCNC: 139 MMOL/L (ref 136–145)
WBC # BLD AUTO: 9.5 K/UL (ref 4.6–13.2)

## 2018-12-05 PROCEDURE — 74011250637 HC RX REV CODE- 250/637: Performed by: NURSE PRACTITIONER

## 2018-12-05 PROCEDURE — 36415 COLL VENOUS BLD VENIPUNCTURE: CPT

## 2018-12-05 PROCEDURE — 71046 X-RAY EXAM CHEST 2 VIEWS: CPT

## 2018-12-05 PROCEDURE — 74011250637 HC RX REV CODE- 250/637: Performed by: ORTHOPAEDIC SURGERY

## 2018-12-05 PROCEDURE — 65270000029 HC RM PRIVATE

## 2018-12-05 PROCEDURE — 51798 US URINE CAPACITY MEASURE: CPT

## 2018-12-05 PROCEDURE — 80048 BASIC METABOLIC PNL TOTAL CA: CPT

## 2018-12-05 PROCEDURE — 74019 RADEX ABDOMEN 2 VIEWS: CPT

## 2018-12-05 PROCEDURE — 74011250636 HC RX REV CODE- 250/636: Performed by: ORTHOPAEDIC SURGERY

## 2018-12-05 PROCEDURE — 97535 SELF CARE MNGMENT TRAINING: CPT

## 2018-12-05 PROCEDURE — 77030027138 HC INCENT SPIROMETER -A

## 2018-12-05 PROCEDURE — 77030011943

## 2018-12-05 PROCEDURE — 97116 GAIT TRAINING THERAPY: CPT

## 2018-12-05 PROCEDURE — 97530 THERAPEUTIC ACTIVITIES: CPT

## 2018-12-05 PROCEDURE — 85027 COMPLETE CBC AUTOMATED: CPT

## 2018-12-05 PROCEDURE — 74176 CT ABD & PELVIS W/O CONTRAST: CPT

## 2018-12-05 RX ORDER — GABAPENTIN 300 MG/1
300 CAPSULE ORAL 3 TIMES DAILY
Status: DISCONTINUED | OUTPATIENT
Start: 2018-12-05 | End: 2018-12-06 | Stop reason: HOSPADM

## 2018-12-05 RX ORDER — MICONAZOLE NITRATE 2 %
1 CREAM WITH APPLICATOR VAGINAL 2 TIMES DAILY
Status: DISCONTINUED | OUTPATIENT
Start: 2018-12-05 | End: 2018-12-06 | Stop reason: HOSPADM

## 2018-12-05 RX ORDER — LORAZEPAM 0.5 MG/1
0.5 TABLET ORAL
Status: DISCONTINUED | OUTPATIENT
Start: 2018-12-05 | End: 2018-12-06 | Stop reason: HOSPADM

## 2018-12-05 RX ORDER — HYDROMORPHONE HYDROCHLORIDE 4 MG/1
4 TABLET ORAL
Status: DISCONTINUED | OUTPATIENT
Start: 2018-12-05 | End: 2018-12-05

## 2018-12-05 RX ORDER — TRAMADOL HYDROCHLORIDE 50 MG/1
50 TABLET ORAL
Status: DISCONTINUED | OUTPATIENT
Start: 2018-12-05 | End: 2018-12-06 | Stop reason: HOSPADM

## 2018-12-05 RX ORDER — DEXTROMETHORPHAN POLISTIREX 30 MG/5 ML
SUSPENSION, EXTENDED RELEASE 12 HR ORAL AS NEEDED
Status: DISCONTINUED | OUTPATIENT
Start: 2018-12-05 | End: 2018-12-06 | Stop reason: HOSPADM

## 2018-12-05 RX ORDER — SODIUM CHLORIDE, SODIUM LACTATE, POTASSIUM CHLORIDE, CALCIUM CHLORIDE 600; 310; 30; 20 MG/100ML; MG/100ML; MG/100ML; MG/100ML
125 INJECTION, SOLUTION INTRAVENOUS CONTINUOUS
Status: DISCONTINUED | OUTPATIENT
Start: 2018-12-05 | End: 2018-12-06

## 2018-12-05 RX ORDER — METHOCARBAMOL 750 MG/1
750 TABLET, FILM COATED ORAL
Status: DISCONTINUED | OUTPATIENT
Start: 2018-12-05 | End: 2018-12-06 | Stop reason: HOSPADM

## 2018-12-05 RX ORDER — DOCUSATE SODIUM 100 MG/1
100 CAPSULE, LIQUID FILLED ORAL 2 TIMES DAILY
Status: DISCONTINUED | OUTPATIENT
Start: 2018-12-05 | End: 2018-12-06 | Stop reason: HOSPADM

## 2018-12-05 RX ORDER — HYDROMORPHONE HYDROCHLORIDE 2 MG/1
2 TABLET ORAL
Status: DISCONTINUED | OUTPATIENT
Start: 2018-12-05 | End: 2018-12-06

## 2018-12-05 RX ORDER — FACIAL-BODY WIPES
10 EACH TOPICAL DAILY
Status: DISCONTINUED | OUTPATIENT
Start: 2018-12-05 | End: 2018-12-06 | Stop reason: HOSPADM

## 2018-12-05 RX ORDER — PROMETHAZINE HYDROCHLORIDE 25 MG/1
12.5 SUPPOSITORY RECTAL
Status: DISCONTINUED | OUTPATIENT
Start: 2018-12-05 | End: 2018-12-06 | Stop reason: HOSPADM

## 2018-12-05 RX ORDER — ONDANSETRON 2 MG/ML
6 INJECTION INTRAMUSCULAR; INTRAVENOUS
Status: DISCONTINUED | OUTPATIENT
Start: 2018-12-05 | End: 2018-12-06 | Stop reason: HOSPADM

## 2018-12-05 RX ORDER — SCOLOPAMINE TRANSDERMAL SYSTEM 1 MG/1
1 PATCH, EXTENDED RELEASE TRANSDERMAL
Status: DISCONTINUED | OUTPATIENT
Start: 2018-12-05 | End: 2018-12-06 | Stop reason: HOSPADM

## 2018-12-05 RX ADMIN — Medication 10 MG: at 16:00

## 2018-12-05 RX ADMIN — PROMETHAZINE HYDROCHLORIDE 12.5 MG: 25 SUPPOSITORY RECTAL at 18:54

## 2018-12-05 RX ADMIN — HYDROMORPHONE HYDROCHLORIDE 2 MG: 2 TABLET ORAL at 14:49

## 2018-12-05 RX ADMIN — Medication 10 ML: at 21:19

## 2018-12-05 RX ADMIN — GABAPENTIN 300 MG: 300 CAPSULE ORAL at 21:14

## 2018-12-05 RX ADMIN — HYDROMORPHONE HYDROCHLORIDE 2 MG: 2 TABLET ORAL at 19:46

## 2018-12-05 RX ADMIN — ACETAMINOPHEN 1000 MG: 500 TABLET ORAL at 05:07

## 2018-12-05 RX ADMIN — ACETAMINOPHEN 1000 MG: 500 TABLET ORAL at 23:42

## 2018-12-05 RX ADMIN — SODIUM CHLORIDE, SODIUM LACTATE, POTASSIUM CHLORIDE, AND CALCIUM CHLORIDE 125 ML/HR: 600; 310; 30; 20 INJECTION, SOLUTION INTRAVENOUS at 08:00

## 2018-12-05 RX ADMIN — HYDROMORPHONE HYDROCHLORIDE 4 MG: 4 TABLET ORAL at 09:36

## 2018-12-05 RX ADMIN — HYDROMORPHONE HYDROCHLORIDE 8 MG: 4 TABLET ORAL at 01:12

## 2018-12-05 RX ADMIN — LORAZEPAM 1 MG: 2 INJECTION INTRAMUSCULAR; INTRAVENOUS at 03:47

## 2018-12-05 RX ADMIN — Medication 10 ML: at 05:10

## 2018-12-05 RX ADMIN — METHOCARBAMOL 750 MG: 750 TABLET ORAL at 15:59

## 2018-12-05 RX ADMIN — LORAZEPAM 0.5 MG: 0.5 TABLET ORAL at 15:03

## 2018-12-05 RX ADMIN — ONDANSETRON 6 MG: 2 INJECTION INTRAMUSCULAR; INTRAVENOUS at 17:41

## 2018-12-05 RX ADMIN — HYDROMORPHONE HYDROCHLORIDE 8 MG: 4 TABLET ORAL at 05:07

## 2018-12-05 RX ADMIN — ONDANSETRON 4 MG: 2 INJECTION INTRAMUSCULAR; INTRAVENOUS at 12:43

## 2018-12-05 RX ADMIN — GABAPENTIN 300 MG: 300 CAPSULE ORAL at 16:00

## 2018-12-05 RX ADMIN — HYDROMORPHONE HYDROCHLORIDE 2 MG: 2 TABLET ORAL at 23:39

## 2018-12-05 NOTE — PROGRESS NOTES
vss afeb Patient with non specific abd pain 
still sleepy Ambulated to BR and voided Neuro intact 
abd exam benign, epigastric pain- non specific, ++nausea Obtained cxr- benign KUB - NAD- given recent surgery on discussion with radiology not definitive Plan will obtain CT abd for more definitive statement of lack of pathology. Clinically stable . Probable ileus Pending CT Observe.

## 2018-12-05 NOTE — PROGRESS NOTES
Problem: Pressure Injury - Risk of 
Goal: *Prevention of pressure injury Document Octavio Scale and appropriate interventions in the flowsheet. Outcome: Progressing Towards Goal 
Pressure Injury Interventions: 
Sensory Interventions: Assess need for specialty bed, Check visual cues for pain, Discuss PT/OT consult with provider, Float heels, Keep linens dry and wrinkle-free, Maintain/enhance activity level, Minimize linen layers, Assess changes in LOC Moisture Interventions: Absorbent underpads, Apply protective barrier, creams and emollients, Assess need for specialty bed, Internal/External urinary devices, Minimize layers, Maintain skin hydration (lotion/cream) Activity Interventions: Increase time out of bed, Pressure redistribution bed/mattress(bed type), Assess need for specialty bed Mobility Interventions: Assess need for specialty bed, Float heels, PT/OT evaluation, Turn and reposition approx. every two hours(pillow and wedges) Nutrition Interventions: Document food/fluid/supplement intake, Offer support with meals,snacks and hydration, Discuss nutritional consult with provider Friction and Shear Interventions: Apply protective barrier, creams and emollients, Foam dressings/transparent film/skin sealants, Lift sheet, Minimize layers, Transfer aides:transfer board/Esa lift/ceiling lift, Transferring/repositioning devices

## 2018-12-05 NOTE — PROGRESS NOTES
POD # 2 Retroperitoneal retropleural approach L1-L2 with extreme lateral interbody fusion. T11 rib resection on left, placement of chest tube by Dr. Kait Mckay. Cosurgeon for that, Dr. Kait Mckay of thoracic surgery. L1-2 extreme lateral interbody fusion with placement of PEEK cage and Infuse bone graft. Revision segmental spinal posterior thoracolumbar fusion T10, T11, T12, L1, L2 with NuVasive pedicle screw instrumentation, adding on to previous instrumented fusion L2-L5. Revision of segmental spinal instrumentation, L2-L5 with removal of transverse connector. Segmental spinal instrumentation, NuVasive pedicle screws T10, T11, T12, L2. Hx: Lupus, Abdominal Abscess, RA   
VSS, O2Sat was 89-93% on Dilaudid 4mg, dose reduced to 2mg, Ativan switched to PO and Valium DC, Robaxin and Gabapentin ordered. O2 Sat now 94-95% LS clear-diminished, Apical pulse regular Labs: CBC and BMP stable, Low Ca+ 7.3 Radiographs: 1. Chest X-ray: Atelectasis, congestion. 2. Abd CT: No Obstruction, Gas and fecal matter Dressing clean, dry and intact UA: Nursing staff and pt report voiding without difficulty PT:20ft, un-motivated Neuro Intact. Moving all extremities. Good strength to BUE and BLE. Emotionally labile. Un-motivated to move. She has nausea, likely worsened from constipation and gas. Was drowsy, now more alert due to reduction in pain meds. Plan: Pt does not want Gabapentin. Offered Lyrica or other anti-neuritic, pt declined. Pt wants increase in Dilaudid, increased doses lowered her O2Sat, unstable, would not recommend this. Plus pt has gas and constipation pain. Ordered Tramadol 50mg for break-thru pain, Scopolamine patch and rectal instead of PO Phenergan. She says she can't have anything PO. Ordered rectal Dulcolax suppository and PRN enema x1 for constipation. Maalox PRN for Gas. Calcium Citrate with Vit D ordered for Calcium replacement.  
 
Felipe Sampson NP

## 2018-12-05 NOTE — ROUTINE PROCESS
1735 Patient threw up green/bile vomit. Held tramadol PO  
 
1750 Gave 6 mg Zofran IV 
 
1818 Called Josselyn. Said to give rectal phenergan and wait an hour before giving tramadol. 200 went to give phenergan and patient sleeping

## 2018-12-05 NOTE — PROGRESS NOTES
Problem: Self Care Deficits Care Plan (Adult) Goal: *Acute Goals and Plan of Care (Insert Text) Occupational Therapy Goals Initiated 12/4/2018 within 7 day(s). 1.  Patient will perform lower body dressing with modified independence 2. Patient will perform toilet transfer with modified independence. 3.  Patient will independently verbalize & adhere to all spinal precautions, to promote adequate healing & for added safety during ADLs. 4. Patient will complete bed mobility (logroll) and sit to stand with the LRAD, for improved ADL participation. Occupational Therapy TREATMENT Patient: Shellia Osgood (1 y.o. 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 ASSESSMENT: 
Attempted interventions in the am on current date but due to challenging night therapist deferred until later time. Attempted intervention for second time and patient required moderate amount of encouragement to participate. Sibling present in the room during intervention to provide appropriate encouragement and restore cooperation in the patient. Patient required moderate amount of assist from supine<>eob with HOB slightly elevated. Scoot eob required increase time with min vcs for sequence of steps. Sit<>stand from eob was CGA via pulling on RW.  Education provided to cease pulling on RW -- with no carryover demonstrated throughout intervention session. Functional mobility from eob to bathroom to utilize [facility] over toilet approx 20 ft. No loss of balance noted with functional mobility. Unsafe walker placement via elevating walker from floor level. Education provided to maintain RW on floor level -- no carryover demonstrated. Toilet transfer from sit<>stand with non proper hand placement CGA. Patient void urine with no difficulty. Stand<>sit to RW level SBA. Side step to sink for hand hygiene require proper placement of RW against sink surface min assist. Hand hygiene SBA with no loss of balance. Functional mobility back to eob 20 ft with RW CGA. Stand<>sit on eob with not reaching backwards appropriately CGA. Sit<>supine in bed min A. Progression toward goals: 
[]          Improving appropriately and progressing toward goals [x]          Improving slowly and progressing toward goals 
[]          Not making progress toward goals and plan of care will be adjusted PLAN: 
Patient continues to benefit from skilled intervention to address the above impairments. Continue treatment per established plan of care. [address] Further Equipment Recommendations for Discharge:  [facility] and shower chair with backrest   
  
G-CODES:  
 
Self Care  Current  CK= 40-59%  Goal  CI= 1-19%. The severity rating is based on the Level of Assistance required for Functional Mobility and ADLs. SUBJECTIVE:  
Patient stated I need to wake up.  OBJECTIVE DATA SUMMARY:  
Cognitive/Behavioral Status: 
Neurologic State: Alert Orientation Level: Oriented X4 Cognition: Appropriate decision making Functional Mobility and Transfers for ADLs: 
 Bed Mobility: 
Supine to Sit: Moderate assistance Transfers: 
Sit to Stand: Contact guard assistance Stand to Sit: Contact guard assistance Toileting Cues: Verbal cues provided(to maintain walker on floor level) Adaptive Equipment: Laurie Thomas Pain: 
Pt reports 7/10 pain or discomfort prior to treatment in lumbar spine.   
Pt reports 7/10 pain or discomfort post treatment in lumbar spine. Activity Tolerance:   
Fair- 
 
Please refer to the flowsheet for vital signs taken during this treatment. After treatment:  
[]  Patient left in no apparent distress sitting up in chair 
[x]  Patient left in no apparent distress in bed 
[x]  Call bell left within reach [x]  Nursing notified 
[]  Caregiver present 
[]  Bed alarm activated COMMUNICATION/EDUCATION:  
[x] Home safety education was provided and the patient/caregiver indicated understanding. [x] Patient/family have participated as able in goal setting and plan of care. [x] Patient/family agree to work toward stated goals and plan of care. [] Patient understands intent and goals of therapy, but is neutral about his/her participation. [] Patient is unable to participate in goal setting and plan of care. Uriah Lua MS, OTR/L Time Calculation: 15 mins

## 2018-12-05 NOTE — ROUTINE PROCESS
Bedside shift report given to Justin (oncoming) by Kirill Covington (offgoing nurse) Reviewed: · Kardex · SBAR 
· MAR 
· I/Os · Pt transferred to unit at 1900

## 2018-12-05 NOTE — PROGRESS NOTES
vss afeb Somnolent sedated from pain meds Abut A+O x3 and responsive Evidently had abdominal pain last night Now resolved Also required SC x2 for retention Pain control difficult And issues with nausea All anticipated from pre op Hx of UC 
PE 
Neuro intact Wound benign 
abd soft, nt, quiet, no flatus AP Did well yesterday Issues on transfer last night- pain control, nausea, etc 
?ileus, surgical pain meds-  
increase hydration Check labs Abdomen benign now - consider further studies if exam changes Emotionally labile , anxious, binary in presentation Anticipate freeman course

## 2018-12-05 NOTE — PROGRESS NOTES
Problem: Mobility Impaired (Adult and Pediatric) Goal: *Acute Goals and Plan of Care (Insert Text) Physical Therapy Goals Initiated 12/3/2018 and to be accomplished within 7 day(s) 1. Patient will move from supine to sit and sit to supine , scoot up and down and roll side to side in bed with supervision/set-up. 2.  Patient will transfer from bed to chair and chair to bed with supervision/set-up using the least restrictive device. 3.  Patient will perform sit to stand with supervision/set-up. 4.  Patient will ambulate with supervision/set-up for >150 feet with the least restrictive device. 5.  Patient will ascend/descend 5 stairs with 1-2 handrail(s) with supervision/set-up. Outcome: Not Progressing Towards Goal 
physical Therapy TREATMENT Patient: Kyara Wright (17 y.o. 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 OBJECTIVE/ASSESSMENT: 
Pt requesting PT to assist pt to bathroom this morning. Pt reports increased pain after transfer from one bed to another last night, required mod A for supine <> sit transfers. Able to ambulate x 20 feet with RW to commode and returned to bed, quite drowsy. Pt to undergo testing today. Education:  
[x]         Bed mobility [x]         Transfers 
[x]         Ambulation 
[x]         Assistive device management 
[]         Stairs [x]         Body mechanics []         Position change 
[]         Activity pacing/energy conservation 
[]         Other:spinal precautions, logrolling technique Progression toward goals: 
[]      Improving appropriately and progressing toward goals [x]      Improving slowly and progressing toward goals 
[]      Not making progress toward goals and plan of care will be adjusted PLAN: 
Patient continues to benefit from skilled intervention to address the above impairments. Continue treatment per established plan of care. Discharge Recommendations:  Home Health Further Equipment Recommendations for Discharge:  rolling walker and N/A  
 
SUBJECTIVE:  
Patient stated Lyssa Nagel had a bad night.  OBJECTIVE DATA SUMMARY:  
Critical Behavior: 
Neurologic State: Alert Orientation Level: Oriented X4 Cognition: Appropriate decision making Functional Mobility Training: 
Bed Mobility: 
Rolling: Setup;Contact guard assistance Supine to Sit: Moderate assistance Sit to Supine: Moderate assistance Transfers: 
Sit to Stand: Contact guard assistance Stand to Sit: Contact guard assistance Balance: 
Sitting: Impaired; Without support Sitting - Static: Good (unsupported) Sitting - Dynamic: Fair (occasional) Standing: Impaired; With support Standing - Static: Good Standing - Dynamic : FairAmbulation/Gait Training: 
Distance (ft): 20 Feet (ft) Assistive Device: Walker, rolling Gait Abnormalities: Decreased step clearance Base of Support: Widened Speed/[place]: Shuffled; Slow Pain: 
Pre session: 10 Post session: 10 Activity Tolerance:  
fair Please refer to the flowsheet for vital signs taken during this treatment. After treatment:  
[] Patient left in no apparent distress sitting up in chair 
[x] Patient left in no apparent distress in bed 
[x] Call bell left within reach [x] Nursing notified 
[x] Caregiver present 
[] Bed alarm activated Martín Oleary PT Time Calculation: 23 mins Mobility [id number] Current  CK= 40-59%   Goal  CI= 1-19%  D/C  CH= 0%. The severity rating is based on the Level of Assistance required for Functional Mobility and ADLs.

## 2018-12-05 NOTE — PROGRESS NOTES
Spoke with radiology , reviewed Ct. NAD. No evident hematoma, bowel issue other that diverticular disease, n obstruction. Plan: Mobilize Advance diet with bowel sounds and flatus PT OT

## 2018-12-05 NOTE — ROUTINE PROCESS
Patient stated that her bladder is full , encouraged to go to bathroom  to void in the toilet ,but patient refused ,she stated\" hurt so much to walk and I have neurogenic bladder I have to push my bladder to void\" bladder scanned reading showed 435ml. Patient Straight cath. 0200 Patient resting quietly medication effective at this time.

## 2018-12-05 NOTE — PROGRESS NOTES
1200 -- Paige removed. 1210 -- Patient complaining of reflux discomfort, pain to back and left flank. Dilaudid PCA was stopped per orders, and instead pt received PO Dilaudid 4mg at 1214.   
 
1800 -- Pt has not voided yet, but in process of transfer to  -- pt was refusing to move/be transferred until 1830 due to pain. Pt had received Dilaudid PO at 1730. .This RN was unable to scan bladder due to pt position/pain. .  Notified receiving RN Yovani Fan on 5S to bladder scan. 1840 -- Pt transferred to [facility] on specialty bed with  at bedside. __________________________________________________ 
 
 
5106 - TRANSFER - OUT REPORT: 
 
Verbal report given to Yovani Fan RN (name) on Lavanda Deal  being transferred to [address] room 505 (unit) for routine progression of care Report consisted of patients Situation, Background, Assessment and  
Recommendations(SBAR). Information from the following report(s) SBAR, Kardex, Procedure Summary, MAR, Recent Results and Cardiac Rhythm NSR was reviewed with the receiving nurse. Lines:  
Peripheral IV 12/03/18 Right Hand (Active) Site Assessment Clean, dry, & intact 12/3/2018  7:50 PM  
Phlebitis Assessment 0 12/3/2018  7:50 PM  
Infiltration Assessment 0 12/3/2018  7:50 PM  
Dressing Status Clean, dry, & intact; Occlusive 12/3/2018  7:50 PM  
Dressing Type Tape;Transparent 12/3/2018  7:50 PM  
Hub Color/Line Status Pink;End cap changed; Flushed 12/3/2018  7:50 PM  
Action Taken Open ports on tubing capped 12/3/2018  7:50 PM  
Alcohol Cap Used Yes 12/3/2018  7:50 PM  
   
Peripheral IV 12/03/18 Left (Active) Site Assessment Clean, dry, & intact 12/3/2018  7:50 PM  
Phlebitis Assessment 0 12/3/2018  7:50 PM  
Infiltration Assessment 0 12/3/2018  7:50 PM  
Dressing Status Clean, dry, & intact; Occlusive 12/3/2018  7:50 PM  
Dressing Type Tape;Transparent 12/3/2018  7:50 PM  
Hub Color/Line Status Pink;End cap changed; Flushed 12/3/2018  7:50 PM  
 Action Taken Open ports on tubing capped 12/3/2018  7:50 PM  
Alcohol Cap Used Yes 12/3/2018  7:50 PM  
  
 
Opportunity for questions and clarification was provided. Patient transported with Transport and  at bedside. All belongings we collected by .

## 2018-12-05 NOTE — ROUTINE PROCESS
Patient received in bed complaining back pain ativan 1mg iv given, Dressing to left flank and back dry and clean. 2040 Patient un able to void, bladder scanned reading 790 ml. St catherized 850ml of yellow urine, patient tolerated procedure. 2136 Patient medicated with dilaudid 4 mg for pain, patient's  stated , that his wife has high tolerance to narcotics and 4mg dilaudid is no helping her. Iizuu NP notified, new order received.

## 2018-12-05 NOTE — PROGRESS NOTES
Rounded on patient post spine surgery. Activity: Reinforced importance of changing position in bed for skin health and to do some movement to help with soreness. Patient refusing. VTE prophylaxis Patient did return demonstration with ankle pumps, scd hose on bilaterally and working, maycol hose on bilaterally. Medications: Patient still complaining of nausea and now sinus headache. Incentive Spirometry: Reinforced use of incentive spirometer with return demonstration by patient. 250 ml x 3.. Ice Protocol: Ice pack refused by patient. Patient Safety: Call light  and personal belongings in reach. . Patient and family encouraged to call for assistance for anything. Phone and other items also within reach per patient's request.  
 
Patient and sister verbalized understand of all information and education discussed. Patient and sister given the opportunity for asking questions.  
 
Phillip Viveros getting patient pain medication per request.

## 2018-12-05 NOTE — PROGRESS NOTES
Occupational Therapy TREATMENT Patient: Manda Espinoza Date: 12/5/2018 Occupational therapy attempted to provided skilled services on current date, unfortunately the patient was unable to be awaken secondary to challenging night from non management of pain. Family member present and relayed the patient has been in the [address] [place] [address] state for a few hrs. Therapist will follow up with skilled OT services as schedule permits.   
 
Rere Eddy, MS, OTR/L

## 2018-12-06 VITALS
BODY MASS INDEX: 31.25 KG/M2 | WEIGHT: 176.37 LBS | HEART RATE: 98 BPM | HEIGHT: 63 IN | RESPIRATION RATE: 18 BRPM | TEMPERATURE: 98.6 F | DIASTOLIC BLOOD PRESSURE: 72 MMHG | SYSTOLIC BLOOD PRESSURE: 105 MMHG | OXYGEN SATURATION: 95 %

## 2018-12-06 LAB
ANION GAP SERPL CALC-SCNC: 9 MMOL/L (ref 3–18)
BUN SERPL-MCNC: 6 MG/DL (ref 7–18)
BUN/CREAT SERPL: 13 (ref 12–20)
CALCIUM SERPL-MCNC: 7.5 MG/DL (ref 8.5–10.1)
CHLORIDE SERPL-SCNC: 106 MMOL/L (ref 100–108)
CO2 SERPL-SCNC: 25 MMOL/L (ref 21–32)
CREAT SERPL-MCNC: 0.47 MG/DL (ref 0.6–1.3)
ERYTHROCYTE [DISTWIDTH] IN BLOOD BY AUTOMATED COUNT: 12.3 % (ref 11.6–14.5)
GLUCOSE SERPL-MCNC: 96 MG/DL (ref 74–99)
HCT VFR BLD AUTO: 31 % (ref 35–45)
HGB BLD-MCNC: 10.4 G/DL (ref 12–16)
MCH RBC QN AUTO: 31 PG (ref 24–34)
MCHC RBC AUTO-ENTMCNC: 33.5 G/DL (ref 31–37)
MCV RBC AUTO: 92.3 FL (ref 74–97)
PLATELET # BLD AUTO: 217 K/UL (ref 135–420)
PMV BLD AUTO: 9.5 FL (ref 9.2–11.8)
POTASSIUM SERPL-SCNC: 3.2 MMOL/L (ref 3.5–5.5)
RBC # BLD AUTO: 3.36 M/UL (ref 4.2–5.3)
SODIUM SERPL-SCNC: 140 MMOL/L (ref 136–145)
WBC # BLD AUTO: 8.5 K/UL (ref 4.6–13.2)

## 2018-12-06 PROCEDURE — 74011250637 HC RX REV CODE- 250/637: Performed by: ORTHOPAEDIC SURGERY

## 2018-12-06 PROCEDURE — 74011250636 HC RX REV CODE- 250/636: Performed by: ORTHOPAEDIC SURGERY

## 2018-12-06 PROCEDURE — 97530 THERAPEUTIC ACTIVITIES: CPT

## 2018-12-06 PROCEDURE — 85027 COMPLETE CBC AUTOMATED: CPT

## 2018-12-06 PROCEDURE — 97535 SELF CARE MNGMENT TRAINING: CPT

## 2018-12-06 PROCEDURE — 97116 GAIT TRAINING THERAPY: CPT

## 2018-12-06 PROCEDURE — 74011250637 HC RX REV CODE- 250/637: Performed by: NURSE PRACTITIONER

## 2018-12-06 PROCEDURE — 80048 BASIC METABOLIC PNL TOTAL CA: CPT

## 2018-12-06 PROCEDURE — 36415 COLL VENOUS BLD VENIPUNCTURE: CPT

## 2018-12-06 RX ORDER — HYDROMORPHONE HYDROCHLORIDE 4 MG/1
4 TABLET ORAL
Status: DISCONTINUED | OUTPATIENT
Start: 2018-12-06 | End: 2018-12-06

## 2018-12-06 RX ORDER — HYDROMORPHONE HYDROCHLORIDE 2 MG/1
2 TABLET ORAL
Status: DISCONTINUED | OUTPATIENT
Start: 2018-12-06 | End: 2018-12-06 | Stop reason: HOSPADM

## 2018-12-06 RX ORDER — ONDANSETRON 4 MG/1
4 TABLET, FILM COATED ORAL
Qty: 15 TAB | Refills: 0 | Status: SHIPPED | OUTPATIENT
Start: 2018-12-06 | End: 2018-12-10 | Stop reason: SDUPTHER

## 2018-12-06 RX ORDER — GABAPENTIN 300 MG/1
300 CAPSULE ORAL 3 TIMES DAILY
Qty: 90 CAP | Refills: 1 | Status: SHIPPED | OUTPATIENT
Start: 2018-12-06 | End: 2019-01-02 | Stop reason: ALTCHOICE

## 2018-12-06 RX ORDER — HYDROMORPHONE HYDROCHLORIDE 2 MG/1
2 TABLET ORAL
Qty: 20 TAB | Refills: 0 | Status: SHIPPED | OUTPATIENT
Start: 2018-12-06 | End: 2018-12-10 | Stop reason: SDUPTHER

## 2018-12-06 RX ORDER — TRAMADOL HYDROCHLORIDE 50 MG/1
50 TABLET ORAL
Qty: 15 TAB | Refills: 0 | Status: SHIPPED | OUTPATIENT
Start: 2018-12-06 | End: 2018-12-10 | Stop reason: SDUPTHER

## 2018-12-06 RX ORDER — DEXTROSE, SODIUM CHLORIDE, AND POTASSIUM CHLORIDE 5; .45; .15 G/100ML; G/100ML; G/100ML
50 INJECTION INTRAVENOUS CONTINUOUS
Status: DISCONTINUED | OUTPATIENT
Start: 2018-12-06 | End: 2018-12-06

## 2018-12-06 RX ADMIN — Medication 10 ML: at 05:13

## 2018-12-06 RX ADMIN — Medication 10 ML: at 13:29

## 2018-12-06 RX ADMIN — DEXTROSE MONOHYDRATE, SODIUM CHLORIDE, AND POTASSIUM CHLORIDE 50 ML/HR: 50; 4.5; 1.49 INJECTION, SOLUTION INTRAVENOUS at 09:47

## 2018-12-06 RX ADMIN — GABAPENTIN 300 MG: 300 CAPSULE ORAL at 09:42

## 2018-12-06 RX ADMIN — ONDANSETRON 6 MG: 2 INJECTION INTRAMUSCULAR; INTRAVENOUS at 15:21

## 2018-12-06 RX ADMIN — HYDROMORPHONE HYDROCHLORIDE 2 MG: 2 TABLET ORAL at 08:03

## 2018-12-06 RX ADMIN — TRAMADOL HYDROCHLORIDE 50 MG: 50 TABLET, FILM COATED ORAL at 12:03

## 2018-12-06 RX ADMIN — GABAPENTIN 300 MG: 300 CAPSULE ORAL at 15:26

## 2018-12-06 RX ADMIN — HYDROMORPHONE HYDROCHLORIDE 2 MG: 2 TABLET ORAL at 13:26

## 2018-12-06 RX ADMIN — Medication 1 TABLET: at 09:43

## 2018-12-06 RX ADMIN — DOCUSATE SODIUM 100 MG: 100 CAPSULE, LIQUID FILLED ORAL at 09:43

## 2018-12-06 RX ADMIN — HYDROMORPHONE HYDROCHLORIDE 2 MG: 2 TABLET ORAL at 03:51

## 2018-12-06 RX ADMIN — ACETAMINOPHEN 1000 MG: 500 TABLET ORAL at 12:02

## 2018-12-06 RX ADMIN — Medication 10 ML: at 13:28

## 2018-12-06 RX ADMIN — LORATADINE 10 MG: 10 TABLET ORAL at 09:43

## 2018-12-06 RX ADMIN — LISINOPRIL 10 MG: 10 TABLET ORAL at 09:43

## 2018-12-06 RX ADMIN — Medication 10 ML: at 05:14

## 2018-12-06 RX ADMIN — Medication 10 MG: at 09:43

## 2018-12-06 RX ADMIN — ACETAMINOPHEN 1000 MG: 500 TABLET ORAL at 05:40

## 2018-12-06 RX ADMIN — TRAMADOL HYDROCHLORIDE 50 MG: 50 TABLET, FILM COATED ORAL at 05:53

## 2018-12-06 RX ADMIN — ONDANSETRON 6 MG: 2 INJECTION INTRAMUSCULAR; INTRAVENOUS at 09:36

## 2018-12-06 RX ADMIN — PANTOPRAZOLE SODIUM 40 MG: 40 TABLET, DELAYED RELEASE ORAL at 05:39

## 2018-12-06 NOTE — PROGRESS NOTES
Rounded on patient post spine surgery. Activity: Reinforced importance of getting OOB for all meals, going to bathroom to help prevent blood clots. VTE prophylaxis: Instructed patient to use their SCD's when not up and walking. To use while in bed and in the chair. Educated re: ankle pumps to assist with circulation when in hospital and at home. Medications: Reviewed pain medications patient is taking and the importance of keeping pain under control to help with getting OOB and therapy. Reminded the patient to always eat a snack with their pain medication to help to prevent nausea. Encouraged patient to monitor for constipation and to take a stool softner/laxative while recovering and on pain medication. Instructed not to go over 3 days without a bowel movement. Patient educated to stay on stool softener and or mild laxative while on narcotics along with drinking 8 glasses of water a day (unless on a fluid restriction). Incentive Spirometry: Reinforced use of incentive spirometer with return demonstration by patient. 1500 ml x 3. Wound Care: Dressing to surgical site dry and intact x2 on back/left lateral. Patient instructed not to take dressing off at home. Patient educated to bathe off daily and can use dial soap. Patient instructed not to shower for 72 hours after surgery. Stressed importance of using a clean towel and washcloth daily. Reminded to put on clean clothes and night clothes daily. Instructed to call nursing staff while in the hospital if dressing coming loose or feels wet. Instructed to call Home health agency or Dr Jennifer Chatterjee office for concerns about dressing at home. Ice Protocol: Ice gel pack in place per protocol. Patient Safety: Call light  and personal belongings in reach. . Patient and sister reminded to call for help toget OOB or when leaving bathroom for safety.  Phone and other items also within reach per patient's request. Reviewed back safety:no bending, lifting, twisting and no lifting anything greater than 1/2 gallon of milk. Patient reminded to log roll to get OOB. Diet: Educated patient on the importance of eating three well balanced meals a day with protein to promote bone/muscle healing. Reminded patient to drink lots of fluids to protect kidneys from all the medications being taken currently with recovery. Patient given post op educational material to remind them to do all the things discussed to prevent post op complications and have a successful recovery. Patient and sister verbalized understand of all information and education discussed. Patient and sister given the opportunity for asking questions.

## 2018-12-06 NOTE — PROGRESS NOTES
Attempted to see pt at 9:49 am, but pt was experiencing N/V. Will attempt again later today. Thank you Linda Neighbours, PT

## 2018-12-06 NOTE — ROUTINE PROCESS
Patient is alert and oriented times three complaining about pain in her back gave her prescribed pain medication. Patients dressings are all intact with some old blood on the back dressing her neuro is intact. She had a very small bowel movement about the size of a couple of peas.

## 2018-12-06 NOTE — ROUTINE PROCESS
Bedside shift report given to [facility] Reviewed: · Kardex · SBAR 
· MAR 
· I/Os · Pain medication regimen

## 2018-12-06 NOTE — PROGRESS NOTES
Problem: Mobility Impaired (Adult and Pediatric) Goal: *Acute Goals and Plan of Care (Insert Text) Physical Therapy Goals Initiated 12/3/2018 and to be accomplished within 7 day(s) 1. Patient will move from supine to sit and sit to supine , scoot up and down and roll side to side in bed with supervision/set-up. 2.  Patient will transfer from bed to chair and chair to bed with supervision/set-up using the least restrictive device. 3.  Patient will perform sit to stand with supervision/set-up. 4.  Patient will ambulate with supervision/set-up for >150 feet with the least restrictive device. 5.  Patient will ascend/descend 5 stairs with 1-2 handrail(s) with supervision/set-up. Outcome: Progressing Towards Goal 
physical Therapy TREATMENT Patient: Celsa Andre (76 y.o. 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 OBJECTIVE/ASSESSMENT: 
Pt received sidelying in bed, agreeable to physical therapy treatment. Pt was able to roll with set up, perform supine-sit transfer with CGA, sit -stand with CGA and ambulate in hallway x 120 feet with RW, CGA. Pt returned to bed with supervision for stand-sit and min A for sit-supine transfer. SCDs in place, call bell in reach, pt's sister at bedside. Education:  
[x]         Bed mobility [x]         Transfers 
[x]         Ambulation [x]         Assistive device management 
[]         Stairs [x]         Body mechanics [x]         Position change 
[x]         Activity pacing/energy conservation 
[x]         Other: Spinal precautions Progression toward goals: 
[x]      Improving appropriately and progressing toward goals 
[]      Improving slowly and progressing toward goals 
[]      Not making progress toward goals and plan of care will be adjusted PLAN: 
Patient continues to benefit from skilled intervention to address the above impairments. Continue treatment per established plan of care. Discharge Recommendations:  Home Health Further Equipment Recommendations for Discharge:  rolling walker SUBJECTIVE:  
Patient stated I don't need to go up my stairs, I'm having the ambulance take me home.  OBJECTIVE DATA SUMMARY:  
Critical Behavior: 
Neurologic State: Alert Orientation Level: Oriented X4 Cognition: Appropriate for age attention/concentration Functional Mobility Training: 
Bed Mobility: 
Rolling: Stand-by assistance;Setup Supine to Sit: Contact guard assistance Sit to Supine: Minimum assistance Scooting: Minimum assistance Transfers: 
Sit to Stand: Stand-by assistance Stand to Sit: Stand-by assistance Balance: 
Sitting: Intact Sitting - Static: Good (unsupported) Sitting - Dynamic: Good (unsupported) Standing: Impaired; With support Standing - Static: Good Standing - Dynamic : FairAmbulation/Gait Training: 
Distance (ft): 120 Feet (ft) Assistive Device: Walker, rolling Base of Support: Widened Speed/[place]: Shuffled; Slow Pain: 
Pre session: 7 (low back) Post session: 7 (low back) Activity Tolerance:  
Fair Please refer to the flowsheet for vital signs taken during this treatment. After treatment:  
[] Patient left in no apparent distress sitting up in chair 
[x] Patient left in no apparent distress in bed 
[x] Call bell left within reach [x] Nursing notified [facility]) [x] Caregiver present [] Bed alarm activated Ellen Bella, PT Time Calculation: 23 mins Mobility [id number] Current  CJ= 20-39%   Goal  CI= 1-19%  D/C  CI= 1-19%. The severity rating is based on the Level of Assistance required for Functional Mobility and ADLs.

## 2018-12-06 NOTE — PROGRESS NOTES
[facility]   
 
 
 asked to see patient to evaluate left chest tube incision prior to discharge today. Site is clean and dry. Suture intact. No drainage. Dry guaze bandage applied and can remove tomorrow to leave open to air. Patient to be seen at Dr. Juan Antonio Ramirez office next week for chest tube suture removal.  Discussed plan with patient and Ms. Korey Gann NP.

## 2018-12-06 NOTE — PROGRESS NOTES
Discharge order noted for today. Pt has been accepted to [address] [facility]. Met with pt and sister are agreeable to the transition plan today. Transport has been arranged with [facility] at 5:30pm. P'ts d/c summary and discharge [facility] orders have been forwarded to  [facility]. BENJA RossN, RN Pager # [phone] Care Manager

## 2018-12-06 NOTE — ROUTINE PROCESS
Patient is alert and oriented times three with no signs or symptoms of distress. Dressings remain the same and she states she feels better. Patient had a huge bowel movement. She is ambulating well and no nausea or vomiting.

## 2018-12-06 NOTE — PROGRESS NOTES
Problem: Pressure Injury - Risk of 
Goal: *Prevention of pressure injury Document Octavio Scale and appropriate interventions in the flowsheet. Outcome: Progressing Towards Goal 
Pressure Injury Interventions: 
Sensory Interventions: Float heels, Keep linens dry and wrinkle-free, Avoid rigorous massage over bony prominences, Minimize linen layers Moisture Interventions: Absorbent underpads, Check for incontinence Q2 hours and as needed, Internal/External fecal devices, Contain wound drainage, Offer toileting Q_hr Activity Interventions: Pressure redistribution bed/mattress(bed type), Increase time out of bed Mobility Interventions: Pressure redistribution bed/mattress (bed type), HOB 30 degrees or less, PT/OT evaluation Nutrition Interventions: Document food/fluid/supplement intake Friction and Shear Interventions: Apply protective barrier, creams and emollients, HOB 30 degrees or less

## 2018-12-06 NOTE — ROUTINE PROCESS
Bedside shift report given to Geoff DIALLO Reviewed: · Kardex · SBAR 
· MAR 
· I/Os · Pain medication regimen

## 2018-12-06 NOTE — ROUTINE PROCESS
commented on how much better she was doing tonight. She remains alert and oriented times three with no signs or symptoms of distress. Neuro remains intact and dressings are intact and neuro is intact

## 2018-12-06 NOTE — ROUTINE PROCESS
Mobility Intervention:  
 
  [] Pt dangled at edge of bed 
  [] Pt assisted OOB to bedside commode 
  [] Pt assisted OOB to chair 
  [x] Pt ambulated to bathroom 
  [] Patient was ambulated in room/hallway Assistive Device Utilized:  
  
 [x] Rolling walker 
 [] Crutches 
 [] Straight Cane 
 [] Knee immobilizer [] IV pole After Mobilization:  
 
[] Patient left in no apparent distress sitting up in chair 
[x] Patient left in no apparent distress in bed 
[x] Call bell left within reach [x] SCDs on & machine turned on 
[] Ice applied 
[] RN notified 
[] Caregiver present 
[] Bed alarm activated Reason patient not mobilized:  
 
 [] Patient refused 
 [] Nausea/vomiting 
 [] Low blood pressure 
 [] Drowsy/lethargic Pain Rating:  
 
[] 0 [] 1 Assistive Device:       
[] 2 [x] 3 [] 4 
[] 5 
[] 6 Assistive Device:       
[] 7 
[] 8 
[] 9 [] 10 Comments:

## 2018-12-06 NOTE — PROGRESS NOTES
vss afeb Neuro intact Pain resolved with large BM sp enema 
k 3.2added k to IV Ambulatory Plan PT Dc when able

## 2018-12-06 NOTE — DISCHARGE SUMMARY
POD #3   Retroperitoneal retropleural approach L1-L2 with extreme lateral interbody fusion.  T11 rib resection on left, placement of chest tube by Dr. Elizabeth Oates for that, Dr. Kathy Zuniga of thoracic surgery.  L1-2 extreme lateral interbody fusion with placement of PEEK cage and Infuse bone graft.  Revision segmental spinal posterior thoracolumbar fusion T10, T11, T12, L1, L2 with NuVasive pedicle screw instrumentation, adding on to previous instrumented fusion L2-L5.  Revision of segmental spinal instrumentation, L2-L5 with removal of transverse connector.  Segmental spinal instrumentation, NuVasive pedicle screws T10, T11, T12, L2. Hx: Lupus, Abdominal Abscess, RA    VSS  Wound: Dressing clean, dry and intact  PT:125 w/ RW  Swallowing ok. Neuro intact. Moving all extremities. 4/5 strength to BLE. Pre op pain: chronic back pain  Post op pain: states the majority of her pain coming from her left side. Plan: LADONAN Thomas from cardiothoracic surgery saw patient for wound check. DC suture on Monday in the office (Spine). Does not need to FU with cardiothoracic surgery. DC home today on Dilaudid 2 mg Q 4 PRN pain and Tramadol Q 8 hr breakthrough pain. Zofran for nausea. Patient requesting Gabapentin. Will DC on Gabapentin 300 mg TID. Will not take ER Tramadol at this time. Narcan sent to pharmacy.      Sherif Daniels NP

## 2018-12-06 NOTE — ROUTINE PROCESS
Mobility Intervention:  
 
  [] Pt dangled at edge of bed 
  [] Pt assisted OOB to bedside commode 
  [] Pt assisted OOB to chair 
  [x] Pt ambulated to bathroom 
  [] Patient was ambulated in room/hallway Assistive Device Utilized:  
  
 [] Rolling walker 
 [] Crutches 
 [] Straight Cane 
 [] Knee immobilizer [x] IV pole After Mobilization:  
 
[] Patient left in no apparent distress sitting up in chair 
[x] Patient left in no apparent distress in bed 
[x] Call bell left within reach [x] SCDs on & machine turned on 
[x] Ice applied 
[] RN notified 
[] Caregiver present 
[] Bed alarm activated Reason patient not mobilized:  
 
 [] Patient refused 
 [] Nausea/vomiting 
 [] Low blood pressure 
 [] Drowsy/lethargic Pain Rating:  
 
[] 0 [] 1 Assistive Device:       
[] 2 [] 3 [] 4 
[] 5 
[] 6 Assistive Device:       
[x] 7 [] 8 
[] 9 [] 10 Comments:

## 2018-12-06 NOTE — PROGRESS NOTES
Problem: Self Care Deficits Care Plan (Adult) Goal: *Acute Goals and Plan of Care (Insert Text) Occupational Therapy Goals Initiated 12/4/2018 within 7 day(s). 1.  Patient will perform lower body dressing with modified independence 2. Patient will perform toilet transfer with modified independence. 3.  Patient will independently verbalize & adhere to all spinal precautions, to promote adequate healing & for added safety during ADLs. 4. Patient will complete bed mobility (logroll) and sit to stand with the LRAD, for improved ADL participation. Occupational Therapy TREATMENT Patient: Sean Long (26 y.o. female) Date: 12/6/2018 Diagnosis: Lumbar spine instability [M53.2X6] Spinal stenosis, lumbar region with neurogenic claudication [M48.062] Lumbar spine instability [M53.2X6] Spinal stenosis of lumbar region [M48.061] Lumbar spine instability [M53.2X6] Spinal stenosis of lumbar region [M48.061] Lumbar spine instability [M53.2X6] Lumbar stenosis [M48.061] <principal problem not specified> Procedure(s) (LRB): EXTREME LATERAL INTERBODY FUSION (XLIF) L1/2; T10-L2 FUSION; EXTEND - REVISE PREVIOUS FUSION/C-ARM/NUVASIVE (N/A) 3 Days Post-Op Precautions:   Spinal ; Fall Chart, occupational therapy assessment, plan of care, and goals were reviewed. ASSESSMENT: 
Patient is making small gains in skilled OT services with self care and functional transfers. Patient is able to adhere to spinal precautions to promote healing. She is modified independence with bed mobility without vcs while adhering to log rolling techniques and utilization of slow, controlled mvts. Patient demonstrates self limiting behaviors and content with staff members and family performing all aspects of self care. She require maximal encouragement to participate in OT interventions. She will provided multiple excuses for delay or defer interventions.  Patient has good support system but they are too involved which is another barrier to therapy. Bed mobility modified independent with use of bed rail. Scoot eob modified independence. Sit<>stand with RW modified independence. Functional mobility 10 ft with RW SBA. Stand<>sit in chair SBA. Grooming for face wash and oral care setup. Anterior pericare required vcs for thoroughness but no carryover. Posterior pericare total dependent. Sit<>stand with RW SBA 10 ft back to bed. Stand<>sit SBA with RW. Sit<>supine modified independent. Progression toward goals: 
[]          Improving appropriately and progressing toward goals [x]          Improving slowly and progressing toward goals 
[]          Not making progress toward goals and plan of care will be adjusted PLAN: 
Patient continues to benefit from skilled intervention to address the above impairments. Continue treatment per established plan of care. Discharge Recommendations:  Home Health for safety evaluation and AE (ie long sponge, reacher, sock-aid, shoe horn, toilet tong) training Further Equipment Recommendations for Discharge:  bedside commode and shower chair w/ backrest  
  
G-CODES:  
 
Self Care  Current  CK= 40-59%  Goal  CI= 1-19%. The severity rating is based on the Level of Assistance required for Functional Mobility and ADLs. SUBJECTIVE:  
Patient stated I want pain meds.  OBJECTIVE DATA SUMMARY:  
Cognitive/Behavioral Status: 
Neurologic State: Alert Orientation Level: Oriented X4 Cognition: Follow commands Demonstrate self limiting behaviors Pain: 
Pt reports 4/10 pain or discomfort prior to treatment.   
Pt reports 6/10 pain or discomfort post treatment. Activity Tolerance:   
Poor Please refer to the flowsheet for vital signs taken during this treatment. After treatment:  
[]  Patient left in no apparent distress sitting up in chair 
[x]  Patient left in no apparent distress in bed [x]  Call bell left within reach [x]  Nursing notified 
[x]  Caregiver present (ie spouse) []  Bed alarm activated COMMUNICATION/EDUCATION:  
[x] Home safety education was provided and the patient/caregiver indicated understanding. [x] Patient/family have participated as able in goal setting and plan of care. [x] Patient/family agree to work toward stated goals and plan of care. [] Patient understands intent and goals of therapy, but is neutral about his/her participation. [] Patient is unable to participate in goal setting and plan of care. Elida Beltre MS, OTR/L Time Calculation: 50 mins

## 2018-12-06 NOTE — DISCHARGE INSTRUCTIONS
PATIENT DISCHARGE INSTRUCTIONS       PATIENT DISCHARGE INSTRUCTIONS    Juvenal Mary / [id number] : 1950    Admitted 12/3/2018 Discharged: 2018       · It is important that you take the medication exactly as they are prescribed. · Keep your medication in the bottles provided by the pharmacist and keep a list of the medication names, dosages, and times to be taken in your wallet. · Do not take other medications without consulting your doctor. What to do at Home    Recommended Diet: Regular Diet    Recommended Activity: No lifting, Driving, or Strenuous exercise for 2 weeks. If you experience any of the following symptoms  fever greater than 101.5, wound drainage, redness at incision site, increased pain, new onset of extremity numbness/tingling/weakness or gait disturbance, bladder or bowel dysfunction, please follow up with [facility]. FU on Monday 12/10 at 11:50 for chest tube suture removal      Signed By: Santos Gupta NP     2018           Sanam Desai from Nurse    PATIENT INSTRUCTIONS:    After general anesthesia or intravenous sedation, for 24 hours or while taking prescription Narcotics:  · Limit your activities  · Do not drive and operate hazardous machinery  · Do not make important personal or business decisions  · Do  not drink alcoholic beverages  · If you have not urinated within 8 hours after discharge, please contact your surgeon on call.     Report the following to your surgeon:  · Excessive pain, swelling, redness or odor of or around the surgical area  · Temperature over 100.5  · Nausea and vomiting lasting longer than 4 hours or if unable to take medications  · Any signs of decreased circulation or nerve impairment to extremity: change in color, persistent  numbness, tingling, coldness or increase pain  · Any questions    What to do at Home:  Recommended activity: No heavy lifting, pushing, pulling with the implant side UNTIL CLEARED BY SURGEON and No driving while on analgesics. *  Please give a list of your current medications to your Primary Care Provider. *  Please update this list whenever your medications are discontinued, doses are      changed, or new medications (including over-the-counter products) are added. *  Please carry medication information at all times in case of emergency situations. These are general instructions for a healthy lifestyle:    No smoking/ No tobacco products/ Avoid exposure to second hand smoke  Surgeon General's Warning:  Quitting smoking now greatly reduces serious risk to your health. Obesity, smoking, and sedentary lifestyle greatly increases your risk for illness    A healthy diet, regular physical exercise & weight monitoring are important for maintaining a healthy lifestyle    You may be retaining fluid if you have a history of heart failure or if you experience any of the following symptoms:  Weight gain of 3 pounds or more overnight or 5 pounds in a week, increased swelling in our hands or feet or shortness of breath while lying flat in bed. Please call your doctor as soon as you notice any of these symptoms; do not wait until your next office visit. Recognize signs and symptoms of STROKE:    F-face looks uneven    A-arms unable to move or move unevenly    S-speech slurred or non-existent    T-time-call 911 as soon as signs and symptoms begin-DO NOT go       Back to bed or wait to see if you get better-TIME IS BRAIN. Warning Signs of HEART ATTACK     Call 911 if you have these symptoms:   Chest discomfort. Most heart attacks involve discomfort in the center of the chest that lasts more than a few minutes, or that goes away and comes back. It can feel like uncomfortable pressure, squeezing, fullness, or pain.  Discomfort in other areas of the upper body. Symptoms can include pain or discomfort in one or both arms, the back, neck, jaw, or stomach.    Shortness of breath with or without chest discomfort.  Other signs may include breaking out in a cold sweat, nausea, or lightheadedness. Don't wait more than five minutes to call 911 - MINUTES MATTER! Fast action can save your life. Calling 911 is almost always the fastest way to get lifesaving treatment. Emergency Medical Services staff can begin treatment when they arrive -- up to an hour sooner than if someone gets to the hospital by car. The discharge information has been reviewed with the {PATIENT PARENT GUARDIAN:[id number]}. The {PATIENT PARENT GUARDIAN:26737} verbalized understanding. Discharge medications reviewed with the {Dishcarge meds reviewed [facility]:21367} and appropriate educational materials and side effects teaching were provided. ___________________________________________________________________________________________________________________________________       Ondansetron (Zofran, BD Simplist Ondansetron, Novaplus Ondansetron, PremierPro Rx Ondansetron) - (By injection)   Why this medicine is used:   Prevents nausea and vomiting. Contact a nurse or doctor right away if you have:  · Fast, pounding, or uneven heartbeat  · Lightheadedness or fainting     Common side effects:  · Pain, swelling, itching, irritation where the needle is placed  · Fever  · Headache, tiredness  · Constipation, diarrhea  © 2017 [address] is for End User's use only and may not be sold, redistributed or otherwise used for commercial purposes.

## 2018-12-06 NOTE — PROGRESS NOTES
Problem: Falls - Risk of 
Goal: *Absence of Falls Document Tyesha Aguila Fall Risk and appropriate interventions in the flowsheet. Outcome: Progressing Towards Goal 
Fall Risk Interventions: 
Mobility Interventions: Communicate number of staff needed for ambulation/transfer Medication Interventions: Teach patient to arise slowly, Patient to call before getting OOB Elimination Interventions: Call light in reach, Toilet paper/wipes in reach, Toileting schedule/hourly rounds, Patient to call for help with toileting needs History of Falls Interventions: Investigate reason for fall

## 2018-12-06 NOTE — DISCHARGE SUMMARY
Discharge  Summary     Patient: Bobbi Cardenas MRN: [id number]  SSN: [id number]    YOB: 1950  Age: 77 y.o.   Sex: female       Admit Date: 12/3/2018    Discharge Date: 12/6/2018      Admission Diagnoses: Lumbar spine instability [M53.2X6]  Spinal stenosis, lumbar region with neurogenic claudication [M48.062]  Lumbar spine instability [M53.2X6]  Spinal stenosis of lumbar region [M48.061]  Lumbar spine instability [M53.2X6]  Spinal stenosis of lumbar region [M48.061]  Lumbar spine instability [M53.2X6]  Lumbar stenosis [M48.061]    Discharge Diagnoses:   Problem List as of 12/6/2018 Date Reviewed: 11/27/2018          Codes Class Noted - Resolved    Lumbar stenosis ICD-10-CM: M48.061  ICD-9-CM: 724.02  12/4/2018 - Present        Spinal stenosis of lumbar region ICD-10-CM: M48.061  ICD-9-CM: 724.02  12/3/2018 - Present        Spinal stenosis, thoracolumbar region ICD-10-CM: M48.05  ICD-9-CM: 724.01  11/6/2018 - Present        Lumbar spine instability ICD-10-CM: M53.2X6  ICD-9-CM: 724.9  11/6/2018 - Present        Spinal stenosis of lumbar region with neurogenic claudication ICD-10-CM: M48.062  ICD-9-CM: 724.03  11/6/2018 - Present        Closed fracture of thoracic spine without spinal cord lesion ([facility] ICD-10-CM: S22.009A  ICD-9-CM: 805.2  10/5/2018 - Present        Osteoarthritis of left hip (Chronic) ICD-10-CM: M16.12  ICD-9-CM: 715.95  10/24/2017 - Present        Osteopenia of multiple sites ICD-10-CM: M85.89  ICD-9-CM: 733.90  9/12/2017 - Present        Osteopenia ICD-10-CM: M85.80  ICD-9-CM: 733.90  9/8/2017 - Present        Chronic left shoulder pain ICD-10-CM: M25.512, G89.29  ICD-9-CM: 719.41, 338.29  7/25/2017 - Present        ACP (advance care planning) ICD-10-CM: Z71.89  ICD-9-CM: V65.49  6/28/2017 - Present        Pain management contract agreement ICD-10-CM: Z02.89  ICD-9-CM: V68.89  6/22/2017 - Present        H/O calcium pyrophosphate deposition disease (CPPD) (Chronic) ICD-10-CM: Z87.39  ICD-9-CM: V13.4  5/30/2017 - Present        IBS (irritable bowel syndrome) (Chronic) ICD-10-CM: K58.9  ICD-9-CM: 564.1  5/30/2017 - Present        RA (rheumatoid arthritis) (HCC) (Chronic) ICD-10-CM: M06.9  ICD-9-CM: 714.0  5/30/2017 - Present        Sicca syndrome (HCC) (Chronic) ICD-10-CM: M35.00  ICD-9-CM: 710.2  5/30/2017 - Present        Osteoarthritis of right hip (Chronic) ICD-10-CM: M16.11  ICD-9-CM: 715.95  5/30/2017 - Present        Fibrosis of left subtalar joint (Chronic) ICD-10-CM: L62.072  ICD-9-CM: 718.57  11/6/2014 - Present        Preop cardiovascular exam ICD-10-CM: Z01.810  ICD-9-CM: V72.81  7/3/2013 - Present        DDD (degenerative disc disease), lumbar ICD-10-CM: M51.36  ICD-9-CM: 722.52  6/28/2013 - Present        Spondylolisthesis of lumbar region ICD-10-CM: M43.16  ICD-9-CM: 738.4  6/28/2013 - Present        Status post lumbar surgery ICD-10-CM: Z98.890  ICD-9-CM: V45.89  6/28/2013 - Present        RESOLVED: Spinal stenosis, lumbar ICD-10-CM: M48.061  ICD-9-CM: 724.02  6/28/2013 - 7/10/2013               Discharge Condition: Good    Procedure: Retroperitoneal retropleural approach L1-L2 with extreme lateral interbody fusion. T11 rib resection on left, placement of chest tube by Dr. Abbey Teixeira. Cosurgeon for that, Dr. Abbey Teixeira of thoracic surgery. L1-2 extreme lateral interbody fusion with placement of PEEK cage and Infuse bone graft. Revision segmental spinal posterior thoracolumbar fusion T10, T11, T12, L1, L2 with NuVasive pedicle screw instrumentation, adding on to previous instrumented fusion L2-L5. Revision of segmental spinal instrumentation, L2-L5 with removal of transverse connector. Segmental spinal instrumentation, NuVasive pedicle screws T10, T11, T12, L2.          Hospital Course: Normal hospital course for this procedure. Tolerated surgical intervention well. VSS Throughout. Neuro intact. Incision dry and intact, tolerating PO intake, voiding adequately.  Ambulatory with RW. Patient had episodic nausea/ abdominal pain resolved with BM. Disposition: home    Discharge Medications:   Current Discharge Medication List      CONTINUE these medications which have NOT CHANGED    Details   cetirizine (ZYRTEC) 10 mg tablet Take  by mouth. petrolat,wht/min oil/sod chl (DRY EYES OP) Apply  to eye. tofacitinib citrate (XELJANZ PO) Take  by mouth. Iron, Cbn & Gluc-FA-B12-C-DSS (FERRALET 90 DUAL-IRON DELIVERY) 90-1-12-50 mg-mg-mcg-mg tab Take  by mouth.      promethazine (PHENERGAN) 25 mg tablet Take 1 Tab by mouth every six (6) hours as needed for Nausea. Qty: 30 Tab, Refills: 1    Associated Diagnoses: Nausea      traMADol (ULTRAM-ER) 100 mg Tb24 Take 1 Tab by mouth daily. Max Daily Amount: 100 mg. Qty: 90 Tab, Refills: 1    Associated Diagnoses: Pain of right hip joint      lisinopril (PRINIVIL, ZESTRIL) 10 mg tablet TAKE 1 TABLET DAILY  Qty: 90 Tab, Refills: 1    Associated Diagnoses: Essential hypertension with goal blood pressure less than 130/85      meloxicam (MOBIC) 15 mg tablet TAKE 1 TABLET DAILY  Qty: 90 Tab, Refills: 1    Associated Diagnoses: Primary osteoarthritis of right hip      zolpidem (AMBIEN) 10 mg tablet Take 0.5 Tabs by mouth nightly as needed for Sleep. Max Daily Amount: 5 mg. Indications: SLEEP-ONSET INSOMNIA  Qty: 30 Tab, Refills: 1    Associated Diagnoses: Other insomnia      traMADol (ULTRAM) 50 mg tablet Take 1 Tab by mouth every six (6) hours as needed for Pain. Max Daily Amount: 200 mg. Qty: 120 Tab, Refills: 1    Associated Diagnoses: Status post lumbar surgery      denosumab (PROLIA) 60 mg/mL injection 60 mg by SubCUTAneous route. acetaminophen (TYLENOL EXTRA STRENGTH) 500 mg tablet Take 1,000 mg by mouth every six (6) hours as needed for Pain. cholecalciferol (VITAMIN D3) 1,000 unit tablet Take 1,000 Units by mouth five (5) days a week.       calcium carbonate (OS-BARBARA) 500 mg calcium (1,250 mg) tablet Take 1,500 mg by mouth daily.      naloxone (NARCAN) 2 mg/actuation spry Use 1 spray intranasally into 1 nostril. Use a new Narcan nasal spray for subsequent doses and administer into alternating nostrils. May repeat every 2 to 3 minutes as needed. Qty: 1 Package, Refills: 0      polyethylene glycol (MIRALAX) 17 gram packet Take 17 g by mouth daily.                Follow-up Appointments   Procedures    FOLLOW UP VISIT Appointment in: 3 - 5 Days     Standing Status:   Standing     Number of Occurrences:   1     Order Specific Question:   Appointment in     Answer:   3 - 5 Days       Signed By: Kierra Abdalla NP     December 6, 2018

## 2018-12-07 ENCOUNTER — TELEPHONE (OUTPATIENT)
Dept: FAMILY MEDICINE CLINIC | Age: 68
End: 2018-12-07

## 2018-12-07 ENCOUNTER — PATIENT OUTREACH (OUTPATIENT)
Dept: FAMILY MEDICINE CLINIC | Age: 68
End: 2018-12-07

## 2018-12-07 NOTE — TELEPHONE ENCOUNTER
Called Mr. Joi Lopez back no answer left message letting him know I was returning his call asked that he call the office back.

## 2018-12-07 NOTE — PROGRESS NOTES
Received email form [facility] about [facility]. HH orders were sent 12/4/18 and updated discharge information sent 12/6/18 via [address]. Called Rogelio at [facility] and left message. Waiting on return call. BENJA CaceresN, RN Pager # [phone] Care Manager

## 2018-12-07 NOTE — TELEPHONE ENCOUNTER
Called Mr. Eliza Ponce back had him verify patients  he had questions about patient's pain medication hd him hold to speak with Dr. Reese Yepez. Call was placed on hold and picked up by Dr. Reese Yepez who spoke to patient on this matter.

## 2018-12-07 NOTE — PROGRESS NOTES
Hospital Discharge Follow-Up Date/Time:  2018 11:10 AM 
 
Patient had a scheduled admission to [facility] on 12/3/18 and discharged on 18 for lumbar laminectomy. The physician discharge summary was available at the time of outreach. Patient was contacted within one business days of discharge. Spoke with  who stated patient does still have considerable pain.  has placed a call to PCP and surgeon regarding recommendations for pain management. Supportive  who is able to assist with managing medications, ADLs and transportation to appointments.  received a call from [facility] care and referral has been received. Patient will hear back today re start of care.  is aware of upcoming appointments and will provide transportation. Red flags and activity instructions reviewed with  who verbalized understanding of all. Top Challenges reviewed with the provider Apt with PCP and ortho on 12/10/18 Method of communication with provider :staff message Inpatient RRAT score: 9 Was this a readmission? no  
Patient stated reason for the readmission: NA 
 
Nurse Navigator (NN) contacted the family by telephone to perform post hospital discharge assessment. Verified name and  with family as identifiers. Provided introduction to self, and explanation of the Nurse Navigator role. Reviewed discharge instructions and red flags with family who verbalized understanding. Family given an opportunity to ask questions and does not have any further questions or concerns at this time. The family agrees to contact the PCP office for questions related to their healthcare. NN provided contact information for future reference. Disease Specific:   N/A Summary of patient's top problems: 1. Recent back surgery 2. pain 3. Impaired ambulation and ability to complete ADLs Home Health orders at discharge: PT, SN, prior history with non [id number], none Home Health company: [facility] Date of initial visit: TBD Durable Medical Equipment ordered/company: none Durable Medical Equipment received: non3 Barriers to care? none noted at this time Advance Care Planning:  
Does patient have an Advance Directive:  not on file Medication(s):  
New Medications at Discharge: Dilaudid, Narcan, Neurontin, Zofran, Tramadol Changed Medications at Discharge: Tramadol Discontinued Medications at Discharge: Mobic, Prolia, Ambien Medication reconciliation was performed with family, who verbalizes understanding of administration of home medications. There were no barriers to obtaining medications identified at this time. Referral to Pharm D needed: no  
 
Current Outpatient Medications Medication Sig  
 HYDROmorphone (DILAUDID) 2 mg tablet Take 1 Tab by mouth every four (4) hours as needed. Max Daily Amount: 12 mg.  traMADol (ULTRAM) 50 mg tablet Take 1 Tab by mouth every eight (8) hours as needed. Max Daily Amount: 150 mg.  
 ondansetron hcl (ZOFRAN) 4 mg tablet Take 1 Tab by mouth every eight (8) hours as needed for Nausea.  gabapentin (NEURONTIN) 300 mg capsule Take 1 Cap by mouth three (3) times daily.  acetaminophen (TYLENOL EXTRA STRENGTH) 500 mg tablet Take 1,000 mg by mouth every six (6) hours as needed for Pain.  polyethylene glycol (MIRALAX) 17 gram packet Take 17 g by mouth daily.  calcium carbonate (OS-BARBARA) 500 mg calcium (1,250 mg) tablet Take 1,500 mg by mouth daily.  cetirizine (ZYRTEC) 10 mg tablet Take  by mouth.  petrolat,wht/min oil/sod chl (DRY EYES OP) Apply  to eye.  tofacitinib citrate (XELJANZ PO) Take  by mouth.  Iron, Cbn & Gluc-FA-B12-C-DSS (FERRALET 90 DUAL-IRON DELIVERY) 90-1-12-50 mg-mg-mcg-mg tab Take  by mouth.  promethazine (PHENERGAN) 25 mg tablet Take 1 Tab by mouth every six (6) hours as needed for Nausea.  lisinopril (PRINIVIL, ZESTRIL) 10 mg tablet TAKE 1 TABLET DAILY  naloxone (NARCAN) 2 mg/actuation spry Use 1 spray intranasally into 1 nostril. Use a new Narcan nasal spray for subsequent doses and administer into alternating nostrils. May repeat every 2 to 3 minutes as needed.  cholecalciferol (VITAMIN D3) 1,000 unit tablet Take 1,000 Units by mouth five (5) days a week. No current facility-administered medications for this visit. There are no discontinued medications. [facility] follow up appointment(s):  
Future Appointments Date Time Provider Jonathan Colmenares 12/10/2018  9:15 AM MD Dot Wright  
12/10/2018 11:50 AM Michelle Martel  [address]  
12/17/2018 11:30 AM Bhupinder Wheatley  [address]  
1/15/2019  9:45 AM Juana Lewis  [address] [facility] follow up appointment(s): none [facility]:  n/a  
 
 
Goals  Attends follow-up appointments as directed. Plan:  Monitor for attendance at apts and assist as needed to schedule  Understands red flags/acitvity instructions post discharge. Plan:  Review red flags and activity instructions and assess patient understanding using the teachback technique 12/7/18-done with  who was able to list red flags of fever, wound drainage or redness, numbness of extremity or bowel/bladder problems.  - stated he has read and understands activity instructions Recommended Activity: No lifting, Driving, or Strenuous exercise for 2 weeks. If you experience any of the following symptoms fever greater than 101.5, wound drainage, redness at incision site, increased pain, new onset of extremity numbness/tingling/weakness or gait disturbance, bladder or bowel dysfunction, please follow up with [facility]. FU on Monday 12/10 at 11:50 for chest tube suture

## 2018-12-07 NOTE — TELEPHONE ENCOUNTER
Patient  called today at 9:24 would like a call back, to discuss medication his wife is currently taken or  To schedule a possible Appointment concerning the matter. Please contact him at [phone]

## 2018-12-07 NOTE — PROGRESS NOTES
Spoke with Alcon Gilliam at [address] who reports she has everything she needs and will be calling the patient. She is \"going to try to have someone there today\". Spoke with Mr. Monisha Arvizu, informed of the above.

## 2018-12-07 NOTE — PROGRESS NOTES
Received call from [address] with [facility]. Requested to resend MD Lumbar protocol to fax #[phone]. Per Heber Clarke, received [facility] orders and updated d/c information and will call patient today. BENJA TrejoN, RN Pager # [phone] Care Manager

## 2018-12-10 ENCOUNTER — OFFICE VISIT (OUTPATIENT)
Dept: FAMILY MEDICINE CLINIC | Age: 68
End: 2018-12-10

## 2018-12-10 ENCOUNTER — PATIENT OUTREACH (OUTPATIENT)
Dept: FAMILY MEDICINE CLINIC | Age: 68
End: 2018-12-10

## 2018-12-10 ENCOUNTER — OFFICE VISIT (OUTPATIENT)
Dept: ORTHOPEDIC SURGERY | Age: 68
End: 2018-12-10

## 2018-12-10 VITALS
HEIGHT: 63 IN | HEART RATE: 84 BPM | OXYGEN SATURATION: 97 % | RESPIRATION RATE: 18 BRPM | SYSTOLIC BLOOD PRESSURE: 105 MMHG | BODY MASS INDEX: 31.18 KG/M2 | WEIGHT: 176 LBS | DIASTOLIC BLOOD PRESSURE: 62 MMHG | TEMPERATURE: 97.9 F

## 2018-12-10 VITALS
DIASTOLIC BLOOD PRESSURE: 74 MMHG | BODY MASS INDEX: 31.24 KG/M2 | RESPIRATION RATE: 14 BRPM | SYSTOLIC BLOOD PRESSURE: 124 MMHG | HEART RATE: 83 BPM | HEIGHT: 63 IN | OXYGEN SATURATION: 98 % | TEMPERATURE: 98.7 F

## 2018-12-10 DIAGNOSIS — Z98.1 S/P LUMBAR FUSION: Primary | ICD-10-CM

## 2018-12-10 DIAGNOSIS — Z98.1 S/P FUSION OF THORACIC SPINE: ICD-10-CM

## 2018-12-10 DIAGNOSIS — M48.062 SPINAL STENOSIS OF LUMBAR REGION WITH NEUROGENIC CLAUDICATION: ICD-10-CM

## 2018-12-10 DIAGNOSIS — Z48.02 VISIT FOR SUTURE REMOVAL: ICD-10-CM

## 2018-12-10 RX ORDER — TRAMADOL HYDROCHLORIDE 50 MG/1
50 TABLET ORAL
Qty: 15 TAB | Refills: 0 | Status: SHIPPED | OUTPATIENT
Start: 2018-12-10 | End: 2018-12-10

## 2018-12-10 RX ORDER — ONDANSETRON 4 MG/1
4 TABLET, FILM COATED ORAL
Qty: 15 TAB | Refills: 0 | Status: SHIPPED | OUTPATIENT
Start: 2018-12-10 | End: 2019-01-02 | Stop reason: SDUPTHER

## 2018-12-10 RX ORDER — HYDROMORPHONE HYDROCHLORIDE 2 MG/1
2 TABLET ORAL
Qty: 20 TAB | Refills: 0 | Status: SHIPPED | OUTPATIENT
Start: 2018-12-10 | End: 2018-12-10 | Stop reason: SDUPTHER

## 2018-12-10 RX ORDER — TRAMADOL HYDROCHLORIDE 50 MG/1
50 TABLET ORAL
Qty: 70 TAB | Refills: 0 | Status: SHIPPED | OUTPATIENT
Start: 2018-12-10 | End: 2018-12-18 | Stop reason: ALTCHOICE

## 2018-12-10 RX ORDER — HYDROMORPHONE HYDROCHLORIDE 2 MG/1
2 TABLET ORAL
Qty: 20 TAB | Refills: 0 | Status: SHIPPED | OUTPATIENT
Start: 2018-12-10 | End: 2018-12-18 | Stop reason: ALTCHOICE

## 2018-12-10 NOTE — PROGRESS NOTES
Chief complaint/History of Present Illness:  Chief Complaint   Patient presents with    Surgical Follow-up     have suttures out     HPI  Doug Hogue is a  77 y.o.  female      HISTORY OF PRESENT ILLNESS:  The patient comes in today to have her stitch removed from her chest tube site. She underwent a retroperitoneal, retropleural approach at L1-2 with extreme lateral interbody fusion and T-11 rib resection on the left with placement of a chest tube by Dr. Surekha Crenshaw. The patient had an L1-2 XLIF with a PEEK cage and revision of her spinal posterior thoracolumbar fusion, T10, T11 and T12 and L1-2. She also had revision of spinal instrumentation at L2-L5 with removal of transverse connector and placement of pedicle screws T11, T10, T12 and L2. The patient is here today to have a stitch removed from her chest tube placement site. She states that she is doing well. Her back and left leg pain have gotten tremendously better. She feels like she only is having surgical pain now and the left leg pain has resolved. She feels like her back does not grind any longer. She states her physical therapist even noticed that her back is straighter. The patient is very happy with the outcome of the surgery. She did see Dr. Dlae Johnson, her PCP who is weaning her off the Dilaudid and increasing her tramadol as he will be handling her pain medications from now on out. The patient denies any fever, bowel or bladder dysfunction. No drainage from the incision. PHYSICAL EXAMINATION:  Ms. Vipul Balderas is a 68-year-old female. She is alert and oriented. She is in a wheelchair but she is able to get from sitting to standing on her own, and is able to walk slowly. Her posterior lumbar incision is healing nicely. The edges are well approximated. There is no erythema or drainage of the incision. Her XLIF incision on the left, along with her chest tube site also have well-approximated edges.   No erythema or draining sites of infection. There is 1 stitch at the chest tube site. ASSESSMENT/PLAN:  This is a patient with the above surgeries. She is here today for chest tube stitch removal.  I did remove that without any new issues. There was some bleeding. I did put some Steri-Strips over the site. We did redress both incisions. We went over wound care and activity level. She is very happy with the outcome of the surgery. We will see her back in 1 week at her 2-week postop visit.          Review of systems:    Past Medical History:   Diagnosis Date    Abdominal abscess 2009    Arthritis     Rheumatoid    Autoimmune disease ([address]     Medically induced Lupus    Back pain     Chronic pain     lower back    Colitis     Diverticulitis     Failed back syndrome     GERD (gastroesophageal reflux disease)     Hypertension     when on cyclosporins    Ill-defined condition     large torus roof of mouth    Irritable bowel syndrome     Neuropathy     bilateral hands/wrist    Osteoporosis     Pneumonia     RA [facility]) [facility]     Seizures ([facility] 6-1-2008    one episode- after high dose of epinepherine     Past Surgical History:   Procedure Laterality Date    HX ABDOMINAL WALL DEFECT REPAIR      HX APPENDECTOMY      HX BREAST REDUCTION      HX CATARACT REMOVAL Bilateral     HX COLONOSCOPY      HX GI      repair rectal prolaspe    HX GYN      dermoid cyst    HX HEENT      tonsillectomy    HX HYSTERECTOMY  1976    HX LUMBAR FUSION      x 2    HX ORTHOPAEDIC Bilateral     CTR    HX ORTHOPAEDIC Bilateral     arthroplasty thumbs    HX ORTHOPAEDIC Left     Ankle     HX ORTHOPAEDIC Right     \"Nerve Release Elbow\"    HX OTHER SURGICAL Left 1985    vein stripping    HX SALPINGO-OOPHORECTOMY Bilateral     HX SHOULDER REPLACEMENT Left     HX UROLOGICAL      bladder repair    TOTAL HIP ARTHROPLASTY Bilateral      Social History     Socioeconomic History    Marital status:      Spouse name: Not on file    Number of children: Not on file    Years of education: Not on file    Highest education level: Not on file   Social Needs    Financial resource strain: Not on file    Food insecurity - worry: Not on file    Food insecurity - inability: Not on file    Transportation needs - medical: Not on file   [employer] needs - non-medical: Not on file   Occupational History    Not on file   Tobacco Use    Smoking status: Never Smoker    Smokeless tobacco: Never Used   Substance and Sexual Activity    Alcohol use: No    Drug use: No    Sexual activity: No   Other Topics Concern    Not on file   Social History Narrative    Not on file     Family History   Problem Relation Age of Onset    Other Other         Orthopedic (Sister)    Arthritis-osteo Sister     Cancer Neg Hx     Diabetes Neg Hx     Heart Disease Neg Hx     Heart Attack Neg Hx     Hypertension Neg Hx     Stroke Neg Hx     Malignant Hyperthermia Neg Hx     Pseudocholinesterase Deficiency Neg Hx     Delayed Awakening Neg Hx     Post-op Nausea/Vomiting Neg Hx     Emergence Delirium Neg Hx     Post-op Cognitive Dysfunction Neg Hx        Physical Exam:  Visit Vitals  /62   Pulse 84   Temp 97.9 °F (36.6 °C)   Resp 18   Ht 5' 3\" (1.6 m)   Wt 176 lb (79.8 kg)   SpO2 97%   BMI 31.18 kg/m²     Pain Scale: 6/10       has been . reviewed and is appropriate          Diagnoses and all orders for this visit:    1. S/P lumbar fusion    2. S/P fusion of thoracic spine    3. Visit for suture removal            Follow-up Disposition:  Return in about 1 week (around 12/17/2018) for with NEIDA Stiles as scheduled.         We have informed Lisa Kong to notify us for immediate appointment if she has any worsening neurogical symptoms or if an emergency situation presents, then call 911

## 2018-12-10 NOTE — PROGRESS NOTES
Angeline Aranda is a 77 y.o. female  presents for follow up from hosp. She was discharged on 12/6/18. She was contacted by RN on 12/8/10. She has constant pain but it has decreased some since discharge. I discussed pain meds with her spouse on Friday 12/7/18. No weakness numbness or loss of bowel or bladder control. Allergies   Allergen Reactions    Celebrex [Celecoxib] Swelling    Shellfish Derived Swelling     \"makes me feel puffy\"    Sulfa (Sulfonamide Antibiotics) Hives and Swelling     Lips swelling    Humira [Adalimumab] Other (comments)     Lupus    Iodine Itching    Optiray 320 [Ioversol] Hives and Itching     Pt states when she gets iv contrast she itches a little from hives?  Penicillins Hives     Outpatient Medications Marked as Taking for the 12/10/18 encounter (Office Visit) with Brent Culp MD   Medication Sig Dispense Refill    ondansetron hcl (ZOFRAN) 4 mg tablet Take 1 Tab by mouth every eight (8) hours as needed for Nausea. 15 Tab 0    traMADol (ULTRAM) 50 mg tablet Take 1 Tab by mouth every eight (8) hours as needed. Max Daily Amount: 150 mg. 70 Tab 0    HYDROmorphone (DILAUDID) 2 mg tablet Take 1 Tab by mouth every four (4) hours as needed. Max Daily Amount: 12 mg. 20 Tab 0    gabapentin (NEURONTIN) 300 mg capsule Take 1 Cap by mouth three (3) times daily. 90 Cap 1    cetirizine (ZYRTEC) 10 mg tablet Take  by mouth.  petrolat,wht/min oil/sod chl (DRY EYES OP) Apply  to eye.  Iron, Cbn & Gluc-FA-B12-C-DSS (FERRALET 90 DUAL-IRON DELIVERY) 90-1-12-50 mg-mg-mcg-mg tab Take  by mouth.  promethazine (PHENERGAN) 25 mg tablet Take 1 Tab by mouth every six (6) hours as needed for Nausea. 30 Tab 1    naloxone (NARCAN) 2 mg/actuation spry Use 1 spray intranasally into 1 nostril. Use a new Narcan nasal spray for subsequent doses and administer into alternating nostrils. May repeat every 2 to 3 minutes as needed.  1 Package 0    acetaminophen (TYLENOL EXTRA STRENGTH) 500 mg tablet Take 1,000 mg by mouth every six (6) hours as needed for Pain.  cholecalciferol (VITAMIN D3) 1,000 unit tablet Take 1,000 Units by mouth five (5) days a week.  polyethylene glycol (MIRALAX) 17 gram packet Take 17 g by mouth daily.  calcium carbonate (OS-BARBARA) 500 mg calcium (1,250 mg) tablet Take 1,500 mg by mouth daily.        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    H/O calcium pyrophosphate deposition disease (CPPD) Z87.39    IBS (irritable bowel syndrome) K58.9    RA (rheumatoid arthritis) ([address] M06.9    Sicca syndrome (HCC) M35.00    Osteoarthritis of right hip M16.11    Pain management contract agreement Z02.89    ACP (advance care planning) Z71.89    Chronic left shoulder pain M25.512, G89.29    Osteopenia M85.80    Osteopenia of multiple sites M85.89    Osteoarthritis of left hip M16.12    Closed fracture of thoracic spine without spinal cord lesion ([address] S22.009A    Spinal stenosis, thoracolumbar region M48.05    Lumbar spine instability M53.2X6    Spinal stenosis of lumbar region with neurogenic claudication M48.062    Spinal stenosis of lumbar region M48.061    Lumbar stenosis M48.061     Past Medical History:   Diagnosis Date    Abdominal abscess 2009    Arthritis     Rheumatoid    Autoimmune disease ([address]     Medically induced Lupus    Back pain     Chronic pain     lower back    Colitis     Diverticulitis     Failed back syndrome     GERD (gastroesophageal reflux disease)     Hypertension     when on cyclosporins    Ill-defined condition     large torus roof of mouth    Irritable bowel syndrome     Neuropathy     bilateral hands/wrist    Osteoporosis     Pneumonia     RA (rheumatoid arthritis) ([address]     Seizures ([address] 6-1-2008    one episode- after high dose of epinepherine     Social History     Socioeconomic History    Marital status:      Spouse name: Not on file    Number of children: Not on file    Years of education: Not on file    Highest education level: Not on file   Tobacco Use    Smoking status: Never Smoker    Smokeless tobacco: Never Used   Substance and Sexual Activity    Alcohol use: No    Drug use: No    Sexual activity: No     Family History   Problem Relation Age of Onset    Other Other         Orthopedic (Sister)    Arthritis-osteo Sister     Cancer Neg Hx     Diabetes Neg Hx     Heart Disease Neg Hx     Heart Attack Neg Hx     Hypertension Neg Hx     Stroke Neg Hx     Malignant Hyperthermia Neg Hx     Pseudocholinesterase Deficiency Neg Hx     Delayed Awakening Neg Hx     Post-op Nausea/Vomiting Neg Hx     Emergence Delirium Neg Hx     Post-op Cognitive Dysfunction Neg Hx         Review of Systems   Constitutional: Negative for chills, fever, malaise/fatigue and weight loss. Eyes: Negative for blurred vision. Respiratory: Negative for shortness of breath and wheezing. Cardiovascular: Negative for chest pain. Gastrointestinal: Negative for constipation, diarrhea, nausea and vomiting. Musculoskeletal: Positive for back pain. Negative for myalgias. Skin: Negative for rash. Neurological: Negative for weakness. Vitals:    12/10/18 0926   BP: 124/74   Pulse: 83   Resp: 14   Temp: 98.7 °F (37.1 °C)   SpO2: 98%   Height: 5' 3\" (1.6 m)   PainSc:   7   PainLoc: Back       Physical Exam   Constitutional: She is oriented to person, place, and time and well-developed, well-nourished, and in no distress. Neck: Normal range of motion. Neck supple. No thyromegaly present. Cardiovascular: Normal rate, regular rhythm and normal heart sounds. Pulmonary/Chest: Effort normal and breath sounds normal. No respiratory distress. She has no wheezes. She has no rales.  She exhibits no tenderness. Abdominal: Soft. Musculoskeletal: Normal range of motion. She exhibits tenderness. She exhibits no edema. Neurological: She is alert and oriented to person, place, and time. Skin: Skin is warm and dry. Psychiatric: Mood, memory, affect and judgment normal.   Nursing note and vitals reviewed. Assessment/Plan      ICD-10-CM ICD-9-CM    1. Spinal stenosis of lumbar region with neurogenic claudication M48.062 724.03 ondansetron hcl (ZOFRAN) 4 mg tablet      traMADol (ULTRAM) 50 mg tablet      HYDROmorphone (DILAUDID) 2 mg tablet      DISCONTINUED: HYDROmorphone (DILAUDID) 2 mg tablet      DISCONTINUED: traMADol (ULTRAM) 50 mg tablet     Will start weaning off of Dilaudid and increasing Ultram    I have discussed the diagnosis with the patient and the intended plan of care as seen in the above orders. The patient has received an after-visit summary and questions were answered concerning future plans. I have discussed medication, side effects, and warnings with the patient in detail. The patient verbalized understanding and is in agreement with the plan of care. The patient will contact the office with any additional concerns. Follow-up Disposition:  Return in about 3 weeks (around 12/31/2018).   lab results and schedule of future lab studies reviewed with patient    Clydie Siemens, MD

## 2018-12-10 NOTE — PROGRESS NOTES
Hospital Discharge Follow-Up 
  
  
Patient had a scheduled admission to [facility] on 12/3/18 and discharged on 12/6/18 for lumbar laminectomy. The patient attended an appointments today, 12/10/18, Dr. Harriet Bettencourt, PCP, and with NP lashonda RAMOS. Office notes reviewed. Patient reported constant but improving pain. No medication changes noted. Patient will follow up with ortho on 12/17/18. Goals Addressed This Visit's Progress  Attends follow-up appointments as directed. On track Plan:  Monitor for attendance at apts and assist as needed to schedule 12/10/18-attended apt with Dr. Harriet Bettencourt 
12/10/18-attended apt with NEIDA Reyes

## 2018-12-10 NOTE — PROGRESS NOTES
Pt had spinal fusion last week at [facility]. States that her lung collapsed during surgery and she was in ICU. Pt wants script for dilaudid and refill on zofran, tramadol. 1. Have you been to the ER, urgent care clinic since your last visit? Hospitalized since your last visit?see note/CC note    2. Have you seen or consulted any other health care providers outside of the [address] [place] [address] since your last visit? Include any pap smears or colon screening.  No

## 2018-12-10 NOTE — PATIENT INSTRUCTIONS
Lumbar Spinal Stenosis: Care Instructions  Your Care Instructions    Stenosis in the spine is a narrowing of the canal that is around the spinal cord and nerve roots in your back. It can happen as part of aging. Sometimes bone and other tissue grow into this canal and press on the nerves that branch out from the spinal cord. This can cause pain, numbness, and weakness. When it happens in the lower part of your back, it is called lumbar spinal stenosis. It can cause problems in the legs, feet, and rear end (buttocks). You may be able to get relief from the symptoms of spinal stenosis by taking pain medicine. Your doctor may suggest physical therapy and exercises to keep your spine strong and flexible. Some people try steroid shots to reduce swelling. If pain and numbness in your legs are still so bad that you cannot do your normal activities, you may need surgery. Follow-up care is a key part of your treatment and safety. Be sure to make and go to all appointments, and call your doctor if you are having problems. It's also a good idea to know your test results and keep a list of the medicines you take. How can you care for yourself at home? · Take an over-the-counter pain medicine. Nonsteroidal anti-inflammatory drugs (NSAIDs) such as ibuprofen or naproxen seem to work best. But if you can't take NSAIDs, you can try acetaminophen. Be safe with medicines. Read and follow all instructions on the label. · Do not take two or more pain medicines at the same time unless the doctor told you to. Many pain medicines have acetaminophen, which is Tylenol. Too much acetaminophen (Tylenol) can be harmful. · Stay at a healthy weight. Being overweight puts extra strain on your spine. · Change positions often when you sit or stand. This can ease pain. It may also reduce pressure on the spinal cord and its nerves. · Avoid doing things that make your symptoms worse.  Walking downhill and standing for a long time may cause pain.  · Stretch and strengthen your back muscles as your doctor or physical therapist recommends. If your doctor says it is okay to do them, these exercises may help. ? Lie on your back with your knees bent. Gently pull one bent knee to your chest. Put that foot back on the floor, and then pull the other knee to your chest.  ? Do pelvic tilts. Lie on your back with your knees bent. Tighten your stomach muscles. Pull your belly button (navel) in and up toward your ribs. You should feel like your back is pressing to the floor and your hips and pelvis are slightly lifting off the floor. Hold for 6 seconds while breathing smoothly. ? Stand with your back flat against a wall. Slowly slide down until your knees are slightly bent. Hold for 10 seconds, then slide back up the wall. · Remove or change anything in your house that may cause you to fall. Keep walkways clear of clutter, electrical cords, and throw rugs. When should you call for help? Call 911 anytime you think you may need emergency care. For example, call if:    · You are unable to move a leg at all.   [facility] your doctor now or seek immediate medical care if:    · You have new or worse symptoms in your legs, belly, or buttocks. Symptoms may include:  ? Numbness or tingling. ? Weakness. ? Pain.     · You lose bladder or bowel control.    Watch closely for changes in your health, and be sure to contact your doctor if:    · You have a fever, lose weight, or don't feel well.     · You are not getting better as expected. Where can you learn more? Go to http://camilo-robert.info/. Morgan Bobo in the search box to learn more about \"Lumbar Spinal Stenosis: Care Instructions. \"  Current as of: November 29, 2017  Content Version: 11.8  © 2299-8913 [employer]. Care instructions adapted under license by [employer] (which disclaims liability or warranty for this information).  If you have questions about a medical condition or this instruction, always ask your healthcare professional. [place] [address] any warranty or liability for your use of this information.

## 2018-12-11 ENCOUNTER — TELEPHONE (OUTPATIENT)
Dept: ORTHOPEDIC SURGERY | Age: 68
End: 2018-12-11

## 2018-12-11 ENCOUNTER — TELEPHONE (OUTPATIENT)
Dept: FAMILY MEDICINE CLINIC | Age: 68
End: 2018-12-11

## 2018-12-11 NOTE — TELEPHONE ENCOUNTER
Called patient and she is having some diverticuli issues which she has had in the past.  She called the pharmacy and they said the neurontin may be aggravating it. She is going to go to [facility] instead of [facility] and see if that helps. Can wean off if she needs to . Will also be coming off the dilaudid in the next 2 days.

## 2018-12-11 NOTE — TELEPHONE ENCOUNTER
Spoke to [employer] pt's home health nurse. She states that pt is concerned that the gabapentin is causing her stomach to distend.  Pt denies any

## 2018-12-11 NOTE — TELEPHONE ENCOUNTER
Patient called the office today stating she thinks her prescription for Tramadol 100 mg should be one tablet by mouth every 6 hours with a max of 300 mg per day instead of one tablet by mouth every 8 hours with max dose of 150 mg per day. She says her  is confused and would like clarification on when to administer the Tramadol to control patients pain. Please advise.

## 2018-12-11 NOTE — TELEPHONE ENCOUNTER
Patient called in requesting to speak with Jodie Dan in regards to her medication. Patient can be reached at [phone].

## 2018-12-12 ENCOUNTER — TELEPHONE (OUTPATIENT)
Dept: ORTHOPEDIC SURGERY | Age: 68
End: 2018-12-12

## 2018-12-12 NOTE — TELEPHONE ENCOUNTER
Augie Holstein from [address] for [address] or a Nurse. Adri Huerta said that the Post Op patient is having a hard time with pain. That she would like to ask if the patient can also add a Muscle Relaxer or a Sleep Aid as well. Kati Burton. [phone].

## 2018-12-12 NOTE — TELEPHONE ENCOUNTER
Per Angeles's note,     She did see Dr. Felice Hernandez, her PCP who is weaning her off the Dilaudid and increasing her tramadol as he will be handling her pain medications from now on out. Please contact the PCP about pain as it appears he is handling her pain management regimen. We will not be prescribing a sleep aide. If she needs a muscle relaxer, can ask PCP or we can prescribe flexeril if she would like.

## 2018-12-12 NOTE — TELEPHONE ENCOUNTER
Left voice message for Alcon Conway with [address] [place] [address] to return call regarding message below.

## 2018-12-12 NOTE — TELEPHONE ENCOUNTER
Natan Dupont who is PT with April Luna at [facility] called the office stating patient is still having a lot of pain, has not been able to sleep past two nights due to pain and does not know what medications to take asked Taz House to encourage patient to also reach out to Dr. Michael Hendrickson office if she is still having a lot of pain, she has agreed with this and states she will call his office next.

## 2018-12-14 NOTE — TELEPHONE ENCOUNTER
Preston Anchors, she can taper off gabapentin to see if this resolves the pain. As far as her post op pain, is this improving? Her PCP is now managing her pain medications so I would recommend she contact him/her for dosing if an adjustment is made.

## 2018-12-14 NOTE — TELEPHONE ENCOUNTER
Render Care from [facility] called again, she states the patient is having pain at a level of 8/10, and also she is having stomach pain that she thinks might be coming from her taking the prescription gabapentin (NEURONTIN) 300 mg capsule. She is asking if we want the patient to continue taking the medication or to stop it. Please advise patient back regarding this at [phone].

## 2018-12-14 NOTE — TELEPHONE ENCOUNTER
Spoke with patient, informed of NP Destin message below. Patient stated understanding and given tapering instructions for Gabapentin. Patient stated that pain is about the same, but the stomach issue was a bigger issue right now. Any further questions or concerns patient will call the office.

## 2018-12-15 ENCOUNTER — HOSPITAL ENCOUNTER (EMERGENCY)
Age: 68
Discharge: HOME | End: 2018-12-15
Attending: EMERGENCY MEDICINE
Payer: MEDICARE

## 2018-12-15 ENCOUNTER — APPOINTMENT (OUTPATIENT)
Dept: GENERAL RADIOLOGY | Age: 68
End: 2018-12-15
Attending: EMERGENCY MEDICINE
Payer: MEDICARE

## 2018-12-15 VITALS
HEIGHT: 63 IN | DIASTOLIC BLOOD PRESSURE: 72 MMHG | HEART RATE: 72 BPM | BODY MASS INDEX: 28.35 KG/M2 | RESPIRATION RATE: 20 BRPM | SYSTOLIC BLOOD PRESSURE: 110 MMHG | TEMPERATURE: 98.6 F | OXYGEN SATURATION: 98 % | WEIGHT: 160 LBS

## 2018-12-15 DIAGNOSIS — R14.1 ABDOMINAL GAS PAIN: Primary | ICD-10-CM

## 2018-12-15 LAB
ALBUMIN SERPL-MCNC: 3.3 G/DL (ref 3.4–5)
ALBUMIN/GLOB SERPL: 1 {RATIO} (ref 0.8–1.7)
ALP SERPL-CCNC: 135 U/L (ref 45–117)
ALT SERPL-CCNC: 99 U/L (ref 13–56)
ANION GAP SERPL CALC-SCNC: 9 MMOL/L (ref 3–18)
AST SERPL-CCNC: 30 U/L (ref 15–37)
BASOPHILS # BLD: 0 K/UL (ref 0–0.1)
BASOPHILS NFR BLD: 0 % (ref 0–2)
BILIRUB SERPL-MCNC: 0.3 MG/DL (ref 0.2–1)
BUN SERPL-MCNC: 12 MG/DL (ref 7–18)
BUN/CREAT SERPL: 16 (ref 12–20)
CALCIUM SERPL-MCNC: 8.4 MG/DL (ref 8.5–10.1)
CHLORIDE SERPL-SCNC: 99 MMOL/L (ref 100–108)
CO2 SERPL-SCNC: 29 MMOL/L (ref 21–32)
CREAT SERPL-MCNC: 0.75 MG/DL (ref 0.6–1.3)
DIFFERENTIAL METHOD BLD: ABNORMAL
EOSINOPHIL # BLD: 0.5 K/UL (ref 0–0.4)
EOSINOPHIL NFR BLD: 5 % (ref 0–5)
ERYTHROCYTE [DISTWIDTH] IN BLOOD BY AUTOMATED COUNT: 13 % (ref 11.6–14.5)
GLOBULIN SER CALC-MCNC: 3.3 G/DL (ref 2–4)
GLUCOSE SERPL-MCNC: 109 MG/DL (ref 74–99)
HCT VFR BLD AUTO: 36.2 % (ref 35–45)
HGB BLD-MCNC: 11.6 G/DL (ref 12–16)
LIPASE SERPL-CCNC: 63 U/L (ref 73–393)
LYMPHOCYTES # BLD: 1.7 K/UL (ref 0.9–3.6)
LYMPHOCYTES NFR BLD: 18 % (ref 21–52)
MCH RBC QN AUTO: 31 PG (ref 24–34)
MCHC RBC AUTO-ENTMCNC: 32 G/DL (ref 31–37)
MCV RBC AUTO: 96.8 FL (ref 74–97)
MONOCYTES # BLD: 0.7 K/UL (ref 0.05–1.2)
MONOCYTES NFR BLD: 7 % (ref 3–10)
NEUTS SEG # BLD: 6.4 K/UL (ref 1.8–8)
NEUTS SEG NFR BLD: 70 % (ref 40–73)
PLATELET # BLD AUTO: 540 K/UL (ref 135–420)
PMV BLD AUTO: 8.3 FL (ref 9.2–11.8)
POTASSIUM SERPL-SCNC: 4 MMOL/L (ref 3.5–5.5)
PROT SERPL-MCNC: 6.6 G/DL (ref 6.4–8.2)
RBC # BLD AUTO: 3.74 M/UL (ref 4.2–5.3)
SODIUM SERPL-SCNC: 137 MMOL/L (ref 136–145)
WBC # BLD AUTO: 9.3 K/UL (ref 4.6–13.2)

## 2018-12-15 PROCEDURE — 74022 RADEX COMPL AQT ABD SERIES: CPT

## 2018-12-15 PROCEDURE — 96374 THER/PROPH/DIAG INJ IV PUSH: CPT

## 2018-12-15 PROCEDURE — 96376 TX/PRO/DX INJ SAME DRUG ADON: CPT

## 2018-12-15 PROCEDURE — 96375 TX/PRO/DX INJ NEW DRUG ADDON: CPT

## 2018-12-15 PROCEDURE — 99281 EMR DPT VST MAYX REQ PHY/QHP: CPT

## 2018-12-15 PROCEDURE — 85025 COMPLETE CBC W/AUTO DIFF WBC: CPT

## 2018-12-15 PROCEDURE — 83690 ASSAY OF LIPASE: CPT

## 2018-12-15 PROCEDURE — 74011250636 HC RX REV CODE- 250/636: Performed by: EMERGENCY MEDICINE

## 2018-12-15 PROCEDURE — 80053 COMPREHEN METABOLIC PANEL: CPT

## 2018-12-15 RX ORDER — ONDANSETRON 2 MG/ML
4 INJECTION INTRAMUSCULAR; INTRAVENOUS
Status: COMPLETED | OUTPATIENT
Start: 2018-12-15 | End: 2018-12-15

## 2018-12-15 RX ORDER — FENTANYL CITRATE 50 UG/ML
50 INJECTION, SOLUTION INTRAMUSCULAR; INTRAVENOUS
Status: COMPLETED | OUTPATIENT
Start: 2018-12-15 | End: 2018-12-15

## 2018-12-15 RX ORDER — FENTANYL CITRATE 50 UG/ML
25 INJECTION, SOLUTION INTRAMUSCULAR; INTRAVENOUS
Status: COMPLETED | OUTPATIENT
Start: 2018-12-15 | End: 2018-12-15

## 2018-12-15 RX ADMIN — FENTANYL CITRATE 25 MCG: 50 INJECTION INTRAMUSCULAR; INTRAVENOUS at 14:07

## 2018-12-15 RX ADMIN — FENTANYL CITRATE 50 MCG: 50 INJECTION INTRAMUSCULAR; INTRAVENOUS at 11:46

## 2018-12-15 RX ADMIN — ONDANSETRON 4 MG: 2 INJECTION INTRAMUSCULAR; INTRAVENOUS at 11:45

## 2018-12-15 NOTE — ED TRIAGE NOTES
Pt had spinal surgery 12/3. Has had abd pain with bloating since. Having diarrhea. Has diverticulosis.  Called her nurse and was told to come to ED

## 2018-12-15 NOTE — ED NOTES
I have reviewed discharge instructions with the patient and spouse. The patient and spouse verbalized understanding.   Current Discharge Medication List      Patient armband removed and shredded

## 2018-12-15 NOTE — DISCHARGE INSTRUCTIONS

## 2018-12-15 NOTE — ED PROVIDER NOTES
EMERGENCY DEPARTMENT HISTORY AND PHYSICAL EXAM    11:24 AM      Date: 12/15/2018  Patient Name: Juvenal Hernandez    History of Presenting Illness     Chief Complaint   Patient presents with    Abdominal Pain         History Provided By: Patient    Chief Complaint: Abdominal Pain  Duration:  Weeks  Timing:  Constant  Location: Diffuse abdomen   Quality: N/A  Severity: Moderate  Modifying Factors: None  Associated Symptoms: Bloating; Nausea; Diarrhea      Additional History (Context): Juvenal Hernandez is a 77 y.o. female with PMHx of diverticulosis, GERD, and IBS presenting to the ED c/o constant moderate diffuse abdominal pain s/p lumbar spine surgery 2 weeks ago (12/3/2018). Associated symptoms include nausea and diarrhea. Pt also reports bloating. Notes she is currently on pain medication from her surgery. Denies vomiting and any other symptoms or complaints. PCP: Tonie Geller MD    Current Outpatient Medications   Medication Sig Dispense Refill    ondansetron hcl (ZOFRAN) 4 mg tablet Take 1 Tab by mouth every eight (8) hours as needed for Nausea. 15 Tab 0    traMADol (ULTRAM) 50 mg tablet Take 1 Tab by mouth every eight (8) hours as needed. Max Daily Amount: 150 mg. 70 Tab 0    HYDROmorphone (DILAUDID) 2 mg tablet Take 1 Tab by mouth every four (4) hours as needed. Max Daily Amount: 12 mg. 20 Tab 0    gabapentin (NEURONTIN) 300 mg capsule Take 1 Cap by mouth three (3) times daily. 90 Cap 1    acetaminophen (TYLENOL EXTRA STRENGTH) 500 mg tablet Take 1,000 mg by mouth every six (6) hours as needed for Pain.  cetirizine (ZYRTEC) 10 mg tablet Take  by mouth.  petrolat,wht/min oil/sod chl (DRY EYES OP) Apply  to eye.  Iron, Cbn & Gluc-FA-B12-C-DSS (FERRALET 90 DUAL-IRON DELIVERY) 90-1-12-50 mg-mg-mcg-mg tab Take  by mouth.  promethazine (PHENERGAN) 25 mg tablet Take 1 Tab by mouth every six (6) hours as needed for Nausea.  30 Tab 1    lisinopril (PRINIVIL, ZESTRIL) 10 mg tablet TAKE 1 TABLET DAILY 90 Tab 1    naloxone (NARCAN) 2 mg/actuation spry Use 1 spray intranasally into 1 nostril. Use a new Narcan nasal spray for subsequent doses and administer into alternating nostrils. May repeat every 2 to 3 minutes as needed. 1 Package 0    cholecalciferol (VITAMIN D3) 1,000 unit tablet Take 1,000 Units by mouth five (5) days a week.  polyethylene glycol (MIRALAX) 17 gram packet Take 17 g by mouth daily.  calcium carbonate (OS-BARBARA) 500 mg calcium (1,250 mg) tablet Take 1,500 mg by mouth daily.          Past History     Past Medical History:  Past Medical History:   Diagnosis Date    Abdominal abscess 2009    Arthritis     Rheumatoid    Autoimmune disease ([address]     Medically induced Lupus    Back pain     Chronic pain     lower back    Colitis     Diverticulitis     Failed back syndrome     GERD (gastroesophageal reflux disease)     Hypertension     when on cyclosporins    Ill-defined condition     large torus roof of mouth    Irritable bowel syndrome     Neuropathy     bilateral hands/wrist    Osteoporosis     Pneumonia     RA [facility]rheumatoid arthritis) [facility] [address] [address]     Seizures ([address] 6-1-2008    one episode- after high dose of epinepherine       Past Surgical History:  Past Surgical History:   Procedure Laterality Date    HX ABDOMINAL WALL DEFECT REPAIR      HX APPENDECTOMY      HX BREAST REDUCTION      HX CATARACT REMOVAL Bilateral     HX COLONOSCOPY      HX GI      repair rectal prolaspe    HX GYN      dermoid cyst    HX HEENT      tonsillectomy    HX HYSTERECTOMY  1976    HX LUMBAR FUSION      x 2    HX ORTHOPAEDIC Bilateral     CTR    HX ORTHOPAEDIC Bilateral     arthroplasty thumbs    HX ORTHOPAEDIC Left     Ankle     HX ORTHOPAEDIC Right     \"Nerve Release Elbow\"    HX OTHER SURGICAL Left 1985    vein stripping    HX SALPINGO-OOPHORECTOMY Bilateral     HX SHOULDER REPLACEMENT Left     HX UROLOGICAL      bladder repair    TOTAL HIP ARTHROPLASTY Bilateral Family History:  Family History   Problem Relation Age of Onset    Other Other         Orthopedic (Sister)    Arthritis-osteo Sister     Cancer Neg Hx     Diabetes Neg Hx     Heart Disease Neg Hx     Heart Attack Neg Hx     Hypertension Neg Hx     Stroke Neg Hx     Malignant Hyperthermia Neg Hx     Pseudocholinesterase Deficiency Neg Hx     Delayed Awakening Neg Hx     Post-op Nausea/Vomiting Neg Hx     Emergence Delirium Neg Hx     Post-op Cognitive Dysfunction Neg Hx        Social History:  Social History     Tobacco Use    Smoking status: Never Smoker    Smokeless tobacco: Never Used   Substance Use Topics    Alcohol use: No    Drug use: No       Allergies: Allergies   Allergen Reactions    Celebrex [Celecoxib] Swelling    Shellfish Derived Swelling     \"makes me feel puffy\"    Sulfa (Sulfonamide Antibiotics) Hives and Swelling     Lips swelling    Humira [Adalimumab] Other (comments)     Lupus    Iodine Itching    Optiray 320 [Ioversol] Hives and Itching     Pt states when she gets iv contrast she itches a little from hives?  Penicillins Hives         Review of Systems       Review of Systems   Constitutional: Negative for activity change, fatigue and fever. HENT: Negative for congestion and rhinorrhea. Eyes: Negative for visual disturbance. Respiratory: Negative for shortness of breath. Cardiovascular: Negative for chest pain and palpitations. Gastrointestinal: Positive for abdominal pain, diarrhea and nausea. Negative for vomiting. Genitourinary: Negative for dysuria and hematuria. Musculoskeletal: Negative for back pain. Skin: Negative for rash. Neurological: Negative for dizziness, weakness and light-headedness. All other systems reviewed and are negative.         Physical Exam     Visit Vitals  /69 (BP 1 Location: Left arm)   Pulse 85   Temp 98.6 °F (37 °C)   Resp 20   Ht 5' 3\" (1.6 m)   Wt 72.6 kg (160 lb)   SpO2 98%   BMI 28.34 kg/m² Physical Exam   Constitutional: She is oriented to person, place, and time. She appears well-developed and well-nourished. No distress. HENT:   Head: Normocephalic and atraumatic. Right Ear: External ear normal.   Left Ear: External ear normal.   Nose: Nose normal.   Mouth/Throat: Oropharynx is clear and moist.   Eyes: Conjunctivae and EOM are normal. Pupils are equal, round, and reactive to light. No scleral icterus. Neck: Normal range of motion. Neck supple. No JVD present. No tracheal deviation present. No thyromegaly present. Cardiovascular: Normal rate, regular rhythm, normal heart sounds and intact distal pulses. Exam reveals no gallop and no friction rub. No murmur heard. Pulmonary/Chest: Effort normal and breath sounds normal. She exhibits no tenderness. Abdominal: Soft. Bowel sounds are normal. She exhibits no distension. There is tenderness (mild diffuse tenderness with no focal pain). There is no rebound and no guarding. Musculoskeletal: Normal range of motion. She exhibits no edema or tenderness. Lymphadenopathy:     She has no cervical adenopathy. Neurological: She is alert and oriented to person, place, and time. No cranial nerve deficit. Coordination normal.   No sensory loss, Gait normal, Motor 5/5   Skin: Skin is warm and dry. Psychiatric: She has a normal mood and affect. Her behavior is normal. Judgment and thought content normal.   Nursing note and vitals reviewed.         Diagnostic Study Results     Labs -  Recent Results (from the past 12 hour(s))   CBC WITH AUTOMATED DIFF    Collection Time: 12/15/18 11:46 AM   Result Value Ref Range    WBC 9.3 4.6 - 13.2 K/uL    RBC 3.74 (L) 4.20 - 5.30 M/uL    HGB 11.6 (L) 12.0 - 16.0 g/dL    HCT 36.2 35.0 - 45.0 %    MCV 96.8 74.0 - 97.0 FL    MCH 31.0 24.0 - 34.0 PG    MCHC 32.0 31.0 - 37.0 g/dL    RDW 13.0 11.6 - 14.5 %    PLATELET 238 (H) 491 - 420 K/uL    MPV 8.3 (L) 9.2 - 11.8 FL    NEUTROPHILS 70 40 - 73 %    LYMPHOCYTES 18 (L) 21 - 52 %    MONOCYTES 7 3 - 10 %    EOSINOPHILS 5 0 - 5 %    BASOPHILS 0 0 - 2 %    ABS. NEUTROPHILS 6.4 1.8 - 8.0 K/UL    ABS. LYMPHOCYTES 1.7 0.9 - 3.6 K/UL    ABS. MONOCYTES 0.7 0.05 - 1.2 K/UL    ABS. EOSINOPHILS 0.5 (H) 0.0 - 0.4 K/UL    ABS. BASOPHILS 0.0 0.0 - 0.1 K/UL    DF AUTOMATED     METABOLIC PANEL, COMPREHENSIVE    Collection Time: 12/15/18 11:46 AM   Result Value Ref Range    Sodium 137 136 - 145 mmol/L    Potassium 4.0 3.5 - 5.5 mmol/L    Chloride 99 (L) 100 - 108 mmol/L    CO2 29 21 - 32 mmol/L    Anion gap 9 3.0 - 18 mmol/L    Glucose 109 (H) 74 - 99 mg/dL    BUN 12 7.0 - 18 MG/DL    Creatinine 0.75 0.6 - 1.3 MG/DL    BUN/Creatinine ratio 16 12 - 20      GFR est AA >60 >60 ml/min/1.73m2    GFR est non-AA >60 >60 ml/min/1.73m2    Calcium 8.4 (L) 8.5 - 10.1 MG/DL    Bilirubin, total 0.3 0.2 - 1.0 MG/DL    ALT (SGPT) 99 (H) 13 - 56 U/L    AST (SGOT) 30 15 - 37 U/L    Alk. phosphatase 135 (H) 45 - 117 U/L    Protein, total 6.6 6.4 - 8.2 g/dL    Albumin 3.3 (L) 3.4 - 5.0 g/dL    Globulin 3.3 2.0 - 4.0 g/dL    A-G Ratio 1.0 0.8 - 1.7     LIPASE    Collection Time: 12/15/18 11:46 AM   Result Value Ref Range    Lipase 63 (L) 73 - 393 U/L       Radiologic Studies -   XR ABD ACUTE W 1 V CHEST   Final Result   IMPRESSION:   1. Benign bowel gas pattern with no evidence for abnormal bowel dilatation. 2. Bilateral hip prosthesis. Thoracolumbar fusion. Left shoulder prosthesis. Medical Decision Making   I am the first provider for this patient. I reviewed the vital signs, available nursing notes, past medical history, past surgical history, family history and social history. Vital Signs-Reviewed the patient's vital signs.     Pulse Oximetry Analysis -  98% on room air, normal    Records Reviewed: Nursing Notes and Old Medical Records (Time of Review: 11:24 AM)    ED Course: Progress Notes, Reevaluation, and Consults:      Provider Notes (Medical Decision Making): Results reviwed with pt, she is feeling better after IV meds and wants to go  Home, pt will F/U with PCP on Monday as scheduled. Pt and  understand and agree with dispo and F/U plan. Jacob Shelton MD  1:54 PM      Diagnosis     Clinical Impression: No diagnosis found. Disposition: home    Follow-up Information    None             Medication List      ASK your doctor about these medications    calcium carbonate 500 mg calcium (1,250 mg) tablet  Commonly known as:  OS-BARBARA     DRY EYES OP     FERRALET 90 DUAL-IRON DELIVERY 90-1-12-50 mg-mg-mcg-mg Tab  Generic drug:  Iron, Cbn & Gluc-FA-B12-C-DSS     gabapentin 300 mg capsule  Commonly known as:  NEURONTIN  Take 1 Cap by mouth three (3) times daily. HYDROmorphone 2 mg tablet  Commonly known as:  DILAUDID  Take 1 Tab by mouth every four (4) hours as needed. Max Daily Amount: 12 mg.     lisinopril 10 mg tablet  Commonly known as:  PRINIVIL, ZESTRIL  TAKE 1 TABLET DAILY     MIRALAX 17 gram packet  Generic drug:  polyethylene glycol     naloxone 2 mg/actuation Spry  Commonly known as:  NARCAN  Use 1 spray intranasally into 1 nostril. Use a new Narcan nasal spray for subsequent doses and administer into alternating nostrils. May repeat every 2 to 3 minutes as needed. ondansetron hcl 4 mg tablet  Commonly known as:  ZOFRAN  Take 1 Tab by mouth every eight (8) hours as needed for Nausea. promethazine 25 mg tablet  Commonly known as:  PHENERGAN  Take 1 Tab by mouth every six (6) hours as needed for Nausea. traMADol 50 mg tablet  Commonly known as:  ULTRAM  Take 1 Tab by mouth every eight (8) hours as needed.  Max Daily Amount: 150 mg.     TYLENOL EXTRA STRENGTH 500 mg tablet  Generic drug:  acetaminophen     VITAMIN D3 1,000 unit tablet  Generic drug:  cholecalciferol     ZyrTEC 10 mg tablet  Generic drug:  cetirizine          _______________________________       Scribe Nicholas Hand acting as a scribe for and in the presence of Jose Bashir, MD      December 15, 2018 at 11:24 AM       Provider Attestation:      I personally performed the services described in the documentation, reviewed the documentation, as recorded by the scribe in my presence, and it accurately and completely records my words and actions.  December 15, 2018 at 11:24 AM - Ozzie Lane MD      _______________________________

## 2018-12-17 ENCOUNTER — PATIENT OUTREACH (OUTPATIENT)
Dept: FAMILY MEDICINE CLINIC | Age: 68
End: 2018-12-17

## 2018-12-17 ENCOUNTER — OFFICE VISIT (OUTPATIENT)
Dept: ORTHOPEDIC SURGERY | Age: 68
End: 2018-12-17

## 2018-12-17 VITALS
RESPIRATION RATE: 20 BRPM | OXYGEN SATURATION: 99 % | HEART RATE: 76 BPM | SYSTOLIC BLOOD PRESSURE: 128 MMHG | TEMPERATURE: 98 F | DIASTOLIC BLOOD PRESSURE: 76 MMHG

## 2018-12-17 DIAGNOSIS — M54.6 THORACIC SPINE PAIN: ICD-10-CM

## 2018-12-17 DIAGNOSIS — Z98.1 S/P FUSION OF THORACIC SPINE: ICD-10-CM

## 2018-12-17 DIAGNOSIS — M53.2X6 LUMBAR SPINE INSTABILITY: Primary | ICD-10-CM

## 2018-12-17 RX ORDER — AMITRIPTYLINE HYDROCHLORIDE 10 MG/1
TABLET, FILM COATED ORAL
Qty: 90 TAB | Refills: 1 | Status: SHIPPED | OUTPATIENT
Start: 2018-12-17 | End: 2019-01-02 | Stop reason: ALTCHOICE

## 2018-12-17 NOTE — PATIENT INSTRUCTIONS
Lumbar Spinal Fusion: What to Expect at Home  Your Recovery    After surgery, you can expect your back to feel stiff and sore. You may have trouble sitting or standing in one position for very long and may need pain medicine in the weeks after your surgery. It may take 4 to 6 weeks to get back to doing simple activities, such as light housework. It may take 6 months to a year for your back to get better completely. You may need to wear a back brace while your back heals. And your doctor may have you go to physical therapy. If your job doesn't require physical labor, you will probably be able to go back to work after 1 or 2 months. If your job involves light physical labor, it may take 3 to 6 months. Most people whose jobs involved heavy labor can never return to those jobs. This care sheet gives you a general idea about how long it will take for you to recover. But each person recovers at a different pace. Follow the steps below to get better as quickly as possible. How can you care for yourself at home? Activity    · Rest when you feel tired. Getting enough sleep will help you recover.     · Try to walk each day. Start by walking a little more than you did the day before. Bit by bit, increase the amount you walk. Walking boosts blood flow and helps prevent pneumonia and constipation. Walking may also decrease your muscle soreness after surgery.     · If advised by your doctor, you may need to avoid lifting anything that would cause excessive strain on your back.  This may include a child, heavy grocery bags and milk containers, a heavy briefcase or backpack, cat litter or dog food bags, or a vacuum .     · Avoid strenuous activities, such as bicycle riding, jogging, weight lifting, or aerobic exercise, until your doctor says it is okay.     · Do not drive for 2 to 4 weeks after your surgery or until your doctor says it is okay.     · Avoid riding in a car for more than 30 minutes at a time for 2 to 4 weeks after surgery. If you must ride in a car for a longer distance, stop often to walk and stretch your legs.     · Try to change your position about every 30 minutes while sitting or standing. This will help decrease your back pain while you are healing.     · You will probably need to take at least 4 to 6 weeks off from work. It depends on the type of work you do and how you feel.     · You may have sex as soon as you feel able, but avoid positions that put stress on your back or cause pain. Diet    · You can eat your normal diet. If your stomach is upset, try bland, low-fat foods like plain rice, broiled chicken, toast, and yogurt.     · Drink plenty of fluids (unless your doctor tells you not to).     · You may notice that your bowel movements are not regular right after your surgery. This is common. Try to avoid constipation and straining with bowel movements. You may want to take a fiber supplement every day. If you have not had a bowel movement after a couple of days, ask your doctor about taking a mild laxative. Medicines    · Be safe with medicines. Take pain medicines exactly as directed. ? If the doctor gave you a prescription medicine for pain, take it as prescribed. ? If you are not taking a prescription pain medicine, ask your doctor if you can take an over-the-counter medicine.     · If your doctor prescribed antibiotics, take them as directed. Do not stop taking them just because you feel better. You need to take the full course of antibiotics.     · If you think your pain medicine is making you sick to your stomach:  ? Take your medicine after meals (unless your doctor has told you not to). ? Ask your doctor for a different pain medicine. Incision care    · You will be given specific instructions about how to care for the cuts (incisions) the doctor made. The instructions will depend on the type of materials used to close the cut.    Exercise    · Do back exercises as instructed by your doctor.     · Your doctor may advise you to work with a physical therapist to improve the strength and flexibility of your back. Other instructions    · To reduce stiffness and help sore muscles, use a warm water bottle, a heating pad set on low, or a warm cloth on your back. Do not put heat right over the incision. Do not go to sleep with a heating pad on your skin. Follow-up care is a key part of your treatment and safety. Be sure to make and go to all appointments, and call your doctor if you are having problems. It's also a good idea to know your test results and keep a list of the medicines you take. When should you call for help? Call 911 anytime you think you may need emergency care. For example, call if:    · You passed out (lost consciousness).     · You have sudden chest pain and shortness of breath, or you cough up blood.     · You are unable to move a leg at all.   [facility] your doctor now or seek immediate medical care if:    · You have pain that does not get better after you take pain pills.     · You have new or worse symptoms in your legs or buttocks. Symptoms may include:  ? Numbness or tingling. ? Weakness. ? Pain.     · You lose bladder or bowel control.     · You have loose stitches, or your incision comes open.     · You have blood or fluid draining from the incision.     · You have signs of infection, such as:  ? Increased pain, swelling, warmth, or redness. ? Pus draining from the incision. ? A fever.    Watch closely for any changes in your health, and be sure to contact your doctor if:    · You do not have a bowel movement after taking a laxative.     · You are not getting better as expected. Where can you learn more? Go to http://camilo-robert.info/. Enter K940 in the search box to learn more about \"Lumbar Spinal Fusion: What to Expect at Home. \"  Current as of: November 29, 2017  Content Version: 11.8  © 4731-5591 [employer].  Care instructions adapted under license by [employer] (which disclaims liability or warranty for this information). If you have questions about a medical condition or this instruction, always ask your healthcare professional. [address] any warranty or liability for your use of this information. Amitriptyline (By mouth)   Amitriptyline (am-i-TRIP-ti-any)  Treats depression. This medicine is a TCA. Brand Name(s): Elavil   There may be other brand names for this medicine. When This Medicine Should Not Be Used: This medicine is not right for everyone. Do not use if you had an allergic reaction to amitriptyline. How to Use This Medicine:   Tablet  · Take your medicine as directed. Your dose may need to be changed several times to find what works best for you. · This medicine should come with a Medication Guide. Ask your pharmacist for a copy if you do not have one. · Store the medicine in a closed container at room temperature, away from heat, moisture, and direct light. · Missed dose: Take a dose as soon as you remember. If it is almost time for your next dose, wait until then and take a regular dose. Do not take extra medicine to make up for a missed dose. Drugs and Foods to Avoid:   Ask your doctor or pharmacist before using any other medicine, including over-the-counter medicines, vitamins, and herbal products. · Do not use this medicine with cisapride. Do not use this medicine and an MAO inhibitor (MAOI) within 14 days of each other. · Some medicines and foods can affect how amitriptyline works. Tell your doctor if you are using cimetidine, disulfiram, or guanethidine. Tell your doctor if you are using other medicine for depression (such as fluoxetine, paroxetine, sertraline), a phenothiazine medicine (such as promethazine, chlorpromazine), medicine for heart rhythm problems (such as flecainide, propafenone, quinidine), or thyroid medicine.   · Tell your doctor if you use anything else that makes you sleepy. Some examples are allergy medicine, narcotic pain medicine, and alcohol. Warnings While Using This Medicine:   · Tell your doctor if you are pregnant or breastfeeding, or if you have liver disease, heart disease, diabetes, narrow-angle glaucoma, a seizure disorder, a thyroid problem, or trouble urinating. · For some children, teenagers, and young adults, this medicine may increase mental or emotional problems. This may lead to thoughts of suicide and violence. Talk with your doctor right away if you have any thoughts or behavior changes that concern you. Tell your doctor if you or anyone in your family has a history of bipolar disorder or suicide attempts. · This medicine may cause the following problems:   ¨ Heart rhythm problems  ¨ High or low blood sugar levels  · This medicine may make you dizzy or drowsy. Do not drive or do anything else that could be dangerous until you know how this medicine affects you. · Do not stop using this medicine suddenly. Your doctor will need to slowly decrease your dose before you stop it completely. · Keep all medicine out of the reach of children. Never share your medicine with anyone.   Possible Side Effects While Using This Medicine:   Call your doctor right away if you notice any of these side effects:  · Allergic reaction: Itching or hives, swelling in your face or hands, swelling or tingling in your mouth or throat, chest tightness, trouble breathing  · Anxiety, restlessness, seeing or hearing things that are not there  · Chest pain, trouble breathing  · Fast, pounding, or uneven heartbeat  · Feeling more excited or energetic than usual, racing thoughts, trouble sleeping  · Lightheadedness, dizziness, or fainting  · Seizures  · Thoughts of hurting yourself or others, unusual behavior  · Unusual bleeding or bruising  If you notice these less serious side effects, talk with your doctor:   · Blurred vision, dry mouth, fever  · Change in how much or how often you urinate  · Constipation, diarrhea, nausea, vomiting  · Drowsiness, sleepiness  · Sexual problems  If you notice other side effects that you think are caused by this medicine, tell your doctor. Call your doctor for medical advice about side effects. You may report side effects to FDA at 6-115-EWY-5906  © 2017 [facility] Information is for End User's use only and may not be sold, redistributed or otherwise used for commercial purposes. The above information is an  only. It is not intended as medical advice for individual conditions or treatments. Talk to your doctor, nurse or pharmacist before following any medical regimen to see if it is safe and effective for you.

## 2018-12-17 NOTE — PROGRESS NOTES
Hospital Discharge Follow-Up        Patient had a scheduled admission to [facility] on 12/3/18 and discharged on 12/6/18 for lumbar laminectomy. Ms Kaushik Suh was seen in [facility] on 12/15/18 with complaints of abdominal pain. Chest and abdominal Xrays showed no acute processes and the patient was discharged to home. The patient attended an office visit today, 12/17/18, with lashonda Hazel. Per visit note the patient reported overall pain has improved since surgery. Incisions reported to be healing well. Elavil was prescribed for sleep. GI consult was recommended for colitis/diarrhea. Goals Addressed                 This Visit's Progress     Attends follow-up appointments as directed.    On track     Plan:  Monitor for attendance at apts and assist as needed to schedule    12/10/18-attended apt with Dr. Angelic Sagastume  12/10/18-attended apt with lashonda Perez  12/17/18-attended apt with lashonda Hazel

## 2018-12-17 NOTE — PROGRESS NOTES
[address], [address]  [place], [address] [place] [address] Street  Phone: [phone]  Fax: [phone]  PROGRESS NOTE-Post Op  Patient: Juvenal Hernandez                MRN: [id number]       SSN: [id number]  YOB: 1950        AGE: 77 y.o. SEX: female  There is no height or weight on file to calculate BMI. PCP: Tonie Geller MD  12/17/18    Chief Complaint   Patient presents with    Back Pain     Post op       HISTORY OF PRESENT ILLNESS:  She underwent a retroperitoneal, retropleural approach at L1-2 with extreme lateral interbody fusion and T-11 rib resection on the left with placement of a chest tube by Dr. Konstantin Cordova. The patient had an L1-2 XLIF with a PEEK cage and revision of her spinal posterior thoracolumbar fusion, T10, T11 and T12 and L1-2. She also had revision of spinal instrumentation at L2-L5 with removal of transverse connector and placement of pedicle screws T11, T10, T12 and L2 two weeks ago for post-jerrod syndrome and compression fracture. Her pain prior to surgery was primarily thoracic back pain with difficulty standing up straight. She was constantly bent over. She has had a previous L2-5 fusion for LLE pain. Her LLE pain has resolved. She was difficult to manage post-op pain wise in the hospital. She went to the ER two days ago for abdominal pain and diarrhea, they Dx her with GERD. She reports a hx of colitis. She comes in today with myofascial and post-surgical back pain. She reports that she is overall improved since before surgery. She can stand up straight and walk. She is just having a lot of incisional pain and her Colitis is flared. Patient denies any fevers, wound drainage, bladder/bowel dysfunction, new onset weakness, new neuropathic pain, or other neurological deficits. Pt desires to continue with evaluation of back pain and postoperative care. Current Medications: She is on Tramadol and Dilaudid from her PCP.  She is trying to wean off the Gabapentin due to her stomach. She has PRN Flexeril. PMHx: RA, Colitis. Off RA meds x 6 weeks      ASSESSMENT   Diagnoses and all orders for this visit:    1. Lumbar spine instability    2. Thoracic spine pain    3. S/P fusion of thoracic spine    Other orders  -     amitriptyline (ELAVIL) 10 mg tablet; Take 1-3 Tab QHS         IMPRESSION AND PLAN:  This is a pt who had a revision of thoracolumbar fusion surgery 2 weeks ago. She is doing well with the improved ability to stand up straight and ambulate. Her incisions are healing nicely. She is having a flare of her Colitis. I have asked my nurses to call her GI doctor to get her an appointment. 1) Pt was given information on fusion post-operative and wound care. 2) Reviewed activity and to avoid NSAIDs x 3 months  3) Trial of very low dose Elavil  4) Continue to get Pain meds thru PCP  5) See GI for Colitis/ Diarrhea  4) Ms. Lino Rodrigues has a reminder for a \"due or due soon\" health maintenance. I have asked that she contact her primary care provider, Lisa Pryor MD, for follow-up on this health maintenance. 5) We have informed the patient to notify us for immediate appointment if he has any worsening neurogical symptoms or if an emergency situation presents, then call 911  6)  demonstrated consistency with prescribing. 7) Pt will follow-up in 4 wks as scheduled. SUBJECTIVE    Pain Scale: 4/10    Pain Assessment  12/17/2018   Location of Pain Back   Location Modifiers -   Severity of Pain 4   Quality of Pain Aching   Quality of Pain Comment -   Duration of Pain -   Frequency of Pain Constant   Aggravating Factors Straightening;Walking;Standing   Aggravating Factors Comment -   Limiting Behavior -   Relieving Factors Nothing   Relieving Factors Comment -   Result of Injury -   Work-Related Injury -   Type of Injury -           REVIEW OF SYSTEMS  Constitutional: Negative for fever, chills, or weight change.    Respiratory: Negative for cough or shortness of breath. Cardiovascular: Negative for chest pain or palpitations. Gastrointestinal: Negative for incontinence, acid reflux, change in bowel habits, or constipation. Genitourinary: Negative for incontinence, dysuria and flank pain. Musculoskeletal: Positive for back pain. See HPI. Skin: Negative for rash. Neurological:no radiculopathy. See HPI. Endo/Heme/Allergies: Negative. Psychiatric/Behavioral: Negative. PHYSICAL EXAMINATION  Visit Vitals  /76 (BP 1 Location: Left arm, BP Patient Position: Sitting)   Pulse 76   Temp 98 °F (36.7 °C) (Oral)   Resp 20   SpO2 99%         Accompanied by spouse. Constitutional:  Well developed, well nourished, in no acute distress. Psychiatric: Affect and mood are appropriate. Integumentary: No rashes or abrasions noted on exposed areas. Wound: Mid low back and left flank Incisions healing well, no drainage, no erythema, no hernia, no seroma, no swelling, no dehiscence, incision well approximated. Cardiovascular/Peripheral Vascular: +2 radial & pedal pulses. No peripheral edema is noted. Lymphatic:  No evidence of lymphedema. No cervical lymphadenopathy. SPINE/MUSCULOSKELETAL EXAM    Lumbar spine:  No rash, ecchymosis, or gross obliquity. No fasciculations. No focal atrophy is noted. Range of motion is not tested . Tenderness to palpation none. No tenderness to palpation at the sciatic notch. Sensation grossly intact to light touch. Straight leg raise negative      MOTOR:        Biceps  Triceps Deltoids Wrist Ext Wrist Flex Hand Intrin   Right +4/5 +4/5 +4/5 +4/5 +4/5 +4/5   Left +4/5 +4/5 +4/5 +4/5 +4/5 +4/5      Hip Flex Quads Hamstrings Ankle DF EHL Ankle PF   Right +4/5 +4/5 +4/5 +4/5 +4/5 +4/5   Left +4/5 +4/5 +4/5 +4/5 +4/5 +4/5       Forward postured gait    Ambulation Presents in wheelchair, was able to ambulate w/out assistive device. FWB.       PAST MEDICAL HISTORY   Past Medical History: Diagnosis Date    Abdominal abscess 2009    Arthritis     Rheumatoid    Autoimmune disease ([facility] [address]     Medically induced Lupus    Back pain     Chronic pain     lower back    Colitis     Diverticulitis     Failed back syndrome     GERD (gastroesophageal reflux disease)     Hypertension     when on cyclosporins    Ill-defined condition     large torus roof of mouth    Irritable bowel syndrome     Neuropathy     bilateral hands/wrist    Osteoporosis     Pneumonia     RA (rheumatoid arthritis) ([facility] [address]     Seizures ([facility] [address] 6-1-2008    one episode- after high dose of epinepherine       Past Surgical History:   Procedure Laterality Date    HX ABDOMINAL WALL DEFECT REPAIR      HX APPENDECTOMY      HX BACK SURGERY  12/03/2018    HX BREAST REDUCTION      HX CATARACT REMOVAL Bilateral     HX COLONOSCOPY      HX GI      repair rectal prolaspe    HX GYN      dermoid cyst    HX HEENT      tonsillectomy    HX HYSTERECTOMY  1976    HX LUMBAR FUSION      x 2    HX ORTHOPAEDIC Bilateral     CTR    HX ORTHOPAEDIC Bilateral     arthroplasty thumbs    HX ORTHOPAEDIC Left     Ankle     HX ORTHOPAEDIC Right     \"Nerve Release Elbow\"    HX OTHER SURGICAL Left 1985    vein stripping    HX SALPINGO-OOPHORECTOMY Bilateral     HX SHOULDER REPLACEMENT Left     HX UROLOGICAL      bladder repair    TOTAL HIP ARTHROPLASTY Bilateral    .      MEDICATIONS      Current Outpatient Medications   Medication Sig Dispense Refill    amitriptyline (ELAVIL) 10 mg tablet Take 1-3 Tab QHS 90 Tab 1    ondansetron hcl (ZOFRAN) 4 mg tablet Take 1 Tab by mouth every eight (8) hours as needed for Nausea. 15 Tab 0    traMADol (ULTRAM) 50 mg tablet Take 1 Tab by mouth every eight (8) hours as needed. Max Daily Amount: 150 mg. (Patient taking differently: Take 50 mg by mouth every eight (8) hours as needed.  Take 1 to 2 tabs prn every 6hrs) 70 Tab 0    HYDROmorphone (DILAUDID) 2 mg tablet Take 1 Tab by mouth every four (4) hours as needed. Max Daily Amount: 12 mg. (Patient taking differently: Take 2 mg by mouth every four (4) hours as needed. Take 1 to 2 tabs every 4hrs prn) 20 Tab 0    gabapentin (NEURONTIN) 300 mg capsule Take 1 Cap by mouth three (3) times daily. 90 Cap 1    cetirizine (ZYRTEC) 10 mg tablet Take  by mouth.  petrolat,wht/min oil/sod chl (DRY EYES OP) Apply  to eye.  Iron, Cbn & Gluc-FA-B12-C-DSS (FERRALET 90 DUAL-IRON DELIVERY) 90-1-12-50 mg-mg-mcg-mg tab Take  by mouth.  lisinopril (PRINIVIL, ZESTRIL) 10 mg tablet TAKE 1 TABLET DAILY 90 Tab 1    naloxone (NARCAN) 2 mg/actuation spry Use 1 spray intranasally into 1 nostril. Use a new Narcan nasal spray for subsequent doses and administer into alternating nostrils. May repeat every 2 to 3 minutes as needed. 1 Package 0    acetaminophen (TYLENOL EXTRA STRENGTH) 500 mg tablet Take 1,000 mg by mouth every six (6) hours as needed for Pain.  cholecalciferol (VITAMIN D3) 1,000 unit tablet Take 1,000 Units by mouth five (5) days a week.  polyethylene glycol (MIRALAX) 17 gram packet Take 17 g by mouth daily.  calcium carbonate (OS-BARBARA) 500 mg calcium (1,250 mg) tablet Take 1,500 mg by mouth daily.  promethazine (PHENERGAN) 25 mg tablet Take 1 Tab by mouth every six (6) hours as needed for Nausea. 30 Tab 1        ALLERGIES    Allergies   Allergen Reactions    Celebrex [Celecoxib] Swelling    Shellfish Derived Swelling     \"makes me feel puffy\"    Sulfa (Sulfonamide Antibiotics) Hives and Swelling     Lips swelling    Humira [Adalimumab] Other (comments)     Lupus    Iodine Itching    Optiray 320 [Ioversol] Hives and Itching     Pt states when she gets iv contrast she itches a little from hives?     Penicillins Hives          SOCIAL HISTORY    Social History     Socioeconomic History    Marital status:      Spouse name: Not on file    Number of children: Not on file    Years of education: Not on file    Highest education level: Not on file   Social Needs    Financial resource strain: Not on file    Food insecurity - worry: Not on file    Food insecurity - inability: Not on file    Transportation needs - medical: Not on file   Financial [place] needs - non-medical: Not on file   Occupational History    Not on file   Tobacco Use    Smoking status: Never Smoker    Smokeless tobacco: Never Used   Substance and Sexual Activity    Alcohol use: No    Drug use: No    Sexual activity: No   Other Topics Concern     Service Not Asked    Blood Transfusions Not Asked    Caffeine Concern Not Asked    Occupational Exposure Not Asked    Hobby Hazards Not Asked    Sleep Concern Not Asked    Stress Concern Not Asked    Weight Concern Not Asked    Special Diet Not Asked    Back Care Not Asked    Exercise Not Asked    Bike Helmet Not Asked    Seat Belt Not Asked    Self-Exams Not Asked   Social History Narrative    Not on file       FAMILY HISTORY    Family History   Problem Relation Age of Onset    Other Other         Orthopedic (Sister)    Arthritis-osteo Sister     Cancer Neg Hx     Diabetes Neg Hx     Heart Disease Neg Hx     Heart Attack Neg Hx     Hypertension Neg Hx     Stroke Neg Hx     Malignant Hyperthermia Neg Hx     Pseudocholinesterase Deficiency Neg Hx     Delayed Awakening Neg Hx     Post-op Nausea/Vomiting Neg Hx     Emergence Delirium Neg Hx     Post-op Cognitive Dysfunction Neg Hx          Luretha Shadow, NP

## 2018-12-18 ENCOUNTER — OFFICE VISIT (OUTPATIENT)
Dept: FAMILY MEDICINE CLINIC | Age: 68
End: 2018-12-18

## 2018-12-18 VITALS
TEMPERATURE: 98.8 F | SYSTOLIC BLOOD PRESSURE: 98 MMHG | DIASTOLIC BLOOD PRESSURE: 60 MMHG | HEART RATE: 91 BPM | OXYGEN SATURATION: 95 %

## 2018-12-18 DIAGNOSIS — M54.6 ACUTE BILATERAL THORACIC BACK PAIN: Primary | ICD-10-CM

## 2018-12-18 RX ORDER — HYDROCODONE BITARTRATE AND ACETAMINOPHEN 5; 325 MG/1; MG/1
1 TABLET ORAL
Qty: 40 TAB | Refills: 0 | Status: SHIPPED | OUTPATIENT
Start: 2018-12-18 | End: 2019-01-02 | Stop reason: SDUPTHER

## 2018-12-18 NOTE — PROGRESS NOTES
Patient here for follow up on post surgical pain and diarrhea. She is asking for refills on her Dilaudid she states she is unable to take this along with the Tramadol due to abdominal cramping and diarrhea. 1. Have you been to the ER, urgent care clinic since your last visit? Hospitalized since your last visit? Yes When: 12/15/2018 Where:  ER Reason for visit: post surgical back pain and andominal pain  2. Have you seen or consulted any other health care providers outside of the [address] [place] [address] since your last visit? Include any pap smears or colon screening.  No

## 2018-12-18 NOTE — PROGRESS NOTES
Casimiro Blanca is a 77 y.o. female  presents for follow up. She can not tolerate tramadol and dalaudid. She is in pain but less than before. She is up ambulating. No chest pains or SOB. No new weakness or numbness. HPI:   Patient has back pain after surgery , primarily affecting the back. Pain control:           Current : borderline controlled   6/10           Worst pain over last week        9/10           Least pain over the last week   5/10  Activities of Daily Living:            stable            Are you able to do your normal daily activities?   intermittent or no  Patient Goals            Functional:  yes            Pain: less  Adverse side effects:             Since starting your pain medicine are you experiencing any of the following?              ( Examples: Constipation, fatigue, lapses in memory, depression or sexual                        Dysfunction)   No   Is a Pain management Contract in the patient's chart? no  Aberrant behaviors:              none(Early refill requests, lost prescriptions, lost medicine)  Pill count:             Is it consistent with patient's prescription? {Yes/No:[id number]:L: \"yes\"  Last urine drug screen:     [facility] Prescription Monitoring Program check performed:  yes        Treatment Care Team:  Patient Care Team:  Rob Cabrera MD as PCP - General (Family Practice)  Nancy Anderson RN as Ambulatory Care Navigator  Follow up contact scheduled for 1-4 weeks: yes      Allergies   Allergen Reactions    Celebrex [Celecoxib] Swelling    Shellfish Derived Swelling     \"makes me feel puffy\"    Sulfa (Sulfonamide Antibiotics) Hives and Swelling     Lips swelling    Humira [Adalimumab] Other (comments)     Lupus    Iodine Itching    Optiray 320 [Ioversol] Hives and Itching     Pt states when she gets iv contrast she itches a little from hives?     Penicillins Hives     Outpatient Medications Marked as Taking for the 12/18/18 encounter (Office Visit) with Rob Cabrera MD   Medication Sig Dispense Refill    amitriptyline (ELAVIL) 10 mg tablet Take 1-3 Tab QHS 90 Tab 1    HYDROmorphone (DILAUDID) 2 mg tablet Take 1 Tab by mouth every four (4) hours as needed. Max Daily Amount: 12 mg. (Patient taking differently: Take 2 mg by mouth every four (4) hours as needed. Take 1 to 2 tabs every 4hrs prn) 20 Tab 0    gabapentin (NEURONTIN) 300 mg capsule Take 1 Cap by mouth three (3) times daily. 90 Cap 1    cetirizine (ZYRTEC) 10 mg tablet Take  by mouth.  petrolat,wht/min oil/sod chl (DRY EYES OP) Apply  to eye.  Iron, Cbn & Gluc-FA-B12-C-DSS (FERRALET 90 DUAL-IRON DELIVERY) 90-1-12-50 mg-mg-mcg-mg tab Take  by mouth.  promethazine (PHENERGAN) 25 mg tablet Take 1 Tab by mouth every six (6) hours as needed for Nausea. 30 Tab 1    lisinopril (PRINIVIL, ZESTRIL) 10 mg tablet TAKE 1 TABLET DAILY 90 Tab 1    acetaminophen (TYLENOL EXTRA STRENGTH) 500 mg tablet Take 1,000 mg by mouth every six (6) hours as needed for Pain.  cholecalciferol (VITAMIN D3) 1,000 unit tablet Take 1,000 Units by mouth five (5) days a week.  polyethylene glycol (MIRALAX) 17 gram packet Take 17 g by mouth daily.  calcium carbonate (OS-BARBARA) 500 mg calcium (1,250 mg) tablet Take 1,500 mg by mouth daily.        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) M06.9    Sicca syndrome ([facility]) M35.00    Osteoarthritis of right hip M16.11    Pain management contract agreement Z02.89    ACP (advance care planning) Z71.89    Chronic left shoulder pain M25.512, G89.29    Osteopenia M85.80    Osteopenia of multiple sites M85.89    Osteoarthritis of left hip M16.12    Closed fracture of thoracic spine without spinal cord lesion (HCC) S22.009A    Spinal stenosis, thoracolumbar region M48.05    Lumbar spine instability M53.2X6    Spinal stenosis of lumbar region with neurogenic claudication M48.062    Spinal stenosis of lumbar region M48.061    Lumbar stenosis M48.061     Past Medical History:   Diagnosis Date    Abdominal abscess 2009    Arthritis     Rheumatoid    Autoimmune disease ([facility] [address]     Medically induced Lupus    Back pain     Chronic pain     lower back    Colitis     Diverticulitis     Failed back syndrome     GERD (gastroesophageal reflux disease)     Hypertension     when on cyclosporins    Ill-defined condition     large torus roof of mouth    Irritable bowel syndrome     Neuropathy     bilateral hands/wrist    Osteoporosis     Pneumonia     RA (rheumatoid arthritis) ([facility] [address]     Seizures ([facility] [address] 6-1-2008    one episode- after high dose of epinepherine     Social History     Socioeconomic History    Marital status:      Spouse name: Not on file    Number of children: Not on file    Years of education: Not on file    Highest education level: Not on file   Tobacco Use    Smoking status: Never Smoker    Smokeless tobacco: Never Used   Substance and Sexual Activity    Alcohol use: No    Drug use: No    Sexual activity: No   Other Topics Concern     Family History   Problem Relation Age of Onset    Other Other         Orthopedic (Sister)    Arthritis-osteo Sister     Cancer Neg Hx     Diabetes Neg Hx     Heart Disease Neg Hx     Heart Attack Neg Hx     Hypertension Neg Hx     Stroke Neg Hx     Malignant Hyperthermia Neg Hx     Pseudocholinesterase Deficiency Neg Hx     Delayed Awakening Neg Hx     Post-op Nausea/Vomiting Neg Hx     Emergence Delirium Neg Hx     Post-op Cognitive Dysfunction Neg Hx         Review of Systems   Constitutional: Negative for chills, fever, malaise/fatigue and weight loss. Eyes: Negative for blurred vision. Respiratory: Negative for shortness of breath and wheezing. Cardiovascular: Negative for chest pain. Gastrointestinal: Negative for nausea and vomiting. Musculoskeletal: Positive for back pain. Negative for myalgias. Skin: Negative for rash. Neurological: Negative for weakness. Vitals:    12/18/18 0809   BP: 98/60   Pulse: 91   Temp: 98.8 °F (37.1 °C)   TempSrc: Oral   SpO2: 95%   PainSc:   5   PainLoc: Back       Physical Exam   Constitutional: She is oriented to person, place, and time and well-developed, well-nourished, and in no distress. HENT:   Nose: Nose normal.   Mouth/Throat: Oropharynx is clear and moist.   Neck: Normal range of motion. Neck supple. Cardiovascular: Normal rate, regular rhythm and normal heart sounds. Pulmonary/Chest: Effort normal and breath sounds normal.   Musculoskeletal: Normal range of motion. Neurological: She is alert and oriented to person, place, and time. Skin: Skin is warm and dry. Psychiatric: Mood, memory, affect and judgment normal.   Nursing note and vitals reviewed. Assessment/Plan      ICD-10-CM ICD-9-CM    1. Acute bilateral thoracic back pain M54.6 724.1 HYDROcodone-acetaminophen (NORCO) 5-325 mg per tablet     I have discussed the diagnosis with the patient and the intended plan of care as seen in the above orders. The patient has received an after-visit summary and questions were answered concerning future plans. I have discussed medication, side effects, and warnings with the patient in detail. The patient verbalized understanding and is in agreement with the plan of care. The patient will contact the office with any additional concerns.  reviewed and no problems.       Follow-up Disposition:  Return if symptoms worsen or fail to improve.  lab results and schedule of future lab studies reviewed with patient    Jalen Davis MD

## 2018-12-26 ENCOUNTER — OFFICE VISIT (OUTPATIENT)
Dept: FAMILY MEDICINE CLINIC | Age: 68
End: 2018-12-26

## 2018-12-26 VITALS
RESPIRATION RATE: 14 BRPM | TEMPERATURE: 98.3 F | DIASTOLIC BLOOD PRESSURE: 70 MMHG | OXYGEN SATURATION: 96 % | BODY MASS INDEX: 27.46 KG/M2 | HEIGHT: 63 IN | WEIGHT: 155 LBS | SYSTOLIC BLOOD PRESSURE: 130 MMHG | HEART RATE: 74 BPM

## 2018-12-26 DIAGNOSIS — M51.36 DDD (DEGENERATIVE DISC DISEASE), LUMBAR: ICD-10-CM

## 2018-12-26 DIAGNOSIS — Z98.890 STATUS POST LUMBAR SURGERY: Primary | ICD-10-CM

## 2018-12-26 RX ORDER — BUPROPION HYDROCHLORIDE 100 MG/1
100 TABLET, EXTENDED RELEASE ORAL 2 TIMES DAILY
Qty: 14 TAB | Refills: 1 | Status: SHIPPED | OUTPATIENT
Start: 2018-12-26 | End: 2019-01-10 | Stop reason: SDUPTHER

## 2018-12-26 NOTE — PATIENT INSTRUCTIONS

## 2018-12-26 NOTE — PROGRESS NOTES
Brian Mayer is a 77 y.o. female  presents for follow up. States pain is much better but still requiring Norco but not as much. Has appt with GI today. Allergies   Allergen Reactions    Celebrex [Celecoxib] Swelling    Shellfish Derived Swelling     \"makes me feel puffy\"    Sulfa (Sulfonamide Antibiotics) Hives and Swelling     Lips swelling    Humira [Adalimumab] Other (comments)     Lupus    Iodine Itching    Optiray 320 [Ioversol] Hives and Itching     Pt states when she gets iv contrast she itches a little from hives?  Penicillins Hives     Outpatient Medications Marked as Taking for the 12/26/18 encounter (Office Visit) with Tony Irving MD   Medication Sig Dispense Refill    HYDROcodone-acetaminophen (NORCO) 5-325 mg per tablet Take 1 Tab by mouth every six (6) hours as needed for Pain. Max Daily Amount: 4 Tabs. 40 Tab 0    amitriptyline (ELAVIL) 10 mg tablet Take 1-3 Tab QHS 90 Tab 1    petrolat,wht/min oil/sod chl (DRY EYES OP) Apply  to eye.  acetaminophen (TYLENOL EXTRA STRENGTH) 500 mg tablet Take 1,000 mg by mouth every six (6) hours as needed for Pain.  cholecalciferol (VITAMIN D3) 1,000 unit tablet Take 1,000 Units by mouth five (5) days a week.  polyethylene glycol (MIRALAX) 17 gram packet Take 17 g by mouth daily.  calcium carbonate (OS-BARBARA) 500 mg calcium (1,250 mg) tablet Take 1,500 mg by mouth daily.        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    H/O calcium pyrophosphate deposition disease (CPPD) Z87.39    IBS (irritable bowel syndrome) K58.9    RA (rheumatoid arthritis) ([facility] [address] M06.9    Sicca syndrome (HCC) M35.00    Osteoarthritis of right hip M16.11    Pain management contract agreement Z02.89    ACP (advance care planning) Z71.89    Chronic left shoulder pain M25.512, G89.29    Osteopenia M85.80    Osteopenia of multiple sites M85.89    Osteoarthritis of left hip M16.12    Closed fracture of thoracic spine without spinal cord lesion ([facility]) S22.009A    Spinal stenosis, thoracolumbar region M48.05    Lumbar spine instability M53.2X6    Spinal stenosis of lumbar region with neurogenic claudication M48.062    Spinal stenosis of lumbar region M48.061    Lumbar stenosis M48.061     Past Medical History:   Diagnosis Date    Abdominal abscess 2009    Arthritis     Rheumatoid    Autoimmune disease ([facility] [address]     Medically induced Lupus    Back pain     Chronic pain     lower back    Colitis     Diverticulitis     Failed back syndrome     GERD (gastroesophageal reflux disease)     Hypertension     when on cyclosporins    Ill-defined condition     large torus roof of mouth    Irritable bowel syndrome     Neuropathy     bilateral hands/wrist    Osteoporosis     Pneumonia     RA (rheumatoid arthritis) ([facility])     Seizures ([facility] 6-1-2008    one episode- after high dose of epinepherine     Social History     Socioeconomic History    Marital status:      Spouse name: Not on file    Number of children: Not on file    Years of education: Not on file    Highest education level: Not on file   Tobacco Use    Smoking status: Never Smoker    Smokeless tobacco: Never Used   Substance and Sexual Activity    Alcohol use: No    Drug use: No    Sexual activity: No   Other Topics Concern     Family History   Problem Relation Age of Onset    Other Other         Orthopedic (Sister)    Arthritis-osteo Sister     Cancer Neg Hx     Diabetes Neg Hx     Heart Disease Neg Hx     Heart Attack Neg Hx     Hypertension Neg Hx     Stroke Neg Hx     Malignant Hyperthermia Neg Hx     Pseudocholinesterase Deficiency Neg Hx     Delayed Awakening Neg Hx     Post-op Nausea/Vomiting Neg Hx     Emergence Delirium Neg Hx     Post-op Cognitive Dysfunction Neg Hx         Review of Systems   Constitutional: Negative for chills, fever, malaise/fatigue and weight loss. Eyes: Negative for blurred vision. Respiratory: Negative for shortness of breath and wheezing. Cardiovascular: Negative for chest pain. Gastrointestinal: Positive for abdominal pain. Negative for constipation, nausea and vomiting. Musculoskeletal: Positive for back pain. Negative for myalgias. Skin: Negative for rash. Neurological: Negative for weakness. Psychiatric/Behavioral: Negative. Vitals:    12/26/18 0955   BP: 130/70   Pulse: 74   Resp: 14   Temp: 98.3 °F (36.8 °C)   TempSrc: Oral   SpO2: 96%   Weight: 155 lb (70.3 kg)   Height: 5' 3\" (1.6 m)   PainSc:   6   PainLoc: Back       Physical Exam   Constitutional: She is oriented to person, place, and time and well-developed, well-nourished, and in no distress. HENT:   Nose: Nose normal.   Mouth/Throat: Oropharynx is clear and moist.   Neck: Normal range of motion. Neck supple. Cardiovascular: Normal rate, regular rhythm and normal heart sounds. Pulmonary/Chest: Effort normal and breath sounds normal.   Abdominal: Soft. Bowel sounds are normal.   Musculoskeletal: Normal range of motion. Neurological: She is alert and oriented to person, place, and time. Gait normal.   Skin: Skin is warm and dry. Psychiatric: Mood, memory, affect and judgment normal.   Nursing note and vitals reviewed. Assessment/Plan      ICD-10-CM ICD-9-CM    1. Status post lumbar surgery Z98.890 V45.89    2. DDD (degenerative disc disease), lumbar M51.36 722.52      Patient is stable and improving. I have discussed the diagnosis with the patient and the intended plan of care as seen in the above orders. The patient has received an after-visit summary and questions were answered concerning future plans. I have discussed medication, side effects, and warnings with the patient in detail.  The patient verbalized understanding and is in agreement with the plan of care. The patient will contact the office with any additional concerns. Follow-up Disposition:  Return in about 1 month (around 1/26/2019).   lab results and schedule of future lab studies reviewed with patient    Teja Viramontes MD

## 2018-12-26 NOTE — PROGRESS NOTES
[facility] 77 y.o. female being seen for   Chief Complaint   Patient presents with    Back Pain    GI Problem     Gi upset since Dec 3rd   Had labs done on the 18th   Discuss medication   Back pain 6 on a 1-10 scale       1. Have you been to the ER, urgent care clinic since your last visit? Hospitalized since your last visit? No    2. Have you seen or consulted any other health care providers outside of the [address] [place] [address] since your last visit? Include any pap smears or colon screening.  No    Pharmacy has been verified  Care team has been verified/updated

## 2018-12-27 ENCOUNTER — PATIENT OUTREACH (OUTPATIENT)
Dept: FAMILY MEDICINE CLINIC | Age: 68
End: 2018-12-27

## 2018-12-27 ENCOUNTER — TELEPHONE (OUTPATIENT)
Dept: ORTHOPEDIC SURGERY | Age: 68
End: 2018-12-27

## 2018-12-27 NOTE — TELEPHONE ENCOUNTER
She was just seen by her PCP yesterday. We cannot give her any pain medication as her PCP is doing all of her pain management. If he is out of town, then he should have someone that covers for him.

## 2018-12-27 NOTE — PROGRESS NOTES
Hospital Discharge Follow-Up        Patient had a scheduled admission to [facility] on 12/3/18 and discharged on 12/6/18 for lumbar laminectomy. The patient attended an appointment with Dr. Kamila Bullock on 12/26/18 and per visit note is improving and doing well. Buproprion was added to medications. Goals Addressed                 This Visit's Progress     Attends follow-up appointments as directed.         Plan:  Monitor for attendance at apts and assist as needed to schedule    12/10/18, 12/26/18-attended apt with Dr. Kamila Bullock  12/10/18-attended apt with lashonda Boswell  12/17/18-attended apt with lashonda Hazel

## 2018-12-27 NOTE — TELEPHONE ENCOUNTER
Left voice message for patient emergency contact informing of NP [place] message below. Any further questions or concerns please call our office. No further action needed at this time.

## 2018-12-27 NOTE — TELEPHONE ENCOUNTER
Patient emergency contact Stefania Ferrera called in states that he is requesting a call back from [facility] or [facility]. He stated that her PCP is out of town and the patient is going to need a refill on her HYDROcodone-acetaminophen (Taina Burns) 5-325 mg per tablet     Please contact Stefania Ferrera at [phone].

## 2018-12-28 ENCOUNTER — TELEPHONE (OUTPATIENT)
Dept: FAMILY MEDICINE CLINIC | Age: 68
End: 2018-12-28

## 2018-12-28 ENCOUNTER — OFFICE VISIT (OUTPATIENT)
Dept: FAMILY MEDICINE CLINIC | Age: 68
End: 2018-12-28

## 2018-12-28 VITALS
SYSTOLIC BLOOD PRESSURE: 110 MMHG | WEIGHT: 161.8 LBS | BODY MASS INDEX: 28.67 KG/M2 | HEIGHT: 63 IN | RESPIRATION RATE: 12 BRPM | DIASTOLIC BLOOD PRESSURE: 76 MMHG | OXYGEN SATURATION: 98 % | HEART RATE: 77 BPM | TEMPERATURE: 98.8 F

## 2018-12-28 DIAGNOSIS — R07.9 CHEST PAIN, UNSPECIFIED TYPE: Primary | ICD-10-CM

## 2018-12-28 NOTE — PROGRESS NOTES
Patient here for ER follow up at [facility] for chest pain she recently had back surgery on 12/2/18. There was concern she may have had a blood clot but CT coul;d not be done. .1. Have you been to the ER, urgent care clinic since your last visit? Hospitalized since your last visit? Yes When: 12/27/18 Where: [facility] ER Reason for visit: chest pain, SOB  2. Have you seen or consulted any other health care providers outside of the [address] [place] [address] since your last visit? Include any pap smears or colon screening. Yes When: 12/28/18 Where: Dr. Issac Davis Reason for visit: follow up RA    Medication reconciliation has been completed with patient. Health Maintenance reviewed - Will discuss with Dr. Mague Chávez at her next visit.

## 2018-12-28 NOTE — TELEPHONE ENCOUNTER
Mr. Sherie Isabel called the office this morning stating patient was seen at [facility] ER on yesterday for chest pain and told to follow up with PCP today. He was made aware Dr. Rima Tineo is not here today but we can get her in with Dr. Willis Cunningham, Mr. Sherie Isabel stated she will need refills on her [place] and was told Dr. Willis Cunningham does not prescribe this type of medication but may be able to give something else he then asked if there was another provider that would prescribe the Truddie Nando was told Dr. Willis Cunningham is the only provider here he then asked if there was another [facility] clinic that they could be referred to he was given the number to [facility] and was advised the providers there may not prescribe the medication but he would have to call, he said this location would be more convenient for them today as patient has an appointment with Dr. Giselle Heath in [place]. Told to call the office back he needs to and we will see what we can do for her. He has expressed understanding.

## 2018-12-28 NOTE — TELEPHONE ENCOUNTER
Mr. Wandy Quan was transferred to the office by the [facility] who states patient can not be scheduled in [place] for her ER follow up, she has been put on Dr. John Kenny schedule.

## 2018-12-28 NOTE — PROGRESS NOTES
Nupur Kolb is a 77 y.o. female who was seen in clinic today (12/28/2018). Assessment & Plan:       ICD-10-CM ICD-9-CM    1. Chest pain, unspecified type R07.9 786.50      Unclear etiology  Resolved    Encouraged to return to [facility] for continuity if symptoms return    Scheduled to see Dr. Denice Johnson next week          Subjective:   Nupur Kolb was seen today for Follow-up (chest pain)  here with her     [facility] emergency room yesterday for complaints of acute onset right-sided chest pain starting that morning. Associated with shortness of breath and nausea. Post lumbar spinal fusion 12/3/2018 by Dr. Dayanna Mejia - (tells me she's doing \"very well\" ambulating)    ER labs:  CBC, chemistries, troponin, lipase unremarkable  Chest x-ray: Reticular scar/atelectasis of the right lung base. Otherwise no acute radiographic findings  LE Dopplers unremarkable  EKG normal    The physicians notes indicate the patient refused CTA/CT    Final diagnoses:  Right-sided chest pain  Dyspnea    Patient had reported she was feeling better. Today notes she feels tired and has \"ongoing GI issues\"   No further chest pain or dyspnea       Outpatient Medications Marked as Taking for the 12/28/18 encounter (Office Visit) with Celia Vieira MD   Medication Sig Dispense Refill    buPROPion SR [facility] SR) 100 mg SR tablet Take 1 Tab by mouth two (2) times a day. 14 Tab 1    HYDROcodone-acetaminophen (NORCO) 5-325 mg per tablet Take 1 Tab by mouth every six (6) hours as needed for Pain. Max Daily Amount: 4 Tabs.  36 Tab 0       Patient Active Problem List    Diagnosis    Lumbar stenosis    Spinal stenosis of lumbar region    Spinal stenosis, thoracolumbar region    Lumbar spine instability    Spinal stenosis of lumbar region with neurogenic claudication    Closed fracture of thoracic spine without spinal cord lesion (HCC)    Osteoarthritis of left hip    Osteopenia of multiple sites    Osteopenia    Chronic left shoulder pain    ACP (advance care planning)    Pain management contract agreement    H/O calcium pyrophosphate deposition disease (CPPD)    IBS (irritable bowel syndrome)    RA (rheumatoid arthritis) (HCC)    Sicca syndrome (HCC)    Osteoarthritis of right hip    Fibrosis of left subtalar joint    Preop cardiovascular exam    DDD (degenerative disc disease), lumbar    Spondylolisthesis of lumbar region    Status post lumbar surgery         Allergies   Allergen Reactions    Celebrex [Celecoxib] Swelling    Shellfish Derived Swelling     \"makes me feel puffy\"    Sulfa (Sulfonamide Antibiotics) Hives and Swelling     Lips swelling    Humira [Adalimumab] Other (comments)     Lupus    Iodine Itching    Optiray 320 [Ioversol] Hives and Itching     Pt states when she gets iv contrast she itches a little from hives?  Penicillins Hives         Patient Care Team:  Shawanda Tony MD as PCP - General (Family Practice)  Jo Doss RN as Ambulatory Care Navigator    The following sections were reviewed & updated as appropriate: PMH, PSH, FH, and SH. Review of Systems   Constitutional: Negative for fever. Respiratory: Negative for cough. Cardiovascular: Negative for chest pain and leg swelling ( or pain ). Objective:     Visit Vitals  /76   Pulse 77   Temp 98.8 °F (37.1 °C) (Oral)   Resp 12   Ht 5' 3\" (1.6 m)   Wt 161 lb 12.8 oz (73.4 kg)   SpO2 98%   BMI 28.66 kg/m²      Physical Exam   Constitutional: She is oriented to person, place, and time. She appears well-developed. No distress. Pleasant overweight elderly white female   HENT:   Head: Normocephalic and atraumatic. Cardiovascular: Normal rate, regular rhythm and normal heart sounds. Exam reveals no gallop and no friction rub. No murmur heard. Pulmonary/Chest: Effort normal and breath sounds normal. No respiratory distress. She has no wheezes. She has no rhonchi. She has no rales. Musculoskeletal: She exhibits no edema. LE's soft, non tender. Negative Homans sign   Neurological: She is alert and oriented to person, place, and time. Skin: Skin is warm and dry. She is not diaphoretic. No cyanosis. Nails show no clubbing. Psychiatric: She has a normal mood and affect. Her behavior is normal. Judgment and thought content normal.         Disclaimer: The patient understands our medical plan. Alternatives have been explained and offered. The risks, benefits and significant side effects of all medications have been reviewed. Anticipated time course and progression of condition reviewed. All questions have been addressed. She is encouraged to employ the information provided in the after visit summary, which was reviewed. Where appropriate, she is instructed to call the clinic if she has not been notified either by phone or through [address] with the results of her tests or with an appointment plan for any referrals within 1 week(s). No news is not good news; it's no news. The patient  is to call if her condition worsens or fails to improve or if significant side effects are experienced. Aspects of this note may have been generated using voice recognition software. Despite editing, there may be unrecognized errors.        Beatris Conrad MD

## 2019-01-02 ENCOUNTER — OFFICE VISIT (OUTPATIENT)
Dept: FAMILY MEDICINE CLINIC | Age: 69
End: 2019-01-02

## 2019-01-02 VITALS
SYSTOLIC BLOOD PRESSURE: 110 MMHG | RESPIRATION RATE: 12 BRPM | WEIGHT: 162 LBS | TEMPERATURE: 98 F | HEART RATE: 75 BPM | DIASTOLIC BLOOD PRESSURE: 80 MMHG | HEIGHT: 63 IN | OXYGEN SATURATION: 96 % | BODY MASS INDEX: 28.7 KG/M2

## 2019-01-02 DIAGNOSIS — M54.6 ACUTE BILATERAL THORACIC BACK PAIN: ICD-10-CM

## 2019-01-02 DIAGNOSIS — M48.062 SPINAL STENOSIS OF LUMBAR REGION WITH NEUROGENIC CLAUDICATION: ICD-10-CM

## 2019-01-02 RX ORDER — HYDROCODONE BITARTRATE AND ACETAMINOPHEN 5; 325 MG/1; MG/1
1 TABLET ORAL
Qty: 40 TAB | Refills: 0 | Status: SHIPPED | OUTPATIENT
Start: 2019-01-02 | End: 2019-01-14 | Stop reason: SDUPTHER

## 2019-01-02 RX ORDER — ONDANSETRON 4 MG/1
4 TABLET, FILM COATED ORAL
Qty: 15 TAB | Refills: 0 | Status: SHIPPED | OUTPATIENT
Start: 2019-01-02 | End: 2019-01-10 | Stop reason: SDUPTHER

## 2019-01-02 NOTE — PROGRESS NOTES
Krystin Pacheco is a 77 y.o. female  presents for follow up after having back surgery. She was seen in ER for chest pain since last visit. No chest pain today. No SOB weakness or numbness. She needs refills of pain meds. HPI:   Patient has back pain after surgery , primarily affecting the back. Pain control:           Current : improved   6/10           Worst pain over last week        8/10           Least pain over the last week   5/10  Activities of Daily Living:            well controlled, improved            Are you able to do your normal daily activities?   intermittent  Patient Goals            Functional:  yes            Pain: no pain  Adverse side effects:             Since starting your pain medicine are you experiencing any of the following?              ( Examples: Constipation, fatigue, lapses in memory, depression or sexual                        Dysfunction)   No   Is a Pain management Contract in the patient's chart? no  Aberrant behaviors:              none(Early refill requests, lost prescriptions, lost medicine)  Pill count:             Is it consistent with patient's prescription? {Yes/No:[id number]:L: \"yes\"  Last urine drug screen:     [employer] Prescription Monitoring Program check performed:  yes        Treatment Care Team:  Patient Care Team:  Dm Kim MD as PCP - General (Family Practice)  Fox Cardenas RN as Ambulatory Care Navigator  Follow up contact scheduled for 1-4 weeks: yes      Allergies   Allergen Reactions    Celebrex [Celecoxib] Swelling    Shellfish Derived Swelling     \"makes me feel puffy\"    Sulfa (Sulfonamide Antibiotics) Hives and Swelling     Lips swelling    Humira [Adalimumab] Other (comments)     Lupus    Iodine Itching    Optiray 320 [Ioversol] Hives and Itching     Pt states when she gets iv contrast she itches a little from hives?     Penicillins Hives     Outpatient Medications Marked as Taking for the 1/2/19 encounter (Office Visit) with Bridgette García Torri Sanchez MD   Medication Sig Dispense Refill    HYDROcodone-acetaminophen (NORCO) 5-325 mg per tablet Take 1 Tab by mouth every six (6) hours as needed for Pain. Max Daily Amount: 4 Tabs. 40 Tab 0    ondansetron hcl (ZOFRAN) 4 mg tablet Take 1 Tab by mouth every eight (8) hours as needed for Nausea. 15 Tab 0    buPROPion SR (WELLBUTRIN SR) 100 mg SR tablet Take 1 Tab by mouth two (2) times a day. 14 Tab 1    petrolat,wht/min oil/sod chl (DRY EYES OP) Apply  to eye.  Iron, Cbn & Gluc-FA-B12-C-DSS (FERRALET 90 DUAL-IRON DELIVERY) 90-1-12-50 mg-mg-mcg-mg tab Take  by mouth.  cholecalciferol (VITAMIN D3) 1,000 unit tablet Take 1,000 Units by mouth five (5) days a week.  polyethylene glycol (MIRALAX) 17 gram packet Take 17 g by mouth daily.  calcium carbonate (OS-BARBARA) 500 mg calcium (1,250 mg) tablet Take 1,500 mg by mouth daily.        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    H/O calcium pyrophosphate deposition disease (CPPD) Z87.39    IBS (irritable bowel syndrome) K58.9    RA (rheumatoid arthritis) ([facility] [address] M06.9    Sicca syndrome (HCC) M35.00    Osteoarthritis of right hip M16.11    Pain management contract agreement Z02.89    ACP (advance care planning) Z71.89    Chronic left shoulder pain M25.512, G89.29    Osteopenia M85.80    Osteopenia of multiple sites M85.89    Osteoarthritis of left hip M16.12    Closed fracture of thoracic spine without spinal cord lesion ([facility] [address] S22.009A    Spinal stenosis, thoracolumbar region M48.05    Lumbar spine instability M53.2X6    Spinal stenosis of lumbar region with neurogenic claudication M48.062    Spinal stenosis of lumbar region M48.061    Lumbar stenosis M48.061     Past Medical History:   Diagnosis Date    Abdominal abscess 2009    Arthritis     Rheumatoid    Autoimmune disease ([facility] [address]     Medically induced Lupus    Back pain     Chronic pain     lower back    Colitis     Diverticulitis     Failed back syndrome     GERD (gastroesophageal reflux disease)     Hypertension     when on cyclosporins    Ill-defined condition     large torus roof of mouth    Irritable bowel syndrome     Neuropathy     bilateral hands/wrist    Osteoporosis     Pneumonia     RA (rheumatoid arthritis) ([facility] [address]     Seizures ([facility] [address] 6-1-2008    one episode- after high dose of epinepherine     Social History     Socioeconomic History    Marital status:      Spouse name: Not on file    Number of children: Not on file    Years of education: Not on file    Highest education level: Not on file   Tobacco Use    Smoking status: Never Smoker    Smokeless tobacco: Never Used   Substance and Sexual Activity    Alcohol use: No    Drug use: No    Sexual activity: No   Other Topics Concern     Family History   Problem Relation Age of Onset    Other Other         Orthopedic (Sister)    Arthritis-osteo Sister     Cancer Neg Hx     Diabetes Neg Hx     Heart Disease Neg Hx     Heart Attack Neg Hx     Hypertension Neg Hx     Stroke Neg Hx     Malignant Hyperthermia Neg Hx     Pseudocholinesterase Deficiency Neg Hx     Delayed Awakening Neg Hx     Post-op Nausea/Vomiting Neg Hx     Emergence Delirium Neg Hx     Post-op Cognitive Dysfunction Neg Hx         Review of Systems   Constitutional: Negative for chills, fever, malaise/fatigue and weight loss. Eyes: Negative for blurred vision. Respiratory: Negative for shortness of breath and wheezing. Cardiovascular: Negative for chest pain. Gastrointestinal: Negative for nausea and vomiting. Musculoskeletal: Negative for myalgias. Skin: Negative for rash. Neurological: Negative for weakness. Psychiatric/Behavioral: Negative.         Vitals:    01/02/19 1139   BP: 110/80 Pulse: 75   Resp: 12   Temp: 98 °F (36.7 °C)   SpO2: 96%   Weight: 162 lb (73.5 kg)   Height: 5' 3\" (1.6 m)   PainSc:   7   PainLoc: Back       Physical Exam   Constitutional: She is oriented to person, place, and time and well-developed, well-nourished, and in no distress. Neck: Normal range of motion. Neck supple. No thyromegaly present. Cardiovascular: Normal rate, regular rhythm and normal heart sounds. Pulmonary/Chest: Effort normal and breath sounds normal.   Musculoskeletal: Normal range of motion. Neurological: She is alert and oriented to person, place, and time. Skin: Skin is warm and dry. Psychiatric: Mood, memory, affect and judgment normal.   Nursing note and vitals reviewed. Assessment/Plan      ICD-10-CM ICD-9-CM    1. Acute bilateral thoracic back pain M54.6 724.1 HYDROcodone-acetaminophen (NORCO) 5-325 mg per tablet   2. Spinal stenosis of lumbar region with neurogenic claudication M48.062 724.03 ondansetron hcl (ZOFRAN) 4 mg tablet     I have discussed the diagnosis with the patient and the intended plan of care as seen in the above orders. The patient has received an after-visit summary and questions were answered concerning future plans. I have discussed medication, side effects, and warnings with the patient in detail. The patient verbalized understanding and is in agreement with the plan of care. The patient will contact the office with any additional concerns. Follow-up Disposition:  Return in about 1 month (around 2/2/2019).   lab results and schedule of future lab studies reviewed with patient    Corrina Kirk MD

## 2019-01-02 NOTE — LETTER
NOTIFICATION  
 
1/2/2019 12:04 PM 
 
Ms. Georgia Gross [address] [address] To Whom It May Concern: 
 
Georgia Gross is currently under the care of [address]. She is cleared for physical therapy If there are questions or concerns please have the patient contact our office. Sincerely, Lakeisha Bustillos MD

## 2019-01-02 NOTE — PROGRESS NOTES
Supa Garrett 77 y.o. female being seen for   Chief Complaint   Patient presents with   [address] [facility] Follow Up     Would like Blood work and CT  Med refill      1. Have you been to the ER, urgent care clinic since your last visit? Hospitalized since your last visit? No  Had a er f/u with Dr Allyn Leija 12/28/2018    2. Have you seen or consulted any other health care providers outside of the [address] [place] [address] since your last visit? Include any pap smears or colon screening.  No    Pharmacy has been verified  Care team has been verified/updated

## 2019-01-02 NOTE — PATIENT INSTRUCTIONS
Back Pain: Care Instructions  Your Care Instructions    Back pain has many possible causes. It is often related to problems with muscles and ligaments of the back. It may also be related to problems with the nerves, discs, or bones of the back. Moving, lifting, standing, sitting, or sleeping in an awkward way can strain the back. Sometimes you don't notice the injury until later. Arthritis is another common cause of back pain. Although it may hurt a lot, back pain usually improves on its own within several weeks. Most people recover in 12 weeks or less. Using good home treatment and being careful not to stress your back can help you feel better sooner. Follow-up care is a key part of your treatment and safety. Be sure to make and go to all appointments, and call your doctor if you are having problems. It's also a good idea to know your test results and keep a list of the medicines you take. How can you care for yourself at home? · Sit or lie in positions that are most comfortable and reduce your pain. Try one of these positions when you lie down:  ? Lie on your back with your knees bent and supported by large pillows. ? Lie on the floor with your legs on the seat of a sofa or chair. ? Lie on your side with your knees and hips bent and a pillow between your legs. ? Lie on your stomach if it does not make pain worse. · Do not sit up in bed, and avoid soft couches and twisted positions. Bed rest can help relieve pain at first, but it delays healing. Avoid bed rest after the first day of back pain. · Change positions every 30 minutes. If you must sit for long periods of time, take breaks from sitting. Get up and walk around, or lie in a comfortable position. · Try using a heating pad on a low or medium setting for 15 to 20 minutes every 2 or 3 hours. Try a warm shower in place of one session with the heating pad. · You can also try an ice pack for 10 to 15 minutes every 2 to 3 hours.  Put a thin cloth between the ice pack and your skin. · Take pain medicines exactly as directed. ? If the doctor gave you a prescription medicine for pain, take it as prescribed. ? If you are not taking a prescription pain medicine, ask your doctor if you can take an over-the-counter medicine. · Take short walks several times a day. You can start with 5 to 10 minutes, 3 or 4 times a day, and work up to longer walks. Walk on level surfaces and avoid hills and stairs until your back is better. · Return to work and other activities as soon as you can. Continued rest without activity is usually not good for your back. · To prevent future back pain, do exercises to stretch and strengthen your back and stomach. Learn how to use good posture, safe lifting techniques, and proper body mechanics. When should you call for help? Call your doctor now or seek immediate medical care if:    · You have new or worsening numbness in your legs.     · You have new or worsening weakness in your legs. (This could make it hard to stand up.)     · You lose control of your bladder or bowels.    Watch closely for changes in your health, and be sure to contact your doctor if:    · You have a fever, lose weight, or don't feel well.     · You do not get better as expected. Where can you learn more? Go to http://camilo-robert.info/. Enter I503 in the search box to learn more about \"Back Pain: Care Instructions. \"  Current as of: November 29, 2017  Content Version: 11.8  © 1257-7445 [employer]. Care instructions adapted under license by [employer] (which disclaims liability or warranty for this information). If you have questions about a medical condition or this instruction, always ask your healthcare professional. [place] [address] any warranty or liability for your use of this information.

## 2019-01-07 ENCOUNTER — PATIENT OUTREACH (OUTPATIENT)
Dept: FAMILY MEDICINE CLINIC | Age: 69
End: 2019-01-07

## 2019-01-07 NOTE — PROGRESS NOTES
Hospital Discharge Follow-Up 
  
  
Patient had a scheduled admission to [facility] on 12/3/18 and discharged on 12/6/18 for lumbar laminectomy. Reached patient who stated \"I'm doing pretty well. I had a PT assessment on 1/4/18 and they said I'm doing well. \"  [facility] continues for PT. Endorsed taking medications as directed. Aware of upcoming appointments and  will provide transportation. Patient has graduated from the Transitions of Care Coordination  program on 1/7/19. Patient's symptoms are stable at this time. Patient/family has the ability to self-manage. Care management goals have been completed at this time. No further nurse navigator follow up scheduled. Pt has nurse navigator's contact information for any further questions, concerns, or needs. Patients upcoming visits:   
Future Appointments Date Time Provider Jonathan Colmenares 1/15/2019  9:45 AM Ileana Deshpande  [address]  
2/4/2019  9:15 AM [facility] Addressed This Visit's Progress  Attends follow-up appointments as directed. On track Plan:  Monitor for attendance at apts and assist as needed to schedule 12/10/18, 12/18/18 12/26/18, 1/2/19-attended apt with Dr. Jarad Poon 
12/10/18, 12/1718-attended apt with lashonda Alvarado 
12/17/18-attended apt with lashonda Hazel 
12/28/18-attended apt with Dr. iSlva Morton  Understands red flags/acitvity instructions post discharge. On track Plan:  Review red flags and activity instructions and assess patient understanding using the teachback technique 12/7/18-done with  who was able to list red flags of fever, wound drainage or redness, numbness of extremity or bowel/bladder problems.  - stated he has read and understands activity instructions Recommended Activity: No lifting, Driving, or Strenuous exercise for 2 weeks. If you experience any of the following symptoms fever greater than 101.5, wound drainage, redness at incision site, increased pain, new onset of extremity numbness/tingling/weakness or gait disturbance, bladder or bowel dysfunction, please follow up with [facility].   
FU on Monday 12/10 at 11:50 for chest tube suture

## 2019-01-10 DIAGNOSIS — M48.062 SPINAL STENOSIS OF LUMBAR REGION WITH NEUROGENIC CLAUDICATION: ICD-10-CM

## 2019-01-10 RX ORDER — BUPROPION HYDROCHLORIDE 100 MG/1
100 TABLET, EXTENDED RELEASE ORAL 2 TIMES DAILY
Qty: 14 TAB | Refills: 1 | Status: SHIPPED | OUTPATIENT
Start: 2019-01-10 | End: 2019-01-14 | Stop reason: SDUPTHER

## 2019-01-10 RX ORDER — ONDANSETRON 4 MG/1
4 TABLET, FILM COATED ORAL
Qty: 15 TAB | Refills: 0 | Status: SHIPPED | OUTPATIENT
Start: 2019-01-10 | End: 2019-01-14 | Stop reason: SDUPTHER

## 2019-01-14 ENCOUNTER — OFFICE VISIT (OUTPATIENT)
Dept: FAMILY MEDICINE CLINIC | Age: 69
End: 2019-01-14

## 2019-01-14 VITALS
RESPIRATION RATE: 16 BRPM | TEMPERATURE: 98.5 F | HEART RATE: 72 BPM | SYSTOLIC BLOOD PRESSURE: 118 MMHG | BODY MASS INDEX: 27.54 KG/M2 | OXYGEN SATURATION: 96 % | WEIGHT: 155.4 LBS | DIASTOLIC BLOOD PRESSURE: 84 MMHG | HEIGHT: 63 IN

## 2019-01-14 DIAGNOSIS — M48.062 SPINAL STENOSIS OF LUMBAR REGION WITH NEUROGENIC CLAUDICATION: ICD-10-CM

## 2019-01-14 DIAGNOSIS — R11.0 NAUSEA: Primary | ICD-10-CM

## 2019-01-14 DIAGNOSIS — F41.9 ANXIETY: ICD-10-CM

## 2019-01-14 DIAGNOSIS — M54.6 ACUTE BILATERAL THORACIC BACK PAIN: ICD-10-CM

## 2019-01-14 RX ORDER — BUPROPION HYDROCHLORIDE 100 MG/1
100 TABLET, EXTENDED RELEASE ORAL 2 TIMES DAILY
Qty: 180 TAB | Refills: 1 | Status: SHIPPED | OUTPATIENT
Start: 2019-01-14 | End: 2020-02-05

## 2019-01-14 RX ORDER — HYDROCODONE BITARTRATE AND ACETAMINOPHEN 5; 325 MG/1; MG/1
1 TABLET ORAL
Qty: 40 TAB | Refills: 0 | Status: SHIPPED | OUTPATIENT
Start: 2019-01-14 | End: 2019-02-04 | Stop reason: SDUPTHER

## 2019-01-14 RX ORDER — ONDANSETRON 4 MG/1
4 TABLET, ORALLY DISINTEGRATING ORAL
Qty: 12 TAB | Refills: 0 | Status: SHIPPED | OUTPATIENT
Start: 2019-01-14 | End: 2019-01-15 | Stop reason: SDUPTHER

## 2019-01-14 RX ORDER — BUPROPION HYDROCHLORIDE 100 MG/1
100 TABLET, EXTENDED RELEASE ORAL 2 TIMES DAILY
Qty: 60 TAB | Refills: 0 | Status: SHIPPED | OUTPATIENT
Start: 2019-01-14 | End: 2019-01-15 | Stop reason: SDUPTHER

## 2019-01-14 RX ORDER — ONDANSETRON 4 MG/1
4 TABLET, FILM COATED ORAL
Qty: 36 TAB | Refills: 0 | Status: SHIPPED | OUTPATIENT
Start: 2019-01-14 | End: 2020-02-05

## 2019-01-14 NOTE — PROGRESS NOTES
Ava Kenyon is a 77 y.o. female  presents for follow up and refills. No new complaints. States pain is getting less. Only worse at night. HPI:   Patient has back pain after surgery , primarily affecting the back. Pain control:           Current : improved   3/10           Worst pain over last week        7/10           Least pain over the last week   3/10  Activities of Daily Living:            improved            Are you able to do your normal daily activities?   intermittent  Patient Goals            Functional:  yes            Pain: less  Adverse side effects:             Since starting your pain medicine are you experiencing any of the following?              ( Examples: Constipation, fatigue, lapses in memory, depression or sexual                        Dysfunction)   No   Is a Pain management Contract in the patient's chart? no  Aberrant behaviors:              none(Early refill requests, lost prescriptions, lost medicine)  Pill count:             Is it consistent with patient's prescription? {Yes/No:15724:L: \"yes\"  Last urine drug screen:     [facility] Prescription Monitoring Program check performed:  yes        Treatment Care Team:  Patient Care Team:  Titi Figueroa MD as PCP - General (Family Practice)  Follow up contact scheduled for 1-4 weeks: yes      Allergies   Allergen Reactions    Celebrex [Celecoxib] Swelling    Shellfish Derived Swelling     \"makes me feel puffy\"    Sulfa (Sulfonamide Antibiotics) Hives and Swelling     Lips swelling    Gabapentin Palpitations     Chest and Abdominal pain    Humira [Adalimumab] Other (comments)     Lupus    Iodine Itching    Optiray 320 [Ioversol] Hives and Itching     Pt states when she gets iv contrast she itches a little from hives?     Penicillins Hives     Outpatient Medications Marked as Taking for the 1/14/19 encounter (Office Visit) with Titi Figueroa MD   Medication Sig Dispense Refill    teriparatide (FORTEO) 20 mcg/dose - 600 mcg/2.4 mL pnij injection 20 mcg by SubCUTAneous route daily.  buPROPion SR (WELLBUTRIN SR) 100 mg SR tablet Take 1 Tab by mouth two (2) times a day. 14 Tab 1    ondansetron hcl (ZOFRAN) 4 mg tablet Take 1 Tab by mouth every eight (8) hours as needed for Nausea. 15 Tab 0    HYDROcodone-acetaminophen (NORCO) 5-325 mg per tablet Take 1 Tab by mouth every six (6) hours as needed for Pain. Max Daily Amount: 4 Tabs. 40 Tab 0    cetirizine (ZYRTEC) 10 mg tablet Take  by mouth.  petrolat,wht/min oil/sod chl (DRY EYES OP) Apply  to eye.  Iron, Cbn & Gluc-FA-B12-C-DSS (FERRALET 90 DUAL-IRON DELIVERY) 90-1-12-50 mg-mg-mcg-mg tab Take  by mouth.  acetaminophen (TYLENOL EXTRA STRENGTH) 500 mg tablet Take 1,000 mg by mouth every six (6) hours as needed for Pain.  cholecalciferol (VITAMIN D3) 1,000 unit tablet Take 1,000 Units by mouth five (5) days a week.  polyethylene glycol (MIRALAX) 17 gram packet Take 17 g by mouth daily.  calcium carbonate (OS-BARBARA) 500 mg calcium (1,250 mg) tablet Take 1,500 mg by mouth daily.        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    H/O calcium pyrophosphate deposition disease (CPPD) Z87.39    IBS (irritable bowel syndrome) K58.9    RA (rheumatoid arthritis) ([facility] M06.9    Sicca syndrome (HCC) M35.00    Osteoarthritis of right hip M16.11    Pain management contract agreement Z02.89    ACP (advance care planning) Z71.89    Chronic left shoulder pain M25.512, G89.29    Osteopenia M85.80    Osteopenia of multiple sites M85.89    Osteoarthritis of left hip M16.12    Closed fracture of thoracic spine without spinal cord lesion ([facility] [address] [address] S22.009A    Spinal stenosis, thoracolumbar region M48.05    Lumbar spine instability M53.2X6    Spinal stenosis of lumbar region with neurogenic claudication M48.062    Spinal stenosis of lumbar region M48.061    Lumbar stenosis M48.061    Nausea R11.0    Acute bilateral thoracic back pain M54.6    Anxiety F41.9     Past Medical History:   Diagnosis Date    Abdominal abscess 2009    Arthritis     Rheumatoid    Autoimmune disease ([facility]     Medically induced Lupus    Back pain     Chronic pain     lower back    Colitis     Diverticulitis     Failed back syndrome     GERD (gastroesophageal reflux disease)     Hypertension     when on cyclosporins    Ill-defined condition     large torus roof of mouth    Irritable bowel syndrome     Neuropathy     bilateral hands/wrist    Osteoporosis     Pneumonia     RA (rheumatoid arthritis) ([facility]     Seizures ([facility] [address] 6-1-2008    one episode- after high dose of epinepherine     Social History     Socioeconomic History    Marital status:      Spouse name: Not on file    Number of children: Not on file    Years of education: Not on file    Highest education level: Not on file   Tobacco Use    Smoking status: Never Smoker    Smokeless tobacco: Never Used   Substance and Sexual Activity    Alcohol use: No    Drug use: No    Sexual activity: No   Other Topics Concern     Family History   Problem Relation Age of Onset    Other Other         Orthopedic (Sister)    Arthritis-osteo Sister     Cancer Neg Hx     Diabetes Neg Hx     Heart Disease Neg Hx     Heart Attack Neg Hx     Hypertension Neg Hx     Stroke Neg Hx     Malignant Hyperthermia Neg Hx     Pseudocholinesterase Deficiency Neg Hx     Delayed Awakening Neg Hx     Post-op Nausea/Vomiting Neg Hx     Emergence Delirium Neg Hx     Post-op Cognitive Dysfunction Neg Hx         Review of Systems   Constitutional: Negative for chills, fever, malaise/fatigue and weight loss. Eyes: Negative for blurred vision. Respiratory: Negative for shortness of breath and wheezing.     Cardiovascular: Negative for chest pain. Gastrointestinal: Positive for nausea. Negative for vomiting. Musculoskeletal: Negative for myalgias. Skin: Negative for rash. Neurological: Negative for weakness. Psychiatric/Behavioral: Negative. Negative for depression, hallucinations, substance abuse and suicidal ideas. The patient is not nervous/anxious and does not have insomnia. Vitals:    01/14/19 0824   BP: 118/84   Pulse: 72   Resp: 16   Temp: 98.5 °F (36.9 °C)   TempSrc: Oral   SpO2: 96%   Weight: 155 lb 6.4 oz (70.5 kg)   Height: 5' 2.5\" (1.588 m)   PainSc:   3   PainLoc: Back       Physical Exam   Constitutional: She is oriented to person, place, and time and well-developed, well-nourished, and in no distress. Neurological: She is alert and oriented to person, place, and time. Skin: Skin is warm and dry. Psychiatric: Mood, memory, affect and judgment normal.   Nursing note and vitals reviewed. Assessment/Plan      ICD-10-CM ICD-9-CM    1. Nausea R11.0 787.02 ondansetron (ZOFRAN ODT) 4 mg disintegrating tablet   2. Acute bilateral thoracic back pain M54.6 724.1 HYDROcodone-acetaminophen (NORCO) 5-325 mg per tablet   3. Spinal stenosis of lumbar region with neurogenic claudication M48.062 724.03 ondansetron hcl (ZOFRAN) 4 mg tablet   4. Anxiety F41.9 300.00 buPROPion SR (WELLBUTRIN SR) 100 mg SR tablet      buPROPion SR (WELLBUTRIN SR) 100 mg SR tablet      REFERRAL TO CARDIOLOGY     I have discussed the diagnosis with the patient and the intended plan of care as seen in the above orders. The patient has received an after-visit summary and questions were answered concerning future plans. I have discussed medication, side effects, and warnings with the patient in detail. The patient verbalized understanding and is in agreement with the plan of care. The patient will contact the office with any additional concerns.       Follow-up Disposition:  Return if symptoms worsen or fail to improve.  lab results and schedule of future lab studies reviewed with patient    Andrea Pratt MD

## 2019-01-14 NOTE — PATIENT INSTRUCTIONS
Back Pain: Care Instructions  Your Care Instructions    Back pain has many possible causes. It is often related to problems with muscles and ligaments of the back. It may also be related to problems with the nerves, discs, or bones of the back. Moving, lifting, standing, sitting, or sleeping in an awkward way can strain the back. Sometimes you don't notice the injury until later. Arthritis is another common cause of back pain. Although it may hurt a lot, back pain usually improves on its own within several weeks. Most people recover in 12 weeks or less. Using good home treatment and being careful not to stress your back can help you feel better sooner. Follow-up care is a key part of your treatment and safety. Be sure to make and go to all appointments, and call your doctor if you are having problems. It's also a good idea to know your test results and keep a list of the medicines you take. How can you care for yourself at home? · Sit or lie in positions that are most comfortable and reduce your pain. Try one of these positions when you lie down:  ? Lie on your back with your knees bent and supported by large pillows. ? Lie on the floor with your legs on the seat of a sofa or chair. ? Lie on your side with your knees and hips bent and a pillow between your legs. ? Lie on your stomach if it does not make pain worse. · Do not sit up in bed, and avoid soft couches and twisted positions. Bed rest can help relieve pain at first, but it delays healing. Avoid bed rest after the first day of back pain. · Change positions every 30 minutes. If you must sit for long periods of time, take breaks from sitting. Get up and walk around, or lie in a comfortable position. · Try using a heating pad on a low or medium setting for 15 to 20 minutes every 2 or 3 hours. Try a warm shower in place of one session with the heating pad. · You can also try an ice pack for 10 to 15 minutes every 2 to 3 hours.  Put a thin cloth between the ice pack and your skin. · Take pain medicines exactly as directed. ? If the doctor gave you a prescription medicine for pain, take it as prescribed. ? If you are not taking a prescription pain medicine, ask your doctor if you can take an over-the-counter medicine. · Take short walks several times a day. You can start with 5 to 10 minutes, 3 or 4 times a day, and work up to longer walks. Walk on level surfaces and avoid hills and stairs until your back is better. · Return to work and other activities as soon as you can. Continued rest without activity is usually not good for your back. · To prevent future back pain, do exercises to stretch and strengthen your back and stomach. Learn how to use good posture, safe lifting techniques, and proper body mechanics. When should you call for help? Call your doctor now or seek immediate medical care if:    · You have new or worsening numbness in your legs.     · You have new or worsening weakness in your legs. (This could make it hard to stand up.)     · You lose control of your bladder or bowels.    Watch closely for changes in your health, and be sure to contact your doctor if:    · You have a fever, lose weight, or don't feel well.     · You do not get better as expected. Where can you learn more? Go to http://camilo-robert.info/. Enter Z589 in the search box to learn more about \"Back Pain: Care Instructions. \"  Current as of: November 29, 2017  Content Version: 11.8  © 4421-0763 [employer]. Care instructions adapted under license by [employer] (which disclaims liability or warranty for this information). If you have questions about a medical condition or this instruction, always ask your healthcare professional. [place] [address] any warranty or liability for your use of this information.

## 2019-01-14 NOTE — PROGRESS NOTES
Chief Complaint   Patient presents with    Follow-up     Back pain after surgery doing much better    Medication Refill     Welbutrin, Noroc, Zofran. 1. Have you been to the ER, urgent care clinic since your last visit? Hospitalized since your last visit? No  2. Have you seen or consulted any other health care providers outside of the [address] [place] [address] since your last visit? Include any pap smears or colon screening. No    Medication reconciliation has been completed with patient. Care team discussed/updated as well as pharmacy.

## 2019-01-15 ENCOUNTER — OFFICE VISIT (OUTPATIENT)
Dept: ORTHOPEDIC SURGERY | Age: 69
End: 2019-01-15

## 2019-01-15 VITALS
DIASTOLIC BLOOD PRESSURE: 90 MMHG | HEART RATE: 77 BPM | OXYGEN SATURATION: 100 % | SYSTOLIC BLOOD PRESSURE: 137 MMHG | TEMPERATURE: 97.8 F | RESPIRATION RATE: 16 BRPM

## 2019-01-15 DIAGNOSIS — M53.2X6 LUMBAR SPINE INSTABILITY: ICD-10-CM

## 2019-01-15 DIAGNOSIS — Z98.1 S/P FUSION OF THORACIC SPINE: Primary | ICD-10-CM

## 2019-01-15 DIAGNOSIS — M54.50 LUMBAR SPINE PAIN: ICD-10-CM

## 2019-01-15 NOTE — PROGRESS NOTES
[facility] 2. 
[address], [address] Phone: [phone] Fax: [phone] PROGRESS NOTE Patient: Lise Stephen                MRN: [id number]       SSN: [id number] YOB: 1950        AGE: 77 y.o. SEX: female There is no height or weight on file to calculate BMI. PCP: Maxine Mcleod MD 
01/15/19 Chief Complaint Patient presents with  Back Pain PO  
 
 
HISTORY OF PRESENT ILLNESS, RADIOGRAPHS, and PLAN:  
 
HISTORY OF PRESENT ILLNESS:  Progress note. Ms. Sven Lundberg returns today. She is 6 weeks out from her thoracolumbar revision and fusion. She is doing dramatically well. She is ecstatic. She feels she has return of her bladder control. She is eliminated her back pain that she rates as no more than a 3 now. She feels straight. She can walk better. The only ache she has is where we resected a section of her rib in doing her XLIF. RADIOGRAPHS:  X-rays demonstrate appropriate alignment and fixation. ASSESSMENT/PLAN:  At this point, we will see her back again in 6 weeks for routine followup. She is continuing her own home exercise program.  
 
cc:  Dr. Branden Negrete Past Medical History:  
Diagnosis Date  Abdominal abscess 2009  Arthritis Rheumatoid  Autoimmune disease ([address] Medically induced Lupus  Back pain  Chronic pain   
 lower back  Colitis  Diverticulitis  Failed back syndrome  GERD (gastroesophageal reflux disease)  Hypertension   
 when on cyclosporins  Ill-defined condition   
 large torus roof of mouth  Irritable bowel syndrome  Neuropathy   
 bilateral hands/wrist  
 Osteoporosis  Pneumonia  RA (rheumatoid arthritis) ([address]  Seizures ([address] 6-1-2008  
 one episode- after high dose of epinepherine Family History Problem Relation Age of Onset  Other Other Orthopedic (Sister)  Arthritis-osteo Sister  Cancer Neg Hx  Diabetes Neg Hx   
 Heart Disease Neg Hx   
 Heart Attack Neg Hx  Hypertension Neg Hx  Stroke Neg Hx  Malignant Hyperthermia Neg Hx  Pseudocholinesterase Deficiency Neg Hx  Delayed Awakening Neg Hx  Post-op Nausea/Vomiting Neg Hx  Emergence Delirium Neg Hx  Post-op Cognitive Dysfunction Neg Hx Current Outpatient Medications Medication Sig Dispense Refill  teriparatide (FORTEO) 20 mcg/dose - 600 mcg/2.4 mL pnij injection 20 mcg by SubCUTAneous route daily.  buPROPion SR (WELLBUTRIN SR) 100 mg SR tablet Take 1 Tab by mouth two (2) times a day. 180 Tab 1  
 HYDROcodone-acetaminophen (NORCO) 5-325 mg per tablet Take 1 Tab by mouth every six (6) hours as needed for Pain. Max Daily Amount: 4 Tabs. 40 Tab 0  
 ondansetron hcl (ZOFRAN) 4 mg tablet Take 1 Tab by mouth every eight (8) hours as needed for Nausea. 36 Tab 0  
 cetirizine (ZYRTEC) 10 mg tablet Take  by mouth.  petrolat,wht/min oil/sod chl (DRY EYES OP) Apply  to eye.  Iron, Cbn & Gluc-FA-B12-C-DSS (FERRALET 90 DUAL-IRON DELIVERY) 90-1-12-50 mg-mg-mcg-mg tab Take  by mouth.  lisinopril (PRINIVIL, ZESTRIL) 10 mg tablet TAKE 1 TABLET DAILY 90 Tab 1  
 naloxone (NARCAN) 2 mg/actuation spry Use 1 spray intranasally into 1 nostril. Use a new Narcan nasal spray for subsequent doses and administer into alternating nostrils. May repeat every 2 to 3 minutes as needed. 1 Package 0  
 acetaminophen (TYLENOL EXTRA STRENGTH) 500 mg tablet Take 1,000 mg by mouth every six (6) hours as needed for Pain.  cholecalciferol (VITAMIN D3) 1,000 unit tablet Take 1,000 Units by mouth five (5) days a week.  polyethylene glycol (MIRALAX) 17 gram packet Take 17 g by mouth daily.  calcium carbonate (OS-BARBARA) 500 mg calcium (1,250 mg) tablet Take 1,500 mg by mouth daily. Allergies Allergen Reactions  Celebrex [Celecoxib] Swelling  Shellfish Derived Swelling \"makes me feel puffy\"  Sulfa (Sulfonamide Antibiotics) Hives and Swelling Lips swelling  Gabapentin Diarrhea and Palpitations Chest and Abdominal pain  Humira [Adalimumab] Other (comments) Lupus  Iodine Itching  Optiray 320 [Ioversol] Hives and Itching Pt states when she gets iv contrast she itches a little from hives?  Penicillins Hives Past Surgical History:  
Procedure Laterality Date  HX ABDOMINAL WALL DEFECT REPAIR    
 HX APPENDECTOMY  HX BACK SURGERY  12/03/2018  HX BREAST REDUCTION    
 HX CATARACT REMOVAL Bilateral   
 HX COLONOSCOPY    
 HX GI    
 repair rectal prolaspe  HX GYN    
 dermoid cyst  
 HX HEENT    
 tonsillectomy Feliciano Rasp  HX LUMBAR FUSION    
 x 2  
 HX ORTHOPAEDIC Bilateral   
 CTR  
 HX ORTHOPAEDIC Bilateral   
 arthroplasty thumbs  HX ORTHOPAEDIC Left Ankle  HX ORTHOPAEDIC Right \"Nerve Release Elbow\"  HX OTHER SURGICAL Left 1985  
 vein stripping  HX SALPINGO-OOPHORECTOMY Bilateral   
 HX SHOULDER REPLACEMENT Left  HX UROLOGICAL    
 bladder repair  TOTAL HIP ARTHROPLASTY Bilateral   
 
 
Past Medical History:  
Diagnosis Date  Abdominal abscess 2009  Arthritis Rheumatoid  Autoimmune disease ([address] Medically induced Lupus  Back pain  Chronic pain   
 lower back  Colitis  Diverticulitis  Failed back syndrome  GERD (gastroesophageal reflux disease)  Hypertension   
 when on cyclosporins  Ill-defined condition   
 large torus roof of mouth  Irritable bowel syndrome  Neuropathy   
 bilateral hands/wrist  
 Osteoporosis  Pneumonia  RA (rheumatoid arthritis) ([address]  Seizures ([address] 6-1-2008  
 one episode- after high dose of epinepherine Social History Socioeconomic History  Marital status:  Spouse name: Not on file  Number of children: Not on file  Years of education: Not on file  Highest education level: Not on file Social Needs  Financial resource strain: Not on file  Food insecurity - worry: Not on file  Food insecurity - inability: Not on file  Transportation needs - medical: Not on file  Transportation needs - non-medical: Not on file Occupational History  Not on file Tobacco Use  Smoking status: Never Smoker  Smokeless tobacco: Never Used Substance and Sexual Activity  Alcohol use: No  
 Drug use: No  
 Sexual activity: No  
Other Topics Concern [address] Service Not Asked  Blood Transfusions Not Asked  Caffeine Concern Not Asked  Occupational Exposure Not Asked Mauricia Kawasaki Hazards Not Asked  Sleep Concern Not Asked  Stress Concern Not Asked  Weight Concern Not Asked  Special Diet Not Asked  Back Care Not Asked  Exercise Not Asked  Bike Helmet Not Asked  Seat Belt Not Asked  Self-Exams Not Asked Social History Narrative  Not on file REVIEW OF SYSTEMS: 
 CONSTITUTIONAL SYMPTOMS:  Negative. EYES:  Negative. EARS, NOSE, THROAT AND MOUTH:  Negative. CARDIOVASCULAR:  Negative. RESPIRATORY:  Negative. GENITOURINARY: Per HPI. GASTROINTESTINAL:  Per HPI. INTEGUMENTARY (SKIN AND/OR BREAST):  Negative. MUSCULOSKELETAL: Per HPI. ENDOCRINE/RHEUMATOLOGIC:  Negative. NEUROLOGICAL:  Per HPI. HEMATOLOGIC/LYMPHATIC:  Negative. ALLERGIC/IMMUNOLOGIC:  Negative. PSYCHIATRIC:  Negative. PHYSICAL EXAMINATION:  
Visit Vitals /90 Pulse 77 Temp 97.8 °F (36.6 °C) Resp 16 SpO2 100% PAIN SCALE: 3/10 CONSTITUTIONAL: The patient is in no apparent distress and is alert and oriented x 3. HEENT: Normocephalic. Hearing grossly intact. NECK: Supple and symmetric. no tenderness, or masses were felt. RESPIRATORY: No labored breathing. CARDIOVASCULAR: The carotid pulses were normal. Peripheral pulses were 2+. CHEST: Normal AP diameter and normal contour without any kyphoscoliosis. LYMPHATIC: No lymphadenopathy was appreciated in the neck, axillae or groin. SKIN:  Incision healing well, no drainage, no erythema, no hernia, no seroma, no swelling, no dehiscence, incision well approximated. Negative for scars, rashes, lesions, or ulcers on the right upper, right lower, left upper, left lower and trunk. NEUROLOGICAL: Alert and oriented x 3. Ambulation without assistive device. FWB. EXTREMITIES: See musculoskeletal. 
MUSCULOSKELETAL: 
? Head and Neck:  Negative for misalignment, asymmetry, crepitation, defects, tenderness masses or effusions. ? Left Upper Extremity: Inspection, percussion and palpation performed. Prices sign is negative. ? Right Upper Extremity: Inspection, percussion and palpation performed. Prices sign is negative. ? Spine, Ribs and Pelvis: Slight back pain. Inspection, percussion and palpation performed. Negative for misalignment, asymmetry, crepitation, defects, tenderness masses or effusions. ? Left Lower Extremity: Inspection, percussion and palpation performed. Negative straight leg raise. ? Right Lower Extremity: Inspection, percussion and palpation performed. Negative straight leg raise. SPINE EXAM:  
 
Lumbar spine: No rash, ecchymosis, or gross obliquity. No focal atrophy is noted. ASSESSMENT 
  ICD-10-CM ICD-9-CM 1. S/P fusion of thoracic spine Z98.1 V45.4 2. Lumbar spine instability M53.2X6 724.9 3. Lumbar spine pain M54.5 724.2 AMB POC XRAY, SPINE, LUMBOSACRAL; 2 O Written by Osmar Malcolm, as dictated by Daxa Cotto MD. 
 
I, Dr. Daxa Cotto MD, confirm that all documentation is accurate.

## 2019-01-15 NOTE — PATIENT INSTRUCTIONS
Learning About How to Have a Healthy Back What causes back pain? Back pain is often caused by overuse, strain, or injury. For example, people often hurt their backs playing sports or working in the yard, being jolted in a car accident, or lifting something too heavy. Aging plays a part too. Your bones and muscles tend to lose strength as you age, which makes injury more likely. The spongy discs between the bones of the spine (vertebrae) may suffer from wear and tear and no longer provide enough cushion between the bones. A disc that bulges or breaks open (herniated disc) can press on nerves, causing back pain. In some people, back pain is the result of arthritis, broken vertebrae caused by bone loss (osteoporosis), illness, or a spine problem. Although most people have back pain at one time or another, there are steps you can take to make it less likely. How can you have a healthy back? Reduce stress on your back through good posture Slumping or slouching alone may not cause low back pain. But after the back has been strained or injured, bad posture can make pain worse. · Sleep in a position that maintains your back's normal curves and on a mattress that feels comfortable. Sleep on your side with a pillow between your knees, or sleep on your back with a pillow under your knees. These positions can reduce strain on your back. · Stand and sit up straight. \"Good posture\" generally means your ears, shoulders, and hips are in a straight line. · If you must stand for a long time, put one foot on a stool, ledge, or box. Switch feet every now and then. · Sit in a chair that is low enough to let you place both feet flat on the floor with both knees nearly level with your hips. If your chair or desk is too high, use a footrest to raise your knees. Place a small pillow, a rolled-up towel, or a lumbar roll in the curve of your back if you need extra support. · Try a kneeling chair, which helps tilt your hips forward. This takes pressure off your lower back. · Try sitting on an exercise ball. It can rock from side to side, which helps keep your back loose. · When driving, keep your knees nearly level with your hips. Sit straight, and drive with both hands on the steering wheel. Your arms should be in a slightly bent position. Reduce stress on your back through careful lifting · Squat down, bending at the hips and knees only. If you need to, put one knee to the floor and extend your other knee in front of you, bent at a right angle (half kneeling). · Press your chest straight forward. This helps keep your upper back straight while keeping a slight arch in your low back. · Hold the load as close to your body as possible, at the level of your belly button (navel). · Use your feet to change direction, taking small steps. · Lead with your hips as you change direction. Keep your shoulders in line with your hips as you move. · Set down your load carefully, squatting with your knees and hips only. Exercise and stretch your back · Do some exercise on most days of the week, if your doctor says it is okay. You can walk, run, swim, or cycle. · Stretch your back muscles. Here are a few exercises to try: 
? Lie on your back, and gently pull one bent knee to your chest. Put that foot back on the floor, and then pull the other knee to your chest. 
? Do pelvic tilts. Lie on your back with your knees bent. Tighten your stomach muscles. Pull your belly button (navel) in and up toward your ribs. You should feel like your back is pressing to the floor and your hips and pelvis are slightly lifting off the floor. Hold for 6 seconds while breathing smoothly. ? Sit with your back flat against a wall. · Keep your core muscles strong. The muscles of your back, belly (abdomen), and buttocks support your spine. ? Pull in your belly and imagine pulling your navel toward your spine. Hold this for 6 seconds, then relax. Remember to keep breathing normally as you tense your muscles. ? Do curl-ups. Always do them with your knees bent. Keep your low back on the floor, and curl your shoulders toward your knees using a smooth, slow motion. Keep your arms folded across your chest. If this bothers your neck, try putting your hands behind your neck (not your head), with your elbows spread apart. ? Lie on your back with your knees bent and your feet flat on the floor. Tighten your belly muscles, and then push with your feet and raise your buttocks up a few inches. Hold this position 6 seconds as you continue to breathe normally, then lower yourself slowly to the floor. Repeat 8 to 12 times. ? If you like group exercise, try Pilates or yoga. These classes have poses that strengthen the core muscles. Lead a healthy lifestyle · Stay at a healthy weight to avoid strain on your back. · Do not smoke. Smoking increases the risk of osteoporosis, which weakens the spine. If you need help quitting, talk to your doctor about stop-smoking programs and medicines. These can increase your chances of quitting for good. Where can you learn more? Go to http://camilo-robert.info/. Enter L315 in the search box to learn more about \"Learning About How to Have a Healthy Back. \" Current as of: November 29, 2017 Content Version: 11.8 © 6240-9006 [employer]wise[employer]. Care instructions adapted under license by [employer] (which disclaims liability or warranty for this information). If you have questions about a medical condition or this instruction, always ask your healthcare professional. [place] [address] any warranty or liability for your use of this information. Back Stretches: Exercises Your Care Instructions Here are some examples of exercises for stretching your back. Start each exercise slowly. Ease off the exercise if you start to have pain. Your doctor or physical therapist will tell you when you can start these exercises and which ones will work best for you. How to do the exercises Overhead stretch 1. Stand comfortably with your feet shoulder-width apart. 2. Looking straight ahead, raise both arms over your head and reach toward the ceiling. Do not allow your head to tilt back. 3. Hold for 15 to 30 seconds, then lower your arms to your sides. 4. Repeat 2 to 4 times. Side stretch 1. Stand comfortably with your feet shoulder-width apart. 2. Raise one arm over your head, and then lean to the other side. 3. Slide your hand down your leg as you let the weight of your arm gently stretch your side muscles. Hold for 15 to 30 seconds. 4. Repeat 2 to 4 times on each side. Press-up 1. Lie on your stomach, supporting your body with your forearms. 2. Press your elbows down into the floor to raise your upper back. As you do this, relax your stomach muscles and allow your back to arch without using your back muscles. As your press up, do not let your hips or pelvis come off the floor. 3. Hold for 15 to 30 seconds, then relax. 4. Repeat 2 to 4 times. Relax and rest 
 
1. Lie on your back with a rolled towel under your neck and a pillow under your knees. Extend your arms comfortably to your sides. 2. Relax and breathe normally. 3. Remain in this position for about 10 minutes. 4. If you can, do this 2 or 3 times each day. Follow-up care is a key part of your treatment and safety. Be sure to make and go to all appointments, and call your doctor if you are having problems. It's also a good idea to know your test results and keep a list of the medicines you take. Where can you learn more? Go to http://camilo-robert.info/. Enter P386 in the search box to learn more about \"Back Stretches: Exercises. \" Current as of: November 29, 2017 Content Version: 11.8 © 1849-7088 [employer]wise, [employer]. Care instructions adapted under license by [employer] (which disclaims liability or warranty for this information). If you have questions about a medical condition or this instruction, always ask your healthcare professional. [address] any warranty or liability for your use of this information.

## 2019-02-01 ENCOUNTER — OFFICE VISIT (OUTPATIENT)
Dept: CARDIOLOGY CLINIC | Age: 69
End: 2019-02-01

## 2019-02-01 VITALS
HEIGHT: 62 IN | HEART RATE: 67 BPM | DIASTOLIC BLOOD PRESSURE: 82 MMHG | WEIGHT: 170 LBS | BODY MASS INDEX: 31.28 KG/M2 | SYSTOLIC BLOOD PRESSURE: 133 MMHG

## 2019-02-01 DIAGNOSIS — Z01.810 PREOP CARDIOVASCULAR EXAM: Primary | ICD-10-CM

## 2019-02-01 DIAGNOSIS — M45.9 RHEUMATOID ARTHRITIS INVOLVING VERTEBRA WITH POSITIVE RHEUMATOID FACTOR (HCC): ICD-10-CM

## 2019-02-01 DIAGNOSIS — I10 ESSENTIAL HYPERTENSION: ICD-10-CM

## 2019-02-01 DIAGNOSIS — R07.9 CHEST PAIN, UNSPECIFIED TYPE: ICD-10-CM

## 2019-02-01 RX ORDER — TRAMADOL HYDROCHLORIDE 50 MG/1
50 TABLET ORAL
COMMUNITY
End: 2019-06-07 | Stop reason: SDUPTHER

## 2019-02-01 NOTE — PROGRESS NOTES
HISTORY OF PRESENT ILLNESS  Faisal Del Rio is a 77 y.o. female. Patient seen here for a new patient evaluation. Patient was in emergency room with complaint of atypical chest pain. It started initially with abdominal pain that was very intense with no clear etiology. Subsequently had a chest tightness and went to emergency room where cardiac workup was negative with 2 EKG and cardiac enzymes. No recurrence of pain is noted since then. Patient has good functional capacity. She had spine surgery with limited activity at present but has significantly improved compared to prior to her spine surgery. She is scheduled for colonoscopy. On follow up patient denies any chest pains,sob, palpitation or other significant symptoms. Review of Systems   Constitutional: Negative for chills and fever. HENT: Negative for nosebleeds. Eyes: Negative for blurred vision and double vision. Respiratory: Negative for cough, hemoptysis, sputum production, shortness of breath and wheezing. Cardiovascular: Negative for chest pain, palpitations, orthopnea, claudication, leg swelling and PND. Gastrointestinal: Negative for abdominal pain, heartburn, nausea and vomiting. Musculoskeletal: Positive for joint pain. Negative for myalgias. Skin: Negative for rash. Neurological: Negative for dizziness, weakness and headaches. Endo/Heme/Allergies: Does not bruise/bleed easily.      Family History   Problem Relation Age of Onset    Other Other         Orthopedic (Sister)   [facility] Arthritis-osteo Sister     Heart Attack Brother 58    Cancer Neg Hx     Diabetes Neg Hx     Heart Disease Neg Hx     Hypertension Neg Hx     Stroke Neg Hx     Malignant Hyperthermia Neg Hx     Pseudocholinesterase Deficiency Neg Hx     Delayed Awakening Neg Hx     Post-op Nausea/Vomiting Neg Hx     Emergence Delirium Neg Hx     Post-op Cognitive Dysfunction Neg Hx        Past Medical History:   Diagnosis Date    Abdominal abscess 2009    Arthritis     Rheumatoid    Autoimmune disease ([facility] [address]     Medically induced Lupus    Back pain     Chronic pain     lower back    Colitis     Diverticulitis     Failed back syndrome     GERD (gastroesophageal reflux disease)     Hypertension     when on cyclosporins    Ill-defined condition     large torus roof of mouth    Irritable bowel syndrome     Neuropathy     bilateral hands/wrist    Osteoporosis     Pneumonia     RA (rheumatoid arthritis) ([facility] [address] [facility]     Seizures ([facility] [address] 6-1-2008    one episode- after high dose of epinepherine       Past Surgical History:   Procedure Laterality Date    HX ABDOMINAL WALL DEFECT REPAIR      HX APPENDECTOMY      HX BACK SURGERY  12/03/2018    HX BREAST REDUCTION      HX CATARACT REMOVAL Bilateral     HX COLONOSCOPY      HX GI      repair rectal prolaspe    HX GYN      dermoid cyst    HX HEENT      tonsillectomy    HX HYSTERECTOMY  1976    HX LUMBAR FUSION      x 2    HX ORTHOPAEDIC Bilateral     CTR    HX ORTHOPAEDIC Bilateral     arthroplasty thumbs    HX ORTHOPAEDIC Left     Ankle     HX ORTHOPAEDIC Right     \"Nerve Release Elbow\"    HX OTHER SURGICAL Left 1985    vein stripping    HX SALPINGO-OOPHORECTOMY Bilateral     HX SHOULDER REPLACEMENT Left     HX UROLOGICAL      bladder repair    TOTAL HIP ARTHROPLASTY Bilateral        Social History     Tobacco Use    Smoking status: Never Smoker    Smokeless tobacco: Never Used   Substance Use Topics    Alcohol use: No       Allergies   Allergen Reactions    Celebrex [Celecoxib] Swelling    Shellfish Derived Swelling     \"makes me feel puffy\"    Sulfa (Sulfonamide Antibiotics) Hives and Swelling     Lips swelling    Gabapentin Diarrhea and Palpitations     Chest and Abdominal pain    Humira [Adalimumab] Other (comments)     Lupus    Iodine Itching    Optiray 320 [Ioversol] Hives and Itching     Pt states when she gets iv contrast she itches a little from hives?     Penicillins Hives Prior to Admission medications    Medication Sig Start Date End Date Taking? Authorizing Provider   traMADol (ULTRAM) 50 mg tablet Take 50 mg by mouth every six (6) hours as needed for Pain. Yes Provider, Historical   teriparatide (FORTEO) 20 mcg/dose - 600 mcg/2.4 mL pnij injection 20 mcg by SubCUTAneous route daily. Yes Provider, Historical   buPROPion SR (WELLBUTRIN SR) 100 mg SR tablet Take 1 Tab by mouth two (2) times a day. 1/14/19  Yes Daniel Hua MD   HYDROcodone-acetaminophen [facility]) 5-325 mg per tablet Take 1 Tab by mouth every six (6) hours as needed for Pain. Max Daily Amount: 4 Tabs. Patient taking differently: Take 1 Tab by mouth every six (6) hours as needed for Pain. Take 0.5 tablet every six hours 1/14/19  Yes Daniel Hua MD   ondansetron hcl (ZOFRAN) 4 mg tablet Take 1 Tab by mouth every eight (8) hours as needed for Nausea. 1/14/19  Yes Daniel Hua MD   cetirizine (ZYRTEC) 10 mg tablet Take  by mouth. Yes Provider, Historical   petrolat,wht/min oil/sod chl (DRY EYES OP) Apply  to eye. Yes Provider, Historical   Iron, Cbn & Gluc-FA-B12-C-DSS (FERRALET 90 DUAL-IRON DELIVERY) 90-1-12-50 mg-mg-mcg-mg tab Take  by mouth. Yes Provider, Historical   naloxone (NARCAN) 2 mg/actuation spry Use 1 spray intranasally into 1 nostril. Use a new Narcan nasal spray for subsequent doses and administer into alternating nostrils. May repeat every 2 to 3 minutes as needed. 10/26/17  Yes Florecita Chatterjee PA-C   acetaminophen (TYLENOL EXTRA STRENGTH) 500 mg tablet Take 1,000 mg by mouth every six (6) hours as needed for Pain. Yes Provider, Historical   cholecalciferol (VITAMIN D3) 1,000 unit tablet Take 1,000 Units by mouth five (5) days a week. Yes Provider, Historical   polyethylene glycol (MIRALAX) 17 gram packet Take 17 g by mouth daily. Yes Provider, Historical   calcium carbonate (OS-BARBARA) 500 mg calcium (1,250 mg) tablet Take 1,500 mg by mouth daily.    Yes Provider, Historical   lisinopril (PRINIVIL, ZESTRIL) 10 mg tablet TAKE 1 TABLET DAILY 11/5/18   Sunita Vaughan MD         Visit Vitals  /82   Pulse 67   Ht 5' 2\" (1.575 m)   Wt 77.1 kg (170 lb)   BMI 31.09 kg/m²         Physical Exam   Constitutional: She is oriented to person, place, and time. She appears well-developed and well-nourished. HENT:   Head: Normocephalic and atraumatic. Eyes: Conjunctivae are normal.   Neck: Neck supple. No JVD present. No tracheal deviation present. No thyromegaly present. Cardiovascular: Normal rate and regular rhythm. PMI is not displaced. Exam reveals no gallop, no S3 and no decreased pulses. No murmur heard. Pulmonary/Chest: No respiratory distress. She has no wheezes. She has no rales. She exhibits no tenderness. Abdominal: Soft. There is no tenderness. Musculoskeletal: She exhibits no edema. Neurological: She is alert and oriented to person, place, and time. Skin: Skin is warm. Psychiatric: She has a normal mood and affect. Ms. Nikita Hand has a reminder for a \"due or due soon\" health maintenance. I have asked that she contact her primary care provider for follow-up on this health maintenance. I have personally reviewed patient's records available from hospital and other providers and incorporated findings in patient care. I Have personally reviewed recent relevant labs available and discussed with patient  I have personally reviewed patients ekg done at other facility. Assessment         ICD-10-CM ICD-9-CM    1. Preop cardiovascular exam Z01.810 V72.81     Patient's cardiac status is stable. Okay to proceed with colonoscopy and biopsy with low cardiac risk   2. Rheumatoid arthritis involving vertebra with positive rheumatoid factor (HCC) M45.9 714.0     Stable multiple joint arthritis and back problems with recent back surgery   3. Essential hypertension I10 401.9     On lisinopril blood pressure is controlled   4.  Chest pain, unspecified type R07.9 786.50     Atypical one episode of pain in December 2018. No recurrence will continue to monitor clinically. Will consider workup only if there is any recurrence as clin     1/2019  Cardiac status stable for colonoscopy no other cardiac workup indicated at this point  There are no discontinued medications. No orders of the defined types were placed in this encounter. Follow-up Disposition:  Return if symptoms worsen or fail to improve, for Cleared for planned surgery, low risk.

## 2019-02-01 NOTE — LETTER
2019 
[id number] [facility] [address] [facility] [id number] Dear Dr Katherin Ruvalcaba . Re: Celina Nielsen : 1950 Ms. Carlee Stauffer is cleared from a cardiac standpoint with low risk for coloscopy surgery scheduled on 2019. If you have any questions or any further assistance is needed please contact our office. Sincerely, Isaiah Miller M.D.  
 
cc:  Naomie Bentley MD

## 2019-02-01 NOTE — LETTER
Tyshawn Sidra 1950 2/1/2019 Dear Andrea Pratt MD 
 
I had the pleasure of evaluating  Ms. Neil Cardoso in office today. Below are the relevant portions of my assessment and plan of care. ICD-10-CM ICD-9-CM 1. Preop cardiovascular exam Z01.810 V72.81 Patient's cardiac status is stable. Okay to proceed with colonoscopy and biopsy with low cardiac risk 2. Rheumatoid arthritis involving vertebra with positive rheumatoid factor (HCC) M45.9 714.0 Stable multiple joint arthritis and back problems with recent back surgery 3. Essential hypertension I10 401.9 On lisinopril blood pressure is controlled 4. Chest pain, unspecified type R07.9 786.50 Atypical one episode of pain in December 2018. No recurrence will continue to monitor clinically. Will consider workup only if there is any recurrence as clin Current Outpatient Medications Medication Sig Dispense Refill  traMADol (ULTRAM) 50 mg tablet Take 50 mg by mouth every six (6) hours as needed for Pain.  teriparatide (FORTEO) 20 mcg/dose - 600 mcg/2.4 mL pnij injection 20 mcg by SubCUTAneous route daily.  buPROPion SR (WELLBUTRIN SR) 100 mg SR tablet Take 1 Tab by mouth two (2) times a day. 180 Tab 1  
 HYDROcodone-acetaminophen (NORCO) 5-325 mg per tablet Take 1 Tab by mouth every six (6) hours as needed for Pain. Max Daily Amount: 4 Tabs. (Patient taking differently: Take 1 Tab by mouth every six (6) hours as needed for Pain. Take 0.5 tablet every six hours) 40 Tab 0  
 ondansetron hcl (ZOFRAN) 4 mg tablet Take 1 Tab by mouth every eight (8) hours as needed for Nausea. 36 Tab 0  
 cetirizine (ZYRTEC) 10 mg tablet Take  by mouth.  petrolat,wht/min oil/sod chl (DRY EYES OP) Apply  to eye.  Iron, Cbn & Gluc-FA-B12-C-DSS (FERRALET 90 DUAL-IRON DELIVERY) 90-1-12-50 mg-mg-mcg-mg tab Take  by mouth.     
 naloxone (NARCAN) 2 mg/actuation spry Use 1 spray intranasally into 1 nostril. Use a new Narcan nasal spray for subsequent doses and administer into alternating nostrils. May repeat every 2 to 3 minutes as needed. 1 Package 0  
 acetaminophen (TYLENOL EXTRA STRENGTH) 500 mg tablet Take 1,000 mg by mouth every six (6) hours as needed for Pain.  cholecalciferol (VITAMIN D3) 1,000 unit tablet Take 1,000 Units by mouth five (5) days a week.  polyethylene glycol (MIRALAX) 17 gram packet Take 17 g by mouth daily.  calcium carbonate (OS-BARBARA) 500 mg calcium (1,250 mg) tablet Take 1,500 mg by mouth daily.  lisinopril (PRINIVIL, ZESTRIL) 10 mg tablet TAKE 1 TABLET DAILY 90 Tab 1 Orders Placed This Encounter  traMADol (ULTRAM) 50 mg tablet Sig: Take 50 mg by mouth every six (6) hours as needed for Pain. If you have questions, please do not hesitate to call me. I look forward to following Ms. Elizabeth Soriano along with you. Sincerely, Jon Cardona MD

## 2019-02-04 ENCOUNTER — OFFICE VISIT (OUTPATIENT)
Dept: FAMILY MEDICINE CLINIC | Age: 69
End: 2019-02-04

## 2019-02-04 VITALS
BODY MASS INDEX: 31.28 KG/M2 | DIASTOLIC BLOOD PRESSURE: 74 MMHG | WEIGHT: 170 LBS | OXYGEN SATURATION: 97 % | RESPIRATION RATE: 14 BRPM | HEIGHT: 62 IN | HEART RATE: 84 BPM | SYSTOLIC BLOOD PRESSURE: 122 MMHG

## 2019-02-04 DIAGNOSIS — M54.6 ACUTE BILATERAL THORACIC BACK PAIN: ICD-10-CM

## 2019-02-04 RX ORDER — HYDROCODONE BITARTRATE AND ACETAMINOPHEN 5; 325 MG/1; MG/1
1 TABLET ORAL
Qty: 40 TAB | Refills: 0 | Status: SHIPPED | OUTPATIENT
Start: 2019-02-04 | End: 2020-02-05

## 2019-02-04 NOTE — PROGRESS NOTES
Chief Complaint   Patient presents with    Nausea    Back Pain    Anxiety     1. Have you been to the ER, urgent care clinic since your last visit? Hospitalized since your last visit? No    2. Have you seen or consulted any other health care providers outside of the [address] [place] [address] since your last visit? Include any pap smears or colon screening.  No

## 2019-02-04 NOTE — PROGRESS NOTES
Dawn Anderson is a 77 y.o. female  presents for follow up. She has decreased her pain meds to only 1/2 tab of Norco qhs. No new sxs. HPI:     Patient has back pain , primarily affecting the back. Pain control:           Current : well controlled   2/10           Worst pain over last week        6/10           Least pain over the last week   2/10  Activities of Daily Living:            stable            Are you able to do your normal daily activities?   intermittent  Patient Goals            Functional:  yes            Pain: less  Adverse side effects:             Since starting your pain medicine are you experiencing any of the following?              ( Examples: Constipation, fatigue, lapses in memory, depression or sexual                        Dysfunction)   No   Is a Pain management Contract in the patient's chart? yes  Aberrant behaviors:              none(Early refill requests, lost prescriptions, lost medicine)  Pill count:             Is it consistent with patient's prescription? {Yes/No:[id number]:L: \"yes\"  Last urine drug screen:     [facility] Prescription Monitoring Program check performed:  yes        Treatment Care Team:  Patient Care Team:  Efrain Hastings MD as PCP - General (Family Practice)  Alejandrina Solomon MD (Gastroenterology)  Follow up contact scheduled for 1-4 weeks: yes        Allergies   Allergen Reactions    Celebrex [Celecoxib] Swelling    Shellfish Derived Swelling     \"makes me feel puffy\"    Sulfa (Sulfonamide Antibiotics) Hives and Swelling     Lips swelling    Gabapentin Diarrhea and Palpitations     Chest and Abdominal pain    Humira [Adalimumab] Other (comments)     Lupus    Iodine Itching    Optiray 320 [Ioversol] Hives and Itching     Pt states when she gets iv contrast she itches a little from hives?     Penicillins Hives     Outpatient Medications Marked as Taking for the 2/4/19 encounter (Office Visit) with Efrain Hastings MD   Medication Sig Dispense Refill    HYDROcodone-acetaminophen (NORCO) 5-325 mg per tablet Take 1 Tab by mouth every six (6) hours as needed for Pain. Max Daily Amount: 4 Tabs. 40 Tab 0    traMADol (ULTRAM) 50 mg tablet Take 50 mg by mouth every six (6) hours as needed for Pain.  teriparatide (FORTEO) 20 mcg/dose - 600 mcg/2.4 mL pnij injection 20 mcg by SubCUTAneous route daily.  buPROPion SR (WELLBUTRIN SR) 100 mg SR tablet Take 1 Tab by mouth two (2) times a day. 180 Tab 1    ondansetron hcl (ZOFRAN) 4 mg tablet Take 1 Tab by mouth every eight (8) hours as needed for Nausea. 36 Tab 0    cetirizine (ZYRTEC) 10 mg tablet Take  by mouth.  petrolat,wht/min oil/sod chl (DRY EYES OP) Apply  to eye.  Iron, Cbn & Gluc-FA-B12-C-DSS (FERRALET 90 DUAL-IRON DELIVERY) 90-1-12-50 mg-mg-mcg-mg tab Take  by mouth.  naloxone (NARCAN) 2 mg/actuation spry Use 1 spray intranasally into 1 nostril. Use a new Narcan nasal spray for subsequent doses and administer into alternating nostrils. May repeat every 2 to 3 minutes as needed. 1 Package 0    acetaminophen (TYLENOL EXTRA STRENGTH) 500 mg tablet Take 1,000 mg by mouth every six (6) hours as needed for Pain.  cholecalciferol (VITAMIN D3) 1,000 unit tablet Take 1,000 Units by mouth five (5) days a week.  polyethylene glycol (MIRALAX) 17 gram packet Take 17 g by mouth daily.  calcium carbonate (OS-BARBARA) 500 mg calcium (1,250 mg) tablet Take 1,500 mg by mouth daily.        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 [address] M06.9    Sicca syndrome (HCC) M35.00    Osteoarthritis of right hip M16.11    Pain management contract agreement Z02.89    ACP (advance care planning) Z71.89    Chronic left shoulder pain M25.512, G89.29    Osteopenia M85.80    Osteopenia of multiple sites M85.89    Osteoarthritis of left hip M16.12    Closed fracture of thoracic spine without spinal cord lesion ([address] S22.009A    Spinal stenosis, thoracolumbar region M48.05    Lumbar spine instability M53.2X6    Spinal stenosis of lumbar region with neurogenic claudication M48.062    Spinal stenosis of lumbar region M48.061    Lumbar stenosis M48.061    Nausea R11.0    Acute bilateral thoracic back pain M54.6    Anxiety F41.9    Chest pain R07.9    Essential hypertension I10     Past Medical History:   Diagnosis Date    Abdominal abscess 2009    Arthritis     Rheumatoid    Autoimmune disease ([address]     Medically induced Lupus    Back pain     Chronic pain     lower back    Colitis     Diverticulitis     Failed back syndrome     GERD (gastroesophageal reflux disease)     Hypertension     when on cyclosporins    Ill-defined condition     large torus roof of mouth    Irritable bowel syndrome     Neuropathy     bilateral hands/wrist    Osteoporosis     Pneumonia     RA (rheumatoid arthritis) ([address]     Seizures ([address] 6-1-2008    one episode- after high dose of epinepherine     Social History     Socioeconomic History    Marital status:      Spouse name: Not on file    Number of children: Not on file    Years of education: Not on file    Highest education level: Not on file   Tobacco Use    Smoking status: Never Smoker    Smokeless tobacco: Never Used   Substance and Sexual Activity    Alcohol use: No    Drug use: No    Sexual activity: No   Other Topics Concern     Family History   Problem Relation Age of Onset    Other Other         Orthopedic (Sister)   [facility] Arthritis-osteo Sister     Heart Attack Brother 58    Cancer Neg Hx     Diabetes Neg Hx     Heart Disease Neg Hx     Hypertension Neg Hx     Stroke Neg Hx     Malignant Hyperthermia Neg Hx     Pseudocholinesterase Deficiency Neg Hx     Delayed Awakening Neg Hx     Post-op Nausea/Vomiting Neg Hx     Emergence Delirium Neg Hx     Post-op Cognitive Dysfunction Neg Hx         Review of Systems   Constitutional: Negative for chills, fever, malaise/fatigue and weight loss. Eyes: Negative for blurred vision. Respiratory: Negative for shortness of breath and wheezing. Cardiovascular: Negative for chest pain. Gastrointestinal: Negative for nausea and vomiting. Musculoskeletal: Positive for back pain. Negative for myalgias. Skin: Negative for rash. Neurological: Negative for weakness. Psychiatric/Behavioral: Negative. Vitals:    02/04/19 0927   BP: 122/74   Pulse: 84   Resp: 14   SpO2: 97%   Weight: 170 lb (77.1 kg)   Height: 5' 2\" (1.575 m)   PainSc:   4   PainLoc: Back       Physical Exam   Constitutional: She is oriented to person, place, and time and well-developed, well-nourished, and in no distress. Neck: Normal range of motion. Neck supple. No thyromegaly present. Cardiovascular: Normal rate, regular rhythm and normal heart sounds. Pulmonary/Chest: Effort normal and breath sounds normal.   Musculoskeletal: Normal range of motion. Neurological: She is alert and oriented to person, place, and time. Skin: Skin is warm and dry. Psychiatric: Mood, memory, affect and judgment normal.   Nursing note and vitals reviewed. Assessment/Plan      ICD-10-CM ICD-9-CM    1. Acute bilateral thoracic back pain M54.6 724.1 HYDROcodone-acetaminophen (NORCO) 5-325 mg per tablet     Continue weaning pain meds    I have discussed the diagnosis with the patient and the intended plan of care as seen in the above orders. The patient has received an after-visit summary and questions were answered concerning future plans. I have discussed medication, side effects, and warnings with the patient in detail.  The patient verbalized understanding and is in agreement with the plan of care. The patient will contact the office with any additional concerns. Follow-up Disposition:  Return in about 8 weeks (around 4/1/2019).   lab results and schedule of future lab studies reviewed with patient    Cayla Pulliam MD

## 2019-02-04 NOTE — PATIENT INSTRUCTIONS
Back Pain: Care Instructions  Your Care Instructions    Back pain has many possible causes. It is often related to problems with muscles and ligaments of the back. It may also be related to problems with the nerves, discs, or bones of the back. Moving, lifting, standing, sitting, or sleeping in an awkward way can strain the back. Sometimes you don't notice the injury until later. Arthritis is another common cause of back pain. Although it may hurt a lot, back pain usually improves on its own within several weeks. Most people recover in 12 weeks or less. Using good home treatment and being careful not to stress your back can help you feel better sooner. Follow-up care is a key part of your treatment and safety. Be sure to make and go to all appointments, and call your doctor if you are having problems. It's also a good idea to know your test results and keep a list of the medicines you take. How can you care for yourself at home? · Sit or lie in positions that are most comfortable and reduce your pain. Try one of these positions when you lie down:  ? Lie on your back with your knees bent and supported by large pillows. ? Lie on the floor with your legs on the seat of a sofa or chair. ? Lie on your side with your knees and hips bent and a pillow between your legs. ? Lie on your stomach if it does not make pain worse. · Do not sit up in bed, and avoid soft couches and twisted positions. Bed rest can help relieve pain at first, but it delays healing. Avoid bed rest after the first day of back pain. · Change positions every 30 minutes. If you must sit for long periods of time, take breaks from sitting. Get up and walk around, or lie in a comfortable position. · Try using a heating pad on a low or medium setting for 15 to 20 minutes every 2 or 3 hours. Try a warm shower in place of one session with the heating pad. · You can also try an ice pack for 10 to 15 minutes every 2 to 3 hours.  Put a thin cloth between the ice pack and your skin. · Take pain medicines exactly as directed. ? If the doctor gave you a prescription medicine for pain, take it as prescribed. ? If you are not taking a prescription pain medicine, ask your doctor if you can take an over-the-counter medicine. · Take short walks several times a day. You can start with 5 to 10 minutes, 3 or 4 times a day, and work up to longer walks. Walk on level surfaces and avoid hills and stairs until your back is better. · Return to work and other activities as soon as you can. Continued rest without activity is usually not good for your back. · To prevent future back pain, do exercises to stretch and strengthen your back and stomach. Learn how to use good posture, safe lifting techniques, and proper body mechanics. When should you call for help? Call your doctor now or seek immediate medical care if:    · You have new or worsening numbness in your legs.     · You have new or worsening weakness in your legs. (This could make it hard to stand up.)     · You lose control of your bladder or bowels.    Watch closely for changes in your health, and be sure to contact your doctor if:    · You have a fever, lose weight, or don't feel well.     · You do not get better as expected. Where can you learn more? Go to http://camilo-robert.info/. Enter T005 in the search box to learn more about \"Back Pain: Care Instructions. \"  Current as of: September 20, 2018  Content Version: 11.9  © 8651-8815 [employer]. Care instructions adapted under license by [employer] (which disclaims liability or warranty for this information). If you have questions about a medical condition or this instruction, always ask your healthcare professional. [place] [address] any warranty or liability for your use of this information.

## 2019-02-26 ENCOUNTER — OFFICE VISIT (OUTPATIENT)
Dept: ORTHOPEDIC SURGERY | Age: 69
End: 2019-02-26

## 2019-02-26 VITALS
SYSTOLIC BLOOD PRESSURE: 147 MMHG | HEIGHT: 62 IN | DIASTOLIC BLOOD PRESSURE: 93 MMHG | HEART RATE: 74 BPM | BODY MASS INDEX: 31.09 KG/M2

## 2019-02-26 DIAGNOSIS — Z98.1 S/P FUSION OF THORACIC SPINE: Primary | ICD-10-CM

## 2019-02-26 DIAGNOSIS — M53.2X6 LUMBAR SPINE INSTABILITY: ICD-10-CM

## 2019-02-26 NOTE — PROGRESS NOTES
[facility] [address]  [address], [address]  [place], [address] [place] [address] Street  Phone: [phone]  Fax: [phone]  PROGRESS NOTE  Patient: Marek Kearns                MRN: [id number]       SSN: [id number]  YOB: 1950        AGE: 77 y.o. SEX: female  Body mass index is 31.09 kg/m². PCP: Olivia Rayo MD  02/26/19    Chief Complaint   Patient presents with    Surgical Follow-up     S/P EXTREME LATERAL INTERBODY FUSION (XLIF) L1/2; T10-L2 FUSION       HISTORY OF PRESENT ILLNESS, RADIOGRAPHS, and PLAN:     HISTORY OF PRESENT ILLNESS:  Ms. Majorie Curling returns today. She is 3 months out from her thoracolumbar decompression and revision fusion for instability and deformity. She is asymptomatic. She is back doing her stepper. She is standing straight. She has no pain. ASSESSMENT/PLAN:  She is thrilled with her outcome. She has been looking good. We will see her back again on the anniversary of her surgery in 9 months' time. cc:  Lolita Chaidez M.D.            Past Medical History:   Diagnosis Date    Abdominal abscess 2009    Arthritis     Rheumatoid    Autoimmune disease ([address]     Medically induced Lupus    Back pain     Chronic pain     lower back    Colitis     Diverticulitis     Failed back syndrome     GERD (gastroesophageal reflux disease)     Hypertension     when on cyclosporins    Ill-defined condition     large torus roof of mouth    Irritable bowel syndrome     Neuropathy     bilateral hands/wrist    Osteoporosis     Pneumonia     RA (rheumatoid arthritis) ([address]     Seizures ([address] 6-1-2008    one episode- after high dose of epinepherine       Family History   Problem Relation Age of Onset    Other Other         Orthopedic (Sister)   Gertrudis Foot Sister     Heart Attack Brother 58    Cancer Neg Hx     Diabetes Neg Hx     Heart Disease Neg Hx     Hypertension Neg Hx     Stroke Neg Hx     Malignant Hyperthermia Neg Hx  Pseudocholinesterase Deficiency Neg Hx     Delayed Awakening Neg Hx     Post-op Nausea/Vomiting Neg Hx     Emergence Delirium Neg Hx     Post-op Cognitive Dysfunction Neg Hx        Current Outpatient Medications   Medication Sig Dispense Refill    tofacitinib (XELJANZ) 5 mg tab Take  by mouth two (2) times a day.  traMADol (ULTRAM) 50 mg tablet Take 50 mg by mouth every six (6) hours as needed for Pain.  teriparatide (FORTEO) 20 mcg/dose - 600 mcg/2.4 mL pnij injection 20 mcg by SubCUTAneous route daily.  buPROPion SR (WELLBUTRIN SR) 100 mg SR tablet Take 1 Tab by mouth two (2) times a day. 180 Tab 1    cetirizine (ZYRTEC) 10 mg tablet Take  by mouth.  petrolat,wht/min oil/sod chl (DRY EYES OP) Apply  to eye.  Iron, Cbn & Gluc-FA-B12-C-DSS (FERRALET 90 DUAL-IRON DELIVERY) 90-1-12-50 mg-mg-mcg-mg tab Take  by mouth.  naloxone (NARCAN) 2 mg/actuation spry Use 1 spray intranasally into 1 nostril. Use a new Narcan nasal spray for subsequent doses and administer into alternating nostrils. May repeat every 2 to 3 minutes as needed. 1 Package 0    acetaminophen (TYLENOL EXTRA STRENGTH) 500 mg tablet Take 1,000 mg by mouth every six (6) hours as needed for Pain.  cholecalciferol (VITAMIN D3) 1,000 unit tablet Take 1,000 Units by mouth five (5) days a week.  calcium carbonate (OS-BARBARA) 500 mg calcium (1,250 mg) tablet Take 1,500 mg by mouth daily.  HYDROcodone-acetaminophen (NORCO) 5-325 mg per tablet Take 1 Tab by mouth every six (6) hours as needed for Pain. Max Daily Amount: 4 Tabs. (Patient not taking: Reported on 2/26/2019) 40 Tab 0    ondansetron hcl (ZOFRAN) 4 mg tablet Take 1 Tab by mouth every eight (8) hours as needed for Nausea. (Patient not taking: Reported on 2/26/2019) 36 Tab 0    lisinopril (PRINIVIL, ZESTRIL) 10 mg tablet TAKE 1 TABLET DAILY 90 Tab 1    polyethylene glycol (MIRALAX) 17 gram packet Take 17 g by mouth daily.          Allergies   Allergen Reactions    Celebrex [Celecoxib] Swelling    Shellfish Derived Swelling     \"makes me feel puffy\"    Sulfa (Sulfonamide Antibiotics) Hives and Swelling     Lips swelling    Gabapentin Diarrhea and Palpitations     Chest and Abdominal pain    Humira [Adalimumab] Other (comments)     Lupus    Iodine Itching    Optiray 320 [Ioversol] Hives and Itching     Pt states when she gets iv contrast she itches a little from hives?     Penicillins Hives       Past Surgical History:   Procedure Laterality Date    HX ABDOMINAL WALL DEFECT REPAIR      HX APPENDECTOMY      HX BACK SURGERY  12/03/2018    HX BREAST REDUCTION      HX CATARACT REMOVAL Bilateral     HX COLONOSCOPY      HX GI      repair rectal prolaspe    HX GYN      dermoid cyst    HX HEENT      tonsillectomy    HX HYSTERECTOMY  1976    HX LUMBAR FUSION      x 2    HX ORTHOPAEDIC Bilateral     CTR    HX ORTHOPAEDIC Bilateral     arthroplasty thumbs    HX ORTHOPAEDIC Left     Ankle     HX ORTHOPAEDIC Right     \"Nerve Release Elbow\"    HX OTHER SURGICAL Left 1985    vein stripping    HX SALPINGO-OOPHORECTOMY Bilateral     HX SHOULDER REPLACEMENT Left     HX UROLOGICAL      bladder repair    TOTAL HIP ARTHROPLASTY Bilateral        Past Medical History:   Diagnosis Date    Abdominal abscess 2009    Arthritis     Rheumatoid    Autoimmune disease ([address]     Medically induced Lupus    Back pain     Chronic pain     lower back    Colitis     Diverticulitis     Failed back syndrome     GERD (gastroesophageal reflux disease)     Hypertension     when on cyclosporins    Ill-defined condition     large torus roof of mouth    Irritable bowel syndrome     Neuropathy     bilateral hands/wrist    Osteoporosis     Pneumonia     RA (rheumatoid arthritis) ([address]     Seizures ([address] 6-1-2008    one episode- after high dose of epinepherine       Social History     Socioeconomic History    Marital status:      Spouse name: Not on file    Number of children: Not on file    Years of education: Not on file    Highest education level: Not on file   Social Needs    Financial resource strain: Not on file    Food insecurity - worry: Not on file    Food insecurity - inability: Not on file    Transportation needs - medical: Not on file   [employer] needs - non-medical: Not on file   Occupational History    Not on file   Tobacco Use    Smoking status: Never Smoker    Smokeless tobacco: Never Used   Substance and Sexual Activity    Alcohol use: No    Drug use: No    Sexual activity: No   Other Topics Concern     Service Not Asked    Blood Transfusions Not Asked    Caffeine Concern Not Asked    Occupational Exposure Not Asked    Hobby Hazards Not Asked    Sleep Concern Not Asked    Stress Concern Not Asked    Weight Concern Not Asked    Special Diet Not Asked    Back Care Not Asked    Exercise Not Asked    Bike Helmet Not Asked   [address] Not Asked    Self-Exams Not Asked   Social History Narrative    Not on file         REVIEW OF SYSTEMS:   CONSTITUTIONAL SYMPTOMS:  Negative. EYES:  Negative. EARS, NOSE, THROAT AND MOUTH:  Negative. CARDIOVASCULAR:  Negative. RESPIRATORY:  Negative. GENITOURINARY: Per HPI. GASTROINTESTINAL:  Per HPI. INTEGUMENTARY (SKIN AND/OR BREAST):  Negative. MUSCULOSKELETAL: Per HPI.   ENDOCRINE/RHEUMATOLOGIC:  Negative. NEUROLOGICAL:  Per HPI. HEMATOLOGIC/LYMPHATIC:  Negative. ALLERGIC/IMMUNOLOGIC:  Negative. PSYCHIATRIC:  Negative. PHYSICAL EXAMINATION:   Visit Vitals  BP (!) 147/93   Pulse 74   Ht 5' 2\" (1.575 m)   BMI 31.09 kg/m²    PAIN SCALE: 0 - No pain/10    CONSTITUTIONAL: The patient is in no apparent distress and is alert and oriented x 3. HEENT: Normocephalic. Hearing grossly intact. NECK: Supple and symmetric. no tenderness, or masses were felt. RESPIRATORY: No labored breathing.   CARDIOVASCULAR: The carotid pulses were normal. Peripheral pulses were 2+.  CHEST: Normal AP diameter and normal contour without any kyphoscoliosis. LYMPHATIC: No lymphadenopathy was appreciated in the neck, axillae or groin. SKIN:   Negative for scars, rashes, lesions, or ulcers on the right upper, right lower, left upper, left lower and trunk. NEUROLOGICAL: Alert and oriented x 3. Ambulation without assistive device. FWB. EXTREMITIES: See musculoskeletal.  MUSCULOSKELETAL:   Head and Neck:  Negative for misalignment, asymmetry, crepitation, defects, tenderness masses or effusions.  Left Upper Extremity: Inspection, percussion and palpation performed. Prices sign is negative.  Right Upper Extremity: Inspection, percussion and palpation performed. Prices sign is negative.  Spine, Ribs and Pelvis: Inspection, percussion and palpation performed. Negative for misalignment, asymmetry, crepitation, defects, tenderness masses or effusions.  Left Lower Extremity: Inspection, percussion and palpation performed. Negative straight leg raise.  Right Lower Extremity: Inspection, percussion and palpation performed. Negative straight leg raise. SPINE EXAM:     Lumbar spine: No rash, ecchymosis, or gross obliquity. No focal atrophy is noted. ASSESSMENT    ICD-10-CM ICD-9-CM    1. S/P fusion of thoracic spine Z98.1 V45.4    2. Lumbar spine instability M53.2X6 724.9        Written by Gt Bernabe, as dictated by Kiel Cormier MD.    I, Dr. Kiel Cormier MD, confirm that all documentation is accurate.

## 2019-03-04 NOTE — H&P
History and Physical        Patient: Tino Rodriguezippo               Sex: female          DOA: (Not on file)         YOB: 1950      Age:  69 y.o.        LOS:  LOS: 0 days        HPI:     Lesa Gowers is a 69year old right handed white female referred today for right severe coxarthrosis. The patient has been having discomfort in her right hip that has been steadily worse over the last seven to eight months. She was down in the [place] and has been using a cane and/or crutches recently just because of the pain and has been diagnosed with significant arthritic change. She is now here today for evaluation and treatment. She has been off of her Mobic now for about a week and is not taking her rheumatoid meds currently. Her pain is in the right groin and radiates throughout. AP, pelvic, and lateral views of the right hip were obtained and interpreted in the office and show right severe bone on bone arthritic change with the left appearing normal.  Otherwise, x-rays reveal no periosteal reaction, no medullary lesions, no osteopenia, no chondrolysis, and no fracture. Past Medical History:   Diagnosis Date    Abdominal abscess ([address] 2009    Arthritis     Rheumatoid    Autoimmune disease ([address]     Medically induced Lupus    Back pain     Chronic pain     Colitis     Diverticulitis     Failed back syndrome     GERD (gastroesophageal reflux disease)     Hypertension     when on cyclosporins    Ill-defined condition     large torus roof of mouth    Irritable bowel syndrome     Osteoporosis     Pneumonia     Seizures ([address] 6-1-2008    one episode- after high dose of med.        Past Surgical History:   Procedure Laterality Date    HX ABDOMINAL WALL DEFECT REPAIR      HX APPENDECTOMY      HX BREAST REDUCTION      HX CATARACT REMOVAL      HX GYN      hysterectomy    HX HEENT      tonsillectomy    HX ORTHOPAEDIC      left total shoulder    HX ORTHOPAEDIC      back    HX Please call pt [phone], she is having trouble getting an answer from her insurance company on coverage they need to know DX codes, if it is medically necessary ect. Please call pt to help. Thank You Sarina       ORTHOPAEDIC      bilateral CTR    HX ORTHOPAEDIC      arthroplasty both thumbs    HX ORTHOPAEDIC      Ankle     HX SALPINGO-OOPHORECTOMY      HX UROLOGICAL      bladder       Family History   Problem Relation Age of Onset    Other Other      Orthopedic (Sister)    Arthritis-osteo Sister     Cancer Neg Hx     Diabetes Neg Hx     Heart Disease Neg Hx     Heart Attack Neg Hx     Hypertension Neg Hx     Stroke Neg Hx     Malignant Hyperthermia Neg Hx     Pseudocholinesterase Deficiency Neg Hx     Delayed Awakening Neg Hx     Post-op Nausea/Vomiting Neg Hx     Emergence Delirium Neg Hx     Post-op Cognitive Dysfunction Neg Hx        Social History     Social History    Marital status:      Spouse name: N/A    Number of children: N/A    Years of education: N/A     Social History Main Topics    Smoking status: Never Smoker    Smokeless tobacco: Never Used    Alcohol use No    Drug use: No    Sexual activity: Not on file     Other Topics Concern    Not on file     Social History Narrative       Prior to Admission medications    Medication Sig Start Date End Date Taking? Authorizing Provider   amitriptyline (ELAVIL) 25 mg tablet Take  by mouth nightly. Historical Provider   acetaminophen-codeine (TYLENOL-CODEINE #3) 300-30 mg per tablet Take 1 Tab by mouth every four (4) hours as needed for Pain. Historical Provider   meloxicam (MOBIC) 15 mg tablet TAKE 1 TABLET DAILY 4/14/17   Grady Morris MD   lisinopril (PRINIVIL, ZESTRIL) 10 mg tablet TAKE 1 TABLET DAILY 4/14/17   Grady Morris MD   furosemide (LASIX) 20 mg tablet Take 1 Tab by mouth daily. Indications: Edema 1/20/17   Grady Morris MD   cyclobenzaprine (FLEXERIL) 10 mg tablet Take 1 Tab by mouth three (3) times daily as needed. Indications: RA 10/14/16   Grady Morris MD   sucralfate (CARAFATE) 100 mg/mL suspension Take 5 mL by mouth four (4) times daily.  9/26/16   Grady Morris MD   TOFACITINIB CITRATE (XELJANZ PO) Take 10 mg by mouth two (2) times a day. Historical Provider   oxybutynin chloride XL (DITROPAN XL) 10 mg CR tablet Take 1 Tab by mouth daily. Indications: INCREASED URINARY FREQUENCY 10/29/15   Jorge Mckinney MD   calcium carbonate (OS-BARBARA) 500 mg calcium (1,250 mg) tablet 1,500 mg. Historical Provider   ethinyl estradiol-etonogestrel (NUVARING) 0.12-0.015 mg/24 hr vaginal ring 1 Each. Historical Provider   MULTIVITAMIN WITH MINERALS (ONE-A-DAY MAXIMUM FORMULA PO) Take 1 Tab by Mouth Once a Day. Historical Provider   methotrexate 25 mg/mL injection 12.5 mg.    Historical Provider   MELATONIN PO Take  by mouth. Historical Provider   diphenhydrAMINE (BENADRYL) 25 mg capsule Take 25 mg by mouth every six (6) hours as needed. Historical Provider   zolpidem (AMBIEN) 5 mg tablet Take 1 tablet by mouth nightly as needed for Sleep. Take 1 Tab by Mouth Nightly As Needed. 11/18/14   Jorge Mckinney MD   ondansetron (ZOFRAN ODT) 8 mg disintegrating tablet Take 1 tablet by mouth every eight (8) hours as needed for Nausea. 11/18/14   Jorge Mckinney MD   linaclotide [employer]) 290 mcg cap capsule Take 1 capsule by mouth daily. Indications: IBS 11/18/14   Jorge Mckinney MD   Cetirizine (ZYRTEC) 10 mg cap Take  by mouth. Historical Provider   ACETAMINOPHEN (TYLENOL PO) Take 200 mg by mouth. Historical Provider   docusate sodium (COLACE) 100 mg capsule Take 100 mg by mouth daily as needed. Historical Provider   omeprazole (PRILOSEC) 20 mg capsule Take 20 mg by mouth two (2) times a day. Pt instructed to take am of surgery. Indications: GASTROESOPHAGEAL REFLUX    Historical Provider   cholecalciferol (VITAMIN D3) 1,000 unit tablet Take 1,000 Units by mouth daily. Gracia Mckay MD   folic acid (FOLVITE) 1 mg tablet Take 1 mg by mouth three (3) times daily.     Gracia Mckay MD       Allergies   Allergen Reactions    Celebrex [Celecoxib] Swelling    Humira [Adalimumab] Other (comments)     Lupus    Optiray 320 [Ioversol] Hives and Itching     Pt states when she gets iv contrast she itches a little from hives?  Penicillins Hives    Sulfa (Sulfonamide Antibiotics) Hives    Sulfur Swelling       Review of Systems  A comprehensive review of systems was negative except for that written in the History of Present Illness. Physical Exam:      There were no vitals taken for this visit. Physical Exam:  Physical exam shows a healthy appearing elderly white female. The right hip has about a 40 degree arc of motion with pain referred back to the right groin. She has about a two to three millimeter leg length discrepancy on the right. Dr. Ana Arambula put the  on the right at five and that felt a little too long for her. The skin is normal with no contusions or wounds. There is no pain at either hip or the greater trochanters. As the iliotibial band is stretched, there is no pain. The patient has normal light touch sensation in all dermatomal patterns. There is normal motor strength to heel and toe walking. There is negative straight leg raising bilaterally to 90 degrees in the seated position. Pedal pulses are good. Assessment/Plan     Principal Problem:    Osteoarthritis of right hip (5/30/2017)    Active Problems:    H/O calcium pyrophosphate deposition disease (CPPD) (5/30/2017)      IBS (irritable bowel syndrome) (5/30/2017)      RA (rheumatoid arthritis) ([facility] (5/30/2017)      Sicca syndrome ([facility] (5/30/2017)      We have scheduled her for a right direct anterior total hip arthroplasty for next week. Dr. Ana Arambula talked to her about the risks, alternatives and the benefits including infection, pain, bleeding and deep vein thrombosis. We will use aspirin for postop deep venous thrombosis prophylaxis and she will use perioperative IV antibiotics. She will be in the hospital overnight and he will see her again about two weeks postop.   She understands the protocol and the postoperative rehab and is willing to proceed. We will add the two to three millimeters to her leg length at the time of surgery. We are going to use Dilaudid postoperatively.

## 2019-05-02 ENCOUNTER — TELEPHONE (OUTPATIENT)
Dept: FAMILY MEDICINE CLINIC | Age: 69
End: 2019-05-02

## 2019-05-02 DIAGNOSIS — F51.02 ADJUSTMENT INSOMNIA: Primary | ICD-10-CM

## 2019-05-02 RX ORDER — ZOLPIDEM TARTRATE 10 MG/1
10 TABLET ORAL
Qty: 90 TAB | Refills: 0 | Status: SHIPPED | OUTPATIENT
Start: 2019-05-02 | End: 2019-07-31

## 2019-05-02 NOTE — TELEPHONE ENCOUNTER
.This pharmacy faxed over request for the following prescriptions to be filled: 90 day request    Medication requested : Zolpidem Tartrate Tabs 10mg (not listed in meds)    PCP: Dr. Ava Quezada MD  Pharmacy or Print: Pharmacy  Mail order or Local pharmacy: [facility] Order[facility]    Scheduled appointment if not seen by current providers in office: last appointment for acute back pain 2/4/19, no future appointment currently scheduled at this time. Please review and advise.

## 2019-05-18 NOTE — TELEPHONE ENCOUNTER
Stanley Smith is a 17 y.o. male patient who presents for n/v/d which onset this morning after eating possible eating old shrimp yesterday. No mitigating or exacerbating factors reported. Associated sxs include abd pain, subjective fever.  No prior treatment.  Patient was seen evaluated in the emergency room.  WBC greater than 20,000 fever, tachycardic in ER.  Patient was treated with antiemetics IV antibiotics and IV fluids in emergency room but still with nausea and diarrhea.  Hospital Medicine consulted.  Patient placed in observation..       [facility] called to inform Dr. Brannon Prudent that patient was prescribed Ultram ER filled 06/29/17 and refilled 09/06/17 off formulary [facility] [place] and not the Ultram ER.   Need a new RX for Ultram ER. [phone]

## 2019-06-07 ENCOUNTER — OFFICE VISIT (OUTPATIENT)
Dept: FAMILY MEDICINE CLINIC | Age: 69
End: 2019-06-07

## 2019-06-07 VITALS
SYSTOLIC BLOOD PRESSURE: 124 MMHG | OXYGEN SATURATION: 96 % | HEART RATE: 59 BPM | DIASTOLIC BLOOD PRESSURE: 68 MMHG | RESPIRATION RATE: 12 BRPM | TEMPERATURE: 98.4 F

## 2019-06-07 DIAGNOSIS — Z00.00 MEDICARE ANNUAL WELLNESS VISIT, SUBSEQUENT: ICD-10-CM

## 2019-06-07 DIAGNOSIS — Z71.89 ACP (ADVANCE CARE PLANNING): ICD-10-CM

## 2019-06-07 DIAGNOSIS — M54.6 ACUTE BILATERAL THORACIC BACK PAIN: Primary | ICD-10-CM

## 2019-06-07 RX ORDER — TRAMADOL HYDROCHLORIDE 50 MG/1
50 TABLET ORAL
Qty: 90 TAB | Refills: 1 | Status: SHIPPED | OUTPATIENT
Start: 2019-06-07 | End: 2019-07-07

## 2019-06-07 NOTE — PATIENT INSTRUCTIONS
Medicare Wellness Visit, Female The best way to live healthy is to have a lifestyle where you eat a well-balanced diet, exercise regularly, limit alcohol use, and quit all forms of tobacco/nicotine, if applicable. Regular preventive services are another way to keep healthy. Preventive services (vaccines, screening tests, monitoring & exams) can help personalize your care plan, which helps you manage your own care. Screening tests can find health problems at the earliest stages, when they are easiest to treat. [facility] follows the current, evidence-based guidelines published by the [facility] ([facility]) when recommending preventive services for our patients. Because we follow these guidelines, sometimes recommendations change over time as research supports it. (For example, mammograms used to be recommended annually. Even though Medicare will still pay for an annual mammogram, the newer guidelines recommend a mammogram every two years for women of average risk.) Of course, you and your doctor may decide to screen more often for some diseases, based on your risk and your health status. Preventive services for you include: - Medicare offers their members a free annual wellness visit, which is time for you and your primary care provider to discuss and plan for your preventive service needs. Take advantage of this benefit every year! 
-All adults over the age of 72 should receive the recommended pneumonia vaccines. Current USPSTF guidelines recommend a series of two vaccines for the best pneumonia protection.  
-All adults should have a flu vaccine yearly and a tetanus vaccine every 10 years. All adults age 61 and older should receive a shingles vaccine once in their lifetime.   
-A bone mass density test is recommended when a woman turns 65 to screen for osteoporosis. This test is only recommended one time, as a screening. Some providers will use this same test as a disease monitoring tool if you already have osteoporosis. -All adults age 38-68 who are overweight should have a diabetes screening test once every three years.  
-Other screening tests and preventive services for persons with diabetes include: an eye exam to screen for diabetic retinopathy, a kidney function test, a foot exam, and stricter control over your cholesterol.  
-Cardiovascular screening for adults with routine risk involves an electrocardiogram (ECG) at intervals determined by your doctor.  
-Colorectal cancer screenings should be done for adults age 54-65 with no increased risk factors for colorectal cancer. There are a number of acceptable methods of screening for this type of cancer. Each test has its own benefits and drawbacks. Discuss with your doctor what is most appropriate for you during your annual wellness visit. The different tests include: colonoscopy (considered the best screening method), a fecal occult blood test, a fecal DNA test, and sigmoidoscopy. -Breast cancer screenings are recommended every other year for women of normal risk, age 54-69. 
-Cervical cancer screenings for women over age 72 are only recommended with certain risk factors.  
-All adults born between [facility] should be screened once for Hepatitis C. Here is a list of your current Health Maintenance items (your personalized list of preventive services) with a due date: 
Health Maintenance Due Topic Date Due  Shingles Vaccine (1 of 2) 03/10/1998 Bassam Liang Annual Well Visit  06/29/2018  Mammogram  07/18/2019 Advance Directives: Care Instructions Your Care Instructions An advance directive is a legal way to state your wishes at the end of your life. It tells your family and your doctor what to do if you can no longer say what you want. There are two main types of advance directives. You can change them any time that your wishes change. · A living will tells your family and your doctor your wishes about life support and other treatment. · A durable power of  for health care lets you name a person to make treatment decisions for you when you can't speak for yourself. This person is called a health care agent. If you do not have an advance directive, decisions about your medical care may be made by a doctor or a  who doesn't know you. It may help to think of an advance directive as a gift to the people who care for you. If you have one, they won't have to make tough decisions by themselves. Follow-up care is a key part of your treatment and safety. Be sure to make and go to all appointments, and call your doctor if you are having problems. It's also a good idea to know your test results and keep a list of the medicines you take. How can you care for yourself at home? · Discuss your wishes with your loved ones and your doctor. This way, there are no surprises. · Many states have a unique form. Or you might use a universal form that has been approved by many states. This kind of form can sometimes be completed and stored online. Your electronic copy will then be available wherever you have a connection to the Internet. In most cases, doctors will respect your wishes even if you have a form from a different state. · You don't need a  to do an advance directive. But you may want to get legal advice. · Think about these questions when you prepare an advance directive: 
? Who do you want to make decisions about your medical care if you are not able to? Many people choose a family member or close friend. ? Do you know enough about life support methods that might be used? If not, talk to your doctor so you understand. ? What are you most afraid of that might happen? You might be afraid of having pain, losing your independence, or being kept alive by machines. ? Where would you prefer to die? Choices include your home, a hospital, or a nursing home. ? Would you like to have information about hospice care to support you and your family? ? Do you want to donate organs when you die? ? Do you want certain Christian practices performed before you die? If so, put your wishes in the advance directive. · Read your advance directive every year, and make changes as needed. When should you call for help? Be sure to contact your doctor if you have any questions. Where can you learn more? Go to http://camilo-robert.info/. Enter R264 in the search box to learn more about \"Advance Directives: Care Instructions. \" Current as of: April 18, 2018 Content Version: 11.9 © 8732-1810 [employer]. Care instructions adapted under license by [employer] (which disclaims liability or warranty for this information). If you have questions about a medical condition or this instruction, always ask your healthcare professional. [address] any warranty or liability for your use of this information.

## 2019-06-07 NOTE — PROGRESS NOTES
Patient here for refills on her Tramadol today. 1. Have you been to the ER, urgent care clinic since your last visit? Hospitalized since your last visit? No  2. Have you seen or consulted any other health care providers outside of the [address] [place] [address] since your last visit? Include any pap smears or colon screening. No    Medication reconciliation has been completed with patient. Care team discussed/updated as well as pharmacy. Health Maintenance reviewed - Due for Arcadio Farooq . This is the Subsequent Medicare Annual Wellness Exam, performed 12 months or more after the Initial AWV or the last Subsequent AWV    I have reviewed the patient's medical history in detail and updated the computerized patient record.      History     Past Medical History:   Diagnosis Date    Abdominal abscess 2009    Arthritis     Rheumatoid    Autoimmune disease ([address]     Medically induced Lupus    Back pain     Chronic pain     lower back    Colitis     Diverticulitis     Failed back syndrome     GERD (gastroesophageal reflux disease)     Hypertension     when on cyclosporins    Ill-defined condition     large torus roof of mouth    Irritable bowel syndrome     Neuropathy     bilateral hands/wrist    Osteoporosis     Pneumonia     RA (rheumatoid arthritis) ([address]     Seizures ([address] 6-1-2008    one episode- after high dose of epinepherine      Past Surgical History:   Procedure Laterality Date    HX ABDOMINAL WALL DEFECT REPAIR      HX APPENDECTOMY      HX BACK SURGERY  12/03/2018    HX BREAST REDUCTION      HX CATARACT REMOVAL Bilateral     HX COLONOSCOPY      HX GI      repair rectal prolaspe    HX GYN      dermoid cyst    HX HEENT      tonsillectomy    HX HYSTERECTOMY  1976    HX LUMBAR FUSION      x 2    HX ORTHOPAEDIC Bilateral     CTR    HX ORTHOPAEDIC Bilateral     arthroplasty thumbs    HX ORTHOPAEDIC Left     Ankle     HX ORTHOPAEDIC Right     \"Nerve Release Elbow\"    HX OTHER SURGICAL Left 1985    vein stripping    HX SALPINGO-OOPHORECTOMY Bilateral     HX SHOULDER REPLACEMENT Left     HX UROLOGICAL      bladder repair    TOTAL HIP ARTHROPLASTY Bilateral      Current Outpatient Medications   Medication Sig Dispense Refill    zolpidem (AMBIEN) 10 mg tablet Take 1 Tab by mouth nightly as needed for Sleep for up to 90 days. Max Daily Amount: 10 mg. 90 Tab 0    tofacitinib (XELJANZ) 5 mg tab Take  by mouth two (2) times a day.  HYDROcodone-acetaminophen (NORCO) 5-325 mg per tablet Take 1 Tab by mouth every six (6) hours as needed for Pain. Max Daily Amount: 4 Tabs. (Patient not taking: Reported on 2/26/2019) 40 Tab 0    traMADol (ULTRAM) 50 mg tablet Take 50 mg by mouth every six (6) hours as needed for Pain.  teriparatide (FORTEO) 20 mcg/dose - 600 mcg/2.4 mL pnij injection 20 mcg by SubCUTAneous route daily.  buPROPion SR (WELLBUTRIN SR) 100 mg SR tablet Take 1 Tab by mouth two (2) times a day. 180 Tab 1    ondansetron hcl (ZOFRAN) 4 mg tablet Take 1 Tab by mouth every eight (8) hours as needed for Nausea. (Patient not taking: Reported on 2/26/2019) 36 Tab 0    cetirizine (ZYRTEC) 10 mg tablet Take  by mouth.  petrolat,wht/min oil/sod chl (DRY EYES OP) Apply  to eye.  Iron, Cbn & Gluc-FA-B12-C-DSS (FERRALET 90 DUAL-IRON DELIVERY) 90-1-12-50 mg-mg-mcg-mg tab Take  by mouth.  lisinopril (PRINIVIL, ZESTRIL) 10 mg tablet TAKE 1 TABLET DAILY 90 Tab 1    naloxone (NARCAN) 2 mg/actuation spry Use 1 spray intranasally into 1 nostril. Use a new Narcan nasal spray for subsequent doses and administer into alternating nostrils. May repeat every 2 to 3 minutes as needed. 1 Package 0    acetaminophen (TYLENOL EXTRA STRENGTH) 500 mg tablet Take 1,000 mg by mouth every six (6) hours as needed for Pain.  cholecalciferol (VITAMIN D3) 1,000 unit tablet Take 1,000 Units by mouth five (5) days a week.  polyethylene glycol (MIRALAX) 17 gram packet Take 17 g by mouth daily.  calcium carbonate (OS-BARBARA) 500 mg calcium (1,250 mg) tablet Take 1,500 mg by mouth daily. Allergies   Allergen Reactions    Celebrex [Celecoxib] Swelling    Shellfish Derived Swelling     \"makes me feel puffy\"    Sulfa (Sulfonamide Antibiotics) Hives and Swelling     Lips swelling    Gabapentin Diarrhea and Palpitations     Chest and Abdominal pain    Humira [Adalimumab] Other (comments)     Lupus    Iodine Itching    Optiray 320 [Ioversol] Hives and Itching     Pt states when she gets iv contrast she itches a little from hives?     Penicillins Hives     Family History   Problem Relation Age of Onset    Other Other         Orthopedic (Sister)   Waylon Feng Arthritis-osteo Sister     Heart Attack Brother 58    Cancer Neg Hx     Diabetes Neg Hx     Heart Disease Neg Hx     Hypertension Neg Hx     Stroke Neg Hx     Malignant Hyperthermia Neg Hx     Pseudocholinesterase Deficiency Neg Hx     Delayed Awakening Neg Hx     Post-op Nausea/Vomiting Neg Hx     Emergence Delirium Neg Hx     Post-op Cognitive Dysfunction Neg Hx      Social History     Tobacco Use    Smoking status: Never Smoker    Smokeless tobacco: Never Used   Substance Use Topics    Alcohol use: No     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    H/O calcium pyrophosphate deposition disease (CPPD) Z87.39    IBS (irritable bowel syndrome) K58.9    RA (rheumatoid arthritis) ([facility]) M06.9    Sicca syndrome ([facility]) M35.00    Osteoarthritis of right hip M16.11    Pain management contract agreement Z02.89    ACP (advance care planning) Z71.89    Chronic left shoulder pain M25.512, G89.29    Osteopenia M85.80    Osteopenia of multiple sites M85.89    Osteoarthritis of left hip M16.12    Closed fracture of thoracic spine without spinal cord lesion ([facility] S22.009A    Spinal stenosis, thoracolumbar region M48.05    Lumbar spine instability M53.2X6    Spinal stenosis of lumbar region with neurogenic claudication M48.062    Spinal stenosis of lumbar region M48.061    Lumbar stenosis M48.061    Nausea R11.0    Acute bilateral thoracic back pain M54.6    Anxiety F41.9    Chest pain R07.9    Essential hypertension I10       Depression Risk Factor Screening:     3 most recent PHQ Screens 11/19/2018   PHQ Not Done -   Little interest or pleasure in doing things Not at all   Feeling down, depressed, irritable, or hopeless Not at all   Total Score PHQ 2 0     Alcohol Risk Factor Screening: You do not drink alcohol or very rarely. Functional Ability and Level of Safety:   Hearing Loss  Hearing is good. Activities of Daily Living  The home contains: no safety equipment. Patient does total self care    Fall Risk  Fall Risk Assessment, last 12 mths 1/15/2019   Able to walk? Yes   Fall in past 12 months? No   Fall with injury? -   Number of falls in past 12 months -   Fall Risk Score -       Abuse Screen  Patient is not abused    Cognitive Screening   Evaluation of Cognitive Function:  Has your family/caregiver stated any concerns about your memory: no  Normal    Patient Care Team   Patient Care Team:  Mario Bains MD as PCP - General (Family Practice)  Aliyah Wesley MD (Gastroenterology)    Assessment/Plan   Education and counseling provided:  Are appropriate based on today's review and evaluation    Diagnoses and all orders for this visit:    1. Medicare annual wellness visit, subsequent    2. ACP (advance care planning)    Other orders  -     traMADol (ULTRAM) 50 mg tablet; Take 1 Tab by mouth every six (6) hours as needed for Pain for up to 30 days. Max Daily Amount: 200 mg.         Health Maintenance Due   Topic Date Due    Shingrix Vaccine Age 49> (1 of 2) 03/10/1998    MEDICARE YEARLY EXAM  06/29/2018    BREAST CANCER SCRN MAMMOGRAM  07/18/2019     Alpa Martinez MD

## 2019-06-07 NOTE — ACP (ADVANCE CARE PLANNING)
Advance Care Planning (ACP) Provider Conversation Snapshot    Date of ACP Conversation: 06/07/19  Persons included in Conversation:  patient  Length of ACP Conversation in minutes:  16 minutes    Authorized Decision Maker (if patient is incapable of making informed decisions):    This person is:   Healthcare Agent/Medical Power of  under Advance Directive            For Patients with Decision Making Capacity:   Values/Goals: Exploration of values, goals, and preferences if recovery is not expected, even with continued medical treatment in the event of:  Imminent death  Severe, permanent brain injury    Conversation Outcomes / Follow-Up Plan:   Recommended completion of Advance Directive form after review of ACP materials and conversation with prospective healthcare agent

## 2019-06-17 ENCOUNTER — TELEPHONE (OUTPATIENT)
Dept: FAMILY MEDICINE CLINIC | Age: 69
End: 2019-06-17

## 2019-06-18 ENCOUNTER — TELEPHONE (OUTPATIENT)
Dept: FAMILY MEDICINE CLINIC | Age: 69
End: 2019-06-18

## 2019-06-18 NOTE — TELEPHONE ENCOUNTER
From  Samira Tyrone To  [facility] Nurse Pool Sent  6/14/2019  2:47 PM   ----- Message from [address] [place] [address] [phone], Generic sent at 6/14/2019  2:47 PM EDT -----     Ongoing issue with my med refill for [employer]. Dr. Leanne Hicks gave me a prescriotion for 23 days of Trammadol (90 tablets) no refills which I got filled locally. I have the slip. At the same time thought a prescription was faxed to my online pharmacy,[facility],for 90 days. That does not seem to have happened.  Its a long lead time for [facility]  delivery of meds ;  timing is five days to process when received (mail or fax), followed  two weeks to fill and deliver. Have appointment June 24th, 1:30 PM for a refill, and to see what can be done to keep from coming in monthly, but its hard to travel. Angela Keen there other meds I could take, steroids?, which would cut down inflamation/pain without the monthly drill? Taking a lot of tylenol too.

## 2019-06-24 ENCOUNTER — OFFICE VISIT (OUTPATIENT)
Dept: FAMILY MEDICINE CLINIC | Age: 69
End: 2019-06-24

## 2019-06-24 VITALS
WEIGHT: 170 LBS | SYSTOLIC BLOOD PRESSURE: 114 MMHG | DIASTOLIC BLOOD PRESSURE: 76 MMHG | RESPIRATION RATE: 12 BRPM | HEIGHT: 62 IN | BODY MASS INDEX: 31.28 KG/M2 | OXYGEN SATURATION: 98 % | HEART RATE: 59 BPM | TEMPERATURE: 97.6 F

## 2019-06-24 DIAGNOSIS — M54.6 ACUTE BILATERAL THORACIC BACK PAIN: Primary | ICD-10-CM

## 2019-06-24 RX ORDER — TRAMADOL HYDROCHLORIDE 100 MG/1
100 TABLET, EXTENDED RELEASE ORAL DAILY
Qty: 30 TAB | Refills: 3 | Status: SHIPPED | OUTPATIENT
Start: 2019-06-24 | End: 2019-06-24 | Stop reason: ALTCHOICE

## 2019-06-24 RX ORDER — TRAMADOL HYDROCHLORIDE 100 MG/1
100 TABLET, EXTENDED RELEASE ORAL DAILY
Qty: 30 TAB | Refills: 0 | Status: SHIPPED | OUTPATIENT
Start: 2019-06-24 | End: 2019-06-24 | Stop reason: ALTCHOICE

## 2019-06-24 RX ORDER — TRAMADOL HYDROCHLORIDE 100 MG/1
100 TABLET, EXTENDED RELEASE ORAL DAILY
Qty: 90 TAB | Refills: 0 | Status: SHIPPED | OUTPATIENT
Start: 2019-06-24 | End: 2019-09-05 | Stop reason: SDUPTHER

## 2019-06-24 NOTE — PROGRESS NOTES
Carlos Fragoso is a 68 y.o. female  presents for refill of meds for back pain. She no new sxs of weakness or numbness. HPI:   Patient has osteoarthritis , primarily affecting the back. Pain control:           Current : stable   3/10           Worst pain over last week        7/10           Least pain over the last week   3/10  Activities of Daily Living:            stable            Are you able to do your normal daily activities?   all day  Patient Goals            Functional:  yes            Pain: less  Adverse side effects:             Since starting your pain medicine are you experiencing any of the following?              ( Examples: Constipation, fatigue, lapses in memory, depression or sexual                        Dysfunction)   No   Is a Pain management Contract in the patient's chart? yes  Aberrant behaviors:              none(Early refill requests, lost prescriptions, lost medicine)  Pill count:             Is it consistent with patient's prescription? {Yes/No:50285:L: \"yes\"  Last urine drug screen:     [facility] Prescription Monitoring Program check performed:  yes        Treatment Care Team:  Patient Care Team:  Rziwan Tang MD as PCP - General (Family Practice)  Orville Urbina MD (Gastroenterology)  Follow up contact scheduled for 1-4 weeks: no  3 months. Allergies   Allergen Reactions    Celebrex [Celecoxib] Swelling    Shellfish Derived Swelling     \"makes me feel puffy\"    Sulfa (Sulfonamide Antibiotics) Hives and Swelling     Lips swelling    Gabapentin Diarrhea and Palpitations     Chest and Abdominal pain    Humira [Adalimumab] Other (comments)     Lupus    Iodine Itching    Optiray 320 [Ioversol] Hives and Itching     Pt states when she gets iv contrast she itches a little from hives?     Penicillins Hives     Outpatient Medications Marked as Taking for the 6/24/19 encounter (Office Visit) with Rizwan Tang MD   Medication Sig Dispense Refill    traMADol (ULTRAM) 50 mg tablet Take 1 Tab by mouth every six (6) hours as needed for Pain for up to 30 days. Max Daily Amount: 200 mg. 90 Tab 1    zolpidem (AMBIEN) 10 mg tablet Take 1 Tab by mouth nightly as needed for Sleep for up to 90 days. Max Daily Amount: 10 mg. 90 Tab 0    tofacitinib (XELJANZ) 5 mg tab Take  by mouth two (2) times a day.  teriparatide (FORTEO) 20 mcg/dose - 600 mcg/2.4 mL pnij injection 20 mcg by SubCUTAneous route daily.  buPROPion SR (WELLBUTRIN SR) 100 mg SR tablet Take 1 Tab by mouth two (2) times a day. 180 Tab 1    ondansetron hcl (ZOFRAN) 4 mg tablet Take 1 Tab by mouth every eight (8) hours as needed for Nausea. 36 Tab 0    cetirizine (ZYRTEC) 10 mg tablet Take  by mouth.  petrolat,wht/min oil/sod chl (DRY EYES OP) Apply  to eye.  Iron, Cbn & Gluc-FA-B12-C-DSS (FERRALET 90 DUAL-IRON DELIVERY) 90-1-12-50 mg-mg-mcg-mg tab Take  by mouth.  lisinopril (PRINIVIL, ZESTRIL) 10 mg tablet TAKE 1 TABLET DAILY 90 Tab 1    acetaminophen (TYLENOL EXTRA STRENGTH) 500 mg tablet Take 1,000 mg by mouth every six (6) hours as needed for Pain.  cholecalciferol (VITAMIN D3) 1,000 unit tablet Take 1,000 Units by mouth five (5) days a week.  calcium carbonate (OS-BARBARA) 500 mg calcium (1,250 mg) tablet Take 1,500 mg by mouth daily.        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009A    Spinal stenosis, thoracolumbar region M48.05    Lumbar spine instability M53.2X6    Spinal stenosis of lumbar region with neurogenic claudication M48.062    Spinal stenosis of lumbar region M48.061    Lumbar stenosis M48.061    Nausea R11.0    Acute bilateral thoracic back pain M54.6    Anxiety F41.9    Chest pain R07.9    Essential hypertension I10     Past Medical History:   Diagnosis Date    Abdominal abscess 2009    Arthritis     Rheumatoid    Autoimmune disease ([facility]     Medically induced Lupus    Back pain     Chronic pain     lower back    Colitis     Diverticulitis     Failed back syndrome     GERD (gastroesophageal reflux disease)     Hypertension     when on cyclosporins    Ill-defined condition     large torus roof of mouth    Irritable bowel syndrome     Neuropathy     bilateral hands/wrist    Osteoporosis     Pneumonia     [facility]     Seizures ([facility] 6-1-2008    one episode- after high dose of epinepherine     Social History     Socioeconomic History    Marital status:      Spouse name: Not on file    Number of children: Not on file    Years of education: Not on file    Highest education level: Not on file   Tobacco Use    Smoking status: Never Smoker    Smokeless tobacco: Never Used   Substance and Sexual Activity    Alcohol use: No    Drug use: No    Sexual activity: Never   Other Topics Concern     Family History   Problem Relation Age of Onset    Other Other         Orthopedic (Sister)    Arthritis-osteo Sister     Heart Attack Brother 58    Cancer Neg Hx     Diabetes Neg Hx     Heart Disease Neg Hx     Hypertension Neg Hx     Stroke Neg Hx     Malignant Hyperthermia Neg Hx     Pseudocholinesterase Deficiency Neg Hx     Delayed Awakening Neg Hx     Post-op Nausea/Vomiting Neg Hx     Emergence Delirium Neg Hx     Post-op Cognitive Dysfunction Neg Hx         Review of Systems   Constitutional: Negative for chills, fever, malaise/fatigue and weight loss. Eyes: Negative for blurred vision. Respiratory: Negative for shortness of breath and wheezing. Cardiovascular: Negative for chest pain. Gastrointestinal: Negative for nausea and vomiting. Musculoskeletal: Positive for back pain. Negative for myalgias. Skin: Negative for rash. Neurological: Negative for weakness. Vitals:    06/24/19 1306   BP: 114/76   Pulse: (!) 59   Resp: 12   Temp: 97.6 °F (36.4 °C)   SpO2: 98%   Weight: 170 lb (77.1 kg)   Height: 5' 2\" (1.575 m)       Physical Exam   Constitutional: She is oriented to person, place, and time and well-developed, well-nourished, and in no distress. Neck: Normal range of motion. Neck supple. No thyromegaly present. Cardiovascular: Normal rate, regular rhythm and normal heart sounds. Pulmonary/Chest: Effort normal and breath sounds normal.   Musculoskeletal: Normal range of motion. Neurological: She is alert and oriented to person, place, and time. Skin: Skin is warm and dry. Nursing note and vitals reviewed. Assessment/Plan      ICD-10-CM ICD-9-CM    1. Acute bilateral thoracic back pain M54.6 724.1 traMADol (ULTRAM ER) 100 mg Tb24      DISCONTINUED: traMADol (ULTRAM-ER) 100 mg Tb24      DISCONTINUED: traMADol (ULTRAM ER) 100 mg Tb24     I have discussed the diagnosis with the patient and the intended plan of care as seen in the above orders. The patient has received an after-visit summary and questions were answered concerning future plans. I have discussed medication, side effects, and warnings with the patient in detail. The patient verbalized understanding and is in agreement with the plan of care. The patient will contact the office with any additional concerns.     lab results and schedule of future lab studies reviewed with patient    Spike Vargas MD

## 2019-06-24 NOTE — PATIENT INSTRUCTIONS
Learning About Relief for Back Pain  What is back tension and strain? Back strain happens when you overstretch, or pull, a muscle in your back. You may hurt your back in an accident or when you exercise or lift something. Most back pain will get better with rest and time. You can take care of yourself at home to help your back heal.  What can you do first to relieve back pain? When you first feel back pain, try these steps:  · Walk. Take a short walk (10 to 20 minutes) on a level surface (no slopes, hills, or stairs) every 2 to 3 hours. Walk only distances you can manage without pain, especially leg pain. · Relax. Find a comfortable position for rest. Some people are comfortable on the floor or a medium-firm bed with a small pillow under their head and another under their knees. Some people prefer to lie on their side with a pillow between their knees. Don't stay in one position for too long. · Try heat or ice. Try using a heating pad on a low or medium setting, or take a warm shower, for 15 to 20 minutes every 2 to 3 hours. Or you can buy single-use heat wraps that last up to 8 hours. You can also try an ice pack for 10 to 15 minutes every 2 to 3 hours. You can use an ice pack or a bag of frozen vegetables wrapped in a thin towel. There is not strong evidence that either heat or ice will help, but you can try them to see if they help. You may also want to try switching between heat and cold. · Take pain medicine exactly as directed. ? If the doctor gave you a prescription medicine for pain, take it as prescribed. ? If you are not taking a prescription pain medicine, ask your doctor if you can take an over-the-counter medicine. What else can you do? · Stretch and exercise. Exercises that increase flexibility may relieve your pain and make it easier for your muscles to keep your spine in a good, neutral position. And don't forget to keep walking. · Do self-massage.  You can use self-massage to unwind after work or school or to energize yourself in the morning. You can easily massage your feet, hands, or neck. Self-massage works best if you are in comfortable clothes and are sitting or lying in a comfortable position. Use oil or lotion to massage bare skin. · Reduce stress. Back pain can lead to a vicious [place]: Distress about the pain tenses the muscles in your back, which in turn causes more pain. Learn how to relax your mind and your muscles to lower your stress. Where can you learn more? Go to http://camilo-robert.info/. Enter X797 in the search box to learn more about \"Learning About Relief for Back Pain. \"  Current as of: September 20, 2018  Content Version: 11.9  © 5885-1436 [employer]. Care instructions adapted under license by [employer] (which disclaims liability or warranty for this information). If you have questions about a medical condition or this instruction, always ask your healthcare professional. [place] [address] any warranty or liability for your use of this information.

## 2019-06-24 NOTE — PROGRESS NOTES
Chief Complaint   Patient presents with    Back Pain       1. Have you been to the ER, urgent care clinic since your last visit? Hospitalized since your last visit? No    2. Have you seen or consulted any other health care providers outside of the [address] [place] [address] since your last visit? Include any pap smears or colon screening.  No

## 2019-08-09 ENCOUNTER — OFFICE VISIT (OUTPATIENT)
Dept: FAMILY MEDICINE CLINIC | Age: 69
End: 2019-08-09

## 2019-08-09 VITALS
DIASTOLIC BLOOD PRESSURE: 76 MMHG | RESPIRATION RATE: 16 BRPM | TEMPERATURE: 98.2 F | SYSTOLIC BLOOD PRESSURE: 110 MMHG | HEART RATE: 59 BPM | OXYGEN SATURATION: 98 %

## 2019-08-09 DIAGNOSIS — M51.36 DDD (DEGENERATIVE DISC DISEASE), LUMBAR: Primary | ICD-10-CM

## 2019-08-09 NOTE — PATIENT INSTRUCTIONS
Back Pain: Care Instructions  Your Care Instructions    Back pain has many possible causes. It is often related to problems with muscles and ligaments of the back. It may also be related to problems with the nerves, discs, or bones of the back. Moving, lifting, standing, sitting, or sleeping in an awkward way can strain the back. Sometimes you don't notice the injury until later. Arthritis is another common cause of back pain. Although it may hurt a lot, back pain usually improves on its own within several weeks. Most people recover in 12 weeks or less. Using good home treatment and being careful not to stress your back can help you feel better sooner. Follow-up care is a key part of your treatment and safety. Be sure to make and go to all appointments, and call your doctor if you are having problems. It's also a good idea to know your test results and keep a list of the medicines you take. How can you care for yourself at home? · Sit or lie in positions that are most comfortable and reduce your pain. Try one of these positions when you lie down:  ? Lie on your back with your knees bent and supported by large pillows. ? Lie on the floor with your legs on the seat of a sofa or chair. ? Lie on your side with your knees and hips bent and a pillow between your legs. ? Lie on your stomach if it does not make pain worse. · Do not sit up in bed, and avoid soft couches and twisted positions. Bed rest can help relieve pain at first, but it delays healing. Avoid bed rest after the first day of back pain. · Change positions every 30 minutes. If you must sit for long periods of time, take breaks from sitting. Get up and walk around, or lie in a comfortable position. · Try using a heating pad on a low or medium setting for 15 to 20 minutes every 2 or 3 hours. Try a warm shower in place of one session with the heating pad. · You can also try an ice pack for 10 to 15 minutes every 2 to 3 hours.  Put a thin cloth between the ice pack and your skin. · Take pain medicines exactly as directed. ? If the doctor gave you a prescription medicine for pain, take it as prescribed. ? If you are not taking a prescription pain medicine, ask your doctor if you can take an over-the-counter medicine. · Take short walks several times a day. You can start with 5 to 10 minutes, 3 or 4 times a day, and work up to longer walks. Walk on level surfaces and avoid hills and stairs until your back is better. · Return to work and other activities as soon as you can. Continued rest without activity is usually not good for your back. · To prevent future back pain, do exercises to stretch and strengthen your back and stomach. Learn how to use good posture, safe lifting techniques, and proper body mechanics. When should you call for help? Call your doctor now or seek immediate medical care if:    · You have new or worsening numbness in your legs.     · You have new or worsening weakness in your legs. (This could make it hard to stand up.)     · You lose control of your bladder or bowels.    Watch closely for changes in your health, and be sure to contact your doctor if:    · You have a fever, lose weight, or don't feel well.     · You do not get better as expected. Where can you learn more? Go to http://camilo-robert.info/. Enter Q012 in the search box to learn more about \"Back Pain: Care Instructions. \"  Current as of: September 20, 2018  Content Version: 12.1  © 8493-2982 [employer]. Care instructions adapted under license by [employer] (which disclaims liability or warranty for this information). If you have questions about a medical condition or this instruction, always ask your healthcare professional. [place] [address] any warranty or liability for your use of this information.

## 2019-08-09 NOTE — PROGRESS NOTES
Patient here for back pain, she states she has found out that one of her legs is longer than the other. She was told to put lifts in her shoes until she feels comfortable. She says she needs to know how much longer her thigh is than the other. 1. Have you been to the ER, urgent care clinic since your last visit? Hospitalized since your last visit? No  2. Have you seen or consulted any other health care providers outside of the [address] [place] [address] since your last visit? Include any pap smears or colon screening. No    Medication reconciliation has been completed with patient. Care team discussed/updated as well as pharmacy.     Health Maintenance Due   Topic Date Due    Shingrix Vaccine Age 49> (1 of 2) 03/10/1998    BREAST CANCER SCRN MAMMOGRAM  07/18/2019    Influenza Age 9 to Adult  08/01/2019

## 2019-08-09 NOTE — PROGRESS NOTES
Harrison Anna is a 68 y.o. female  presents for chronic  Back pain. She has been seen by PT. She thinks that one leg is longer than the other one. Allergies   Allergen Reactions    Celebrex [Celecoxib] Swelling    Shellfish Derived Swelling     \"makes me feel puffy\"    Sulfa (Sulfonamide Antibiotics) Hives and Swelling     Lips swelling    Gabapentin Diarrhea and Palpitations     Chest and Abdominal pain    Humira [Adalimumab] Other (comments)     Lupus    Iodine Itching    Optiray 320 [Ioversol] Hives and Itching     Pt states when she gets iv contrast she itches a little from hives?  Penicillins Hives     Outpatient Medications Marked as Taking for the 8/9/19 encounter (Office Visit) with Dale Arenas MD   Medication Sig Dispense Refill    traMADol [facility]) 100 mg Tb24 Take 1 Tab by mouth daily for 90 days. Max Daily Amount: 100 mg. 90 Tab 0    tofacitinib (XELJANZ) 5 mg tab Take  by mouth two (2) times a day.  HYDROcodone-acetaminophen (NORCO) 5-325 mg per tablet Take 1 Tab by mouth every six (6) hours as needed for Pain. Max Daily Amount: 4 Tabs. 40 Tab 0    teriparatide (FORTEO) 20 mcg/dose - 600 mcg/2.4 mL pnij injection 20 mcg by SubCUTAneous route daily.  buPROPion SR (WELLBUTRIN SR) 100 mg SR tablet Take 1 Tab by mouth two (2) times a day. 180 Tab 1    ondansetron hcl (ZOFRAN) 4 mg tablet Take 1 Tab by mouth every eight (8) hours as needed for Nausea. 36 Tab 0    cetirizine (ZYRTEC) 10 mg tablet Take  by mouth.  petrolat,wht/min oil/sod chl (DRY EYES OP) Apply  to eye.  Iron, Cbn & Gluc-FA-B12-C-DSS (FERRALET 90 DUAL-IRON DELIVERY) 90-1-12-50 mg-mg-mcg-mg tab Take  by mouth.  lisinopril (PRINIVIL, ZESTRIL) 10 mg tablet TAKE 1 TABLET DAILY 90 Tab 1    naloxone (NARCAN) 2 mg/actuation spry Use 1 spray intranasally into 1 nostril. Use a new Narcan nasal spray for subsequent doses and administer into alternating nostrils.  May repeat every 2 to 3 minutes as needed. 1 Package 0    acetaminophen (TYLENOL EXTRA STRENGTH) 500 mg tablet Take 1,000 mg by mouth every six (6) hours as needed for Pain.  cholecalciferol (VITAMIN D3) 1,000 unit tablet Take 1,000 Units by mouth five (5) days a week.  polyethylene glycol (MIRALAX) 17 gram packet Take 17 g by mouth daily.  calcium carbonate (OS-BARBARA) 500 mg calcium (1,250 mg) tablet Take 1,500 mg by mouth daily.        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    H/O calcium pyrophosphate deposition disease (CPPD) Z87.39    IBS (irritable bowel syndrome) K58.9    RA [facility]rheumatoid [facility]) [facility] M06.9    Sicca syndrome ([facility]) M35.00    Osteoarthritis of right hip M16.11    Pain management contract agreement Z02.89    ACP (advance care planning) Z71.89    Chronic left shoulder pain M25.512, G89.29    Osteopenia M85.80    Osteopenia of multiple sites M85.89    Osteoarthritis of left hip M16.12    Closed fracture of thoracic spine without spinal cord lesion ([facility] S22.009A    Spinal stenosis, thoracolumbar region M48.05    Lumbar spine instability M53.2X6    Spinal stenosis of lumbar region with neurogenic claudication M48.062    Spinal stenosis of lumbar region M48.061    Lumbar stenosis M48.061    Nausea R11.0    Acute bilateral thoracic back pain M54.6    Anxiety F41.9    Chest pain R07.9    Essential hypertension I10     Past Medical History:   Diagnosis Date    Abdominal abscess 2009    Arthritis     Rheumatoid    Autoimmune disease ([facility]     Medically induced Lupus    Back pain     Chronic pain     lower back    Colitis     Diverticulitis     Failed back syndrome     GERD (gastroesophageal reflux disease)     Hypertension     when on cyclosporins    Ill-defined condition     large torus roof of mouth    Irritable bowel syndrome     Neuropathy     bilateral hands/wrist    Osteoporosis     Pneumonia     RA (rheumatoid arthritis) ([facility])     Seizures ([facility] 6-1-2008    one episode- after high dose of epinepherine     Social History     Socioeconomic History    Marital status:      Spouse name: Not on file    Number of children: Not on file    Years of education: Not on file    Highest education level: Not on file   Tobacco Use    Smoking status: Never Smoker    Smokeless tobacco: Never Used   Substance and Sexual Activity    Alcohol use: No    Drug use: No    Sexual activity: Never   Other Topics Concern     Family History   Problem Relation Age of Onset    Other Other         Orthopedic (Sister)   Agueda Byers Sister     Heart Attack Brother 58    Cancer Neg Hx     Diabetes Neg Hx     Heart Disease Neg Hx     Hypertension Neg Hx     Stroke Neg Hx     Malignant Hyperthermia Neg Hx     Pseudocholinesterase Deficiency Neg Hx     Delayed Awakening Neg Hx     Post-op Nausea/Vomiting Neg Hx     Emergence Delirium Neg Hx     Post-op Cognitive Dysfunction Neg Hx         Review of Systems   Constitutional: Negative for chills, fever, malaise/fatigue and weight loss. Eyes: Negative for blurred vision. Respiratory: Negative for shortness of breath and wheezing. Cardiovascular: Negative for chest pain. Gastrointestinal: Negative for nausea and vomiting. Musculoskeletal: Negative for myalgias. Skin: Negative for rash. Neurological: Negative for weakness. Vitals:    08/09/19 1504   BP: 110/76   Pulse: (!) 59   Resp: 16   Temp: 98.2 °F (36.8 °C)   TempSrc: Oral   SpO2: 98%   PainSc:   5   PainLoc: Hip       Physical Exam   Constitutional: She is oriented to person, place, and time and well-developed, well-nourished, and in no distress. Neck: Normal range of motion. Neck supple. No thyromegaly present.    Cardiovascular: Normal rate, regular rhythm and normal heart sounds. Pulmonary/Chest: Effort normal and breath sounds normal.   Musculoskeletal: Normal range of motion. Neurological: She is alert and oriented to person, place, and time. Skin: Skin is warm and dry. Nursing note and vitals reviewed. Assessment/Plan      ICD-10-CM ICD-9-CM    1. DDD (degenerative disc disease), lumbar M51.36 722.52      I have discussed the diagnosis with the patient and the intended plan of care as seen in the above orders. The patient has received an after-visit summary and questions were answered concerning future plans. I have discussed medication, side effects, and warnings with the patient in detail. The patient verbalized understanding and is in agreement with the plan of care. The patient will contact the office with any additional concerns.     lab results and schedule of future lab studies reviewed with patient    Massiel Rocha MD

## 2019-09-05 ENCOUNTER — OFFICE VISIT (OUTPATIENT)
Dept: FAMILY MEDICINE CLINIC | Age: 69
End: 2019-09-05

## 2019-09-05 VITALS — BODY MASS INDEX: 31.09 KG/M2 | HEIGHT: 62 IN | OXYGEN SATURATION: 100 % | HEART RATE: 91 BPM

## 2019-09-05 DIAGNOSIS — G47.00 INSOMNIA: ICD-10-CM

## 2019-09-05 DIAGNOSIS — I10 ESSENTIAL HYPERTENSION WITH GOAL BLOOD PRESSURE LESS THAN 130/85: Primary | ICD-10-CM

## 2019-09-05 DIAGNOSIS — M54.6 ACUTE BILATERAL THORACIC BACK PAIN: ICD-10-CM

## 2019-09-05 DIAGNOSIS — M51.36 DDD (DEGENERATIVE DISC DISEASE), LUMBAR: ICD-10-CM

## 2019-09-05 RX ORDER — MELOXICAM 15 MG/1
15 TABLET ORAL
COMMUNITY
Start: 2016-05-20 | End: 2019-09-05 | Stop reason: SDUPTHER

## 2019-09-05 RX ORDER — TRAMADOL HYDROCHLORIDE 100 MG/1
100 TABLET, EXTENDED RELEASE ORAL DAILY
Qty: 90 TAB | Refills: 0 | Status: SHIPPED | OUTPATIENT
Start: 2019-09-05 | End: 2019-12-03 | Stop reason: SDUPTHER

## 2019-09-05 RX ORDER — ZOLPIDEM TARTRATE 5 MG/1
5 TABLET ORAL
Qty: 90 TAB | Refills: 0 | Status: SHIPPED | OUTPATIENT
Start: 2019-09-05 | End: 2020-11-02 | Stop reason: SDUPTHER

## 2019-09-05 RX ORDER — LISINOPRIL 10 MG/1
10 TABLET ORAL DAILY
Qty: 90 TAB | Refills: 1 | Status: SHIPPED | OUTPATIENT
Start: 2019-09-05 | End: 2020-07-07

## 2019-09-05 RX ORDER — MELOXICAM 15 MG/1
15 TABLET ORAL DAILY
Qty: 90 TAB | Refills: 2 | Status: SHIPPED | OUTPATIENT
Start: 2019-09-05 | End: 2020-04-16 | Stop reason: SDUPTHER

## 2019-09-05 NOTE — PROGRESS NOTES
Darcy Juarez is a 68 y.o. female  presents for follow up on meds. Needs refills of pain meds insomnia and HTN meds. Allergies   Allergen Reactions    Celebrex [Celecoxib] Swelling    Shellfish Derived Swelling     \"makes me feel puffy\"    Sulfa (Sulfonamide Antibiotics) Hives and Swelling     Lips swelling    Gabapentin Diarrhea and Palpitations     Chest and Abdominal pain    Humira [Adalimumab] Other (comments)     Lupus    Iodine Itching    Optiray 320 [Ioversol] Hives and Itching     Pt states when she gets iv contrast she itches a little from hives?  Penicillins Hives     Outpatient Medications Marked as Taking for the 9/5/19 encounter (Office Visit) with Elis Puentes MD   Medication Sig Dispense Refill    meloxicam (MOBIC) 15 mg tablet Take 15 mg by mouth.  traMADol (ULTRAM ER) 100 mg Tb24 Take 1 Tab by mouth daily for 90 days. Max Daily Amount: 100 mg. 90 Tab 0    HYDROcodone-acetaminophen (NORCO) 5-325 mg per tablet Take 1 Tab by mouth every six (6) hours as needed for Pain. Max Daily Amount: 4 Tabs. 40 Tab 0    buPROPion SR (WELLBUTRIN SR) 100 mg SR tablet Take 1 Tab by mouth two (2) times a day. 180 Tab 1    ondansetron hcl (ZOFRAN) 4 mg tablet Take 1 Tab by mouth every eight (8) hours as needed for Nausea. 36 Tab 0    lisinopril (PRINIVIL, ZESTRIL) 10 mg tablet TAKE 1 TABLET DAILY 90 Tab 1    naloxone (NARCAN) 2 mg/actuation spry Use 1 spray intranasally into 1 nostril. Use a new Narcan nasal spray for subsequent doses and administer into alternating nostrils. May repeat every 2 to 3 minutes as needed.  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    H/O calcium pyrophosphate deposition disease (CPPD) Z87.39    IBS (irritable bowel syndrome) K58.9    RA (rheumatoid arthritis) ([facility]) M06.9    Sicca syndrome ([facility]) M35.00    Osteoarthritis of right hip M16.11    Pain management contract agreement Z02.89    ACP (advance care planning) Z71.89    Chronic left shoulder pain M25.512, G89.29    Osteopenia M85.80    Osteopenia of multiple sites M85.89    Osteoarthritis of left hip M16.12    Closed fracture of thoracic spine without spinal cord lesion ([address] S22.009A    Spinal stenosis, thoracolumbar region M48.05    Lumbar spine instability M53.2X6    Spinal stenosis of lumbar region with neurogenic claudication M48.062    Spinal stenosis of lumbar region M48.061    Lumbar stenosis M48.061    Nausea R11.0    Acute bilateral thoracic back pain M54.6    Anxiety F41.9    Chest pain R07.9    Essential hypertension I10     Past Medical History:   Diagnosis Date    Abdominal abscess 2009    Arthritis     Rheumatoid    Autoimmune disease ([address]     Medically induced Lupus    Back pain     Chronic pain     lower back    Colitis     Diverticulitis     Failed back syndrome     GERD (gastroesophageal reflux disease)     Hypertension     when on cyclosporins    Ill-defined condition     large torus roof of mouth    Irritable bowel syndrome     Neuropathy     bilateral hands/wrist    Osteoporosis     Pneumonia     RA (rheumatoid arthritis) ([address]     Seizures ([address] 6-1-2008    one episode- after high dose of epinepherine     Social History     Socioeconomic History    Marital status:      Spouse name: Not on file    Number of children: Not on file    Years of education: Not on file    Highest education level: Not on file   Tobacco Use    Smoking status: Never Smoker    Smokeless tobacco: Never Used   Substance and Sexual Activity    Alcohol use: No    Drug use: No    Sexual activity: Never   Other Topics Concern     Family History   Problem Relation Age of Onset    Other Other         Orthopedic (Sister)    Arthritis-osteo Sister     Heart Attack Brother 58    Cancer Neg Hx     Diabetes Neg Hx     Heart Disease Neg Hx     Hypertension Neg Hx     Stroke Neg Hx     Malignant Hyperthermia Neg Hx     Pseudocholinesterase Deficiency Neg Hx     Delayed Awakening Neg Hx     Post-op Nausea/Vomiting Neg Hx     Emergence Delirium Neg Hx     Post-op Cognitive Dysfunction Neg Hx         Review of Systems   Constitutional: Negative for chills, fever, malaise/fatigue and weight loss. Eyes: Negative for blurred vision. Respiratory: Negative for shortness of breath and wheezing. Cardiovascular: Negative for chest pain. Gastrointestinal: Negative for nausea and vomiting. Musculoskeletal: Positive for back pain and joint pain. Negative for myalgias. Skin: Negative for rash. Neurological: Negative for weakness. Vitals:    09/05/19 1515   Pulse: 91   SpO2: 100%   Height: 5' 2\" (1.575 m)       Physical Exam   Constitutional: She is oriented to person, place, and time and well-developed, well-nourished, and in no distress. Neck: Normal range of motion. Neck supple. No thyromegaly present. Cardiovascular: Normal rate, regular rhythm and normal heart sounds. Pulmonary/Chest: Effort normal and breath sounds normal.   Musculoskeletal: Normal range of motion. Neurological: She is alert and oriented to person, place, and time. Skin: Skin is warm and dry. Psychiatric: Mood, memory, affect and judgment normal.   Nursing note and vitals reviewed. Assessment/Plan      ICD-10-CM ICD-9-CM    1. Essential hypertension with goal blood pressure less than 130/85 I10 401.9 lisinopril (PRINIVIL, ZESTRIL) 10 mg tablet   2. Acute bilateral thoracic back pain M54.6 724.1 traMADol (ULTRAM ER) 100 mg Tb24   3. Insomnia G47.00 780.52 zolpidem (AMBIEN) 5 mg tablet   4.  DDD (degenerative disc disease), lumbar M51.36 722.52 meloxicam (MOBIC) 15 mg tablet      I have discussed the diagnosis with the patient and the intended plan of care as seen in the above orders. The patient has received an after-visit summary and questions were answered concerning future plans. I have discussed medication, side effects, and warnings with the patient in detail. The patient verbalized understanding and is in agreement with the plan of care. The patient will contact the office with any additional concerns.       lab results and schedule of future lab studies reviewed with patient    Marcus Shah MD

## 2019-09-05 NOTE — PATIENT INSTRUCTIONS
Learning About Relief for Back Pain  What is back tension and strain? Back strain happens when you overstretch, or pull, a muscle in your back. You may hurt your back in an accident or when you exercise or lift something. Most back pain will get better with rest and time. You can take care of yourself at home to help your back heal.  What can you do first to relieve back pain? When you first feel back pain, try these steps:  · Walk. Take a short walk (10 to 20 minutes) on a level surface (no slopes, hills, or stairs) every 2 to 3 hours. Walk only distances you can manage without pain, especially leg pain. · Relax. Find a comfortable position for rest. Some people are comfortable on the floor or a medium-firm bed with a small pillow under their head and another under their knees. Some people prefer to lie on their side with a pillow between their knees. Don't stay in one position for too long. · Try heat or ice. Try using a heating pad on a low or medium setting, or take a warm shower, for 15 to 20 minutes every 2 to 3 hours. Or you can buy single-use heat wraps that last up to 8 hours. You can also try an ice pack for 10 to 15 minutes every 2 to 3 hours. You can use an ice pack or a bag of frozen vegetables wrapped in a thin towel. There is not strong evidence that either heat or ice will help, but you can try them to see if they help. You may also want to try switching between heat and cold. · Take pain medicine exactly as directed. ? If the doctor gave you a prescription medicine for pain, take it as prescribed. ? If you are not taking a prescription pain medicine, ask your doctor if you can take an over-the-counter medicine. What else can you do? · Stretch and exercise. Exercises that increase flexibility may relieve your pain and make it easier for your muscles to keep your spine in a good, neutral position. And don't forget to keep walking. · Do self-massage.  You can use self-massage to unwind after work or school or to energize yourself in the morning. You can easily massage your feet, hands, or neck. Self-massage works best if you are in comfortable clothes and are sitting or lying in a comfortable position. Use oil or lotion to massage bare skin. · Reduce stress. Back pain can lead to a vicious [place]: Distress about the pain tenses the muscles in your back, which in turn causes more pain. Learn how to relax your mind and your muscles to lower your stress. Where can you learn more? Go to http://camilo-robert.info/. Enter S645 in the search box to learn more about \"Learning About Relief for Back Pain. \"  Current as of: September 20, 2018  Content Version: 12.1  © 2331-0564 [employer]. Care instructions adapted under license by [employer] (which disclaims liability or warranty for this information). If you have questions about a medical condition or this instruction, always ask your healthcare professional. [place] [address] any warranty or liability for your use of this information.

## 2019-10-22 ENCOUNTER — OFFICE VISIT (OUTPATIENT)
Dept: ORTHOPEDIC SURGERY | Age: 69
End: 2019-10-22

## 2019-10-22 VITALS
BODY MASS INDEX: 31.14 KG/M2 | DIASTOLIC BLOOD PRESSURE: 80 MMHG | OXYGEN SATURATION: 99 % | TEMPERATURE: 97.9 F | HEART RATE: 68 BPM | WEIGHT: 169.2 LBS | SYSTOLIC BLOOD PRESSURE: 120 MMHG | HEIGHT: 62 IN

## 2019-10-22 DIAGNOSIS — M53.2X6 LUMBAR SPINE INSTABILITY: ICD-10-CM

## 2019-10-22 DIAGNOSIS — M54.6 THORACIC SPINE PAIN: ICD-10-CM

## 2019-10-22 DIAGNOSIS — Z98.1 S/P FUSION OF THORACIC SPINE: Primary | ICD-10-CM

## 2019-10-22 NOTE — PROGRESS NOTES
[facility] [address]  [address], [address]  [place], [facility] [address]  Phone: [phone]  Fax: [phone]  PROGRESS NOTE  Patient: Niall Connors                MRN: [id number]       SSN: [id number]  YOB: 1950        AGE: 68 y.o. SEX: female  Body mass index is 30.95 kg/m². PCP: Angela Fuller MD  10/22/19    No chief complaint on file. HISTORY OF PRESENT ILLNESS, RADIOGRAPHS, and PLAN:     HISTORY OF PRESENT ILLNESS:  Ms. Emely Cox returns today. She is about 10 months out from her revision thoracolumbar fusion. She has done great. She is back sailing. She gets periods of pain when she says she overdoes it. Her severe instability sensations are gone. Her radiculopathy is gone. She still needs to use a brace. She still finds herself somewhat limited. RADIOGRAPHS:  X-rays of her spine demonstrate what appears to be appropriate alignment and fixation. ASSESSMENT/PLAN: She is having problems with her hips. She has a leg length discrepancy following several hip replacements and pelvic obliquity, which I think also effects how she functions now with this thoracolumbar fusion. Her sagittal balance is improved since surgery, but she is still out of some coronal and sagittal balance both from her hip malalignment and the fact that her initial primary lumbar surgery was flat. I will see her back again in another year for routine follow up. cc: Leander Vaca M.D.          Past Medical History:   Diagnosis Date    Abdominal abscess 2009    Arthritis     Rheumatoid    Autoimmune disease ([facility]     Medically induced Lupus    Back pain     Chronic pain     lower back    Colitis     Diverticulitis     Failed back syndrome     GERD (gastroesophageal reflux disease)     Hypertension     when on cyclosporins    Ill-defined condition     large torus roof of mouth    Irritable bowel syndrome     Neuropathy     bilateral hands/wrist    Osteoporosis     Pneumonia     RA (rheumatoid arthritis) (HCC)     Seizures ([facility] 6-1-2008    one episode- after high dose of epinepherine       Family History   Problem Relation Age of Onset    Other Other         Orthopedic (Sister)   Merna Mohr Sister     Heart Attack Brother 58    Cancer Neg Hx     Diabetes Neg Hx     Heart Disease Neg Hx     Hypertension Neg Hx     Stroke Neg Hx     Malignant Hyperthermia Neg Hx     Pseudocholinesterase Deficiency Neg Hx     Delayed Awakening Neg Hx     Post-op Nausea/Vomiting Neg Hx     Emergence Delirium Neg Hx     Post-op Cognitive Dysfunction Neg Hx        Current Outpatient Medications   Medication Sig Dispense Refill    traMADol (ULTRAM ER) 100 mg Tb24 Take 1 Tab by mouth daily for 90 days. Max Daily Amount: 100 mg. 90 Tab 0    lisinopril (PRINIVIL, ZESTRIL) 10 mg tablet Take 1 Tab by mouth daily. 90 Tab 1    zolpidem (AMBIEN) 5 mg tablet Take 1 Tab by mouth nightly as needed for Sleep. Take 1 Tab by Mouth Nightly As Needed. 90 Tab 0    meloxicam (MOBIC) 15 mg tablet Take 1 Tab by mouth daily. 90 Tab 2    tofacitinib (XELJANZ) 5 mg tab Take  by mouth two (2) times a day.  teriparatide (FORTEO) 20 mcg/dose - 600 mcg/2.4 mL pnij injection 20 mcg by SubCUTAneous route daily.  petrolat,wht/min oil/sod chl (DRY EYES OP) Apply  to eye.  acetaminophen (TYLENOL EXTRA STRENGTH) 500 mg tablet Take 1,000 mg by mouth every six (6) hours as needed for Pain.  cholecalciferol (VITAMIN D3) 1,000 unit tablet Take 1,000 Units by mouth five (5) days a week.  calcium carbonate (OS-BARBARA) 500 mg calcium (1,250 mg) tablet Take 1,500 mg by mouth daily.  HYDROcodone-acetaminophen (NORCO) 5-325 mg per tablet Take 1 Tab by mouth every six (6) hours as needed for Pain. Max Daily Amount: 4 Tabs. 40 Tab 0    buPROPion SR (WELLBUTRIN SR) 100 mg SR tablet Take 1 Tab by mouth two (2) times a day.  180 Tab 1    ondansetron hcl (ZOFRAN) 4 mg tablet Take 1 Tab by mouth every eight (8) hours as needed for Nausea. 36 Tab 0    cetirizine (ZYRTEC) 10 mg tablet Take  by mouth.  Iron, Cbn & Gluc-FA-B12-C-DSS (FERRALET 90 DUAL-IRON DELIVERY) 90-1-12-50 mg-mg-mcg-mg tab Take  by mouth.  naloxone (NARCAN) 2 mg/actuation spry Use 1 spray intranasally into 1 nostril. Use a new Narcan nasal spray for subsequent doses and administer into alternating nostrils. May repeat every 2 to 3 minutes as needed. 1 Package 0    polyethylene glycol (MIRALAX) 17 gram packet Take 17 g by mouth daily. Allergies   Allergen Reactions    Celebrex [Celecoxib] Swelling    Shellfish Derived Swelling     \"makes me feel puffy\"    Sulfa (Sulfonamide Antibiotics) Hives and Swelling     Lips swelling    Gabapentin Diarrhea and Palpitations     Chest and Abdominal pain    Humira [Adalimumab] Other (comments)     Lupus    Iodine Itching    Optiray 320 [Ioversol] Hives and Itching     Pt states when she gets iv contrast she itches a little from hives?     Penicillins Hives       Past Surgical History:   Procedure Laterality Date    HX ABDOMINAL WALL DEFECT REPAIR      HX APPENDECTOMY      HX BACK SURGERY  12/03/2018    HX BREAST REDUCTION      HX CATARACT REMOVAL Bilateral     HX COLONOSCOPY      HX GI      repair rectal prolaspe    HX GYN      dermoid cyst    HX HEENT      tonsillectomy    HX HYSTERECTOMY  1976    HX LUMBAR FUSION      x 2    HX ORTHOPAEDIC Bilateral     CTR    HX ORTHOPAEDIC Bilateral     arthroplasty thumbs    HX ORTHOPAEDIC Left     Ankle     HX ORTHOPAEDIC Right     \"Nerve Release Elbow\"    HX OTHER SURGICAL Left 1985    vein stripping    HX SALPINGO-OOPHORECTOMY Bilateral     HX SHOULDER REPLACEMENT Left     HX UROLOGICAL      bladder repair    TOTAL HIP ARTHROPLASTY Bilateral        Past Medical History:   Diagnosis Date    Abdominal abscess 2009    Arthritis     Rheumatoid    Auto[facility] disease ([facility]     Medically induced Lupus  Back pain     Chronic pain     lower back    Colitis     Diverticulitis     Failed back syndrome     GERD (gastroesophageal reflux disease)     Hypertension     when on cyclosporins    Ill-defined condition     large torus roof of mouth    Irritable bowel syndrome     Neuropathy     bilateral hands/wrist    Osteoporosis     Pneumonia     RA [facility]) [facility]     Seizures ([facility] 6-1-2008    one episode- after high dose of epinepherine       Social History     Socioeconomic History    Marital status:      Spouse name: Not on file    Number of children: Not on file    Years of education: Not on file    Highest education level: Not on file   Occupational History    Not on file   Social Needs    Financial resource strain: Not on file    Food insecurity:     Worry: Not on file     Inability: Not on file    Transportation needs:     Medical: Not on file     Non-medical: Not on file   Tobacco Use    Smoking status: Never Smoker    Smokeless tobacco: Never Used   Substance and Sexual Activity    Alcohol use: No    Drug use: No    Sexual activity: Never   Lifestyle    Physical activity:     Days per week: Not on file     Minutes per session: Not on file    Stress: Not on file   Relationships    Social connections:     Talks on phone: Not on file     Gets together: Not on file     Attends Rastafarian service: Not on file     Active member of club or organization: Not on file     Attends meetings of clubs or organizations: Not on file     Relationship status: Not on file    Intimate partner violence:     Fear of current or ex partner: Not on file     Emotionally abused: Not on file     Physically abused: Not on file     Forced sexual activity: Not on file   Other Topics Concern     Service Not Asked    Blood Transfusions Not Asked    Caffeine Concern Not Asked    Occupational Exposure Not Asked   Lavon Goldmann Hazards Not Asked    Sleep Concern Not Asked    Stress Concern Not Asked    Weight Concern Not Asked    Special Diet Not Asked    Back Care Not Asked    Exercise Not Asked    Bike Helmet Not Asked   [address] Not Asked    Self-Exams Not Asked   Social History Narrative    Not on file         REVIEW OF SYSTEMS:   CONSTITUTIONAL SYMPTOMS:  Negative. EYES:  Negative. EARS, NOSE, THROAT AND MOUTH:  Negative. CARDIOVASCULAR:  Negative. RESPIRATORY:  Negative. GENITOURINARY: Per HPI. GASTROINTESTINAL:  Per HPI. INTEGUMENTARY (SKIN AND/OR BREAST):  Negative. MUSCULOSKELETAL: Per HPI.   ENDOCRINE/RHEUMATOLOGIC:  Negative. NEUROLOGICAL:  Per HPI. HEMATOLOGIC/LYMPHATIC:  Negative. ALLERGIC/IMMUNOLOGIC:  Negative. PSYCHIATRIC:  Negative. PHYSICAL EXAMINATION:   Visit Vitals  /80 (BP 1 Location: Left arm, BP Patient Position: Sitting)   Pulse 68   Temp 97.9 °F (36.6 °C) (Oral)   Ht 5' 2\" (1.575 m)   Wt 169 lb 3.2 oz (76.7 kg)   SpO2 99%   BMI 30.95 kg/m²    PAIN SCALE: 8/10    CONSTITUTIONAL: The patient is in no apparent distress and is alert and oriented x 3. HEENT: Normocephalic. Hearing grossly intact. NECK: Supple and symmetric. no tenderness, or masses were felt. RESPIRATORY: No labored breathing. CARDIOVASCULAR: The carotid pulses were normal. Peripheral pulses were 2+. CHEST: Normal AP diameter and normal contour without any kyphoscoliosis. LYMPHATIC: No lymphadenopathy was appreciated in the neck, axillae or groin. SKIN:   Negative for scars, rashes, lesions, or ulcers on the right upper, right lower, left upper, left lower and trunk. NEUROLOGICAL: Alert and oriented x 3. Ambulation without assistive device. FWB. EXTREMITIES: See musculoskeletal.  MUSCULOSKELETAL:   Head and Neck:  Negative for misalignment, asymmetry, crepitation, defects, tenderness masses or effusions.  Left Upper Extremity: Inspection, percussion and palpation performed. Prices sign is negative.    Right Upper Extremity: Inspection, percussion and palpation performed. Prices sign is negative.  Spine, Ribs and Pelvis: Mid back pain. Inspection, percussion and palpation performed. Negative for misalignment, asymmetry, crepitation, defects, tenderness masses or effusions.  Left Lower Extremity: Inspection, percussion and palpation performed. Negative straight leg raise.  Right Lower Extremity: Inspection, percussion and palpation performed. Negative straight leg raise. SPINE EXAM:     Lumbar spine: No rash, ecchymosis, or gross obliquity. No focal atrophy is noted. ASSESSMENT    ICD-10-CM ICD-9-CM    1. S/P fusion of thoracic spine Z98.1 V45.4    2. Lumbar spine instability M53.2X6 724.9 AMB POC XRAY, SPINE, LUMBOSACRAL; 2 O   3. Thoracic spine pain M54.6 724.1        Written by Dane Pal, as dictated by Julio Bedolla MD.    I, Dr. Julio Bedolla MD, confirm that all documentation is accurate.

## 2019-12-03 ENCOUNTER — OFFICE VISIT (OUTPATIENT)
Dept: FAMILY MEDICINE CLINIC | Age: 69
End: 2019-12-03

## 2019-12-03 VITALS
DIASTOLIC BLOOD PRESSURE: 70 MMHG | TEMPERATURE: 98.1 F | BODY MASS INDEX: 30.95 KG/M2 | RESPIRATION RATE: 18 BRPM | OXYGEN SATURATION: 97 % | HEIGHT: 62 IN | HEART RATE: 68 BPM | SYSTOLIC BLOOD PRESSURE: 98 MMHG

## 2019-12-03 DIAGNOSIS — M54.6 ACUTE BILATERAL THORACIC BACK PAIN: ICD-10-CM

## 2019-12-03 RX ORDER — TRAMADOL HYDROCHLORIDE 100 MG/1
100 TABLET, EXTENDED RELEASE ORAL DAILY
Qty: 90 TAB | Refills: 0 | Status: SHIPPED | OUTPATIENT
Start: 2019-12-03 | End: 2020-02-14 | Stop reason: SDUPTHER

## 2019-12-03 NOTE — PROGRESS NOTES
Bryson Laurent is a 68 y.o. female  presents for back pain and leg pain. She needs refills. No new complaints. She would like PT referral.    HPI:   Patient has osteoarthritis , primarily affecting the back, legs and lower back. Pain control:           Current : well controlled   4/10           Worst pain over last week        7/10           Least pain over the last week   4/10  Activities of Daily Living:            stable            Are you able to do your normal daily activities?   all day  Patient Goals            Functional:  yes            Pain: less  Adverse side effects:             Since starting your pain medicine are you experiencing any of the following?              ( Examples: Constipation, fatigue, lapses in memory, depression or sexual                        Dysfunction)   No   Is a Pain management Contract in the patient's chart? yes  Aberrant behaviors:              none(Early refill requests, lost prescriptions, lost medicine)  Pill count:             Is it consistent with patient's prescription? {Yes/No:19684:L: \"yes\"  Last urine drug screen:     VA Prescription Monitoring Program check performed:  no        Treatment Care Team:  Patient Care Team:  Tyson Abarca MD as PCP - General (Family Practice)  Tyson Abarca MD as PCP - [facility] Empaneled Provider  Gwen Turner MD (Gastroenterology)  Follow up contact scheduled for 1-4 weeks: yes      Allergies   Allergen Reactions    Celebrex [Celecoxib] Swelling    Shellfish Derived Swelling     \"makes me feel puffy\"    Sulfa (Sulfonamide Antibiotics) Hives and Swelling     Lips swelling    Gabapentin Diarrhea and Palpitations     Chest and Abdominal pain    Humira [Adalimumab] Other (comments)     Lupus    Influenza Virus Vaccines Hives    Iodine Itching    Optiray 320 [Ioversol] Hives and Itching     Pt states when she gets iv contrast she itches a little from hives?     Penicillins Hives     Outpatient Medications Marked as Taking for the 12/3/19 encounter (Office Visit) with Angela Fuller MD   Medication Sig Dispense Refill    traMADol [facility]) 100 mg Tb24 Take 1 Tab by mouth daily for 90 days. Max Daily Amount: 100 mg. 90 Tab 0    lisinopril (PRINIVIL, ZESTRIL) 10 mg tablet Take 1 Tab by mouth daily. 90 Tab 1    zolpidem (AMBIEN) 5 mg tablet Take 1 Tab by mouth nightly as needed for Sleep. Take 1 Tab by Mouth Nightly As Needed. 90 Tab 0    meloxicam (MOBIC) 15 mg tablet Take 1 Tab by mouth daily. 90 Tab 2    tofacitinib (XELJANZ) 5 mg tab Take  by mouth two (2) times a day.  teriparatide (FORTEO) 20 mcg/dose - 600 mcg/2.4 mL pnij injection 20 mcg by SubCUTAneous route daily.  buPROPion SR (WELLBUTRIN SR) 100 mg SR tablet Take 1 Tab by mouth two (2) times a day. 180 Tab 1    cetirizine (ZYRTEC) 10 mg tablet Take  by mouth.  petrolat,wht/min oil/sod chl (DRY EYES OP) Apply  to eye.  acetaminophen (TYLENOL EXTRA STRENGTH) 500 mg tablet Take 1,000 mg by mouth every six (6) hours as needed for Pain.  cholecalciferol (VITAMIN D3) 1,000 unit tablet Take 1,000 Units by mouth five (5) days a week.  calcium carbonate (OS-BARBARA) 500 mg calcium (1,250 mg) tablet Take 1,500 mg by mouth daily.        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    H/O calcium pyrophosphate deposition disease (CPPD) Z87.39    IBS (irritable bowel syndrome) K58.9    RA (rheumatoid arthritis) ([facility]) M06.9    Sicca syndrome ([facility]) M35.00    Osteoarthritis of right hip M16.11    Pain management contract agreement Z02.89    ACP (advance care planning) Z71.89    Chronic left shoulder pain M25.512, G89.29    Osteopenia M85.80    Osteopenia of multiple sites M85.89    Osteoarthritis of left hip M16.12    Closed fracture of thoracic spine without spinal cord lesion ([facility] S22.009A    Spinal stenosis, thoracolumbar region M48.05    Lumbar spine instability M53.2X6    Spinal stenosis of lumbar region with neurogenic claudication M48.062    Spinal stenosis of lumbar region M48.061    Lumbar stenosis M48.061    Nausea R11.0    Acute bilateral thoracic back pain M54.6    Anxiety F41.9    Chest pain R07.9    Essential hypertension I10     Past Medical History:   Diagnosis Date    Abdominal abscess 2009    Arthritis     Rheumatoid    Autoimmune disease ([facility]     Medically induced Lupus    Back pain     Chronic pain     lower back    Colitis     Diverticulitis     Failed back syndrome     GERD (gastroesophageal reflux disease)     Hypertension     when on cyclosporins    Ill-defined condition     large torus roof of mouth    Irritable bowel syndrome     Neuropathy     bilateral hands/wrist    Osteoporosis     Pneumonia     RA (rheumatoid arthritis) ([facility]     Seizures ([facility] 6-1-2008    one episode- after high dose of epinepherine     Social History     Socioeconomic History    Marital status:      Spouse name: Not on file    Number of children: Not on file    Years of education: Not on file    Highest education level: Not on file   Tobacco Use    Smoking status: Never Smoker    Smokeless tobacco: Never Used   Substance and Sexual Activity    Alcohol use: No    Drug use: No    Sexual activity: Never   Other Topics Concern     Family History   Problem Relation Age of Onset    Other Other         Orthopedic (Sister)   Aleida Ordoñez Sister     Heart Attack Brother 58    Cancer Neg Hx     Diabetes Neg Hx     Heart Disease Neg Hx     Hypertension Neg Hx     Stroke Neg Hx     Malignant Hyperthermia Neg Hx     Pseudocholinesterase Deficiency Neg Hx     Delayed Awakening Neg Hx     Post-op Nausea/Vomiting Neg Hx     Emergence Delirium Neg Hx     Post-op Cognitive Dysfunction Neg Hx         Review of Systems   Constitutional: Negative for chills, fever, malaise/fatigue and weight loss. Eyes: Negative for blurred vision. Respiratory: Negative for shortness of breath and wheezing. Cardiovascular: Negative for chest pain. Gastrointestinal: Negative for nausea and vomiting. Musculoskeletal: Positive for back pain and joint pain. Negative for myalgias. Skin: Negative for rash. Neurological: Negative for weakness. Vitals:    12/03/19 1105   BP: 98/70   Pulse: 68   Resp: 18   Temp: 98.1 °F (36.7 °C)   TempSrc: Oral   SpO2: 97%   Height: 5' 2\" (1.575 m)   PainSc:   4   PainLoc: Back       Physical Exam  Vitals signs and nursing note reviewed. Neck:      Musculoskeletal: Normal range of motion and neck supple. Thyroid: No thyromegaly. Cardiovascular:      Rate and Rhythm: Normal rate and regular rhythm. Heart sounds: Normal heart sounds. Pulmonary:      Effort: Pulmonary effort is normal.      Breath sounds: Normal breath sounds. Musculoskeletal: Normal range of motion. Skin:     General: Skin is warm and dry. Neurological:      Mental Status: She is alert and oriented to person, place, and time. Assessment/Plan      ICD-10-CM ICD-9-CM    1. Acute bilateral thoracic back pain M54.6 724.1 traMADol (ULTRAM ER) 100 mg Tb24      REFERRAL TO PHYSICAL THERAPY     I have discussed the diagnosis with the patient and the intended plan of care as seen in the above orders. The patient has received an after-visit summary and questions were answered concerning future plans. I have discussed medication, side effects, and warnings with the patient in detail. The patient verbalized understanding and is in agreement with the plan of care. The patient will contact the office with any additional concerns.         lab results and schedule of future lab studies reviewed with patient    Elicia Porter MD

## 2019-12-03 NOTE — PROGRESS NOTES
Rica Kolb is a 68 y.o. female presents in office for    Chief Complaint   Patient presents with    Medication Evaluation    Referral Request     Physical Therapy; [facility]        Visit Vitals  BP 98/70 (BP 1 Location: Left arm, BP Patient Position: Sitting)   Pulse 68   Temp 98.1 °F (36.7 °C) (Oral)   Resp 18   Ht 5' 2\" (1.575 m)   SpO2 97%   BMI 30.95 kg/m²         Health Maintenance Due   Topic Date Due    Shingrix Vaccine Age 49> (1 of 2) 03/10/1998    BREAST CANCER SCRN MAMMOGRAM  07/18/2019    Influenza Age 5 to Adult  08/01/2019             1. Have you been to the ER, urgent care clinic since your last visit? Hospitalized since your last visit? No    2. Have you seen or consulted any other health care providers outside of the [address] [place] [address] since your last visit? Include any pap smears or colon screening. No    3 most recent PHQ Screens 12/3/2019   PHQ Not Done -   Little interest or pleasure in doing things Not at all   Feeling down, depressed, irritable, or hopeless Not at all   Total Score PHQ 2 0       Abuse Screening Questionnaire 12/3/2019   Do you ever feel afraid of your partner? N   Are you in a relationship with someone who physically or mentally threatens you? N   Is it safe for you to go home? Y       Fall Risk Assessment, last 12 mths 10/22/2019   Able to walk? Yes   Fall in past 12 months? Yes   Fall with injury?  Yes   Number of falls in past 12 months 1   Fall Risk Score 2       Learning Assessment 12/18/2014   PRIMARY LEARNER Patient   BARRIERS PRIMARY LEARNER NONE   CO-LEARNER CAREGIVER No   PRIMARY LANGUAGE ENGLISH   LEARNER PREFERENCE PRIMARY DEMONSTRATION   ANSWERED BY pateint   RELATIONSHIP SELF

## 2019-12-03 NOTE — PATIENT INSTRUCTIONS
Arthritis: Care Instructions  Your Care Instructions  Arthritis, also called osteoarthritis, is a breakdown of the cartilage that cushions your joints. When the cartilage wears down, your bones rub against each other. This causes pain and stiffness. Many people have some arthritis as they age. Arthritis most often affects the joints of the spine, hands, hips, knees, or feet. You can take simple measures to protect your joints, ease your pain, and help you stay active. Follow-up care is a key part of your treatment and safety. Be sure to make and go to all appointments, and call your doctor if you are having problems. It's also a good idea to know your test results and keep a list of the medicines you take. How can you care for yourself at home? · Stay at a healthy weight. Being overweight puts extra strain on your joints. · Talk to your doctor or physical therapist about exercises that will help ease joint pain. ? Stretch. You may enjoy gentle forms of yoga to help keep your joints and muscles flexible. ? Walk instead of jog. Other types of exercise that are less stressful on the joints include riding a bicycle, swimming, jackie chi, or water exercise. ? Lift weights. Strong muscles help reduce stress on your joints. Stronger thigh muscles, for example, take some of the stress off of the knees and hips. Learn the right way to lift weights so you do not make joint pain worse. · Take your medicines exactly as prescribed. Call your doctor if you think you are having a problem with your medicine. · Take pain medicines exactly as directed. ? If the doctor gave you a prescription medicine for pain, take it as prescribed. ? If you are not taking a prescription pain medicine, ask your doctor if you can take an over-the-counter medicine. · Use a cane, crutch, walker, or another device if you need help to get around. These can help rest your joints.  You also can use other things to make life easier, such as a higher toilet seat and padded handles on kitchen utensils. · Do not sit in low chairs, which can make it hard to get up. · Put heat or cold on your sore joints as needed. Use whichever helps you most. You also can take turns with hot and cold packs. ? Apply heat 2 or 3 times a day for 20 to 30 minutes--using a heating pad, hot shower, or hot pack--to relieve pain and stiffness. ? Put ice or a cold pack on your sore joint for 10 to 20 minutes at a time. Put a thin cloth between the ice and your skin. When should you call for help? Call your doctor now or seek immediate medical care if:    · You have sudden swelling, warmth, or pain in any joint.     · You have joint pain and a fever or rash.     · You have such bad pain that you cannot use a joint.    Watch closely for changes in your health, and be sure to contact your doctor if:    · You have mild joint symptoms that continue even with more than 6 weeks of care at home.     · You have stomach pain or other problems with your medicine. Where can you learn more? Go to http://camilo-robert.info/. Enter V731 in the search box to learn more about \"Arthritis: Care Instructions. \"  Current as of: April 1, 2019  Content Version: 12.2  © 6276-6087 [employer]. Care instructions adapted under license by [employer] (which disclaims liability or warranty for this information). If you have questions about a medical condition or this instruction, always ask your healthcare professional. [place] [address] any warranty or liability for your use of this information.

## 2019-12-09 ENCOUNTER — HOSPITAL ENCOUNTER (OUTPATIENT)
Dept: PHYSICAL THERAPY | Age: 69
Discharge: HOME | End: 2019-12-09
Payer: MEDICARE

## 2019-12-09 PROCEDURE — 97140 MANUAL THERAPY 1/> REGIONS: CPT

## 2019-12-09 PROCEDURE — 97161 PT EVAL LOW COMPLEX 20 MIN: CPT

## 2019-12-09 PROCEDURE — 97110 THERAPEUTIC EXERCISES: CPT

## 2019-12-09 NOTE — PROGRESS NOTES
[facility] at [address] [facility], [address] [place] [address]  Phone: [phone]      Fax:  [phone]    Plan of Care/ Statement of Necessity for Physical Therapy Services  Patient name: David Johnston Start of Care: 2019   Referral source: Alondra Arce MD : 1950    Medical Diagnosis: Back pain [M54.9]  Payor: Melissa Rodriguez / Plan: [place] MEDICARE PART A & B / Product Type: Medicare /  Onset Date: 2018 with recent acute exacerbation     Treatment Diagnosis: LBP with hip dysfunction   Prior Hospitalization: see medical history Provider#: [id number]   Medications: Verified on Patient summary List    Comorbidities: OP, OA, Scoliosis   Prior Level of Function: Able to do all normal activity to include sailing, with good balance      The Plan of Care and following information is based on the information from the initial evaluation. Assessment/ key information: Patient is a 68 y.o. female referred to PT with the above Dx. Patient seen today for c/o low back pain with limited mobility due to a lumbar spinal fracture in Mar 2018 with fusion/internal fixation L5-T8. Pt reports pelvic mal alignment and possible leg length discrepancies and multiple falls due to Limited ability to look down when walking. She describes limited proprioception (B). Pt c/o right groin pain at times and states that her balance is off. 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 Patient presents to PT with an impaired gait, decreased balance, decreased strength, decreased flexibility, and decreased mobility. She has a pelvic obliquity, painful palpation of the right pectineus and lower abdominals. Patient s/s appear to be consistent w/ diagnosis. Patient demonstrates the potential to make functional gains within a reasonable time frame.   Patient will benefit from skilled PT to address impairments and improve functional mobility, strength, gait and balance for an improved quality of life.  Fall Risk Assessment: Patient demonstrates no Fall Risk   Evaluation Complexity History MEDIUM  Complexity : 1-2 comorbidities / personal factors will impact the outcome/ POC ; Examination LOW Complexity : 1-2 Standardized tests and measures addressing body structure, function, activity limitation and / or participation in recreation  ;Presentation LOW Complexity : Stable, uncomplicated  ;Clinical Decision Making MEDIUM Complexity : FOTO score of 26-74  Overall Complexity Rating: LOW   Problem List: pain affecting function, decrease ROM, decrease strength, impaired gait/ balance, decrease ADL/ functional abilitiies, decrease activity tolerance, decrease flexibility/ joint mobility, decrease transfer abilities and other FOTO = 38   Treatment Plan may include any combination of the following: Therapeutic exercise, Therapeutic activities, Neuromuscular re-education, Physical agent/modality, Gait/balance training, Manual therapy, Patient education, Self Care training, Functional mobility training, Home safety training and Stair training  Patient / Family readiness to learn indicated by: asking questions, trying to perform skills and interest  Persons(s) to be included in education: patient (P)  Barriers to Learning/Limitations: None  Patient Goal (s): Fix leg length discrepancy if need, decrease discomfort with walking and standing  Patient Self Reported Health Status: fair  Rehabilitation Potential: good    Short Term Goals: To be accomplished in 5 treatments:  1. Pt will be compliant and independent with HEP in order to facilitate PT sessions and aid with self management             Eval Status:  Initiated             Current Status:  2. Pt to tolerate 30 min or more of TE and/or Interventions w/o increased s/s             Eval Status:  Initiated             Current Status:     Long Term Goals: To be accomplished in 10 treatments:  1.   Pt will report 50% improvement or better with involvement to show a significant increase in function             Eval Status:  Initiated             Current Status:  2. Pt will stand with hip flexed at 90* for 10\" or more without significant discomfort to aid with fabulation and clearance of toes during swing phase of gait             Eval Status:  Initiated             Current Status:  3. Pt will demonstrate the ability to ambulate over hurdles times 10 reps with 2 or less deviations to improve hip flexion for progress to a normal gait pattern               Eval Status:  Initiated             Current Status:  4. 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 Pt will demonstrate (B) hip flexion at 4/5 or better for improved stability with normal gait pattern            Eval Status:  Initiated             Current Status:  5. Pt will improve FOTO score to 48 in 14 visits to show significant improvement in function for progress to little difficulty performing usual daily activity             Eval Status: 38             Current Status:   Frequency / Duration: Patient to be seen 2-3 times per week for 10 treatments. Patient/ Caregiver education and instruction: Diagnosis, prognosis, self care, activity modification and exercises   Comments:  Patient provided w/HEP   [x]  Plan of care has been reviewed with PTA    Certification Period: 12/9/19 - 1/7/19  Joann Terry, PT 12/9/2019 9:15 AM  _____________________________________________________________________  I certify that the above Therapy Services are being furnished while the patient is under my care. I agree with the treatment plan and certify that this therapy is necessary.     Physician's Signature:____________Date:_________TIME:________    [employer], Date and Time must be completed for valid certification **    Please sign and return to [facility] at [facility] [address] [facility]  Phone: [phone]      Fax:  [phone]

## 2019-12-09 NOTE — PROGRESS NOTES
PT DAILY TREATMENT NOTE 10-18    Patient Name: Nehemias Murray  Date:2019  : 1950  [x]  Patient  Verified  Payor: VA MEDICARE / Plan: VA MEDICARE PART A & B / Product Type: Medicare /    In YQBT:[id number]  Out time:1015  Total Treatment Time (min): 58  Visit #: 1 of 10    Medicare/BCBS Only   Total Timed Codes (min):  58 1:1 Treatment Time:  73       Treatment Area: Back pain [M54.9]      SUBJECTIVE  Pain Level (0-10 scale): 2-3  []constant [x]intermittent []improving []worsening []no change since onset     Any medication changes, allergies to medications, adverse drug reactions, diagnosis change, or new procedure performed?: [x] No    [] Yes (see summary sheet for update)  Subjective functional status/changes:       Subjective: c/o low back pain with limited mobility due to a lumbar spinal fracture in Mar 2018 with fusion/internal fixation L5-T8. Pt reports pelvic mal alignment and possible leg length discrepancies and multiple falls due to  Limited ability to look down when walking. She describes limited proprioception (B). Pt c/o right groin pain at times. Balance is off. Show dogs at dog shows. 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  Sailing, walk dogs, Computing, Presentations, Ancient [employer]ens, Walking up and down stairs on boat docks,     Limitations to Activity/Recreation/PLOF: walking dogs is difficult with pain in the back of legs, limited rot with driving,     Barriers: [x]pain []financial []time []transportation []other    Motivation: High    FABQ Score: []low []elevate    Cognition: A & O x 3    Other:    Risk For Falls:   []No  [x]low []elevate           OBJECTIVE/EXAMINATION  Demonstrated Balance: Good       SLS: Right 30  Left  30     Tandem Stance:           Romberg:  EO 30   EC 30  Mobility: (I) but labored  Gait: Abnormal gait with decr right pelvic sway, decr (B) Hip Flx, decreased arm swing, decrease stride length  - Depressed right crest  - post rot right innominate  - TTP with muscle tightness (B) Superior glute Left > Right  - TTP (B) PSIS  - Pain right Groin with hip flx/resisted hip flx  - elev right sacral base  - Hip Flexor with (B) weakness  - TTP (B) Pectenius/ADD/LGoodower ABs  - Presents with lower abdominal muscle strain       AROM PROM Strength Pain? ?    Right Left Right Left Right Left    Hip Flx     3 3+    Hip Ext          Knee Flx          Knee Ext          DF          PF          Toe DF          Toe PF          HS 90/90          SLR                                          33 min [x]Eval                  []Re-Eval         15 min Therapeutic Exercise:  [x] See flow sheet : Develop and instruct HEP   Rationale: increase ROM, increase strength and improve coordination to improve the patients ability to tolerate increased activity levels     10 min Manual Therapy:  Inf glide right sacral base, (B) Innominate p/a mobs, STM/DTM (B) Glute     Rationale: decrease pain, increase ROM, increase tissue extensibility and decrease trigger points to tolerate usual activity                                                                  With   [x] TE   [] TA   [] neuro   [] other: Patient Education: [x] Review HEP    [] Progressed/Changed HEP based on:   [] positioning   [] body mechanics   [] transfers   [] heat/ice application    [] other:       Other Objective/Functional Measures:        Pain Level (0-10 scale) post treatment: 2-3     ASSESSMENT/Changes in Function:        [x]  See Plan of Care  []  See progress note/recertification  []  See Discharge Summary            PLAN  [x]  Upgrade activities as tolerated     [x]  Continue plan of care  []  Update interventions per flow sheet       []  Discharge due to:_  []  Other:_      Tiffany Llanos, PT 12/9/2019  9:16 AM    No future appointments.

## 2019-12-11 ENCOUNTER — HOSPITAL ENCOUNTER (OUTPATIENT)
Dept: PHYSICAL THERAPY | Age: 69
Discharge: HOME | End: 2019-12-11
Payer: MEDICARE

## 2019-12-11 PROCEDURE — 97035 APP MDLTY 1+ULTRASOUND EA 15: CPT

## 2019-12-11 PROCEDURE — 97140 MANUAL THERAPY 1/> REGIONS: CPT

## 2019-12-11 NOTE — PROGRESS NOTES
PT DAILY TREATMENT NOTE 10-18    Patient Name: Shakir Rutherford  Date:2019  : 1950  [x]  Patient  Verified  Payor: [place] MEDICARE / Plan: [place] MEDICARE PART A & B / Product Type: Medicare /    In time:1005  Out time:1059  Total Treatment Time (min): 50  Visit #: 2 of 10    Medicare/BCBS Only   Total Timed Codes (min):  50 1:1 Treatment Time:  26       Treatment Area: Back pain [M54.9]    SUBJECTIVE  Pain Level (0-10 scale): 3  Any medication changes, allergies to medications, adverse drug reactions, diagnosis change, or new procedure performed?: [x] No    [] Yes (see summary sheet for update)  Subjective functional status/changes:   [] No changes reported  Some pain at the right hip    OBJECTIVE    Modality rationale: decrease inflammation, decrease pain and increase tissue extensibility to improve the patients ability to tolerate increased activity   Min Type Additional Details    [] Estim:  []Unatt       []IFC  []Premod                        []Other:  []w/ice   []w/heat  Position:  Location:    [] Estim: []Att    []TENS instruct  []NMES                    []Other:  []w/US   []w/ice   []w/heat  Position:  Location:    []  Traction: [] Cervical       []Lumbar                       [] Prone          []Supine                       []Intermittent   []Continuous Lbs:  [] before manual  [] after manual   8 [x]  Ultrasound: [x]Continuous   [] Pulsed                           [x]1MHz   []3MHz W/cm2: 1.7  Location: right hip flx/pectineus/lateral glute region/TFL    []  Iontophoresis with dexamethasone         Location: [] Take home patch   [] In clinic    []  Ice     []  heat  []  Ice massage  []  Laser   []  Anodyne Position:  Location:    []  Laser with stim  []  Other:  Position:  Location:    []  Vasopneumatic Device Pressure:       [] lo [] med [] hi   Temperature: [] lo [] med [] hi   [] Skin assessment post-treatment:  []intact []redness- no adverse reaction    []redness - adverse reaction:     30 min Therapeutic Exercise:  [x] See flow sheet :   Rationale: increase ROM, increase strength and improve coordination to improve the patients ability to tolerate increased activity    12 min Manual Therapy:  STM/DTM right Hip flx/lateral glute/TFL region   Rationale: decrease pain, increase ROM, increase tissue extensibility and decrease trigger points to perform increased activity            With   [x] TE   [] TA   [] neuro   [] other: Patient Education: [x] Review HEP    [] Progressed/Changed HEP based on:   [] positioning   [] body mechanics   [] transfers   [] heat/ice application    [] other:      Other Objective/Functional Measures:  - Some difficulty performing hip flx exercises with right LE        Pain Level (0-10 scale) post treatment: 4-5    ASSESSMENT/Changes in Function:   - Good tolerance with first full treatment with minor increased pain  - Right anterior hip pain with flexion exercises  - VCs required for some hip flx exercises       Patient will continue to benefit from skilled PT services to modify and progress therapeutic interventions, address functional mobility deficits, address ROM deficits, address strength deficits, analyze and address soft tissue restrictions, analyze and cue movement patterns and analyze and modify body mechanics/ergonomics to attain remaining goals.      []  See Plan of Care  []  See progress note/recertification  []  See Discharge Summary         Progress towards goals / Updated goals:  Short Term Goals: To be accomplished in 5 treatments:  1.  Pt will be compliant and independent with HEP in order to facilitate PT sessions and aid with self management             Eval Status:  Initiated             Current Status: Pt reports compliance with HEP 12/11/19  2.  Pt to tolerate 30 min or more of TE and/or Interventions w/o increased s/s             Eval Status:  Initiated             Current Status: Pt seen for 54 min with minor increased pain 12/11/19     Long Term Goals: To be accomplished in 10 treatments:  1.  Pt will report 50% improvement or better with involvement to show a significant increase in function             Eval Status:  Initiated             Current Status:  2.  Pt will stand with hip flexed at 90* for 10\" or more without significant discomfort to aid with fabulation and clearance of toes during swing phase of gait             Eval Status:  Initiated             Current Status:  3.  Pt will demonstrate the ability to ambulate over hurdles times 10 reps with 2 or less deviations to improve hip flexion for progress to a normal gait pattern               Eval Status:  Initiated             Current Status:  4.  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   Pt will demonstrate (B) hip flexion at 4/5 or better for improved stability with normal gait pattern            Eval Status:  Initiated             Current Status:  5.  Pt will improve FOTO score to 48 in 14 visits to show significant improvement in function for progress to little difficulty performing usual daily activity             Eval Status: 38             Current Status:     PLAN  [x]  Upgrade activities as tolerated     [x]  Continue plan of care  []  Update interventions per flow sheet       []  Discharge due to:_  []  Other:_      Stefanie Waller, PT 12/11/2019  11:00 AM    Future Appointments   Date Time Provider Jonathan Colmenares   12/17/2019 11:00 AM Nadine Johns, PT [facility]   12/20/2019  8:30 AM Nadine Johns PT [facility]

## 2019-12-17 ENCOUNTER — APPOINTMENT (OUTPATIENT)
Dept: PHYSICAL THERAPY | Age: 69
End: 2019-12-17
Payer: MEDICARE

## 2019-12-18 ENCOUNTER — HOSPITAL ENCOUNTER (OUTPATIENT)
Dept: PHYSICAL THERAPY | Age: 69
Discharge: HOME | End: 2019-12-18
Payer: MEDICARE

## 2019-12-18 PROCEDURE — 97140 MANUAL THERAPY 1/> REGIONS: CPT

## 2019-12-18 PROCEDURE — 97110 THERAPEUTIC EXERCISES: CPT

## 2019-12-18 PROCEDURE — 97035 APP MDLTY 1+ULTRASOUND EA 15: CPT

## 2019-12-18 NOTE — PROGRESS NOTES
PT DAILY TREATMENT NOTE 10-18    Patient Name: Raisa Tolentino  Date:2019  : 1950  [x]  Patient  Verified  Payor: [place] MEDICARE / Plan: [place] MEDICARE PART A & B / Product Type: Medicare /    In YPXC:2337  Out time:928  Total Treatment Time (min): 55  Visit #: 3 of 10    Medicare/BCBS Only   Total Timed Codes (min):  50 1:1 Treatment Time:  40       Treatment Area: Back pain [M54.9]    SUBJECTIVE  Pain Level (0-10 scale): 3-4  Any medication changes, allergies to medications, adverse drug reactions, diagnosis change, or new procedure performed?: [x] No    [] Yes (see summary sheet for update)  Subjective functional status/changes:   [] No changes reported  The thigh is really hurting    OBJECTIVE    Modality rationale: decrease inflammation, decrease pain and increase tissue extensibility to improve the patients ability to tolerate increased activity   Min Type Additional Details    [] Estim:  []Unatt       []IFC  []Premod                        []Other:  []w/ice   []w/heat  Position:  Location:    [] Estim: []Att    []TENS instruct  []NMES                    []Other:  []w/US   []w/ice   []w/heat  Position:  Location:    []  Traction: [] Cervical       []Lumbar                       [] Prone          []Supine                       []Intermittent   []Continuous Lbs:  [] before manual  [] after manual   8 [x]  Ultrasound: [x]Continuous   [] Pulsed                           [x]1MHz   []3MHz W/cm2:.7   Location:right Anterior thigh    []  Iontophoresis with dexamethasone         Location: [] Take home patch   [] In clinic    []  Ice     []  heat  []  Ice massage  []  Laser   []  Anodyne Position:  Location:    []  Laser with stim  []  Other:  Position:  Location:    []  Vasopneumatic Device Pressure:       [] lo [] med [] hi   Temperature: [] lo [] med [] hi   [x] Skin assessment post-treatment:  [x]intact []redness- no adverse reaction    []redness - adverse reaction:     26 min Therapeutic Exercise:  [x] See flow sheet :   Rationale: increase ROM, increase strength and improve coordination to improve the patients ability to perform increased activity    16 min Manual Therapy:  STM/DTM right anterior thigh   Rationale: decrease pain, increase ROM, increase tissue extensibility and decrease trigger points to tolerate increased activity          With   [x] TE   [] TA   [] neuro   [] other: Patient Education: [x] Review HEP    [] Progressed/Changed HEP based on:   [] positioning   [] body mechanics   [] transfers   [] heat/ice application    [] other:      Other Objective/Functional Measures:   - TTP with TrP right anterior vastus lateralis       Pain Level (0-10 scale) post treatment: 3-4    ASSESSMENT/Changes in Function:   - Pt responded with feeling more relaxed in her right thight but reports no change in the pain level post treatment of MHP, manual therapy and US    Patient will continue to benefit from skilled PT services to modify and progress therapeutic interventions, address functional mobility deficits, address ROM deficits, address strength deficits, analyze and address soft tissue restrictions and analyze and cue movement patterns to attain remaining goals.      []  See Plan of Care  []  See progress note/recertification  []  See Discharge Summary         Progress towards goals / Updated goals:  Short Term Goals: To be accomplished in 5 treatments:  1.  Pt will be compliant and independent with HEP in order to facilitate PT sessions and aid with self management             Eval Status:  Initiated             Current Status: Pt reports compliance with HEP 12/11/19  2.  Pt to tolerate 30 min or more of TE and/or Interventions w/o increased s/s             Eval Status:  Initiated             Current Status: Pt seen for 54 min with minor increased pain 12/11/19     Long Term Goals: To be accomplished in 10 treatments:  1.  Pt will report 50% improvement or better with involvement to show a significant increase in function             Eval Status:  Initiated             Current Status:  2.  Pt will stand with hip flexed at 90* for 10\" or more without significant discomfort to aid with fabulation and clearance of toes during swing phase of gait             Eval Status:  Initiated             Current Status:  3.  Pt will demonstrate the ability to ambulate over hurdles times 10 reps with 2 or less deviations to improve hip flexion for progress to a normal gait pattern               Eval Status:  Initiated             Current Status:  4.  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  Pt will demonstrate (B) hip flexion at 4/5 or better for improved stability with normal gait pattern            Eval Status:  Initiated             Current Status:  5.  Pt will improve FOTO score to 48 in 14 visits to show significant improvement in function for progress to little difficulty performing usual daily activity             Eval Status: 38             Current Status:     PLAN  [x]  Upgrade activities as tolerated     [x]  Continue plan of care  []  Update interventions per flow sheet       []  Discharge due to:_  []  Other:_      Asa Roger, SAQIB 12/18/2019  6:55 PM    Future Appointments   Date Time Provider Jonathan Colmenares   12/20/2019  8:30 AM Zenaida Suh, PT [facility]

## 2019-12-20 ENCOUNTER — HOSPITAL ENCOUNTER (OUTPATIENT)
Dept: PHYSICAL THERAPY | Age: 69
Discharge: HOME | End: 2019-12-20
Payer: MEDICARE

## 2019-12-20 PROCEDURE — 97140 MANUAL THERAPY 1/> REGIONS: CPT

## 2019-12-20 PROCEDURE — 97035 APP MDLTY 1+ULTRASOUND EA 15: CPT

## 2019-12-20 NOTE — PROGRESS NOTES
PT DAILY TREATMENT NOTE 10-18    Patient Name: Haylee Eden  Date:2019  : 1950  [x]  Patient  Verified  Payor: Josef Brody / Plan: [place] MEDICARE PART A & B / Product Type: Medicare /    In time:0830  Out AXWY:[id number]  Total Treatment Time (min): 44  Visit #: 4 of 10    Medicare/BCBS Only   Total Timed Codes (min):  44 1:1 Treatment Time:  27       Treatment Area: Back pain [M54.9]    SUBJECTIVE  Pain Level (0-10 scale):  2  Any medication changes, allergies to medications, adverse drug reactions, diagnosis change, or new procedure performed?: [x] No    [] Yes (see summary sheet for update)  Subjective functional status/changes:   [] No changes reported  Leg feels better but still has a lump    OBJECTIVE    Modality rationale: decrease inflammation, decrease pain and increase tissue extensibility to improve the patients ability to tolerate increased activity   Min Type Additional Details    [] Estim:  []Unatt       []IFC  []Premod                        []Other:  []w/ice   []w/heat  Position:  Location:    [] Estim: []Att    []TENS instruct  []NMES                    []Other:  []w/US   []w/ice   []w/heat  Position:  Location:    []  Traction: [] Cervical       []Lumbar                       [] Prone          []Supine                       []Intermittent   []Continuous Lbs:  [] before manual  [] after manual   8 [x]  Ultrasound: [x]Continuous   [] Pulsed                           [x]1MHz   []3MHz W/cm2:1.7  Location: Right lateral thigh    []  Iontophoresis with dexamethasone         Location: [] Take home patch   [] In clinic    []  Ice     []  heat  []  Ice massage  []  Laser   []  Anodyne Position:  Location:    []  Laser with stim  []  Other:  Position:  Location:    []  Vasopneumatic Device Pressure:       [] lo [] med [] hi   Temperature: [] lo [] med [] hi   [x] Skin assessment post-treatment:  [x]intact []redness- no adverse reaction    []redness - adverse reaction:     17 min Therapeutic Exercise:  [] See flow sheet :   Rationale: increase ROM, increase strength and improve coordination to improve the patients ability to tolerate increased activity    16 min Manual Therapy:  STM/DTM right anterior lateral thigh   Rationale: decrease pain, increase ROM, increase tissue extensibility and decrease trigger points to aid with increased activity tolerance        With   [x] TE   [] TA   [] neuro   [] other: Patient Education: [x] Review HEP    [] Progressed/Changed HEP based on:   [] positioning   [] body mechanics   [] transfers   [] heat/ice application    [] other:      Other Objective/Functional Measures:       Pain Level (0-10 scale) post treatment: 1    ASSESSMENT/Changes in Function:   - Continued TTP with muscle tightness but at a lesser degree since last visit  - Good response to manual therapy and use of US to decrease muscle tightness    Patient will continue to benefit from skilled PT services to modify and progress therapeutic interventions, address functional mobility deficits, address ROM deficits, address strength deficits, analyze and address soft tissue restrictions and analyze and cue movement patterns to attain remaining goals.      []  See Plan of Care  []  See progress note/recertification  []  See Discharge Summary         Progress towards goals / Updated goals:  Short Term Goals: To be accomplished in 5 treatments:  1.  Pt will be compliant and independent with HEP in order to facilitate PT sessions and aid with self management             Eval Status:  Initiated             Current Status: Pt reports compliance with HEP 12/11/19  2.  Pt to tolerate 30 min or more of TE and/or Interventions w/o increased s/s             Eval Status:  Initiated             Current Status: Pt seen for 54 min with minor increased pain 12/11/19     Long Term Goals: To be accomplished in 10 treatments:  1.  Pt will report 50% improvement or better with involvement to show a significant increase in function             Eval Status:  Initiated             Current Status:  2.  Pt will stand with hip flexed at 90* for 10\" or more without significant discomfort to aid with fabulation and clearance of toes during swing phase of gait             Eval Status:  Initiated             Current Status: Progressing with increased tolerance performing repeated hip flexion from 8\" step 12/20/19  3.  Pt will demonstrate the ability to ambulate over hurdles times 10 reps with 2 or less deviations to improve hip flexion for progress to a normal gait pattern               Eval Status:  Initiated             Current Status:  4.  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  Pt will demonstrate (B) hip flexion at 4/5 or better for improved stability with normal gait pattern            Eval Status:  Initiated             Current Status:  5.  Pt will improve FOTO score to 48 in 14 visits to show significant improvement in function for progress to little difficulty performing usual daily activity             Eval Status: 38             Current Status:     PLAN  [x]  Upgrade activities as tolerated     [x]  Continue plan of care  []  Update interventions per flow sheet       []  Discharge due to:_  []  Other:_      Marty Roberts, PT 12/20/2019  9:36 AM    No future appointments.

## 2019-12-24 ENCOUNTER — HOSPITAL ENCOUNTER (OUTPATIENT)
Dept: PHYSICAL THERAPY | Age: 69
Discharge: HOME | End: 2019-12-24
Payer: MEDICARE

## 2019-12-24 PROCEDURE — 97110 THERAPEUTIC EXERCISES: CPT

## 2019-12-24 PROCEDURE — 97140 MANUAL THERAPY 1/> REGIONS: CPT

## 2019-12-24 NOTE — PROGRESS NOTES
PT DAILY TREATMENT NOTE 10-18    Patient Name: Melvin Velasquez  Date:2019  : 1950  [x]  Patient  Verified  Payor: [place] MEDICARE / Plan: [place] MEDICARE PART A & B / Product Type: Medicare /    In time:1033  Out time:1121  Total Treatment Time (min): 48  Visit #: 5 of 10    Medicare/BCBS Only   Total Timed Codes (min):  48 1:1 Treatment Time:  32       Treatment Area: Back pain [M54.9]    SUBJECTIVE  Pain Level (0-10 scale): 0  Any medication changes, allergies to medications, adverse drug reactions, diagnosis change, or new procedure performed?: [x] No    [] Yes (see summary sheet for update)  Subjective functional status/changes:   [] No changes reported  Just a low grade ache.   The leg is feeling much better with less discomfort    OBJECTIVE    Modality rationale: decrease inflammation, decrease pain and increase tissue extensibility to improve the patients ability to tolerate increased activity   Min Type Additional Details    [] Estim:  []Unatt       []IFC  []Premod                        []Other:  []w/ice   []w/heat  Position:  Location:    [] Estim: []Att    []TENS instruct  []NMES                    []Other:  []w/US   []w/ice   []w/heat  Position:  Location:    []  Traction: [] Cervical       []Lumbar                       [] Prone          []Supine                       []Intermittent   []Continuous Lbs:  [] before manual  [] after manual   8 [x]  Ultrasound: [x]Continuous   [] Pulsed                           [x]1MHz   []3MHz W/cm2:1.7  Location: right lateral quad near right hip surgical scar    []  Iontophoresis with dexamethasone         Location: [] Take home patch   [] In clinic    []  Ice     []  heat  []  Ice massage  []  Laser   []  Anodyne Position:  Location:    []  Laser with stim  []  Other:  Position:  Location:    []  Vasopneumatic Device Pressure:       [] lo [] med [] hi   Temperature: [] lo [] med [] hi   [x] Skin assessment post-treatment:  [x]intact []redness- no adverse reaction    []redness - adverse reaction:     23 min Therapeutic Exercise:  [x] See flow sheet :   Rationale: increase ROM, increase strength and improve coordination to improve the patients ability to tolerate increased activity    17 min Manual Therapy:  STM/DTM   Rationale: decrease pain, increase ROM, increase tissue extensibility and decrease trigger points to improve daily function       With   [x] TE   [] TA   [] neuro   [] other: Patient Education: [x] Review HEP    [] Progressed/Changed HEP based on:   [] positioning   [] body mechanics   [] transfers   [] heat/ice application    [] other:      Other Objective/Functional Measures:   - Add leg press and ashish walking     Pain Level (0-10 scale) post treatment: 1-2    ASSESSMENT/Changes in Function:   - Decreased muscle tightness noted at the right lateral quad  - Improved ability to ride bike with no added pain today    Patient will continue to benefit from skilled PT services to modify and progress therapeutic interventions, address functional mobility deficits, address ROM deficits, address strength deficits, analyze and address soft tissue restrictions and analyze and cue movement patterns to attain remaining goals.      []  See Plan of Care  []  See progress note/recertification  []  See Discharge Summary         Progress towards goals / Updated goals:  Short Term Goals: To be accomplished in 5 treatments:  1.  Pt will be compliant and independent with HEP in order to facilitate PT sessions and aid with self management             Eval Status:  Initiated             Current Status: Pt reports compliance with HEP 12/11/19  2.  Pt to tolerate 30 min or more of TE and/or Interventions w/o increased s/s             Eval Status:  Initiated             Current Status: Pt seen for 54 min with minor increased pain 12/11/19     Long Term Goals: To be accomplished in 10 treatments:  1.  Pt will report 50% improvement or better with involvement to show a significant increase in function             Eval Status:  Initiated             Current Status: Pt reports improvement with daily tasks with less pain 12/24/19  2.  Pt will stand with hip flexed at 90* for 10\" or more without significant discomfort to aid with fabulation and clearance of toes during swing phase of gait             Eval Status:  Initiated             Current Status: Progressing with increased tolerance performing repeated hip flexion from 8\" step 12/20/19  3.  Pt will demonstrate the ability to ambulate over hurdles times 10 reps with 2 or less deviations to improve hip flexion for progress to a normal gait pattern               Eval Status:  Initiated             Current Status:  4.  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  Pt will demonstrate (B) hip flexion at 4/5 or better for improved stability with normal gait pattern            Eval Status:  Initiated             Current Status:  5.  Pt will improve FOTO score to 48 in 14 visits to show significant improvement in function for progress to little difficulty performing usual daily activity             Eval Status: 38    PLAN  [x]  Upgrade activities as tolerated     [x]  Continue plan of care  []  Update interventions per flow sheet       []  Discharge due to:_  []  Other:_      Jean Rodrigues, PT 12/24/2019  10:55 AM    Future Appointments   Date Time Provider Jonathan Colmenares   12/31/2019  9:00 AM Jam Olmedo PT [facility]   1/3/2020  8:30 AM Jam Olmedo PT [facility]   1/6/2020 10:00 AM Jam Olmedo PT [facility]   1/10/2020  8:30 AM Tiffany WIGGINS PT [facility]   1/13/2020  8:30 AM Jam Olmedo PT [facility]

## 2019-12-31 ENCOUNTER — HOSPITAL ENCOUNTER (OUTPATIENT)
Dept: PHYSICAL THERAPY | Age: 69
Discharge: HOME | End: 2019-12-31
Payer: MEDICARE

## 2019-12-31 PROCEDURE — 97110 THERAPEUTIC EXERCISES: CPT

## 2019-12-31 PROCEDURE — 97035 APP MDLTY 1+ULTRASOUND EA 15: CPT

## 2019-12-31 PROCEDURE — 97140 MANUAL THERAPY 1/> REGIONS: CPT

## 2019-12-31 NOTE — PROGRESS NOTES
PT DAILY TREATMENT NOTE 10-18    Patient Name: Claudeen Robinson  Date:2019  : 1950  [x]  Patient  Verified  Payor: Willie lEliott / Plan: [place] & [place] / Product Type: Medicare /    In [id number]:[id number]  Out time:09  Total Treatment Time (min): 53  Visit #: 6 of 10    Medicare/BCBS Only   Total Timed Codes (min):  53 1:1 Treatment Time:  53       Treatment Area: Back pain [M54.9]    SUBJECTIVE  Pain Level (0-10 scale): 2  Any medication changes, allergies to medications, adverse drug reactions, diagnosis change, or new procedure performed?: [x] No    [] Yes (see summary sheet for update)  Subjective functional status/changes:   [] No changes reported  Cramping at night in the calf. Ache in the back.     OBJECTIVE    Modality rationale: decrease inflammation, decrease pain and increase tissue extensibility to improve the patients ability to improve activity tolerance   Min Type Additional Details    [] Estim:  []Unatt       []IFC  []Premod                        []Other:  []w/ice   []w/heat  Position:  Location:    [] Estim: []Att    []TENS instruct  []NMES                    []Other:  []w/US   []w/ice   []w/heat  Position:  Location:    []  Traction: [] Cervical       []Lumbar                       [] Prone          []Supine                       []Intermittent   []Continuous Lbs:  [] before manual  [] after manual   8 [x]  Ultrasound: [x]Continuous   [] Pulsed                           [x]1MHz   []3MHz W/cm2:1.7  Location: (B) Glut and LS paraspinals      []  Iontophoresis with dexamethasone         Location: [] Take home patch   [] In clinic    []  Ice     []  heat  []  Ice massage  []  Laser   []  Anodyne Position:  Location:    []  Laser with stim  []  Other:  Position:  Location:    []  Vasopneumatic Device Pressure:       [] lo [] med [] hi   Temperature: [] lo [] med [] hi   [x] Skin assessment post-treatment:  []intact []redness- no adverse reaction    []redness - adverse reaction:     21 min Therapeutic Exercise:  [x] See flow sheet :Assess mobility and alignment   Rationale: increase ROM, increase strength and improve coordination to improve the patients ability to perform activities better      24 min Manual Therapy:  STM/DTM (B) Superior Glute and LS paraspinalsSTM/DTM with roller at the lateral proximal right thigh, Inf glide right sacral base, (B) Innominate p/a mobs, (B) LE LAD, (B) L/S and T/S Rot mobs     Rationale: decrease pain, increase ROM, increase tissue extensibility and decrease trigger points to tolerate normal activity          With   [x] TE   [] TA   [] neuro   [] other: Patient Education: [x] Review HEP    [] Progressed/Changed HEP based on:   [] positioning   [] body mechanics   [] transfers   [] heat/ice application    [] other:      Other Objective/Functional Measures:   - Right TS lateral shift  - Elev Right crest  - Decr mobility (B) innominate in stork stance  =- decreased pelvic sway with ambulation     Pain Level (0-10 scale) post treatment: 2    ASSESSMENT/Changes in Function:   -Improved pelvic mobility and alignment after treatment  - Improved gait pattern with increased pelvic sway (B)    Patient will continue to benefit from skilled PT services to modify and progress therapeutic interventions, address functional mobility deficits, address ROM deficits, address strength deficits, analyze and address soft tissue restrictions and analyze and cue movement patterns to attain remaining goals.      []  See Plan of Care  []  See progress note/recertification  []  See Discharge Summary         Progress towards goals / Updated goals:  Short Term Goals: To be accomplished in 5 treatments:  1.  Pt will be compliant and independent with HEP in order to facilitate PT sessions and aid with self management             Eval Status:  Initiated             Current Status: Pt reports compliance with HEP 12/11/19  2.  Pt to tolerate 30 min or more of TE and/or Interventions w/o increased s/s             Eval Status:  Initiated             Current Status: Pt seen for 54 min with minor increased pain 12/11/19     Long Term Goals: To be accomplished in 10 treatments:  1.  Pt will report 50% improvement or better with involvement to show a significant increase in function             Eval Status:  Initiated             Current Status: Pt reports improvement with daily tasks with less pain 12/24/19  2.  Pt will stand with hip flexed at 90* for 10\" or more without significant discomfort to aid with fabulation and clearance of toes during swing phase of gait             Eval Status:  Initiated             Current Status: Progressing with increased tolerance performing repeated hip flexion from 8\" step 12/20/19  3.  Pt will demonstrate the ability to ambulate over hurdles times 10 reps with 2 or less deviations to improve hip flexion for progress to a normal gait pattern               Eval Status:  Initiated             Current Status:  4.  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  Pt will demonstrate (B) hip flexion at 4/5 or better for improved stability with normal gait pattern            Eval Status:  Initiated             Current Status:  5.  Pt will improve FOTO score to 48 in 14 visits to show significant improvement in function for progress to little difficulty performing usual daily activity             Eval Status: 38    PLAN  [x]  Upgrade activities as tolerated     [x]  Continue plan of care  []  Update interventions per flow sheet       []  Discharge due to:_  []  Other:_      Janie Alcazar, PT 12/31/2019  9:16 AM    Future Appointments   Date Time Provider Jonathan Colmenares   1/3/2020  8:30 AM Dorothea Barros, PT [facility]   1/6/2020 10:00 AM Dorothea Barros, PT [facility]   1/10/2020  8:30 AM Dorothea Barros, PT [facility]   1/13/2020  8:30 AM Raulito Sherman, PT [place] [facility]

## 2020-01-03 ENCOUNTER — HOSPITAL ENCOUNTER (OUTPATIENT)
Dept: PHYSICAL THERAPY | Age: 70
Discharge: HOME | End: 2020-01-03
Payer: MEDICARE

## 2020-01-03 PROCEDURE — 97110 THERAPEUTIC EXERCISES: CPT

## 2020-01-03 PROCEDURE — 97035 APP MDLTY 1+ULTRASOUND EA 15: CPT

## 2020-01-03 PROCEDURE — 97140 MANUAL THERAPY 1/> REGIONS: CPT

## 2020-01-03 NOTE — PROGRESS NOTES
PT DAILY TREATMENT NOTE 10-18    Patient Name: Morgan Dominguez  Date:1/3/2020  : 1950  [x]  Patient  Verified  Payor: [place] MEDICARE / Plan: [place] MEDICARE PART A & B / Product Type: Medicare /    In BSDL:3027  Out time:0925  Total Treatment Time (min): 62  Visit #: 7 of 10    Medicare/BCBS Only   Total Timed Codes (min):  57 1:1 Treatment Time:  40       Treatment Area: Back pain [M54.9]    SUBJECTIVE  Pain Level (0-10 scale): 1  Any medication changes, allergies to medications, adverse drug reactions, diagnosis change, or new procedure performed?: [x] No    [] Yes (see summary sheet for update)  Subjective functional status/changes:   [] No changes reported  Doing better with less pain today    OBJECTIVE    Modality rationale: decrease inflammation, decrease pain and increase tissue extensibility to improve the patients ability to tolerate increased activity   Min Type Additional Details    [] Estim:  []Unatt       []IFC  []Premod                        []Other:  []w/ice   []w/heat  Position:  Location:    [] Estim: []Att    []TENS instruct  []NMES                    []Other:  []w/US   []w/ice   []w/heat  Position:  Location:    []  Traction: [] Cervical       []Lumbar                       [] Prone          []Supine                       []Intermittent   []Continuous Lbs:  [] before manual  [] after manual   8 [x]  Ultrasound: [x]Continuous   [] Pulsed                           [x]1MHz   []3MHz W/cm2: 1.7  Location: (B) superior Glute and right lumbar paraspinal    []  Iontophoresis with dexamethasone         Location: [] Take home patch   [] In clinic    []  Ice     []  heat  []  Ice massage  []  Laser   []  Anodyne Position:  Location:    []  Laser with stim  []  Other:  Position:  Location:    []  Vasopneumatic Device Pressure:       [] lo [] med [] hi   Temperature: [] lo [] med [] hi   [x] Skin assessment post-treatment:  [x]intact []redness- no adverse reaction    []redness - adverse reaction:     27 min Therapeutic Exercise:  [x] See flow sheet :   Rationale: increase ROM, increase strength and improve coordination to improve the patients ability to tolerate increased activity levels    20 min Manual Therapy:  STM/DTM (B) Superior glute and right Lumbar paraspinal muscles, Inf glide right sacral base   Rationale: decrease pain, increase ROM, increase tissue extensibility and decrease trigger points to allow performance of usual daily activity     With   [x] TE   [] TA   [] neuro   [] other: Patient Education: [x] Review HEP    [] Progressed/Changed HEP based on:   [] positioning   [] body mechanics   [] transfers   [] heat/ice application    [] other:      Other Objective/Functional Measures:   - TTP right lower abdominals, (B) PSIS, right Lumbar paraspinal  - Increased right lateral shift today     Pain Level (0-10 scale) post treatment: 2    ASSESSMENT/Changes in Function:   - Good response to manual therapy with increased muscle relaxation at the low back and PSIS/superior glute region    Patient will continue to benefit from skilled PT services to modify and progress therapeutic interventions, address functional mobility deficits, address ROM deficits, address strength deficits, analyze and address soft tissue restrictions and analyze and cue movement patterns to attain remaining goals.      []  See Plan of Care  []  See progress note/recertification  []  See Discharge Summary         Progress towards goals / Updated goals:  Short Term Goals: To be accomplished in 5 treatments:  1.  Pt will be compliant and independent with HEP in order to facilitate PT sessions and aid with self management             Eval Status:  Initiated             Current Status: Pt reports compliance with HEP 12/11/19  2.  Pt to tolerate 30 min or more of TE and/or Interventions w/o increased s/s             Eval Status:  Initiated             Current Status: Pt seen for 54 min with minor increased pain 12/11/19     Long Term Goals: To be accomplished in 10 treatments:  1.  Pt will report 50% improvement or better with involvement to show a significant increase in function             Eval Status:  Initiated             Current Status: Pt reports improvement with daily tasks with less pain 12/24/19  2.  Pt will stand with hip flexed at 90* for 10\" or more without significant discomfort to aid with fabulation and clearance of toes during swing phase of gait             Eval Status:  Initiated             Current Status: Increased tolerance but continued pain with hold hip flexion on the right side 1/3/20  3.  Pt will demonstrate the ability to ambulate over hurdles times 10 reps with 2 or less deviations to improve hip flexion for progress to a normal gait pattern               Eval Status:  Initiated             Current Status:  4.  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  Pt will demonstrate (B) hip flexion at 4/5 or better for improved stability with normal gait pattern            Eval Status:  Initiated             Current Status:  5.  Pt will improve FOTO score to 48 in 14 visits to show significant improvement in function for progress to little difficulty performing usual daily activity             Eval Status: 38    PLAN  [x]  Upgrade activities as tolerated     [x]  Continue plan of care  []  Update interventions per flow sheet       []  Discharge due to:_  []  Other:_      Todd Mcgowan, PT 1/3/2020  12:47 PM    Future Appointments   Date Time Provider Jonathan Colmenares   1/6/2020 10:00 AM Madelyn Moses, PT [facility]   1/10/2020  8:30 AM Madelyn Moses PT [facility]   1/13/2020  8:30 AM Madelyn Moses, PT [facility]

## 2020-01-06 ENCOUNTER — HOSPITAL ENCOUNTER (OUTPATIENT)
Dept: PHYSICAL THERAPY | Age: 70
Discharge: HOME | End: 2020-01-06
Payer: MEDICARE

## 2020-01-06 PROCEDURE — 97035 APP MDLTY 1+ULTRASOUND EA 15: CPT

## 2020-01-06 PROCEDURE — 97110 THERAPEUTIC EXERCISES: CPT

## 2020-01-06 NOTE — PROGRESS NOTES
PT DAILY TREATMENT NOTE 10-18    Patient Name: Armaan Winkler  Date:2020  : 1950  [x]  Patient  Verified  Payor: [place] MEDICARE / Plan: [place] MEDICARE PART A & B / Product Type: Medicare /    In time: 3236  Out time:1040  Total Treatment Time (min): 49  Visit #: 8 of 10    Medicare/BCBS Only   Total Timed Codes (min):  49 1:1 Treatment Time:  49       Treatment Area: Back pain [M54.9]    SUBJECTIVE  Pain Level (0-10 scale): 0  Any medication changes, allergies to medications, adverse drug reactions, diagnosis change, or new procedure performed?: [x] No    [] Yes (see summary sheet for update)  Subjective functional status/changes:   [] No changes reported  Doing better with a lot less pain today.   Was able to practice gaiting with show dogs this weekend with minor discomfort    OBJECTIVE    Modality rationale: decrease edema, decrease inflammation, decrease pain and increase tissue extensibility to improve the patients ability to return to normal activity   Min Type Additional Details    [] Estim:  []Unatt       []IFC  []Premod                        []Other:  []w/ice   []w/heat  Position:  Location:    [] Estim: []Att    []TENS instruct  []NMES                    []Other:  []w/US   []w/ice   []w/heat  Position:  Location:    []  Traction: [] Cervical       []Lumbar                       [] Prone          []Supine                       []Intermittent   []Continuous Lbs:  [] before manual  [] after manual   8 [x]  Ultrasound: [x]Continuous   [] Pulsed                           [x]1MHz   []3MHz W/cm2: 1.7  Location: Right proximal quad    []  Iontophoresis with dexamethasone         Location: [] Take home patch   [] In clinic    []  Ice     []  heat  []  Ice massage  []  Laser   []  Anodyne Position:  Location:    []  Laser with stim  []  Other:  Position:  Location:    []  Vasopneumatic Device Pressure:       [] lo [] med [] hi   Temperature: [] lo [] med [] hi   [x] Skin assessment post-treatment: [x]intact []redness- no adverse reaction    []redness - adverse reaction:     36 min Therapeutic Exercise:  [x] Seeflow sheet :   Rationale: increase ROM, increase strength and improve coordination to improve the patients ability to tolerate increased activity      5 min Manual Therapy:  STM/DTM Right Proximal quad   Rationale: decrease pain, increase ROM and increase tissue extensibility to perform normal activity      With   [x] TE   [] TA   [] neuro   [] other: Patient Education: [x] Review HEP    [] Progressed/Changed HEP based on:   [] positioning   [] body mechanics   [] transfers   [] heat/ice application    [] other:      Other Objective/Functional Measures:   - Able to hold standing Hip flx better today     Pain Level (0-10 scale) post treatment: 0    ASSESSMENT/Changes in Function:   - Increased strength and hip flexion tolerance  - - Pt held Hip Flx for 30\" each with c/o right hip/quad pain    Patient will continue to benefit from skilled PT services to modify and progress therapeutic interventions, address functional mobility deficits, address ROM deficits, address strength deficits, analyze and address soft tissue restrictions and analyze and cue movement patterns to attain remaining goals.      []  See Plan of Care  []  See progress note/recertification  []  See Discharge Summary         Progress towards goals / Updated goals:  Short Term Goals: To be accomplished in 5 treatments:  1.  Pt will be compliant and independent with HEP in order to facilitate PT sessions and aid with self management             Eval Status:  Initiated             Current Status: Pt reports compliance with HEP 12/11/19  2.  Pt to tolerate 30 min or more of TE and/or Interventions w/o increased s/s             Eval Status:  Initiated             Current Status: Pt seen for 54 min with minor increased pain 12/11/19     Long Term Goals: To be accomplished in 10 treatments:  1.  Pt will report 50% improvement or better with involvement to show a significant increase in function             Eval Status:  Initiated             Current Status: Pt reports improvement with daily tasks with less pain 12/24/19  2.  Pt will stand with hip flexed at 90* for 10\" or more without significant discomfort to aid with fabulation and clearance of toes during swing phase of gait             Eval Status:  Initiated             Current Status: Met for 30\" 1/6/20  3.  Pt will demonstrate the ability to ambulate over hurdles times 10 reps with 2 or less deviations to improve hip flexion for progress to a normal gait pattern               Eval Status:  Initiated             Current Status: Progressing at 6 Reps with no trouble 1/6/20  4.  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  Pt will demonstrate (B) hip flexion at 4/5 or better for improved stability with normal gait pattern            Eval Status:  Initiated             Current Status:  5.  Pt will improve FOTO score to 48 in 14 visits to show significant improvement in function for progress to little difficulty performing usual daily activity             Eval Status: 38    PLAN  [x]  Upgrade activities as tolerated     [x]  Continue plan of care  []  Update interventions per flow sheet       []  Discharge due to:_  []  Other:_      Yash Muller PT 1/6/2020  10:04 AM    Future Appointments   Date Time Provider Jonathan Colmenares   1/10/2020  8:30 AM Fran Hernandez, PT [facility]   1/13/2020  8:30 AM Fran Hernandez PT [facility]

## 2020-01-10 ENCOUNTER — HOSPITAL ENCOUNTER (OUTPATIENT)
Dept: PHYSICAL THERAPY | Age: 70
Discharge: HOME | End: 2020-01-10
Payer: MEDICARE

## 2020-01-10 PROCEDURE — 97530 THERAPEUTIC ACTIVITIES: CPT

## 2020-01-10 PROCEDURE — 97110 THERAPEUTIC EXERCISES: CPT

## 2020-01-10 NOTE — PROGRESS NOTES
PT DAILY TREATMENT NOTE 10-18    Patient Name: Juana Dowell  Date:1/10/2020  : 1950  [x]  Patient  Verified  Payor: [place] MEDICARE / Plan: VA MEDICARE PART A & B / Product Type: Medicare /    In XCYC:[id number]  Out QNNP:[id number]  Total Treatment Time (min): 58  Visit #: 1 of 10    Medicare/BCBS Only   Total Timed Codes (min):  48 1:1 Treatment Time:  48       Treatment Area: Back pain [M54.9]    SUBJECTIVE  Pain Level (0-10 scale): 2-3  Any medication changes, allergies to medications, adverse drug reactions, diagnosis change, or new procedure performed?: [x] No    [] Yes (see summary sheet for update)  Subjective functional status/changes:   [] No changes reported  Had a dog show and had to do 3 gait patterns. Got a little sore in the right groin    OBJECTIVE    Modality rationale: decrease inflammation, decrease pain and increase tissue extensibility to improve the patients ability to perform increased activity for showing dogs.    Min Type Additional Details    [] Estim:  []Unatt       []IFC  []Premod                        []Other:  []w/ice   []w/heat  Position:  Location:    [] Estim: []Att    []TENS instruct  []NMES                    []Other:  []w/US   []w/ice   []w/heat  Position:  Location:    []  Traction: [] Cervical       []Lumbar                       [] Prone          []Supine                       []Intermittent   []Continuous Lbs:  [] before manual  [] after manual    []  Ultrasound: []Continuous   [] Pulsed                           []1MHz   []3MHz W/cm2:  Location:    []  Iontophoresis with dexamethasone         Location: [] Take home patch   [] In clinic   10 [x]  Ice     []  heat  []  Ice massage  []  Laser   []  Anodyne Position: supine  Location: Right Adductor    []  Laser with stim  []  Other:  Position:  Location:    []  Vasopneumatic Device Pressure:       [] lo [] med [] hi   Temperature: [] lo [] med [] hi   [x] Skin assessment post-treatment:  [x]intact []redness- no adverse reaction []redness - adverse reaction:     36 min Therapeutic Exercise:  [x] See flow sheet :   Rationale: increase ROM, increase strength and improve coordination to improve the patients ability to return to     8 min Therapeutic Activity:  [x]  See flow sheet :   Rationale: increase ROM, increase strength and improve coordination  to improve the patients ability to improve walking tolerance  4 min Manual Therapy:  STM/DTM   Rationale: decrease pain, increase ROM, increase tissue extensibility and decrease trigger points to improve walking tolerance               With   [x] TE   [] TA   [] neuro   [] other: Patient Education: [x] Review HEP    [] Progressed/Changed HEP based on:   [] positioning   [] body mechanics   [] transfers   [] heat/ice application    [] other:      Other Objective/Functional Measures:        Pain Level (0-10 scale) post treatment: 0    ASSESSMENT/Changes in Function:    - Improved tolerance and balance with Chen walking1    Patient will continue to benefit from skilled PT services to modify and progress therapeutic interventions, address functional mobility deficits, address ROM deficits, address strength deficits, analyze and address soft tissue restrictions and analyze and cue movement patterns to attain remaining goals.      []  See Plan of Care  []  See progress note/recertification  []  See Discharge Summary         Progress towards goals / Updated goals:  Short Term Goals: To be accomplished in 5 treatments:  1.  Pt will be compliant and independent with HEP in order to facilitate PT sessions and aid with self management             Eval Status:  Initiated             Current Status: Pt reports compliance with HEP 12/11/19  2.  Pt to tolerate 30 min or more of TE and/or Interventions w/o increased s/s             Eval Status:  Initiated             Current Status: Pt seen for 54 min with minor increased pain 12/11/19     Long Term Goals: To be accomplished in 10 treatments:  1.  Pt will report 50% improvement or better with involvement to show a significant increase in function             Eval Status:  Initiated             Current Status: Pt reports improvement with daily tasks with less pain 12/24/19  2.  Pt will stand with hip flexed at 90* for 10\" or more without significant discomfort to aid with fabulation and clearance of toes during swing phase of gait             Eval Status:  Initiated             Current Status: Met for 30\" 1/6/20  3.  Pt will demonstrate the ability to ambulate over hurdles times 10 reps with 2 or less deviations to improve hip flexion for progress to a normal gait pattern               Eval Status:  Initiated             Current Status: Progressing at 6 Reps with no trouble 1/6/20  4.  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  Pt will demonstrate (B) hip flexion at 4/5 or better for improved stability with normal gait pattern            Eval Status:  Initiated             Current Status: Met with (B) Hip Flx 4+/5 today 1/10/20  5.  Pt will improve FOTO score to 48 in 14 visits to show significant improvement in function for progress to little difficulty performing usual daily activity             Eval Status: 38    PLAN  [x]  Upgrade activities as tolerated     [x]  Continue plan of care  []  Update interventions per flow sheet       []  Discharge due to:_  []  Other:_      Eduardo Conrad, PT 1/10/2020  8:50 AM    Future Appointments   Date Time Provider Jonathan Colmenares   1/13/2020  8:30 AM Tiffanie Potts, PT [place] [facility]

## 2020-01-10 NOTE — PROGRESS NOTES
[facility] at [address] [facility] [facility], [address] [place] [address]  Phone: [phone]      Fax:  [phone]    Continued Plan of Care/ Re-certification for Physical Therapy Services    Patient name: Gerard Henao Start of Care: 2019   Referral source: Kika Box MD : 1950               Medical Diagnosis: Back pain [M54.9]  Payor: [address] [place] [address] / Plan: [place] MED[place] PART A & B / Product Type: Medicare /  Onset Date: 2018 with recent acute exacerbation               Treatment Diagnosis: LBP with hip dysfunction   Prior Hospitalization: see medical history Provider#: [id number]   Medications: Verified on Patient summary List    Comorbidities: OP, OA, Scoliosis   Prior Level of Function: Able to do all normal activity to include sailing, with good balance                            Visits from Start of Care: 8    Missed Visits: 0    The Plan of Care and following information is based on the patient's current status:  Progress towards goals / Updated goals:  Short Term Goals: To be accomplished in 5 treatments:  1.  Pt will be compliant and independent with HEP in order to facilitate PT sessions and aid with self management             Eval Status:  Initiated             Current Status: Pt reports compliance with HEP 19  2.  Pt to tolerate 30 min or more of TE and/or Interventions w/o increased s/s             Eval Status:  Initiated             Current Status: Pt seen for 54 min with minor increased pain 19     Long Term Goals: To be accomplished in 10 treatments:  1.  Pt will report 50% improvement or better with involvement to show a significant increase in function             Eval Status:  Initiated             Current Status: Pt reports improvement with daily tasks with less pain 19  2.  Pt will stand with hip flexed at 90* for 10\" or more without significant discomfort to aid with fabulation and clearance of toes during swing phase of gait             Eval Status:  Initiated             Current Status: Met for 30\" 1/6/20  3.  Pt will demonstrate the ability to ambulate over hurdles times 10 reps with 2 or less deviations to improve hip flexion for progress to a normal gait pattern               Eval Status:  Initiated             Current Status: Progressing at 6 Reps with no trouble 1/6/20  4.  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  Pt will demonstrate (B) hip flexion at 4/5 or better for improved stability with normal gait pattern            Eval Status:  Initiated             Current Status: Pt showing increased strength but MMT Not assessed 1/6/20  5.  Pt will improve FOTO score to 48 in 14 visits to show significant improvement in function for progress to little difficulty performing usual daily activity             Eval Status: 38    Key functional changes: Patient has shown good progress with this treatment program. Pain as of last visit was 0/10. Patient has shown decreased pain and increased strength and mobility. Patient reports good improvements with overall involvement. All STG/LTGs achieved as identified below.     Fall Risk Assessment: Patient demonstrates no Fall Risk       Problems/ barriers to goal attainment: None noted     Problem List: pain affecting function, decrease ROM, decrease strength, impaired gait/ balance, decrease ADL/ functional abilitiies, decrease activity tolerance, decrease flexibility/ joint mobility and decrease transfer abilities    Treatment Plan: Therapeutic exercise, Therapeutic activities, Neuromuscular re-education, Physical agent/modality, Gait/balance training, Manual therapy, Patient education, Self Care training, Functional mobility training and Home safety training     Patient Goal (s) has been updated and includes: \"No change\"     Goals for this certification period to be accomplished in 10 treatments:  Continue with current unmet goals    Frequency / Duration: Patient to be seen 2-3 times per week for 10 treatments:    Assessment / Recommendations:  Patient demonstrates the potential to make further functional gains within a reasonable time frame to allow improved walking tolerance. 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 Patient appears to be an appropriate candidate for skilled outpatient Physical Therapy. Certification Period: 1/10/20 - 2/8/20    Everardo Kumar, PT 1/10/2020 8:15 AM    ________________________________________________________________________  I certify that the above Therapy Services are being furnished while the patient is under my care. I agree with the treatment plan and certify that this therapy is necessary. [] I have read the above and request that my patient continue as recommended.   [] I have read the above report and request that my patient continue therapy with the following changes/special instructions: ______________________________________  [] I have read the above report and request that my patient be discharged from therapy    Physician's Signature:____________Date:_________TIME:________    ** Signature, Date and Time must be completed for valid certification **    Please sign and return to [facility] at [address] [facility].[address] [place] [address], [facility] [address]  Phone: [phone]      Fax:  [phone]

## 2020-01-13 ENCOUNTER — HOSPITAL ENCOUNTER (OUTPATIENT)
Dept: PHYSICAL THERAPY | Age: 70
Discharge: HOME | End: 2020-01-13
Payer: MEDICARE

## 2020-01-13 PROCEDURE — 97140 MANUAL THERAPY 1/> REGIONS: CPT

## 2020-01-13 NOTE — PROGRESS NOTES
PT DAILY TREATMENT NOTE 10-18    Patient Name: Osmani Scherer  Date:2020  : 1950  [x]  Patient  Verified  Payor: VA MEDICARE / Plan: [place] MEDICARE PART A & B / Product Type: Medicare /    In time:829  Out time:921  Total Treatment Time (min):35  Visit #: 2 of 10    Medicare/BCBS Only   Total Timed Codes (min):  25 1:1 Treatment Time: 25       Treatment Area: Back pain [M54.9]    SUBJECTIVE  Pain Level (0-10 scale): 4  Any medication changes, allergies to medications, adverse drug reactions, diagnosis change, or new procedure performed?: [x] No    [] Yes (see summary sheet for update)  Subjective functional status/changes:   [] No changes reported  Soreness in both hip flexor regions    OBJECTIVE    Modality rationale: decrease inflammation and decrease pain to improve the patients ability to tolerate increased walking   Min Type Additional Details    [] Estim:  []Unatt       []IFC  []Premod                        []Other:  []w/ice   []w/heat  Position:  Location:    [] Estim: []Att    []TENS instruct  []NMES                    []Other:  []w/US   []w/ice   []w/heat  Position:  Location:    []  Traction: [] Cervical       []Lumbar                       [] Prone          []Supine                       []Intermittent   []Continuous Lbs:  [] before manual  [] after manual    []  Ultrasound: []Continuous   [] Pulsed                           []1MHz   []3MHz W/cm2:  Location:    []  Iontophoresis with dexamethasone         Location: [] Take home patch   [] In clinic   10 [x]  Ice     []  heat  []  Ice massage  []  Laser   []  Anodyne Position: Supine  Location: Right Adductor    []  Laser with stim  []  Other:  Position:  Location:    []  Vasopneumatic Device Pressure:       [] lo [] med [] hi   Temperature: [] lo [] med [] hi   [x] Skin assessment post-treatment:  [x]intact []redness- no adverse reaction    []redness - adverse reaction:     25 min Manual Therapy:  STM/DTM Right Adductor region, assess tissue constriction    Rationale: decrease pain, increase ROM, increase tissue extensibility and decrease trigger points to tolerate increased activity       With   [x] TE   [] TA   [] neuro   [] other: Patient Education: [x] Review HEP    [] Progressed/Changed HEP based on:   [] positioning   [] body mechanics   [] transfers   [] heat/ice application    [] other:      Other Objective/Functional Measures:   - TTP with significant tightness right Adductor       Pain Level (0-10 scale) post treatment: 3    ASSESSMENT/Changes in Function:   - Good response to manual therapy with decreased muscle tightness and decreased pain at the right adductor region    Patient will continue to benefit from skilled PT services to modify and progress therapeutic interventions, address functional mobility deficits, address ROM deficits, address strength deficits, analyze and address soft tissue restrictions and analyze and cue movement patterns to attain remaining goals.      []  See Plan of Care  []  See progress note/recertification  []  See Discharge Summary         Progress towards goals / Updated goals:  Short Term Goals: To be accomplished in 5 treatments:  1.  Pt will be compliant and independent with HEP in order to facilitate PT sessions and aid with self management             Eval Status:  Initiated             Current Status: Pt reports compliance with HEP 12/11/19  2.  Pt to tolerate 30 min or more of TE and/or Interventions w/o increased s/s             Eval Status:  Initiated             Current Status: Pt seen for 54 min with minor increased pain 12/11/19     Long Term Goals: To be accomplished in 10 treatments:  1.  Pt will report 50% improvement or better with involvement to show a significant increase in function             Eval Status:  Initiated             Current Status: Pt reports increased pain ofver the last week 1/13/20  2.  Pt will stand with hip flexed at 90* for 10\" or more without significant discomfort to aid with fabulation and clearance of toes during swing phase of gait             Eval Status:  Initiated             Current Status: Met for 30\" 1/6/20  3.  Pt will demonstrate the ability to ambulate over hurdles times 10 reps with 2 or less deviations to improve hip flexion for progress to a normal gait pattern               Eval Status:  Initiated             Current Status: Progressing at 6 Reps with no trouble 1/6/20  4.  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  Pt will demonstrate (B) hip flexion at 4/5 or better for improved stability with normal gait pattern            Eval Status:  Initiated             Current Status: Met with (B) Hip Flx 4+/5 today 1/10/20  5.  Pt will improve FOTO score to 48 in 14 visits to show significant improvement in function for progress to little difficulty performing usual daily activity             Eval Status: 38    PLAN  [x]  Upgrade activities as tolerated     [x]  Continue plan of care  []  Update interventions per flow sheet       []  Discharge due to:_  []  Other:_      [facility], PT 1/13/2020  8:41 AM    No future appointments.

## 2020-01-15 ENCOUNTER — APPOINTMENT (OUTPATIENT)
Dept: PHYSICAL THERAPY | Age: 70
End: 2020-01-15
Payer: MEDICARE

## 2020-01-27 NOTE — ADDENDUM NOTE
Addended by: Rachna Youssef on: 9/6/2018 01:54 PM 
 
  Modules accepted: Orders no sweating/no fever/no chills

## 2020-02-04 ENCOUNTER — HOSPITAL ENCOUNTER (OUTPATIENT)
Dept: PHYSICAL THERAPY | Age: 70
Discharge: HOME | End: 2020-02-04
Payer: MEDICARE

## 2020-02-04 PROCEDURE — 97530 THERAPEUTIC ACTIVITIES: CPT

## 2020-02-04 PROCEDURE — 97110 THERAPEUTIC EXERCISES: CPT

## 2020-02-04 NOTE — PROGRESS NOTES
Physical Therapy Discharge Instructions      [facility] at [address] [facility]  Phone: [phone]      Fax:  [phone]    Patient: Erika Sanchez  : 1950      Continue Home Exercise Program daily        Continue with    [x] Ice  as needed      [] Heat           Follow up with MD:     [] Upon completion of therapy     [x] As needed      Recommendations:     [x]   Return to activity with home program    []   Return to activity with the following modifications:       []Post Rehab Program    []Join Independent aquatic program     []Return to/join local gym        Additional Comments:  Thank you for your hard work and confidence in me and my staff        Karissa Vitale, PT 2020 10:01 AM

## 2020-02-05 ENCOUNTER — OFFICE VISIT (OUTPATIENT)
Dept: FAMILY MEDICINE CLINIC | Age: 70
End: 2020-02-05

## 2020-02-05 VITALS
SYSTOLIC BLOOD PRESSURE: 114 MMHG | OXYGEN SATURATION: 96 % | DIASTOLIC BLOOD PRESSURE: 78 MMHG | RESPIRATION RATE: 14 BRPM | TEMPERATURE: 98 F | HEART RATE: 61 BPM

## 2020-02-05 DIAGNOSIS — L98.9 SKIN LESION: Primary | ICD-10-CM

## 2020-02-05 RX ORDER — CLOTRIMAZOLE AND BETAMETHASONE DIPROPIONATE 10; .64 MG/G; MG/G
CREAM TOPICAL
Qty: 15 G | Refills: 1 | Status: SHIPPED | OUTPATIENT
Start: 2020-02-05 | End: 2020-11-10 | Stop reason: ALTCHOICE

## 2020-02-05 RX ORDER — METHYLPREDNISOLONE ACETATE 40 MG/ML
40 INJECTION, SUSPENSION INTRA-ARTICULAR; INTRALESIONAL; INTRAMUSCULAR; SOFT TISSUE ONCE
Status: CANCELLED | OUTPATIENT
Start: 2020-02-05 | End: 2020-02-05

## 2020-02-05 RX ORDER — BUPIVACAINE HYDROCHLORIDE 2.5 MG/ML
10 INJECTION, SOLUTION EPIDURAL; INFILTRATION; INTRACAUDAL
Status: CANCELLED | OUTPATIENT
Start: 2020-02-05

## 2020-02-05 NOTE — PATIENT INSTRUCTIONS
Antibiotics for Skin Conditions: Care Instructions  Your Care Instructions    Antibiotics are medicines that kill bacteria. Bacteria can cause some skin problems or conditions, such as impetigo. They can also lead to problems like acne. There are many types of antibiotics. Each works a little differently and acts on different types of bacteria. Your doctor will decide which medicine will work best for you. You can put an antibiotic ointment or cream on your skin. Or you can take pills by mouth to kill bacteria in your skin pores. Antibiotics are not used to treat skin problems that are caused by viruses or allergies. But sometimes bacteria get into a skin problem you already have. Then you may need this medicine. Follow-up care is a key part of your treatment and safety. Be sure to make and go to all appointments, and call your doctor if you are having problems. It's also a good idea to know your test results and keep a list of the medicines you take. How can you care for yourself at home? To take antibiotics  · If your doctor prescribed pills, take them as directed. Do not stop taking them just because your skin problem gets better. You need to take the full course of antibiotics. · Apply antibiotic ointment exactly as instructed. · Read the label to learn how to store your medicine. · Do not use antibiotics that were prescribed for a different illness or for someone else. You may take longer to heal. And your skin problem may get worse. To take care of your skin  · Try not to scratch rashes or sores. Scratching may spread bacteria to other parts of your skin or body. · Clean your skin with mild soap and water 2 times a day unless your doctor gives you different instructions. Don't use hydrogen peroxide or alcohol, which can slow healing. · Protect your skin from the sun. Wear hats with wide brims, sunglasses, and loose-fitting, tightly woven clothing that covers your arms and legs.  Make sure to use a broad-spectrum sunscreen that has a sun protection factor (SPF) of 30 or higher. Apply it several times a day. What are the possible side effects? Many people do not have side effects from antibiotics. But sometimes people have problems, such as:  · Nausea, diarrhea, and belly pain. · Allergic reactions, such as a skin rash. · Vaginal yeast infections. If the side effects bother you, ask your doctor if there is another antibiotic that will work as well but will not cause these effects. When should you call for help? Call your doctor now or seek immediate medical care if:    · You have signs of an infection, such as:  ? Increased pain, swelling, warmth, and redness. ? Red streaks leading from the affected area. ? Pus draining from the area. ? A fever.    Watch closely for changes in your health, and be sure to contact your doctor if:    · You think you may be having a problem with the medicine.     · You do not get better as expected. Where can you learn more? Go to http://camilo-robert.info/. Enter N660 in the search box to learn more about \"Antibiotics for Skin Conditions: Care Instructions. \"  Current as of: April 1, 2019  Content Version: 12.2  © 9356-0750 [employer]. Care instructions adapted under license by [employer] (which disclaims liability or warranty for this information). If you have questions about a medical condition or this instruction, always ask your healthcare professional. [place] [address] any warranty or liability for your use of this information.

## 2020-02-05 NOTE — PROGRESS NOTES
PT aware of need for  at discharge. PT aware of arrival time pre procedure. will arrive at 0930. Pt states no questions at this time.

## 2020-02-05 NOTE — PROGRESS NOTES
Patient c/o skin infection where she had a chest tube prior. She says this area has been red and irritated every since her tube was removed a year ago. The area has gotten worse and is now draining. She has been using OTC antibiotic creams that are no longer working. 1. Have you been to the ER, urgent care clinic since your last visit? Hospitalized since your last visit? No  2. Have you seen or consulted any other health care providers outside of the [address] [place] [address] since your last visit? Include any pap smears or colon screening. No    Medication reconciliation has been completed with patient. Care team discussed/updated as well as pharmacy.     Health Maintenance Due   Topic Date Due    Shingrix Vaccine Age 49> (1 of 2) 03/10/1998    Influenza Age 5 to Adult  08/01/2019

## 2020-02-05 NOTE — PROGRESS NOTES
PT DISCHARGE DAILY NOTE AND [id number]    Date:2020     Patient name: Nela Grewal Start of Care: 2019   Referral Rosana Christy MD : 1950               Medical Diagnosis: Back pain [M54.9]  Payor: Willie Elliott / Plan: [place] MEDICARE PART A & B / Product Type: Medicare /  Onset Date: 2018 with recent acute exacerbation               Treatment Diagnosis: LBP with hip dysfunction   Prior Hospitalization: see medical history Provider#: [id number]   Medications: Verified on Patient summary List    Comorbidities: OP, OA, Scoliosis   Prior Level of Function: Able to do all normal activity to include sailing, with good balanc    Visits from Start of Care: 11    Missed Visits: 0    Reporting Period : 1/10/20 to 20    [x]  Patient  Verified  Payor: Willie Gallagher / Plan: [place] MEDICARE PART A & B / Product Type: Medicare /    In time:903  Out time:1000  Total Treatment Time (min): 57  Total Timed Codes (min): 57  1:1 Treatment Time ( [address] only): 62   Visit #: 3 of 10    SUBJECTIVE  Pain Level (0-10 scale): 1-2  Any medication changes, allergies to medications, adverse drug reactions, diagnosis change, or new procedure performed?: [x] No    [] Yes (see summary sheet for update)  Subjective functional status/changes:   [] No changes reported  Pt not seen since 20 due to going out of town for a show and family reunion. She reports doing her exercises daily as well as her right lower abdominal massage. Pain is not bad but increases with walking at the right groin to 3-4.       OBJECTIVE        45 min Therapeutic Exercise:  [] See flow sheet : Assess for strength and FOTO   Rationale: increase ROM, increase strength and improve coordination to improve the patients ability to perform normal activity    12 min Therapeutic Activity:  []  See flow sheet :   Rationale: increase ROM, increase strength, improve coordination, improve balance and increase proprioception  to improve the patients ability to walk with less discomfort           With   [x] TE   [x] TA   [] neuro   [] other: Patient Education: [x] Review HEP    [] Progressed/Changed HEP based on:   [] positioning   [] body mechanics   [] transfers   [] heat/ice application    [] other:      Other Objective/Functional Measures:   - TTP right distal lower abdominal with moderate pain  - MMTHip Flx Right 4-4+ with pain, Left 5-  - Chen walk x 10 with 1 deviation     Pain Level (0-10 scale) post treatment: 3-4    Summary of Care:  Progress towards goals / Updated goals:  Short Term Goals: To be accomplished in 5 treatments:  1.  Pt will be compliant and independent with HEP in order to facilitate PT sessions and aid with self management             Eval Status:  Initiated             Current Status: Met Pt reports compliance with HEP 12/11/19  2.  Pt to tolerate 30 min or more of TE and/or Interventions w/o increased s/s             Eval Status:  Initiated             Current Status: Met Pt seen for 54 min with minor increased pain 12/11/19     Long Term Goals: To be accomplished in 10 treatments:  1.  Pt will report 50% improvement or better with involvement to show a significant increase in function             Eval Status:  Initiated             Current Status: Met at 50% or better  2.  Pt will stand with hip flexed at 90* for 10\" or more without significant discomfort to aid with fabulation and clearance of toes during swing phase of gait             Eval Status:  Initiated             Current Status: Met for 30\" 1/6/20  3.  Pt will demonstrate the ability to ambulate over hurdles times 10 reps with 2 or less deviations to improve hip flexion for progress to a normal gait pattern               Eval Status:  Initiated             Current Status: Met at 10 reps with 1 deviation 2/4/20  4.  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  Pt will demonstrate (B) hip flexion at 4/5 or better for improved stability with normal gait pattern            Eval Status:  Initiated             Current Status: Met with MMTHip Flx Right 4-4+ with pain, Left 5- 2/4/20  5.  Pt will improve FOTO score to 48 in 14 visits to show significant improvement in function for progress to little difficulty performing usual daily activity             Eval Status: 38             Current: Not Met, Progressing at 40 2/4/20    ASSESSMENT/Changes in Function: Patient has shown good progress with this treatment program. Pain as of last visit was 1-2/10. Patient has shown decreased pain and increased strength and mobility. Patient reports 50% or better improvement with overall involvement. FOTO score is 40. Patient is currently hindered by her right hip pain with multiple TrPs at the lower abdominals. All STG/LTGs achieved as identified below.     Fall Risk Assessment: Patient demonstrates no Fall Risk     Thank you for this referral!     PLAN  [x]Discontinue therapy: [x]Patient has reached or is progressing toward set goals      []Patient is non-compliant or has abdicated      []Due to lack of appreciable progress towards set goals    Yazan Gay, PT 2/4/2020  8:49 PM

## 2020-02-05 NOTE — PROGRESS NOTES
Florin Jay is a 68 y.o. female  presents for skin lesion. She has assoc drainage. sxs are getting worse. She has had it for several weeks. She has history of chest tube in that area over a year ago. Allergies   Allergen Reactions    Celebrex [Celecoxib] Swelling    Shellfish Derived Swelling     \"makes me feel puffy\"    Sulfa (Sulfonamide Antibiotics) Hives and Swelling     Lips swelling    Gabapentin Diarrhea and Palpitations     Chest and Abdominal pain    Humira [Adalimumab] Other (comments)     Lupus    Influenza Virus Vaccines Hives    Iodine Itching    Optiray 320 [Ioversol] Hives and Itching     Pt states when she gets iv contrast she itches a little from hives?  Penicillins Hives     Outpatient Medications Marked as Taking for the 2/5/20 encounter (Office Visit) with Willie Wing MD   Medication Sig Dispense Refill    traMADol [facility]) 100 mg Tb24 Take 1 Tab by mouth daily for 90 days. Max Daily Amount: 100 mg. 90 Tab 0    lisinopril (PRINIVIL, ZESTRIL) 10 mg tablet Take 1 Tab by mouth daily. 90 Tab 1    zolpidem (AMBIEN) 5 mg tablet Take 1 Tab by mouth nightly as needed for Sleep. Take 1 Tab by Mouth Nightly As Needed. 90 Tab 0    meloxicam (MOBIC) 15 mg tablet Take 1 Tab by mouth daily. 90 Tab 2    tofacitinib (XELJANZ) 5 mg tab Take  by mouth two (2) times a day.  teriparatide (FORTEO) 20 mcg/dose - 600 mcg/2.4 mL pnij injection 20 mcg by SubCUTAneous route daily.  cetirizine (ZYRTEC) 10 mg tablet Take  by mouth.  petrolat,wht/min oil/sod chl (DRY EYES OP) Apply  to eye.  acetaminophen (TYLENOL EXTRA STRENGTH) 500 mg tablet Take 1,000 mg by mouth every six (6) hours as needed for Pain.  cholecalciferol (VITAMIN D3) 1,000 unit tablet Take 1,000 Units by mouth five (5) days a week.  calcium carbonate (OS-BARBRAA) 500 mg calcium (1,250 mg) tablet Take 1,500 mg by mouth daily.        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    H/O calcium pyrophosphate deposition disease (CPPD) Z87.39    IBS (irritable bowel syndrome) K58.9    RA (rheumatoid arthritis) ([facility]) M06.9    Sicca syndrome ([facility]) M35.00    Osteoarthritis of right hip M16.11    Pain management contract agreement Z02.89    ACP (advance care planning) Z71.89    Chronic left shoulder pain M25.512, G89.29    Osteopenia M85.80    Osteopenia of multiple sites M85.89    Osteoarthritis of left hip M16.12    Closed fracture of thoracic spine without spinal cord lesion ([address] S22.009A    Spinal stenosis, thoracolumbar region M48.05    Lumbar spine instability M53.2X6    Spinal stenosis of lumbar region with neurogenic claudication M48.062    Spinal stenosis of lumbar region M48.061    Lumbar stenosis M48.061    Nausea R11.0    Acute bilateral thoracic back pain M54.6    Anxiety F41.9    Chest pain R07.9    Essential hypertension I10     Past Medical History:   Diagnosis Date    Abdominal abscess 2009    Arthritis     Rheumatoid    Autoimmune disease ([address]     Medically induced Lupus    Back pain     Chronic pain     lower back    Colitis     Diverticulitis     Failed back syndrome     GERD (gastroesophageal reflux disease)     Hypertension     when on cyclosporins    Ill-defined condition     large torus roof of mouth    Irritable bowel syndrome     Neuropathy     bilateral hands/wrist    Osteoporosis     Pneumonia     RA (rheumatoid arthritis) ([facility])     Seizures ([address] 6-1-2008    one episode- after high dose of epinepherine     Social History     Socioeconomic History    Marital status:      Spouse name: Not on file    Number of children: Not on file    Years of education: Not on file    Highest education level: Not on file   Tobacco Use    Smoking status: Never Smoker    Smokeless tobacco: Never Used   Substance and Sexual Activity    Alcohol use: No    Drug use: No    Sexual activity: Never   Other Topics Concern     Family History   Problem Relation Age of Onset    Other Other         Orthopedic (Sister)   Joslyn Contreras Sister     Heart Attack Brother 58    Cancer Neg Hx     Diabetes Neg Hx     Heart Disease Neg Hx     Hypertension Neg Hx     Stroke Neg Hx     Malignant Hyperthermia Neg Hx     Pseudocholinesterase Deficiency Neg Hx     Delayed Awakening Neg Hx     Post-op Nausea/Vomiting Neg Hx     Emergence Delirium Neg Hx     Post-op Cognitive Dysfunction Neg Hx         Review of Systems   Constitutional: Negative for chills and fever. Skin:        Lesion on left side of back       Vitals:    02/05/20 1454   BP: 114/78   Pulse: 61   Resp: 14   Temp: 98 °F (36.7 °C)   TempSrc: Oral   SpO2: 96%   PainSc:   2   PainLoc: Back       Physical Exam  Vitals signs and nursing note reviewed. Neck:      Musculoskeletal: Normal range of motion and neck supple. Thyroid: No thyromegaly. Cardiovascular:      Rate and Rhythm: Normal rate and regular rhythm. Heart sounds: Normal heart sounds. Pulmonary:      Effort: Pulmonary effort is normal.      Breath sounds: Normal breath sounds. Musculoskeletal: Normal range of motion. Skin:     General: Skin is warm and dry. Findings: Lesion (papula lesion on left posterior chest.  nontender. ) present. Neurological:      Mental Status: She is alert and oriented to person, place, and time. Psychiatric:         Mood and Affect: Mood normal.         Behavior: Behavior normal.         Thought Content: Thought content normal.         Judgment: Judgment normal.         Assessment/Plan      ICD-10-CM ICD-9-CM    1. Skin lesion L98.9 709.9 clotrimazole-betamethasone (LOTRISONE) topical cream     I have discussed the diagnosis with the patient and the intended plan of care as seen in the above orders.  The patient has received an after-visit summary and questions were answered concerning future plans. I have discussed medication, side effects, and warnings with the patient in detail. The patient verbalized understanding and is in agreement with the plan of care. The patient will contact the office with any additional concerns.     lab results and schedule of future lab studies reviewed with patient    Alexis Turner MD

## 2020-02-10 ENCOUNTER — APPOINTMENT (OUTPATIENT)
Dept: ULTRASOUND IMAGING | Age: 70
End: 2020-02-10
Attending: ORTHOPAEDIC SURGERY
Payer: MEDICARE

## 2020-02-10 ENCOUNTER — HOSPITAL ENCOUNTER (OUTPATIENT)
Dept: GENERAL RADIOLOGY | Age: 70
Discharge: HOME | End: 2020-02-10
Attending: ORTHOPAEDIC SURGERY
Payer: MEDICARE

## 2020-02-10 ENCOUNTER — HOSPITAL ENCOUNTER (OUTPATIENT)
Dept: INTERVENTIONAL RADIOLOGY/VASCULAR | Age: 70
Discharge: HOME | End: 2020-02-10
Attending: ORTHOPAEDIC SURGERY
Payer: MEDICARE

## 2020-02-10 DIAGNOSIS — M25.551 RIGHT HIP PAIN: ICD-10-CM

## 2020-02-10 PROCEDURE — 77030039433 HC TY MYLEOGRAM BD -B

## 2020-02-10 PROCEDURE — 77002 NEEDLE LOCALIZATION BY XRAY: CPT

## 2020-02-10 PROCEDURE — 74011250636 HC RX REV CODE- 250/636

## 2020-02-10 PROCEDURE — 74011000250 HC RX REV CODE- 250

## 2020-02-10 PROCEDURE — 74011636320 HC RX REV CODE- 636/320: Performed by: RADIOLOGY

## 2020-02-10 RX ORDER — BUPIVACAINE HYDROCHLORIDE 2.5 MG/ML
INJECTION, SOLUTION EPIDURAL; INFILTRATION; INTRACAUDAL
Status: COMPLETED
Start: 2020-02-10 | End: 2020-02-10

## 2020-02-10 RX ORDER — METHYLPREDNISOLONE ACETATE 40 MG/ML
INJECTION, SUSPENSION INTRA-ARTICULAR; INTRALESIONAL; INTRAMUSCULAR; SOFT TISSUE
Status: COMPLETED
Start: 2020-02-10 | End: 2020-02-10

## 2020-02-10 RX ORDER — LIDOCAINE HYDROCHLORIDE 10 MG/ML
1-5 INJECTION INFILTRATION; PERINEURAL
Status: DISCONTINUED | OUTPATIENT
Start: 2020-02-10 | End: 2020-02-10

## 2020-02-10 RX ORDER — METHYLPREDNISOLONE ACETATE 40 MG/ML
40 INJECTION, SUSPENSION INTRA-ARTICULAR; INTRALESIONAL; INTRAMUSCULAR; SOFT TISSUE ONCE
Status: CANCELLED | OUTPATIENT
Start: 2020-02-10 | End: 2020-02-10

## 2020-02-10 RX ORDER — METHYLPREDNISOLONE ACETATE 40 MG/ML
40 INJECTION, SUSPENSION INTRA-ARTICULAR; INTRALESIONAL; INTRAMUSCULAR; SOFT TISSUE
Status: COMPLETED | OUTPATIENT
Start: 2020-02-10 | End: 2020-02-10

## 2020-02-10 RX ORDER — BUPIVACAINE HYDROCHLORIDE 2.5 MG/ML
10 INJECTION, SOLUTION EPIDURAL; INFILTRATION; INTRACAUDAL
Status: CANCELLED | OUTPATIENT
Start: 2020-02-10

## 2020-02-10 RX ORDER — METHYLPREDNISOLONE ACETATE 40 MG/ML
40 INJECTION, SUSPENSION INTRA-ARTICULAR; INTRALESIONAL; INTRAMUSCULAR; SOFT TISSUE ONCE
Status: ACTIVE | OUTPATIENT
Start: 2020-02-10 | End: 2020-02-10

## 2020-02-10 RX ORDER — LIDOCAINE HYDROCHLORIDE 10 MG/ML
1-5 INJECTION, SOLUTION EPIDURAL; INFILTRATION; INTRACAUDAL; PERINEURAL
Status: COMPLETED | OUTPATIENT
Start: 2020-02-10 | End: 2020-02-10

## 2020-02-10 RX ORDER — BUPIVACAINE HYDROCHLORIDE 2.5 MG/ML
10 INJECTION, SOLUTION EPIDURAL; INFILTRATION; INTRACAUDAL
Status: DISCONTINUED | OUTPATIENT
Start: 2020-02-10 | End: 2020-02-14 | Stop reason: HOSPADM

## 2020-02-10 RX ORDER — BUPIVACAINE HYDROCHLORIDE 2.5 MG/ML
1-10 INJECTION, SOLUTION EPIDURAL; INFILTRATION; INTRACAUDAL
Status: COMPLETED | OUTPATIENT
Start: 2020-02-10 | End: 2020-02-10

## 2020-02-10 RX ADMIN — METHYLPREDNISOLONE ACETATE 40 MG: 40 INJECTION, SUSPENSION INTRA-ARTICULAR; INTRALESIONAL; INTRAMUSCULAR; SOFT TISSUE at 10:24

## 2020-02-10 RX ADMIN — BUPIVACAINE HYDROCHLORIDE 10 ML: 2.5 INJECTION, SOLUTION EPIDURAL; INFILTRATION; INTRACAUDAL at 10:24

## 2020-02-10 RX ADMIN — BUPIVACAINE HYDROCHLORIDE 10 ML: 2.5 INJECTION, SOLUTION EPIDURAL; INFILTRATION; INTRACAUDAL; PERINEURAL at 10:24

## 2020-02-10 RX ADMIN — LIDOCAINE HYDROCHLORIDE 3 ML: 10 INJECTION, SOLUTION EPIDURAL; INFILTRATION; INTRACAUDAL; PERINEURAL at 10:23

## 2020-02-10 RX ADMIN — IOPAMIDOL 1 ML: 510 INJECTION, SOLUTION INTRAVASCULAR at 10:25

## 2020-02-11 ENCOUNTER — HOSPITAL ENCOUNTER (OUTPATIENT)
Dept: PHYSICAL THERAPY | Age: 70
Discharge: HOME | End: 2020-02-11
Payer: MEDICARE

## 2020-02-11 PROCEDURE — 97161 PT EVAL LOW COMPLEX 20 MIN: CPT

## 2020-02-11 PROCEDURE — 97110 THERAPEUTIC EXERCISES: CPT

## 2020-02-11 NOTE — PROGRESS NOTES
PT DAILY TREATMENT NOTE/HIP VFLP02-12    Patient Name: Gerard Henao  Date:2020  : 1950  [x]  Patient  Verified  Payor: [place] MED[place]ARE / Plan: [place] MEDICARE PART A & B / Product Type: Medicare /    In time:1113  Out time:1210  Total Treatment Time (min): 62  Visit #: 1 of 10    Medicare/BCBS Only   Total Timed Codes (min):  10 1:1 Treatment Time:  62       Treatment Area: Pain of right hip [M25.551]      SUBJECTIVE  Pain Level (0-10 scale): 4-5  [x]constant []intermittent []improving []worsening []no change since onset     Any medication changes, allergies to medications, adverse drug reactions, diagnosis change, or new procedure performed?: [x] No    [] Yes (see summary sheet for update)  Subjective functional status/changes:       Subjective: Patient is a 68 y.o. female referred to PT with the above Dx. Patient seen today for c/o right groin/hip pain since having a THR in . Recent follow up with MD resulted in a lidocaine and cortisone shot on 2/10/20 into the distal illio[soas tendon. Pt reports that she was pain free after the injection but the pain returned yesterday evening with increased pain. Pt reports icing the hip with no resolution of the pain. She reports swelling in the area of the injection and pain with walking. Feels like she has lost right hip ROM and it feels pretty weak right now. Barely able to lift the right leg. Patient presents to PT with an impaired gait, decreased balance, decreased strength, decreased flexibility, and decreased mobility. Patient s/s appear to be consistent w/ diagnosis. Patient demonstrates the potential to make functional gains within a reasonable time frame. Patient will benefit from skilled PT to address impairments and improve functional mobility, strength, gait and balance for an improved quality of life.   Fall Risk Assessment: Patient demonstrates no Fall Risk     Mechanism of Injury: Post hip replacement in 2017    Comorbidities: OP, OA, Scoliosis    FOTO: 38 / 48 in 15 visits    Goal: Stretch that tendon out to allow her to lift her leg without discomfort    PLOF: Able to lift left leg with little discomfort    Health Status: Good      What type of work do you do? [employer]    Living Situation/Domestic Life: lives at home with  with 2 flights of steps. Mobility: (I)  Self Care (I)    What type of daily activities/hobbies?  Sail, Dog Shows, Author,     Limitations to Activity/Recreation/PLOF: Discomfort with lifting leg, difficulty getting in and out of the car     Barriers: [x]pain []financial []time []transportation []other    Motivation: High    FABQ Score: []low []elevate    Cognition: A & O x 3    Other:    Risk For Falls:   [x]No  []low []elevate           OBJECTIVE/EXAMINATION  Demonstrated Balance: Good with standing and walking         Mobility: (I)  Gait: Right antalgic, decreased Right Hip Flx  - Significant bruise right inguinal region  - Seated AROM Hip Flx Right 78 Left 110 witih pain in the right Hip Flexor  - (+) Right Vicente test with Hip Flx Contracture -14* (B)  -  Significant TTP Right ADD/Loweer Abdominals/Pectinious/  - Painful SLR in supine 15*  - Pt too acute for any additional assessment and palpation of the sore region  - Pain and cramping with attempted MMT Hip Flx/ADD  - Fear of falling with stairs, ambulating with a step to gait pattern             Modality rationale: decrease inflammation and decrease pain to improve the patients ability to tolerate increased activity with lifting right LE   Min Type Additional Details      [] Estim:  []Unatt       []IFC  []Premod                        []Other:  []w/ice   []w/heat  Position:  Location:      [] Estim: []Att    []TENS instruct  []NMES                    []Other:  []w/US   []w/ice   []w/heat  Position:  Location:      []  Traction: [] Cervical       []Lumbar                       [] Prone          []Supine                       []Intermittent   []Continuous Lbs:  [] before manual  [] after manual      []  Ultrasound: []Continuous   [] Pulsed                           []1MHz   []3MHz W/cm2:  Location:      []  Iontophoresis with dexamethasone         Location: [] Take home patch   [] In clinic    15  [x]  Ice     []  heat  []  Ice massage  []  Laser   []  Anodyne Position: Supine  Location: Right Hip Flx/ADD/Lower Abdominal      []  Laser with stim  []  Other:  Position:  Location:      []  Vasopneumatic Device Pressure:       [] lo [] med [] hi   Temperature: [] lo [] med [] hi    [x] Skin assessment post-treatment:  []intact []redness- no adverse reaction    []redness - adverse reaction:      32 min [x]Eval                  []Re-Eval         10 min Therapeutic Exercise:  [x] See flow sheet : Develop and instruct HEP   Rationale: increase ROM, increase strength and improve coordination to improve the patients ability to perform normal activity                          With   [x] TE   [] TA   [] neuro   [] other: Patient Education: [x] Review HEP    [] Progressed/Changed HEP based on:   [] positioning   [] body mechanics   [] transfers   [] heat/ice application    [] other:       Other Objective/Functional Measures:        Pain Level (0-10 scale) post treatment: 2     ASSESSMENT/Changes in Function:        [x]  See Plan of Care  []  See progress note/recertification  []  See Discharge Summary         Progress towards goals / Updated goals:  Short Term Goals: To be accomplished in 5 treatments:  1. Pt will be compliant and independent with HEP in order to facilitate PT sessions and aid with self management   Eval Status:  Initiated   Current Status:  2. Pt to tolerate 30 min or more of TE and/or Interventions w/o increased s/s   Eval Status:  Initiated   Current Status:    Long Term Goals: To be accomplished in 10 treatments:  1. Pt will report 50% improvement or better with involvement to show a significant increase in function   Eval Status:  Initiated   Current Status:  2. Pt will demonstrate lifting right LE onto 8\" step x 10 reps with little difficulty to allow her to lift her leg in and out of her car with little difficulty    Eval Status:  Hard to get leg in and out of the car   Current Status:  3. Pt will demonstrate MMT (B) Hip Flx/Ext/ABD/ADD to 4+/5 with little pain and no residual cramping for carryover to normal hip function with little pain     Eval Status:  Unable to assess due to pain and cramping   Current Status:  4. Pt will have decreased reports of pain at 2/10 or better with activity to allow her to return to normal activity with little to no discomfort. Eval Status:  Initiated   Current Status:  5. Pt will demonstrate a reciprocal gait pattern with negotiating up/down 20 6\"steps with HHA as needed and little katya to tolerate walking her stairs at home with little to no difficulty   Eval Status:  Initiated   Current Status:  6.   Pt will improve FOTO score to 48 in 15 visits to show significant improvement in function for progress to normal right hip function, stair negotiation and independent community ambulation   Eval Status: 38   Current Status:      PLAN  [x]  Upgrade activities as tolerated     [x]  Continue plan of care  []  Update interventions per flow sheet       []  Discharge due to:_  []  Other:_         Irasema Clinton, PT 2/11/2020  11:17 AM

## 2020-02-11 NOTE — PROGRESS NOTES
[facility] at [address] [facility]  Phone: [phone]      Fax:  [phone]    Plan of Care/ Statement of Necessity for Physical Therapy Services  Patient name: Osmani Scherer Start of Care: 2020   Referral source: Xochitl Cazares MD : 1950    Medical Diagnosis: Pain of right hip [M25.551]  Payor: [place] MEDICARE / Plan: [place] MEDICARE PART A & B / Product Type: Medicare /  Onset Date:2 years with recent exacerbation 2/10/20    Treatment Diagnosis: Right hip and lower abdominal pain   Prior Hospitalization: see medical history Provider#: [id number]   Medications: Verified on Patient summary List    Comorbidities: OP, OA, Scoliosisi   Prior Level of Function: Able to lift left leg with little discomfort      The Plan of Care and following information is based on the information from the initial evaluation. Assessment/ key information: Patient is a 68 y.o. female referred to PT with the above Dx. Patient seen today for c/o right groin/hip pain since having a THR in 2017. Recent follow up with MD resulted in a lidocaine and cortisone shot on 2/10/20 into the distal illio[soas tendon. Pt reports that she was pain free after the injection but the pain returned yesterday evening with increased pain. Pt reports icing the hip with no resolution of the pain. She reports swelling in the area of the injection and pain with walking. Feels like she has lost right hip ROM and it feels pretty weak right now. Barely able to lift the right leg.         Patient presents to PT with an impaired gait, decreased balance, decreased strength, decreased flexibility, and decreased mobility of the right LE due to hip pain. Patient s/s appear to be consistent w/ diagnosis. Patient demonstrates the potential to make functional gains within a reasonable time frame.   Patient will benefit from skilled PT to address impairments and improve functional mobility, strength, gait and balance for an improved quality of life. Fall Risk Assessment: Patient demonstrates no Fall Risk   Evaluation Complexity History MEDIUM  Complexity : 1-2 comorbidities / personal factors will impact the outcome/ POC ; Examination LOW Complexity : 1-2 Standardized tests and measures addressing body structure, function, activity limitation and / or participation in recreation  ;Presentation LOW Complexity : Stable, uncomplicated  ;Clinical Decision Making MEDIUM Complexity : FOTO score of 26-74  Overall Complexity Rating: LOW   Problem List: pain affecting function, decrease ROM, decrease strength, edema affecting function, impaired gait/ balance, decrease ADL/ functional abilitiies, decrease activity tolerance, decrease flexibility/ joint mobility, decrease transfer abilities and other FOTO = 38   Treatment Plan may include any combination of the following: Therapeutic exercise, Therapeutic activities, Neuromuscular re-education, Physical agent/modality, Gait/balance training, Manual therapy, Patient education, Self Care training, Functional mobility training, Home safety training and Stair training  Patient / Family readiness to learn indicated by: asking questions, trying to perform skills and interest  Persons(s) to be included in education: patient (P)  Barriers to Learning/Limitations: None  Patient Goal (s): Stretch tat tendon out o allow me to lift my leg without discomfort  Patient Self Reported Health Status: good  Rehabilitation Potential: good    Short Term Goals: To be accomplished in 5 treatments:  1. Pt will be compliant and independent with HEP in order to facilitate PT sessions and aid with self management             Eval Status:  Initiated             Current Status:  2. Pt to tolerate 30 min or more of TE and/or Interventions w/o increased s/s             Eval Status:  Initiated             Current Status:     Long Term Goals: To be accomplished in 10 treatments:  1.   Pt will report 50% improvement or better with involvement to show a significant increase in function             Eval Status:  Initiated             Current Status:  2. Pt will demonstrate lifting right LE onto 8\" step x 10 reps with little difficulty to allow her to lift her leg in and out of her car with little difficulty              Eval Status:  Hard to get leg in and out of the car             Current Status:  3. Pt will demonstrate MMT (B) Hip Flx/Ext/ABD/ADD to 4+/5 with little pain and no residual cramping for carryover to normal hip function with little pain               Eval Status:  Unable to assess due to pain and cramping             Current Status:  4. Pt will have decreased reports of pain at 2/10 or better with activity to allow her to return to normal activity with little to no discomfort. Eval Status:  Initiated             Current Status:  5. Pt will demonstrate a reciprocal gait pattern with negotiating up/down 20 6\"steps with HHA as needed and little katya to tolerate walking her stairs at home with little to no difficulty             Eval Status:  Initiated             Current Status:  6. Pt will improve FOTO score to 48 in 15 visits to show significant improvement in function for progress to normal right hip function, stair negotiation and independent community ambulation             Eval Status: 38             Current Status:     Frequency / Duration: Patient to be seen 2-3 times per week for 10 treatments. Patient/ Caregiver education and instruction: Diagnosis, prognosis, self care, activity modification and exercises   Comments:  Patient provided w/HEP   [x]  Plan of care has been reviewed with PTA    Certification Period: 2/11/20 - 3/11/20  Jero Santos, PT 2/11/2020 11:13 AM  _____________________________________________________________________  I certify that the above Therapy Services are being furnished while the patient is under my care.  I agree with the treatment plan and certify that this therapy is necessary.     Physician's Signature:____________Date:_________TIME:________    ** Signature, Date and Time must be completed for valid certification **    Please sign and return to [facility] at [address] [facility]  [place], [facility] [address]  Phone: [phone]      Fax:  [phone]

## 2020-02-13 ENCOUNTER — HOSPITAL ENCOUNTER (OUTPATIENT)
Dept: PHYSICAL THERAPY | Age: 70
Discharge: HOME | End: 2020-02-13
Payer: MEDICARE

## 2020-02-13 PROCEDURE — 97140 MANUAL THERAPY 1/> REGIONS: CPT

## 2020-02-13 PROCEDURE — 97110 THERAPEUTIC EXERCISES: CPT

## 2020-02-13 NOTE — PROGRESS NOTES
PT DAILY TREATMENT NOTE 10-18    Patient Name: Rui Vela  Date:2020  : 1950  [x]  Patient  Verified  Payor: Siri Fofana / Plan: [place] MEDICARE PART A & B / Product Type: Medicare /    In time:1200  Out time:1:16  Total Treatment Time (min): 79  Visit #: 2 of 10    Medicare/BCBS Only   Total Timed Codes (min):  57 1:1 Treatment Time:  45       Treatment Area: Pain of right hip [M25.551]    SUBJECTIVE  Pain Level (0-10 scale):  2  Any medication changes, allergies to medications, adverse drug reactions, diagnosis change, or new procedure performed?: [x] No    [] Yes (see summary sheet for update)  Subjective functional status/changes:   [] No changes reported  Continued soreness and bruising of the right Hip flexor region proximal to inguinal region    OBJECTIVE    Modality rationale: decrease inflammation and decrease pain to improve the patients ability to tolerate increased acivity   Min Type Additional Details    [] Estim:  []Unatt       []IFC  []Premod                        []Other:  []w/ice   []w/heat  Position:  Location:    [] Estim: []Att    []TENS instruct  []NMES                    []Other:  []w/US   []w/ice   []w/heat  Position:  Location:    []  Traction: [] Cervical       []Lumbar                       [] Prone          []Supine                       []Intermittent   []Continuous Lbs:  [] before manual  [] after manual   8 [x]  Ultrasound: [x]Continuous   [x] Pulsed 5%                           []1MHz   []3MHz W/cm2: 1.7  Location: Right ADD/Pectineous     []  Iontophoresis with dexamethasone         Location: [] Take home patch   [] In clinic   10 [x]  Ice     []  heat  []  Ice massage  []  Laser   []  Anodyne Position: Supine  Location: Right groin    []  Laser with stim  []  Other:  Position:  Location:    []  Vasopneumatic Device Pressure:       [] lo [] med [] hi   Temperature: [] lo [] med [] hi   [x] Skin assessment post-treatment:  [x]intact []redness- no adverse reaction []redness - adverse reaction:     35 min Therapeutic Exercise:  [x] See flow sheet :   Rationale: increase ROM, increase strength and improve coordination to improve the patients ability to tolerate increased activity    14 min Manual Therapy:  STM/DTM/Effleurage right ADD/Pectineus region, KT I-Strip for spacing at the area of bruising in the right hip just above inguinal region   Rationale: decrease pain, increase ROM, increase tissue extensibility, decrease edema  and decrease trigger points to tolerate usual activity without right hip pain      With   [x] TE   [] TA   [] neuro   [] other: Patient Education: [x] Review HEP    [] Progressed/Changed HEP based on:   [] positioning   [] body mechanics   [] transfers   [] heat/ice application    [] other:      Other Objective/Functional Measures:        Pain Level (0-10 scale) post treatment: 1-2    ASSESSMENT/Changes in Function:   - Good response to treatment with less discomfort after treatment  - Good tolerance with first full treatment with decreasing pain    Patient will continue to benefit from skilled PT services to modify and progress therapeutic interventions, address functional mobility deficits, address ROM deficits, address strength deficits, analyze and address soft tissue restrictions and analyze and cue movement patterns to attain remaining goals.      []  See Plan of Care  []  See progress note/recertification  []  See Discharge Summary         Short Term Goals: To be accomplished in 5 treatments:  1.  Pt will be compliant and independent with HEP in order to facilitate PT sessions and aid with self management             Eval Status:  Initiated             Current Status: Pt reports compliance with HEP 2/13/20  2.  Pt to tolerate 30 min or more of TE and/or Interventions w/o increased s/s             Eval Status:  Initiated             Current Status: Met at 57 min of treatment with less discomfort 2/13/20     Long Term Goals: To be accomplished in 10 treatments:  1.  Pt will report 50% improvement or better with involvement to show a significant increase in function             Eval Status:  Initiated             Current Status:  2.  Pt will demonstrate lifting right LE onto 8\" step x 10 reps with little difficulty to allow her to lift her leg in and out of her car with little difficulty              Eval Status:  Hard to get leg in and out of the car             Current Status:  3.  Pt will demonstrate MMT (B) Hip Flx/Ext/ABD/ADD to 4+/5 with little pain and no residual cramping for carryover to normal hip function with little pain               Eval Status:  Unable to assess due to pain and cramping             Current Status:  4.  Pt will have decreased reports of pain at 2/10 or better with activity to allow her to return to normal activity with little to no discomfort.               Eval Status:  Initiated             Current Status:  5.   Pt will demonstrate a reciprocal gait pattern with negotiating up/down 20 6\"steps with HHA as needed and little katya to tolerate walking her stairs at home with little to no difficulty             Eval Status:  Initiated             Current Status:  6.  Pt will improve FOTO score to 48 in 15 visits to show significant improvement in function for progress to normal right hip function, stair negotiation and independent community ambulation             Eval Status: 38             Current Status:     PLAN  [x]  Upgrade activities as tolerated     [x]  Continue plan of care  []  Update interventions per flow sheet       []  Discharge due to:_  []  Other:_      Arsenio Weinstein, PT 2/13/2020  2:01 PM    Future Appointments   Date Time Provider Jonathan Whittakeri   2/14/2020  8:15 AM Tisha Olson MD [address] [place] [address]   2/17/2020 11:00 AM Jessica Daniel PT [place] [id number] [address]   2/19/2020  8:30 AM Jessica Daniel PT [place] [id number] [address]

## 2020-02-14 ENCOUNTER — OFFICE VISIT (OUTPATIENT)
Dept: FAMILY MEDICINE CLINIC | Age: 70
End: 2020-02-14

## 2020-02-14 VITALS
OXYGEN SATURATION: 96 % | SYSTOLIC BLOOD PRESSURE: 126 MMHG | TEMPERATURE: 98 F | DIASTOLIC BLOOD PRESSURE: 80 MMHG | HEART RATE: 52 BPM | RESPIRATION RATE: 12 BRPM

## 2020-02-14 DIAGNOSIS — M54.6 ACUTE BILATERAL THORACIC BACK PAIN: ICD-10-CM

## 2020-02-14 RX ORDER — TRAMADOL HYDROCHLORIDE 100 MG/1
100 TABLET, EXTENDED RELEASE ORAL DAILY
Qty: 90 TAB | Refills: 0 | Status: SHIPPED | OUTPATIENT
Start: 2020-02-14 | End: 2020-04-16 | Stop reason: SDUPTHER

## 2020-02-14 NOTE — PROGRESS NOTES
Robert Rockwell is a 68 y.o. female  presents for follow up on chronic pain. HPI:   Patient has osteoarthritis , primarily affecting the back and hips. Pain control:           Current : stable   3/10           Worst pain over last week        6/10           Least pain over the last week   3/10  Activities of Daily Living:            well controlled, stable            Are you able to do your normal daily activities?   all day  Patient Goals            Functional:  yes            Pain: less3  Adverse side effects:             Since starting your pain medicine are you experiencing any of the following?              ( Examples: Constipation, fatigue, lapses in memory, depression or sexual                        Dysfunction)   No   Is a Pain management Contract in the patient's chart? yes  Aberrant behaviors:              none(Early refill requests, lost prescriptions, lost medicine)  Pill count:             Is it consistent with patient's prescription? {Yes/No:[id number]:L: \"yes\"  Last urine drug screen:     VA Prescription Monitoring Program check performed:  yes        Treatment Care Team:  Patient Care Team:  Hemal Guy MD as PCP - General (Family Practice)  Hemal Guy MD as PCP - [facility] Empaneled Provider  Jacquelyn Ramos MD (Gastroenterology)  Follow up contact scheduled for 1-4 weeks: yes      Allergies   Allergen Reactions    Celebrex [Celecoxib] Swelling    Shellfish Derived Swelling     \"makes me feel puffy\"    Sulfa (Sulfonamide Antibiotics) Hives and Swelling     Lips swelling    Gabapentin Diarrhea and Palpitations     Chest and Abdominal pain    Humira [Adalimumab] Other (comments)     Lupus    Influenza Virus Vaccines Hives    Iodine Itching    Optiray 320 [Ioversol] Hives and Itching     Pt states when she gets iv contrast she itches a little from hives?     Penicillins Hives     Outpatient Medications Marked as Taking for the 2/14/20 encounter (Office Visit) with Hemal Guy MD Medication Sig Dispense Refill    traMADol (ULTRAM ER) 100 mg Tb24 Take 1 Tab by mouth daily for 90 days. Max Daily Amount: 100 mg. 90 Tab 0    clotrimazole-betamethasone (LOTRISONE) topical cream Use bid 15 g 1    lisinopril (PRINIVIL, ZESTRIL) 10 mg tablet Take 1 Tab by mouth daily. 90 Tab 1    zolpidem (AMBIEN) 5 mg tablet Take 1 Tab by mouth nightly as needed for Sleep. Take 1 Tab by Mouth Nightly As Needed. 90 Tab 0    meloxicam (MOBIC) 15 mg tablet Take 1 Tab by mouth daily. 90 Tab 2    tofacitinib (XELJANZ) 5 mg tab Take  by mouth two (2) times a day.  teriparatide (FORTEO) 20 mcg/dose - 600 mcg/2.4 mL pnij injection 20 mcg by SubCUTAneous route daily.  cetirizine (ZYRTEC) 10 mg tablet Take  by mouth.  petrolat,wht/min oil/sod chl (DRY EYES OP) Apply  to eye.  acetaminophen (TYLENOL EXTRA STRENGTH) 500 mg tablet Take 1,000 mg by mouth every six (6) hours as needed for Pain.  cholecalciferol (VITAMIN D3) 1,000 unit tablet Take 1,000 Units by mouth five (5) days a week.  calcium carbonate (OS-BARBARA) 500 mg calcium (1,250 mg) tablet Take 1,500 mg by mouth daily.        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    H/O calcium pyrophosphate deposition disease (CPPD) Z87.39    IBS (irritable bowel syndrome) K58.9    RA (rheumatoid arthritis) ([facility] [address] M06.9    Sicca syndrome (HCC) M35.00    Osteoarthritis of right hip M16.11    Pain management contract agreement Z02.89    ACP (advance care planning) Z71.89    Chronic left shoulder pain M25.512, G89.29    Osteopenia M85.80    Osteopenia of multiple sites M85.89    Osteoarthritis of left hip M16.12    Closed fracture of thoracic spine without spinal cord lesion ([facility] [address] S22.009A    Spinal stenosis, thoracolumbar region M48.05    Lumbar spine instability M53.2X6    Spinal stenosis of lumbar region with neurogenic claudication M48.062    Spinal stenosis of lumbar region M48.061    Lumbar stenosis M48.061    Nausea R11.0    Acute bilateral thoracic back pain M54.6    Anxiety F41.9    Chest pain R07.9    Essential hypertension I10     Past Medical History:   Diagnosis Date    Abdominal abscess 2009    Arthritis     Rheumatoid    Autoimmune disease ([address]     Medically induced Lupus    Back pain     Chronic pain     lower back    Colitis     Diverticulitis     Failed back syndrome     GERD (gastroesophageal reflux disease)     Hypertension     when on cyclosporins    Ill-defined condition     large torus roof of mouth    Irritable bowel syndrome     Neuropathy     bilateral hands/wrist    Osteoporosis     Pneumonia     RA (rheumatoid arthritis) ([address]     Seizures ([address] 6-1-2008    one episode- after high dose of epinepherine     Social History     Socioeconomic History    Marital status:      Spouse name: Not on file    Number of children: Not on file    Years of education: Not on file    Highest education level: Not on file   Tobacco Use    Smoking status: Never Smoker    Smokeless tobacco: Never Used   Substance and Sexual Activity    Alcohol use: No    Drug use: No    Sexual activity: Never   Other Topics Concern     Family History   Problem Relation Age of Onset    Other Other         Orthopedic (Sister)   [facility] Arthritis-osteo Sister     Heart Attack Brother 58    Cancer Neg Hx     Diabetes Neg Hx     Heart Disease Neg Hx     Hypertension Neg Hx     Stroke Neg Hx     Malignant Hyperthermia Neg Hx     Pseudocholinesterase Deficiency Neg Hx     Delayed Awakening Neg Hx     Post-op Nausea/Vomiting Neg Hx     Emergence Delirium Neg Hx     Post-op Cognitive Dysfunction Neg Hx         Review of Systems   Constitutional: Negative for chills, fever, malaise/fatigue and weight loss.    Eyes: Negative for blurred vision. Respiratory: Negative for shortness of breath and wheezing. Cardiovascular: Negative for chest pain. Gastrointestinal: Negative for nausea and vomiting. Musculoskeletal: Positive for back pain and joint pain. Negative for myalgias. Skin: Negative for rash. Neurological: Negative for weakness. Vitals:    02/14/20 0823   BP: 126/80   Pulse: (!) 52   Resp: 12   Temp: 98 °F (36.7 °C)   TempSrc: Oral   SpO2: 96%   PainSc:   2   PainLoc: Generalized       Physical Exam  Vitals signs and nursing note reviewed. Neck:      Musculoskeletal: Normal range of motion and neck supple. Thyroid: No thyromegaly. Cardiovascular:      Rate and Rhythm: Normal rate and regular rhythm. Heart sounds: Normal heart sounds. Pulmonary:      Effort: Pulmonary effort is normal.      Breath sounds: Normal breath sounds. Musculoskeletal: Normal range of motion. Skin:     General: Skin is warm and dry. Neurological:      Mental Status: She is alert and oriented to person, place, and time. Psychiatric:         Mood and Affect: Mood normal.         Behavior: Behavior normal.         Thought Content: Thought content normal.         Judgment: Judgment normal.         Assessment/Plan      ICD-10-CM ICD-9-CM    1. Acute bilateral thoracic back pain M54.6 724.1 traMADol (ULTRAM ER) 100 mg Tb24     Follow-up and Dispositions    · Return if symptoms worsen or fail to improve. I have discussed the diagnosis with the patient and the intended plan of care as seen in the above orders. The patient has received an after-visit summary and questions were answered concerning future plans. I have discussed medication, side effects, and warnings with the patient in detail. The patient verbalized understanding and is in agreement with the plan of care. The patient will contact the office with any additional concerns.       lab results and schedule of future lab studies reviewed with patient    Ara Hamm MD

## 2020-02-14 NOTE — PATIENT INSTRUCTIONS
Back Pain: Care Instructions  Your Care Instructions    Back pain has many possible causes. It is often related to problems with muscles and ligaments of the back. It may also be related to problems with the nerves, discs, or bones of the back. Moving, lifting, standing, sitting, or sleeping in an awkward way can strain the back. Sometimes you don't notice the injury until later. Arthritis is another common cause of back pain. Although it may hurt a lot, back pain usually improves on its own within several weeks. Most people recover in 12 weeks or less. Using good home treatment and being careful not to stress your back can help you feel better sooner. Follow-up care is a key part of your treatment and safety. Be sure to make and go to all appointments, and call your doctor if you are having problems. It's also a good idea to know your test results and keep a list of the medicines you take. How can you care for yourself at home? · Sit or lie in positions that are most comfortable and reduce your pain. Try one of these positions when you lie down:  ? Lie on your back with your knees bent and supported by large pillows. ? Lie on the floor with your legs on the seat of a sofa or chair. ? Lie on your side with your knees and hips bent and a pillow between your legs. ? Lie on your stomach if it does not make pain worse. · Do not sit up in bed, and avoid soft couches and twisted positions. Bed rest can help relieve pain at first, but it delays healing. Avoid bed rest after the first day of back pain. · Change positions every 30 minutes. If you must sit for long periods of time, take breaks from sitting. Get up and walk around, or lie in a comfortable position. · Try using a heating pad on a low or medium setting for 15 to 20 minutes every 2 or 3 hours. Try a warm shower in place of one session with the heating pad. · You can also try an ice pack for 10 to 15 minutes every 2 to 3 hours.  Put a thin cloth between the ice pack and your skin. · Take pain medicines exactly as directed. ? If the doctor gave you a prescription medicine for pain, take it as prescribed. ? If you are not taking a prescription pain medicine, ask your doctor if you can take an over-the-counter medicine. · Take short walks several times a day. You can start with 5 to 10 minutes, 3 or 4 times a day, and work up to longer walks. Walk on level surfaces and avoid hills and stairs until your back is better. · Return to work and other activities as soon as you can. Continued rest without activity is usually not good for your back. · To prevent future back pain, do exercises to stretch and strengthen your back and stomach. Learn how to use good posture, safe lifting techniques, and proper body mechanics. When should you call for help? Call your doctor now or seek immediate medical care if:    · You have new or worsening numbness in your legs.     · You have new or worsening weakness in your legs. (This could make it hard to stand up.)     · You lose control of your bladder or bowels.    Watch closely for changes in your health, and be sure to contact your doctor if:    · You have a fever, lose weight, or don't feel well.     · You do not get better as expected. Where can you learn more? Go to http://camilo-robert.info/. Enter L126 in the search box to learn more about \"Back Pain: Care Instructions. \"  Current as of: June 26, 2019  Content Version: 12.2  © 8683-4121 [employer]. Care instructions adapted under license by [employer] (which disclaims liability or warranty for this information). If you have questions about a medical condition or this instruction, always ask your healthcare professional. [place] [address] any warranty or liability for your use of this information.

## 2020-02-17 ENCOUNTER — HOSPITAL ENCOUNTER (OUTPATIENT)
Dept: PHYSICAL THERAPY | Age: 70
Discharge: HOME | End: 2020-02-17
Payer: MEDICARE

## 2020-02-17 PROCEDURE — 97032 APPL MODALITY 1+ESTIM EA 15: CPT

## 2020-02-17 PROCEDURE — 97110 THERAPEUTIC EXERCISES: CPT

## 2020-02-17 PROCEDURE — 97140 MANUAL THERAPY 1/> REGIONS: CPT

## 2020-02-17 NOTE — PROGRESS NOTES
PT DAILY TREATMENT NOTE 10-18    Patient Name: Samira Hansen  Date:2020  : 1950  [x]  Patient  Verified  Payor: [place] MEDICARE / Plan: [place] MEDICARE PART A & B / Product Type: Medicare /    In time:1100  Out time:1200  Total Treatment Time (min): 50  Visit #: 3 of 10    Medicare/BCBS Only   Total Timed Codes (min):  50 1:1 Treatment Time:  40       Treatment Area: Pain of right hip [M25.551]    SUBJECTIVE  Pain Level (0-10 scale): 1  Any medication changes, allergies to medications, adverse drug reactions, diagnosis change, or new procedure performed?: [x] No    [] Yes (see summary sheet for update)  Subjective functional status/changes:   [] No changes reported  Tightness right ADD and pectineus region    OBJECTIVE    Modality rationale: decrease inflammation, decrease pain and increase tissue extensibility to improve the patients ability to move right LE better   Min Type Additional Details    [] Estim:  []Unatt       []IFC  []Premod                        []Other:  [x]w/ice   []w/heat  Position: supine  Location:Right Adductor/pectineus    12 [x] Estim: [x]Att    []TENS instruct  []NMES                    [x]Other: Combo []w/US   []w/ice   []w/heat  Position:  Location:    []  Traction: [] Cervical       []Lumbar                       [] Prone          []Supine                       []Intermittent   []Continuous Lbs:  [] before manual  [] after manual    []  Ultrasound: []Continuous   [] Pulsed                           []1MHz   []3MHz W/cm2:  Location:    []  Iontophoresis with dexamethasone         Location: [] Take home patch   [] In clinic    []  Ice     []  heat  []  Ice massage  []  Laser   []  Anodyne Position:  Location:    []  Laser with stim  []  Other:  Position:  Location:    []  Vasopneumatic Device Pressure:       [] lo [] med [] hi   Temperature: [] lo [] med [] hi   [x] Skin assessment post-treatment:  [x]intact []redness- no adverse reaction    []redness - adverse reaction:     28 min Therapeutic Exercise:  [x] See flow sheet :   Rationale: increase ROM, increase strength and improve coordination to improve the patients ability to tolerate increased activity with less right hip flexor pain    10 min Manual Therapy:  STM/DTM/TPR right pectineus/ADD   Rationale: decrease pain, increase ROM, increase tissue extensibility and decrease trigger points to improved daily function          With   [x] TE   [] TA   [] neuro   [] other: Patient Education: [x] Review HEP    [] Progressed/Changed HEP based on:   [] positioning   [] body mechanics   [] transfers   [] heat/ice application    [] other:      Other Objective/Functional Measures:        Pain Level (0-10 scale) post treatment: 0    ASSESSMENT/Changes in Function:   - Continued TTP and muscle tightness at the right ADD/Pectineus region  - Decreased muscle tightness at the right ADD since last visit    Patient will continue to benefit from skilled PT services to modify and progress therapeutic interventions, address functional mobility deficits, address ROM deficits, address strength deficits, analyze and address soft tissue restrictions and analyze and cue movement patterns to attain remaining goals.      []  See Plan of Care  []  See progress note/recertification  []  See Discharge Summary         Short Term Goals: To be accomplished in 5 treatments:  1.  Pt will be compliant and independent with HEP in order to facilitate PT sessions and aid with self management             Eval Status:  Initiated             Current Status: Pt reports compliance with HEP 2/13/20  2.  Pt to tolerate 30 min or more of TE and/or Interventions w/o increased s/s             Eval Status:  Initiated             Current Status: Met at 57 min of treatment with less discomfort 2/13/20     Long Term Goals: To be accomplished in 10 treatments:  1.  Pt will report 50% improvement or better with involvement to show a significant increase in function             Eval Status:  Initiated             Current Status:  2.  Pt will demonstrate lifting right LE onto 8\" step x 10 reps with little difficulty to allow her to lift her leg in and out of her car with little difficulty              Eval Status:  Hard to get leg in and out of the car             Current Status:  3.  Pt will demonstrate MMT (B) Hip Flx/Ext/ABD/ADD to 4+/5 with little pain and no residual cramping for carryover to normal hip function with little pain               Eval Status:  Unable to assess due to pain and cramping             Current Status:  4.  Pt will have decreased reports of pain at 2/10 or better with activity to allow her to return to normal activity with little to no discomfort.               Eval Status:  Initiated             Current Status:  Progressing with pain 1/10 today but continued tightness 2/17/20  5.   Pt will demonstrate a reciprocal gait pattern with negotiating up/down 20 6\"steps with HHA as needed and little katya to tolerate walking her stairs at home with little to no difficulty             Eval Status:  Initiated             Current Status:  6.  Pt will improve FOTO score to 48 in 15 visits to show significant improvement in function for progress to normal right hip function, stair negotiation and independent community ambulation             Eval Status: 38             Current Status:     PLAN  [x]  Upgrade activities as tolerated     [x]  Continue plan of care  []  Update interventions per flow sheet       []  Discharge due to:_  []  Other:_      Alis García, PT 2/17/2020  11:22 AM    Future Appointments   Date Time Provider Jonathan Colmenares   2/19/2020  8:30 AM Audra Leslie, PT [place] [facility]

## 2020-02-19 ENCOUNTER — HOSPITAL ENCOUNTER (OUTPATIENT)
Dept: PHYSICAL THERAPY | Age: 70
Discharge: HOME | End: 2020-02-19
Payer: MEDICARE

## 2020-02-19 PROCEDURE — 97110 THERAPEUTIC EXERCISES: CPT

## 2020-02-19 NOTE — PROGRESS NOTES
PT DAILY TREATMENT NOTE 10-18    Patient Name: Aleah Houston  Date:2020  : 1950  [x]  Patient  Verified  Payor: [place] MEDICARE / Plan: [place] MEDICARE PART A & B / Product Type: Medicare /    In time:829  Out time:920  Total Treatment Time (min): 50  Visit #: 4 of 10    Medicare/BCBS Only   Total Timed Codes (min):  40 1:1 Treatment Time:  40       Treatment Area: Pain of right hip [M25.551]    SUBJECTIVE  Pain Level (0-10 scale): 0  Any medication changes, allergies to medications, adverse drug reactions, diagnosis change, or new procedure performed?: [x] No    [] Yes (see summary sheet for update)  Subjective functional status/changes:   [] No changes reported  No pain upon arrival but reports continued pain at the right hip region with activity.   Not able to drive due to the pain    OBJECTIVE    Modality rationale: decrease inflammation and decrease pain to improve the patients ability to tolerate increased activity   Min Type Additional Details    [] Estim:  []Unatt       []IFC  []Premod                        []Other:  []w/ice   []w/heat  Position:  Location:    [] Estim: []Att    []TENS instruct  []NMES                    []Other:  []w/US   []w/ice   []w/heat  Position:  Location:    []  Traction: [] Cervical       []Lumbar                       [] Prone          []Supine                       []Intermittent   []Continuous Lbs:  [] before manual  [] after manual    []  Ultrasound: []Continuous   [] Pulsed                           []1MHz   []3MHz W/cm2:  Location:    []  Iontophoresis with dexamethasone         Location: [] Take home patch   [] In clinic   10 [x]  Ice     []  heat  []  Ice massage  []  Laser   []  Anodyne Position: Supine  Location: Right Groin/Adductior muscles    []  Laser with stim  []  Other:  Position:  Location:    []  Vasopneumatic Device Pressure:       [] lo [] med [] hi   Temperature: [] lo [] med [] hi   [x] Skin assessment post-treatment:  [x]intact []redness- no adverse reaction    []redness - adverse reaction:     40 min Therapeutic Exercise:  [x] See flow sheet :   Rationale: increase ROM, increase strength and improve coordination to improve the patients ability to tolerate increased activity and limit muscle tightness          With   [x] TE   [] TA   [] neuro   [] other: Patient Education: [x] Review HEP    [] Progressed/Changed HEP based on:   [] positioning   [] body mechanics   [] transfers   [] heat/ice application    [] other:      Other Objective/Functional Measures:   - Initiated lift to box in seated position       Pain Level (0-10 scale) post treatment: 3    ASSESSMENT/Changes in Function:   - Pt showing improved ability to perform hip flexion with less discomfort  - Pt demo the ability to perform SLR/Knee to Chest/and Hip Flx to box without assistance today 2-3x10 reps with improved tolerance  - Increased pain/soreness reported after treatment due to increased activity     Patient will continue to benefit from skilled PT services to modify and progress therapeutic interventions, address functional mobility deficits, address ROM deficits, address strength deficits, analyze and address soft tissue restrictions and analyze and cue movement patterns to attain remaining goals.      []  See Plan of Care  []  See progress note/recertification  []  See Discharge Summary         Short Term Goals: To be accomplished in 5 treatments:  1.  Pt will be compliant and independent with HEP in order to facilitate PT sessions and aid with self management             Eval Status:  Initiated             Current Status: Pt reports compliance with HEP 2/13/20  2.  Pt to tolerate 30 min or more of TE and/or Interventions w/o increased s/s             Eval Status:  Initiated             Current Status: Met at 57 min of treatment with less discomfort 2/13/20     Long Term Goals: To be accomplished in 10 treatments:  1.  Pt will report 50% improvement or better with involvement to show a significant increase in function             Eval Status:  Initiated             Current Status:  2.  Pt will demonstrate lifting right LE onto 8\" step x 10 reps with little difficulty to allow her to lift her leg in and out of her car with little difficulty              Eval Status:  Hard to get leg in and out of the car             Current Status: Progressing with 6\" 3x10 Reps 2/19/20  3.  Pt will demonstrate MMT (B) Hip Flx/Ext/ABD/ADD to 4+/5 with little pain and no residual cramping for carryover to normal hip function with little pain               Eval Status:  Unable to assess due to pain and cramping             Current Status:  4.  Pt will have decreased reports of pain at 2/10 or better with activity to allow her to return to normal activity with little to no discomfort.               Eval Status:  Initiated             Current Status:  Progressing with pain 1/10 today but continued tightness 2/17/20  5.   Pt will demonstrate a reciprocal gait pattern with negotiating up/down 20 6\"steps with HHA as needed and little katya to tolerate walking her stairs at home with little to no difficulty             Eval Status:  Initiated             Current Status:  6.  Pt will improve FOTO score to 48 in 15 visits to show significant improvement in function for progress to normal right hip function, stair negotiation and independent community ambulation             Eval Status: 38             Current Status:     PLAN  [x]  Upgrade activities as tolerated     [x]  Continue plan of care  []  Update interventions per flow sheet       []  Discharge due to:_  []  Other:_      Che Hannon, PT 2/19/2020  1:59 PM    No future appointments.

## 2020-03-03 ENCOUNTER — HOSPITAL ENCOUNTER (OUTPATIENT)
Dept: PHYSICAL THERAPY | Age: 70
Discharge: HOME | End: 2020-03-03
Payer: MEDICARE

## 2020-03-03 PROCEDURE — 97110 THERAPEUTIC EXERCISES: CPT

## 2020-03-03 PROCEDURE — 97032 APPL MODALITY 1+ESTIM EA 15: CPT

## 2020-03-03 NOTE — PROGRESS NOTES
PT DAILY TREATMENT NOTE 10-18    Patient Name: Florin Jay  Date:3/3/2020  : 1950  [x]  Patient  Verified  Payor: [place] MEDICARE / Plan: VA MEDICARE PART A & B / Product Type: Medicare /    In time:4:30  Out time:5:50  Total Treatment Time (min): 60  Visit #: 5 of 10    Medicare/BCBS Only   Total Timed Codes (min):  60 1:1 Treatment Time:  45       Treatment Area: Pain of right hip [M25.551]    SUBJECTIVE  Pain Level (0-10 scale): 0  Any medication changes, allergies to medications, adverse drug reactions, diagnosis change, or new procedure performed?: [x] No    [] Yes (see summary sheet for update)  Subjective functional status/changes:   [] No changes reported  Feeling of hip flexor popping with ambulation    OBJECTIVE    Modality rationale: decrease inflammation, decrease pain and increase tissue extensibility to improve the patients ability to perform normal activity   Min Type Additional Details    [] Estim:  []Unatt       []IFC  []Premod                        []Other:  []w/ice   []w/heat  Position:  Location:   10 [x] Estim: [x]Att    []TENS instruct  []NMES                    [x]Other: Combo [x]w/US   []w/ice   []w/heat  Position: Supine  Location: Right Adductor/Pectineus     []  Traction: [] Cervical       []Lumbar                       [] Prone          []Supine                       []Intermittent   []Continuous Lbs:  [] before manual  [] after manual    []  Ultrasound: []Continuous   [] Pulsed                           []1MHz   []3MHz W/cm2:  Location:    []  Iontophoresis with dexamethasone         Location: [] Take home patch   [] In clinic    []  Ice     []  heat  []  Ice massage  []  Laser   []  Anodyne Position:  Location:    []  Laser with stim  []  Other:  Position:  Location:    []  Vasopneumatic Device Pressure:       [] lo [] med [] hi   Temperature: [] lo [] med [] hi   [x] Skin assessment post-treatment:  [x]intact []redness- no adverse reaction    []redness - adverse reaction: 34 min Therapeutic Exercise:  [x] See flow sheet :   Rationale: increase ROM, increase strength and improve coordination to improve the patients ability to perform normal activity    8 min Manual Therapy:  (B) Innominate pelvic rot mobs   Rationale: decrease pain, increase ROM and increase tissue extensibility to improve ability for ambulation          With   [x] TE   [] TA   [] neuro   [] other: Patient Education: [x] Review HEP    [] Progressed/Changed HEP based on:   [] positioning   [] body mechanics   [] transfers   [] heat/ice application    [] other:      Other Objective/Functional Measures:   - TTP right pectineus/Adductor  - Decr pelvic sway with ambulation        Pain Level (0-10 scale) post treatment: 3    ASSESSMENT/Changes in Function:   - Pt with improved pelvic alignment and mobility after manual therapy  - Goo       Patient will continue to benefit from skilled PT services to modify and progress therapeutic interventions, address functional mobility deficits, address ROM deficits, address strength deficits, analyze and address soft tissue restrictions and analyze and cue movement patterns to attain remaining goals.      []  See Plan of Care  []  See progress note/recertification  []  See Discharge Summary         Short Term Goals: To be accomplished in 5 treatments:  1.  Pt will be compliant and independent with HEP in order to facilitate PT sessions and aid with self management             Eval Status:  Initiated             Current Status: Pt reports compliance with HEP 2/13/20  2.  Pt to tolerate 30 min or more of TE and/or Interventions w/o increased s/s             Eval Status:  Initiated             Current Status: Met at 57 min of treatment with less discomfort 2/13/20     Long Term Goals: To be accomplished in 10 treatments:  1.  Pt will report 50% improvement or better with involvement to show a significant increase in function             Eval Status:  Initiated             Current Status:  2.  Pt will demonstrate lifting right LE onto 8\" step x 10 reps with little difficulty to allow her to lift her leg in and out of her car with little difficulty              Eval Status:  Hard to get leg in and out of the car             Current Status: Progressing with 6\" 3x10 Reps 2/19/20  3.  Pt will demonstrate MMT (B) Hip Flx/Ext/ABD/ADD to 4+/5 with little pain and no residual cramping for carryover to normal hip function with little pain               Eval Status:  Unable to assess due to pain and cramping             Current Status:  4.  Pt will have decreased reports of pain at 2/10 or better with activity to allow her to return to normal activity with little to no discomfort.               Eval Status:  Initiated             Current Status:  Progressing with pain 0/10 today but continued tightness 3/3/20  5.   Pt will demonstrate a reciprocal gait pattern with negotiating up/down 20 6\"steps with HHA as needed and little katya to tolerate walking her stairs at home with little to no difficulty             Eval Status:  Initiated             Current Status:  6.  Pt will improve FOTO score to 48 in 15 visits to show significant improvement in function for progress to normal right hip function, stair negotiation and independent community ambulation             Eval Status: 38             Current Status:     PLAN  [x]  Upgrade activities as tolerated     [x]  Continue plan of care  []  Update interventions per flow sheet       []  Discharge due to:_  []  Other:_      Eduardo Conrad, PT 3/3/2020  5:27 PM    Future Appointments   Date Time Provider Jonathan Oroisti   3/4/2020  3:30 PM MD Dot Edwards   3/5/2020  9:45 AM THE [facility] MRI RM 1 MIHRMRI [facility]   3/5/2020  3:00 PM [facility] PT [place] 1 [facility]

## 2020-03-04 ENCOUNTER — OFFICE VISIT (OUTPATIENT)
Dept: FAMILY MEDICINE CLINIC | Age: 70
End: 2020-03-04

## 2020-03-04 VITALS
SYSTOLIC BLOOD PRESSURE: 108 MMHG | DIASTOLIC BLOOD PRESSURE: 80 MMHG | TEMPERATURE: 98.2 F | HEART RATE: 58 BPM | OXYGEN SATURATION: 95 % | RESPIRATION RATE: 12 BRPM

## 2020-03-04 DIAGNOSIS — J02.9 SORE THROAT: Primary | ICD-10-CM

## 2020-03-04 LAB
S PYO AG THROAT QL: NEGATIVE
VALID INTERNAL CONTROL?: YES

## 2020-03-04 NOTE — PROGRESS NOTES
Armaan Winkler is a 68 y.o. female  presents for cough for several days. She has assoc sore throat. sxs are getting worse. She has sinus headache. Allergies   Allergen Reactions    Celebrex [Celecoxib] Swelling    Shellfish Derived Swelling     \"makes me feel puffy\"    Sulfa (Sulfonamide Antibiotics) Hives and Swelling     Lips swelling    Gabapentin Diarrhea and Palpitations     Chest and Abdominal pain    Humira [Adalimumab] Other (comments)     Lupus    Influenza Virus Vaccines Hives    Iodine Itching    Optiray 320 [Ioversol] Hives and Itching     Pt states when she gets iv contrast she itches a little from hives?  Penicillins Hives     Outpatient Medications Marked as Taking for the 3/4/20 encounter (Office Visit) with Claudia Moses MD   Medication Sig Dispense Refill    traMADol Estella Or ER) 100 mg Tb24 Take 1 Tab by mouth daily for 90 days. Max Daily Amount: 100 mg. 90 Tab 0    clotrimazole-betamethasone (LOTRISONE) topical cream Use bid 15 g 1    lisinopril (PRINIVIL, ZESTRIL) 10 mg tablet Take 1 Tab by mouth daily. 90 Tab 1    zolpidem (AMBIEN) 5 mg tablet Take 1 Tab by mouth nightly as needed for Sleep. Take 1 Tab by Mouth Nightly As Needed. 90 Tab 0    meloxicam (MOBIC) 15 mg tablet Take 1 Tab by mouth daily. 90 Tab 2    tofacitinib (XELJANZ) 5 mg tab Take  by mouth two (2) times a day.  teriparatide (FORTEO) 20 mcg/dose - 600 mcg/2.4 mL pnij injection 20 mcg by SubCUTAneous route daily.  cetirizine (ZYRTEC) 10 mg tablet Take  by mouth.  petrolat,wht/min oil/sod chl (DRY EYES OP) Apply  to eye.  acetaminophen (TYLENOL EXTRA STRENGTH) 500 mg tablet Take 1,000 mg by mouth every six (6) hours as needed for Pain.  cholecalciferol (VITAMIN D3) 1,000 unit tablet Take 1,000 Units by mouth five (5) days a week.  calcium carbonate (OS-BARBARA) 500 mg calcium (1,250 mg) tablet Take 1,500 mg by mouth daily.        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    H/O calcium pyrophosphate deposition disease (CPPD) Z87.39    IBS (irritable bowel syndrome) K58.9    RA (rheumatoid arthritis) ([facility]) M06.9    Sicca syndrome ([facility]) M35.00    Osteoarthritis of right hip M16.11    Pain management contract agreement Z02.89    ACP (advance care planning) Z71.89    Chronic left shoulder pain M25.512, G89.29    Osteopenia M85.80    Osteopenia of multiple sites M85.89    Osteoarthritis of left hip M16.12    Closed fracture of thoracic spine without spinal cord lesion ([address] S22.009A    Spinal stenosis, thoracolumbar region M48.05    Lumbar spine instability M53.2X6    Spinal stenosis of lumbar region with neurogenic claudication M48.062    Spinal stenosis of lumbar region M48.061    Lumbar stenosis M48.061    Nausea R11.0    Acute bilateral thoracic back pain M54.6    Anxiety F41.9    Chest pain R07.9    Essential hypertension I10     Past Medical History:   Diagnosis Date    Abdominal abscess 2009    Arthritis     Rheumatoid    Autoimmune disease ([address]     Medically induced Lupus    Back pain     Chronic pain     lower back    Colitis     Diverticulitis     Failed back syndrome     GERD (gastroesophageal reflux disease)     Hypertension     when on cyclosporins    Ill-defined condition     large torus roof of mouth    Irritable bowel syndrome     Neuropathy     bilateral hands/wrist    Osteoporosis     Pneumonia     RA (rheumatoid arthritis) ([facility])     Seizures ([address] 6-1-2008    one episode- after high dose of epinepherine     Social History     Socioeconomic History    Marital status:      Spouse name: Not on file    Number of children: Not on file    Years of education: Not on file    Highest education level: Not on file   Tobacco Use    Smoking status: Never Smoker  Smokeless tobacco: Never Used   Substance and Sexual Activity    Alcohol use: No    Drug use: No    Sexual activity: Never   Other Topics Concern     Family History   Problem Relation Age of Onset    Other Other         Orthopedic (Sister)   [place] Arthritis-osteo Sister     Heart Attack Brother 58    Cancer Neg Hx     Diabetes Neg Hx     Heart Disease Neg Hx     Hypertension Neg Hx     Stroke Neg Hx     Malignant Hyperthermia Neg Hx     Pseudocholinesterase Deficiency Neg Hx     Delayed Awakening Neg Hx     Post-op Nausea/Vomiting Neg Hx     Emergence Delirium Neg Hx     Post-op Cognitive Dysfunction Neg Hx         Review of Systems   Constitutional: Negative for chills, fever, malaise/fatigue and weight loss. HENT: Positive for congestion, sinus pain and sore throat. Eyes: Negative for blurred vision. Respiratory: Positive for cough. Negative for shortness of breath and wheezing. Cardiovascular: Negative for chest pain. Gastrointestinal: Negative for nausea and vomiting. Musculoskeletal: Negative for myalgias. Skin: Negative for rash. Neurological: Negative for weakness. Vitals:    03/04/20 1537   BP: 108/80   Pulse: (!) 58   Resp: 12   Temp: 98.2 °F (36.8 °C)   TempSrc: Oral   SpO2: 95%   PainSc:   2   PainLoc: Hip       Physical Exam  Vitals signs and nursing note reviewed. Constitutional:       Appearance: Normal appearance. She is normal weight. HENT:      Nose: Congestion present. Mouth/Throat:      Mouth: Mucous membranes are moist.   Neck:      Musculoskeletal: Normal range of motion and neck supple. Thyroid: No thyromegaly. Cardiovascular:      Rate and Rhythm: Normal rate and regular rhythm. Heart sounds: Normal heart sounds. Pulmonary:      Effort: Pulmonary effort is normal.      Breath sounds: Normal breath sounds. Musculoskeletal: Normal range of motion. Skin:     General: Skin is warm and dry.    Neurological:      Mental Status: She is alert and oriented to person, place, and time. Assessment/Plan      ICD-10-CM ICD-9-CM    1. Sore throat J02.9 462 AMB POC RAPID STREP A      I have discussed the diagnosis with the patient and the intended plan of care as seen in the above orders. The patient has received an after-visit summary and questions were answered concerning future plans. I have discussed medication, side effects, and warnings with the patient in detail. The patient verbalized understanding and is in agreement with the plan of care. The patient will contact the office with any additional concerns.       lab results and schedule of future lab studies reviewed with patient    Alpa Martinez MD

## 2020-03-04 NOTE — PROGRESS NOTES
Patient c/o cough and sore throat, her son was diagnosed with strep. 1. Have you been to the ER, urgent care clinic since your last visit? Hospitalized since your last visit? No  2. Have you seen or consulted any other health care providers outside of the [address] [place] [address] since your last visit? Include any pap smears or colon screening. No    Medication reconciliation has been completed with patient. Care team discussed/updated as well as pharmacy.     Health Maintenance Due   Topic Date Due    Shingrix Vaccine Age 49> (1 of 2) 03/10/1998    Influenza Age 5 to Adult  08/01/2019

## 2020-03-04 NOTE — PATIENT INSTRUCTIONS
Sore Throat: Care Instructions Your Care Instructions Infection by bacteria or a virus causes most sore throats. Cigarette smoke, dry air, air pollution, allergies, and yelling can also cause a sore throat. Sore throats can be painful and annoying. Fortunately, most sore throats go away on their own. If you have a bacterial infection, your doctor may prescribe antibiotics. Follow-up care is a key part of your treatment and safety. Be sure to make and go to all appointments, and call your doctor if you are having problems. It's also a good idea to know your test results and keep a list of the medicines you take. How can you care for yourself at home? · If your doctor prescribed antibiotics, take them as directed. Do not stop taking them just because you feel better. You need to take the full course of antibiotics. · Gargle with warm salt water once an hour to help reduce swelling and relieve discomfort. Use 1 teaspoon of salt mixed in 1 cup of warm water. · Take an over-the-counter pain medicine, such as acetaminophen (Tylenol), ibuprofen (Advil, Motrin), or naproxen (Aleve). Read and follow all instructions on the label. · Be careful when taking over-the-counter cold or flu medicines and Tylenol at the same time. Many of these medicines have acetaminophen, which is Tylenol. Read the labels to make sure that you are not taking more than the recommended dose. Too much acetaminophen (Tylenol) can be harmful. · Drink plenty of fluids. Fluids may help soothe an irritated throat. Hot fluids, such as tea or soup, may help decrease throat pain. · Use over-the-counter throat lozenges to soothe pain. Regular cough drops or hard candy may also help. These should not be given to young children because of the risk of choking. · Do not smoke or allow others to smoke around you. If you need help quitting, talk to your doctor about stop-smoking programs and medicines. These can increase your chances of quitting for good. · Use a vaporizer or humidifier to add moisture to your bedroom. Follow the directions for cleaning the machine. When should you call for help? Call your doctor now or seek immediate medical care if: 
  · You have new or worse trouble swallowing.  
  · Your sore throat gets much worse on one side.  
 Watch closely for changes in your health, and be sure to contact your doctor if you do not get better as expected. Where can you learn more? Go to http://camilo-robert.info/. Enter [phone] [id number] 19 in the search box to learn more about \"Sore Throat: Care Instructions. \" Current as of: October 21, 2018 Content Version: 12.2 © 2060-4365 [employer]. Care instructions adapted under license by [employer] (which disclaims liability or warranty for this information). If you have questions about a medical condition or this instruction, always ask your healthcare professional. [place] [address] any warranty or liability for your use of this information.

## 2020-03-05 ENCOUNTER — HOSPITAL ENCOUNTER (OUTPATIENT)
Dept: MRI IMAGING | Age: 70
Discharge: HOME | End: 2020-03-05
Attending: ORTHOPAEDIC SURGERY
Payer: MEDICARE

## 2020-03-05 ENCOUNTER — HOSPITAL ENCOUNTER (OUTPATIENT)
Dept: PHYSICAL THERAPY | Age: 70
Discharge: HOME | End: 2020-03-05
Payer: MEDICARE

## 2020-03-05 DIAGNOSIS — M25.551 RIGHT HIP PAIN: ICD-10-CM

## 2020-03-05 PROCEDURE — 97110 THERAPEUTIC EXERCISES: CPT

## 2020-03-05 PROCEDURE — 97035 APP MDLTY 1+ULTRASOUND EA 15: CPT

## 2020-03-05 PROCEDURE — 73721 MRI JNT OF LWR EXTRE W/O DYE: CPT

## 2020-03-05 NOTE — PROGRESS NOTES
PT DAILY TREATMENT NOTE 10-18    Patient Name: Robert August  Date:3/5/2020  : 1950  [x]  Patient  Verified  Payor: [place] MEDICARE / Plan: [place] MEDICARE PART A & B / Product Type: Medicare /    In time:2:36  Out time:3:10  Total Treatment Time (min): 28  Visit #: 6 of 10    Medicare/BCBS Only   Total Timed Codes (min):  28 1:1 Treatment Time:  28       Treatment Area: Pain of right hip [M25.551]    SUBJECTIVE  Pain Level (0-10 scale): 3  Any medication changes, allergies to medications, adverse drug reactions, diagnosis change, or new procedure performed?: [x] No    [] Yes (see summary sheet for update)  Subjective functional status/changes:   [] No changes reported  Had an MRI of the right Hip today and the hip is sore from the position    OBJECTIVE    Modality rationale: decrease inflammation, decrease pain and increase tissue extensibility to improve the patients ability to perform increased activity   Min Type Additional Details    [] Estim:  []Unatt       []IFC  []Premod                        []Other:  []w/ice   []w/heat  Position:  Location:    [] Estim: []Att    []TENS instruct  []NMES                    []Other:  []w/US   []w/ice   []w/heat  Position:  Location:    []  Traction: [] Cervical       []Lumbar                       [] Prone          []Supine                       []Intermittent   []Continuous Lbs:  [] before manual  [] after manual   8 [x]  Ultrasound: [x]Continuous   [] Pulsed                           [x]1MHz   []3MHz W/cm2:1.7  Location: Right pectineus/Adductor    []  Iontophoresis with dexamethasone         Location: [] Take home patch   [] In clinic    []  Ice     []  heat  []  Ice massage  []  Laser   []  Anodyne Position:  Location:    []  Laser with stim  []  Other:  Position:  Location:    []  Vasopneumatic Device Pressure:       [] lo [] med [] hi   Temperature: [] lo [] med [] hi   [x] Skin assessment post-treatment:  [x]intact []redness- no adverse reaction    []redness - adverse reaction:         10 min Therapeutic Exercise:  [x] See flow sheet :   Rationale: increase ROM, increase strength and improve coordination to improve the patients ability to improve daily function      10 min Manual Therapy:  STM/DTM right ADD/pectineus    Rationale: decrease pain, increase ROM, increase tissue extensibility and decrease trigger points to tolerate increased walking          With   [x] TE   [] TA   [] neuro   [] other: Patient Education: [x] Review HEP    [] Progressed/Changed HEP based on:   [] positioning   [] body mechanics   [] transfers   [] heat/ice application    [] other:      Other Objective/Functional Measures:   - TTP with muscle tightness right pectineus/Quadratus Femorus/Adductor muscles  - Right antalgic gait after treatment     Pain Level (0-10 scale) post treatment: 2    ASSESSMENT/Changes in Function:   - Increased ease of motion and ambulation noted after treatment    Patient will continue to benefit from skilled PT services to modify and progress therapeutic interventions, address functional mobility deficits, address ROM deficits, address strength deficits, analyze and address soft tissue restrictions and analyze and cue movement patterns to attain remaining goals.      []  See Plan of Care  []  See progress note/recertification  []  See Discharge Summary         Short Term Goals: To be accomplished in 5 treatments:  1.  Pt will be compliant and independent with HEP in order to facilitate PT sessions and aid with self management             Eval Status:  Initiated             Current Status: Pt reports compliance with HEP 2/13/20  2.  Pt to tolerate 30 min or more of TE and/or Interventions w/o increased s/s             Eval Status:  Initiated             Current Status: Met at 57 min of treatment with less discomfort 2/13/20     Long Term Goals: To be accomplished in 10 treatments:  1.  Pt will report 50% improvement or better with involvement to show a significant increase in function             Eval Status:  Initiated             Current Status:  2.  Pt will demonstrate lifting right LE onto 8\" step x 10 reps with little difficulty to allow her to lift her leg in and out of her car with little difficulty              Eval Status:  Hard to get leg in and out of the car             Current Status: Progressing with 6\" 3x10 Reps 2/19/20  3.  Pt will demonstrate MMT (B) Hip Flx/Ext/ABD/ADD to 4+/5 with little pain and no residual cramping for carryover to normal hip function with little pain               Eval Status:  Unable to assess due to pain and cramping             Current Status:  4.  Pt will have decreased reports of pain at 2/10 or better with activity to allow her to return to normal activity with little to no discomfort.               Eval Status:  Initiated             Current Status:  Progressing with pain 0/10 today but continued tightness 3/3/20  5.   Pt will demonstrate a reciprocal gait pattern with negotiating up/down 20 6\"steps with HHA as needed and little katya to tolerate walking her stairs at home with little to no difficulty             Eval Status:  Initiated             Current Status:  6.  Pt will improve FOTO score to 48 in 15 visits to show significant improvement in function for progress to normal right hip function, stair negotiation and independent community ambulation             Eval Status: 38             Current Status:     PLAN  [x]  Upgrade activities as tolerated     [x]  Continue plan of care  []  Update interventions per flow sheet       []  Discharge due to:_  []  Other:_      Jennifer Corbin, PT 3/5/2020  2:36 PM    Future Appointments   Date Time Provider Jonathan Colmenares   3/10/2020  4:30 PM Keesha Gee, PT [facility]   3/12/2020  1:30 PM [facility] PT [place] [facility]

## 2020-03-10 ENCOUNTER — HOSPITAL ENCOUNTER (OUTPATIENT)
Dept: PHYSICAL THERAPY | Age: 70
Discharge: HOME | End: 2020-03-10
Payer: MEDICARE

## 2020-03-10 PROCEDURE — 97110 THERAPEUTIC EXERCISES: CPT

## 2020-03-10 PROCEDURE — 97032 APPL MODALITY 1+ESTIM EA 15: CPT

## 2020-03-10 NOTE — PROGRESS NOTES
PT DAILY TREATMENT NOTE 10-18    Patient Name: Morgan Dominguez  Date:3/10/2020  : 1950  [x]  Patient  Verified  Payor: [place] MEDICARE / Plan: [place] MEDICARE PART A & B / Product Type: Medicare /    In time:4:30  Out time:5:23  Total Treatment Time (min): 40  Visit #: 7 of 10    Medicare/BCBS Only   Total Timed Codes (min):  40 1:1 Treatment Time:  30       Treatment Area: Pain of right hip [M25.551]    SUBJECTIVE  Pain Level (0-10 scale): 1  Any medication changes, allergies to medications, adverse drug reactions, diagnosis change, or new procedure performed?: [x] No    [] Yes (see summary sheet for update)  Subjective functional status/changes:   [] No changes reported  Received the results of the MRI with negative results.   No tendon problems    OBJECTIVE    Modality rationale: decrease inflammation, decrease pain and increase tissue extensibility to improve the patients ability to tolerate improved activity and function   Min Type Additional Details    [] Estim:  []Unatt       []IFC  []Premod                        []Other:  []w/ice   []w/heat  Position:  Location:   10 [x] Estim: [x]Att    []TENS instruct  []NMES                    [x]Other: Combo [x]w/US   []w/ice   []w/heat  Position:Supine  Location: Right Hip Flexor region    []  Traction: [] Cervical       []Lumbar                       [] Prone          []Supine                       []Intermittent   []Continuous Lbs:  [] before manual  [] after manual    []  Ultrasound: []Continuous   [] Pulsed                           []1MHz   []3MHz W/cm2:  Location:    []  Iontophoresis with dexamethasone         Location: [] Take home patch   [] In clinic    []  Ice     []  heat  []  Ice massage  []  Laser   []  Anodyne Position:  Location:    []  Laser with stim  []  Other:  Position:  Location:    []  Vasopneumatic Device Pressure:       [] lo [] med [] hi   Temperature: [] lo [] med [] hi   [x] Skin assessment post-treatment:  [x]intact []redness- no adverse reaction    []redness - adverse reaction:     30 min Therapeutic Exercise:  [x] See flow sheet :   Rationale: increase ROM, increase strength and improve coordination to improve the patients ability to tolerate increased activity       With   [x] TE   [] TA   [] neuro   [] other: Patient Education: [x] Review HEP    [] Progressed/Changed HEP based on:   [] positioning   [] body mechanics   [] transfers   [] heat/ice application    [] other:      Other Objective/Functional Measures:   - Continued TTP right pectineus and proximal quadratus femorous  - FOTO increased to 44      Pain Level (0-10 scale) post treatment: 1    ASSESSMENT/Changes in Function:   - Pt showing improved mobility and decreased muscle tightness      Patient will continue to benefit from skilled PT services to modify and progress therapeutic interventions, address functional mobility deficits, address ROM deficits, address strength deficits, analyze and address soft tissue restrictions and analyze and cue movement patterns to attain remaining goals.      []  See Plan of Care  [x]  See progress note/recertification  []  See Discharge Summary         Short Term Goals: To be accomplished in 5 treatments:  1.  Pt will be compliant and independent with HEP in order to facilitate PT sessions and aid with self management             Eval Status:  Initiated             Current Status: Pt reports compliance with HEP 2/13/20  2.  Pt to tolerate 30 min or more of TE and/or Interventions w/o increased s/s             Eval Status:  Initiated             Current Status: Met at 57 min of treatment with less discomfort 2/13/20     Long Term Goals: To be accomplished in 10 treatments:  1.  Pt will report 50% improvement or better with involvement to show a significant increase in function             Eval Status:  Initiated             Current Status: Not assessed  2.  Pt will demonstrate lifting right LE onto 8\" step x 10 reps with little difficulty to allow her to lift her leg in and out of her car with little difficulty              Eval Status:  Hard to get leg in and out of the car             Current Status: Not met but Progressing with 6\" 3x10 Reps 2/19/20  3.  Pt will demonstrate MMT (B) Hip Flx/Ext/ABD/ADD to 4+/5 with little pain and no residual cramping for carryover to normal hip function with little pain               Eval Status:  Unable to assess due to pain and cramping             Current Status: Not Met but Pt demonstrates improved function with exercise indicating improved strength 3/10/20  4.  Pt will have decreased reports of pain at 2/10 or better with activity to allow her to return to normal activity with little to no discomfort.               Eval Status:  Initiated             Current Status:  Met at pan 1/10 3/10/20  5.   Pt will demonstrate a reciprocal gait pattern with negotiating up/down 20 6\"steps with HHA as needed and little katya to tolerate walking her stairs at home with little to no difficulty             Eval Status:  Initiated             Current Status: Not Tested  6.  Pt will improve FOTO score to 48 in 15 visits to show significant improvement in function for progress to normal right hip function, stair negotiation and independent community ambulation             Eval Status: 38             Current Status: Progressing at 40    PLAN  [x]  Upgrade activities as tolerated     [x]  Continue plan of care  []  Update interventions per flow sheet       []  Discharge due to:_  []  Other:_      Grazyna Carrasco, PT 3/10/2020  4:49 PM    Future Appointments   Date Time Provider Jonathan Colmenares   3/12/2020  1:30 PM [facility] [place] [address] [facility]

## 2020-03-12 ENCOUNTER — HOSPITAL ENCOUNTER (OUTPATIENT)
Dept: PHYSICAL THERAPY | Age: 70
Discharge: HOME | End: 2020-03-12
Payer: MEDICARE

## 2020-03-12 PROCEDURE — 97110 THERAPEUTIC EXERCISES: CPT

## 2020-03-12 NOTE — PROGRESS NOTES
PT DAILY TREATMENT NOTE 10-18    Patient Name: Kip Kinney  Date:3/12/2020  : 1950  [x]  Patient  Verified  Payor: [place] MEDICARE / Plan: [place] MEDICARE PART A & B / Product Type: Medicare /    In time:135  Out time:226  Total Treatment Time (min): 51  Visit #: 8 of 10    Medicare/BCBS Only   Total Timed Codes (min):  51 1:1 Treatment Time:  51       Treatment Area: Pain of right hip [M25.551]    SUBJECTIVE  Pain Level (0-10 scale): 0  Any medication changes, allergies to medications, adverse drug reactions, diagnosis change, or new procedure performed?: [x] No    [] Yes (see summary sheet for update)  Subjective functional status/changes:   [x] No changes reported      OBJECTIVE    Modality rationale: decrease pain to improve the patients ability to perform daily activities    Min Type Additional Details    [] Estim:  []Unatt       []IFC  []Premod                        []Other:  []w/ice   []w/heat  Position:  Location:   8 [x] Estim: [x]Att    []TENS instruct  []NMES                    []Other:  [x]w/US   []w/ice   []w/heat  Position:supine  Location:right adductors    []  Traction: [] Cervical       []Lumbar                       [] Prone          []Supine                       []Intermittent   []Continuous Lbs:  [] before manual  [] after manual    []  Ultrasound: []Continuous   [] Pulsed                           []1MHz   []3MHz W/cm2:  Location:    []  Iontophoresis with dexamethasone         Location: [] Take home patch   [] In clinic    []  Ice     []  heat  []  Ice massage  []  Laser   []  Anodyne Position:  Location:    []  Laser with stim  []  Other:  Position:  Location:    []  Vasopneumatic Device Pressure:       [] lo [] med [] hi   Temperature: [] lo [] med [] hi   [] Skin assessment post-treatment:  []intact []redness- no adverse reaction    []redness - adverse reaction:       43 min Therapeutic Exercise:  [] See flow sheet :   Rationale: increase ROM and increase strength to improve the patients ability to walk and perform daily activities without pain       With   [] TE   [] TA   [] neuro   [] other: Patient Education: [x] Review HEP    [] Progressed/Changed HEP based on:   [] positioning   [] body mechanics   [] transfers   [] heat/ice application    [] other:      Other Objective/Functional Measures:      Pain Level (0-10 scale) post treatment: 2    ASSESSMENT/Changes in Function: Requested DN to address hip flexor and adductor mm restrictions. Patient continues to c/o pinching and hip popping with all activities and has to shorten her stride when walking to lessen the pain. Patient will continue to benefit from skilled PT services to modify and progress therapeutic interventions, address functional mobility deficits, address ROM deficits, address strength deficits, analyze and address soft tissue restrictions and analyze and cue movement patterns to attain remaining goals.      []  See Plan of Care  []  See progress note/recertification  []  See Discharge Summary         Progress towards goals / Updated goals:  Short Term Goals: To be accomplished in 5 treatments:  1.  Pt will be compliant and independent with HEP in order to facilitate PT sessions and aid with self management             Eval Status:  Initiated             Current Status: Pt reports compliance with HEP 2/13/20  2.  Pt to tolerate 30 min or more of TE and/or Interventions w/o increased s/s             Eval Status:  Initiated             Current Status: Met at 57 min of treatment with less discomfort 2/13/20     Long Term Goals: To be accomplished in 10 treatments:  1.  Pt will report 50% improvement or better with involvement to show a significant increase in function             Eval Status:  Initiated             Current Status: Not assessed  2.  Pt will demonstrate lifting right LE onto 8\" step x 10 reps with little difficulty to allow her to lift her leg in and out of her car with little difficulty              Eval Status:  Hard to get leg in and out of the car             Current Status: Not met but Progressing with 6\" 3x10 Reps 2/19/20  3.  Pt will demonstrate MMT (B) Hip Flx/Ext/ABD/ADD to 4+/5 with little pain and no residual cramping for carryover to normal hip function with little pain               Eval Status:  Unable to assess due to pain and cramping             Current Status: Not Met but Pt demonstrates improved function with exercise indicating improved strength 3/10/20  4.  Pt will have decreased reports of pain at 2/10 or better with activity to allow her to return to normal activity with little to no discomfort.               Eval Status:  Initiated             Current Status:  Met at pan 1/10 3/10/20  5.   Pt will demonstrate a reciprocal gait pattern with negotiating up/down 20 6\"steps with HHA as needed and little pain to tolerate walking her stairs at home with little to no difficulty             Eval Status:  Initiated             Current Status: not met- pain with step ups 3/12/20  6.  Pt will improve FOTO score to 48 in 15 visits to show significant improvement in function for progress to normal right hip function, stair negotiation and independent community ambulation             Eval Status: 38             Current Status: Progressing at 40       PLAN  []  Upgrade activities as tolerated     [x]  Continue plan of care  []  Update interventions per flow sheet       []  Discharge due to:_  []  Other:_      Calderon Kelley, MPT, CMTPT 3/12/2020  2:08 PM    Future Appointments   Date Time Provider Jonathan Colmenares   3/16/2020  2:00 PM Roque Salazar PT [facility]   3/19/2020 11:00 AM [facility] PT [place] [address] [facility]   3/23/2020  8:30 AM Roque Salazar PT [facility]   3/26/2020 11:00 AM [facility] PT [place] [facility]   3/31/2020 12:00 PM Roque Salazar PT [facility]   4/2/2020 10:00 AM Roque Salazar, PT [facility]

## 2020-03-12 NOTE — PROGRESS NOTES
Request for use of Dry Needling/Intramuscular Manual Therapy  Patient: Meena Martinez     Referral Source: Alex Melgoza MD  Diagnosis: Pain of right hip [M25.551]      : 1950  Date of initial visit: 20   Attended visits: 8  Missed Visits: 0    Based on findings from the physical therapy examination and evaluation, the evaluating therapist believes the patient, Meena Martinez  would benefit from including Dry Needling as part of the plan of care. Dry needling is a treatment technique utilized in conjunction with other PT interventions to inactivate myofascial trigger points and the pain and dysfunction they cause. Dry Needling is an advanced procedure that requires additional training including greater than 54 hours of intensive course work. Physical Therapists at [address] [place] [address] are trained and/or certified through [employer] for their education. PROCEDURE:   Solid filament sterile needle (typically 0.3mm/30 gauge) inserted into a trigger point   Repeated movements inactivate the trigger points, taking 30-60 seconds per site   Typically consists of 1 dry needling session per week and a possible second treatment including muscle re-education, flexibility, strengthening and other manual techniques to facilitate the benefits of dry needling     BENEFITS:   Inactivation of trigger points   Decreased pain   Increased muscle length   Improved movement patterns   Restoration of function POTENTIAL RISKS:   Post-needling soreness   Infection   Bruising/bleeding   Penetration of a nerve   Pneumothorax   All treating PTs have been thoroughly educated in avoiding adverse reactions    If you agree with this recommendation, please sign this form and fax it to us at [phone]. If you have questions or concerns regarding dry needling or any other treatment we may be providing, please contact us at [phone].     Thank you for allowing us to assist in the care of your patient. Batsheva Brooks, PT    3/12/2020 2:13 PM     NOTE TO PHYSICIAN:  PLEASE COMPLETE THE ORDERS BELOW AND   FAX TO [facility]: [phone]  If you are unable to process this request in 24 hours please contact our office:   [phone]    I have read the above request and AGREE to the recommendation of including dry needling as part of the plan of care.       Physicians signature: _________________________Date: _________Time:________

## 2020-03-15 NOTE — PROGRESS NOTES
[facility] at [address] [facility] [facility], [address] [place] [address]  Phone: [phone]      Fax:  [phone]    Continued Plan of Care/ Re-certification for Physical Therapy Services    Patient name: Aleah [place] Start of Care: 2020   Referral source: Amy Herr MD : 1950               Medical Diagnosis: Pain of right hip [M25.551]  Payor: [place] MEDICARE / Plan: [place] MEDICARE PART A & B / Product Type: Medicare /  Onset Date:2 years with recent exacerbation 2/10/20               Treatment Diagnosis: Right hip and lower abdominal pain   Prior Hospitalization: see medical history Provider#: [id number]   Medications: Verified on Patient summary List    Comorbidities: OP, OA, Scoliosisi   Prior Level of Function: Able to lift left leg with little discomfort  Visits from Start of Care: 8    Missed Visits: 0    The Plan of Care and following information is based on the patient's current status:  Short Term Goals: To be accomplished in 5 treatments:  1.  Pt will be compliant and independent with HEP in order to facilitate PT sessions and aid with self management             Eval Status:  Initiated             Current Status: Pt reports compliance with HEP 20  2.  Pt to tolerate 30 min or more of TE and/or Interventions w/o increased s/s             Eval Status:  Initiated             Current Status: Met at 57 min of treatment with less discomfort 20     Long Term Goals: To be accomplished in 10 treatments:  1.  Pt will report 50% improvement or better with involvement to show a significant increase in function             Eval Status:  Initiated             Current Status: Not assessed  2.  Pt will demonstrate lifting right LE onto 8\" step x 10 reps with little difficulty to allow her to lift her leg in and out of her car with little difficulty              Eval Status:  Hard to get leg in and out of the car             Current Status: Not met but Progressing with 6\" 3x10 Reps 2/19/20  3.  Pt will demonstrate MMT (B) Hip Flx/Ext/ABD/ADD to 4+/5 with little pain and no residual cramping for carryover to normal hip function with little pain               Eval Status:  Unable to assess due to pain and cramping             Current Status: Not Met but Pt demonstrates improved function with exercise indicating improved strength 3/10/20  4.  Pt will have decreased reports of pain at 2/10 or better with activity to allow her to return to normal activity with little to no discomfort.               Eval Status:  Initiated             Current Status:  Met at pan 1/10 3/10/20  5.   Pt will demonstrate a reciprocal gait pattern with negotiating up/down 20 6\"steps with HHA as needed and little pain to tolerate walking her stairs at home with little to no difficulty             Eval Status:  Initiated             Current Status: not met- pain with step ups 3/12/20  6.  Pt will improve FOTO score to 48 in 15 visits to show significant improvement in function for progress to normal right hip function, stair negotiation and independent community ambulation             Eval Status: 38             Current Status: Progressing at 40    Key functional changes: Patient has shown good progress with this treatment program but continues to c/o pinching and hip popping with all activities and has to shorten her stride when walking to lessen the pain. Pain as of last visit was 0/10 upon arrival however increased during treatment. Patient has shown decreased pain and increased  Mobility since start of care but appears to be a good candidate for Dry Needling. Patient reports improvement with overall involvement. FOTO score is 44. All STG/LTGs achieved as identified below.     Fall Risk Assessment: Patient demonstrates no Fall Risk       Problems/ barriers to goal attainment: None noted     Problem List: pain affecting function, decrease ROM, decrease strength, edema affecting function, impaired gait/ balance, decrease ADL/ functional abilitiies, decrease activity tolerance, decrease flexibility/ joint mobility and decrease transfer abilities    Treatment Plan: Therapeutic exercise, Therapeutic activities, Neuromuscular re-education, Physical agent/modality, Gait/balance training, Manual therapy, Patient education, Self Care training, Functional mobility training, Home safety training and Stair training     Patient Goal (s) has been updated and includes:  \"No change\"     Goals for this certification period to be accomplished in 10 treatments:  1.   Pt will report 50% improvement or better with involvement to show a significant increase in function              Status at last note/certification: Not assessed   Current Status:             2.   Pt will demonstrate lifting right LE onto 8\" step x 10 reps with little difficulty to allow her to lift her leg in and out of her car with little difficulty               Status at last note/certification: Not met but Progressing with 6\" 3x10 Reps    Current Status:             3.  Pt will demonstrate MMT (B) Hip Flx/Ext/ABD/ADD to 4+/5 with little pain and no residual cramping for carryover to normal hip function with little pain                 Status at last note/certification: Not Met but Pt demonstrates improved function with exercise indicating improved strength   Current Status:             4.  Pt will demonstrate a reciprocal gait pattern with negotiating up/down 20 6\"steps with HHA as needed and little pain to tolerate walking her stairs at home with little to no difficulty               Status at last note/certification: Not Met, Pt continues to c/o pain with negotiating stairs   Current Status:             5. Pt will improve FOTO score to 48 in 15 visits to show significant improvement in function for progress to normal right hip function, stair negotiation and independent community ambulation              Status at last note/certification: Progressing at 44   Current Status:             Frequency / Duration: Patient to be seen 2-3 times per week for 10 treatments:    Assessment / Recommendations:Patient is showing improved function with her current treatment program but it is not sustained to this point. Patient will continue to benefit from skilled PT to address impairments of the right hip flexor/groin region and continue to improve functional mobility and tolerance to daily activity. Certification Period: 3/12/20 - 4/10/20    Che Hannon, PT 3/15/2020 10:43 AM    ________________________________________________________________________  I certify that the above Therapy Services are being furnished while the patient is under my care. I agree with the treatment plan and certify that this therapy is necessary. [] I have read the above and request that my patient continue as recommended.   [] I have read the above report and request that my patient continue therapy with the following changes/special instructions: ______________________________________  [] I have read the above report and request that my patient be discharged from therapy    Physician's Signature:____________Date:_________TIME:________    ** Signature, Date and Time must be completed for valid certification **    Please sign and return to [facility] at [address] [facility].Bambi Revolucira [address] [place] [address]. [facility] [address]  Phone: [phone]      Fax:  [phone]

## 2020-03-16 ENCOUNTER — APPOINTMENT (OUTPATIENT)
Dept: PHYSICAL THERAPY | Age: 70
End: 2020-03-16
Payer: MEDICARE

## 2020-03-19 ENCOUNTER — APPOINTMENT (OUTPATIENT)
Dept: PHYSICAL THERAPY | Age: 70
End: 2020-03-19
Payer: MEDICARE

## 2020-03-20 ENCOUNTER — TELEPHONE (OUTPATIENT)
Dept: FAMILY MEDICINE CLINIC | Age: 70
End: 2020-03-20

## 2020-03-20 NOTE — TELEPHONE ENCOUNTER
Mrs. Kyra Vegas called stating that she has a cough and \"runaway diarrhea. \" No present fever at this time. She inquired about getting covid19 testing. Informed her that other sicknesses has to be ruled out & a certain criteria met before that testing will be considered. Patient would like a return call [phone].

## 2020-03-20 NOTE — TELEPHONE ENCOUNTER
COVID-19 testing is not yet generally available in [facility]: If she is having NO shortness of breath or fever: she should remain home and treat herself symptomatically. She is at greater risk of donovan an illness by going to a health care facility. If she is experiencing temperature >/99.6 or if her diarrhea is uncontrolled (>10 episodes/day despite standard OTC diarrheal therapy), she should proceed to UC. Call them in advance. If she is experiencing shortness of breath: ED. Call them in advance.     GRACIE

## 2020-03-20 NOTE — TELEPHONE ENCOUNTER
Called patient had her verify  she has been told with her symptoms for now she should stay home and self medicate. If she begins to have more than 10 episodes of diarrhea and is actively taking medication that should be helping this to go to UC but to call them ahead of time. She has been told if she dev elopes any SOB she should go to the ED but call them ahead of time as well she was able to repeat directions back to me correctly and has expressed clear understanding.

## 2020-03-23 ENCOUNTER — APPOINTMENT (OUTPATIENT)
Dept: PHYSICAL THERAPY | Age: 70
End: 2020-03-23
Payer: MEDICARE

## 2020-03-26 ENCOUNTER — APPOINTMENT (OUTPATIENT)
Dept: PHYSICAL THERAPY | Age: 70
End: 2020-03-26
Payer: MEDICARE

## 2020-03-31 ENCOUNTER — APPOINTMENT (OUTPATIENT)
Dept: PHYSICAL THERAPY | Age: 70
End: 2020-03-31
Payer: MEDICARE

## 2020-04-02 ENCOUNTER — APPOINTMENT (OUTPATIENT)
Dept: PHYSICAL THERAPY | Age: 70
End: 2020-04-02

## 2020-04-16 DIAGNOSIS — M54.6 ACUTE BILATERAL THORACIC BACK PAIN: ICD-10-CM

## 2020-04-16 DIAGNOSIS — M51.36 DDD (DEGENERATIVE DISC DISEASE), LUMBAR: ICD-10-CM

## 2020-04-16 NOTE — TELEPHONE ENCOUNTER
Patient calling requesting refills on tramadol and meloxicam. Last appointment 3/4/20. Please review and advise. Requested Prescriptions     Pending Prescriptions Disp Refills    traMADoL (Ultram ER) 100 mg Tb24 90 Tab 0     Sig: Take 1 Tab by mouth daily for 90 days. Max Daily Amount: 100 mg.    meloxicam (MOBIC) 15 mg tablet 90 Tab 2     Sig: Take 1 Tab by mouth daily.

## 2020-04-17 RX ORDER — MELOXICAM 15 MG/1
15 TABLET ORAL DAILY
Qty: 90 TAB | Refills: 2 | Status: SHIPPED | OUTPATIENT
Start: 2020-04-17 | End: 2021-03-17

## 2020-04-17 RX ORDER — TRAMADOL HYDROCHLORIDE 100 MG/1
100 TABLET, EXTENDED RELEASE ORAL DAILY
Qty: 90 TAB | Refills: 0 | Status: SHIPPED | OUTPATIENT
Start: 2020-04-17 | End: 2020-08-03 | Stop reason: SDUPTHER

## 2020-04-18 ENCOUNTER — E-VISIT (OUTPATIENT)
Dept: FAMILY MEDICINE CLINIC | Age: 70
End: 2020-04-18

## 2020-04-18 DIAGNOSIS — J40 BRONCHITIS: Primary | ICD-10-CM

## 2020-04-21 ENCOUNTER — APPOINTMENT (OUTPATIENT)
Dept: PHYSICAL THERAPY | Age: 70
End: 2020-04-21

## 2020-04-21 RX ORDER — DOXYCYCLINE 100 MG/1
100 CAPSULE ORAL 2 TIMES DAILY
Qty: 20 CAP | Refills: 0 | Status: SHIPPED | OUTPATIENT
Start: 2020-04-21 | End: 2020-05-01

## 2020-04-21 NOTE — TELEPHONE ENCOUNTER
Hal Steel is a 65 y.o. female  presents for cold sxs. Allergies   Allergen Reactions    Celebrex [Celecoxib] Swelling    Shellfish Derived Swelling     \"makes me feel puffy\"    Sulfa (Sulfonamide Antibiotics) Hives and Swelling     Lips swelling    Gabapentin Diarrhea and Palpitations     Chest and Abdominal pain    Humira [Adalimumab] Other (comments)     Lupus    Influenza Virus Vaccines Hives    Iodine Itching    Optiray 320 [Ioversol] Hives and Itching     Pt states when she gets iv contrast she itches a little from hives?    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    H/O calcium pyrophosphate deposition disease (CPPD) Z87.39    IBS (irritable bowel syndrome) K58.9    RA (rheumatoid arthritis) ([facility]) M06.9    Sicca syndrome ([facility]) M35.00    Osteoarthritis of right hip M16.11    Pain management contract agreement Z02.89    ACP (advance care planning) Z71.89    Chronic left shoulder pain M25.512, G89.29    Osteopenia M85.80    Osteopenia of multiple sites M85.89    Osteoarthritis of left hip M16.12    Closed fracture of thoracic spine without spinal cord lesion ([facility] S22.009A    Spinal stenosis, thoracolumbar region M48.05    Lumbar spine instability M53.2X6    Spinal stenosis of lumbar region with neurogenic claudication M48.062    Spinal stenosis of lumbar region M48.061    Lumbar stenosis M48.061    Nausea R11.0    Acute bilateral thoracic back pain M54.6    Anxiety F41.9    Chest pain R07.9    Essential hypertension I10     Past Medical History:   Diagnosis Date    Abdominal abscess 2009    Arthritis     Rheumatoid    Autoimmune disease ([facility]     Medically induced Lupus    Back pain     Chronic pain     lower back    Colitis     Diverticulitis     Failed back syndrome     GERD (gastroesophageal reflux disease)     Hypertension     when on cyclosporins    Ill-defined condition     large torus roof of mouth    Irritable bowel syndrome     Neuropathy     bilateral hands/wrist    Osteoporosis     Pneumonia     RA (rheumatoid arthritis) ([facility])     Seizures ([facility] 6-1-2008    one episode- after high dose of epinepherine     Social History     Socioeconomic History    Marital status:      Spouse name: Not on file    Number of children: Not on file    Years of education: Not on file    Highest education level: Not on file   Tobacco Use    Smoking status: Never Smoker    Smokeless tobacco: Never Used   Substance and Sexual Activity    Alcohol use: No    Drug use: No    Sexual activity: Never   Other Topics Concern     Family History   Problem Relation Age of Onset    Other Other         Orthopedic (Sister)   Little Beards Sister     Heart Attack Brother 58    Cancer Neg Hx     Diabetes Neg Hx     Heart Disease Neg Hx     Hypertension Neg Hx     Stroke Neg Hx     Malignant Hyperthermia Neg Hx     Pseudocholinesterase Deficiency Neg Hx     Delayed Awakening Neg Hx     Post-op Nausea/Vomiting Neg Hx     Emergence Delirium Neg Hx     Post-op Cognitive Dysfunction Neg Hx         @ROS@    There were no vitals filed for this visit. [unfilled]    Assessment/Plan      ICD-10-CM ICD-9-CM    1.  Bronchitis J40 490      [unfilled]  lab results and schedule of future lab studies reviewed with patient    Yvonne Franco MD

## 2020-04-23 ENCOUNTER — PATIENT MESSAGE (OUTPATIENT)
Dept: CASE MANAGEMENT | Age: 70
End: 2020-04-23

## 2020-06-30 NOTE — PROGRESS NOTES
[facility] at [address] [facility] [facility], [address] [place] [address]  Phone: [phone]      Fax:  [phone]    Discharge Summary    Patient name: Nela Rahamn Start of Care: 2020   Referral Courtney Gilbert MD : 1950               Medical Diagnosis: Pain of right hip [M25.551]  Payor: [place] MEDICARE / Plan: [place] MEDICARE PART A & B / Product Type: Medicare /  Onset Date:2 years with recent exacerbation 2/10/20               Treatment Diagnosis: Right hip and lower abdominal pain   Prior Hospitalization: see medical history Provider#: [id number]   Medications: Verified on Patient summary List    Comorbidities: OP, OA, Scoliosisi   Prior Level of Function: Able to lift left leg with little discomfort  Visits from Start of Care: 8                                      Missed Visits: 0      Reporting Period : 3/11/20 to 3/12/20      Short Term Goals: To be accomplished in 5 treatments:  1.  Pt will be compliant and independent with HEP in order to facilitate PT sessions and aid with self management             Eval Status:  Initiated             Current Status: Met, Pt reports compliance with HEP 20  2.  Pt to tolerate 30 min or more of TE and/or Interventions w/o increased s/s             Eval Status:  Initiated             Current Status: Met at 57 min of treatment with less discomfort 20     Long Term Goals: To be accomplished in 10 treatments:  1.  Pt will report 50% improvement or better with involvement to show a significant increase in function             Eval Status:  Initiated             Current Status: Not assessed  2.  Pt will demonstrate lifting right LE onto 8\" step x 10 reps with little difficulty to allow her to lift her leg in and out of her car with little difficulty              Eval Status:  Hard to get leg in and out of the car             Current Status: Not met but Progressing with 6\" 3x10 Reps 20  3.  Pt will demonstrate MMT (B) Hip Flx/Ext/ABD/ADD to 4+/5 with little pain and no residual cramping for carryover to normal hip function with little pain               Eval Status:  Unable to assess due to pain and cramping             Current Status: Not Met but Pt demonstrates improved function with exercise indicating improved strength 3/10/20  4.  Pt will have decreased reports of pain at 2/10 or better with activity to allow her to return to normal activity with little to no discomfort.               Eval Status:  Initiated             Current Status:  Met at pan 1/10 3/10/20  5.   Pt will demonstrate a reciprocal gait pattern with negotiating up/down 20 6\"steps with HHA as needed and little pain to tolerate walking her stairs at home with little to no difficulty             Eval Status:  Initiated             Current Status: not met- pain with step ups 3/12/20  6.  Pt will improve FOTO score to 48 in 15 visits to show significant improvement in function for progress to normal right hip function, stair negotiation and independent community ambulation             Eval Status: 38             Current Status: Not met, Progressing at [id number]}      Assessment/ Summary of Care: Patient has shown good progress with this treatment program but continues to c/o pinching and hip popping with all activities and has to shorten her stride when walking to lessen the pain. Pain as of last visit was 0/10 upon arrival however increased during treatment. Patient has shown decreased pain and increased  Mobility since start of care but appears to be a good candidate for Dry Needling. Patient reports improvement with overall involvement. FOTO score is 44. All STG/LTGs achieved as identified below.     Fall Risk Assessment: Patient demonstrates no Fall Risk       RECOMMENDATIONS:  [x]Discontinue therapy: []Patient has reached or is progressing toward set goals      []Patient is non-compliant or has abdicated      [x] Due to COVID-19 Symptoms    Everardo Kumar, PT 6/30/2020 11:37 AM

## 2020-07-07 ENCOUNTER — OFFICE VISIT (OUTPATIENT)
Dept: ORTHOPEDIC SURGERY | Age: 70
End: 2020-07-07

## 2020-07-07 VITALS
WEIGHT: 175 LBS | SYSTOLIC BLOOD PRESSURE: 110 MMHG | DIASTOLIC BLOOD PRESSURE: 75 MMHG | HEIGHT: 62 IN | TEMPERATURE: 97.9 F | HEART RATE: 75 BPM | OXYGEN SATURATION: 99 % | BODY MASS INDEX: 32.2 KG/M2

## 2020-07-07 DIAGNOSIS — I10 ESSENTIAL HYPERTENSION WITH GOAL BLOOD PRESSURE LESS THAN 130/85: ICD-10-CM

## 2020-07-07 DIAGNOSIS — Z98.1 S/P FUSION OF THORACIC SPINE: ICD-10-CM

## 2020-07-07 DIAGNOSIS — M54.50 LUMBAR SPINE PAIN: ICD-10-CM

## 2020-07-07 DIAGNOSIS — Z98.1 S/P LUMBAR FUSION: Primary | ICD-10-CM

## 2020-07-07 RX ORDER — LISINOPRIL 10 MG/1
TABLET ORAL
Qty: 90 TAB | Refills: 3 | Status: SHIPPED | OUTPATIENT
Start: 2020-07-07 | End: 2021-02-24 | Stop reason: ALTCHOICE

## 2020-07-07 NOTE — PROGRESS NOTES
[address], [address]  [facility], [address]  Phone: [phone]  Fax: [phone]  PROGRESS NOTE  Patient: Bishop Morejon                MRN: [id number]       SSN: [id number]  YOB: 1950        AGE: 65 y.o. SEX: female  Body mass index is 32.01 kg/m². PCP: Sebastian Sandifer, MD  07/07/20    Chief Complaint   Patient presents with    Back Pain     yearly visit       HISTORY OF PRESENT ILLNESS, RADIOGRAPHS, and PLAN:     Number returns today she is over a year and a half status post her revision fusion for proximal junctional kyphosis. He comes in today with a several month history of to think she gets a clunking sensation in her right hip evaluated by hip specialist snapping tendon had minimal success with physical therapy she is also complaining of lumbosacral back pain. Her sense is her SI joints though when I look at her on exam higher than her SI joints more the lumbosacral junction she has no SI joint provocative symptoms in the past she is only got minimal relief from SI joint blocks. Physical exam demonstrates strength sensation reflexes tendency towards internal rotation of her right hip. X-rays are obtained of her lumbar spine demonstrate a solid fusion she has an unfused segment at L5-S1 does not appear to be unstable on observation but it again is the junctional segment below an extensive lumbar fusion. My overall sense is that the most likely source of her pain is her L5-S1 disc. Think that more likely than it is from her SI joints. My next step would be to obtain an MRI of her lumbar spine to see if there is been any objective changes in that segment. Further interventions could be SI joint blocks and more aggressive physical therapy I will see her back following her MRI. This dictation was created utilizing voice recognition software. Errors may be present.        Past Medical History:   Diagnosis Date    Abdominal abscess 2009    Arthritis     Rheumatoid    Autoimmune disease ([facility] [address]     Medically induced Lupus    Back pain     Chronic pain     lower back    Colitis     Diverticulitis     Failed back syndrome     GERD (gastroesophageal reflux disease)     Hypertension     when on cyclosporins    Ill-defined condition     large torus roof of mouth    Irritable bowel syndrome     Neuropathy     bilateral hands/wrist    Osteoporosis     Pneumonia     RA (rheumatoid arthritis) (HCC)     Seizures ([facility] [address] 6-1-2008    one episode- after high dose of epinepherine       Family History   Problem Relation Age of Onset    Other Other         Orthopedic (Sister)   Shayla Cerda Sister     Heart Attack Brother 58    Cancer Neg Hx     Diabetes Neg Hx     Heart Disease Neg Hx     Hypertension Neg Hx     Stroke Neg Hx     Malignant Hyperthermia Neg Hx     Pseudocholinesterase Deficiency Neg Hx     Delayed Awakening Neg Hx     Post-op Nausea/Vomiting Neg Hx     Emergence Delirium Neg Hx     Post-op Cognitive Dysfunction Neg Hx        Current Outpatient Medications   Medication Sig Dispense Refill    lisinopriL (PRINIVIL, ZESTRIL) 10 mg tablet TAKE 1 TABLET DAILY 90 Tab 3    traMADoL (Ultram ER) 100 mg Tb24 Take 1 Tab by mouth daily for 90 days. Max Daily Amount: 100 mg. 90 Tab 0    meloxicam (MOBIC) 15 mg tablet Take 1 Tab by mouth daily. 90 Tab 2    zolpidem (AMBIEN) 5 mg tablet Take 1 Tab by mouth nightly as needed for Sleep. Take 1 Tab by Mouth Nightly As Needed. 90 Tab 0    tofacitinib (XELJANZ) 5 mg tab Take  by mouth two (2) times a day.  cetirizine (ZYRTEC) 10 mg tablet Take  by mouth.  petrolat,wht/min oil/sod chl (DRY EYES OP) Apply  to eye.  acetaminophen (TYLENOL EXTRA STRENGTH) 500 mg tablet Take 1,000 mg by mouth every six (6) hours as needed for Pain.  cholecalciferol (VITAMIN D3) 1,000 unit tablet Take 1,000 Units by mouth five (5) days a week.       calcium carbonate (OS-BARBARA) 500 mg calcium (1,250 mg) tablet Take 1,500 mg by mouth daily.  clotrimazole-betamethasone (LOTRISONE) topical cream Use bid 15 g 1    teriparatide (FORTEO) 20 mcg/dose - 600 mcg/2.4 mL pnij injection 20 mcg by SubCUTAneous route daily. Allergies   Allergen Reactions    Celebrex [Celecoxib] Swelling    Shellfish Derived Swelling     \"makes me feel puffy\"    Sulfa (Sulfonamide Antibiotics) Hives and Swelling     Lips swelling    Gabapentin Diarrhea and Palpitations     Chest and Abdominal pain    Humira [Adalimumab] Other (comments)     Lupus    Influenza Virus Vaccines Hives    Iodine Itching    Optiray 320 [Ioversol] Hives and Itching     Pt states when she gets iv contrast she itches a little from hives?     Penicillins Hives       Past Surgical History:   Procedure Laterality Date    HX ABDOMINAL WALL DEFECT REPAIR      HX APPENDECTOMY      HX BACK SURGERY  12/03/2018    HX BREAST REDUCTION      HX CATARACT REMOVAL Bilateral     HX COLONOSCOPY      HX GI      repair rectal prolaspe    HX GYN      dermoid cyst    HX HEENT      tonsillectomy    HX HYSTERECTOMY  1976    HX LUMBAR FUSION      x 2    HX ORTHOPAEDIC Bilateral     CTR    HX ORTHOPAEDIC Bilateral     arthroplasty thumbs    HX ORTHOPAEDIC Left     Ankle     HX ORTHOPAEDIC Right     \"Nerve Release Elbow\"    HX OTHER SURGICAL Left 1985    vein stripping    HX SALPINGO-OOPHORECTOMY Bilateral     HX SHOULDER REPLACEMENT Left     HX UROLOGICAL      bladder repair    TOTAL HIP ARTHROPLASTY Bilateral        Past Medical History:   Diagnosis Date    Abdominal abscess 2009    Arthritis     Rheumatoid    Autoimmune disease ([facility] [address]     Medically induced Lupus    Back pain     Chronic pain     lower back    Colitis     Diverticulitis     Failed back syndrome     GERD (gastroesophageal reflux disease)     Hypertension     when on cyclosporins    Ill-defined condition     large torus roof of mouth  Irritable bowel syndrome     Neuropathy     bilateral hands/wrist    Osteoporosis     Pneumonia     RA [facility]rheumatoid arthritis) [facility]     Seizures ([facility] [address] 6-1-2008    one episode- after high dose of epinepherine       Social History     Socioeconomic History    Marital status:      Spouse name: Not on file    Number of children: Not on file    Years of education: Not on file    Highest education level: Not on file   Occupational History    Not on file   Social Needs    Financial resource strain: Not on file    Food insecurity     Worry: Not on file     Inability: Not on file    Transportation needs     Medical: Not on file     Non-medical: Not on file   Tobacco Use    Smoking status: Never Smoker    Smokeless tobacco: Never Used   Substance and Sexual Activity    Alcohol use: No    Drug use: No    Sexual activity: Never   Lifestyle    Physical activity     Days per week: Not on file     Minutes per session: Not on file    Stress: Not on file   Relationships    Social connections     Talks on phone: Not on file     Gets together: Not on file     Attends [employer] service: Not on file     Active member of club or organization: Not on file     Attends meetings of clubs or organizations: Not on file     Relationship status: Not on file    Intimate partner violence     Fear of current or ex partner: Not on file     Emotionally abused: Not on file     Physically abused: Not on file     Forced sexual activity: Not on file   Other Topics Concern     Service Not Asked    Blood Transfusions Not Asked    Caffeine Concern Not Asked    Occupational Exposure Not Asked   Kumar Moros Hazards Not Asked    Sleep Concern Not Asked    Stress Concern Not Asked    Weight Concern Not Asked    Special Diet Not Asked    Back Care Not Asked    Exercise Not Asked    Bike Helmet Not Asked   [address] Not Asked    Self-Exams Not Asked   Social History Narrative    Not on file         REVIEW OF SYSTEMS:   CONSTITUTIONAL SYMPTOMS:  Negative. EYES:  Negative. EARS, NOSE, THROAT AND MOUTH:  Negative. CARDIOVASCULAR:  Negative. RESPIRATORY:  Negative. GENITOURINARY: Per HPI. GASTROINTESTINAL:  Per HPI. INTEGUMENTARY (SKIN AND/OR BREAST):  Negative. MUSCULOSKELETAL: Per HPI.   ENDOCRINE/RHEUMATOLOGIC:  Negative. NEUROLOGICAL:  Per HPI. HEMATOLOGIC/LYMPHATIC:  Negative. ALLERGIC/IMMUNOLOGIC:  Negative. PSYCHIATRIC:  Negative. PHYSICAL EXAMINATION:   Visit Vitals  /75 (BP 1 Location: Right arm, BP Patient Position: Sitting)   Pulse 75   Temp 97.9 °F (36.6 °C) (Oral)   Ht 5' 2\" (1.575 m)   Wt 175 lb (79.4 kg)   SpO2 99%   BMI 32.01 kg/m²    PAIN SCALE: 4/10    CONSTITUTIONAL: The patient is in no apparent distress and is alert and oriented x 3. HEENT: Normocephalic. Hearing grossly intact. NECK: Supple and symmetric. no tenderness, or masses were felt. RESPIRATORY: No labored breathing. CARDIOVASCULAR: The carotid pulses were normal. Peripheral pulses were 2+. CHEST: Normal AP diameter and normal contour without any kyphoscoliosis. LYMPHATIC: No lymphadenopathy was appreciated in the neck, axillae or groin. SKIN: Negative for scars, rashes, lesions, or ulcers on the right upper, right lower, left upper, left lower and trunk. NEUROLOGICAL: Alert and oriented x 3. Ambulation without assistive device. FWB. EXTREMITIES: See musculoskeletal.  MUSCULOSKELETAL:   Head and Neck: Negative for misalignment, asymmetry, crepitation, defects, tenderness masses or effusions.  Left Upper Extremity: Inspection, percussion and palpation performed. Prices sign is negative.  Right Upper Extremity: Inspection, percussion and palpation performed. Prices sign is negative.  Spine, Ribs and Pelvis: Low back pain. \"Clunk\" in hips. Inspection, percussion and palpation performed. Negative for misalignment, asymmetry, crepitation, defects, tenderness masses or effusions.  Left Lower Extremity: Inspection, percussion and palpation performed. Negative straight leg raise.  Right Lower Extremity: Inspection, percussion and palpation performed. Negative straight leg raise. SPINE EXAM:     Lumbar spine: No rash, ecchymosis, or gross obliquity. No focal atrophy is noted. ASSESSMENT    ICD-10-CM ICD-9-CM    1. S/P lumbar fusion Z98.1 V45.4 MRI LUMB SPINE WO CONT   2. Lumbar spine pain M54.5 724.2 AMB POC XRAY, SPINE, LUMBOSACRAL; 4+   3. S/P fusion of thoracic spine Z98.1 V45.4        Written by Miya Centeno, as dictated by Shantell Varghese MD.    I, Dr. Shantell Varghese MD, confirm that all documentation is accurate.

## 2020-07-07 NOTE — LETTER
7/7/20 Patient: Jorden Stevenson YOB: 1950 Date of Visit: 7/7/2020 Emilie Gonzalez MD 
[address] [facility] [address] [address] VIA In Basket Dear Emilie Gonzalez MD, Thank you for referring Ms. Dawn Ards to [address] [place] [address] [facility] for evaluation. My notes for this consultation are attached. If you have questions, please do not hesitate to call me. I look forward to following your patient along with you.  
 
 
Sincerely, 
 
Priya Walters MD

## 2020-07-07 NOTE — PATIENT INSTRUCTIONS
Snapping Hip Syndrome: Exercises Introduction Here are some examples of exercises for you to try. The exercises may be suggested for a condition or for rehabilitation. Start each exercise slowly. Ease off the exercises if you start to have pain. You will be told when to start these exercises and which ones will work best for you. How to do the exercises Iliotibial band stretch 1. Lean sideways against a wall. If you are not steady on your feet, hold on to a chair or counter. 2. Stand on the leg with the affected hip, with that leg close to the wall. Then cross your other leg in front of it. 3. Let your affected hip drop out to the side of your body and against the wall. Then lean away from your affected hip until you feel a stretch. 4. Hold the stretch for 15 to 30 seconds. 5. Repeat 2 to 4 times. Hip flexor stretch (kneeling) 1. Kneel on your affected leg, and bend your good leg out in front of you, with that foot flat on the floor. If you feel discomfort in the front of your knee, place a towel under your knee. 2. Keeping your back straight, slowly push your hips forward until you feel a stretch in the upper thigh of your back leg and hip. 3. Hold the stretch for at least 15 to 30 seconds. 4. Repeat 2 to 4 times. Piriformis stretch 1. Lie on your back with both knees bent and your feet flat on the floor. 2. Put the ankle of your affected leg on your opposite thigh near your knee. 3. Use your hands to gently lift the knee of your good leg off the floor. Gently pull that knee toward your chest until you feel a stretch in the buttock and hip of your affected leg. 4. Hold the stretch for at least 15 to 30 seconds. 5. Repeat 2 to 4 times. Hamstring stretch (lying down) 1. Lie flat on your back with your legs straight. If you feel discomfort in your back, place a small towel roll under your lower back.  
2. Holding the back of your affected leg for support, lift that leg straight up and toward your body until you feel a stretch at the back of your thigh. 3. Hold the stretch for at least 30 seconds. 4. Repeat 2 to 4 times. Bridging 1. Lie on your back with both knees bent. Your knees should be bent about 90 degrees. 2. Then push your feet into the floor, squeeze your buttocks, and lift your hips off the floor until your shoulders, hips, and knees are all in a straight line. 3. Hold for about 6 seconds as you continue to breathe normally, and then slowly lower your hips back down to the floor and rest for up to 10 seconds. 4. Repeat 8 to 12 times. Clamshell 1. Lie on your side, with your affected leg on top and your head propped on a pillow. Keep your feet and knees together and your knees bent. 2. Raise your top knee, but keep your feet together. Do not let your hips roll back. Your legs should open up like a clamshell. 3. Hold for 6 seconds. 4. Slowly lower your knee back down. Rest for 10 seconds. 5. Repeat 8 to 12 times. Alternate arm and leg (bird dog) exercise Do this exercise slowly. Try to keep your body straight at all times. 1. Start on the floor, on your hands and knees. 2. Tighten your belly muscles by pulling your belly button in toward your spine. Be sure you continue to breathe normally and do not hold your breath. 3. Raise one leg off the floor, and hold it straight out behind you. Be careful not to let your hip drop down, because that will twist your trunk. 4. Hold for about 6 seconds, then lower your leg and switch to your other leg. 5. Repeat 8 to 12 times on each leg. 6. When you can do this exercise with ease and no pain, repeat steps 1 through 5 using your arms instead of your legs. Raise one arm off the floor, holding your arm straight out in front of you. Be careful not to let your shoulder drop down, because that will twist your trunk. Then switch to your other arm. 7. When holding your arm straight out becomes easier, try raising your opposite leg at the same time, and repeat steps 1 through 5. Lower abdominal strengthening 1. Lie on your back with your knees bent and your feet flat on the floor. 2. Tighten your belly muscles by pulling your belly button in toward your spine. 3. Lift one foot off the floor and bring your knee toward your chest, so that your knee is straight above your hip and your leg is bent like the letter \"L. \" 
4. Lift the other knee up to the same position. 5. Lower one leg at a time to the starting position. 6. Keep alternating legs until you have lifted each leg 8 to 12 times. 7. Be sure to keep your belly muscles tight and your back still as you are moving your legs. Be sure to breathe normally. Follow-up care is a key part of your treatment and safety. Be sure to make and go to all appointments, and call your doctor if you are having problems. It's also a good idea to know your test results and keep a list of the medicines you take. Where can you learn more? Go to http://camilo-robert.info/ Enter V111 in the search box to learn more about \"Snapping Hip Syndrome: Exercises. \" Current as of: March 2, 2020               Content Version: 12.5 © 6612-1178 [employer]. Care instructions adapted under license by [employer] (which disclaims liability or warranty for this information). If you have questions about a medical condition or this instruction, always ask your healthcare professional. [address] [place] [address] any warranty or liability for your use of this information.

## 2020-07-24 ENCOUNTER — HOSPITAL ENCOUNTER (OUTPATIENT)
Age: 70
Discharge: HOME | End: 2020-07-24
Attending: ORTHOPAEDIC SURGERY
Payer: MEDICARE

## 2020-07-24 DIAGNOSIS — Z98.1 S/P LUMBAR FUSION: ICD-10-CM

## 2020-07-24 PROCEDURE — 72148 MRI LUMBAR SPINE W/O DYE: CPT

## 2020-08-03 DIAGNOSIS — M54.6 ACUTE BILATERAL THORACIC BACK PAIN: ICD-10-CM

## 2020-08-03 RX ORDER — TRAMADOL HYDROCHLORIDE 100 MG/1
100 TABLET, EXTENDED RELEASE ORAL DAILY
Qty: 90 TAB | Refills: 0 | Status: SHIPPED | OUTPATIENT
Start: 2020-08-03 | End: 2020-11-02 | Stop reason: SDUPTHER

## 2020-08-03 NOTE — TELEPHONE ENCOUNTER
Patient requesting refills on her Tramadol, she was last seen via E-Visit for sinus. Tramadol last filled 4/19/2020. See my chart message.

## 2020-08-11 ENCOUNTER — OFFICE VISIT (OUTPATIENT)
Dept: ORTHOPEDIC SURGERY | Age: 70
End: 2020-08-11

## 2020-08-11 VITALS
HEART RATE: 61 BPM | TEMPERATURE: 97.7 F | DIASTOLIC BLOOD PRESSURE: 82 MMHG | SYSTOLIC BLOOD PRESSURE: 149 MMHG | RESPIRATION RATE: 15 BRPM

## 2020-08-11 DIAGNOSIS — Z98.1 S/P LUMBAR FUSION: ICD-10-CM

## 2020-08-11 DIAGNOSIS — M48.062 SPINAL STENOSIS OF LUMBAR REGION WITH NEUROGENIC CLAUDICATION: Primary | ICD-10-CM

## 2020-08-11 DIAGNOSIS — M54.16 LUMBAR RADICULOPATHY: ICD-10-CM

## 2020-08-11 NOTE — PROGRESS NOTES
[facility]emantodalysl[facility] Dohertyemeli [facility] [address]  [address], [address]  [place] [address]  Phone: [phone]  Fax: [phone]  PROGRESS NOTE  Patient: Nayana Dixon                MRN: [id number]       SSN: [id number]  YOB: 1950        AGE: 65 y.o. SEX: female  There is no height or weight on file to calculate BMI. PCP: Gracie Bhakta MD  08/11/20    Chief Complaint   Patient presents with    Back Pain     MRI fu       HISTORY OF PRESENT ILLNESS, RADIOGRAPHS, and PLAN:     Ms. Caleb Wong returns today. She still gets this nonspecific back and buttock pain. She has had hip replacements and is arthritic there MRI of the lumbar spine demonstrates no acute changes some chronic degenerative change at L5-S1 no particular issues radiographically with her SI joints. She does get worsening back pain when she lays on her back but nothing myelopathic just she just uncomfortable lying flat on her back. She has had an extensive thoracolumbar fusion. At this time we can see if some SI joint blocks will help and L5 selective's is those are the most likely areas of pain generation but I am uncertain if there is anything more significant to do certainly she is not interested in considering any surgery and I do not see a surgical entity present. This dictation was created utilizing voice recognition software. Errors may be present.        Past Medical History:   Diagnosis Date    Abdominal abscess 2009    Arthritis     Rheumatoid    Autoimmune disease ([address]     Medically induced Lupus    Back pain     Chronic pain     lower back    Colitis     Diverticulitis     Failed back syndrome     GERD (gastroesophageal reflux disease)     Hypertension     when on cyclosporins    Ill-defined condition     large torus roof of mouth    Irritable bowel syndrome     Neuropathy     bilateral hands/wrist    Osteoporosis     Pneumonia     RA (rheumatoid arthritis) ([address]     Seizures ([address] 6-1-2008    one episode- after high dose of epinepherine       Family History   Problem Relation Age of Onset    Other Other         Orthopedic (Sister)   Shayla Cerda Sister     Heart Attack Brother 58    Cancer Neg Hx     Diabetes Neg Hx     Heart Disease Neg Hx     Hypertension Neg Hx     Stroke Neg Hx     Malignant Hyperthermia Neg Hx     Pseudocholinesterase Deficiency Neg Hx     Delayed Awakening Neg Hx     Post-op Nausea/Vomiting Neg Hx     Emergence Delirium Neg Hx     Post-op Cognitive Dysfunction Neg Hx        Current Outpatient Medications   Medication Sig Dispense Refill    traMADoL (Ultram ER) 100 mg Tb24 Take 1 Tab by mouth daily for 90 days. Max Daily Amount: 100 mg. 90 Tab 0    lisinopriL (PRINIVIL, ZESTRIL) 10 mg tablet TAKE 1 TABLET DAILY 90 Tab 3    meloxicam (MOBIC) 15 mg tablet Take 1 Tab by mouth daily. 90 Tab 2    clotrimazole-betamethasone (LOTRISONE) topical cream Use bid 15 g 1    zolpidem (AMBIEN) 5 mg tablet Take 1 Tab by mouth nightly as needed for Sleep. Take 1 Tab by Mouth Nightly As Needed. 90 Tab 0    tofacitinib (XELJANZ) 5 mg tab Take  by mouth two (2) times a day.  teriparatide (FORTEO) 20 mcg/dose - 600 mcg/2.4 mL pnij injection 20 mcg by SubCUTAneous route daily.  cetirizine (ZYRTEC) 10 mg tablet Take  by mouth.  petrolat,wht/min oil/sod chl (DRY EYES OP) Apply  to eye.  acetaminophen (TYLENOL EXTRA STRENGTH) 500 mg tablet Take 1,000 mg by mouth every six (6) hours as needed for Pain.  cholecalciferol (VITAMIN D3) 1,000 unit tablet Take 1,000 Units by mouth five (5) days a week.  calcium carbonate (OS-BARBARA) 500 mg calcium (1,250 mg) tablet Take 1,500 mg by mouth daily.          Allergies   Allergen Reactions    Celebrex [Celecoxib] Swelling    Shellfish Derived Swelling     \"makes me feel puffy\"    Sulfa (Sulfonamide Antibiotics) Hives and Swelling     Lips swelling    Gabapentin Diarrhea and Palpitations     Chest and Abdominal pain    Humira [Adalimumab] Other (comments)     Lupus    Influenza Virus Vaccines Hives    Iodine Itching    Optiray 320 [Ioversol] Hives and Itching     Pt states when she gets iv contrast she itches a little from hives?     Penicillins Hives       Past Surgical History:   Procedure Laterality Date    HX ABDOMINAL WALL DEFECT REPAIR      HX APPENDECTOMY      HX BACK SURGERY  12/03/2018    HX BREAST REDUCTION      HX CATARACT REMOVAL Bilateral     HX COLONOSCOPY      HX GI      repair rectal prolaspe    HX GYN      dermoid cyst    HX HEENT      tonsillectomy    HX HYSTERECTOMY  1976    HX LUMBAR FUSION      x 2    HX ORTHOPAEDIC Bilateral     CTR    HX ORTHOPAEDIC Bilateral     arthroplasty thumbs    HX ORTHOPAEDIC Left     Ankle     HX ORTHOPAEDIC Right     \"Nerve Release Elbow\"    HX OTHER SURGICAL Left 1985    vein stripping    HX SALPINGO-OOPHORECTOMY Bilateral     HX SHOULDER REPLACEMENT Left     HX UROLOGICAL      bladder repair    TOTAL HIP ARTHROPLASTY Bilateral        Past Medical History:   Diagnosis Date    Abdominal abscess 2009    Arthritis     Rheumatoid    Autoimmune disease ([facility]     Medically induced Lupus    Back pain     Chronic pain     lower back    Colitis     Diverticulitis     Failed back syndrome     GERD (gastroesophageal reflux disease)     Hypertension     when on cyclosporins    Ill-defined condition     large torus roof of mouth    Irritable bowel syndrome     Neuropathy     bilateral hands/wrist    Osteoporosis     Pneumonia     RA (rheumatoid arthritis) ([facility]     Seizures ([facility] 6-1-2008    one episode- after high dose of epinepherine       Social History     Socioeconomic History    Marital status:      Spouse name: Not on file    Number of children: Not on file    Years of education: Not on file    Highest education level: Not on file   Occupational History    Not on file   Social Needs    Financial resource strain: Not on file    Food insecurity     Worry: Not on file     Inability: Not on file    Transportation needs     Medical: Not on file     Non-medical: Not on file   Tobacco Use    Smoking status: Never Smoker    Smokeless tobacco: Never Used   Substance and Sexual Activity    Alcohol use: No    Drug use: No    Sexual activity: Never   Lifestyle    Physical activity     Days per week: Not on file     Minutes per session: Not on file    Stress: Not on file   Relationships    Social connections     Talks on phone: Not on file     Gets together: Not on file     Attends [employer] service: Not on file     Active member of club or organization: Not on file     Attends meetings of clubs or organizations: Not on file     Relationship status: Not on file    Intimate partner violence     Fear of current or ex partner: Not on file     Emotionally abused: Not on file     Physically abused: Not on file     Forced sexual activity: Not on file   Other Topics Concern     Service Not Asked    Blood Transfusions Not Asked    Caffeine Concern Not Asked    Occupational Exposure Not Asked   Colie Maizes Hazards Not Asked    Sleep Concern Not Asked    Stress Concern Not Asked    Weight Concern Not Asked    Special Diet Not Asked    Back Care Not Asked    Exercise Not Asked    Bike Helmet Not Asked   [address] Not Asked    Self-Exams Not Asked   Social History Narrative    Not on file         REVIEW OF SYSTEMS:   CONSTITUTIONAL SYMPTOMS:  Negative. EYES:  Negative. EARS, NOSE, THROAT AND MOUTH:  Negative. CARDIOVASCULAR:  Negative. RESPIRATORY:  Negative. GENITOURINARY: Per HPI. GASTROINTESTINAL:  Per HPI. INTEGUMENTARY (SKIN AND/OR BREAST):  Negative. MUSCULOSKELETAL: Per HPI.   ENDOCRINE/RHEUMATOLOGIC:  Negative. NEUROLOGICAL:  Per HPI. HEMATOLOGIC/LYMPHATIC:  Negative. ALLERGIC/IMMUNOLOGIC:  Negative. PSYCHIATRIC:  Negative.     PHYSICAL EXAMINATION:   Visit Vitals  /82 (BP 1 Location: Left arm, BP Patient Position: Sitting)   Pulse 61   Temp 97.7 °F (36.5 °C) (Temporal)   Resp 15    PAIN SCALE: 4/10    CONSTITUTIONAL: The patient is in no apparent distress and is alert and oriented x 3. HEENT: Normocephalic. Hearing grossly intact. NECK: Supple and symmetric. no tenderness, or masses were felt. RESPIRATORY: No labored breathing. CARDIOVASCULAR: The carotid pulses were normal. Peripheral pulses were 2+. CHEST: Normal AP diameter and normal contour without any kyphoscoliosis. LYMPHATIC: No lymphadenopathy was appreciated in the neck, axillae or groin. SKIN: Negative for scars, rashes, lesions, or ulcers on the right upper, right lower, left upper, left lower and trunk. NEUROLOGICAL: Alert and oriented x 3. Ambulation without assistive device. FWB. EXTREMITIES: See musculoskeletal.  MUSCULOSKELETAL:   Head and Neck: Negative for misalignment, asymmetry, crepitation, defects, tenderness masses or effusions.  Left Upper Extremity: Inspection, percussion and palpation performed. Prices sign is negative.  Right Upper Extremity: Inspection, percussion and palpation performed. Prices sign is negative.  Spine, Ribs and Pelvis: Low back pain radiating to BLE. Inspection, percussion and palpation performed. Negative for misalignment, asymmetry, crepitation, defects, tenderness masses or effusions.  Left Lower Extremity: Inspection, percussion and palpation performed. Negative straight leg raise.  Right Lower Extremity: Inspection, percussion and palpation performed. Negative straight leg raise. SPINE EXAM:     Lumbar spine: No rash, ecchymosis, or gross obliquity. No focal atrophy is noted. ASSESSMENT    ICD-10-CM ICD-9-CM    1. Spinal stenosis of lumbar region with neurogenic claudication  M48.062 724.03 SCHEDULE SURGERY   2. Lumbar radiculopathy  M54.16 724.4 SCHEDULE SURGERY   3.  S/P lumbar fusion  Z98.1 V45.4 SCHEDULE SURGERY       Written by Marlon Caba Kamari Uribe, as dictated by Julio Bedolla MD.    I, Dr. Julio Bedolla MD, confirm that all documentation is accurate.

## 2020-08-11 NOTE — LETTER
8/11/20 Patient: Irlanda Ji YOB: 1950 Date of Visit: 8/11/2020 Cristel Dumas MD 
[address] [facility] [address] [id number] VIA In Basket Dear Cristel Dumas MD, Thank you for referring Ms. Marybeth Mccarty to [address] [place] [address] [facility] for evaluation. My notes for this consultation are attached. If you have questions, please do not hesitate to call me. I look forward to following your patient along with you.  
 
 
Sincerely, 
 
Adeline Paul MD

## 2020-08-20 ENCOUNTER — VIRTUAL VISIT (OUTPATIENT)
Dept: FAMILY MEDICINE CLINIC | Age: 70
End: 2020-08-20

## 2020-08-20 DIAGNOSIS — M45.9 RHEUMATOID ARTHRITIS INVOLVING VERTEBRA WITH POSITIVE RHEUMATOID FACTOR (HCC): ICD-10-CM

## 2020-08-20 NOTE — PROGRESS NOTES
Corie Fox is a 65 y.o. female, evaluated via audio-only technology on 8/20/2020 for Follow-up and Hip Pain  . Assessment & Plan:   Diagnoses and all orders for this visit:    1. Rheumatoid arthritis involving vertebra with positive rheumatoid factor (HCC)  -     REFERRAL TO PHYSICAL THERAPY        12  Subjective:       Prior to Admission medications    Medication Sig Start Date End Date Taking? Authorizing Provider   traMADoL (Ultram ER) 100 mg Tb24 Take 1 Tab by mouth daily for 90 days. Max Daily Amount: 100 mg. 8/3/20 11/1/20  Hero Castillo MD   lisinopriL (PRINIVIL, ZESTRIL) 10 mg tablet TAKE 1 TABLET DAILY 7/7/20   Hero Castillo MD   meloxicam (MOBIC) 15 mg tablet Take 1 Tab by mouth daily. 4/17/20   Hero Castillo MD   clotrimazole-betamethasone Nicko Fischer) topical cream Use bid 2/5/20   Hero Castillo MD   zolpidem [facility]) 5 mg tablet Take 1 Tab by mouth nightly as needed for Sleep. Take 1 Tab by Mouth Nightly As Needed. 9/5/19   Hero Castillo MD   tofacitinib Guillermina Hasting) 5 mg tab Take  by mouth two (2) times a day. Provider, Historical   teriparatide (FORTEO) 20 mcg/dose - 600 mcg/2.4 mL pnij injection 20 mcg by SubCUTAneous route daily. Provider, Historical   cetirizine (ZYRTEC) 10 mg tablet Take  by mouth. Provider, Historical   petrolat,wht/min oil/sod chl (DRY EYES OP) Apply  to eye. Provider, Historical   acetaminophen (TYLENOL EXTRA STRENGTH) 500 mg tablet Take 1,000 mg by mouth every six (6) hours as needed for Pain. Provider, Historical   cholecalciferol (VITAMIN D3) 1,000 unit tablet Take 1,000 Units by mouth five (5) days a week. Provider, Historical   calcium carbonate (OS-BARBARA) 500 mg calcium (1,250 mg) tablet Take 1,500 mg by mouth daily.     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    H/O calcium pyrophosphate deposition disease (CPPD) Z87.39    IBS (irritable bowel syndrome) K58.9    RA (rheumatoid arthritis) ([facility] M06.9    Sicca syndrome ([facility]) M35.00    Osteoarthritis of right hip M16.11    Pain management contract agreement Z02.89    ACP (advance care planning) Z71.89    Chronic left shoulder pain M25.512, G89.29    Osteopenia M85.80    Osteopenia of multiple sites M85.89    Osteoarthritis of left hip M16.12    Closed fracture of thoracic spine without spinal cord lesion ([facility] S22.009A    Spinal stenosis, thoracolumbar region M48.05    Lumbar spine instability M53.2X6    Spinal stenosis of lumbar region with neurogenic claudication M48.062    Spinal stenosis of lumbar region M48.061    Lumbar stenosis M48.061    Nausea R11.0    Acute bilateral thoracic back pain M54.6    Anxiety F41.9    Chest pain R07.9    Essential hypertension I10     Past Medical History:   Diagnosis Date    Abdominal abscess 2009    Arthritis     Rheumatoid    Autoimmune disease ([address]     Medically induced Lupus    Back pain     Chronic pain     lower back    Colitis     Diverticulitis     Failed back syndrome     GERD (gastroesophageal reflux disease)     Hypertension     when on cyclosporins    Ill-defined condition     large torus roof of mouth    Irritable bowel syndrome     Neuropathy     bilateral hands/wrist    Osteoporosis     Pneumonia     RA (rheumatoid arthritis) ([address]     Seizures ([facility] 6-1-2008    one episode- after high dose of epinepherine       Review of Systems   Constitutional: Negative for chills and fever. Gastrointestinal: Negative for nausea and vomiting. Musculoskeletal: Positive for joint pain. Neurological: Negative. Psychiatric/Behavioral: Negative.         Patient-Reported Vitals 8/20/2020   Patient-Reported Weight 175   Patient-Reported Height 5 ft  2 inches   Patient-Reported Pulse 0   Patient-Reported Temperature 0 Patient-Reported SpO2 0   Patient-Reported Systolic  0   Patient-Reported Diastolic 0        Jorden Stevenson, who was evaluated through a patient-initiated, synchronous (real-time) audio only encounter, and/or her healthcare decision maker, is aware that it is a billable service, with coverage as determined by her insurance carrier. She provided verbal consent to proceed: Yes. She has not had a related appointment within my department in the past 7 days or scheduled within the next 24 hours.       Total Time: minutes: 11-20 minutes    Emilie Gonzalez MD

## 2020-09-01 ENCOUNTER — HOSPITAL ENCOUNTER (OUTPATIENT)
Dept: PHYSICAL THERAPY | Age: 70
Discharge: HOME | End: 2020-09-01
Payer: MEDICARE

## 2020-09-01 PROCEDURE — 97110 THERAPEUTIC EXERCISES: CPT

## 2020-09-01 PROCEDURE — 97035 APP MDLTY 1+ULTRASOUND EA 15: CPT

## 2020-09-01 PROCEDURE — 97161 PT EVAL LOW COMPLEX 20 MIN: CPT

## 2020-09-01 NOTE — PROGRESS NOTES
[facility] at [address] [facility], [facility]  Phone: [phone]      Fax:  [phone]    Plan of Care/ Statement of Necessity for Physical Therapy Services  Patient name: Burak Valencia Start of Care: 2020   Referral source: Adilson Napoles MD : 1950    Medical Diagnosis: Low back pain [M54.5]  Pain in right hip [M25.551]  Payor: [place] MED[place]ARE / Plan: [place] MEDICARE PART A & B / Product Type: Medicare /  Onset Date: On or about 4/15/20    Treatment Diagnosis: LBP, Hip pain and weakness   Prior Hospitalization: see medical history Provider#: [id number]   Medications: Verified on Patient summary List   Comorbidities: OP, OA, Scoliosis  Prior Level of Function: Able to get around a little better and loosing weight with exercises. Able to perform HEP better. Able to walk further. Able to shop longer      The [address] [place] [address] and following information is based on the information from the initial evaluation. Assessment/ key information:  Patient is a 65 y.o. female referred to PT with the above Dx. Patient seen today for c/o LBP and right Hip clunking sound. Nights are difficulty due to moving around to try and get comfortable. Primary left flank pain and (B) low back pain. Periodic muscle spasms that get better with lying down. Hx of Lumbar surgery with Rods and extension internal fixations from T-8 - L2. Onset Apr-May 2020. Performs exercise daily with bike and past PT HEP. Clunk in the right hip is decreased with hip IR during ambulation. Hard to lift left arm overhead due to left flank pain.     Patient presents to PT with an impaired gait, decreased balance, decreased strength, decreased flexibility, and decreased mobility of the pelvis and Lx Spine. She has limited spinal mobility due to multilevel internal fixations and muscle tightness as well as (B) Hip ER muscle weakness.   Additional limitations with overhead reaching compared to her normal function. Patient s/s appear to be consistent w/ diagnosis. Patient demonstrates the potential to make functional gains within a reasonable time frame. Patient will benefit from skilled PT to address impairments and improve functional mobility, strength, gait and balance for an improved quality of life. Fall Risk Assessment: Patient demonstrates a low Fall Risk   Evaluation Complexity History HIGH Complexity :3+ comorbidities / personal factors will impact the outcome/ POC ; Examination MEDIUM Complexity : 3 Standardized tests and measures addressing body structure, function, activity limitation and / or participation in recreation  ;Presentation LOW Complexity : Stable, uncomplicated  ;Clinical Decision Making MEDIUM Complexity : FOTO score of 26-74  Overall Complexity Rating: LOW   Problem List: pain affecting function, decrease ROM, decrease strength, impaired gait/ balance, decrease ADL/ functional abilitiies, decrease activity tolerance, decrease flexibility/ joint mobility, decrease transfer abilities and other FOTO = 40   Treatment Plan may include any combination of the following: Therapeutic exercise, Therapeutic activities, Neuromuscular re-education, Physical agent/modality, Gait/balance training, Manual therapy, Patient education, Self Care training, Functional mobility training, Home safety training and Stair training  Patient / Family readiness to learn indicated by: asking questions, trying to perform skills and interest  Persons(s) to be included in education: patient (P)  Barriers to Learning/Limitations: yes;  physical  Patient Goal (s): Be able to walk further without discomfort, raise left arm up better  Patient Self Reported Health Status: good  Rehabilitation Potential: good    Short Term Goals: To be accomplished in 5 treatments:  1.   Pt will be compliant and independent with HEP in order to facilitate PT sessions and aid with self management             Eval Status:  Initiated Current Status:  2. Pt to tolerate 30 min or more of TE and/or Interventions w/o increased s/s             Eval Status:  Initiated             Current Status:     Long Term Goals: To be accomplished in 10 treatments:  1. Pt will report 50% improvement or better with Hip and back discomfort to show a significant increase in ability to tolerate usual ADLs, to return to helping with boat maintenance             Eval Status:  Initiated             Current Status:  2. Pt will demonstrate the ability to ambulate on TM for 1/2 mile or more for carry over to increased community ambulation and shopping tolerance               Eval Status:  Limited ability to walk and shop             Current Status:  3. Pt will demonstrate lifting overhead to her normal limit without being hindered by associated left flank pain               Eval Status:  Pain at the left flank with reaching overhead             Current Status:  4. Pt will improve (B) Seated Hip ER to 20* or better and MMT to 4/5 or better to aid increased stability with walking and performing activity with sail boat               Eval Status:  Hip ER Right 14* Left 17* (B) MMT 3+/5 in available range             Current Status:  5. Pt will improve FOTO score to 47 in 14 visits to show significant improvement in function for progress to performing light to moderate activity to progress toward active boating lifestyle             Eval Status: FOTO - 40             Current Status:   Frequency / Duration: Patient to be seen 2-3 times per week for 10 treatments.     Patient/ Caregiver education and instruction: Diagnosis, prognosis, self care, activity modification and exercises   [x]  Plan of care has been reviewed with PTA  Comments:  Patient provided w/HEP    Certification Period: 9/1/20 - 9/30/20  Darling Kelly, PT 9/1/2020 10:36 AM  _____________________________________________________________________  I certify that the above Therapy Services are being furnished while the patient is under my care. I agree with the treatment plan and certify that this therapy is necessary.     Physician's Signature:____________Date:_________TIME:________    ** Signature, Date and Time must be completed for valid certification **    Please sign and return to [facility] at [address] [facility]  [place], [facility] [address]  Phone: [phone]      Fax:  [phone]

## 2020-09-01 NOTE — PROGRESS NOTES
PT DAILY TREATMENT NOTE/LUMBAR EVAL 10-18    Patient Name: Sal Xiao  Date:2020  : 1950  [x]  Patient  Verified  Payor: [place] MEDICARE / Plan: [place] MEDICARE PART A & B / Product Type: Medicare /    In time:1100  Out time:1150  Total Treatment Time (min): 50  Visit #: 1 of 10    Medicare/BCBS Only   Total Timed Codes (min):  30 1:1 Treatment Time:  50       Treatment Area: Low back pain [M54.5]  Pain in right hip [M25.551]      SUBJECTIVE  Pain Level (0-10 scale): 4  []constant [x]intermittent []improving []worsening []no change since onset     Any medication changes, allergies to medications, adverse drug reactions, diagnosis change, or new procedure performed?: [x] No    [] Yes (see summary sheet for update)  Subjective functional status/changes:       Subjective: Patient is a 65 y.o. female referred to PT with the above Dx. Patient seen today for c/o LBP and right Hip clunking sound. Nights are difficulty due to moving around to try and get comfortable. Primary left flank pain and (B) low back pain. Periodic muscle spasms that get better with lying down. Hx of Lumbar surgery with Rods and extension internal fixations from T-8 - L2. Onset Apr-May 2020. Performs exercise daily with bike and past PT HEP. Clunk in the right hip is decreased with hip IR during ambulation. Hard to lift left arm overhead due to left flank pain.     Patient presents to PT with an impaired gait, decreased balance, decreased strength, decreased flexibility, and decreased mobility of the pelvis and Lx Spine. She has limited spinal mobility due to multilevel internal fixations and muscle tightness as well as (B) Hip ER muscle weakness. Additional limitations with overhead reaching compared to her normal function. Patient s/s appear to be consistent w/ diagnosis. Patient demonstrates the potential to make functional gains within a reasonable time frame.   Patient will benefit from skilled PT to address impairments and improve functional mobility, strength, gait and balance for an improved quality of life. Fall Risk Assessment: Patient demonstrates a low Fall Risk     Mechanism of Injury: Hx of Multiple Lx Surgeries    Comorbidities: OP, OA, Scoliosis  Prior Level of Function: Able to get around a little better and loosing weight with exercises. Able to perform HEP better. Able to walk further. Able to shop longer    FOTO: 40 / 52 in 14 visits    Goal: Be able to walk further without discomfort, raise left arm up better    Health Status: Good    What type of work do you do? NA    Living Situation/Domestic Life: Lives at home with   Mobility: (I)  Self Care (I)    What type of daily activities/hobbies? 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, limited walking, limited lifting left arm overhead with comfort     Barriers: []pain []financial []time []transportation []other    Motivation: High    FABQ Score: []low []elevate    Cognition: A & O x 3    Other:    Risk For Falls:   []No  [x]low []elevate       OBJECTIVE/EXAMINATION  Demonstrated Balance: Fair to good         Mobility: (I)  Gait: Decreased (B) pelvic mobility, Decreased Hip Flx  - Elev right crest  - Decr mobility left Innominate in stork stance  - TTP Left Lx/Tx paraspinals  - Decreased pain in standing with left paraspinal muscle compression  -      AROM PROM Strength Pain? ?    Right Left Right Left Right Left    Hip Flx WNL WNL   3+ 3+    Hip IR WNL WNL   5 5    Hip ER 14 17   3+ 3+                 Modality rationale: decrease inflammation, decrease pain and increase tissue extensibility to improve the patients ability to tolerate usual activity   Min Type Additional Details      [] Estim:  []Unatt       []IFC  []Premod                        []Other:  []w/ice   []w/heat  Position:  Location:      [] Estim: []Att    []TENS instruct  []NMES                    []Other:  []w/US   []w/ice []w/heat  Position:  Location:      []  Traction: [] Cervical       []Lumbar                       [] Prone          []Supine                       []Intermittent   []Continuous Lbs:  [] before manual  [] after manual     8 [x]  Ultrasound: [x]Continuous   [] Pulsed                           [x]1MHz   []3MHz W/cm2: 1.7  Location: Left Lx paraspinals      []  Iontophoresis with dexamethasone         Location: [] Take home patch   [] In clinic      []  Ice     []  heat  []  Ice massage  []  Laser   []  Anodyne Position:  Location:      []  Laser with stim  []  Other:  Position:  Location:      []  Vasopneumatic Device Pressure:       [] lo [] med [] hi   Temperature: [] lo [] med [] hi    [x] Skin assessment post-treatment:  [x]intact []redness- no adverse reaction    []redness  adverse reaction:      20 min [x]Eval                  []Re-Eval         12 min Therapeutic Exercise:  [] See flow sheet : Develop and instruct HEP   Rationale: increase ROM, increase strength, and improve coordination to improve the patients ability to Initiate HEP and improve daily function     10 min Manual Therapy:  STM/DTM   Rationale: decrease pain, increase ROM, increase tissue extensibility and decrease trigger points to improve daily activity tolerance                                                                  With   [x] TE   [] TA   [] neuro   [] other: Patient Education: [x] Review HEP    [] Progressed/Changed HEP based on:   [] positioning   [] body mechanics   [] transfers   [] heat/ice application    [] other:       Other Objective/Functional Measures:        Pain Level (0-10 scale) post treatment: 3     ASSESSMENT/Changes in Function:        [x]  See Plan of Care  []  See progress note/recertification  []  See Discharge Summary         Progress towards goals / Updated goals:  See POC      PLAN  [x]  Upgrade activities as tolerated     [x]  Continue plan of care  []  Update interventions per flow sheet       []  Discharge due to:_  []  Other:_        [place], PT 9/1/2020  10:40 AM

## 2020-09-02 ENCOUNTER — HOSPITAL ENCOUNTER (OUTPATIENT)
Age: 70
Setting detail: OUTPATIENT SURGERY
Discharge: HOME | End: 2020-09-02
Attending: PHYSICAL MEDICINE & REHABILITATION | Admitting: PHYSICAL MEDICINE & REHABILITATION
Payer: MEDICARE

## 2020-09-02 ENCOUNTER — APPOINTMENT (OUTPATIENT)
Dept: GENERAL RADIOLOGY | Age: 70
End: 2020-09-02
Attending: PHYSICAL MEDICINE & REHABILITATION
Payer: MEDICARE

## 2020-09-02 VITALS
HEART RATE: 70 BPM | DIASTOLIC BLOOD PRESSURE: 79 MMHG | RESPIRATION RATE: 20 BRPM | SYSTOLIC BLOOD PRESSURE: 120 MMHG | OXYGEN SATURATION: 97 % | TEMPERATURE: 98.3 F

## 2020-09-02 PROCEDURE — 76010000009 HC PAIN MGT 0 TO 30 MIN PROC: Performed by: PHYSICAL MEDICINE & REHABILITATION

## 2020-09-02 PROCEDURE — 77030039433 HC TY MYLEOGRAM BD -B: Performed by: PHYSICAL MEDICINE & REHABILITATION

## 2020-09-02 PROCEDURE — 74011000250 HC RX REV CODE- 250: Performed by: PHYSICAL MEDICINE & REHABILITATION

## 2020-09-02 PROCEDURE — 77030003676 HC NDL SPN MPRI -A: Performed by: PHYSICAL MEDICINE & REHABILITATION

## 2020-09-02 PROCEDURE — 74011000636 HC RX REV CODE- 636: Performed by: PHYSICAL MEDICINE & REHABILITATION

## 2020-09-02 PROCEDURE — 74011250637 HC RX REV CODE- 250/637: Performed by: PHYSICAL MEDICINE & REHABILITATION

## 2020-09-02 PROCEDURE — 77030003669 HC NDL SPN COOK -B: Performed by: PHYSICAL MEDICINE & REHABILITATION

## 2020-09-02 PROCEDURE — 74011250636 HC RX REV CODE- 250/636: Performed by: PHYSICAL MEDICINE & REHABILITATION

## 2020-09-02 RX ORDER — DIAZEPAM 5 MG/1
10 TABLET ORAL ONCE
Status: COMPLETED | OUTPATIENT
Start: 2020-09-02 | End: 2020-09-02

## 2020-09-02 RX ORDER — DEXAMETHASONE SODIUM PHOSPHATE 100 MG/10ML
INJECTION INTRAMUSCULAR; INTRAVENOUS AS NEEDED
Status: DISCONTINUED | OUTPATIENT
Start: 2020-09-02 | End: 2020-09-02 | Stop reason: HOSPADM

## 2020-09-02 RX ORDER — DIAZEPAM 5 MG/1
5 TABLET ORAL ONCE
Status: CANCELLED | OUTPATIENT
Start: 2020-09-02 | End: 2020-09-02

## 2020-09-02 RX ORDER — DIAZEPAM 5 MG/1
5-20 TABLET ORAL ONCE
Status: COMPLETED | OUTPATIENT
Start: 2020-09-02 | End: 2020-09-02

## 2020-09-02 RX ORDER — LIDOCAINE HYDROCHLORIDE 10 MG/ML
INJECTION, SOLUTION EPIDURAL; INFILTRATION; INTRACAUDAL; PERINEURAL AS NEEDED
Status: DISCONTINUED | OUTPATIENT
Start: 2020-09-02 | End: 2020-09-02 | Stop reason: HOSPADM

## 2020-09-02 RX ADMIN — DIAZEPAM 5 MG: 5 TABLET ORAL at 13:00

## 2020-09-02 RX ADMIN — DIAZEPAM 10 MG: 5 TABLET ORAL at 12:13

## 2020-09-02 NOTE — H&P
Date of Surgery Update:  Rene Duvall was seen and examined. History and physical has been reviewed. The patient has been examined. There have been no significant clinical changes since the last office visit. The patient was counseled at length about the risks of donovan Covid-19 during their perioperative period and any recovery window from their procedure. The patient was made aware that donovan Covid-19  may worsen their prognosis for recovering from their procedure and lend to a higher morbidity and/or mortality risk. All material risks, benefits, and reasonable alternatives including postponing the procedure were discussed. The patient does  wish to proceed with the procedure at this time.         Signed By: Jannie Garrido MD     September 2, 2020 12:57 PM

## 2020-09-02 NOTE — DISCHARGE INSTRUCTIONS
[facility]   (PABLO)  Dr. Gely Graham, Dr. Darrius Guzman, Dr. Ravindra Llamas Spinal Procedure (Block) Instructions    * Do not drive a car, operate heavy machinery or dangerous equipment, or make important decisions for 12-24 hours. * Light activity as tolerated; may rest for the remainder of the day. * Resume pre-block medications including those from your other doctors. * Do not drink alcoholic beverages for 24 hours. Alcohol and the medications you have received may interact and cause an adverse reaction. * You may feel better this evening and worse tomorrow, as the numbing medications wears off and the steroid has yet to begin to work. After 48-72 hrs the steroid should begin to release bringing you relief. If you had a medial branch block, no steroids were used. The medial branch block is a test to see if you are a candidate for radiofrequency ablation (RFA). The anesthetic (numbing medicine)  will wear off by the next day. * You may shower this evening and remove any bandages. * Avoid hot tubs/pools/tub soaks and heating pads for 24 hours. You may use cold packs on the procedure site as tolerated for the first 24 hours. * If a headache develops, drink plenty of fluids and rest.  Take over the counter medications for headache if needed. If the headache continues longer than 24 hours, call MD at the [address] [place] [address] Avenue. [phone]    * Continue taking pain medications as needed. * You may resume your regular diet if tolerated. Otherwise, start with sips of water and advance slowly. * If Diabetic: check your blood sugar three times a day for the next 3 days. If your sugar is greater than 300 call your family doctor. If your sugar is greater than 400, have someone transport you to the nearest Emergency Room. * If you experience any of the following problems, Please Call the [id number] [place] [address] Avenue at [phone].         * Excessive pain, swelling, redness or odor at or around the surgical area    * Fever of 101 or higher    * Nausea / Vomiting lasting longer than 4 hours or if unable to take medications. * Severe Headache    * Weakness or numbness in arms or legs that is not      resolving   * Any NEW signs of decreased circulation or nerve impairment in leg: change in color, swelling, persistent numbness, tingling                    * Prolonged increase in pain greater than 4 days      PATIENT INSTRUCTIONS:    After oral sedation, for 12-24 hours or while taking prescription Narcotics:  · Limit your activities  · Do not drive and operate hazardous machinery  · Do not make important personal or business decisions  · Do  not drink alcoholic beverages  · If you have not urinated within 8 hours after discharge, please contact your surgeon on call. *  Please give a list of your current medications to your Primary Care Provider. *  Please update this list whenever your medications are discontinued, doses are      changed, or new medications (including over-the-counter products) are added. *  Please carry medication information at all times in case of emergency situations. These are general instructions for a healthy lifestyle:    No smoking/ No tobacco products/ Avoid exposure to second hand smoke    Surgeon General's Warning:  Quitting smoking now greatly reduces serious risk to your health. Obesity, smoking, and sedentary lifestyle greatly increases your risk for illness    A healthy diet, regular physical exercise & weight monitoring are important for maintaining a healthy lifestyle    You may be retaining fluid if you have a history of heart failure or if you experience any of the following symptoms:  Weight gain of 3 pounds or more overnight or 5 pounds in a week, increased swelling in our hands or feet or shortness of breath while lying flat in bed.   Please call your doctor as soon as you notice any of these symptoms; do not wait until your next office visit. Recognize signs and symptoms of STROKE:    F-face looks uneven    A-arms unable to move or move unevenly    S-speech slurred or non-existent    T-time-call 911 as soon as signs and symptoms begin-DO NOT go       Back to bed or wait to see if you get better-TIME IS BRAIN.

## 2020-09-02 NOTE — PROCEDURES
Bi Procedure Note    Patient Name: Leon Shafer    Date of Procedure: September 2, 2020    Preoperative Diagnosis: Bilateral SI Pain and Dysfunction    Post Operative Diagnosis: same    Procedure: SI Joint Injection bilateral      Consent: Informed consent was obtained prior to the procedure. The patient was given the opportunity to ask questions regarding the procedure and its associated risks. In addition to the potential risks associated with the procedure itself, the patient was informed both verbally and in writing of potential side effects of the use of glucocorticoids. The patient appeared to comprehend the informed consent and desired to have the procedure performed. Procedure: The patient was placed in the prone position on the flouroscopy table and the back was prepped and draped in the usual sterile manner. A #22 gauge spinal needle was then advanced to lie within the SI joint after local Lidocaine 1% injection. 1 cc isovue used to confirm the position. The procedure was repeated for the contralateral SI joint. A total of 10 mg of preservative free dexamethasone and 5 ml of Lidocaine was introduced into and around the SI joints. The injection area was cleaned and bandaids applied. No excessive bleeding was noted. Patient dressed and was discharged to home with instructions. Discussion:  (x) The patient tolerated the procedure well.     ( )       Hellen Mullins MD  September 2, 2020

## 2020-09-14 ENCOUNTER — HOSPITAL ENCOUNTER (OUTPATIENT)
Dept: PHYSICAL THERAPY | Age: 70
Discharge: HOME | End: 2020-09-14
Payer: MEDICARE

## 2020-09-14 PROCEDURE — 97110 THERAPEUTIC EXERCISES: CPT

## 2020-09-14 PROCEDURE — 97140 MANUAL THERAPY 1/> REGIONS: CPT

## 2020-09-14 NOTE — PROGRESS NOTES
PT DAILY TREATMENT NOTE 10-18    Patient Name: Rica Kolb  Date:2020  : 1950  [x]  Patient  Verified  Payor: [place] MEDICARE / Plan: [place] MEDICARE PART A & B / Product Type: Medicare /    In time:1151  Out time:1232  Total Treatment Time (min): 41  Visit #: 2 of 10    Medicare/BCBS Only   Total Timed Codes (min):  41 1:1 Treatment Time:  41       Treatment Area: Low back pain [M54.5]  Pain in right hip [M25.551]    SUBJECTIVE  Pain Level (0-10 scale): 3-4  Any medication changes, allergies to medications, adverse drug reactions, diagnosis change, or new procedure performed?: [x] No    [] Yes (see summary sheet for update)  Subjective functional status/changes:   [] No changes reported  Pain at the left flank,    OBJECTIVE        16 min Therapeutic Exercise:  [x] See flow sheet :   Rationale: increase ROM, increase strength and improve coordination to improve the patients ability to tolerate increased activity    10 min Neuromuscular Re-education:  []  See flow sheet :   Rationale: increase strength and improve coordination  to improve the patients ability for increased core stability with activity    15 min Manual Therapy:  STM/DTM/TPR (B) Superior Glute/Glute Medius/PSIS/SI joint region.   (B) Innominate p/a mobs   Rationale: decrease pain, increase ROM, increase tissue extensibility and decrease trigger points to tolerate increased activity          With   [x] TE   [] TA   [] neuro   [] other: Patient Education: [x] Review HEP    [] Progressed/Changed HEP based on:   [] positioning   [] body mechanics   [] transfers   [] heat/ice application    [] other:      Other Objective/Functional Measures:   - TTP (B) PSIS and low back       Pain Level (0-10 scale) post treatment: 2    ASSESSMENT/Changes in Function:   - Good tolerance with first full treatment    Patient will continue to benefit from skilled PT services to modify and progress therapeutic interventions, address functional mobility deficits, address ROM deficits, address strength deficits, analyze and address soft tissue restrictions and analyze and cue movement patterns to attain remaining goals.      []  See Plan of Care  []  See progress note/recertification  []  See Discharge Summary         Short Term Goals: To be accomplished in 5 treatments:  1.  Pt will be compliant and independent with HEP in order to facilitate PT sessions and aid with self management             Eval Status:  Initiated             Current Status: Pt reports compliance 9/14/20  2.  Pt to tolerate 30 min or more of TE and/or Interventions w/o increased s/s             Eval Status:  Initiated             Current Status: Progressing at 41 min with minor discomfort reported 9/14/20     Long Term Goals: To be accomplished in 10 treatments:  1.  Pt will report 50% improvement or better with Hip and back discomfort to show a significant increase in ability to tolerate usual ADLs, to return to helping with boat maintenance             Eval Status:  Initiated             Current Status:  2.  Pt will demonstrate the ability to ambulate on TM for 1/2 mile or more for carry over to increased community ambulation and shopping tolerance               Eval Status:  Limited ability to walk and shop             Current Status:  3.  Pt will demonstrate lifting overhead to her normal limit without being hindered by associated left flank pain               Eval Status:  Pain at the left flank with reaching overhead             Current Status:  4.  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  Pt will improve FOTO score to 47 in 14 visits to show significant improvement in function for progress to performing light to moderate activity to progress toward active boating lifestyle             Eval Status: FOTO - 40             Current Status:     PLAN  [x]  Upgrade activities as tolerated     [x]  Continue plan of care  []  Update interventions per flow sheet       []  Discharge due to:_  []  Other:_      Susy Dugan, PT 9/14/2020  8:46 AM    Future Appointments   Date Time Provider Jonathan Colmenares   9/14/2020 11:45 AM Geoffery Boxer, PT [facility]   9/17/2020 11:45 AM Geoffery Boxer, PT [facility]   9/21/2020 11:45 AM Geoffery Boxer, PT [facility]   9/24/2020 11:45 AM Geoffery Boxer, PT [facility]   9/28/2020 11:45 AM Geoffery Boxer, PT [facility]   10/1/2020 11:45 AM Geoffery Boxer, PT [facility]

## 2020-09-17 ENCOUNTER — HOSPITAL ENCOUNTER (OUTPATIENT)
Dept: PHYSICAL THERAPY | Age: 70
Discharge: HOME | End: 2020-09-17
Payer: MEDICARE

## 2020-09-17 PROCEDURE — 97110 THERAPEUTIC EXERCISES: CPT

## 2020-09-17 PROCEDURE — 97035 APP MDLTY 1+ULTRASOUND EA 15: CPT

## 2020-09-17 NOTE — PROGRESS NOTES
PT DAILY TREATMENT NOTE 10-18 Patient Name: Lm Godoy Date:2020 : 1950 [x]  Patient  Verified Payor: [place] MEDICARE / Plan: Jordin Ranks / Product Type: Medicare / In TSHC:0590  Out time:1233 Total Treatment Time (min): 46 Visit #: 3 of 10 Medicare/BCBS Only Total Timed Codes (min):  46 1:1 Treatment Time:  46  
 
 
Treatment Area: Low back pain [M54.5] Pain in right hip [M25.551] SUBJECTIVE Pain Level (0-10 scale): 2 Any medication changes, allergies to medications, adverse drug reactions, diagnosis change, or new procedure performed?: [x] No    [] Yes (see summary sheet for update) Subjective functional status/changes:   [] No changes reported Feeling better. Seems like it does better with no movement or activity. Pain increases the next day after exercise. OBJECTIVE Modality rationale: decrease inflammation, decrease pain and increase tissue extensibility to improve the patients ability to tolerate increased activity Min Type Additional Details  
 [] Estim:  []Unatt       []IFC  []Premod []Other:  []w/ice   []w/heat Position: Location:  
 [] Estim: []Att    []TENS instruct  []NMES []Other:  []w/US   []w/ice   []w/heat Position: Location:  
 []  Traction: [] Cervical       []Lumbar 
                     [] Prone          []Supine []Intermittent   []Continuous Lbs: 
[] before manual 
[] after manual  
8 [x]  Ultrasound: [x]Continuous   [] Pulsed 
                         [x]1MHz   []3MHz W/cm2: 1.7 Location: Right Lx paraspinal/superior glute/PSIS region  
 []  Iontophoresis with dexamethasone Location: [] Take home patch  
[] In clinic  
 []  Ice     []  heat 
[]  Ice massage 
[]  Laser  
[]  Anodyne Position: Location:  
 []  Laser with stim 
[]  Other:  Position: Location:  
 []  Vasopneumatic Device Pressure:       [] lo [] med [] hi  
Temperature: [] lo [] med [] hi  
 [x] Skin assessment post-treatment:  [x]intact []redness- no adverse reaction 
  []redness  adverse reaction:  
 
24 min Therapeutic Exercise:  [x]? See flow sheet :  
Rationale: increase ROM, increase strength and improve coordination to improve the patients ability to tolerate increased activity 
   
14 min Manual Therapy:  STM/DTM/TPR (B) Superior Glute/Glute Medius/PSIS/SI joint region. (B) Innominate p/a mobs Rationale: decrease pain, increase ROM, increase tissue extensibility and decrease trigger points to tolerate increased activity With 
 [x] TE 
 [] TA 
 [] neuro 
 [] other: Patient Education: [x] Review HEP [] Progressed/Changed HEP based on:  
[] positioning   [] body mechanics   [] transfers   [] heat/ice application   
[] other:   
 
Other Objective/Functional Measures:  
- TTP Right PSIS/Superior Glute/low back, little TTP Left Pain Level (0-10 scale) post treatment: 4 
 
ASSESSMENT/Changes in Function: - Increased focus on right low back and buttocks with good response - Pt reports no pain at the right after treatment but had more pain on the left side of the back Patient will continue to benefit from skilled PT services to modify and progress therapeutic interventions, address functional mobility deficits, address ROM deficits, address strength deficits, analyze and address soft tissue restrictions and analyze and cue movement patterns to attain remaining goals. []  See Plan of Care 
[]  See progress note/recertification 
[]  See Discharge Summary Short Term Goals: To be accomplished in 5 treatments: 1.  Pt will be compliant and independent with HEP in order to facilitate PT sessions and aid with self management 
           Eval Status:  Initiated 
           Current Status: Pt reports compliance 9/14/20 2.  Pt to tolerate 30 min or more of TE and/or Interventions w/o increased s/s            Eval Status:  Initiated            Current Status: Progressing at 41 min with minor discomfort reported 9/14/20 
  
Long Term Goals: To be accomplished in 10 treatments: 1.  Pt will report 50% improvement or better with Hip and back discomfort to show a significant increase in ability to tolerate usual ADLs, to return to helping with boat maintenance 
           Eval Status:  Initiated 
           Current Status: 2.  Pt will demonstrate the ability to ambulate on TM for 1/2 mile or more for carry over to increased community ambulation and shopping tolerance   
           Eval Status:  Limited ability to walk and shop 
           Current Status: 3.  Pt will demonstrate lifting overhead to her normal limit without being hindered by associated left flank pain   
           Eval Status:  Pain at the left flank with reaching overhead 
           Current Status: Continued pain with normal limitations 9/17/20 4.  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   
           Eval Status:  Hip ER Right 14* Left 17* (B) MMT 3+/5 in available range 
           Current Status: 5.  Pt will improve FOTO score to 47 in 14 visits to show significant improvement in function for progress to performing light to moderate activity to progress toward active boating lifestyle            Eval Status: FOTO - 40 
           Current Status:  
 
PLAN [x]  Upgrade activities as tolerated     [x]  Continue plan of care 
[]  Update interventions per flow sheet      
[]  Discharge due to:_ 
[]  Other:_ Joann Terry, PT 9/17/2020  11:52 AM 
 
Future Appointments Date Time Provider Jonathan Colmenares 9/21/2020 11:45 AM Sade WIGGINS PT [facility]  
9/24/2020 11:45 AM Flakita Pedersen, PT [facility]  
9/28/2020 11:45 AM Flakita Pedersen, PT [facility]  
10/1/2020 11:45 AM Flakita Pedersen PT [facility]

## 2020-09-21 ENCOUNTER — HOSPITAL ENCOUNTER (OUTPATIENT)
Dept: PHYSICAL THERAPY | Age: 70
Discharge: HOME | End: 2020-09-21
Payer: MEDICARE

## 2020-09-21 PROCEDURE — 97140 MANUAL THERAPY 1/> REGIONS: CPT

## 2020-09-21 PROCEDURE — 97035 APP MDLTY 1+ULTRASOUND EA 15: CPT

## 2020-09-21 PROCEDURE — 97110 THERAPEUTIC EXERCISES: CPT

## 2020-09-21 NOTE — PROGRESS NOTES
PT DAILY TREATMENT NOTE 10-18    Patient Name: Armen Cabrera  Date:2020  : 1950  [x]  Patient  Verified  Payor: [place] MEDICARE / Plan: [place] MEDICARE PART A & B / Product Type: Medicare /    In IMCC:[id number]  Out time:1233  Total Treatment Time (min): 45  Visit #: 4 of 10    Medicare/BCBS Only   Total Timed Codes (min):  45 1:1 Treatment Time:  45       Treatment Area: Low back pain [M54.5]  Pain in right hip [M25.551]    SUBJECTIVE  Pain Level (0-10 scale): 5-6  Any medication changes, allergies to medications, adverse drug reactions, diagnosis change, or new procedure performed?: [x] No    [] Yes (see summary sheet for update)  Subjective functional status/changes:   [] No changes reported  Increased pain today, not a good day because she has been off her pain medication for 4 days awaiting spinal injections    OBJECTIVE    Modality rationale: decrease inflammation, decrease pain and increase tissue extensibility to improve the patients ability to improve daily function   Min Type Additional Details    [] Estim:  []Unatt       []IFC  []Premod                        []Other:  []w/ice   []w/heat  Position:  Location:    [] Estim: []Att    []TENS instruct  []NMES                    []Other:  []w/US   []w/ice   []w/heat  Position:  Location:    []  Traction: [] Cervical       []Lumbar                       [] Prone          []Supine                       []Intermittent   []Continuous Lbs:  [] before manual  [] after manual   8 [x]  Ultrasound: [x]Continuous   [] Pulsed                           [x]1MHz   []3MHz W/cm2: 1.7  Location: (B) Superior Glute/PSIS/(B) Lx paraspinals    []  Iontophoresis with dexamethasone         Location: [] Take home patch   [] In clinic    []  Ice     []  heat  []  Ice massage  []  Laser   []  Anodyne Position:  Location:    []  Laser with stim  []  Other:  Position:  Location:    []  Vasopneumatic Device Pressure:       [] lo [] med [] hi   Temperature: [] lo [] med [] hi   [x] Skin assessment post-treatment:  [x]intact []redness- no adverse reaction    []redness  adverse reaction:     22 min Therapeutic Exercise:  [x] See flow sheet :   Rationale: increase ROM, increase strength and improve coordination to improve the patients ability to tolerate usual activity    10 min Manual Therapy:  STM/DTM/TPR (B) Superior glute/PSIS/Lx paraspinals   Rationale: decrease pain, increase ROM, increase tissue extensibility and decrease trigger points to improve daily function       With   [x] TE   [] TA   [] neuro   [] other: Patient Education: [x] Review HEP    [] Progressed/Changed HEP based on:   [] positioning   [] body mechanics   [] transfers   [] heat/ice application    [] other:      Other Objective/Functional Measures:   - Initiate LE Lx Stab exercise  - TTP with muscle tightness (B) Superior glut/PSIS/Lx paraspinals     Pain Level (0-10 scale) post treatment: 54    ASSESSMENT/Changes in Function:   -     Patient will continue to benefit from skilled PT services to modify and progress therapeutic interventions, address functional mobility deficits, address ROM deficits, address strength deficits, analyze and address soft tissue restrictions and analyze and cue movement patterns to attain remaining goals.      []  See Plan of Care  []  See progress note/recertification  []  See Discharge Summary         Progress towards goals / Updated goals:  Short Term Goals: To be accomplished in 5 treatments:  1.  Pt will be compliant and independent with HEP in order to facilitate PT sessions and aid with self management             Eval Status:  Initiated             Current Status: Pt reports compliance 9/14/20  2.  Pt to tolerate 30 min or more of TE and/or Interventions w/o increased s/s             Eval Status:  Initiated             Current Status: Progressing at 41 min with minor discomfort reported 9/14/20     Long Term Goals: To be accomplished in 10 treatments:  1.  Pt will report 50% improvement or better with Hip and back discomfort to show a significant increase in ability to tolerate usual ADLs, to return to helping with boat maintenance             Eval Status:  Initiated             Current Status:  2.  Pt will demonstrate the ability to ambulate on TM for 1/2 mile or more for carry over to increased community ambulation and shopping tolerance               Eval Status:  Limited ability to walk and shop             Current Status:  3.  Pt will demonstrate lifting overhead to her normal limit without being hindered by associated left flank pain               Eval Status:  Pain at the left flank with reaching overhead             Current Status: Continued pain with normal limitations 9/17/20  4.  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  Pt will improve FOTO score to 47 in 14 visits to show significant improvement in function for progress to performing light to moderate activity to progress toward active boating lifestyle             Eval Status: FOTO - 40             Current Status:     PLAN  [x]  Upgrade activities as tolerated     [x]  Continue plan of care  []  Update interventions per flow sheet       []  Discharge due to:_  []  Other:_      Jose Alberto Arias, PT 9/21/2020  11:52 AM    Future Appointments   Date Time Provider Jonathan Colmenares   9/24/2020 11:45 AM Ander Delarosa, PT [facility]   9/28/2020 11:45 AM Ander Delarosa, PT [facility]   10/1/2020 11:45 AM Ander Delarosa, PT [facility]

## 2020-09-23 ENCOUNTER — APPOINTMENT (OUTPATIENT)
Dept: GENERAL RADIOLOGY | Age: 70
End: 2020-09-23
Attending: PHYSICAL MEDICINE & REHABILITATION
Payer: MEDICARE

## 2020-09-23 ENCOUNTER — HOSPITAL ENCOUNTER (OUTPATIENT)
Age: 70
Setting detail: OUTPATIENT SURGERY
Discharge: HOME | End: 2020-09-23
Attending: PHYSICAL MEDICINE & REHABILITATION | Admitting: PHYSICAL MEDICINE & REHABILITATION
Payer: MEDICARE

## 2020-09-23 VITALS
DIASTOLIC BLOOD PRESSURE: 72 MMHG | SYSTOLIC BLOOD PRESSURE: 113 MMHG | OXYGEN SATURATION: 97 % | TEMPERATURE: 97.7 F | RESPIRATION RATE: 20 BRPM | HEART RATE: 59 BPM

## 2020-09-23 PROCEDURE — 77030003676 HC NDL SPN MPRI -A: Performed by: PHYSICAL MEDICINE & REHABILITATION

## 2020-09-23 PROCEDURE — 74011250637 HC RX REV CODE- 250/637: Performed by: PHYSICAL MEDICINE & REHABILITATION

## 2020-09-23 PROCEDURE — 74011250636 HC RX REV CODE- 250/636: Performed by: PHYSICAL MEDICINE & REHABILITATION

## 2020-09-23 PROCEDURE — 2709999900 HC NON-CHARGEABLE SUPPLY: Performed by: PHYSICAL MEDICINE & REHABILITATION

## 2020-09-23 PROCEDURE — 74011000250 HC RX REV CODE- 250: Performed by: PHYSICAL MEDICINE & REHABILITATION

## 2020-09-23 PROCEDURE — 77030003669 HC NDL SPN COOK -B: Performed by: PHYSICAL MEDICINE & REHABILITATION

## 2020-09-23 PROCEDURE — 74011000636 HC RX REV CODE- 636: Performed by: PHYSICAL MEDICINE & REHABILITATION

## 2020-09-23 PROCEDURE — 76010000009 HC PAIN MGT 0 TO 30 MIN PROC: Performed by: PHYSICAL MEDICINE & REHABILITATION

## 2020-09-23 PROCEDURE — 77030039433 HC TY MYLEOGRAM BD -B: Performed by: PHYSICAL MEDICINE & REHABILITATION

## 2020-09-23 RX ORDER — DEXAMETHASONE SODIUM PHOSPHATE 100 MG/10ML
INJECTION INTRAMUSCULAR; INTRAVENOUS AS NEEDED
Status: DISCONTINUED | OUTPATIENT
Start: 2020-09-23 | End: 2020-09-23 | Stop reason: HOSPADM

## 2020-09-23 RX ORDER — LIDOCAINE HYDROCHLORIDE 10 MG/ML
INJECTION, SOLUTION EPIDURAL; INFILTRATION; INTRACAUDAL; PERINEURAL AS NEEDED
Status: DISCONTINUED | OUTPATIENT
Start: 2020-09-23 | End: 2020-09-23 | Stop reason: HOSPADM

## 2020-09-23 RX ORDER — DIAZEPAM 5 MG/1
5-20 TABLET ORAL ONCE
Status: COMPLETED | OUTPATIENT
Start: 2020-09-23 | End: 2020-09-23

## 2020-09-23 RX ADMIN — DIAZEPAM 10 MG: 5 TABLET ORAL at 12:02

## 2020-09-23 NOTE — PROCEDURES
PROCEDURE NOTE      Patient Name: Niall Connors    Date of Procedure: September 23, 2020    Preoperative Diagnosis:  Spinal stenosis, lumbar region with neurogenic claudication [M48.062]  Lumbar radiculopathy [M54.16]    Post Operative Diagnosis:  Spinal stenosis, lumbar region with neurogenic claudication [M48.062]  Lumbar radiculopathy [M54.16]    Procedure :    bilateral  L5 Selective Nerve Root Block    Consent:  Informed consent was obtained prior to the procedure. The patient was given the opportunity to ask questions regarding the procedure and its associated risks. In addition to the potential risks associated with the procedure itself, the patient was informed both verbally and in writing of the potential side effects of the use of glucocorticoid. The patient appeared to comprehend the informed consent and desired to have the procedure performed. Procedure: The patient was placed in the prone position on the fluoroscopy table and the back was prepped and draped in the usual sterile manner. The exact spinal level was  identified using fluoroscopy, and Lidocaine 1 % was injected locally, a # 22 gauge spinal needle was passed to the transverse process. The depth was noted and the needle redirected to pass inferior and approximately one cm anterior to the transverse process. YES  1 cc of Isovue M-200 was used to verify positioning in the epidural and paravertebral space and outlined the course of the spinal nerve into the epidural space. The same procedure was repeated at each spinal level indicated above. A total of 10 mg of preservative free Dexamethasone and 5 cc of Lidocaine was slowly injected. The patient tolerated the procedure well. The injection area was cleaned and bandaids applied. Not excessive bleeding was noted. Patient dressed and discharged to home with instructions. Discussion: The patient tolerated the procedure well. Terry Avery MD  September 23, 2020

## 2020-09-23 NOTE — DISCHARGE INSTRUCTIONS
[facility]   ([facility])  Dr. Precious Sales, Dr. Nanda Middleton, Dr. Barber Simon Spinal Procedure (Block) Instructions    * Do not drive a car, operate heavy machinery or dangerous equipment, or make important decisions for 12-24 hours. * Light activity as tolerated; may rest for the remainder of the day. * Resume pre-block medications including those from your other doctors. * Do not drink alcoholic beverages for 24 hours. Alcohol and the medications you have received may interact and cause an adverse reaction. * You may feel better this evening and worse tomorrow, as the numbing medications wears off and the steroid has yet to begin to work. After 48-72 hrs the steroid should begin to release bringing you relief. If you had a medial branch block, no steroids were used. The medial branch block is a test to see if you are a candidate for radiofrequency ablation (RFA). The anesthetic (numbing medicine)  will wear off by the next day. * You may shower this evening and remove any bandages. * Avoid hot tubs/pools/tub soaks and heating pads for 24 hours. You may use cold packs on the procedure site as tolerated for the first 24 hours. * If a headache develops, drink plenty of fluids and rest.  Take over the counter medications for headache if needed. If the headache continues longer than 24 hours, call MD at the [address] [place] [address] Avenue. [phone]    * Continue taking pain medications as needed. * You may resume your regular diet if tolerated. Otherwise, start with sips of water and advance slowly. * If Diabetic: check your blood sugar three times a day for the next 3 days. If your sugar is greater than 300 call your family doctor. If your sugar is greater than 400, have someone transport you to the nearest Emergency Room. * If you experience any of the following problems, Please Call the [address] [place] [address] Avenue at [phone].         * Excessive pain, swelling, redness or odor at or around the surgical area    * Fever of 101 or higher    * Nausea / Vomiting lasting longer than 4 hours or if unable to take medications. * Severe Headache    * Weakness or numbness in arms or legs that is not      resolving   * Any NEW signs of decreased circulation or nerve impairment in leg: change in color, swelling, persistent numbness, tingling                    * Prolonged increase in pain greater than 4 days      PATIENT INSTRUCTIONS:    After oral sedation, for 12-24 hours or while taking prescription Narcotics:  · Limit your activities  · Do not drive and operate hazardous machinery  · Do not make important personal or business decisions  · Do  not drink alcoholic beverages  · If you have not urinated within 8 hours after discharge, please contact your surgeon on call. *  Please give a list of your current medications to your Primary Care Provider. *  Please update this list whenever your medications are discontinued, doses are      changed, or new medications (including over-the-counter products) are added. *  Please carry medication information at all times in case of emergency situations. These are general instructions for a healthy lifestyle:    No smoking/ No tobacco products/ Avoid exposure to second hand smoke    Surgeon General's Warning:  Quitting smoking now greatly reduces serious risk to your health. Obesity, smoking, and sedentary lifestyle greatly increases your risk for illness    A healthy diet, regular physical exercise & weight monitoring are important for maintaining a healthy lifestyle    You may be retaining fluid if you have a history of heart failure or if you experience any of the following symptoms:  Weight gain of 3 pounds or more overnight or 5 pounds in a week, increased swelling in our hands or feet or shortness of breath while lying flat in bed.   Please call your doctor as soon as you notice any of these symptoms; do not wait until your next office visit. Recognize signs and symptoms of STROKE:    F-face looks uneven    A-arms unable to move or move unevenly    S-speech slurred or non-existent    T-time-call 911 as soon as signs and symptoms begin-DO NOT go       Back to bed or wait to see if you get better-TIME IS BRAIN.

## 2020-09-24 ENCOUNTER — HOSPITAL ENCOUNTER (OUTPATIENT)
Dept: PHYSICAL THERAPY | Age: 70
Discharge: HOME | End: 2020-09-24
Payer: MEDICARE

## 2020-09-24 PROCEDURE — 97110 THERAPEUTIC EXERCISES: CPT

## 2020-09-24 NOTE — PROGRESS NOTES
PT DAILY TREATMENT NOTE 10-18    Patient Name: Nayana Dixon  Date:2020  : 1950  [x]  Patient  Verified  Payor: [place] MEDICARE / Plan: VA MEDICARE PART A & B / Product Type: Medicare /    In time:11:45  Out time:1230  Total Treatment Time (min): 45  Visit #: 5 of 10    Medicare/BCBS Only   Total Timed Codes (min):  45 1:1 Treatment Time:  45       Treatment Area: Low back pain [M54.5]  Pain in right hip [M25.551]    SUBJECTIVE  Pain Level (0-10 scale): 0    Any medication changes, allergies to medications, adverse drug reactions, diagnosis change, or new procedure performed?: [x] No    [] Yes (see summary sheet for update)  Subjective functional status/changes:   [] No changes reported  Received nerve block (B) low back and am feeling no pain    OBJECTIVE    Modality rationale: decrease inflammation and decrease pain to improve the patients ability to tolerate post treatment soreness   Min Type Additional Details    [] Estim:  []Unatt       []IFC  []Premod                        []Other:  []w/ice   []w/heat  Position:  Location:    [] Estim: []Att    []TENS instruct  []NMES                    []Other:  []w/US   []w/ice   []w/heat  Position:  Location:    []  Traction: [] Cervical       []Lumbar                       [] Prone          []Supine                       []Intermittent   []Continuous Lbs:  [] before manual  [] after manual    []  Ultrasound: []Continuous   [] Pulsed                           []1MHz   []3MHz W/cm2:  Location:    []  Iontophoresis with dexamethasone         Location: [] Take home patch   [] In clinic   5 [x]  Ice     []  heat  []  Ice massage  []  Laser   []  Anodyne Position: Seated  Location: Low back    []  Laser with stim  []  Other:  Position:  Location:    []  Vasopneumatic Device Pressure:       [] lo [] med [] hi   Temperature: [] lo [] med [] hi   [x] Skin assessment post-treatment:  [x]intact []redness- no adverse reaction    []redness  adverse reaction:     40 min Therapeutic Exercise:  [x] See flow sheet :   Rationale: increase ROM, increase strength and improve coordination to improve the patients ability to tolerate increased exercise      With   [x] TE   [] TA   [] neuro   [] other: Patient Education: [x] Review HEP    [] Progressed/Changed HEP based on:   [] positioning   [] body mechanics   [] transfers   [] heat/ice application    [] other:      Other Objective/Functional Measures:   - AROM ER Right 24  Left 19 Left Hip Flx 92*  -      Pain Level (0-10 scale) post treatment: 0    ASSESSMENT/Changes in Function:   - Increased tolerance with exercise today  - Increased left hip flx  - Slower mobility with exercise due to pt being more cautious    Patient will continue to benefit from skilled PT services to modify and progress therapeutic interventions, address functional mobility deficits, address ROM deficits, address strength deficits, analyze and address soft tissue restrictions and analyze and cue movement patterns to attain remaining goals.      []  See Plan of Care  []  See progress note/recertification  []  See Discharge Summary         Progress towards goals / Updated goals:  Short Term Goals: To be accomplished in 5 treatments:  1.  Pt will be compliant and independent with HEP in order to facilitate PT sessions and aid with self management             Eval Status:  Initiated             Current Status: Pt reports compliance 9/14/20  2.  Pt to tolerate 30 min or more of TE and/or Interventions w/o increased s/s             Eval Status:  Initiated             Current Status: Progressing at 41 min with minor discomfort reported 9/14/20     Long Term Goals: To be accomplished in 10 treatments:  1.  Pt will report 50% improvement or better with Hip and back discomfort to show a significant increase in ability to tolerate usual ADLs, to return to helping with boat maintenance             Eval Status:  Initiated             Current Status:  2.  Pt will demonstrate the ability to ambulate on TM for 1/2 mile or more for carry over to increased community ambulation and shopping tolerance               Eval Status:  Limited ability to walk and shop             Current Status:  3.  Pt will demonstrate lifting overhead to her normal limit without being hindered by associated left flank pain               Eval Status:  Pain at the left flank with reaching overhead             Current Status: Continued pain with normal limitations 9/17/20  4.  Pt will improve (B) Seated Hip ER to 20* or better and MMT to 4/5 or better to aid increased stability with walking and performing activity with sail boat               Eval Status:  Hip ER Right 14* Left 17* (B) MMT 3+/5 in available range             Current Status: Progressing at Right 24 Left 19 9/24/20  5.  Pt will improve FOTO score to 47 in 14 visits to show significant improvement in function for progress to performing light to moderate activity to progress toward active boating lifestyle             Eval Status: FOTO - 40             Current Status:     PLAN  [x]  Upgrade activities as tolerated     [x]  Continue plan of care  []  Update interventions per flow sheet       []  Discharge due to:_  []  Other:_      Asa Rgoer, PT 9/24/2020  11:46 AM    Future Appointments   Date Time Provider Jonathan Colmenares   9/28/2020 11:45 AM Zenaida Suh, PT [facility]   10/1/2020 11:45 AM Zenaida Suh, PT [facility]

## 2020-09-28 ENCOUNTER — HOSPITAL ENCOUNTER (OUTPATIENT)
Dept: PHYSICAL THERAPY | Age: 70
Discharge: HOME | End: 2020-09-28
Payer: MEDICARE

## 2020-09-28 PROCEDURE — 97110 THERAPEUTIC EXERCISES: CPT

## 2020-09-28 PROCEDURE — 97140 MANUAL THERAPY 1/> REGIONS: CPT

## 2020-09-28 NOTE — PROGRESS NOTES
PT DAILY TREATMENT NOTE 10-18    Patient Name: Mallorie Jordan  Date:2020  : 1950  [x]  Patient  Verified  Payor: [place] MEDICARE / Plan: [place] MEDICARE PART A & B / Product Type: Medicare /    In YARZ:[id number]  Out time:1242  Total Treatment Time (min): 48  Visit #: 6 of 10    Medicare/BCBS Only   Total Timed Codes (min):  43 1:1 Treatment Time:  43       Treatment Area: Low back pain [M54.5]  Pain in right hip [M25.551]    SUBJECTIVE  Pain Level (0-10 scale): 2-3  Any medication changes, allergies to medications, adverse drug reactions, diagnosis change, or new procedure performed?: [x] No    [] Yes (see summary sheet for update)  Subjective functional status/changes:   [] No changes reported  Continued pain in the left flank    OBJECTIVE    Modality rationale: decrease inflammation, decrease pain and increase tissue extensibility to improve the patients ability to perform usual activity   Min Type Additional Details    [] Estim:  []Unatt       []IFC  []Premod                        []Other:  []w/ice   []w/heat  Position:  Location:    [] Estim: []Att    []TENS instruct  []NMES                    []Other:  []w/US   []w/ice   []w/heat  Position:  Location:    []  Traction: [] Cervical       []Lumbar                       [] Prone          []Supine                       []Intermittent   []Continuous Lbs:  [] before manual  [] after manual    []  Ultrasound: []Continuous   [] Pulsed                           []1MHz   []3MHz W/cm2:  Location:    []  Iontophoresis with dexamethasone         Location: [] Take home patch   [] In clinic   10 [x]  Ice     []  heat  []  Ice massage  []  Laser   []  Anodyne Position: Prone  Location: Low back/buttocks    []  Laser with stim  []  Other:  Position:  Location:    []  Vasopneumatic Device Pressure:       [] lo [] med [] hi   Temperature: [] lo [] med [] hi   [x] Skin assessment post-treatment:  [x]intact []redness- no adverse reaction    []redness  adverse reaction:     30 min Therapeutic Exercise:  [x] See flow sheet :   Rationale: increase ROM, increase strength and improve coordination to improve the patients ability to tolerate usual activity    15 min Manual Therapy:  STM/DTM Left Lx/Tx Paraspinals/superior glute   Rationale: decrease pain, increase ROM, increase tissue extensibility and decrease trigger points to improve daily function          With   [x] TE   [] TA   [] neuro   [] other: Patient Education: [x] Review HEP    [] Progressed/Changed HEP based on:   [] positioning   [] body mechanics   [] transfers   [] heat/ice application    [] other:      Other Objective/Functional Measures:   - Right Lateral shift  - Elev right crest  - Decr mobility (B) Innominate in stork stance     Pain Level (0-10 scale) post treatment: 2-3    ASSESSMENT/Changes in Function:   - Lateral shift correction and improved pelvic alignment after treatment    Patient will continue to benefit from skilled PT services to modify and progress therapeutic interventions, address functional mobility deficits, address ROM deficits, address strength deficits, analyze and address soft tissue restrictions and analyze and cue movement patterns to attain remaining goals.      []  See Plan of Care  []  See progress note/recertification  []  See Discharge Summary         Progress towards goals / Updated goals:  Short Term Goals: To be accomplished in 5 treatments:  1.  Pt will be compliant and independent with HEP in order to facilitate PT sessions and aid with self management             Eval Status:  Initiated             Current Status: Pt reports compliance 9/14/20  2.  Pt to tolerate 30 min or more of TE and/or Interventions w/o increased s/s             Eval Status:  Initiated             Current Status: Progressing at 41 min with minor discomfort reported 9/14/20     Long Term Goals: To be accomplished in 10 treatments:  1.  Pt will report 50% improvement or better with Hip and back discomfort to show a significant increase in ability to tolerate usual ADLs, to return to helping with boat maintenance             Eval Status:  Initiated             Current Status:  2.  Pt will demonstrate the ability to ambulate on TM for 1/2 mile or more for carry over to increased community ambulation and shopping tolerance               Eval Status:  Limited ability to walk and shop             Current Status:  3.  Pt will demonstrate lifting overhead to her normal limit without being hindered by associated left flank pain               Eval Status:  Pain at the left flank with reaching overhead             Current Status: Continued pain with normal limitations 9/17/20  4.  Pt will improve (B) Seated Hip ER to 20* or better and MMT to 4/5 or better to aid increased stability with walking and performing activity with sail boat               Eval Status:  Hip ER Right 14* Left 17* (B) MMT 3+/5 in available range             Current Status: Progressing at Right 24 Left 19 9/24/20  5.  Pt will improve FOTO score to 47 in 14 visits to show significant improvement in function for progress to performing light to moderate activity to progress toward active boating lifestyle             Eval Status: FOTO - 40             Current Status:     PLAN  [x]  Upgrade activities as tolerated     [x]  Continue plan of care  []  Update interventions per flow sheet       []  Discharge due to:_  []  Other:_      Gerardo Hernandez, PT 9/28/2020  11:53 AM    Future Appointments   Date Time Provider Jonathan Colmenares   10/1/2020 11:45 AM Yao Zurita, PT [facility]

## 2020-10-01 ENCOUNTER — HOSPITAL ENCOUNTER (OUTPATIENT)
Dept: PHYSICAL THERAPY | Age: 70
Discharge: HOME | End: 2020-10-01
Payer: MEDICARE

## 2020-10-01 PROCEDURE — 97110 THERAPEUTIC EXERCISES: CPT

## 2020-10-01 NOTE — PROGRESS NOTES
PT DAILY TREATMENT NOTE 10-18    Patient Name: Jimbo Parks  Date:10/1/2020  : 1950  [x]  Patient  Verified  Payor: [place] MEDICARE / Plan: [place] MEDICARE PART A & B / Product Type: Medicare /    In [id number]  Out time:1229  Total Treatment Time (min): 40  Visit #: 7 of 10    Medicare/BCBS Only   Total Timed Codes (min):  40 1:1 Treatment Time:  40       Treatment Area: Low back pain [M54.5]  Pain in right hip [M25.551]    SUBJECTIVE  Pain Level (0-10 scale): 1-2  Any medication changes, allergies to medications, adverse drug reactions, diagnosis change, or new procedure performed?: [x] No    [] Yes (see summary sheet for update)  Subjective functional status/changes:   [] No changes reported  Doing fine. A little sore in the right groin    OBJECTIVE      40 min Therapeutic Exercise:  [x] See flow sheet :   Rationale: increase ROM, increase strength and improve coordination to improve the patients ability to tolerate increased activity        With   [x] TE   [] TA   [x] neuro   [] other: Patient Education: [x] Review HEP    [] Progressed/Changed HEP based on:   [] positioning   [] body mechanics   [] transfers   [] heat/ice application    [] other:      Other Objective/Functional Measures:   - ADD Supine hip ER, SLR/Hip and Knee Flx and Ext       Pain Level (0-10 scale) post treatment: 2    ASSESSMENT/Changes in Function:   - Increased tolerance to treatment today with increased participation    Patient will continue to benefit from skilled PT services to modify and progress therapeutic interventions, address functional mobility deficits, address ROM deficits, address strength deficits, analyze and address soft tissue restrictions and analyze and cue movement patterns to attain remaining goals.      []  See Plan of Care  []  See progress note/recertification  []  See Discharge Summary                Progress towards goals / Updated goals:  Short Term Goals: To be accomplished in 5 treatments:  1.  Pt will be compliant and independent with HEP in order to facilitate PT sessions and aid with self management             Eval Status:  Initiated             Current Status: Pt reports compliance 9/14/20  2.  Pt to tolerate 30 min or more of TE and/or Interventions w/o increased s/s             Eval Status:  Initiated             Current Status: Progressing at 41 min with minor discomfort reported 9/14/20     Long Term Goals: To be accomplished in 10 treatments:  1.  Pt will report 50% improvement or better with Hip and back discomfort to show a significant increase in ability to tolerate usual ADLs, to return to helping with boat maintenance             Eval Status:  Initiated             Current Status:  2.  Pt will demonstrate the ability to ambulate on TM for 1/2 mile or more for carry over to increased community ambulation and shopping tolerance               Eval Status:  Limited ability to walk and shop             Current Status: Pt amb 200' with good [id number]/1/20  3.  Pt will demonstrate lifting overhead to her normal limit without being hindered by associated left flank pain               Eval Status:  Pain at the left flank with reaching overhead             Current Status: Continued pain with normal limitations 9/17/20  4.  Pt will improve (B) Seated Hip ER to 20* or better and MMT to 4/5 or better to aid increased stability with walking and performing activity with sail boat               Eval Status:  Hip ER Right 14* Left 17* (B) MMT 3+/5 in available range             Current Status: Met (B) > 20* today 10/1/20  5.  Pt will improve FOTO score to 47 in 14 visits to show significant improvement in function for progress to performing light to moderate activity to progress toward active boating lifestyle             Eval Status: FOTO - 40             Current Status:     PLAN  [x]  Upgrade activities as tolerated     [x]  Continue plan of care  []  Update interventions per flow sheet       []  Discharge due to:_  []  Other:_      Jaime Eisenmenger, PT 10/1/2020  10:01 AM    Future Appointments   Date Time Provider Jonathan Colmenares   10/1/2020 11:45 AM Melissa Zamorano, PT [facility]

## 2020-11-02 DIAGNOSIS — G47.00 INSOMNIA: ICD-10-CM

## 2020-11-02 DIAGNOSIS — M54.6 ACUTE BILATERAL THORACIC BACK PAIN: ICD-10-CM

## 2020-11-02 RX ORDER — ZOLPIDEM TARTRATE 5 MG/1
5 TABLET ORAL
Qty: 90 TAB | Refills: 0 | Status: SHIPPED | OUTPATIENT
Start: 2020-11-02 | End: 2021-01-04

## 2020-11-02 RX ORDER — TRAMADOL HYDROCHLORIDE 100 MG/1
100 TABLET, EXTENDED RELEASE ORAL DAILY
Qty: 90 TAB | Refills: 0 | Status: SHIPPED | OUTPATIENT
Start: 2020-11-02 | End: 2021-01-30 | Stop reason: SDUPTHER

## 2020-11-09 ENCOUNTER — PATIENT MESSAGE (OUTPATIENT)
Dept: FAMILY MEDICINE CLINIC | Age: 70
End: 2020-11-09

## 2020-11-10 ENCOUNTER — TELEPHONE (OUTPATIENT)
Dept: FAMILY MEDICINE CLINIC | Age: 70
End: 2020-11-10

## 2020-11-10 ENCOUNTER — VIRTUAL VISIT (OUTPATIENT)
Dept: FAMILY MEDICINE CLINIC | Age: 70
End: 2020-11-10
Payer: MEDICARE

## 2020-11-10 DIAGNOSIS — B35.1 ONYCHOMYCOSIS: ICD-10-CM

## 2020-11-10 DIAGNOSIS — M48.062 SPINAL STENOSIS OF LUMBAR REGION WITH NEUROGENIC CLAUDICATION: Primary | ICD-10-CM

## 2020-11-10 PROCEDURE — 99443 PR PHYS/QHP TELEPHONE EVALUATION 21-30 MIN: CPT | Performed by: FAMILY MEDICINE

## 2020-11-10 RX ORDER — TRAMADOL HYDROCHLORIDE 100 MG/1
100 TABLET, FILM COATED, EXTENDED RELEASE ORAL DAILY
Qty: 90 TAB | Refills: 0 | Status: SHIPPED | OUTPATIENT
Start: 2020-11-10 | End: 2021-01-11 | Stop reason: SDUPTHER

## 2020-11-10 NOTE — TELEPHONE ENCOUNTER
Fax received from [address] [place] [address] meds stating prior auth is required for the Jublia 10% Solution. Submit a PA request  1. Go to key. [employer] and click \"Enter a Key\"  2. Patient last name: Raciel Marie      : 1950      Key: [id number] 3.  Click \"start a PA\", complete the form, and \"send to plan\"

## 2020-11-10 NOTE — PROGRESS NOTES
Alma Toure is a 65 y.o. female who was seen by synchronous (real-time) audio technology on 11/10/2020 for No chief complaint on file. Assessment & Plan:   Diagnoses and all orders for this visit:    1. Spinal stenosis of lumbar region with neurogenic claudication  -     traMADoL (ULTRAM-ER) 100 mg tablet; Take 1 Tab by mouth daily for 90 days. Max Daily Amount: 100 mg.    2. Onychomycosis  -     efinaconazole (JUBLIA) tyrone topical solution; Apply  to affected area daily. 712  Subjective:       Prior to Admission medications    Medication Sig Start Date End Date Taking? Authorizing Provider   zolpidem (AMBIEN) 5 mg tablet Take 1 Tab by mouth nightly as needed for Sleep. Take 1 Tab by Mouth Nightly As Needed. 11/2/20   Hodan Álvarez MD   traMADoL (Ultram ER) 100 mg Tb24 Take 1 Tab by mouth daily for 90 days. Max Daily Amount: 100 mg. 11/2/20 1/31/21  Hodan Álvarez MD   lisinopriL (PRINIVIL, ZESTRIL) 10 mg tablet TAKE 1 TABLET DAILY 7/7/20   Hodan Álvarez MD   meloxicam (MOBIC) 15 mg tablet Take 1 Tab by mouth daily. 4/17/20   Hodan Álvarez MD   clotrimazole-betamethasone [facility]) topical cream Use bid 2/5/20   Hodan Álvarez MD   tofacitinib Troy Santos) 5 mg tab Take  by mouth two (2) times a day. Provider, Historical   teriparatide (FORTEO) 20 mcg/dose - 600 mcg/2.4 mL pnij injection 20 mcg by SubCUTAneous route daily. Provider, Historical   cetirizine (ZYRTEC) 10 mg tablet Take  by mouth. Provider, Historical   petrolat,wht/min oil/sod chl (DRY EYES OP) Apply  to eye. Provider, Historical   acetaminophen (TYLENOL EXTRA STRENGTH) 500 mg tablet Take 1,000 mg by mouth every six (6) hours as needed for Pain. Provider, Historical   cholecalciferol (VITAMIN D3) 1,000 unit tablet Take 1,000 Units by mouth five (5) days a week. Provider, Historical   calcium carbonate (OS-BARBARA) 500 mg calcium (1,250 mg) tablet Take 1,500 mg by mouth daily.     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    H/O calcium pyrophosphate deposition disease (CPPD) Z87.39    IBS (irritable bowel syndrome) K58.9    RA (rheumatoid arthritis) ([facility]) M06.9    Sicca syndrome ([facility]) M35.00    Osteoarthritis of right hip M16.11    Pain management contract agreement Z02.89    ACP (advance care planning) Z71.89    Chronic left shoulder pain M25.512, G89.29    Osteopenia M85.80    Osteopenia of multiple sites M85.89    Osteoarthritis of left hip M16.12    Closed fracture of thoracic spine without spinal cord lesion ([address] S22.009A    Spinal stenosis, thoracolumbar region M48.05    Lumbar spine instability M53.2X6    Spinal stenosis of lumbar region with neurogenic claudication M48.062    Spinal stenosis of lumbar region M48.061    Lumbar stenosis M48.061    Nausea R11.0    Acute bilateral thoracic back pain M54.6    Anxiety F41.9    Chest pain R07.9    Essential hypertension I10     Past Medical History:   Diagnosis Date    Abdominal abscess 2009    Arthritis     Rheumatoid    Autoimmune disease ([address]     Medically induced Lupus    Back pain     Chronic pain     lower back    Colitis     Diverticulitis     Failed back syndrome     GERD (gastroesophageal reflux disease)     Hypertension     when on cyclosporins    Ill-defined condition     large torus roof of mouth    Irritable bowel syndrome     Neuropathy     bilateral hands/wrist    Osteoporosis     Pneumonia     RA (rheumatoid arthritis) ([address]     Seizures ([address] 6-1-2008    one episode- after high dose of epinepherine       Review of Systems   Constitutional: Negative for chills and fever. Cardiovascular: Negative for chest pain and palpitations. Musculoskeletal: Positive for back pain. Neurological: Negative. Psychiatric/Behavioral: Negative. Objective:     Patient-Reported Vitals 11/10/2020   Patient-Reported Weight 170   Patient-Reported Height 5 ft 2 inches   Patient-Reported Pulse 56   Patient-Reported Temperature 96.7   Patient-Reported SpO2 92   Patient-Reported Systolic  0   Patient-Reported Diastolic 0      General: alert, cooperative, no distress   Mental  status: normal mood, behavior, speech, dress, motor activity, and thought processes, able to follow commands   HENT: NCAT   Neck: no visualized mass   Resp: no respiratory distress   Neuro: no gross deficits   Skin: no discoloration or lesions of concern on visible areas   Psychiatric: normal affect, consistent with stated mood, no evidence of hallucinations     Additional exam findings: We discussed the expected course, resolution and complications of the diagnosis(es) in detail. Medication risks, benefits, costs, interactions, and alternatives were discussed as indicated. I advised her to contact the office if her condition worsens, changes or fails to improve as anticipated. She expressed understanding with the diagnosis(es) and plan. Jailyn Ayala, who was evaluated through a patient-initiated, synchronous (real-time) audio encounter, and/or her healthcare decision maker, is aware that it is a billable service, with coverage as determined by her insurance carrier. She provided verbal consent to proceed: Yes, and patient identification was verified. It was conducted pursuant to the emergency declaration under the [address] [place] [address] authority and the [employer] Act. A caregiver was present when appropriate. Ability to conduct physical exam was limited. I was in the office. The patient was at home.     21 - 30 min call    Oscar Palma MD

## 2020-11-16 NOTE — PROGRESS NOTES
[facility] at [address] [facility] [facility], [address] [place] [address]  Phone: [phone]      Fax:  [phone]    Discharge Summary    Patient name: Abhishek Schultz Start of Care: 2020   Referral source: William Herrera MD : 1950               Medical Diagnosis: Low back pain [M54.5]  Pain in right hip [M25.551]  Payor: [place] MEDICARE / Plan: VA MEDICARE PART A & B / Product Type: Medicare /  Onset Date: On or about 4/15/20               Treatment Diagnosis: LBP, Hip pain and weakness   Prior Hospitalization: see medical history Provider#: [id number]   Medications: Verified on Patient summary List   Comorbidities: OP, OA, Scoliosis  Prior Level of Function: Able to get around a little better and loosing weight with exercises.  Able to perform HEP better.  Able to walk further.  Able to shop longer  Visits from Start of Care: 7    Missed Visits: 0  Reporting Period : 2020 to 10/1/2020    Goal: Pt will be compliant and independent with HEP in order to facilitate PT sessions and aid with self management  Status at last note/certification: unable to reassess   Status at discharge: not met    Goal: Pt to tolerate 30 min or more of TE and/or Interventions w/o increased s/s  Status at last note/certification: unable to reassess  Status at discharge: not met    Goal: Pt will report 50% improvement or better with Hip and back discomfort to show a significant increase in ability to tolerate usual ADLs, to return to helping with boat maintenance  Status at last note/certification: unable to reassess  Status at discharge: not met    Goal: Pt will demonstrate the ability to ambulate on TM for 1/2 mile or more for carry over to increased community ambulation and shopping tolerance    Status at last note/certification: unable to reassess  Status at discharge: not met    Goal: Pt will demonstrate lifting overhead to her normal limit without being hindered by associated left flank pain   Status at last note/certification: unable to reassess  Status at discharge: not met    Goal: Pt will improve (B) Seated Hip ER to 20* or better and MMT to 4/5 or better to aid increased stability with walking and performing activity with sail boat    Status at last note/certification: unable to reassess  Status at discharge: not met    Goal: Pt will improve FOTO score to 47 in 14 visits to show significant improvement in function for progress to performing light to moderate activity to progress toward active boating lifestyle  Status at last note/certification: unable to reassess  Status at discharge: not met    Assessment/ Summary of Care:   Pt was seen for 7 therapy sessions. Per telephone log, the pt requested d/c from therapy secondary to pt report of having a lot going on outside of therapy. Pt is therefore d/c'ed from therapy and unable to reassess goals at this time.      RECOMMENDATIONS:  [x]Discontinue therapy: []Patient has reached or is progressing toward set goals      [x]Patient is non-compliant or has abdicated      []Due to lack of appreciable progress towards set goals    Dora Lao, PT 11/16/2020 3:21 PM

## 2020-12-28 ENCOUNTER — TELEPHONE (OUTPATIENT)
Dept: FAMILY MEDICINE CLINIC | Age: 70
End: 2020-12-28

## 2020-12-28 NOTE — TELEPHONE ENCOUNTER
Shrimp has been added to patients allergy list, called to let her know I have added shrimp to her allergy list asked if she was still having any type of reaction she says she is not but she has sores in her mouth where the hives were that are healing. She does have a visit set up with Dr. Mirna Beard on 1/4/21 and has been told if he has any recommendations before then someone will call her back.

## 2020-12-28 NOTE — TELEPHONE ENCOUNTER
Patient called and states she had shrimp for [place] and broke out in hives. She would like this added to her allergy list.  She states the hives have resolved but she also had blisters that broke in her mouth and are still trying to heal.  Wondering if Dr. Aline Cardenas can recommend anything. She is aware he is out of the office but may be checking messages periodically.

## 2021-01-04 ENCOUNTER — VIRTUAL VISIT (OUTPATIENT)
Dept: FAMILY MEDICINE CLINIC | Age: 71
End: 2021-01-04
Payer: MEDICARE

## 2021-01-04 DIAGNOSIS — G47.00 INSOMNIA: Primary | ICD-10-CM

## 2021-01-04 DIAGNOSIS — Z00.00 MEDICARE ANNUAL WELLNESS VISIT, SUBSEQUENT: ICD-10-CM

## 2021-01-04 PROCEDURE — 3017F COLORECTAL CA SCREEN DOC REV: CPT | Performed by: FAMILY MEDICINE

## 2021-01-04 PROCEDURE — G8536 NO DOC ELDER MAL SCRN: HCPCS | Performed by: FAMILY MEDICINE

## 2021-01-04 PROCEDURE — G0439 PPPS, SUBSEQ VISIT: HCPCS | Performed by: FAMILY MEDICINE

## 2021-01-04 PROCEDURE — 1101F PT FALLS ASSESS-DOCD LE1/YR: CPT | Performed by: FAMILY MEDICINE

## 2021-01-04 PROCEDURE — G8427 DOCREV CUR MEDS BY ELIG CLIN: HCPCS | Performed by: FAMILY MEDICINE

## 2021-01-04 PROCEDURE — G8756 NO BP MEASURE DOC: HCPCS | Performed by: FAMILY MEDICINE

## 2021-01-04 PROCEDURE — 99213 OFFICE O/P EST LOW 20 MIN: CPT | Performed by: FAMILY MEDICINE

## 2021-01-04 PROCEDURE — 1090F PRES/ABSN URINE INCON ASSESS: CPT | Performed by: FAMILY MEDICINE

## 2021-01-04 PROCEDURE — G8399 PT W/DXA RESULTS DOCUMENT: HCPCS | Performed by: FAMILY MEDICINE

## 2021-01-04 PROCEDURE — G8510 SCR DEP NEG, NO PLAN REQD: HCPCS | Performed by: FAMILY MEDICINE

## 2021-01-04 PROCEDURE — G8417 CALC BMI ABV UP PARAM F/U: HCPCS | Performed by: FAMILY MEDICINE

## 2021-01-04 RX ORDER — ZOLPIDEM TARTRATE 5 MG/1
5 TABLET ORAL
Qty: 90 TAB | Refills: 1 | Status: SHIPPED | OUTPATIENT
Start: 2021-01-04 | End: 2021-01-19

## 2021-01-04 NOTE — PATIENT INSTRUCTIONS
Medicare Wellness Visit, Female The best way to live healthy is to have a lifestyle where you eat a well-balanced diet, exercise regularly, limit alcohol use, and quit all forms of tobacco/nicotine, if applicable. Regular preventive services are another way to keep healthy. Preventive services (vaccines, screening tests, monitoring & exams) can help personalize your care plan, which helps you manage your own care. Screening tests can find health problems at the earliest stages, when they are easiest to treat. [facility] follows the current, evidence-based guidelines published by the [facility] ([facility]) when recommending preventive services for our patients. Because we follow these guidelines, sometimes recommendations change over time as research supports it. (For example, mammograms used to be recommended annually. Even though Medicare will still pay for an annual mammogram, the newer guidelines recommend a mammogram every two years for women of average risk). Of course, you and your doctor may decide to screen more often for some diseases, based on your risk and your co-morbidities (chronic disease you are already diagnosed with). Preventive services for you include: - Medicare offers their members a free annual wellness visit, which is time for you and your primary care provider to discuss and plan for your preventive service needs. Take advantage of this benefit every year! 
-All adults over the age of 72 should receive the recommended pneumonia vaccines. Current USPSTF guidelines recommend a series of two vaccines for the best pneumonia protection.  
-All adults should have a flu vaccine yearly and a tetanus vaccine every 10 years.  
-All adults age 48 and older should receive the shingles vaccines (series of two vaccines). -All adults age 38-68 who are overweight should have a diabetes screening test once every three years. -All adults born between [birth year] and 1967 should be screened once for Hepatitis C. 
-Other screening tests and preventive services for persons with diabetes include: an eye exam to screen for diabetic retinopathy, a kidney function test, a foot exam, and stricter control over your cholesterol.  
-Cardiovascular screening for adults with routine risk involves an electrocardiogram (ECG) at intervals determined by your doctor.  
-Colorectal cancer screenings should be done for adults age 54-65 with no increased risk factors for colorectal cancer. There are a number of acceptable methods of screening for this type of cancer. Each test has its own benefits and drawbacks. Discuss with your doctor what is most appropriate for you during your annual wellness visit. The different tests include: colonoscopy (considered the best screening method), a fecal occult blood test, a fecal DNA test, and sigmoidoscopy. 
 
-A bone mass density test is recommended when a woman turns 65 to screen for osteoporosis. This test is only recommended one time, as a screening. Some providers will use this same test as a disease monitoring tool if you already have osteoporosis. -Breast cancer screenings are recommended every other year for women of normal risk, age 54-69. 
-Cervical cancer screenings for women over age 72 are only recommended with certain risk factors. Here is a list of your current Health Maintenance items (your personalized list of preventive services) with a due date: 
Health Maintenance Due Topic Date Due  Shingles Vaccine (1 of 2) 03/10/1998 [address] [place] [address] Annual Well Visit  06/07/2020  Mammogram  07/18/2020  Glaucoma Screening   08/11/2020

## 2021-01-04 NOTE — PROGRESS NOTES
Chief Complaint   Patient presents with   [facility] Annual Wellness Visit    Hives     Pt [address] Ahsan Farooq. Would also like to discuss COVID vaccine. This is the Subsequent Medicare Annual Wellness Exam, performed 12 months or more after the Initial AWV or the last Subsequent AWV    I have reviewed the patient's medical history in detail and updated the computerized patient record. Depression Risk Factor Screening:     3 most recent PHQ Screens 1/4/2021   PHQ Not Done -   Little interest or pleasure in doing things Not at all   Feeling down, depressed, irritable, or hopeless Not at all   Total Score PHQ 2 0       Alcohol Risk Screen    Do you average more than 1 drink per night or more than 7 drinks a week:  No    On any one occasion in the past three months have you have had more than 3 drinks containing alcohol:  No        Functional Ability and Level of Safety:    Hearing: Hearing is good. Activities of Daily Living: The home contains: no safety equipment. Patient does total self care      Ambulation: with no difficulty     Fall Risk:  Fall Risk Assessment, last 12 mths 1/4/2021   Able to walk? No   Fall in past 12 months? 0   Do you feel unsteady? 0   Are you worried about falling 0   Number of falls in past 12 months -   Fall with injury? -      Abuse Screen:  Patient is not abused       Cognitive Screening    Has your family/caregiver stated any concerns about your memory: no    Cognitive Screening: not done    Assessment/Plan   Education and counseling provided:  Are appropriate based on today's review and evaluation    Diagnoses and all orders for this visit:    1.  Medicare annual wellness visit, subsequent        Health Maintenance Due     Health Maintenance Due   Topic Date Due    Shingrix Vaccine Age 49> (1 of 2) 03/10/1998    Medicare Yearly Exam  06/07/2020    Breast Cancer Screen Mammogram  07/18/2020    GLAUCOMA SCREENING Q2Y  08/11/2020       Patient Care Team   Patient Care Team:  Xavier Arguello MD as PCP - General (Family Medicine)  Abdirizak Fowler MD as PCP - [facility]  Andrew Orozco MD (Gastroenterology)    History     Patient Active Problem List   Diagnosis Code    DDD (degenerative disc disease), lumbar M51.36    Spondylolisthesis of lumbar region M43.16    Status post lumbar surgery Z98.890    Fibrosis of left subtalar joint M20.26    H/O calcium pyrophosphate deposition disease (CPPD) Z87.39    IBS (irritable bowel syndrome) K58.9    RA (rheumatoid arthritis) ([address] M06.9    Sicca syndrome ([address] M35.00    Osteoarthritis of right hip M16.11    Pain management contract agreement Z02.89    ACP (advance care planning) Z71.89    Chronic left shoulder pain M25.512, G89.29    Osteopenia M85.80    Osteopenia of multiple sites M85.89    Osteoarthritis of left hip M16.12    Closed fracture of thoracic spine without spinal cord lesion ([address] S22.009A    Spinal stenosis, thoracolumbar region M48.05    Lumbar spine instability M53.2X6    Spinal stenosis of lumbar region with neurogenic claudication M48.062    Spinal stenosis of lumbar region M48.061    Lumbar stenosis M48.061    Nausea R11.0    Acute bilateral thoracic back pain M54.6    Anxiety F41.9    Chest pain R07.9    Essential hypertension I10     Past Medical History:   Diagnosis Date    Abdominal abscess 2009    Arthritis     Rheumatoid    Autoimmune disease ([address]     Medically induced Lupus    Back pain     Chronic pain     lower back    Colitis     Diverticulitis     Failed back syndrome     GERD (gastroesophageal reflux disease)     Hypertension     when on cyclosporins    Ill-defined condition     large torus roof of mouth    Irritable bowel syndrome     Neuropathy     bilateral hands/wrist    Osteoporosis     Pneumonia     RA (rheumatoid arthritis) ([address]     Seizures ([address] 6-1-2008    one episode- after high dose of epinepherine      Past Surgical History:   Procedure Laterality Date    HX ABDOMINAL WALL DEFECT REPAIR      HX APPENDECTOMY      HX BACK SURGERY  12/03/2018    HX BREAST REDUCTION      HX CATARACT REMOVAL Bilateral     HX COLONOSCOPY      HX GI      repair rectal prolaspe    HX GYN      dermoid cyst    HX HEENT      tonsillectomy    HX HYSTERECTOMY  1976    HX LUMBAR FUSION      x 2    HX ORTHOPAEDIC Bilateral     CTR    HX ORTHOPAEDIC Bilateral     arthroplasty thumbs    HX ORTHOPAEDIC Left     Ankle     HX ORTHOPAEDIC Right     \"Nerve Release Elbow\"    HX OTHER SURGICAL Left 1985    vein stripping    HX SALPINGO-OOPHORECTOMY Bilateral     HX SHOULDER REPLACEMENT Left     HX UROLOGICAL      bladder repair    TOTAL HIP ARTHROPLASTY Bilateral      Current Outpatient Medications   Medication Sig Dispense Refill    traMADoL (ULTRAM-ER) 100 mg tablet Take 1 Tab by mouth daily for 90 days. Max Daily Amount: 100 mg. 90 Tab 0    efinaconazole (JUBLIA) tyrone topical solution Apply  to affected area daily. 8 mL 5    zolpidem (AMBIEN) 5 mg tablet Take 1 Tab by mouth nightly as needed for Sleep. Take 1 Tab by Mouth Nightly As Needed. 90 Tab 0    traMADoL (Ultram ER) 100 mg Tb24 Take 1 Tab by mouth daily for 90 days. Max Daily Amount: 100 mg. 90 Tab 0    lisinopriL (PRINIVIL, ZESTRIL) 10 mg tablet TAKE 1 TABLET DAILY 90 Tab 3    meloxicam (MOBIC) 15 mg tablet Take 1 Tab by mouth daily. 90 Tab 2    tofacitinib (XELJANZ) 5 mg tab Take  by mouth two (2) times a day.  teriparatide (FORTEO) 20 mcg/dose - 600 mcg/2.4 mL pnij injection 20 mcg by SubCUTAneous route daily.  cetirizine (ZYRTEC) 10 mg tablet Take  by mouth.  petrolat,wht/min oil/sod chl (DRY EYES OP) Apply  to eye.  acetaminophen (TYLENOL EXTRA STRENGTH) 500 mg tablet Take 1,000 mg by mouth every six (6) hours as needed for Pain.  cholecalciferol (VITAMIN D3) 1,000 unit tablet Take 1,000 Units by mouth five (5) days a week.       calcium carbonate (OS-BARBARA) 500 mg calcium (1,250 mg) tablet Take 1,500 mg by mouth daily. Allergies   Allergen Reactions    Celebrex [Celecoxib] Swelling    Shellfish Derived Swelling     \"makes me feel puffy\"    Sulfa (Sulfonamide Antibiotics) Hives and Swelling     Lips swelling    Gabapentin Diarrhea and Palpitations     Chest and Abdominal pain    Humira [Adalimumab] Other (comments)     Lupus    Influenza Virus Vaccines Hives    Iodine Itching    Optiray 320 [Ioversol] Hives and Itching     Pt states when she gets iv contrast she itches a little from hives?  Penicillins Hives    Shrimp Hives       Family History   Problem Relation Age of Onset    Other Other         Orthopedic (Sister)    Arthritis-osteo Sister     Heart Attack Brother 58    Cancer Neg Hx     Diabetes Neg Hx     Heart Disease Neg Hx     Hypertension Neg Hx     Stroke Neg Hx     Malignant Hyperthermia Neg Hx     Pseudocholinesterase Deficiency Neg Hx     Delayed Awakening Neg Hx     Post-op Nausea/Vomiting Neg Hx     Emergence Delirium Neg Hx     Post-op Cognitive Dysfunction Neg Hx      Social History     Tobacco Use    Smoking status: Never Smoker    Smokeless tobacco: Never Used   Substance Use Topics    Alcohol use: No       Karen Moy, who was evaluated through a synchronous (real-time) audio-video encounter, and/or her healthcare decision maker, is aware that it is a billable service, with coverage as determined by her insurance carrier. She provided verbal consent to proceed: Yes, and patient identification was verified. It was conducted pursuant to the emergency declaration under the [address] [place] [address] authority and the [employer] and [employer] General Act. A caregiver was present when appropriate. Ability to conduct physical exam was limited. I was in the office. The patient was at home.     Juan M Barnhart MD

## 2021-01-04 NOTE — PROGRESS NOTES
Dimitry Dennison is a 65 y.o. female who was seen by synchronous (real-time) audio-video technology on 1/4/2021 for Annual Wellness Visit, Hives (she had shrimp over Xmas.), and Sleep Problem        Assessment & Plan:   Diagnoses and all orders for this visit:    1. Medicare annual wellness visit, subsequent    2. Insomnia  -     zolpidem (AMBIEN) 5 mg tablet; Take 1 Tab by mouth nightly as needed for Sleep. Take 1 Tab by Mouth Nightly As Needed. 712  Subjective:       Prior to Admission medications    Medication Sig Start Date End Date Taking? Authorizing Provider   traMADoL (ULTRAM-ER) 100 mg tablet Take 1 Tab by mouth daily for 90 days. Max Daily Amount: 100 mg. 11/10/20 2/8/21  Henrry Ramirez MD   efinaconazole (JUBLIA) tyrone topical solution Apply  to affected area daily. 11/10/20   Henrry Ramirez MD   zolpidem (AMBIEN) 5 mg tablet Take 1 Tab by mouth nightly as needed for Sleep. Take 1 Tab by Mouth Nightly As Needed. 11/2/20   Henrry Ramirez MD   traMADoL (Ultram ER) 100 mg Tb24 Take 1 Tab by mouth daily for 90 days. Max Daily Amount: 100 mg. 11/2/20 1/31/21  Henrry Ramirez MD   lisinopriL (PRINIVIL, ZESTRIL) 10 mg tablet TAKE 1 TABLET DAILY 7/7/20   Henrry Ramirez MD   meloxicam (MOBIC) 15 mg tablet Take 1 Tab by mouth daily. 4/17/20   Henrry Ramirez MD   tofacitinib Toby Kessler) 5 mg tab Take  by mouth two (2) times a day. Provider, Historical   teriparatide (FORTEO) 20 mcg/dose - 600 mcg/2.4 mL pnij injection 20 mcg by SubCUTAneous route daily. Provider, Historical   cetirizine (ZYRTEC) 10 mg tablet Take  by mouth. Provider, Historical   petrolat,wht/min oil/sod chl (DRY EYES OP) Apply  to eye. Provider, Historical   acetaminophen (TYLENOL EXTRA STRENGTH) 500 mg tablet Take 1,000 mg by mouth every six (6) hours as needed for Pain. Provider, Historical   cholecalciferol (VITAMIN D3) 1,000 unit tablet Take 1,000 Units by mouth five (5) days a week.     Provider, Historical   calcium carbonate (OS-BARBARA) 500 mg calcium (1,250 mg) tablet Take 1,500 mg by mouth daily. 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    H/O calcium pyrophosphate deposition disease (CPPD) Z87.39    IBS (irritable bowel syndrome) K58.9    RA (rheumatoid arthritis) ([facility] M06.9    Sicca syndrome (HCC) M35.00    Osteoarthritis of right hip M16.11    Pain management contract agreement Z02.89    ACP (advance care planning) Z71.89    Chronic left shoulder pain M25.512, G89.29    Osteopenia M85.80    Osteopenia of multiple sites M85.89    Osteoarthritis of left hip M16.12    Closed fracture of thoracic spine without spinal cord lesion ([facility] S22.009A    Spinal stenosis, thoracolumbar region M48.05    Lumbar spine instability M53.2X6    Spinal stenosis of lumbar region with neurogenic claudication M48.062    Spinal stenosis of lumbar region M48.061    Lumbar stenosis M48.061    Nausea R11.0    Acute bilateral thoracic back pain M54.6    Anxiety F41.9    Chest pain R07.9    Essential hypertension I10     Past Medical History:   Diagnosis Date    Abdominal abscess 2009    Arthritis     Rheumatoid    Autoimmune disease ([address]     Medically induced Lupus    Back pain     Chronic pain     lower back    Colitis     Diverticulitis     Failed back syndrome     GERD (gastroesophageal reflux disease)     Hypertension     when on cyclosporins    Ill-defined condition     large torus roof of mouth    Irritable bowel syndrome     Neuropathy     bilateral hands/wrist    Osteoporosis     Pneumonia     RA (rheumatoid arthritis) ([address]     Seizures ([facility] 6-1-2008    one episode- after high dose of epinepherine       Review of Systems   Constitutional: Negative for chills and fever.    Respiratory: Negative for cough and wheezing. Cardiovascular: Negative for chest pain and palpitations. Neurological: Negative. Psychiatric/Behavioral: Negative. Objective:     Patient-Reported Vitals 11/10/2020   Patient-Reported Weight 170   Patient-Reported Height 5 ft 2 inches   Patient-Reported Pulse 56   Patient-Reported Temperature 96.7   Patient-Reported SpO2 92   Patient-Reported Systolic  0   Patient-Reported Diastolic 0      General: alert, cooperative, no distress   Mental  status: normal mood, behavior, speech, dress, motor activity, and thought processes, able to follow commands   HENT: NCAT   Neck: no visualized mass   Resp: no respiratory distress   Neuro: no gross deficits   Skin: no discoloration or lesions of concern on visible areas   Psychiatric: normal affect, consistent with stated mood, no evidence of hallucinations     Additional exam findings: We discussed the expected course, resolution and complications of the diagnosis(es) in detail. Medication risks, benefits, costs, interactions, and alternatives were discussed as indicated. I advised her to contact the office if her condition worsens, changes or fails to improve as anticipated. She expressed understanding with the diagnosis(es) and plan. Karen Moy, who was evaluated through a patient-initiated, synchronous (real-time) audio-video encounter, and/or her healthcare decision maker, is aware that it is a billable service, with coverage as determined by her insurance carrier. She provided verbal consent to proceed: , and patient identification was verified. It was conducted pursuant to the emergency declaration under the [facility], [address] [place] [address] authority and the [employer] Act. A caregiver was present when appropriate. Ability to conduct physical exam was limited. I was in the office. The patient was at home.       Juan M Barnhart MD

## 2021-01-10 ENCOUNTER — TELEPHONE (OUTPATIENT)
Dept: FAMILY MEDICINE CLINIC | Age: 71
End: 2021-01-10

## 2021-01-10 NOTE — TELEPHONE ENCOUNTER
Patient called with fever and abd pain. Has a history of colitis and diverticulitis. She has had lower abd pain. No blood in stool. She also has a GI doc who she sees and last had a colonoscopy about 6 months ago. I have advised that she should have an eval in urgent care however she wanted to wait it out until Monday. I told her this may be ill advised to let this go without an evaluation this weekend. She said if she is not feeling better she will go to the ER. I told her to try her GI doc as well.

## 2021-01-11 ENCOUNTER — VIRTUAL VISIT (OUTPATIENT)
Dept: FAMILY MEDICINE CLINIC | Age: 71
End: 2021-01-11
Payer: MEDICARE

## 2021-01-11 DIAGNOSIS — K57.92 DIVERTICULITIS: Primary | ICD-10-CM

## 2021-01-11 PROCEDURE — 99442 PR PHYS/QHP TELEPHONE EVALUATION 11-20 MIN: CPT | Performed by: FAMILY MEDICINE

## 2021-01-11 RX ORDER — CIPROFLOXACIN 500 MG/1
500 TABLET ORAL 2 TIMES DAILY
Qty: 20 TAB | Refills: 1 | Status: SHIPPED | OUTPATIENT
Start: 2021-01-11 | End: 2021-01-21

## 2021-01-11 RX ORDER — METRONIDAZOLE 500 MG/1
500 TABLET ORAL 3 TIMES DAILY
Qty: 30 TAB | Refills: 1 | Status: SHIPPED | OUTPATIENT
Start: 2021-01-11 | End: 2021-01-21

## 2021-01-11 NOTE — PROGRESS NOTES
Patient c/o diverticulitis that began on Friday 1/9/21. She says she has been having fever and bloody diarrhea. She says she also has left abdominal pain. She has began taking Clindamycin 150 mg every 6 hours since Saturday. She says her fever has resolved but she still has the pain and bloody diarrhea. 1. Have you been to the ER, urgent care clinic since your last visit? Hospitalized since your last visit? No  2. Have you seen or consulted any other health care providers outside of the [address] [place] [address] since your last visit? Include any pap smears or colon screening. No    Medication reconciliation has been completed with patient. Care team discussed/updated as well as pharmacy.     Health Maintenance Due   Topic Date Due    Shingrix Vaccine Age 49> (1 of 2) 03/10/1998    Breast Cancer Screen Mammogram  07/18/2020    GLAUCOMA SCREENING Q2Y  08/11/2020

## 2021-01-11 NOTE — PROGRESS NOTES
Rand Rapp is a 65 y.o. female, evaluated via audio-only technology on 1/11/2021 for Rectal Bleeding and Abdominal Pain  . Assessment & Plan:   Diagnoses and all orders for this visit:    1. Diverticulitis  -     ciprofloxacin HCl (CIPRO) 500 mg tablet; Take 1 Tab by mouth two (2) times a day for 10 days. -     metroNIDAZOLE (FLAGYL) 500 mg tablet; Take 1 Tab by mouth three (3) times daily for 10 days. 12  Subjective:       Prior to Admission medications    Medication Sig Start Date End Date Taking? Authorizing Provider   zolpidem (AMBIEN) 5 mg tablet Take 1 Tab by mouth nightly as needed for Sleep. Take 1 Tab by Mouth Nightly As Needed. 1/4/21  Yes Sandy Lo MD   traMADoL (Ultram ER) 100 mg Tb24 Take 1 Tab by mouth daily for 90 days. Max Daily Amount: 100 mg. 11/2/20 1/31/21 Yes Sandy Lo MD   lisinopriL (PRINIVIL, ZESTRIL) 10 mg tablet TAKE 1 TABLET DAILY 7/7/20  Yes Sandy Lo MD   meloxicam (MOBIC) 15 mg tablet Take 1 Tab by mouth daily. 4/17/20  Yes Sandy Lo MD   tofacitinib Norrine Hope) 5 mg tab Take  by mouth two (2) times a day. Yes Provider, Historical   cetirizine (ZYRTEC) 10 mg tablet Take  by mouth. Yes Provider, Historical   petrolat,wht/min oil/sod chl (DRY EYES OP) Apply  to eye. Yes Provider, Historical   acetaminophen (TYLENOL EXTRA STRENGTH) 500 mg tablet Take 1,000 mg by mouth every six (6) hours as needed for Pain. Yes Provider, Historical   cholecalciferol (VITAMIN D3) 1,000 unit tablet Take 1,000 Units by mouth five (5) days a week. Yes Provider, Historical   calcium carbonate (OS-BARBARA) 500 mg calcium (1,250 mg) tablet Take 1,500 mg by mouth daily. Yes Provider, Historical   efinaconazole (JUBLIA) tyrone topical solution Apply  to affected area daily. 11/10/20   Sandy Lo MD   traMADoL (ULTRAM-ER) 100 mg tablet Take 1 Tab by mouth daily for 90 days.  Max Daily Amount: 100 mg. 11/10/20 1/11/21  Sandy Lo MD   teriparatide (FORTEO) 20 mcg/dose - 600 mcg/2.4 mL pnij injection 20 mcg by SubCUTAneous route daily. 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    H/O calcium pyrophosphate deposition disease (CPPD) Z87.39    IBS (irritable bowel syndrome) K58.9    RA (rheumatoid arthritis) ([facility] M06.9    Sicca syndrome (HCC) M35.00    Osteoarthritis of right hip M16.11    Pain management contract agreement Z02.89    ACP (advance care planning) Z71.89    Chronic left shoulder pain M25.512, G89.29    Osteopenia M85.80    Osteopenia of multiple sites M85.89    Osteoarthritis of left hip M16.12    Closed fracture of thoracic spine without spinal cord lesion ([facility] S22.009A    Spinal stenosis, thoracolumbar region M48.05    Lumbar spine instability M53.2X6    Spinal stenosis of lumbar region with neurogenic claudication M48.062    Spinal stenosis of lumbar region M48.061    Lumbar stenosis M48.061    Nausea R11.0    Acute bilateral thoracic back pain M54.6    Anxiety F41.9    Chest pain R07.9    Essential hypertension I10     Past Medical History:   Diagnosis Date    Abdominal abscess 2009    Arthritis     Rheumatoid    Autoimmune disease ([address]     Medically induced Lupus    Back pain     Chronic pain     lower back    Colitis     Diverticulitis     Failed back syndrome     GERD (gastroesophageal reflux disease)     Hypertension     when on cyclosporins    Ill-defined condition     large torus roof of mouth    Irritable bowel syndrome     Neuropathy     bilateral hands/wrist    Osteoporosis     Pneumonia     RA (rheumatoid arthritis) ([address]     Seizures ([facility] 6-1-2008    one episode- after high dose of epinepherine       Review of Systems   Constitutional: Negative for chills and fever.    Respiratory: Negative for cough. Cardiovascular: Negative for chest pain. Gastrointestinal: Positive for blood in stool. Negative for nausea and vomiting. Psychiatric/Behavioral: Negative. Patient-Reported Vitals 11/10/2020   Patient-Reported Weight 170   Patient-Reported Height 5 ft 2 inches   Patient-Reported Pulse 56   Patient-Reported Temperature 96.7   Patient-Reported SpO2 92   Patient-Reported Systolic  0   Patient-Reported Diastolic 0        Ramesh Mays, who was evaluated through a patient-initiated, synchronous (real-time) audio only encounter, and/or her healthcare decision maker, is aware that it is a billable service, with coverage as determined by her insurance carrier. She provided verbal consent to proceed: Yes. She has not had a related appointment within my department in the past 7 days or scheduled within the next 24 hours.       Total Time: minutes: 11-20 minutes    Kareen Hernandez MD

## 2021-01-19 DIAGNOSIS — G47.00 INSOMNIA: ICD-10-CM

## 2021-01-19 RX ORDER — ZOLPIDEM TARTRATE 5 MG
TABLET ORAL
Qty: 90 TAB | Refills: 0 | Status: SHIPPED | OUTPATIENT
Start: 2021-01-19 | End: 2021-06-07 | Stop reason: SDUPTHER

## 2021-01-30 ENCOUNTER — PATIENT MESSAGE (OUTPATIENT)
Dept: FAMILY MEDICINE CLINIC | Age: 71
End: 2021-01-30

## 2021-01-30 DIAGNOSIS — M54.6 ACUTE BILATERAL THORACIC BACK PAIN: ICD-10-CM

## 2021-02-01 RX ORDER — TRAMADOL HYDROCHLORIDE 100 MG/1
100 TABLET, EXTENDED RELEASE ORAL DAILY
Qty: 90 TAB | Refills: 0 | Status: SHIPPED | OUTPATIENT
Start: 2021-02-01 | End: 2021-04-16 | Stop reason: SDUPTHER

## 2021-02-05 ENCOUNTER — PATIENT MESSAGE (OUTPATIENT)
Dept: FAMILY MEDICINE CLINIC | Age: 71
End: 2021-02-05

## 2021-02-05 DIAGNOSIS — D49.2 SKIN GROWTH: ICD-10-CM

## 2021-02-05 DIAGNOSIS — L98.9 SKIN LESION: ICD-10-CM

## 2021-02-05 DIAGNOSIS — Z12.83 SKIN CANCER SCREENING: Primary | ICD-10-CM

## 2021-02-24 DIAGNOSIS — I10 ESSENTIAL HYPERTENSION WITH GOAL BLOOD PRESSURE LESS THAN 130/85: Primary | ICD-10-CM

## 2021-02-24 DIAGNOSIS — T78.40XS ALLERGIC REACTION, SEQUELA: Primary | ICD-10-CM

## 2021-02-24 RX ORDER — TELMISARTAN 20 MG/1
20 TABLET ORAL DAILY
Qty: 30 TAB | Refills: 2 | Status: SHIPPED | OUTPATIENT
Start: 2021-02-24 | End: 2021-06-01

## 2021-02-24 RX ORDER — EPINEPHRINE 0.3 MG/.3ML
0.3 INJECTION SUBCUTANEOUS
Qty: 0.3 ML | Refills: 0 | Status: SHIPPED | OUTPATIENT
Start: 2021-02-24 | End: 2021-02-24

## 2021-03-15 ENCOUNTER — VIRTUAL VISIT (OUTPATIENT)
Dept: FAMILY MEDICINE CLINIC | Age: 71
End: 2021-03-15
Payer: MEDICARE

## 2021-03-15 DIAGNOSIS — T50.905A THROMBOCYTOPENIA DUE TO DRUGS: Primary | ICD-10-CM

## 2021-03-15 DIAGNOSIS — D69.59 THROMBOCYTOPENIA DUE TO DRUGS: Primary | ICD-10-CM

## 2021-03-15 DIAGNOSIS — M45.9 RHEUMATOID ARTHRITIS INVOLVING VERTEBRA WITH POSITIVE RHEUMATOID FACTOR (HCC): ICD-10-CM

## 2021-03-15 PROCEDURE — G8756 NO BP MEASURE DOC: HCPCS | Performed by: FAMILY MEDICINE

## 2021-03-15 PROCEDURE — G8399 PT W/DXA RESULTS DOCUMENT: HCPCS | Performed by: FAMILY MEDICINE

## 2021-03-15 PROCEDURE — G8417 CALC BMI ABV UP PARAM F/U: HCPCS | Performed by: FAMILY MEDICINE

## 2021-03-15 PROCEDURE — G8536 NO DOC ELDER MAL SCRN: HCPCS | Performed by: FAMILY MEDICINE

## 2021-03-15 PROCEDURE — 1090F PRES/ABSN URINE INCON ASSESS: CPT | Performed by: FAMILY MEDICINE

## 2021-03-15 PROCEDURE — G8432 DEP SCR NOT DOC, RNG: HCPCS | Performed by: FAMILY MEDICINE

## 2021-03-15 PROCEDURE — 99212 OFFICE O/P EST SF 10 MIN: CPT | Performed by: FAMILY MEDICINE

## 2021-03-15 PROCEDURE — G8427 DOCREV CUR MEDS BY ELIG CLIN: HCPCS | Performed by: FAMILY MEDICINE

## 2021-03-15 PROCEDURE — 3017F COLORECTAL CA SCREEN DOC REV: CPT | Performed by: FAMILY MEDICINE

## 2021-03-15 PROCEDURE — 1101F PT FALLS ASSESS-DOCD LE1/YR: CPT | Performed by: FAMILY MEDICINE

## 2021-03-15 NOTE — PROGRESS NOTES
Jeff Le is a 66 y.o. female who was seen by synchronous (real-time) audio-video technology on 3/15/2021 for Arm swelling (recently received COVID vaccine), Other (low blood count low platelets.  ), and Leg Swelling        Assessment & Plan:   Diagnoses and all orders for this visit:    1. Thrombocytopenia due to drugs  -     REFERRAL TO HEMATOLOGY    2. Rheumatoid arthritis involving vertebra with positive rheumatoid factor ([address]      sxs started after she took first covid vaccine. 712  Subjective:       Prior to Admission medications    Medication Sig Start Date End Date Taking? Authorizing Provider   telmisartan (MICARDIS) 20 mg tablet Take 1 Tab by mouth daily. 2/24/21   Cesar Rain MD   traMADoL [facility]) 100 mg Tb24 Take 1 Tab by mouth daily for 90 days. Max Daily Amount: 100 mg. 2/1/21 5/2/21  Cesar Rain MD   Ambien 5 mg tablet TAKE 1 TABLET NIGHTLY AS NEEDED FOR SLEEP 1/19/21   Cesar Rain MD   efinaconazole (JUBLIA) tyrone topical solution Apply  to affected area daily. 11/10/20   Cesar Rain MD   meloxicam (MOBIC) 15 mg tablet Take 1 Tab by mouth daily. 4/17/20   Cesar Rain MD   tofacitinib [facility]) 5 mg tab Take  by mouth two (2) times a day. Provider, Historical   teriparatide (FORTEO) 20 mcg/dose - 600 mcg/2.4 mL pnij injection 20 mcg by SubCUTAneous route daily. Provider, Historical   cetirizine (ZYRTEC) 10 mg tablet Take  by mouth. Provider, Historical   petrolat,wht/min oil/sod chl (DRY EYES OP) Apply  to eye. Provider, Historical   acetaminophen (TYLENOL EXTRA STRENGTH) 500 mg tablet Take 1,000 mg by mouth every six (6) hours as needed for Pain. Provider, Historical   cholecalciferol (VITAMIN D3) 1,000 unit tablet Take 1,000 Units by mouth five (5) days a week. Provider, Historical   calcium carbonate (OS-BARBARA) 500 mg calcium (1,250 mg) tablet Take 1,500 mg by mouth daily.     Provider, Historical         Review of Systems   Constitutional: Negative for chills and fever. Respiratory: Negative for cough and shortness of breath. Cardiovascular: Negative for chest pain and palpitations. Endo/Heme/Allergies: Bruises/bleeds easily. Psychiatric/Behavioral: Negative. Objective:     Patient-Reported Vitals 11/10/2020   Patient-Reported Weight 170   Patient-Reported Height 5 ft 2 inches   Patient-Reported Pulse 56   Patient-Reported Temperature 96.7   Patient-Reported SpO2 92   Patient-Reported Systolic  0   Patient-Reported Diastolic 0      General: alert, cooperative, no distress   Mental  status: normal mood, behavior, speech, dress, motor activity, and thought processes, able to follow commands   HENT: NCAT   Neck: no visualized mass   Resp: no respiratory distress   Neuro: no gross deficits   Skin: no discoloration or lesions of concern on visible areas   Psychiatric: normal affect, consistent with stated mood, no evidence of hallucinations     Additional exam findings: We discussed the expected course, resolution and complications of the diagnosis(es) in detail. Medication risks, benefits, costs, interactions, and alternatives were discussed as indicated. I advised her to contact the office if her condition worsens, changes or fails to improve as anticipated. She expressed understanding with the diagnosis(es) and plan. Rand Rapp, was evaluated through a synchronous (real-time) audio-video encounter. The patient (or guardian if applicable) is aware that this is a billable service. Verbal consent to proceed has been obtained within the past 12 months. The visit was conducted pursuant to the emergency declaration under the [address] [place] [address] authority and the [employer] General Act. Patient identification was verified, and a caregiver was present when appropriate.  The patient was located in a state where the provider was credentialed to provide care.    Maddison Stevens MD

## 2021-03-16 DIAGNOSIS — M51.36 DDD (DEGENERATIVE DISC DISEASE), LUMBAR: ICD-10-CM

## 2021-03-17 RX ORDER — MELOXICAM 15 MG/1
TABLET ORAL
Qty: 90 TAB | Refills: 3 | Status: SHIPPED | OUTPATIENT
Start: 2021-03-17 | End: 2022-09-14 | Stop reason: SDUPTHER

## 2021-03-29 ENCOUNTER — NURSE TRIAGE (OUTPATIENT)
Dept: OTHER | Facility: CLINIC | Age: 71
End: 2021-03-29

## 2021-03-29 NOTE — TELEPHONE ENCOUNTER
[facility] [place] [address] [facility] Brief description of triage: see below    Triage indicates for patient to go to ED now. Pt refusing to go to ED. Stated she will go in if her temperature goes higher. Again recommended pt to go to ED and to call 911 if it's emergency. Pt agreeable to this. Care advice provided. Recommended using local department of health website for testing locations and up to date information. Caller verbalizes understanding, denies any other questions or concerns; instructed to call back for any new or worsening symptoms. Reason for Disposition   Sounds like a severe, unusual reaction to the triager    Answer Assessment - Initial Assessment Questions  1. MAIN CONCERN OR SYMPTOM:  \"What is your main concern right now? \" \"What question do you have? \" \"What's the main symptom you're worried about? \" (e.g., fever, pain, redness, swelling)      Pt with  and fever    2. VACCINE: \"What vaccination did you receive? \" \"Is this your first or second shot? \" (e.g., none; Jacya Parvez, other)      2nd Moderna vaccine yesterday at 1pm    3. SYMPTOM ONSET: \"When did the symptoms begin? \" (e.g., not relevant; hours, days)       2pm yesterday    4. SYMPTOM SEVERITY: \"How bad is it? \"       Pt states she is ok at rest, but HR jumps up to 120 if she does anything    5. FEVER: \"Is there a fever? \" If so, ask: \"What is it, how was it measured, and when did it start? \"        Pt states 100.0    6. PAST REACTIONS: \"Have you reacted to immunizations before? \" If so, ask: \"What happened? \"      Pt denies    7. OTHER SYMPTOMS: \"Do you have any other symptoms? \"      Eyes feel foggy and /60; rash on face yesterday and took Benadryl, which helped. Rash came back today and pt took 50mg Benadryl.     Protocols used: CORONAVIRUS (COVID-19) VACCINE QUESTIONS AND REACTIONS-ADULT-

## 2021-04-16 DIAGNOSIS — M54.6 ACUTE BILATERAL THORACIC BACK PAIN: ICD-10-CM

## 2021-04-19 RX ORDER — TRAMADOL HYDROCHLORIDE 100 MG/1
100 TABLET, EXTENDED RELEASE ORAL DAILY
Qty: 90 TAB | Refills: 0 | Status: SHIPPED | OUTPATIENT
Start: 2021-04-19 | End: 2021-07-06 | Stop reason: SDUPTHER

## 2021-04-20 ENCOUNTER — VIRTUAL VISIT (OUTPATIENT)
Dept: FAMILY MEDICINE CLINIC | Age: 71
End: 2021-04-20
Payer: MEDICARE

## 2021-04-20 DIAGNOSIS — R00.0 TACHYCARDIA: Primary | ICD-10-CM

## 2021-04-20 DIAGNOSIS — I10 ESSENTIAL HYPERTENSION WITH GOAL BLOOD PRESSURE LESS THAN 130/85: ICD-10-CM

## 2021-04-20 PROCEDURE — 3017F COLORECTAL CA SCREEN DOC REV: CPT | Performed by: FAMILY MEDICINE

## 2021-04-20 PROCEDURE — 99213 OFFICE O/P EST LOW 20 MIN: CPT | Performed by: FAMILY MEDICINE

## 2021-04-20 PROCEDURE — G8756 NO BP MEASURE DOC: HCPCS | Performed by: FAMILY MEDICINE

## 2021-04-20 PROCEDURE — 1101F PT FALLS ASSESS-DOCD LE1/YR: CPT | Performed by: FAMILY MEDICINE

## 2021-04-20 PROCEDURE — 1090F PRES/ABSN URINE INCON ASSESS: CPT | Performed by: FAMILY MEDICINE

## 2021-04-20 PROCEDURE — G8417 CALC BMI ABV UP PARAM F/U: HCPCS | Performed by: FAMILY MEDICINE

## 2021-04-20 PROCEDURE — G8399 PT W/DXA RESULTS DOCUMENT: HCPCS | Performed by: FAMILY MEDICINE

## 2021-04-20 PROCEDURE — G8510 SCR DEP NEG, NO PLAN REQD: HCPCS | Performed by: FAMILY MEDICINE

## 2021-04-20 PROCEDURE — G8427 DOCREV CUR MEDS BY ELIG CLIN: HCPCS | Performed by: FAMILY MEDICINE

## 2021-04-20 PROCEDURE — G8536 NO DOC ELDER MAL SCRN: HCPCS | Performed by: FAMILY MEDICINE

## 2021-04-20 NOTE — PROGRESS NOTES
Chief Complaint   Patient presents with    Rapid Heart Rate     Pt concerns of elevated heart rate since receiving COVID vaccine. Pt has spoken with dr Gregorio Harding about this via [employer]. 1. Have you been to the ER, urgent care clinic since your last visit? Hospitalized since your last visit? No    2. Have you seen or consulted any other health care providers outside of the [address] [place] [address] since your last visit? Include any pap smears or colon screening.  No

## 2021-04-20 NOTE — PROGRESS NOTES
Luanne Montiel is a 66 y.o. female who was seen by synchronous (real-time) audio-video technology on 4/20/2021 for Rapid Heart Rate        Assessment & Plan:   Diagnoses and all orders for this visit:    1. Tachycardia  -     REFERRAL TO CARDIOLOGY    2. Essential hypertension with goal blood pressure less than 130/85          712  Subjective:       Prior to Admission medications    Medication Sig Start Date End Date Taking? Authorizing Provider   traMADoL (ULTRAM-ER) 100 mg Tb24 Take 1 Tab by mouth daily for 90 days. Max Daily Amount: 100 mg. 4/19/21 7/18/21  Rossy Penaloza MD   meloxicam (MOBIC) 15 mg tablet TAKE 1 TABLET DAILY 3/17/21   Rossy Penaloza MD   telmisartan (MICARDIS) 20 mg tablet Take 1 Tab by mouth daily. 2/24/21   Rossy Penaloza MD   Ambien 5 mg tablet TAKE 1 TABLET NIGHTLY AS NEEDED FOR SLEEP 1/19/21   Rossy Penaloza MD   efinaconazole (JUBLIA) tyrone topical solution Apply  to affected area daily. 11/10/20   Rossy Penaloza MD   [facility]ib [facility]) 5 mg tab Take  by mouth two (2) times a day. Provider, Historical   teriparatide (FORTEO) 20 mcg/dose - 600 mcg/2.4 mL pnij injection 20 mcg by SubCUTAneous route daily. Provider, Historical   cetirizine (ZYRTEC) 10 mg tablet Take  by mouth. Provider, Historical   petrolat,wht/min oil/sod chl (DRY EYES OP) Apply  to eye. Provider, Historical   acetaminophen (TYLENOL EXTRA STRENGTH) 500 mg tablet Take 1,000 mg by mouth every six (6) hours as needed for Pain. Provider, Historical   cholecalciferol (VITAMIN D3) 1,000 unit tablet Take 1,000 Units by mouth five (5) days a week. Provider, Historical   calcium carbonate (OS-BARBARA) 500 mg calcium (1,250 mg) tablet Take 1,500 mg by mouth daily.     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  H/O calcium pyrophosphate deposition disease (CPPD) Z87.39    IBS (irritable bowel syndrome) K58.9    RA (rheumatoid arthritis) ([facility]) M06.9    Sicca syndrome ([facility]) M35.00    Osteoarthritis of right hip M16.11    Pain management contract agreement Z02.89    ACP (advance care planning) Z71.89    Chronic left shoulder pain M25.512, G89.29    Osteopenia M85.80    Osteopenia of multiple sites M85.89    Osteoarthritis of left hip M16.12    Closed fracture of thoracic spine without spinal cord lesion ([address] S22.009A    Spinal stenosis, thoracolumbar region M48.05    Lumbar spine instability M53.2X6    Spinal stenosis of lumbar region with neurogenic claudication M48.062    Spinal stenosis of lumbar region M48.061    Lumbar stenosis M48.061    Nausea R11.0    Acute bilateral thoracic back pain M54.6    Anxiety F41.9    Chest pain R07.9    Essential hypertension I10     Past Medical History:   Diagnosis Date    Abdominal abscess 2009    Arthritis     Rheumatoid    Autoimmune disease ([address]     Medically induced Lupus    Back pain     Chronic pain     lower back    Colitis     Diverticulitis     Failed back syndrome     GERD (gastroesophageal reflux disease)     Hypertension     when on cyclosporins    Ill-defined condition     large torus roof of mouth    Irritable bowel syndrome     Neuropathy     bilateral hands/wrist    Osteoporosis     Pneumonia     RA (rheumatoid arthritis) ([address]     Seizures ([address] 6-1-2008    one episode- after high dose of epinepherine       Review of Systems   Constitutional: Negative for chills and fever. Respiratory: Negative. Cardiovascular: Positive for palpitations. Negative for chest pain and orthopnea. Genitourinary: Negative. Neurological: Negative. Tremors:      Psychiatric/Behavioral: Negative.         Objective:     Patient-Reported Vitals 4/20/2021   Patient-Reported Weight 175   Patient-Reported Height 5 2   Patient-Reported Pulse up to 120 if I move around   Patient-Reported Temperature 97.1   Patient-Reported SpO2 92   Patient-Reported Systolic  391   Patient-Reported Diastolic 80      General: alert, cooperative, no distress   Mental  status: normal mood, behavior, speech, dress, motor activity, and thought processes, able to follow commands   HENT: NCAT   Neck: no visualized mass   Resp: no respiratory distress   Neuro: no gross deficits   Skin: no discoloration or lesions of concern on visible areas   Psychiatric: normal affect, consistent with stated mood, no evidence of hallucinations     Additional exam findings: We discussed the expected course, resolution and complications of the diagnosis(es) in detail. Medication risks, benefits, costs, interactions, and alternatives were discussed as indicated. I advised her to contact the office if her condition worsens, changes or fails to improve as anticipated. She expressed understanding with the diagnosis(es) and plan. Nicki Lau, was evaluated through a synchronous (real-time) audio-video encounter. The patient (or guardian if applicable) is aware that this is a billable service. Verbal consent to proceed has been obtained within the past 12 months. The visit was conducted pursuant to the emergency declaration under the [facility], [address] [place] [address] authority and the [employer] and [employer] General Act. Patient identification was verified, and a caregiver was present when appropriate. The patient was located in a state where the provider was credentialed to provide care.     Benita Holbrook MD    \

## 2021-05-03 NOTE — PROGRESS NOTES
Hematology/Oncology Consultation Note      Date: 2021    Name: Astrid Leon  : 1950        Diane Caceres MD         Subjective:     Chief complaint: Low platelet counts    History of Present Illness:   Ms. Seymour Garcia is a most pleasant 66y.o. year old female who was seen for consultation of Low platelet counts. The patient has a past medical history significant of rheumatoid arthritis, SLE, diverticulitis, hypertension, seizures. She has f/u with her Rheumatologist every 4-6 months for labs check and continuing management. The patient reported her CBC obtained at her PCP office after first dose of COVID19 vaccine which reported low platelet counts. There're no available labs from her PCP for review at this time. The patient otherwise has no other complaints. Denied fever, chills, night sweat, unintentional weight loss, skin lumps or bumps, acute bleeding or bruising issues. No acute bleeding, blood in stool, dark stool, melena, hematochezia, hemoptysis, dark urine, or easily bruising. Denied headache, acute vision change, dizziness, chest pain, worsen shortness of breath, palpitation, productive cough, nausea, vomiting, abdominal pain, altered bowel habits, dysuria, worsen bone pain or back pain, new focal numbness or weakness.        Past Medical History, Family History, and Social History:    Past Medical History:   Diagnosis Date    Abdominal abscess     Arthritis     Rheumatoid    Autoimmune disease ([address]     Medically induced Lupus    Back pain     Chronic pain     lower back    Colitis     Diverticulitis     Failed back syndrome     GERD (gastroesophageal reflux disease)     Hypertension     when on cyclosporins    Ill-defined condition     large torus roof of mouth    Irritable bowel syndrome     Neuropathy     bilateral hands/wrist    Osteoporosis     Pneumonia     RA (rheumatoid arthritis) ([address]     Seizures ([address] 2008    one episode- after high dose of epinepherine     Past Surgical History:   Procedure Laterality Date    HX ABDOMINAL WALL DEFECT REPAIR      HX APPENDECTOMY      HX BACK SURGERY  12/03/2018    HX BREAST REDUCTION      HX CATARACT REMOVAL Bilateral     HX COLONOSCOPY      HX GI      repair rectal prolaspe    HX GYN      dermoid cyst    HX HEENT      tonsillectomy    HX HYSTERECTOMY  1976    HX LUMBAR FUSION      x 2    HX ORTHOPAEDIC Bilateral     CTR    HX ORTHOPAEDIC Bilateral     arthroplasty thumbs    HX ORTHOPAEDIC Left     Ankle     HX ORTHOPAEDIC Right     \"Nerve Release Elbow\"    HX OTHER SURGICAL Left 1985    vein stripping    HX SALPINGO-OOPHORECTOMY Bilateral     HX SHOULDER REPLACEMENT Left     HX UROLOGICAL      bladder repair    NE TOTAL HIP ARTHROPLASTY Bilateral      Social History     Socioeconomic History    Marital status:      Spouse name: Not on file    Number of children: Not on file    Years of education: Not on file    Highest education level: Not on file   Occupational History    Not on file   Social Needs    Financial resource strain: Not on file    Food insecurity     Worry: Not on file     Inability: Not on file    Transportation needs     Medical: Not on file     Non-medical: Not on file   Tobacco Use    Smoking status: Never Smoker    Smokeless tobacco: Never Used   Substance and Sexual Activity    Alcohol use: No    Drug use: No    Sexual activity: Never   Lifestyle    Physical activity     Days per week: Not on file     Minutes per session: Not on file    Stress: Not on file   Relationships    Social connections     Talks on phone: Not on file     Gets together: Not on file     Attends [employer] service: Not on file     Active member of club or organization: Not on file     Attends meetings of clubs or organizations: Not on file     Relationship status: Not on file    Intimate partner violence     Fear of current or ex partner: Not on file     Emotionally abused: Not on file Physically abused: Not on file     Forced sexual activity: Not on file   Other Topics Concern     Service Not Asked    Blood Transfusions Not Asked    Caffeine Concern Not Asked    Occupational Exposure Not Asked    Hobby Hazards Not Asked    Sleep Concern Not Asked    Stress Concern Not Asked    Weight Concern Not Asked    Special Diet Not Asked    Back Care Not Asked    Exercise Not Asked    Bike Helmet Not Asked   [address] Not Asked    Self-Exams Not Asked   Social History Narrative    Not on file     Family History   Problem Relation Age of Onset    Other Other         Orthopedic (Sister)   Lobito Roll Sister     Heart Attack Brother 58    Cancer Neg Hx     Diabetes Neg Hx     Heart Disease Neg Hx     Hypertension Neg Hx     Stroke Neg Hx     Malignant Hyperthermia Neg Hx     Pseudocholinesterase Deficiency Neg Hx     Delayed Awakening Neg Hx     Post-op Nausea/Vomiting Neg Hx     Emergence Delirium Neg Hx     Post-op Cognitive Dysfunction Neg Hx      Current Outpatient Medications   Medication Sig Dispense Refill    traMADoL (ULTRAM-ER) 100 mg Tb24 Take 1 Tab by mouth daily for 90 days. Max Daily Amount: 100 mg. 90 Tab 0    meloxicam (MOBIC) 15 mg tablet TAKE 1 TABLET DAILY 90 Tab 3    telmisartan (MICARDIS) 20 mg tablet Take 1 Tab by mouth daily. 30 Tab 2    Ambien 5 mg tablet TAKE 1 TABLET NIGHTLY AS NEEDED FOR SLEEP 90 Tab 0    efinaconazole (JUBLIA) tyrone topical solution Apply  to affected area daily. 8 mL 5    tofacitinib (XELJANZ) 5 mg tab Take  by mouth two (2) times a day.  teriparatide (FORTEO) 20 mcg/dose - 600 mcg/2.4 mL pnij injection 20 mcg by SubCUTAneous route daily.  cetirizine (ZYRTEC) 10 mg tablet Take  by mouth.  petrolat,wht/min oil/sod chl (DRY EYES OP) Apply  to eye.  acetaminophen (TYLENOL EXTRA STRENGTH) 500 mg tablet Take 1,000 mg by mouth every six (6) hours as needed for Pain.       cholecalciferol (VITAMIN D3) 1,000 unit tablet Take 1,000 Units by mouth five (5) days a week.  calcium carbonate (OS-BARBARA) 500 mg calcium (1,250 mg) tablet Take 1,500 mg by mouth daily. Review of Systems   Constitutional: Negative for chills, diaphoresis, fever, malaise/fatigue and weight loss. Respiratory: Negative for cough, hemoptysis, shortness of breath and wheezing. Cardiovascular: Negative for chest pain, palpitations and leg swelling. Gastrointestinal: Negative for abdominal pain, diarrhea, heartburn, nausea and vomiting. Genitourinary: Negative for dysuria, frequency, hematuria and urgency. Musculoskeletal: Negative for joint pain and myalgias. Skin: Negative for itching and rash. Neurological: Negative for dizziness, seizures, weakness and headaches. Psychiatric/Behavioral: Negative for depression. The patient does not have insomnia. Objective:     Visit Vitals  /74 (BP 1 Location: Left upper arm, BP Patient Position: Sitting, BP Cuff Size: Adult)   Pulse 67   Temp 97.2 °F (36.2 °C) (Temporal)   Resp 16   Ht 5' 2\" (1.575 m)   Wt 79.4 kg (175 lb)   SpO2 95%   BMI 32.01 kg/m²       ECOG Performance Status (grade): 0  0 - able to carry on all pre-disease activity w/out restriction  1 - restricted but able to carry out light work  2 - ambulatory and can self- care but unable to carry out work  3 - bed or chair >50% of waking hours  4 - completely disable, total care, confined to bed or chair    Physical Exam  Constitutional:       Appearance: Normal appearance. HENT:      Head: Normocephalic and atraumatic. Eyes:      Pupils: Pupils are equal, round, and reactive to light. Neck:      Musculoskeletal: Neck supple. Cardiovascular:      Rate and Rhythm: Normal rate and regular rhythm. Heart sounds: Normal heart sounds. Pulmonary:      Effort: Pulmonary effort is normal.      Breath sounds: Normal breath sounds.    Abdominal:      General: Bowel sounds are normal.      Palpations: Abdomen is soft. Tenderness: There is no abdominal tenderness. There is no guarding. Musculoskeletal: Normal range of motion. Right lower leg: No edema. Left lower leg: No edema. Skin:     General: Skin is warm. Neurological:      General: No focal deficit present. Mental Status: She is alert and oriented to person, place, and time. Mental status is at baseline. Diagnostics:      No results found for this or any previous visit (from the past 96 hour(s)). Imaging:  Results for orders placed during the hospital encounter of 02/10/20   IR INJ /ASP/ ARTHRO LARGE JOINT / BURSA    Narrative Fluoroscopically Guided right Iliopsoas therapeutic injection. History: pain    Fluoroscopic time: 0.5 minutes  Fluoroscopic dose (reference air kerma): 13 mGy     Technique:  The procedure, risks, benefits and alternatives were discussed with the patient. Signed and witnessed consent was obtained and placed as part of the patient  record. Procedure: The patient was prepped and draped in a sterile fashion. Under direct  fluoroscopic guidance, the overlying skin was marked with 1% lidocaine used for  local anesthesia. Under direct fluoroscopic observation a 22 gauge spinal  needle was placed into the right iliopsoas  tendon. For localization: 1 ml of   nonionic iodine containing contrast was injected to confirm position within this  iliopsoas tendon. After this, 10 cc of 0.25% Marcaine and 1 cc of Depo-Medrol  40 mg was injected without complication. The patient reported the procedure  well. Impression IMPRESSION:    1. Therapeutic right iliopsoas tendon injection. Results for orders placed during the hospital encounter of 09/23/20   [address] [place] [address] [facility] [employer]NOLOG[facility]    Narrative Fluoroscopy was provided for a bundled exam for documentation purposes. Impression IMPRESSION:    Please see above.     FLUORO TIME: 00.36    AJB         Results for orders placed during the hospital encounter of 12/03/18   CT ABD PELV WO CONT    Narrative CT ABDOMEN AND PELVIS WITHOUT ENHANCEMENT    CPT CODE: 51559 and [id number]. INDICATION: Abdominal pain and nausea. Previous lumbar surgery and drain  placement. .    TECHNIQUE: 5 mm collimation axial images obtained from the diaphragm to the  level of the pubic symphysis without intravenous contrast.    All CT scans at this facility are performed using dose optimization technique as  appropriate to this specific exam, to include automated exposure control,  adjustment of the mA and/or KP according to patient size or use of iterative  reconstruction techniques. COMPARISON: Prior CT 9/10/2018. ABDOMEN FINDINGS:     There is a very small right and small left-sided pleural effusion layering  dependently, new from the prior exam. There is triangular segmental atelectasis  or consolidation anterior left lower lobe and dependent the lateral right lower  lobe, also new. Liver appears normal size. No intrahepatic biliary distention. Gallbladder is relatively distended compared to the prior exam, measuring up to  4.8 cm short axis. There may be some layering dependent debris or sludge, axial  image 32, but no calcified stones are visible. -Extrahepatic common bile duct is prominent, measuring up to 10 mm diameter  proximal.   Spleen normal size and unremarkable. Streak artifact through the retroperitoneum and posterior abdominal and pelvic  structures from extensive thoracolumbar fusion hardware. Adrenal glands do not appear enlarged. Pancreas is somewhat fatty replaced, not well  from adjacent  unopacified bowel. Right kidney contains no calcified stones. No hydronephrosis. Left kidney contains no calcified stones. No hydronephrosis. Normal caliber abdominal aorta. PELVIS FINDINGS:     No small bowel distention to suggest obstruction. Colon contains small amount of dense fecal material throughout.  There is  extensive diverticulosis without any convincing evidence for acute  diverticulitis. Appears to be a trace amount of free fluid in the dependent pelvis axial image  80, nonspecific. Urinary bladder largely obscured by streak artifact from metallic hip implants. Extensive postsurgical change from multilevel laminectomy and thoracolumbar  hardware fusion. There is gas adjacent to the left diaphragmatic crux, tracking  along the minimally enlarged left psoas muscle, likely postoperative. There is  mild stranding adjacent to the psoas which could reflect some residual hematoma  given the surgical approach. Tiny amount of fluid left retroperitoneum between  the psoas and the colon approximately 2 cm diameter on image 51. There is some  additional retroperitoneal air present, tracking lateral to the descending  colon. Small amount of scattered air within the musculature of the left lower  chest and upper back with some residual muscular enlargement consistent with  hematoma. Reported recent chest tube removal.        Impression IMPRESSION:    1. Very small left and trace right pleural fluid with areas of subsegmental to  segmental presumed atelectasis locally as above. Less likely infectious  component. 2. Small amount of stranding left retroperitoneum and adjacent to the psoas  muscles with tiny amount of fluid may reflect post surgical residual hematoma. Scattered areas of gas along the psoas muscle and in the left paracolic gutter.  -Small amount of deep subcutaneous gas posterior lateral lower chest wall and  intramuscular lower back. 3. Gallbladder is distended, without appreciable wall thickening.   -Suspect some dependent sludge or debris as above. Mildly prominent common bile  duct without intrahepatic distention.   -If there is clinical concern for cholelithiasis, ultrasound could be performed  for further evaluation. 4. No bowel distention to suggest obstruction.  Diverticulosis without any  convincing evidence for acute diverticulitis. Pathology          Assessment:                                        1. Thrombocytopenia due to diminished platelet production        Plan:                                        # Thrombocytopenia   -- Past medical history significant of rheumatoid arthritis, SLE, diverticulitis, hypertension, seizures. She has f/u with her Rheumatologist every 4-6 months for labs check and continuing management. -- The patient stated she obtained CBC at her PCP office after her first dose of COVID19 vaccine, and it reported low platelet counts. There're no available labs from her PCP for review at this time. -- Today I have repeat her CBC and then reviewed with the patient about lab reports. 5/4/2021 CBC reported hemoglobin 13.7, hematocrit 41.3%, WBC 7.4, and platelet 127,210.  -- I have explained to the patient that based on her CBC today, her low platelet count has resolved. It could be transient issues after COVID vaccination. Clinical stable. -- The patient will continue to monitor CBC with her PCP and Rheumatologist.  -- We have instructed the patient to seek a prompt medical attention if she notices any acute bleeding or if any bruising episodes. -- The patient will go back to our service as needed. No orders of the defined types were placed in this encounter. Ms. Gelacio Thomas has a reminder for a \"due or due soon\" health maintenance. I have asked that she contact her primary care provider for follow-up on this health maintenance. All of patient's questions answered to their apparent satisfaction. They verbally show understanding and agreement with aforementioned plan. I would like to thank Dr Karissa Molina MD  for allowing me to participate in the care of this very pleasant patient. If I can be of further assistance please do not hesitate to call.          Reuben Freeman MD  5/4/2021          About 45 minutes were spent for this encounter with more than 50% of the time spent in face-to-face counseling, discussing on diagnosis and management plan going forward, and co-ordination of care. Parts of this document has been produced using Dragon dictation system. Unrecognized errors in transcription may be present. Please do not hesitate to reach out for any questions or clarifications.       CC: Higinio Damian MD

## 2021-05-04 ENCOUNTER — HOSPITAL ENCOUNTER (OUTPATIENT)
Dept: ONCOLOGY | Age: 71
Discharge: HOME | End: 2021-05-04

## 2021-05-04 ENCOUNTER — OFFICE VISIT (OUTPATIENT)
Dept: ONCOLOGY | Age: 71
End: 2021-05-04
Payer: MEDICARE

## 2021-05-04 VITALS
TEMPERATURE: 97.2 F | OXYGEN SATURATION: 95 % | SYSTOLIC BLOOD PRESSURE: 103 MMHG | DIASTOLIC BLOOD PRESSURE: 74 MMHG | BODY MASS INDEX: 32.2 KG/M2 | WEIGHT: 175 LBS | RESPIRATION RATE: 16 BRPM | HEIGHT: 62 IN | HEART RATE: 67 BPM

## 2021-05-04 DIAGNOSIS — D69.59 THROMBOCYTOPENIA DUE TO DIMINISHED PLATELET PRODUCTION: Primary | ICD-10-CM

## 2021-05-04 DIAGNOSIS — D69.59 THROMBOCYTOPENIA DUE TO DIMINISHED PLATELET PRODUCTION: ICD-10-CM

## 2021-05-04 PROBLEM — K11.5 SIALOLITHIASIS: Status: ACTIVE | Noted: 2019-05-20

## 2021-05-04 PROBLEM — R20.2 NUMBNESS AND TINGLING OF RIGHT ARM: Status: ACTIVE | Noted: 2021-05-04

## 2021-05-04 PROBLEM — M81.0 OSTEOPOROSIS: Status: ACTIVE | Noted: 2021-05-04

## 2021-05-04 PROBLEM — K13.79 LESION OF HARD PALATE: Status: ACTIVE | Noted: 2018-07-05

## 2021-05-04 PROBLEM — R33.9 URINARY RETENTION WITH INCOMPLETE BLADDER EMPTYING: Status: ACTIVE | Noted: 2021-05-04

## 2021-05-04 PROBLEM — L98.9 LESION OF NECK: Status: ACTIVE | Noted: 2019-05-20

## 2021-05-04 PROBLEM — R20.0 NUMBNESS AND TINGLING OF RIGHT ARM: Status: ACTIVE | Noted: 2021-05-04

## 2021-05-04 PROBLEM — G47.00 INSOMNIA: Status: ACTIVE | Noted: 2021-05-04

## 2021-05-04 PROBLEM — H26.9 CATARACTS, BILATERAL: Status: ACTIVE | Noted: 2021-05-04

## 2021-05-04 LAB
BASO+EOS+MONOS # BLD AUTO: 0.7 K/UL (ref 0–2.3)
BASO+EOS+MONOS NFR BLD AUTO: 9 % (ref 0.1–17)
DIFFERENTIAL METHOD BLD: NORMAL
ERYTHROCYTE [DISTWIDTH] IN BLOOD BY AUTOMATED COUNT: 12.3 % (ref 11.5–14.5)
HCT VFR BLD AUTO: 41.3 % (ref 36–48)
HGB BLD-MCNC: 13.7 G/DL (ref 12–16)
LYMPHOCYTES # BLD: 2.3 K/UL (ref 1.1–5.9)
LYMPHOCYTES NFR BLD: 31 % (ref 14–44)
MCH RBC QN AUTO: 30.9 PG (ref 25–35)
MCHC RBC AUTO-ENTMCNC: 33.2 G/DL (ref 31–37)
MCV RBC AUTO: 93 FL (ref 78–102)
NEUTS SEG # BLD: 4.4 K/UL (ref 1.8–9.5)
NEUTS SEG NFR BLD: 60 % (ref 40–70)
PLATELET # BLD AUTO: 254 K/UL (ref 140–440)
RBC # BLD AUTO: 4.44 M/UL (ref 4.1–5.1)
WBC # BLD AUTO: 7.4 K/UL (ref 4.5–13)

## 2021-05-04 PROCEDURE — G0463 HOSPITAL OUTPT CLINIC VISIT: HCPCS | Performed by: INTERNAL MEDICINE

## 2021-05-04 PROCEDURE — 85025 COMPLETE CBC W/AUTO DIFF WBC: CPT | Performed by: INTERNAL MEDICINE

## 2021-05-04 PROCEDURE — 99204 OFFICE O/P NEW MOD 45 MIN: CPT | Performed by: INTERNAL MEDICINE

## 2021-05-04 RX ORDER — DOXYCYCLINE 100 MG/1
CAPSULE ORAL
COMMUNITY
Start: 2021-04-29 | End: 2021-07-26

## 2021-05-19 ENCOUNTER — TELEPHONE (OUTPATIENT)
Dept: CARDIOLOGY CLINIC | Age: 71
End: 2021-05-19

## 2021-05-19 NOTE — TELEPHONE ENCOUNTER
Called and spoke with patient she is aware that her appointment is scheduled with Dr Raheem King on 6/1/21. She is being referred by Corrinne Guthrie, MD to be seen for gaudencio.

## 2021-05-27 ENCOUNTER — TRANSCRIBE ORDER (OUTPATIENT)
Dept: SCHEDULING | Age: 71
End: 2021-05-27

## 2021-05-27 DIAGNOSIS — S32.009K PSEUDOARTHROSIS OF LUMBAR SPINE: Primary | ICD-10-CM

## 2021-05-27 DIAGNOSIS — M96.1 POST LAMINECTOMY SYNDROME: ICD-10-CM

## 2021-05-27 DIAGNOSIS — M43.10 SPONDYLOLISTHESIS: ICD-10-CM

## 2021-06-01 ENCOUNTER — OFFICE VISIT (OUTPATIENT)
Dept: CARDIOLOGY CLINIC | Age: 71
End: 2021-06-01
Payer: MEDICARE

## 2021-06-01 VITALS
DIASTOLIC BLOOD PRESSURE: 70 MMHG | WEIGHT: 174 LBS | HEIGHT: 62 IN | HEART RATE: 57 BPM | BODY MASS INDEX: 32.02 KG/M2 | SYSTOLIC BLOOD PRESSURE: 99 MMHG

## 2021-06-01 DIAGNOSIS — M06.9 RHEUMATOID ARTHRITIS, INVOLVING UNSPECIFIED SITE, UNSPECIFIED WHETHER RHEUMATOID FACTOR PRESENT (HCC): ICD-10-CM

## 2021-06-01 DIAGNOSIS — R06.02 SHORTNESS OF BREATH: ICD-10-CM

## 2021-06-01 DIAGNOSIS — I10 ESSENTIAL HYPERTENSION: ICD-10-CM

## 2021-06-01 DIAGNOSIS — R00.0 TACHYCARDIA: Primary | ICD-10-CM

## 2021-06-01 PROCEDURE — G8427 DOCREV CUR MEDS BY ELIG CLIN: HCPCS | Performed by: INTERNAL MEDICINE

## 2021-06-01 PROCEDURE — G8536 NO DOC ELDER MAL SCRN: HCPCS | Performed by: INTERNAL MEDICINE

## 2021-06-01 PROCEDURE — G8417 CALC BMI ABV UP PARAM F/U: HCPCS | Performed by: INTERNAL MEDICINE

## 2021-06-01 PROCEDURE — 3017F COLORECTAL CA SCREEN DOC REV: CPT | Performed by: INTERNAL MEDICINE

## 2021-06-01 PROCEDURE — 1101F PT FALLS ASSESS-DOCD LE1/YR: CPT | Performed by: INTERNAL MEDICINE

## 2021-06-01 PROCEDURE — 1090F PRES/ABSN URINE INCON ASSESS: CPT | Performed by: INTERNAL MEDICINE

## 2021-06-01 PROCEDURE — 99214 OFFICE O/P EST MOD 30 MIN: CPT | Performed by: INTERNAL MEDICINE

## 2021-06-01 PROCEDURE — G8752 SYS BP LESS 140: HCPCS | Performed by: INTERNAL MEDICINE

## 2021-06-01 PROCEDURE — G8432 DEP SCR NOT DOC, RNG: HCPCS | Performed by: INTERNAL MEDICINE

## 2021-06-01 PROCEDURE — 93000 ELECTROCARDIOGRAM COMPLETE: CPT | Performed by: INTERNAL MEDICINE

## 2021-06-01 PROCEDURE — G8754 DIAS BP LESS 90: HCPCS | Performed by: INTERNAL MEDICINE

## 2021-06-01 RX ORDER — LISINOPRIL 5 MG/1
TABLET ORAL DAILY
COMMUNITY
End: 2021-07-06 | Stop reason: SDUPTHER

## 2021-06-01 NOTE — PROGRESS NOTES
HISTORY OF PRESENT ILLNESS  Olivia Fothergill is a 66 y.o. female. 6/1/2021  Patient seen today for new patient evaluation. She is referred here for evaluation of tachycardia. For past few weeks she has been having episode of sudden tachycardia with heart rate going in 120s. She is short of breath at that time. Denies any dizziness or syncope. She has history of rheumatoid arthritis with extensive joint problems including back problem that is being evaluated by Dr. Ash Oswald. She has no prior cardiac history. She has history of hypertension on treatment. Review of Systems   Constitutional: Negative for chills and fever. HENT: Negative for nosebleeds. Eyes: Negative for blurred vision and double vision. Respiratory: Positive for shortness of breath. Negative for cough, hemoptysis, sputum production and wheezing. Cardiovascular: Positive for palpitations. Negative for chest pain, orthopnea, claudication, leg swelling and PND. Gastrointestinal: Negative for abdominal pain, heartburn, nausea and vomiting. Musculoskeletal: Negative for myalgias. Skin: Negative for rash. Neurological: Negative for dizziness, weakness and headaches. Endo/Heme/Allergies: Does not bruise/bleed easily.      Family History   Problem Relation Age of Onset    Other Other         Orthopedic (Sister)   Isatu Hinojosay Arthritis-osteo Sister     Heart Attack Brother 58    Cancer Neg Hx     Diabetes Neg Hx     Heart Disease Neg Hx     Hypertension Neg Hx     Stroke Neg Hx     Malignant Hyperthermia Neg Hx     Pseudocholinesterase Deficiency Neg Hx     Delayed Awakening Neg Hx     Post-op Nausea/Vomiting Neg Hx     Emergence Delirium Neg Hx     Post-op Cognitive Dysfunction Neg Hx        Past Medical History:   Diagnosis Date    Abdominal abscess 2009    Arthritis     Rheumatoid    Autoimmune disease ([facility] [address]     Medically induced Lupus    Back pain     Chronic pain     lower back    Colitis     Diverticulitis     Failed back syndrome     GERD (gastroesophageal reflux disease)     Hypertension     when on cyclosporins    Ill-defined condition     large torus roof of mouth    Irritable bowel syndrome     Neuropathy     bilateral hands/wrist    Osteoporosis     Pneumonia     RA [facility]heumatoid arthritis) [facility] [address]     Seizures ([facility] [address] 6-1-2008    one episode- after high dose of epinepherine       Past Surgical History:   Procedure Laterality Date    HX ABDOMINAL WALL DEFECT REPAIR      HX APPENDECTOMY      HX BACK SURGERY  12/03/2018    HX BREAST REDUCTION      HX CATARACT REMOVAL Bilateral     HX COLONOSCOPY      HX GI      repair rectal prolaspe    HX GYN      dermoid cyst    HX HEENT      tonsillectomy    HX HYSTERECTOMY  1976    HX LUMBAR FUSION      x 2    HX ORTHOPAEDIC Bilateral     CTR    HX ORTHOPAEDIC Bilateral     arthroplasty thumbs    HX ORTHOPAEDIC Left     Ankle     HX ORTHOPAEDIC Right     \"Nerve Release Elbow\"    HX OTHER SURGICAL Left 1985    vein stripping    HX SALPINGO-OOPHORECTOMY Bilateral     HX SHOULDER REPLACEMENT Left     HX UROLOGICAL      bladder repair    MD TOTAL HIP ARTHROPLASTY Bilateral        Social History     Tobacco Use    Smoking status: Never Smoker    Smokeless tobacco: Never Used   Substance Use Topics    Alcohol use: No       Allergies   Allergen Reactions    Celebrex [Celecoxib] Swelling    Shellfish Derived Swelling     \"makes me feel puffy\"    Sulfa (Sulfonamide Antibiotics) Hives and Swelling     Lips swelling    Gabapentin Diarrhea and Palpitations     Chest and Abdominal pain    Humira [Adalimumab] Other (comments)     Lupus    Influenza Virus Vaccines Hives    Iodine Itching    Optiray 320 [Ioversol] Hives and Itching     Pt states when she gets iv contrast she itches a little from hives?  Penicillins Hives    Shrimp Hives       Prior to Admission medications    Medication Sig Start Date End Date Taking?  Authorizing Provider   lisinopriL (PRINIVIL, ZESTRIL) 5 mg tablet Take  by mouth daily. Yes Provider, Historical   doxycycline (VIBRAMYCIN) 100 mg capsule take 1 capsule by mouth twice a day 4/29/21  Yes Provider, Historical   traMADoL (ULTRAM-ER) 100 mg Tb24 Take 1 Tab by mouth daily for 90 days. Max Daily Amount: 100 mg. 4/19/21 7/18/21 Yes Jimbo Brady MD   meloxicam (MOBIC) 15 mg tablet TAKE 1 TABLET DAILY 3/17/21  Yes Jimbo Brady MD   Ambien 5 mg tablet TAKE 1 TABLET NIGHTLY AS NEEDED FOR SLEEP 1/19/21  Yes Jimbo Brady MD   efinaconazole (JUBLIA) tyrone topical solution Apply  to affected area daily. 11/10/20  Yes Jimbo Brady MD   tofacitinib Cindy Scientologist) 5 mg tab Take  by mouth two (2) times a day. Yes Provider, Historical   teriparatide (FORTEO) 20 mcg/dose - 600 mcg/2.4 mL pnij injection 20 mcg by SubCUTAneous route daily. Yes Provider, Historical   cetirizine (ZYRTEC) 10 mg tablet Take  by mouth. Yes Provider, Historical   petrolat,wht/min oil/sod chl (DRY EYES OP) Apply  to eye. Yes Provider, Historical   acetaminophen (TYLENOL EXTRA STRENGTH) 500 mg tablet Take 1,000 mg by mouth every six (6) hours as needed for Pain. Yes Provider, Historical   cholecalciferol (VITAMIN D3) 1,000 unit tablet Take 1,000 Units by mouth five (5) days a week. Yes Provider, Historical   calcium carbonate (OS-BARBARA) 500 mg calcium (1,250 mg) tablet Take 1,500 mg by mouth daily. Yes Provider, Historical         Visit Vitals  BP 99/70   Pulse (!) 57   Ht 5' 2\" (1.575 m)   Wt 78.9 kg (174 lb)   BMI 31.83 kg/m²       Physical Exam  Constitutional:       Appearance: She is well-developed. HENT:      Head: Normocephalic and atraumatic. Eyes:      Conjunctiva/sclera: Conjunctivae normal.   Neck:      Thyroid: No thyromegaly. Vascular: No JVD. Trachea: No tracheal deviation. Cardiovascular:      Rate and Rhythm: Normal rate and regular rhythm. Chest Wall: PMI is not displaced. Pulses: No decreased pulses.       Heart sounds: No murmur heard. No gallop. No S3 sounds. Pulmonary:      Effort: No respiratory distress. Breath sounds: No wheezing or rales. Chest:      Chest wall: No tenderness. Abdominal:      Palpations: Abdomen is soft. Tenderness: There is no abdominal tenderness. Musculoskeletal:      Cervical back: Neck supple. Skin:     General: Skin is warm. Neurological:      Mental Status: She is alert and oriented to person, place, and time. Ms. Cuca Flores has a reminder for a \"due or due soon\" health maintenance. I have asked that she contact her primary care provider for follow-up on this health maintenance. No flowsheet data found. I have personally reviewed patient's records available from hospital and other providers and incorporated findings in patient care. Notes, lab,Old EKG, old CAT scan, old chest x-ray    Assessment         ICD-10-CM ICD-9-CM    1. Tachycardia  R00.0 785.0 AMB POC EKG ROUTINE W/ 12 LEADS, INTER & REP      CARDIAC EVENT MONITOR      ECHO ADULT COMPLETE    Episodes of tachycardia palpitation and shortness of breath. Rule out arrhythmia tachycardia   2. Essential hypertension  I10 401.9     Low normal blood pressure monitor   3. Rheumatoid arthritis, involving unspecified site, unspecified whether rheumatoid factor present ([facility] [address]  M06.9 714.0 CARDIAC EVENT MONITOR      ECHO ADULT COMPLETE    Multiple joints. Currently on treatment. Rule out pulmonary hypertension or valvular disease   4. Shortness of breath  R06.02 786.05 CARDIAC EVENT MONITOR      ECHO ADULT COMPLETE    -On exertion. Worse with tachycardia   6/2021  Seen for tachycardia palpitation shortness of breath. History of hypertension and rheumatoid arthritis. Set up for event monitor and echo. Currently has significant back problem being evaluated.   Low blood pressure today but currently on low-dose lisinopril will monitor    Medications Discontinued During This Encounter   Medication Reason    telmisartan (MICARDIS) 20 mg tablet Not A Current Medication       Orders Placed This Encounter    AMB POC EKG ROUTINE W/ 12 LEADS, INTER & REP     Order Specific Question:   Reason for Exam:     Answer:   tacycardia    ECHO ADULT COMPLETE     Standing Status:   Future     Standing Expiration Date:   6/1/2022     Order Specific Question:   Contrast Enhancement (Bubble Study, Definity, Optison) may be used if criteria listed in established evidence-based protocol has been identified. Answer:   Yes       Follow-up and Dispositions    · Return for F/u after tests.

## 2021-06-05 ENCOUNTER — HOSPITAL ENCOUNTER (OUTPATIENT)
Dept: CT IMAGING | Age: 71
Discharge: HOME | End: 2021-06-05
Attending: ORTHOPAEDIC SURGERY
Payer: MEDICARE

## 2021-06-05 DIAGNOSIS — M96.1 POST LAMINECTOMY SYNDROME: ICD-10-CM

## 2021-06-05 DIAGNOSIS — S32.009K PSEUDOARTHROSIS OF LUMBAR SPINE: ICD-10-CM

## 2021-06-05 DIAGNOSIS — M43.10 SPONDYLOLISTHESIS: ICD-10-CM

## 2021-06-05 PROCEDURE — 72128 CT CHEST SPINE W/O DYE: CPT

## 2021-06-05 PROCEDURE — 72131 CT LUMBAR SPINE W/O DYE: CPT

## 2021-06-07 ENCOUNTER — PATIENT MESSAGE (OUTPATIENT)
Dept: FAMILY MEDICINE CLINIC | Age: 71
End: 2021-06-07

## 2021-06-07 DIAGNOSIS — G47.00 INSOMNIA: ICD-10-CM

## 2021-06-08 RX ORDER — ZOLPIDEM TARTRATE 5 MG/1
5 TABLET ORAL
Qty: 90 TABLET | Refills: 0 | Status: SHIPPED | OUTPATIENT
Start: 2021-06-08 | End: 2021-08-31 | Stop reason: SDUPTHER

## 2021-06-08 NOTE — ADDENDUM NOTE
Addended by: Arnie Ybarra on: 7/14/2017 04:40 PM     Modules accepted: Orders
Implemented All Fall with Harm Risk Interventions:  [place] to call system. Call bell, personal items and telephone within reach. Instruct patient to call for assistance. Room bathroom lighting operational. Non-slip footwear when patient is off stretcher. Physically safe environment: no spills, clutter or unnecessary equipment. Stretcher in lowest position, wheels locked, appropriate side rails in place. Provide visual cue, wrist band, yellow gown, etc. Monitor gait and stability. Monitor for mental status changes and reorient to person, place, and time. Review medications for side effects contributing to fall risk. Reinforce activity limits and safety measures with patient and family. Provide visual clues: red socks.

## 2021-07-06 ENCOUNTER — OFFICE VISIT (OUTPATIENT)
Dept: FAMILY MEDICINE CLINIC | Age: 71
End: 2021-07-06
Payer: MEDICARE

## 2021-07-06 VITALS
TEMPERATURE: 98.3 F | BODY MASS INDEX: 32.02 KG/M2 | SYSTOLIC BLOOD PRESSURE: 123 MMHG | RESPIRATION RATE: 12 BRPM | DIASTOLIC BLOOD PRESSURE: 84 MMHG | WEIGHT: 174 LBS | HEART RATE: 81 BPM | OXYGEN SATURATION: 98 % | HEIGHT: 62 IN

## 2021-07-06 DIAGNOSIS — I10 ESSENTIAL HYPERTENSION WITH GOAL BLOOD PRESSURE LESS THAN 130/85: ICD-10-CM

## 2021-07-06 DIAGNOSIS — Z01.818 PRE-OP EXAM: Primary | ICD-10-CM

## 2021-07-06 DIAGNOSIS — M54.6 ACUTE BILATERAL THORACIC BACK PAIN: ICD-10-CM

## 2021-07-06 PROCEDURE — G8417 CALC BMI ABV UP PARAM F/U: HCPCS | Performed by: FAMILY MEDICINE

## 2021-07-06 PROCEDURE — G8427 DOCREV CUR MEDS BY ELIG CLIN: HCPCS | Performed by: FAMILY MEDICINE

## 2021-07-06 PROCEDURE — G8536 NO DOC ELDER MAL SCRN: HCPCS | Performed by: FAMILY MEDICINE

## 2021-07-06 PROCEDURE — G8752 SYS BP LESS 140: HCPCS | Performed by: FAMILY MEDICINE

## 2021-07-06 PROCEDURE — G8510 SCR DEP NEG, NO PLAN REQD: HCPCS | Performed by: FAMILY MEDICINE

## 2021-07-06 PROCEDURE — 3017F COLORECTAL CA SCREEN DOC REV: CPT | Performed by: FAMILY MEDICINE

## 2021-07-06 PROCEDURE — G9899 SCRN MAM PERF RSLTS DOC: HCPCS | Performed by: FAMILY MEDICINE

## 2021-07-06 PROCEDURE — 99214 OFFICE O/P EST MOD 30 MIN: CPT | Performed by: FAMILY MEDICINE

## 2021-07-06 PROCEDURE — 1101F PT FALLS ASSESS-DOCD LE1/YR: CPT | Performed by: FAMILY MEDICINE

## 2021-07-06 PROCEDURE — 1090F PRES/ABSN URINE INCON ASSESS: CPT | Performed by: FAMILY MEDICINE

## 2021-07-06 PROCEDURE — G8754 DIAS BP LESS 90: HCPCS | Performed by: FAMILY MEDICINE

## 2021-07-06 RX ORDER — LISINOPRIL 5 MG/1
5 TABLET ORAL DAILY
Qty: 90 TABLET | Refills: 2 | Status: SHIPPED | OUTPATIENT
Start: 2021-07-06 | End: 2021-07-09 | Stop reason: SDUPTHER

## 2021-07-06 RX ORDER — TRAMADOL HYDROCHLORIDE 100 MG/1
100 TABLET, EXTENDED RELEASE ORAL DAILY
Qty: 90 TABLET | Refills: 0 | Status: SHIPPED | OUTPATIENT
Start: 2021-07-06 | End: 2021-07-09 | Stop reason: SDUPTHER

## 2021-07-06 NOTE — PROGRESS NOTES
Syeda Hernandez is a 66 y.o. female  presents for preop for back surgery. No new complaints. No chest pains or SOB. Allergies   Allergen Reactions    Celebrex [Celecoxib] Swelling    Shellfish Derived Swelling     \"makes me feel puffy\"    Sulfa (Sulfonamide Antibiotics) Hives and Swelling     Lips swelling    Gabapentin Diarrhea and Palpitations     Chest and Abdominal pain    Humira [Adalimumab] Other (comments)     Lupus    Influenza Virus Vaccines Hives    Iodine Itching    Optiray 320 [Ioversol] Hives and Itching     Pt states when she gets iv contrast she itches a little from hives? 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    H/O calcium pyrophosphate deposition disease (CPPD) Z87.39    IBS (irritable bowel syndrome) K58.9    RA [facility]rheumatoid arthritis) ([facility]) M06.9    Sicca syndrome ([facility]) M35.00    Osteoarthritis of right hip M16.11    Pain management contract agreement Z02.89    ACP (advance care planning) Z71.89    Chronic left shoulder pain M25.512, G89.29    Osteopenia M85.80    Osteopenia of multiple sites M85.89    Osteoarthritis of left hip M16.12    Closed fracture of thoracic spine without spinal cord lesion ([facility] [address] S22.009A    Spinal stenosis, thoracolumbar region M48.05    Lumbar spine instability M53.2X6    Spinal stenosis of lumbar region with neurogenic claudication M48.062    Spinal stenosis of lumbar region M48.061    Lumbar stenosis M48.061    Nausea R11.0    Acute bilateral thoracic back pain M54.6    Anxiety F41.9    Chest pain R07.9    Essential hypertension I10    Cataracts, bilateral H26.9    Diverticulitis K57.92    Elevated blood pressure reading without diagnosis of hypertension R03.0    Fall at home W19. Anjel Velez, Y92.009    Insomnia G47.00    Lesion of hard palate K13.79    Lesion of neck L98.9    Numbness and tingling of right arm R20.0, R20.2    Osteoporosis M81.0    Sciatica M54.30    Sialolithiasis K11.5    Urinary retention with incomplete bladder emptying R33.9     Past Medical History:   Diagnosis Date    Abdominal abscess 2009    Arthritis     Rheumatoid    Autoimmune disease ([facility] [address]     Medically induced Lupus    Back pain     Chronic pain     lower back    Colitis     Diverticulitis     Failed back syndrome     GERD (gastroesophageal reflux disease)     Hypertension     when on cyclosporins    Ill-defined condition     large torus roof of mouth    Irritable bowel syndrome     Neuropathy     bilateral hands/wrist    Osteoporosis     Pneumonia     RA (rheumatoid arthritis) ([facility] [address]     Seizures ([facility] [address] 6-1-2008    one episode- after high dose of epinepherine     Social History     Socioeconomic History    Marital status:      Spouse name: Not on file    Number of children: Not on file    Years of education: Not on file    Highest education level: Not on file   Tobacco Use    Smoking status: Never Smoker    Smokeless tobacco: Never Used   Substance and Sexual Activity    Alcohol use: No    Drug use: No    Sexual activity: Never   Other Topics Concern     Social Determinants of Health     Financial Resource Strain:     Difficulty of Paying Living Expenses:    Food Insecurity:     Worried About Running Out of Food in the Last Year:     Ran Out of Food in the Last Year:    Transportation Needs:     Lack of Transportation (Medical):      Lack of Transportation (Non-Medical):    Physical Activity:     Days of Exercise per Week:     Minutes of Exercise per Session:    Stress:     Feeling of Stress :    Social Connections:     Frequency of Communication with Friends and Family:     Frequency of Social Gatherings with Friends and Family:     Attends Mosque Services:     Active Member of Clubs or Organizations:     Attends Club or Organization Meetings:     Marital Status:      Family History   Problem Relation Age of Onset    Other Other         Orthopedic (Sister)   Unique [place] Sister     Heart Attack Brother 58    Cancer Neg Hx     Diabetes Neg Hx     Heart Disease Neg Hx     Hypertension Neg Hx     Stroke Neg Hx     Malignant Hyperthermia Neg Hx     Pseudocholinesterase Deficiency Neg Hx     Delayed Awakening Neg Hx     Post-op Nausea/Vomiting Neg Hx     Emergence Delirium Neg Hx     Post-op Cognitive Dysfunction Neg Hx         Review of Systems   Constitutional: Negative for chills, fever, malaise/fatigue and weight loss. Eyes: Negative for blurred vision. Respiratory: Negative for cough, shortness of breath and wheezing. Cardiovascular: Negative for chest pain and palpitations. Gastrointestinal: Negative for nausea and vomiting. Musculoskeletal: Positive for back pain. Negative for myalgias. Skin: Negative for rash. Neurological: Negative for weakness. Psychiatric/Behavioral: Negative. Vitals:    07/06/21 1020   BP: 123/84   Pulse: 81   Resp: 12   Temp: 98.3 °F (36.8 °C)   SpO2: 98%   Weight: 174 lb (78.9 kg)   PainSc:   0 - No pain       Physical Exam  Vitals and nursing note reviewed. Constitutional:       Appearance: Normal appearance. She is obese. Cardiovascular:      Rate and Rhythm: Normal rate and regular rhythm. Pulses: Normal pulses. Heart sounds: Normal heart sounds. Pulmonary:      Effort: Pulmonary effort is normal.      Breath sounds: Normal breath sounds. Skin:     General: Skin is warm. Capillary Refill: Capillary refill takes less than 2 seconds. Neurological:      General: No focal deficit present. Mental Status: She is alert and oriented to person, place, and time. Psychiatric:         Mood and Affect: Mood normal.         Behavior: Behavior normal.         Thought Content:  Thought content normal. Judgment: Judgment normal.         Assessment/Plan      ICD-10-CM ICD-9-CM    1. Pre-op exam  Z01.818 V72.84    2. Essential hypertension with goal blood pressure less than 130/85  I10 401.9 lisinopriL (PRINIVIL, ZESTRIL) 5 mg tablet   3. Essential hypertension  I10 401.9    4. Acute bilateral thoracic back pain  M54.6 724.1 traMADoL (ULTRAM-ER) 100 mg Tb24     Form will be completed and copied. She is cleared for back surgery. I have discussed the diagnosis with the patient and the intended plan of care as seen in the above orders. The patient has received an after-visit summary and questions were answered concerning future plans. I have discussed medication, side effects, and warnings with the patient in detail. The patient verbalized understanding and is in agreement with the plan of care. The patient will contact the office with any additional concerns.     lab results and schedule of future lab studies reviewed with patient    Chris Fischer MD

## 2021-07-09 DIAGNOSIS — I10 ESSENTIAL HYPERTENSION WITH GOAL BLOOD PRESSURE LESS THAN 130/85: ICD-10-CM

## 2021-07-09 DIAGNOSIS — M54.6 ACUTE BILATERAL THORACIC BACK PAIN: ICD-10-CM

## 2021-07-09 RX ORDER — TRAMADOL HYDROCHLORIDE 100 MG/1
100 TABLET, EXTENDED RELEASE ORAL DAILY
Qty: 90 TABLET | Refills: 0 | Status: SHIPPED | OUTPATIENT
Start: 2021-07-09 | End: 2021-08-31 | Stop reason: SDUPTHER

## 2021-07-09 RX ORDER — LISINOPRIL 5 MG/1
5 TABLET ORAL DAILY
Qty: 90 TABLET | Refills: 2 | Status: SHIPPED | OUTPATIENT
Start: 2021-07-09 | End: 2021-10-01 | Stop reason: SDUPTHER

## 2021-07-15 NOTE — ROUTINE PROCESS
commented on how much better she was doing tonight. She remains alert and oriented times three with no signs or symptoms of distress. Neuro remains intact and dressings are intact and neuro is intact 2 person assist

## 2021-07-16 DIAGNOSIS — M06.9 RHEUMATOID ARTHRITIS, INVOLVING UNSPECIFIED SITE, UNSPECIFIED WHETHER RHEUMATOID FACTOR PRESENT (HCC): ICD-10-CM

## 2021-07-16 DIAGNOSIS — R00.0 TACHYCARDIA: ICD-10-CM

## 2021-07-16 DIAGNOSIS — R06.02 SHORTNESS OF BREATH: ICD-10-CM

## 2021-07-19 ENCOUNTER — TELEPHONE (OUTPATIENT)
Dept: CARDIOLOGY CLINIC | Age: 71
End: 2021-07-19

## 2021-07-23 ENCOUNTER — HOSPITAL ENCOUNTER (OUTPATIENT)
Dept: LAB | Age: 71
Discharge: HOME | End: 2021-07-23
Payer: MEDICARE

## 2021-07-23 DIAGNOSIS — Z01.818 PRE-OP EXAM: ICD-10-CM

## 2021-07-23 LAB
ALBUMIN SERPL-MCNC: 4.2 G/DL (ref 3.4–5)
ALBUMIN/GLOB SERPL: 1.4 {RATIO} (ref 0.8–1.7)
ALP SERPL-CCNC: 81 U/L (ref 45–117)
ALT SERPL-CCNC: 25 U/L (ref 13–56)
ANION GAP SERPL CALC-SCNC: 5 MMOL/L (ref 3–18)
AST SERPL-CCNC: 25 U/L (ref 10–38)
BASOPHILS # BLD: 0 K/UL (ref 0–0.1)
BASOPHILS NFR BLD: 1 % (ref 0–2)
BILIRUB SERPL-MCNC: 0.8 MG/DL (ref 0.2–1)
BUN SERPL-MCNC: 20 MG/DL (ref 7–18)
BUN/CREAT SERPL: 24 (ref 12–20)
CALCIUM SERPL-MCNC: 9.2 MG/DL (ref 8.5–10.1)
CHLORIDE SERPL-SCNC: 104 MMOL/L (ref 100–111)
CO2 SERPL-SCNC: 28 MMOL/L (ref 21–32)
CREAT SERPL-MCNC: 0.85 MG/DL (ref 0.6–1.3)
DIFFERENTIAL METHOD BLD: NORMAL
EOSINOPHIL # BLD: 0.1 K/UL (ref 0–0.4)
EOSINOPHIL NFR BLD: 2 % (ref 0–5)
ERYTHROCYTE [DISTWIDTH] IN BLOOD BY AUTOMATED COUNT: 12.7 % (ref 11.6–14.5)
GLOBULIN SER CALC-MCNC: 3 G/DL (ref 2–4)
GLUCOSE SERPL-MCNC: 95 MG/DL (ref 74–99)
HCT VFR BLD AUTO: 42.8 % (ref 35–45)
HGB BLD-MCNC: 13.3 G/DL (ref 12–16)
INR PPP: 1 (ref 0.8–1.2)
LYMPHOCYTES # BLD: 2.3 K/UL (ref 0.9–3.6)
LYMPHOCYTES NFR BLD: 40 % (ref 21–52)
MCH RBC QN AUTO: 29.5 PG (ref 24–34)
MCHC RBC AUTO-ENTMCNC: 31.1 G/DL (ref 31–37)
MCV RBC AUTO: 94.9 FL (ref 74–97)
MONOCYTES # BLD: 0.4 K/UL (ref 0.05–1.2)
MONOCYTES NFR BLD: 6 % (ref 3–10)
NEUTS SEG # BLD: 2.9 K/UL (ref 1.8–8)
NEUTS SEG NFR BLD: 51 % (ref 40–73)
PLATELET # BLD AUTO: 302 K/UL (ref 135–420)
PMV BLD AUTO: 10 FL (ref 9.2–11.8)
POTASSIUM SERPL-SCNC: 4.5 MMOL/L (ref 3.5–5.5)
PROT SERPL-MCNC: 7.2 G/DL (ref 6.4–8.2)
PROTHROMBIN TIME: 13 SEC (ref 11.5–15.2)
RBC # BLD AUTO: 4.51 M/UL (ref 4.2–5.3)
SODIUM SERPL-SCNC: 137 MMOL/L (ref 136–145)
WBC # BLD AUTO: 5.7 K/UL (ref 4.6–13.2)

## 2021-07-23 PROCEDURE — 80053 COMPREHEN METABOLIC PANEL: CPT

## 2021-07-23 PROCEDURE — 85610 PROTHROMBIN TIME: CPT

## 2021-07-23 PROCEDURE — 36415 COLL VENOUS BLD VENIPUNCTURE: CPT

## 2021-07-23 PROCEDURE — 85025 COMPLETE CBC W/AUTO DIFF WBC: CPT

## 2021-07-26 ENCOUNTER — OFFICE VISIT (OUTPATIENT)
Dept: CARDIOLOGY CLINIC | Age: 71
End: 2021-07-26
Payer: MEDICARE

## 2021-07-26 VITALS
HEIGHT: 62 IN | DIASTOLIC BLOOD PRESSURE: 75 MMHG | SYSTOLIC BLOOD PRESSURE: 111 MMHG | HEART RATE: 61 BPM | BODY MASS INDEX: 31.83 KG/M2 | WEIGHT: 173 LBS

## 2021-07-26 DIAGNOSIS — I47.1 SVT (SUPRAVENTRICULAR TACHYCARDIA) (HCC): ICD-10-CM

## 2021-07-26 DIAGNOSIS — Z01.810 PREOP CARDIOVASCULAR EXAM: ICD-10-CM

## 2021-07-26 DIAGNOSIS — M06.9 RHEUMATOID ARTHRITIS, INVOLVING UNSPECIFIED SITE, UNSPECIFIED WHETHER RHEUMATOID FACTOR PRESENT (HCC): ICD-10-CM

## 2021-07-26 DIAGNOSIS — R00.0 TACHYCARDIA: ICD-10-CM

## 2021-07-26 DIAGNOSIS — I10 ESSENTIAL HYPERTENSION: Primary | ICD-10-CM

## 2021-07-26 PROCEDURE — G8510 SCR DEP NEG, NO PLAN REQD: HCPCS | Performed by: INTERNAL MEDICINE

## 2021-07-26 PROCEDURE — 1101F PT FALLS ASSESS-DOCD LE1/YR: CPT | Performed by: INTERNAL MEDICINE

## 2021-07-26 PROCEDURE — 3017F COLORECTAL CA SCREEN DOC REV: CPT | Performed by: INTERNAL MEDICINE

## 2021-07-26 PROCEDURE — G8536 NO DOC ELDER MAL SCRN: HCPCS | Performed by: INTERNAL MEDICINE

## 2021-07-26 PROCEDURE — G8417 CALC BMI ABV UP PARAM F/U: HCPCS | Performed by: INTERNAL MEDICINE

## 2021-07-26 PROCEDURE — G8752 SYS BP LESS 140: HCPCS | Performed by: INTERNAL MEDICINE

## 2021-07-26 PROCEDURE — 1090F PRES/ABSN URINE INCON ASSESS: CPT | Performed by: INTERNAL MEDICINE

## 2021-07-26 PROCEDURE — G8427 DOCREV CUR MEDS BY ELIG CLIN: HCPCS | Performed by: INTERNAL MEDICINE

## 2021-07-26 PROCEDURE — G8754 DIAS BP LESS 90: HCPCS | Performed by: INTERNAL MEDICINE

## 2021-07-26 PROCEDURE — G9899 SCRN MAM PERF RSLTS DOC: HCPCS | Performed by: INTERNAL MEDICINE

## 2021-07-26 PROCEDURE — 99214 OFFICE O/P EST MOD 30 MIN: CPT | Performed by: INTERNAL MEDICINE

## 2021-07-26 NOTE — PROGRESS NOTES
1. Have you been to the ER, urgent care clinic since your last visit? Hospitalized since your last visit? no    2. Have you seen or consulted any other health care providers outside of the [address] [place] [address] since your last visit? Include any pap smears or colon screening.  Yes pcp

## 2021-07-26 NOTE — PROGRESS NOTES
HISTORY OF PRESENT ILLNESS  Kassy Ly is a 66 y.o. female. 6/1/2021  Patient seen today for new patient evaluation. She is referred here for evaluation of tachycardia. For past few weeks she has been having episode of sudden tachycardia with heart rate going in 120s. She is short of breath at that time. Denies any dizziness or syncope. She has history of rheumatoid arthritis with extensive joint problems including back problem that is being evaluated by Dr. Joe Thompson. She has no prior cardiac history. She has history of hypertension on treatment. Review of Systems   Constitutional: Negative for chills and fever. HENT: Negative for nosebleeds. Eyes: Negative for blurred vision and double vision. Respiratory: Positive for shortness of breath. Negative for cough, hemoptysis, sputum production and wheezing. Cardiovascular: Positive for palpitations. Negative for chest pain, orthopnea, claudication, leg swelling and PND. Gastrointestinal: Negative for abdominal pain, heartburn, nausea and vomiting. Musculoskeletal: Negative for myalgias. Skin: Negative for rash. Neurological: Negative for dizziness, weakness and headaches. Endo/Heme/Allergies: Does not bruise/bleed easily.      Family History   Problem Relation Age of Onset    Other Other         Orthopedic (Sister)   [place] Arthritis-osteo Sister     Heart Attack Brother 58    Cancer Neg Hx     Diabetes Neg Hx     Heart Disease Neg Hx     Hypertension Neg Hx     Stroke Neg Hx     Malignant Hyperthermia Neg Hx     Pseudocholinesterase Deficiency Neg Hx     Delayed Awakening Neg Hx     Post-op Nausea/Vomiting Neg Hx     Emergence Delirium Neg Hx     Post-op Cognitive Dysfunction Neg Hx        Past Medical History:   Diagnosis Date    Abdominal abscess 2009    Arthritis     Rheumatoid    Autoimmune disease ([facility] [address]     Medically induced Lupus    Back pain     Chronic pain     lower back    Colitis     Diverticulitis     Failed back syndrome     GERD (gastroesophageal reflux disease)     Hypertension     when on cyclosporins    Ill-defined condition     large torus roof of mouth    Irritable bowel syndrome     Neuropathy     bilateral hands/wrist    Osteoporosis     Pneumonia     RA (rheumatoid arthritis) ([facility] [address]     Seizures ([facility] [address] 6-1-2008    one episode- after high dose of epinepherine       Past Surgical History:   Procedure Laterality Date    HX ABDOMINAL WALL DEFECT REPAIR      HX APPENDECTOMY      HX BACK SURGERY  12/03/2018    HX BREAST REDUCTION      HX CATARACT REMOVAL Bilateral     HX COLONOSCOPY      HX GI      repair rectal prolaspe    HX GYN      dermoid cyst    HX HEENT      tonsillectomy    HX HYSTERECTOMY  1976    HX LUMBAR FUSION      x 2    HX ORTHOPAEDIC Bilateral     CTR    HX ORTHOPAEDIC Bilateral     arthroplasty thumbs    HX ORTHOPAEDIC Left     Ankle     HX ORTHOPAEDIC Right     \"Nerve Release Elbow\"    HX OTHER SURGICAL Left 1985    vein stripping    HX SALPINGO-OOPHORECTOMY Bilateral     HX SHOULDER REPLACEMENT Left     HX UROLOGICAL      bladder repair    UT TOTAL HIP ARTHROPLASTY Bilateral        Social History     Tobacco Use    Smoking status: Never Smoker    Smokeless tobacco: Never Used   Substance Use Topics    Alcohol use: No       Allergies   Allergen Reactions    Celebrex [Celecoxib] Swelling    Shellfish Derived Swelling     \"makes me feel puffy\"    Sulfa (Sulfonamide Antibiotics) Hives and Swelling     Lips swelling    Gabapentin Diarrhea and Palpitations     Chest and Abdominal pain    Humira [Adalimumab] Other (comments)     Lupus    Influenza Virus Vaccines Hives    Iodine Itching    Optiray 320 [Ioversol] Hives and Itching     Pt states when she gets iv contrast she itches a little from hives?  Penicillins Hives    Shrimp Hives       Prior to Admission medications    Medication Sig Start Date End Date Taking?  Authorizing Provider   traMADoL (ULTRAM-ER) 100 mg Tb24 Take 1 Tablet by mouth daily for 90 days. Max Daily Amount: 100 mg. 7/9/21 10/7/21 Yes Kate Blunt MD   lisinopriL (PRINIVIL, ZESTRIL) 5 mg tablet Take 1 Tablet by mouth daily. 7/9/21  Yes Kate Blunt MD   zolpidem (Ambien) 5 mg tablet Take 1 Tablet by mouth nightly. Max Daily Amount: 5 mg. 6/8/21  Yes Kate Blunt MD   meloxicam (MOBIC) 15 mg tablet TAKE 1 TABLET DAILY 3/17/21  Yes Kate Blunt MD   tofacitinib Ceci Case) 5 mg tab Take  by mouth two (2) times a day. Yes Provider, Historical   cetirizine (ZYRTEC) 10 mg tablet Take  by mouth. Yes Provider, Historical   acetaminophen (TYLENOL EXTRA STRENGTH) 500 mg tablet Take 1,000 mg by mouth every six (6) hours as needed for Pain. Yes Provider, Historical   cholecalciferol (VITAMIN D3) 1,000 unit tablet Take 1,000 Units by mouth five (5) days a week. Yes Provider, Historical   calcium carbonate (OS-BARBARA) 500 mg calcium (1,250 mg) tablet Take 1,500 mg by mouth daily. Yes Provider, Historical   doxycycline (VIBRAMYCIN) 100 mg capsule take 1 capsule by mouth twice a day 4/29/21   Provider, Historical   efinaconazole (JUBLIA) tyrone topical solution Apply  to affected area daily. 11/10/20   Kate Blunt MD   teriparatide (FORTEO) 20 mcg/dose - 600 mcg/2.4 mL pnij injection 20 mcg by SubCUTAneous route daily. Provider, Historical   petrolat,wht/min oil/sod chl (DRY EYES OP) Apply  to eye. Provider, Historical         Visit Vitals  /75   Pulse 61   Ht 5' 2\" (1.575 m)   Wt 78.5 kg (173 lb)   BMI 31.64 kg/m²       Physical Exam  Constitutional:       Appearance: She is well-developed. HENT:      Head: Normocephalic and atraumatic. Eyes:      Conjunctiva/sclera: Conjunctivae normal.   Neck:      Thyroid: No thyromegaly. Vascular: No JVD. Trachea: No tracheal deviation. Cardiovascular:      Rate and Rhythm: Normal rate and regular rhythm. Chest Wall: PMI is not displaced.       Pulses: No decreased pulses. Heart sounds: No murmur heard. No gallop. No S3 sounds. Pulmonary:      Effort: No respiratory distress. Breath sounds: No wheezing or rales. Chest:      Chest wall: No tenderness. Abdominal:      Palpations: Abdomen is soft. Tenderness: There is no abdominal tenderness. Musculoskeletal:      Cervical back: Neck supple. Skin:     General: Skin is warm. Neurological:      Mental Status: She is alert and oriented to person, place, and time. Ms. Colene Blizzard has a reminder for a \"due or due soon\" health maintenance. I have asked that she contact her primary care provider for follow-up on this health maintenance. No flowsheet data found. I have personally reviewed patient's records available from hospital and other providers and incorporated findings in patient care. Notes, lab,Old EKG, old CAT scan, old chest x-ray  Interpretation Summary 6/2021    · LV: Estimated LVEF is 55 - 60%. Normal cavity size, wall thickness and systolic function (ejection fraction normal). Wall motion: normal. Mild (grade 1) left ventricular diastolic dysfunction. · LA: Left Atrium volume index is 27.72 mL/m2. · TV: Right Ventricular Arterial Pressure (RVSP) is 25 mmHg. Pulmonary hypertension not suggested by Doppler findings. 6/2021  Event monitor-rare PAC PVC on 122 beat SVT in 140s. Assessment         ICD-10-CM ICD-9-CM    1. Essential hypertension  I10 401.9     Controlled continue treatment   2. SVT (supraventricular tachycardia) ([facility])  I47.1 427.89 TSH 3RD GENERATION      T4, FREE    Episode on event monitor 22 beat. Likely cause for palpitation. Symptoms are improved we will continue to monitor check TSH   3. Rheumatoid arthritis, involving unspecified site, unspecified whether rheumatoid factor present ([facility])  M06.9 714.0     No significant pulmonary hypertension or valvular disease on echo monitor   4.  Preop cardiovascular exam  Z01.810 V72.81     Stable cardiac status okay to proceed with back surgery. Medium cardiac risk   5. Tachycardia  R00.0 785.0     We will continue to monitor. We will add beta-blocker if needed   6/2021  Seen for tachycardia palpitation shortness of breath. History of hypertension and rheumatoid arthritis. Set up for event monitor and echo. Currently has significant back problem being evaluated. Low blood pressure today but currently on low-dose lisinopril will monitor  7/2021  Episode of SVT likely cause for palpitation. Check TSH. Add low-dose beta-blocker after back surgery if needed  There are no discontinued medications. Orders Placed This Encounter    TSH 3RD GENERATION     Standing Status:   Future     Standing Expiration Date:   8/25/2021    T4, FREE     Standing Status:   Future     Standing Expiration Date:   7/27/2022       Follow-up and Dispositions    · Return in about 3 months (around 10/26/2021) for Cleared for planned surgery, moderate risk.

## 2021-07-26 NOTE — LETTER
2021  [id number] [facility]  [address]        Dear Dr. Manuel Sam:    Re: Sanaz Senior   : 1950     Ms. Aundrea Hagen is cleared from a cardiac standpoint with moderate risk for spinal surgery scheduled on 2021. If you have any questions or any further assistance is needed please contact our office. Sincerely,    Signed By: Ratna Barry. Milly Campo MD    2021         [facility]  Milly Campo M.D.     cc:  Bryan Rdz MD

## 2021-07-27 RX ORDER — BISMUTH SUBSALICYLATE 262 MG
1 TABLET,CHEWABLE ORAL DAILY
COMMUNITY
Start: 2021-02-08

## 2021-07-27 NOTE — PERIOP NOTES
PRE-SURGICAL INSTRUCTIONS        Patient's Name:  Ivis ALVARADO Date:  7/27/2021              Surgery Date:  8/2/2021                1. Do NOT eat or drink anything, including candy, gum, or ice chips after midnight on 8/1, unless you have specific instructions from your surgeon or anesthesia provider to do so.  2. You may brush your teeth before coming to the hospital.  3. No smoking 24 hours prior to the day of surgery. 4. No alcohol 24 hours prior to the day of surgery. 5. No recreational drugs for one week prior to the day of surgery. 6. Leave all valuables, including money/purse, at home. 7. Remove all jewelry, nail polish, acrylic nails, and makeup (including mascara); no lotions powders, deodorant, or perfume/cologne/after shave on the skin. 8. Glasses/contact lenses and dentures may be worn to the hospital.  They will be removed prior to surgery. 9. Call your doctor if symptoms of a cold or illness develop within 24-48 hours prior to your surgery. 10.  AN ADULT MUST DRIVE YOU HOME AFTER OUTPATIENT SURGERY. 11.  If you are having an outpatient procedure, please make arrangements for a responsible adult to be with you for 24 hours after your surgery. 12.  One visitor may accompany you. Everyone must wear a mask and social distance. Special Instructions:      Bring any pertinent legal medical records. Bring Covid card. Take these medications the morning of surgery with a sip of water:  May take tylenol if needed. May hold or take Lisinopril. Follow physician instructions about stopping anticoagulants. Stopped Meloxicam 7/23/21 and Farheen Simper 7/23/21. On the day of surgery, come in the main entrance of DR[facility]". Let the  at the desk know you are there for surgery. A staff member will come escort you to the surgical area on the second floor.     If you have any questions or concerns, please do not hesitate to call:     (Prior to the day of surgery) NAYLA department:  [phone]   (Day of surgery) Pre-Op department:  [phone]    These surgical instructions were reviewed with Iva Headings during the PAT phone call.

## 2021-07-29 NOTE — H&P
Pre-Admission History and Physical    Patient: Hai Tobar   MRN: [id number]   SSN: [id number]   YOB: 1950   Age: 66 y.o. Sex: female     Patient scheduled for: removal possible revision T10 screws. Date of surgery: 8/2/21. Surgeon: Luanne Thurston MD    HPI:  Hai Tobar is a 66 y.o. female with pain in her thoracic area. She reports a pain level of 3/10. CT scan reviewed. Xrays demonstrate subtle haloing around her T10 pedicle screws. There is no junctional kyphosis, though there is likely a pseudoarthrosis there. This patient has failed the presurgical conservative treatments  including physical therapy, spinal block injections and medications. Pain has impacted the patient's functional ability. She is being admitted for surgical intervention. Past Medical History:   Diagnosis Date    Abdominal abscess 2009    Adverse effect of anesthesia      Be careful with intubation.  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 )    Irritable bowel syndrome     Neuropathy     bilateral hands/wrist    Osteoporosis     Pneumonia     RA (rheumatoid arthritis) ([address]     Seizures ([address] 6-1-2008    one episode- after high dose of epinepherine     Social History     Socioeconomic History    Marital status:      Spouse name: Not on file    Number of children: Not on file    Years of education: Not on file    Highest education level: Not on file   Tobacco Use    Smoking status: Never Smoker    Smokeless tobacco: Never Used   Vaping Use    Vaping Use: Never used   Substance and Sexual Activity    Alcohol use: No    Drug use: Never    Sexual activity: Not Currently   Other Topics Concern     Social Determinants of Health     Financial Resource Strain:     Difficulty of Paying Living Expenses:    Food Insecurity:     Worried About Running Out of Food in the Last Year:     [address] in the Last Year:    Transportation Needs:     Lack of Transportation (Medical):      Lack of Transportation (Non-Medical):    Physical Activity:     Days of Exercise per Week:     Minutes of Exercise per Session:    Stress:     Feeling of Stress :    Social Connections:     Frequency of Communication with Friends and Family:     Frequency of Social Gatherings with Friends and Family:     Attends Oriental orthodox Services:     Active Member of Clubs or Organizations:     Attends Club or Organization Meetings:     Marital Status:      Past Surgical History:   Procedure Laterality Date    HX ABDOMINAL WALL DEFECT REPAIR      HX APPENDECTOMY      HX BREAST REDUCTION      HX CATARACT REMOVAL Bilateral     HX COLONOSCOPY      HX GI      repair rectal prolaspe    HX GYN      dermoid cyst    HX HEENT      tonsillectomy    HX HYSTERECTOMY  1976    HX LUMBAR FUSION      x 2  L 3-4 , L-5-6    HX LUMBAR FUSION  12/03/2018    T-8    HX ORTHOPAEDIC Bilateral     CTR    HX ORTHOPAEDIC Bilateral     arthroplasty thumbs    HX ORTHOPAEDIC Left     Ankle     HX ORTHOPAEDIC Right     \"Nerve Release Elbow\"    HX OTHER SURGICAL Left 1985    vein stripping    HX SALPINGO-OOPHORECTOMY Bilateral     HX SHOULDER REPLACEMENT Left     HX UROLOGICAL      bladder repair    CA TOTAL HIP ARTHROPLASTY Bilateral      Family History   Problem Relation Age of Onset    Other Other         Orthopedic (Sister)   [de-identified] Arthritis-osteo Sister     Heart Attack Brother 58    Cancer Neg Hx     Diabetes Neg Hx     Heart Disease Neg Hx     Hypertension Neg Hx     Stroke Neg Hx     Malignant Hyperthermia Neg Hx     Pseudocholinesterase Deficiency Neg Hx     Delayed Awakening Neg Hx     Post-op Nausea/Vomiting Neg Hx     Emergence Delirium Neg Hx     Post-op Cognitive Dysfunction Neg Hx      Allergies   Allergen Reactions    Celebrex [Celecoxib] Swelling    Shellfish Derived Other (comments)      Pt.denies     Sulfa (Sulfonamide Antibiotics) Hives and Swelling     Lips swelling    Gabapentin Diarrhea and Palpitations     Chest and Abdominal pain    Humira [Adalimumab] Other (comments)     Lupus    Influenza Virus Vaccines Hives    Iodine Itching     Takes benadryl and is OK.  Optiray 320 [Ioversol] Hives and Itching     Pt states when she gets iv contrast she itches a little from hives?  Penicillins Hives    Shrimp Other (comments)     Sodium puffy     Current Outpatient Medications   Medication Sig Dispense Refill    multivitamin (ONE A DAY) tablet Take 1 Tablet by mouth daily.  traMADoL (ULTRAM-ER) 100 mg Tb24 Take 1 Tablet by mouth daily for 90 days. Max Daily Amount: 100 mg. (Patient taking differently: Take 100 mg by mouth nightly.) 90 Tablet 0    lisinopriL (PRINIVIL, ZESTRIL) 5 mg tablet Take 1 Tablet by mouth daily. 90 Tablet 2    zolpidem (Ambien) 5 mg tablet Take 1 Tablet by mouth nightly. Max Daily Amount: 5 mg. 90 Tablet 0    meloxicam (MOBIC) 15 mg tablet TAKE 1 TABLET DAILY 90 Tab 3    tofacitinib (XELJANZ) 5 mg tab Take  by mouth two (2) times a day.  cetirizine (ZYRTEC) 10 mg tablet Take 10 mg by mouth daily as needed.  acetaminophen (TYLENOL EXTRA STRENGTH) 500 mg tablet Take 1,000 mg by mouth every six (6) hours as needed for Pain.  cholecalciferol (VITAMIN D3) 1,000 unit tablet Take 1,000 Units by mouth five (5) days a week.  calcium carbonate (OS-BARBARA) 500 mg calcium (1,250 mg) tablet Take 1,500 mg by mouth daily. ROS:  Denies chills, fever,night sweats,  bowel or bladder dysfunction, unexplained weight loss/weight gain, chest pain, sob or anxiety.     Physical Examination    Gen: Well developed, well nourished 66 y.o. female with good strength in her intrinsics, deltoids, biceps, and triceps, hams, quads, ehl, and ta. No nerve tension signs. No reflex changes. Assessment and Plan    Due to the pt's persistent symptoms unrelieved by conservative measure Alli Taveras is being admitted to undergo surgical intervention. The post-operative plan of care consists of physical therapy, home health and a 2 week f/u office visit. The risks, benefits, complications and alternatives to surgery have been discussed in detail with the patient. The patient understands and agrees to proceed.

## 2021-07-30 ENCOUNTER — ANESTHESIA EVENT (OUTPATIENT)
Dept: SURGERY | Age: 71
End: 2021-07-30
Payer: MEDICARE

## 2021-08-02 ENCOUNTER — ANESTHESIA (OUTPATIENT)
Dept: SURGERY | Age: 71
End: 2021-08-02
Payer: MEDICARE

## 2021-08-02 ENCOUNTER — APPOINTMENT (OUTPATIENT)
Dept: GENERAL RADIOLOGY | Age: 71
End: 2021-08-02
Attending: ORTHOPAEDIC SURGERY
Payer: MEDICARE

## 2021-08-02 ENCOUNTER — HOSPITAL ENCOUNTER (OUTPATIENT)
Age: 71
Setting detail: OUTPATIENT SURGERY
Discharge: HOME | End: 2021-08-02
Attending: ORTHOPAEDIC SURGERY | Admitting: ORTHOPAEDIC SURGERY
Payer: MEDICARE

## 2021-08-02 VITALS
HEART RATE: 66 BPM | BODY MASS INDEX: 31.8 KG/M2 | OXYGEN SATURATION: 95 % | SYSTOLIC BLOOD PRESSURE: 120 MMHG | DIASTOLIC BLOOD PRESSURE: 69 MMHG | HEIGHT: 62 IN | RESPIRATION RATE: 20 BRPM | WEIGHT: 172.8 LBS | TEMPERATURE: 97 F

## 2021-08-02 DIAGNOSIS — Z98.890 S/P HARDWARE REMOVAL: Primary | ICD-10-CM

## 2021-08-02 LAB
ABO + RH BLD: NORMAL
BLOOD GROUP ANTIBODIES SERPL: NORMAL
SPECIMEN EXP DATE BLD: NORMAL

## 2021-08-02 PROCEDURE — 74011250637 HC RX REV CODE- 250/637: Performed by: NURSE ANESTHETIST, CERTIFIED REGISTERED

## 2021-08-02 PROCEDURE — 2709999900 HC NON-CHARGEABLE SUPPLY: Performed by: ORTHOPAEDIC SURGERY

## 2021-08-02 PROCEDURE — 76060000032 HC ANESTHESIA 0.5 TO 1 HR: Performed by: ORTHOPAEDIC SURGERY

## 2021-08-02 PROCEDURE — 76210000017 HC OR PH I REC 1.5 TO 2 HR: Performed by: ORTHOPAEDIC SURGERY

## 2021-08-02 PROCEDURE — 77030035236 HC SUT PDS STRATFX BARB J&J -B: Performed by: ORTHOPAEDIC SURGERY

## 2021-08-02 PROCEDURE — 77030033138 HC SUT PGA STRATFX J&J -B: Performed by: ORTHOPAEDIC SURGERY

## 2021-08-02 PROCEDURE — 74011000250 HC RX REV CODE- 250: Performed by: NURSE ANESTHETIST, CERTIFIED REGISTERED

## 2021-08-02 PROCEDURE — 76210000026 HC REC RM PH II 1 TO 1.5 HR: Performed by: ORTHOPAEDIC SURGERY

## 2021-08-02 PROCEDURE — 77030002933 HC SUT MCRYL J&J -A: Performed by: ORTHOPAEDIC SURGERY

## 2021-08-02 PROCEDURE — 74011000250 HC RX REV CODE- 250: Performed by: ORTHOPAEDIC SURGERY

## 2021-08-02 PROCEDURE — 77030025006 HC BUR STRY -C: Performed by: ORTHOPAEDIC SURGERY

## 2021-08-02 PROCEDURE — 77030013079 HC BLNKT BAIR HGGR 3M -A: Performed by: ANESTHESIOLOGY

## 2021-08-02 PROCEDURE — 74011250636 HC RX REV CODE- 250/636: Performed by: ORTHOPAEDIC SURGERY

## 2021-08-02 PROCEDURE — 99100 ANES PT EXTEME AGE<1 YR&>70: CPT | Performed by: ANESTHESIOLOGY

## 2021-08-02 PROCEDURE — 74011250636 HC RX REV CODE- 250/636: Performed by: NURSE ANESTHETIST, CERTIFIED REGISTERED

## 2021-08-02 PROCEDURE — 77030027138 HC INCENT SPIROMETER -A: Performed by: ORTHOPAEDIC SURGERY

## 2021-08-02 PROCEDURE — 77030040361 HC SLV COMPR DVT MDII -B: Performed by: ORTHOPAEDIC SURGERY

## 2021-08-02 PROCEDURE — 77030008683 HC TU ET CUF COVD -A: Performed by: ANESTHESIOLOGY

## 2021-08-02 PROCEDURE — 77030040922 HC BLNKT HYPOTHRM STRY -A: Performed by: ORTHOPAEDIC SURGERY

## 2021-08-02 PROCEDURE — 76010000138 HC OR TIME 0.5 TO 1 HR: Performed by: ORTHOPAEDIC SURGERY

## 2021-08-02 PROCEDURE — 77030019908 HC STETH ESOPH SIMS -A: Performed by: ANESTHESIOLOGY

## 2021-08-02 PROCEDURE — 74011250637 HC RX REV CODE- 250/637: Performed by: ORTHOPAEDIC SURGERY

## 2021-08-02 PROCEDURE — 77030038692 HC WND DEB SYS IRMX -B: Performed by: ORTHOPAEDIC SURGERY

## 2021-08-02 PROCEDURE — 86901 BLOOD TYPING SEROLOGIC RH(D): CPT

## 2021-08-02 PROCEDURE — 00670 ANES XTNSV SP&SPI CORD PX: CPT | Performed by: ANESTHESIOLOGY

## 2021-08-02 PROCEDURE — 74011000272 HC RX REV CODE- 272: Performed by: ORTHOPAEDIC SURGERY

## 2021-08-02 RX ORDER — ONDANSETRON 2 MG/ML
4 INJECTION INTRAMUSCULAR; INTRAVENOUS ONCE
Status: COMPLETED | OUTPATIENT
Start: 2021-08-02 | End: 2021-08-02

## 2021-08-02 RX ORDER — LIDOCAINE HYDROCHLORIDE 20 MG/ML
INJECTION, SOLUTION EPIDURAL; INFILTRATION; INTRACAUDAL; PERINEURAL AS NEEDED
Status: DISCONTINUED | OUTPATIENT
Start: 2021-08-02 | End: 2021-08-02 | Stop reason: HOSPADM

## 2021-08-02 RX ORDER — LIDOCAINE HYDROCHLORIDE 10 MG/ML
0.1 INJECTION, SOLUTION EPIDURAL; INFILTRATION; INTRACAUDAL; PERINEURAL AS NEEDED
Status: DISCONTINUED | OUTPATIENT
Start: 2021-08-02 | End: 2021-08-02 | Stop reason: HOSPADM

## 2021-08-02 RX ORDER — VANCOMYCIN HYDROCHLORIDE 1 G/20ML
INJECTION, POWDER, LYOPHILIZED, FOR SOLUTION INTRAVENOUS AS NEEDED
Status: DISCONTINUED | OUTPATIENT
Start: 2021-08-02 | End: 2021-08-02 | Stop reason: HOSPADM

## 2021-08-02 RX ORDER — DEXMEDETOMIDINE HYDROCHLORIDE 4 UG/ML
INJECTION, SOLUTION INTRAVENOUS AS NEEDED
Status: DISCONTINUED | OUTPATIENT
Start: 2021-08-02 | End: 2021-08-02 | Stop reason: HOSPADM

## 2021-08-02 RX ORDER — SUCCINYLCHOLINE CHLORIDE 20 MG/ML
INJECTION INTRAMUSCULAR; INTRAVENOUS AS NEEDED
Status: DISCONTINUED | OUTPATIENT
Start: 2021-08-02 | End: 2021-08-02 | Stop reason: HOSPADM

## 2021-08-02 RX ORDER — OXYCODONE HYDROCHLORIDE 5 MG/1
5 TABLET ORAL
Qty: 20 TABLET | Refills: 0 | Status: SHIPPED | OUTPATIENT
Start: 2021-08-02 | End: 2021-08-07

## 2021-08-02 RX ORDER — SODIUM CHLORIDE 0.9 % (FLUSH) 0.9 %
5-40 SYRINGE (ML) INJECTION EVERY 8 HOURS
Status: DISCONTINUED | OUTPATIENT
Start: 2021-08-02 | End: 2021-08-02 | Stop reason: HOSPADM

## 2021-08-02 RX ORDER — FENTANYL CITRATE 50 UG/ML
INJECTION, SOLUTION INTRAMUSCULAR; INTRAVENOUS AS NEEDED
Status: DISCONTINUED | OUTPATIENT
Start: 2021-08-02 | End: 2021-08-02 | Stop reason: HOSPADM

## 2021-08-02 RX ORDER — KETAMINE HCL 50MG/ML(1)
SYRINGE (ML) INTRAVENOUS AS NEEDED
Status: DISCONTINUED | OUTPATIENT
Start: 2021-08-02 | End: 2021-08-02 | Stop reason: HOSPADM

## 2021-08-02 RX ORDER — FAMOTIDINE 20 MG/1
20 TABLET, FILM COATED ORAL ONCE
Status: COMPLETED | OUTPATIENT
Start: 2021-08-02 | End: 2021-08-02

## 2021-08-02 RX ORDER — INSULIN LISPRO 100 [IU]/ML
INJECTION, SOLUTION INTRAVENOUS; SUBCUTANEOUS ONCE
Status: DISCONTINUED | OUTPATIENT
Start: 2021-08-02 | End: 2021-08-02 | Stop reason: HOSPADM

## 2021-08-02 RX ORDER — SODIUM CHLORIDE, SODIUM LACTATE, POTASSIUM CHLORIDE, CALCIUM CHLORIDE 600; 310; 30; 20 MG/100ML; MG/100ML; MG/100ML; MG/100ML
25 INJECTION, SOLUTION INTRAVENOUS CONTINUOUS
Status: DISCONTINUED | OUTPATIENT
Start: 2021-08-02 | End: 2021-08-02 | Stop reason: HOSPADM

## 2021-08-02 RX ORDER — MAGNESIUM SULFATE HEPTAHYDRATE 40 MG/ML
INJECTION, SOLUTION INTRAVENOUS AS NEEDED
Status: DISCONTINUED | OUTPATIENT
Start: 2021-08-02 | End: 2021-08-02 | Stop reason: HOSPADM

## 2021-08-02 RX ORDER — PROPOFOL 10 MG/ML
INJECTION, EMULSION INTRAVENOUS AS NEEDED
Status: DISCONTINUED | OUTPATIENT
Start: 2021-08-02 | End: 2021-08-02 | Stop reason: HOSPADM

## 2021-08-02 RX ORDER — HYDROMORPHONE HYDROCHLORIDE 2 MG/ML
0.5 INJECTION, SOLUTION INTRAMUSCULAR; INTRAVENOUS; SUBCUTANEOUS
Status: DISCONTINUED | OUTPATIENT
Start: 2021-08-02 | End: 2021-08-02 | Stop reason: HOSPADM

## 2021-08-02 RX ORDER — OXYCODONE HYDROCHLORIDE 5 MG/1
5 TABLET ORAL ONCE
Status: COMPLETED | OUTPATIENT
Start: 2021-08-02 | End: 2021-08-02

## 2021-08-02 RX ORDER — BUPIVACAINE HYDROCHLORIDE 5 MG/ML
INJECTION, SOLUTION EPIDURAL; INTRACAUDAL AS NEEDED
Status: DISCONTINUED | OUTPATIENT
Start: 2021-08-02 | End: 2021-08-02 | Stop reason: HOSPADM

## 2021-08-02 RX ORDER — DEXAMETHASONE SODIUM PHOSPHATE 4 MG/ML
INJECTION, SOLUTION INTRA-ARTICULAR; INTRALESIONAL; INTRAMUSCULAR; INTRAVENOUS; SOFT TISSUE AS NEEDED
Status: DISCONTINUED | OUTPATIENT
Start: 2021-08-02 | End: 2021-08-02 | Stop reason: HOSPADM

## 2021-08-02 RX ORDER — ONDANSETRON 2 MG/ML
INJECTION INTRAMUSCULAR; INTRAVENOUS AS NEEDED
Status: DISCONTINUED | OUTPATIENT
Start: 2021-08-02 | End: 2021-08-02 | Stop reason: HOSPADM

## 2021-08-02 RX ORDER — HYDROMORPHONE HYDROCHLORIDE 2 MG/ML
0.2 INJECTION, SOLUTION INTRAMUSCULAR; INTRAVENOUS; SUBCUTANEOUS AS NEEDED
Status: DISCONTINUED | OUTPATIENT
Start: 2021-08-02 | End: 2021-08-02 | Stop reason: HOSPADM

## 2021-08-02 RX ORDER — SODIUM CHLORIDE 0.9 % (FLUSH) 0.9 %
5-40 SYRINGE (ML) INJECTION AS NEEDED
Status: DISCONTINUED | OUTPATIENT
Start: 2021-08-02 | End: 2021-08-02 | Stop reason: HOSPADM

## 2021-08-02 RX ADMIN — LIDOCAINE HYDROCHLORIDE 100 MG: 20 INJECTION, SOLUTION EPIDURAL; INFILTRATION; INTRACAUDAL; PERINEURAL at 09:11

## 2021-08-02 RX ADMIN — FENTANYL CITRATE 50 MCG: 50 INJECTION, SOLUTION INTRAMUSCULAR; INTRAVENOUS at 09:12

## 2021-08-02 RX ADMIN — DEXMEDETOMIDINE HYDROCHLORIDE 10 MCG: 100 INJECTION, SOLUTION, CONCENTRATE INTRAVENOUS at 09:11

## 2021-08-02 RX ADMIN — SUCCINYLCHOLINE CHLORIDE 100 MG: 20 INJECTION, SOLUTION INTRAMUSCULAR; INTRAVENOUS at 09:11

## 2021-08-02 RX ADMIN — SODIUM CHLORIDE, SODIUM LACTATE, POTASSIUM CHLORIDE, AND CALCIUM CHLORIDE 25 ML/HR: 600; 310; 30; 20 INJECTION, SOLUTION INTRAVENOUS at 08:19

## 2021-08-02 RX ADMIN — ONDANSETRON 4 MG: 2 INJECTION INTRAMUSCULAR; INTRAVENOUS at 10:27

## 2021-08-02 RX ADMIN — DEXAMETHASONE SODIUM PHOSPHATE 4 MG: 4 INJECTION, SOLUTION INTRAMUSCULAR; INTRAVENOUS at 09:11

## 2021-08-02 RX ADMIN — VANCOMYCIN HYDROCHLORIDE 1000 MG: 1 INJECTION, POWDER, LYOPHILIZED, FOR SOLUTION INTRAVENOUS at 08:20

## 2021-08-02 RX ADMIN — FAMOTIDINE 20 MG: 20 TABLET ORAL at 08:24

## 2021-08-02 RX ADMIN — HYDROMORPHONE HYDROCHLORIDE 0.5 MG: 2 INJECTION, SOLUTION INTRAMUSCULAR; INTRAVENOUS; SUBCUTANEOUS at 10:25

## 2021-08-02 RX ADMIN — Medication 50 MG: at 09:11

## 2021-08-02 RX ADMIN — SODIUM CHLORIDE 1 G: 900 INJECTION, SOLUTION INTRAVENOUS at 09:23

## 2021-08-02 RX ADMIN — MAGNESIUM SULFATE 2 G: 2 INJECTION INTRAVENOUS at 09:04

## 2021-08-02 RX ADMIN — FENTANYL CITRATE 50 MCG: 50 INJECTION, SOLUTION INTRAMUSCULAR; INTRAVENOUS at 09:47

## 2021-08-02 RX ADMIN — ONDANSETRON 4 MG: 2 INJECTION INTRAMUSCULAR; INTRAVENOUS at 09:11

## 2021-08-02 RX ADMIN — PROPOFOL 120 MG: 10 INJECTION, EMULSION INTRAVENOUS at 09:11

## 2021-08-02 RX ADMIN — OXYCODONE HYDROCHLORIDE 5 MG: 5 TABLET ORAL at 12:32

## 2021-08-02 RX ADMIN — DEXMEDETOMIDINE HYDROCHLORIDE 10 MCG: 100 INJECTION, SOLUTION, CONCENTRATE INTRAVENOUS at 09:04

## 2021-08-02 NOTE — BRIEF OP NOTE
Brief Postoperative Note    Patient: Bibiana Gallardo  YOB: 1950  MRN: [id number]    Date of Procedure: 8/2/2021     Pre-Op Diagnosis: S32.009K PSEUDO ARTHROSIS, LOOSE T10 SCREWS    Post-Op Diagnosis: Same as preoperative diagnosis.       Procedure(s):  REMOVAL  T10 SCREWS/C-ARM/NUVASIVE    Surgeon(s):  Aubree Red MD    Surgical Assistant: None    Anesthesia: General     Estimated Blood Loss (mL): Minimal    Complications: None    Specimens: * No specimens in log *     Implants: * No implants in log *    Drains: * No LDAs found *    Findings: screws without fixation    Electronically Signed by Alba Saucedo MD on 8/2/2021 at 9:45 AM

## 2021-08-02 NOTE — ANESTHESIA PREPROCEDURE EVALUATION
Anesthetic History   No history of anesthetic complications            Review of Systems / Medical History  Patient summary reviewed, nursing notes reviewed and pertinent labs reviewed    Pulmonary  Within defined limits                 Neuro/Psych   Within defined limits           Cardiovascular    Hypertension                Comments: 07/2021 ECHO  Estimated LVEF is 55 - 60%.    GI/Hepatic/Renal  Within defined limits              Endo/Other        Obesity     Other Findings   Comments: Medically induced Lupus per pt report         Physical Exam    Airway  Mallampati: II  TM Distance: 4 - 6 cm  Neck ROM: decreased range of motion   Mouth opening: Normal     Cardiovascular    Rhythm: regular           Dental    Dentition: Upper dentition intact and Lower dentition intact     Pulmonary  Breath sounds clear to auscultation               Abdominal  GI exam deferred       Other Findings            Anesthetic Plan    ASA: 2  Anesthesia type: general            Anesthetic plan and risks discussed with: Patient

## 2021-08-02 NOTE — OP NOTES
[address] [place] [address]   OPERATIVE REPORT    Name:  Lubertha Sandhoff  MR#:   [id number]  :  1950  ACCOUNT #:  [de-identified]  DATE OF SERVICE:  2021    PREOPERATIVE DIAGNOSES:  Thoracic pseudoarthrosis, loose T10 pedicle screws. POSTOPERATIVE DIAGNOSES:  Thoracic pseudoarthrosis, loose T10 pedicle screws. PROCEDURE PERFORMED:  Removal of T10 pedicle screws. SURGEON:  Lennox Dingwall, MD.    ASSISTANT:  LADONNA Messina.    ANESTHESIA:  General endotracheal.    COMPLICATIONS:  None. SPECIMENS REMOVED:  None. IMPLANTS:  No new implants. ESTIMATED BLOOD LOSS:  Zero. FINDINGS:  The screws had no purchase. The patient has known severe osteoporosis. No gross instability was present. Uncertain fusion at L16-Y77. DESCRIPTION OF PROCEDURE:  Following induction of general endotracheal anesthesia, the patient was turned to prone position on a spinal frame. The patient was prepped and draped in the usual fashion. A 1 inch incision was made in the midline. A paramedian incision was then made in the thoracodorsal fascia, and I dissected free the screw heads of T10 bilaterally. A metal-cutting bur was used to cut the yousif just inferior to the screws, and the screws were removed. The screws had absolutely no purchase. The wound was copiously irrigated. The fascia was closed with #1 Vicryl, subcutaneous tissues 2-0 Vicryl, and the skin with 3-0 Monocryl subcuticular suture and Dermabond. A sterile occlusive dressing was placed upon the wound. All counts were correct.       MD JASMEET [id number]  D:  2021 10:02  T:  2021 14:39  JOB #:  [id number]

## 2021-08-02 NOTE — ANESTHESIA POSTPROCEDURE EVALUATION
Procedure(s):  REMOVAL  T10 SCREWS/C-ARM/NUVASIVE.     general    Anesthesia Post Evaluation      Multimodal analgesia: multimodal analgesia used between 6 hours prior to anesthesia start to PACU discharge  Patient location during evaluation: PACU  Patient participation: complete - patient participated  Level of consciousness: awake and alert  Pain management: adequate  Airway patency: patent  Anesthetic complications: no  Cardiovascular status: acceptable  Respiratory status: acceptable  Hydration status: acceptable  Post anesthesia nausea and vomiting:  controlled  Final Post Anesthesia Temperature Assessment:  Normothermia (36.0-37.5 degrees C)      INITIAL Post-op Vital signs:   Vitals Value Taken Time   /69 08/02/21 1146   Temp 36.1 °C (97 °F) 08/02/21 1146   Pulse 66 08/02/21 1146   Resp 20 08/02/21 1146   SpO2 95 % 08/02/21 1146

## 2021-08-02 NOTE — INTERVAL H&P NOTE
Update History & Physical    The Patient's History and Physical of July 29, 2021 was reviewed with the patient and I examined the patient. There was no change. The surgical site was confirmed by the patient and me. Plan:  The risk, benefits, expected outcome, and alternative to the recommended procedure have been discussed with the patient. Patient understands and wants to proceed with the procedure.     Electronically signed by Wesley Zhou MD on 8/2/2021 at 6:48 AM

## 2021-08-02 NOTE — PERIOP NOTES
Assumed care of pt from OR via stretcher. Attached to monitor. VSS. OR, MAR and anesthesia report appreciated. Will cont to monitor.

## 2021-08-02 NOTE — DISCHARGE INSTRUCTIONS
DISCHARGE SUMMARY from Nurse    PATIENT INSTRUCTIONS:    After general anesthesia or intravenous sedation, for 24 hours or while taking prescription Narcotics:  · Limit your activities  · Do not drive and operate hazardous machinery  · Do not make important personal or business decisions  · Do  not drink alcoholic beverages  · If you have not urinated within 8 hours after discharge, please contact your surgeon on call. Report the following to your surgeon:  · Excessive pain, swelling, redness or odor of or around the surgical area  · Temperature over 100.5  · Nausea and vomiting lasting longer than 4 hours or if unable to take medications  · Any signs of decreased circulation or nerve impairment to extremity: change in color, persistent  numbness, tingling, coldness or increase pain  · Any questions    May resume regular diet and medications. Use prescription pain medicine as needed. Ice the site frequently. Take pain medications as needed, using less as the pain improves. Call Dr Chavez  office if pain medication is not helping or making you sick. You may shower on the third day after surgery. Leave dressing in place for 1 week unless it becomes saturated/soiled. Keep site clean and dry, may use band-aid to cover site after dressing removal. No bathing/swimming/hot tubs for 6 weeks. Back Precautions:  Log roll into and out of bed. NO Bending/Pushing/Pulling/Twisting. NO Lifting more than 5-10lbs, such as a gallon of milk. Follow up with Dr Kasia Garay as scheduled, call his office for any questions or concerns.

## 2021-08-09 ENCOUNTER — HOSPITAL ENCOUNTER (EMERGENCY)
Age: 71
Discharge: HOME | End: 2021-08-09
Attending: EMERGENCY MEDICINE
Payer: MEDICARE

## 2021-08-09 VITALS
DIASTOLIC BLOOD PRESSURE: 65 MMHG | OXYGEN SATURATION: 98 % | TEMPERATURE: 97.8 F | RESPIRATION RATE: 18 BRPM | HEART RATE: 62 BPM | SYSTOLIC BLOOD PRESSURE: 119 MMHG

## 2021-08-09 DIAGNOSIS — L30.9 DERMATITIS: Primary | ICD-10-CM

## 2021-08-09 LAB
ANION GAP SERPL CALC-SCNC: 8 MMOL/L (ref 3–18)
BASOPHILS # BLD: 0 K/UL (ref 0–0.1)
BASOPHILS NFR BLD: 0 % (ref 0–2)
BUN SERPL-MCNC: 16 MG/DL (ref 7–18)
BUN/CREAT SERPL: 23 (ref 12–20)
CALCIUM SERPL-MCNC: 9.3 MG/DL (ref 8.5–10.1)
CHLORIDE SERPL-SCNC: 102 MMOL/L (ref 100–111)
CO2 SERPL-SCNC: 29 MMOL/L (ref 21–32)
CREAT SERPL-MCNC: 0.7 MG/DL (ref 0.6–1.3)
DIFFERENTIAL METHOD BLD: NORMAL
EOSINOPHIL # BLD: 0.3 K/UL (ref 0–0.4)
EOSINOPHIL NFR BLD: 5 % (ref 0–5)
ERYTHROCYTE [DISTWIDTH] IN BLOOD BY AUTOMATED COUNT: 12.2 % (ref 11.6–14.5)
GLUCOSE SERPL-MCNC: 92 MG/DL (ref 74–99)
HCT VFR BLD AUTO: 41 % (ref 35–45)
HGB BLD-MCNC: 13.4 G/DL (ref 12–16)
LYMPHOCYTES # BLD: 1.8 K/UL (ref 0.9–3.6)
LYMPHOCYTES NFR BLD: 27 % (ref 21–52)
MCH RBC QN AUTO: 30.6 PG (ref 24–34)
MCHC RBC AUTO-ENTMCNC: 32.7 G/DL (ref 31–37)
MCV RBC AUTO: 93.6 FL (ref 74–97)
MONOCYTES # BLD: 0.4 K/UL (ref 0.05–1.2)
MONOCYTES NFR BLD: 6 % (ref 3–10)
NEUTS SEG # BLD: 4.1 K/UL (ref 1.8–8)
NEUTS SEG NFR BLD: 61 % (ref 40–73)
PLATELET # BLD AUTO: 333 K/UL (ref 135–420)
PMV BLD AUTO: 9.3 FL (ref 9.2–11.8)
POTASSIUM SERPL-SCNC: 4.9 MMOL/L (ref 3.5–5.5)
RBC # BLD AUTO: 4.38 M/UL (ref 4.2–5.3)
SODIUM SERPL-SCNC: 139 MMOL/L (ref 136–145)
WBC # BLD AUTO: 6.7 K/UL (ref 4.6–13.2)

## 2021-08-09 PROCEDURE — 74011250636 HC RX REV CODE- 250/636: Performed by: EMERGENCY MEDICINE

## 2021-08-09 PROCEDURE — 87040 BLOOD CULTURE FOR BACTERIA: CPT

## 2021-08-09 PROCEDURE — 85025 COMPLETE CBC W/AUTO DIFF WBC: CPT

## 2021-08-09 PROCEDURE — 99283 EMERGENCY DEPT VISIT LOW MDM: CPT

## 2021-08-09 PROCEDURE — 80048 BASIC METABOLIC PNL TOTAL CA: CPT

## 2021-08-09 PROCEDURE — 96374 THER/PROPH/DIAG INJ IV PUSH: CPT

## 2021-08-09 RX ORDER — DIPHENHYDRAMINE HYDROCHLORIDE 50 MG/ML
25 INJECTION, SOLUTION INTRAMUSCULAR; INTRAVENOUS ONCE
Status: COMPLETED | OUTPATIENT
Start: 2021-08-09 | End: 2021-08-09

## 2021-08-09 RX ORDER — CEPHALEXIN 500 MG/1
500 CAPSULE ORAL 4 TIMES DAILY
Qty: 40 CAPSULE | Refills: 0 | Status: SHIPPED | OUTPATIENT
Start: 2021-08-09 | End: 2021-08-09

## 2021-08-09 RX ORDER — DIPHENHYDRAMINE HCL 25 MG
CAPSULE ORAL
Qty: 16 CAPSULE | Refills: 0 | Status: SHIPPED | OUTPATIENT
Start: 2021-08-09 | End: 2021-08-12

## 2021-08-09 RX ADMIN — DIPHENHYDRAMINE HYDROCHLORIDE 25 MG: 50 INJECTION INTRAMUSCULAR; INTRAVENOUS at 10:49

## 2021-08-09 NOTE — ED NOTES
Pt arrived to ER from home with c/o \"burining and itching\" on surgical incision site to her back. Pt did attempt to call surgeons office, but was not able to get thru. Pt has large rash to back, under bandage. Bandage removed. Pt has not taken any benadryl or meds to assist with the rash.

## 2021-08-09 NOTE — ED PROVIDER NOTES
EMERGENCY DEPARTMENT HISTORY AND PHYSICAL EXAM    9:58 AM late entry, patient was seen at the time I signed on      Date: 8/9/2021  Patient Name: Alverto Hernandez    History of Presenting Illness     Chief Complaint   Patient presents with    Rash         History Provided By: patient    Additional History (Context): Alverto Hernandez is a 66 y.o. female presents with operation on 8/3/2021 to remove T10 screws which have no purchase. Since that time she states she has had fevers up to 105 Fahrenheit she has been taking Tylenol. States she always gets fevers after surgery so this is not unusual.  No neurologic symptoms. Rates her discomfort as severe. PCP: Mayra Almanzar MD    Chief Complaint:   Duration:    Timing:    Location:   Quality:   Severity:   Modifying Factors:   Associated Symptoms:       Current Outpatient Medications   Medication Sig Dispense Refill    diphenhydrAMINE (BenadryL) 25 mg capsule Take 1-2 tabs every 6 hours as needed. 16 Capsule 0    multivitamin (ONE A DAY) tablet Take 1 Tablet by mouth daily.  traMADoL (ULTRAM-ER) 100 mg Tb24 Take 1 Tablet by mouth daily for 90 days. Max Daily Amount: 100 mg. (Patient taking differently: Take 100 mg by mouth nightly.) 90 Tablet 0    lisinopriL (PRINIVIL, ZESTRIL) 5 mg tablet Take 1 Tablet by mouth daily. 90 Tablet 2    zolpidem (Ambien) 5 mg tablet Take 1 Tablet by mouth nightly. Max Daily Amount: 5 mg. 90 Tablet 0    meloxicam (MOBIC) 15 mg tablet TAKE 1 TABLET DAILY 90 Tab 3    tofacitinib (XELJANZ) 5 mg tab Take  by mouth two (2) times a day.  cetirizine (ZYRTEC) 10 mg tablet Take 10 mg by mouth daily as needed.  acetaminophen (TYLENOL EXTRA STRENGTH) 500 mg tablet Take 1,000 mg by mouth every six (6) hours as needed for Pain. (Patient not taking: Reported on 8/2/2021)      cholecalciferol (VITAMIN D3) 1,000 unit tablet Take 1,000 Units by mouth five (5) days a week.       calcium carbonate (OS-BARBARA) 500 mg calcium (1,250 mg) tablet Take 1,500 mg by mouth daily. Past History     Past Medical History:  Past Medical History:   Diagnosis Date    Abdominal abscess 2009    Adverse effect of anesthesia      Be careful with intubation.  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 )    Irritable bowel syndrome     Neuropathy     bilateral hands/wrist    Osteoporosis     Pneumonia     RA (rheumatoid arthritis) ([address]     Seizures ([address] 6-1-2008    one episode- after high dose of epinepherine       Past Surgical History:  Past Surgical History:   Procedure Laterality Date    HX ABDOMINAL WALL DEFECT REPAIR      HX APPENDECTOMY      HX BREAST REDUCTION      HX CATARACT REMOVAL Bilateral     HX COLONOSCOPY      HX GI      repair rectal prolaspe    HX GYN      dermoid cyst    HX HEENT      tonsillectomy    HX HYSTERECTOMY  1976    HX LUMBAR FUSION      x 2  L 3-4 , L-5-6    HX LUMBAR FUSION  12/03/2018    T-8    HX ORTHOPAEDIC Bilateral     CTR    HX ORTHOPAEDIC Bilateral     arthroplasty thumbs    HX ORTHOPAEDIC Left     Ankle     HX ORTHOPAEDIC Right     \"Nerve Release Elbow\"    HX OTHER SURGICAL Left 1985    vein stripping    HX SALPINGO-OOPHORECTOMY Bilateral     HX SHOULDER REPLACEMENT Left     HX UROLOGICAL      bladder repair    CT TOTAL HIP ARTHROPLASTY Bilateral        Family History:  Family History   Problem Relation Age of Onset    Other Other         Orthopedic (Sister)   Farina Draft Arthritis-osteo Sister     Heart Attack Brother 58    Cancer Neg Hx     Diabetes Neg Hx     Heart Disease Neg Hx     Hypertension Neg Hx     Stroke Neg Hx     Malignant Hyperthermia Neg Hx  Pseudocholinesterase Deficiency Neg Hx     Delayed Awakening Neg Hx     Post-op Nausea/Vomiting Neg Hx     Emergence Delirium Neg Hx     Post-op Cognitive Dysfunction Neg Hx        Social History:  Social History     Tobacco Use    Smoking status: Never Smoker    Smokeless tobacco: Never Used   Vaping Use    Vaping Use: Never used   Substance Use Topics    Alcohol use: No    Drug use: Never       Allergies: Allergies   Allergen Reactions    Celebrex [Celecoxib] Swelling    Shellfish Derived Other (comments)      Pt.denies     Sulfa (Sulfonamide Antibiotics) Hives and Swelling     Lips swelling    Gabapentin Diarrhea and Palpitations     Chest and Abdominal pain    Humira [Adalimumab] Other (comments)     Lupus    Influenza Virus Vaccines Hives    Iodine Itching     Takes benadryl and is OK.  Optiray 320 [Ioversol] Hives and Itching     Pt states when she gets iv contrast she itches a little from hives?  Penicillins Hives    Shrimp Other (comments)     Sodium puffy         Review of Systems     Review of Systems   Constitutional: Negative for diaphoresis and fever. HENT: Negative for congestion and sore throat. Eyes: Negative for pain and itching. Respiratory: Negative for cough and shortness of breath. Cardiovascular: Negative for chest pain and palpitations. Gastrointestinal: Negative for abdominal pain and diarrhea. Endocrine: Negative for polydipsia and polyuria. Genitourinary: Negative for dysuria and hematuria. Musculoskeletal: Positive for back pain. Negative for arthralgias and myalgias. Skin: Positive for rash. Negative for wound. Neurological: Negative for seizures and syncope. Hematological: Does not bruise/bleed easily. Psychiatric/Behavioral: Negative for agitation and hallucinations.          Physical Exam       Patient Vitals for the past 12 hrs:   Temp Pulse Resp BP SpO2   08/09/21 1100    119/65 98 %   08/09/21 0950 97.8 °F (36.6 °C) 62 18 (!) 156/66 100 %       Physical Exam  Vitals and nursing note reviewed. Constitutional:       Appearance: She is well-developed. HENT:      Head: Normocephalic and atraumatic. Eyes:      General: No scleral icterus. Conjunctiva/sclera: Conjunctivae normal.   Neck:      Vascular: No JVD. Cardiovascular:      Rate and Rhythm: Normal rate and regular rhythm. Heart sounds: Normal heart sounds. Comments: 4 intact extremity pulses  Pulmonary:      Effort: Pulmonary effort is normal.      Breath sounds: Normal breath sounds. Abdominal:      Palpations: Abdomen is soft. There is no mass. Tenderness: There is no abdominal tenderness. Musculoskeletal:         General: Normal range of motion. Cervical back: Normal range of motion and neck supple. Lymphadenopathy:      Cervical: No cervical adenopathy. Skin:     General: Skin is warm and dry. Findings: Rash present. Neurological:      General: No focal deficit present. Mental Status: She is alert. Diagnostic Study Results   Labs -  Recent Results (from the past 12 hour(s))   CBC WITH AUTOMATED DIFF    Collection Time: 08/09/21 10:40 AM   Result Value Ref Range    WBC 6.7 4.6 - 13.2 K/uL    RBC 4.38 4.20 - 5.30 M/uL    HGB 13.4 12.0 - 16.0 g/dL    HCT 41.0 35.0 - 45.0 %    MCV 93.6 74.0 - 97.0 FL    MCH 30.6 24.0 - 34.0 PG    MCHC 32.7 31.0 - 37.0 g/dL    RDW 12.2 11.6 - 14.5 %    PLATELET 938 441 - 713 K/uL    MPV 9.3 9.2 - 11.8 FL    NEUTROPHILS 61 40 - 73 %    LYMPHOCYTES 27 21 - 52 %    MONOCYTES 6 3 - 10 %    EOSINOPHILS 5 0 - 5 %    BASOPHILS 0 0 - 2 %    ABS. NEUTROPHILS 4.1 1.8 - 8.0 K/UL    ABS. LYMPHOCYTES 1.8 0.9 - 3.6 K/UL    ABS. MONOCYTES 0.4 0.05 - 1.2 K/UL    ABS. EOSINOPHILS 0.3 0.0 - 0.4 K/UL    ABS.  BASOPHILS 0.0 0.0 - 0.1 K/UL    DF AUTOMATED     METABOLIC PANEL, BASIC    Collection Time: 08/09/21 10:40 AM   Result Value Ref Range    Sodium 139 136 - 145 mmol/L    Potassium 4.9 3.5 - 5.5 mmol/L Chloride 102 100 - 111 mmol/L    CO2 29 21 - 32 mmol/L    Anion gap 8 3.0 - 18 mmol/L    Glucose 92 74 - 99 mg/dL    BUN 16 7.0 - 18 MG/DL    Creatinine 0.70 0.6 - 1.3 MG/DL    BUN/Creatinine ratio 23 (H) 12 - 20      GFR est AA >60 >60 ml/min/1.73m2    GFR est non-AA >60 >60 ml/min/1.73m2    Calcium 9.3 8.5 - 10.1 MG/DL       Radiologic Studies -   No orders to display     No results found. Medications ordered:   Medications   diphenhydrAMINE (BENADRYL) injection 25 mg (25 mg IntraVENous Given 8/9/21 1049)         Medical Decision Making   Initial Medical Decision Making and DDx:  More suggestive of a superficial dermatitis allergic reaction to the adhesive bandage. Representative pressure sent to Dr. Lou Alcantara discussed Keflex, she does have a allergy to penicillin causing hives and felt much better with the Benadryl and the characteristic of the rash I think she is better served by continuing the Benadryl and avoiding something that might muddy the picture. She will follow up with Dr. Lou Alcantara. I do not think there is a large surgical site infection    ED Course: Progress Notes, Reevaluation, and Consults:         I am the first provider for this patient. I reviewed the vital signs, available nursing notes, past medical history, past surgical history, family history and social history. Patient Vitals for the past 12 hrs:   Temp Pulse Resp BP SpO2   08/09/21 1100    119/65 98 %   08/09/21 0950 97.8 °F (36.6 °C) 62 18 (!) 156/66 100 %       Vital Signs-Reviewed the patient's vital signs. Pulse Oximetry Analysis, Cardiac Monitor, 12 lead ekg:       Interpreted by the EP. Records Reviewed: Nursing notes reviewed (Time of Review: 9:58 AM)    Procedures:   Critical Care Time:   Aspirin: (was aspirin given for stroke?)    Diagnosis     Clinical Impression:   1.  Dermatitis        Disposition: Discharged      Follow-up Information     Follow up With Specialties Details Why Contact Ju Cruz MD Orthopedic Surgery In 2 days  [facility] [id number]  [phone]             Patient's Medications   Start Taking    DIPHENHYDRAMINE (BENADRYL) 25 MG CAPSULE    Take 1-2 tabs every 6 hours as needed. Continue Taking    ACETAMINOPHEN (TYLENOL EXTRA STRENGTH) 500 MG TABLET    Take 1,000 mg by mouth every six (6) hours as needed for Pain. CALCIUM CARBONATE (OS-BARBARA) 500 MG CALCIUM (1,250 MG) TABLET    Take 1,500 mg by mouth daily. CETIRIZINE (ZYRTEC) 10 MG TABLET    Take 10 mg by mouth daily as needed. CHOLECALCIFEROL (VITAMIN D3) 1,000 UNIT TABLET    Take 1,000 Units by mouth five (5) days a week. LISINOPRIL (PRINIVIL, ZESTRIL) 5 MG TABLET    Take 1 Tablet by mouth daily. MELOXICAM (MOBIC) 15 MG TABLET    TAKE 1 TABLET DAILY    MULTIVITAMIN (ONE A DAY) TABLET    Take 1 Tablet by mouth daily. TOFACITINIB (XELJANZ) 5 MG TAB    Take  by mouth two (2) times a day. TRAMADOL (ULTRAM-ER) 100 MG TB24    Take 1 Tablet by mouth daily for 90 days. Max Daily Amount: 100 mg. ZOLPIDEM (AMBIEN) 5 MG TABLET    Take 1 Tablet by mouth nightly. Max Daily Amount: 5 mg.    These Medications have changed    No medications on file   Stop Taking    No medications on file     _______________________________    Notes:    Jose G Kaur MD using Dragon dictation      _______________________________

## 2021-08-09 NOTE — ED NOTES
Provided pt with warm blankets. Pt resting comfortably. Pt reports itching to back/ surgical site, has improved.

## 2021-08-12 ENCOUNTER — OFFICE VISIT (OUTPATIENT)
Dept: FAMILY MEDICINE CLINIC | Age: 71
End: 2021-08-12
Payer: MEDICARE

## 2021-08-12 VITALS
WEIGHT: 173 LBS | TEMPERATURE: 97.4 F | RESPIRATION RATE: 12 BRPM | HEIGHT: 62 IN | BODY MASS INDEX: 31.83 KG/M2 | SYSTOLIC BLOOD PRESSURE: 128 MMHG | OXYGEN SATURATION: 98 % | HEART RATE: 68 BPM | DIASTOLIC BLOOD PRESSURE: 78 MMHG

## 2021-08-12 DIAGNOSIS — L30.9 DERMATITIS: Primary | ICD-10-CM

## 2021-08-12 PROCEDURE — G8427 DOCREV CUR MEDS BY ELIG CLIN: HCPCS | Performed by: FAMILY MEDICINE

## 2021-08-12 PROCEDURE — 1090F PRES/ABSN URINE INCON ASSESS: CPT | Performed by: FAMILY MEDICINE

## 2021-08-12 PROCEDURE — 99213 OFFICE O/P EST LOW 20 MIN: CPT | Performed by: FAMILY MEDICINE

## 2021-08-12 PROCEDURE — G8536 NO DOC ELDER MAL SCRN: HCPCS | Performed by: FAMILY MEDICINE

## 2021-08-12 PROCEDURE — G8417 CALC BMI ABV UP PARAM F/U: HCPCS | Performed by: FAMILY MEDICINE

## 2021-08-12 PROCEDURE — G8432 DEP SCR NOT DOC, RNG: HCPCS | Performed by: FAMILY MEDICINE

## 2021-08-12 PROCEDURE — 1101F PT FALLS ASSESS-DOCD LE1/YR: CPT | Performed by: FAMILY MEDICINE

## 2021-08-12 PROCEDURE — 3017F COLORECTAL CA SCREEN DOC REV: CPT | Performed by: FAMILY MEDICINE

## 2021-08-12 PROCEDURE — G9899 SCRN MAM PERF RSLTS DOC: HCPCS | Performed by: FAMILY MEDICINE

## 2021-08-12 PROCEDURE — G8754 DIAS BP LESS 90: HCPCS | Performed by: FAMILY MEDICINE

## 2021-08-12 PROCEDURE — G8752 SYS BP LESS 140: HCPCS | Performed by: FAMILY MEDICINE

## 2021-08-12 RX ORDER — FLUOCINONIDE 0.5 MG/G
CREAM TOPICAL 2 TIMES DAILY
Qty: 15 G | Refills: 0 | Status: SHIPPED | OUTPATIENT
Start: 2021-08-12 | End: 2021-08-16 | Stop reason: SDUPTHER

## 2021-08-12 RX ORDER — HYDROXYZINE 50 MG/1
50 TABLET, FILM COATED ORAL
Qty: 40 TABLET | Refills: 1 | Status: SHIPPED | OUTPATIENT
Start: 2021-08-12 | End: 2021-09-11

## 2021-08-12 NOTE — PROGRESS NOTES
Andrez Tirado is a 66 y.o. female  presents for rash on back at incision site. She has assoc itching. No fever or chills. She has tried otc meds but it has not helped. Allergies   Allergen Reactions    Celebrex [Celecoxib] Swelling    Shellfish Derived Other (comments)      Pt.denies     Sulfa (Sulfonamide Antibiotics) Hives and Swelling     Lips swelling    Gabapentin Diarrhea and Palpitations     Chest and Abdominal pain    Humira [Adalimumab] Other (comments)     Lupus    Influenza Virus Vaccines Hives    Iodine Itching     Takes benadryl and is OK.  Optiray 320 [Ioversol] Hives and Itching     Pt states when she gets iv contrast she itches a little from hives?  Penicillins Hives    Shrimp Other (comments)     Sodium puffy     Outpatient Medications Marked as Taking for the 8/12/21 encounter (Office Visit) with Jessica Tabares MD   Medication Sig Dispense Refill    fluocinoNIDE (LIDEX) 0.05 % topical cream Apply  to affected area two (2) times a day. 15 g 0    hydrOXYzine HCL (ATARAX) 50 mg tablet Take 1 Tablet by mouth three (3) times daily as needed for Itching for up to 30 days.  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    H/O calcium pyrophosphate deposition disease (CPPD) Z87.39    IBS (irritable bowel syndrome) K58.9    RA (rheumatoid arthritis) ([facility]) M06.9    Sicca syndrome ([facility]) M35.00    Osteoarthritis of right hip M16.11    Pain management contract agreement Z02.89    ACP (advance care planning) Z71.89    Chronic left shoulder pain M25.512, G89.29    Osteopenia M85.80    Osteopenia of multiple sites M85.89    Osteoarthritis of left hip M16.12    Closed fracture of thoracic spine without spinal cord lesion ([facility] S22.009A    Spinal stenosis, thoracolumbar region M48.05    Lumbar spine instability M53.2X6    Spinal stenosis of lumbar region with neurogenic claudication M48.062    Spinal stenosis of lumbar region M48.061    Lumbar stenosis M48.061    Nausea R11.0    Acute bilateral thoracic back pain M54.6    Anxiety F41.9    Chest pain R07.9    Essential hypertension I10    Cataracts, bilateral H26.9    Diverticulitis K57.92    Elevated blood pressure reading without diagnosis of hypertension R03.0    Fall at home W19. Bernestine Sailors, Y92.009    Insomnia G47.00    Lesion of hard palate K13.79    Lesion of neck L98.9    Numbness and tingling of right arm R20.0, R20.2    Osteoporosis M81.0    Sciatica M54.30    Sialolithiasis K11.5    Urinary retention with incomplete bladder emptying R33.9    SVT (supraventricular tachycardia) ([facility]) I47.1     Past Medical History:   Diagnosis Date    Abdominal abscess 2009    Adverse effect of anesthesia      Be careful with intubation.  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 )    Irritable bowel syndrome     Neuropathy     bilateral hands/wrist    Osteoporosis     Pneumonia     RA (rheumatoid arthritis) ([address]     Seizures ([facility] 6-1-2008    one episode- after high dose of epinepherine     Social History     Socioeconomic History    Marital status:      Spouse name: Not on file    Number of children: Not on file    Years of education: Not on file    Highest education level: Not on file   Tobacco Use    Smoking status: Never Smoker    Smokeless tobacco: Never Used   Vaping Use    Vaping Use: Never used   Substance and Sexual Activity    Alcohol use: No    Drug use: Never    Sexual activity: Not Currently   Other Topics Concern     Social Determinants of Health     Financial Resource Strain:     Difficulty of Paying Living Expenses:    Food Insecurity:     Worried About Running Out of Food in the Last Year:     [address] in the Last Year:    Transportation Needs:     Lack of Transportation (Medical):  Lack of Transportation (Non-Medical):    Physical Activity:     Days of Exercise per Week:     Minutes of Exercise per Session:    Stress:     Feeling of Stress :    Social Connections:     Frequency of Communication with Friends and Family:     Frequency of Social Gatherings with Friends and Family:     Attends [employer] Services:     Active Member of Clubs or Organizations:     Attends Club or Organization Meetings:     Marital Status:      Family History   Problem Relation Age of Onset    Other Other         Orthopedic (Sister)   Jenn Duncan Sister     Heart Attack Brother 58    Cancer Neg Hx     Diabetes Neg Hx     Heart Disease Neg Hx     Hypertension Neg Hx     Stroke Neg Hx     Malignant Hyperthermia Neg Hx     Pseudocholinesterase Deficiency Neg Hx     Delayed Awakening Neg Hx     Post-op Nausea/Vomiting Neg Hx     Emergence Delirium Neg Hx     Post-op Cognitive Dysfunction Neg Hx         Review of Systems   Constitutional: Negative for chills, fever, malaise/fatigue and weight loss. Eyes: Negative for blurred vision. Respiratory: Negative for cough, shortness of breath and wheezing. Cardiovascular: Negative for chest pain. Gastrointestinal: Negative for nausea and vomiting. Musculoskeletal: Negative for myalgias. Skin: Positive for itching and rash. Neurological: Negative for weakness. Vitals:    08/12/21 1545   BP: 128/78   Pulse: 68   Resp: 12   Temp: 97.4 °F (36.3 °C)   SpO2: 98%   Weight: 173 lb (78.5 kg)   Height: 5' 2\" (1.575 m)       Physical Exam  Vitals and nursing note reviewed.    Constitutional: Appearance: Normal appearance. She is obese. HENT:      Mouth/Throat:      Mouth: Mucous membranes are moist.   Eyes:      Extraocular Movements: Extraocular movements intact. Conjunctiva/sclera: Conjunctivae normal.      Pupils: Pupils are equal, round, and reactive to light. Skin:     General: Skin is warm. Capillary Refill: Capillary refill takes less than 2 seconds. Findings: Rash present. No erythema. Neurological:      General: No focal deficit present. Mental Status: She is alert. Psychiatric:         Mood and Affect: Mood normal.         Behavior: Behavior normal.         Thought Content: Thought content normal.         Judgment: Judgment normal.         Assessment/Plan      ICD-10-CM ICD-9-CM    1. Dermatitis  L30.9 692.9 fluocinoNIDE (LIDEX) 0.05 % topical cream      hydrOXYzine HCL (ATARAX) 50 mg tablet     Follow-up and Dispositions    · Return if symptoms worsen or fail to improve. I have discussed the diagnosis with the patient and the intended plan of care as seen in the above orders. The patient has received an after-visit summary and questions were answered concerning future plans. I have discussed medication, side effects, and warnings with the patient in detail. The patient verbalized understanding and is in agreement with the plan of care. The patient will contact the office with any additional concerns.       lab results and schedule of future lab studies reviewed with patient    Chris Fischer MD

## 2021-08-12 NOTE — PATIENT INSTRUCTIONS

## 2021-08-12 NOTE — PROGRESS NOTES
Chief Complaint   Patient presents with    Hives     Pt in office for hives after back surgery. 1. Have you been to the ER, urgent care clinic since your last visit? Hospitalized since your last visit? No    2. Have you seen or consulted any other health care providers outside of the [address] [place] [address] since your last visit? Include any pap smears or colon screening.  No

## 2021-08-15 LAB
BACTERIA SPEC CULT: NORMAL
BACTERIA SPEC CULT: NORMAL
SERVICE CMNT-IMP: NORMAL
SERVICE CMNT-IMP: NORMAL

## 2021-08-16 ENCOUNTER — PATIENT MESSAGE (OUTPATIENT)
Dept: FAMILY MEDICINE CLINIC | Age: 71
End: 2021-08-16

## 2021-08-16 DIAGNOSIS — L30.9 DERMATITIS: ICD-10-CM

## 2021-08-16 RX ORDER — FLUOCINONIDE 0.5 MG/G
CREAM TOPICAL 2 TIMES DAILY
Qty: 15 G | Refills: 0 | Status: SHIPPED | OUTPATIENT
Start: 2021-08-16 | End: 2021-10-07

## 2021-08-16 NOTE — TELEPHONE ENCOUNTER
Requested Prescriptions     Pending Prescriptions Disp Refills    fluocinoNIDE (LIDEX) 0.05 % topical cream 15 g 0     Sig: Apply  to affected area two (2) times a day.      See Enhanced Medical Decisionst Message

## 2021-08-24 LAB
SARS-COV-2 AB, IGG, CORG1M: POSITIVE
SARS-COV-2, NAA: POSITIVE

## 2021-08-26 ENCOUNTER — TELEPHONE (OUTPATIENT)
Dept: FAMILY MEDICINE CLINIC | Age: 71
End: 2021-08-26

## 2021-08-26 NOTE — TELEPHONE ENCOUNTER
Patient called and would like to ask Dr. Zonia Barrios (not urgent, when he returns)  About having had the vaccined and she also states she was tested and tested positive for antibodies, she said he level was 27 but not sure what that meant. She would like to know Dr. Malika Black thoughts on this in relation to all the issues she has had and if he knows how long it may take her to get over.

## 2021-08-31 ENCOUNTER — PATIENT MESSAGE (OUTPATIENT)
Dept: CARDIOLOGY CLINIC | Age: 71
End: 2021-08-31

## 2021-08-31 DIAGNOSIS — M54.6 ACUTE BILATERAL THORACIC BACK PAIN: ICD-10-CM

## 2021-08-31 DIAGNOSIS — G47.00 INSOMNIA: ICD-10-CM

## 2021-08-31 RX ORDER — ZOLPIDEM TARTRATE 5 MG/1
5 TABLET ORAL
Qty: 90 TABLET | Refills: 0 | Status: SHIPPED | OUTPATIENT
Start: 2021-08-31 | End: 2021-11-16

## 2021-08-31 RX ORDER — TRAMADOL HYDROCHLORIDE 100 MG/1
100 TABLET, EXTENDED RELEASE ORAL DAILY
Qty: 90 TABLET | Refills: 0 | Status: SHIPPED | OUTPATIENT
Start: 2021-08-31 | End: 2021-11-29

## 2021-09-01 RX ORDER — METOPROLOL SUCCINATE 25 MG/1
25 TABLET, EXTENDED RELEASE ORAL DAILY
Qty: 90 TABLET | Refills: 1 | Status: SHIPPED | OUTPATIENT
Start: 2021-09-01 | End: 2021-10-07

## 2021-09-01 NOTE — TELEPHONE ENCOUNTER
I talked to patient and she had covid antibodies that were 927.8. We discussed that she has had covid 23 recently.

## 2021-09-01 NOTE — TELEPHONE ENCOUNTER
I have reviewed her monitor she has episode of SVT up to 22 beat. Still has symptoms. I would add Toprol 25 mg once a day. Continue to monitor.   She has a follow-up in October

## 2021-09-03 ENCOUNTER — TELEPHONE (OUTPATIENT)
Dept: CARDIOLOGY CLINIC | Age: 71
End: 2021-09-03

## 2021-09-03 NOTE — TELEPHONE ENCOUNTER
Patient called and states she started metoprolol yesterday and now her legs with pitting edema.  Please advise

## 2021-09-03 NOTE — TELEPHONE ENCOUNTER
Spoke with patient per Dr. Ava Benoit and she states he didn't take medication when first prescribed d/t being sick and she just started. Patient states she will continue to take it if she get worse she will go to hospital. She voices understanding and acceptance of this advice and will call back if any further questions or concerns. Patient refuse hear anything else and discontinue call.

## 2021-09-03 NOTE — TELEPHONE ENCOUNTER
I prescribed her metoprolol almost a month ago. If she just started it is less likely that it will cause edema in 1 day. It is a very small dose.   Reduce salt intake and continue to monitor if edema persist then she can start metoprolol and please let us know

## 2021-09-13 ENCOUNTER — VIRTUAL VISIT (OUTPATIENT)
Dept: FAMILY MEDICINE CLINIC | Age: 71
End: 2021-09-13
Payer: MEDICARE

## 2021-09-13 DIAGNOSIS — U09.9 POST-COVID-19 SYNDROME: Primary | ICD-10-CM

## 2021-09-13 DIAGNOSIS — Z92.29 STATUS POST ADMINISTRATION OF ALL DOSES OF COVID-19 VACCINE SERIES: ICD-10-CM

## 2021-09-13 PROCEDURE — 3017F COLORECTAL CA SCREEN DOC REV: CPT | Performed by: FAMILY MEDICINE

## 2021-09-13 PROCEDURE — 99213 OFFICE O/P EST LOW 20 MIN: CPT | Performed by: FAMILY MEDICINE

## 2021-09-13 PROCEDURE — 1101F PT FALLS ASSESS-DOCD LE1/YR: CPT | Performed by: FAMILY MEDICINE

## 2021-09-13 PROCEDURE — G8756 NO BP MEASURE DOC: HCPCS | Performed by: FAMILY MEDICINE

## 2021-09-13 PROCEDURE — G8432 DEP SCR NOT DOC, RNG: HCPCS | Performed by: FAMILY MEDICINE

## 2021-09-13 PROCEDURE — G9899 SCRN MAM PERF RSLTS DOC: HCPCS | Performed by: FAMILY MEDICINE

## 2021-09-13 PROCEDURE — G8536 NO DOC ELDER MAL SCRN: HCPCS | Performed by: FAMILY MEDICINE

## 2021-09-13 PROCEDURE — 1090F PRES/ABSN URINE INCON ASSESS: CPT | Performed by: FAMILY MEDICINE

## 2021-09-13 PROCEDURE — G8427 DOCREV CUR MEDS BY ELIG CLIN: HCPCS | Performed by: FAMILY MEDICINE

## 2021-09-13 PROCEDURE — G8417 CALC BMI ABV UP PARAM F/U: HCPCS | Performed by: FAMILY MEDICINE

## 2021-09-13 NOTE — PROGRESS NOTES
Patient being seen for positive AB test she says she is being seen by Pulmonary tomorrow. She says she needs to know what to do from here, she says she has had issues with palpitations she does have an appt with Cardiology in October. She says she also has had fevers on and off.      1. Have you been to the ER, urgent care clinic since your last visit? Hospitalized since your last visit? Has carmencita seen at  twice. 2. Have you seen or consulted any other health care providers outside of the [address] [place] [address] since your last visit? Include any pap smears or colon screening. Has seen Cardiology. Medication reconciliation has been completed with patient. Care team discussed/updated as well as pharmacy.     Health Maintenance Due   Topic Date Due    COVID-19 Vaccine (1) Never done    Shingrix Vaccine Age 50> (1 of 2) Never done

## 2021-09-13 NOTE — PROGRESS NOTES
Neftali Pollard is a 66 y.o. female who was seen by synchronous (real-time) audio-video technology on 9/13/2021 for Post-COVID Symptoms  she is seeing pulm for cough. She has had hives and low grade fever      Assessment & Plan:   Diagnoses and all orders for this visit:    1. Status post administration of all doses of COVID-19 vaccine series    2. Post-COVID-19 syndrome        Continue follow up with pulmonary. 712  Subjective:       Prior to Admission medications    Medication Sig Start Date End Date Taking? Authorizing Provider   traMADoL (ULTRAM-ER) 100 mg Tb24 Take 1 Tablet by mouth daily for 90 days. Max Daily Amount: 100 mg. 8/31/21 11/29/21 Yes Lanny Callejas MD   zolpidem (Ambien) 5 mg tablet Take 1 Tablet by mouth nightly. Max Daily Amount: 5 mg. 8/31/21  Yes Lanny Callejas MD   multivitamin (ONE A DAY) tablet Take 1 Tablet by mouth daily. 2/8/21  Yes Provider, Historical   lisinopriL (PRINIVIL, ZESTRIL) 5 mg tablet Take 1 Tablet by mouth daily. 7/9/21  Yes Lanny Caleljas MD   meloxicam (MOBIC) 15 mg tablet TAKE 1 TABLET DAILY 3/17/21  Yes Lanny Callejas MD   tofacitinib Yani [place]) 5 mg tab Take  by mouth two (2) times a day. Yes Provider, Historical   cetirizine (ZYRTEC) 10 mg tablet Take 10 mg by mouth daily as needed. Yes Provider, Historical   cholecalciferol (VITAMIN D3) 1,000 unit tablet Take 1,000 Units by mouth five (5) days a week. Yes Provider, Historical   calcium carbonate (OS-BARBARA) 500 mg calcium (1,250 mg) tablet Take 1,500 mg by mouth daily. Yes Provider, Historical   metoprolol succinate (TOPROL-XL) 25 mg XL tablet Take 1 Tablet by mouth daily. 9/1/21   Nano Daniels MD   fluocinoNIDE (LIDEX) 0.05 % topical cream Apply  to affected area two (2) times a day. 8/16/21   Lanny Callejas MD   acetaminophen (TYLENOL EXTRA STRENGTH) 500 mg tablet Take 1,000 mg by mouth every six (6) hours as needed for Pain.   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    H/O calcium pyrophosphate deposition disease (CPPD) Z87.39    IBS (irritable bowel syndrome) K58.9    RA (rheumatoid arthritis) ([facility] M06.9    Sicca syndrome ([facility]) M35.00    Osteoarthritis of right hip M16.11    Pain management contract agreement Z02.89    ACP (advance care planning) Z71.89    Chronic left shoulder pain M25.512, G89.29    Osteopenia M85.80    Osteopenia of multiple sites M85.89    Osteoarthritis of left hip M16.12    Closed fracture of thoracic spine without spinal cord lesion ([facility] [address] S22.009A    Spinal stenosis, thoracolumbar region M48.05    Lumbar spine instability M53.2X6    Spinal stenosis of lumbar region with neurogenic claudication M48.062    Spinal stenosis of lumbar region M48.061    Lumbar stenosis M48.061    Nausea R11.0    Acute bilateral thoracic back pain M54.6    Anxiety F41.9    Chest pain R07.9    Essential hypertension I10    Cataracts, bilateral H26.9    Diverticulitis K57.92    Elevated blood pressure reading without diagnosis of hypertension R03.0    Fall at home W19. [facility], Y92.009    Insomnia G47.00    Lesion of hard palate K13.79    Lesion of neck L98.9    Numbness and tingling of right arm R20.0, R20.2    Osteoporosis M81.0    Sciatica M54.30    Sialolithiasis K11.5    Urinary retention with incomplete bladder emptying R33.9    SVT (supraventricular tachycardia) ([facility]) I47.1     Past Medical History:   Diagnosis Date    Abdominal abscess 2009    Adverse effect of anesthesia      Be careful with intubation.  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 Eyes: Negative for blurred vision. Respiratory: Negative for cough, shortness of breath and wheezing. Cardiovascular: Negative for chest pain. Gastrointestinal: Negative for nausea and vomiting. Musculoskeletal: Negative for myalgias. Skin: Negative for rash. Neurological: Negative for weakness. Psychiatric/Behavioral: Negative. Objective:     Patient-Reported Vitals 9/13/2021   Patient-Reported Weight 170   Patient-Reported Height 5 ft 2 inches   Patient-Reported Pulse 70   Patient-Reported Temperature 97.6   Patient-Reported SpO2 96   Patient-Reported Systolic  378   Patient-Reported Diastolic 80      General: alert, cooperative, no distress   Mental  status: normal mood, behavior, speech, dress, motor activity, and thought processes, able to follow commands   HENT: NCAT   Neck: no visualized mass   Resp: no respiratory distress   Neuro: no gross deficits   Skin: no discoloration or lesions of concern on visible areas   Psychiatric: normal affect, consistent with stated mood, no evidence of hallucinations     Additional exam findings: We discussed the expected course, resolution and complications of the diagnosis(es) in detail. Medication risks, benefits, costs, interactions, and alternatives were discussed as indicated. I advised her to contact the office if her condition worsens, changes or fails to improve as anticipated. She expressed understanding with the diagnosis(es) and plan.      Seng Jorgensen Torsten Mcpherson, was evaluated through a synchronous (real-time) audio-video encounter. The patient (or guardian if applicable) is aware that this is a billable service. Verbal consent to proceed has been obtained within the past 12 months. The visit was conducted pursuant to the emergency declaration under the [address] [place] [address] authority and the [employer] General Act. Patient identification was verified, and a caregiver was present when appropriate. The patient was located in a state where the provider was credentialed to provide care.     Kp Rodriguez MD

## 2021-10-01 DIAGNOSIS — I10 ESSENTIAL HYPERTENSION WITH GOAL BLOOD PRESSURE LESS THAN 130/85: ICD-10-CM

## 2021-10-04 RX ORDER — LISINOPRIL 5 MG/1
5 TABLET ORAL DAILY
Qty: 90 TABLET | Refills: 2 | Status: SHIPPED | OUTPATIENT
Start: 2021-10-04 | End: 2022-09-02

## 2021-10-05 ENCOUNTER — VIRTUAL VISIT (OUTPATIENT)
Dept: FAMILY MEDICINE CLINIC | Age: 71
End: 2021-10-05
Payer: MEDICARE

## 2021-10-05 DIAGNOSIS — R19.7 DIARRHEA OF PRESUMED INFECTIOUS ORIGIN: Primary | ICD-10-CM

## 2021-10-05 PROCEDURE — G8536 NO DOC ELDER MAL SCRN: HCPCS | Performed by: FAMILY MEDICINE

## 2021-10-05 PROCEDURE — 3017F COLORECTAL CA SCREEN DOC REV: CPT | Performed by: FAMILY MEDICINE

## 2021-10-05 PROCEDURE — 99213 OFFICE O/P EST LOW 20 MIN: CPT | Performed by: FAMILY MEDICINE

## 2021-10-05 PROCEDURE — G8432 DEP SCR NOT DOC, RNG: HCPCS | Performed by: FAMILY MEDICINE

## 2021-10-05 PROCEDURE — G8417 CALC BMI ABV UP PARAM F/U: HCPCS | Performed by: FAMILY MEDICINE

## 2021-10-05 PROCEDURE — G8756 NO BP MEASURE DOC: HCPCS | Performed by: FAMILY MEDICINE

## 2021-10-05 PROCEDURE — 1101F PT FALLS ASSESS-DOCD LE1/YR: CPT | Performed by: FAMILY MEDICINE

## 2021-10-05 PROCEDURE — G8427 DOCREV CUR MEDS BY ELIG CLIN: HCPCS | Performed by: FAMILY MEDICINE

## 2021-10-05 PROCEDURE — G9899 SCRN MAM PERF RSLTS DOC: HCPCS | Performed by: FAMILY MEDICINE

## 2021-10-05 PROCEDURE — 1090F PRES/ABSN URINE INCON ASSESS: CPT | Performed by: FAMILY MEDICINE

## 2021-10-05 RX ORDER — METRONIDAZOLE 500 MG/1
500 TABLET ORAL 3 TIMES DAILY
Qty: 21 TABLET | Refills: 0 | Status: SHIPPED | OUTPATIENT
Start: 2021-10-05 | End: 2021-10-12

## 2021-10-05 NOTE — PROGRESS NOTES
Chika Joy is a 66 y.o. female who was seen by synchronous (real-time) audio-video technology on 10/5/2021 for Diarrhea (she has history of frequent stools. no abdo pain or nausea or vomiting. )        Assessment & Plan:   Diagnoses and all orders for this visit:    1. Diarrhea of presumed infectious origin  -     metroNIDAZOLE (FLAGYL) 500 mg tablet; Take 1 Tablet by mouth three (3) times daily for 7 days. 712  Subjective:       Prior to Admission medications    Medication Sig Start Date End Date Taking? Authorizing Provider   lisinopriL (PRINIVIL, ZESTRIL) 5 mg tablet Take 1 Tablet by mouth daily. 10/4/21   Vijaya Wheeler MD   metoprolol succinate (TOPROL-XL) 25 mg XL tablet Take 1 Tablet by mouth daily. 9/1/21   Roxanne Monahan MD   traMADoL Waldo Radha) 100 mg Tb24 Take 1 Tablet by mouth daily for 90 days. Max Daily Amount: 100 mg. 8/31/21 11/29/21  Vijaya Wheeler MD   zolpidem (Ambien) 5 mg tablet Take 1 Tablet by mouth nightly. Max Daily Amount: 5 mg. 8/31/21   Vijaya Wheeler MD   fluocinoNIDE (LIDEX) 0.05 % topical cream Apply  to affected area two (2) times a day. 8/16/21   Vijaya Wheeler MD   multivitamin (ONE A DAY) tablet Take 1 Tablet by mouth daily. 2/8/21   Provider, Historical   meloxicam (MOBIC) 15 mg tablet TAKE 1 TABLET DAILY 3/17/21   Vijaya Wheeler MD   tofacitinib Moriah Ramachandran) 5 mg tab Take  by mouth two (2) times a day. Provider, Historical   cetirizine (ZYRTEC) 10 mg tablet Take 10 mg by mouth daily as needed. Provider, Historical   acetaminophen (TYLENOL EXTRA STRENGTH) 500 mg tablet Take 1,000 mg by mouth every six (6) hours as needed for Pain. Patient not taking: Reported on 8/2/2021    Provider, Historical   cholecalciferol (VITAMIN D3) 1,000 unit tablet Take 1,000 Units by mouth five (5) days a week. Provider, Historical   calcium carbonate (OS-BARBARA) 500 mg calcium (1,250 mg) tablet Take 1,500 mg by mouth daily.     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    H/O calcium pyrophosphate deposition disease (CPPD) Z87.39    IBS (irritable bowel syndrome) K58.9    RA (rheumatoid arthritis) ([facility]) M06.9    Sicca syndrome ([facility]) M35.00    Osteoarthritis of right hip M16.11    Pain management contract agreement Z02.89    ACP (advance care planning) Z71.89    Chronic left shoulder pain M25.512, G89.29    Osteopenia M85.80    Osteopenia of multiple sites M85.89    Osteoarthritis of left hip M16.12    Closed fracture of thoracic spine without spinal cord lesion ([facility] [address] S22.009A    Spinal stenosis, thoracolumbar region M48.05    Lumbar spine instability M53.2X6    Spinal stenosis of lumbar region with neurogenic claudication M48.062    Spinal stenosis of lumbar region M48.061    Lumbar stenosis M48.061    Nausea R11.0    Acute bilateral thoracic back pain M54.6    Anxiety F41.9    Chest pain R07.9    Essential hypertension I10    Cataracts, bilateral H26.9    Diverticulitis K57.92    Elevated blood pressure reading without diagnosis of hypertension R03.0    Fall at home W19. Jose Luisclifton Brantdr, Y92.009    Insomnia G47.00    Lesion of hard palate K13.79    Lesion of neck L98.9    Numbness and tingling of right arm R20.0, R20.2    Osteoporosis M81.0    Sciatica M54.30    Sialolithiasis K11.5    Urinary retention with incomplete bladder emptying R33.9    SVT (supraventricular tachycardia) ([facility]) I47.1     Past Medical History:   Diagnosis Date    Abdominal abscess 2009    Adverse effect of anesthesia      Be careful with intubation.  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 Eyes: Negative for blurred vision. Respiratory: Negative for cough, shortness of breath and wheezing. Cardiovascular: Negative for chest pain. Gastrointestinal: Positive for diarrhea. Negative for blood in stool, melena, nausea and vomiting. Musculoskeletal: Negative for myalgias. Skin: Negative for rash. Neurological: Negative for weakness. Objective:     Patient-Reported Vitals 10/4/2021   Patient-Reported Weight 170   Patient-Reported Height 5 2   Patient-Reported Pulse 70   Patient-Reported Temperature 97;6   Patient-Reported SpO2 96   Patient-Reported Systolic  283   Patient-Reported Diastolic 72   Patient-Reported LMP HAHAHAHAH      General: alert, cooperative, no distress   Mental  status: normal mood, behavior, speech, dress, motor activity, and thought processes, able to follow commands   HENT: NCAT   Neck: no visualized mass   Resp: no respiratory distress   Neuro: no gross deficits   Skin: no discoloration or lesions of concern on visible areas   Psychiatric: normal affect, consistent with stated mood, no evidence of hallucinations     Additional exam findings: We discussed the expected course, resolution and complications of the diagnosis(es) in detail. Medication risks, benefits, costs, interactions, and alternatives were discussed as indicated. I advised her to contact the office if her condition worsens, changes or fails to improve as anticipated. She expressed understanding with the diagnosis(es) and plan. Gino Haney was evaluated through a synchronous (real-time) audio-video encounter. The patient (or guardian if applicable) is aware that this is a billable service. Verbal consent to proceed has been obtained within the past 12 months. The visit was conducted pursuant to the emergency declaration under the [address] [place] [address] and the [employer] General Act. Patient identification was verified, and a caregiver was present when appropriate. The patient was located in a state where the provider was credentialed to provide care.     Doug Robbins MD

## 2021-10-07 ENCOUNTER — OFFICE VISIT (OUTPATIENT)
Dept: CARDIOLOGY CLINIC | Age: 71
End: 2021-10-07
Payer: MEDICARE

## 2021-10-07 VITALS
BODY MASS INDEX: 32.02 KG/M2 | DIASTOLIC BLOOD PRESSURE: 69 MMHG | HEART RATE: 62 BPM | SYSTOLIC BLOOD PRESSURE: 116 MMHG | HEIGHT: 62 IN | WEIGHT: 174 LBS

## 2021-10-07 DIAGNOSIS — M06.9 RHEUMATOID ARTHRITIS, INVOLVING UNSPECIFIED SITE, UNSPECIFIED WHETHER RHEUMATOID FACTOR PRESENT (HCC): ICD-10-CM

## 2021-10-07 DIAGNOSIS — R00.0 TACHYCARDIA: ICD-10-CM

## 2021-10-07 DIAGNOSIS — I47.1 SVT (SUPRAVENTRICULAR TACHYCARDIA) (HCC): ICD-10-CM

## 2021-10-07 DIAGNOSIS — I10 ESSENTIAL HYPERTENSION: Primary | ICD-10-CM

## 2021-10-07 PROCEDURE — 1090F PRES/ABSN URINE INCON ASSESS: CPT | Performed by: INTERNAL MEDICINE

## 2021-10-07 PROCEDURE — 3017F COLORECTAL CA SCREEN DOC REV: CPT | Performed by: INTERNAL MEDICINE

## 2021-10-07 PROCEDURE — 1101F PT FALLS ASSESS-DOCD LE1/YR: CPT | Performed by: INTERNAL MEDICINE

## 2021-10-07 PROCEDURE — G8427 DOCREV CUR MEDS BY ELIG CLIN: HCPCS | Performed by: INTERNAL MEDICINE

## 2021-10-07 PROCEDURE — G9899 SCRN MAM PERF RSLTS DOC: HCPCS | Performed by: INTERNAL MEDICINE

## 2021-10-07 PROCEDURE — G8752 SYS BP LESS 140: HCPCS | Performed by: INTERNAL MEDICINE

## 2021-10-07 PROCEDURE — 99214 OFFICE O/P EST MOD 30 MIN: CPT | Performed by: INTERNAL MEDICINE

## 2021-10-07 PROCEDURE — G8417 CALC BMI ABV UP PARAM F/U: HCPCS | Performed by: INTERNAL MEDICINE

## 2021-10-07 PROCEDURE — G8754 DIAS BP LESS 90: HCPCS | Performed by: INTERNAL MEDICINE

## 2021-10-07 PROCEDURE — G8536 NO DOC ELDER MAL SCRN: HCPCS | Performed by: INTERNAL MEDICINE

## 2021-10-07 PROCEDURE — G8510 SCR DEP NEG, NO PLAN REQD: HCPCS | Performed by: INTERNAL MEDICINE

## 2021-10-07 RX ORDER — DILTIAZEM HYDROCHLORIDE 120 MG/1
120 CAPSULE, COATED, EXTENDED RELEASE ORAL DAILY
Qty: 90 CAPSULE | Refills: 3 | Status: SHIPPED | OUTPATIENT
Start: 2021-10-07 | End: 2021-11-17 | Stop reason: SDUPTHER

## 2021-10-07 NOTE — PROGRESS NOTES
HISTORY OF PRESENT ILLNESS  Ramesh Mays is a 66 y.o. female. 6/1/2021  Patient seen today for new patient evaluation. She is referred here for evaluation of tachycardia. For past few weeks she has been having episode of sudden tachycardia with heart rate going in 120s. She is short of breath at that time. Denies any dizziness or syncope. She has history of rheumatoid arthritis with extensive joint problems including back problem that is being evaluated by Dr. Ivtet Polk. She has no prior cardiac history. She has history of hypertension on treatment. Review of Systems   Constitutional: Negative for chills and fever. HENT: Negative for nosebleeds. Eyes: Negative for blurred vision and double vision. Respiratory: Positive for shortness of breath. Negative for cough, hemoptysis, sputum production and wheezing. Cardiovascular: Positive for palpitations. Negative for chest pain, orthopnea, claudication, leg swelling and PND. Gastrointestinal: Negative for abdominal pain, heartburn, nausea and vomiting. Musculoskeletal: Negative for myalgias. Skin: Negative for rash. Neurological: Negative for dizziness, weakness and headaches. Endo/Heme/Allergies: Does not bruise/bleed easily. Family History   Problem Relation Age of Onset    Other Other         Orthopedic (Sister)   Gloriajean Seeds Arthritis-osteo Sister     Heart Attack Brother 58    Cancer Neg Hx     Diabetes Neg Hx     Heart Disease Neg Hx     Hypertension Neg Hx     Stroke Neg Hx     Malignant Hyperthermia Neg Hx     Pseudocholinesterase Deficiency Neg Hx     Delayed Awakening Neg Hx     Post-op Nausea/Vomiting Neg Hx     Emergence Delirium Neg Hx     Post-op Cognitive Dysfunction Neg Hx        Past Medical History:   Diagnosis Date    Abdominal abscess 2009    Adverse effect of anesthesia      Be careful with intubation.  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 )    Irritable bowel syndrome     Neuropathy     bilateral hands/wrist    Osteoporosis     Pneumonia     RA (rheumatoid arthritis) ([facility] [address]     Seizures ([facility] [address] 6-1-2008    one episode- after high dose of epinepherine       Past Surgical History:   Procedure Laterality Date    HX ABDOMINAL WALL DEFECT REPAIR      HX APPENDECTOMY      HX BREAST REDUCTION      HX CATARACT REMOVAL Bilateral     HX COLONOSCOPY      HX GI      repair rectal prolaspe    HX GYN      dermoid cyst    HX HEENT      tonsillectomy    HX HYSTERECTOMY  1976    HX LUMBAR FUSION      x 2  L 3-4 , L-5-6    HX LUMBAR FUSION  12/03/2018    T-8    HX ORTHOPAEDIC Bilateral     CTR    HX ORTHOPAEDIC Bilateral     arthroplasty thumbs    HX ORTHOPAEDIC Left     Ankle     HX ORTHOPAEDIC Right     \"Nerve Release Elbow\"    HX OTHER SURGICAL Left 1985    vein stripping    HX SALPINGO-OOPHORECTOMY Bilateral     HX SHOULDER REPLACEMENT Left     HX UROLOGICAL      bladder repair    NH TOTAL HIP ARTHROPLASTY Bilateral        Social History     Tobacco Use    Smoking status: Never Smoker    Smokeless tobacco: Never Used   Substance Use Topics    Alcohol use: No       Allergies   Allergen Reactions    Celebrex [Celecoxib] Swelling    Shellfish Derived Other (comments)      Pt.denies     Sulfa (Sulfonamide Antibiotics) Hives and Swelling     Lips swelling    Gabapentin Diarrhea and Palpitations     Chest and Abdominal pain    Humira [Adalimumab] Other (comments)     Lupus    Influenza Virus Vaccines Hives    Iodine Itching     Takes benadryl and is OK.     Optiray 320 [Ioversol] Hives and Itching     Pt states when she gets iv contrast she itches a little from hives?  Penicillins Hives    Shrimp Other (comments)     Sodium puffy       Prior to Admission medications    Medication Sig Start Date End Date Taking? Authorizing Provider   dilTIAZem ER (CARDIZEM CD) 120 mg capsule Take 1 Capsule by mouth daily. 10/7/21  Yes Caryle Lint, MD   metroNIDAZOLE (FLAGYL) 500 mg tablet Take 1 Tablet by mouth three (3) times daily for 7 days. 10/5/21 10/12/21 Yes Xavier Arguello MD   lisinopriL (PRINIVIL, ZESTRIL) 5 mg tablet Take 1 Tablet by mouth daily. 10/4/21  Yes Xavier Arguello MD   traMADoL (ULTRAM-ER) 100 mg Tb24 Take 1 Tablet by mouth daily for 90 days. Max Daily Amount: 100 mg. 8/31/21 11/29/21 Yes Xavier Arguello MD   zolpidem (Ambien) 5 mg tablet Take 1 Tablet by mouth nightly. Max Daily Amount: 5 mg. 8/31/21  Yes Xavier Arguello MD   multivitamin (ONE A DAY) tablet Take 1 Tablet by mouth daily. 2/8/21  Yes Provider, Historical   meloxicam (MOBIC) 15 mg tablet TAKE 1 TABLET DAILY 3/17/21  Yes Xavier Arguello MD   tofacitinib Wellington Mura) 5 mg tab Take  by mouth two (2) times a day. Yes Provider, Historical   cetirizine (ZYRTEC) 10 mg tablet Take 10 mg by mouth daily as needed. Yes Provider, Historical   acetaminophen (TYLENOL EXTRA STRENGTH) 500 mg tablet Take 1,000 mg by mouth every six (6) hours as needed for Pain. Yes Provider, Historical   cholecalciferol (VITAMIN D3) 1,000 unit tablet Take 1,000 Units by mouth five (5) days a week. Yes Provider, Historical   calcium carbonate (OS-BARBARA) 500 mg calcium (1,250 mg) tablet Take 1,500 mg by mouth daily. Yes Provider, Historical         Visit Vitals  /69   Pulse 62   Ht 5' 2\" (1.575 m)   Wt 78.9 kg (174 lb)   BMI 31.83 kg/m²       Physical Exam  Constitutional:       Appearance: She is well-developed. HENT:      Head: Normocephalic and atraumatic. Eyes:      Conjunctiva/sclera: Conjunctivae normal.   Neck:      Thyroid: No thyromegaly. Vascular: No JVD.       Trachea: No tracheal deviation. Cardiovascular:      Rate and Rhythm: Normal rate and regular rhythm. Chest Wall: PMI is not displaced. Pulses: No decreased pulses. Heart sounds: No murmur heard. No gallop. No S3 sounds. Pulmonary:      Effort: No respiratory distress. Breath sounds: No wheezing or rales. Chest:      Chest wall: No tenderness. Abdominal:      Palpations: Abdomen is soft. Tenderness: There is no abdominal tenderness. Musculoskeletal:      Cervical back: Neck supple. Skin:     General: Skin is warm. Neurological:      Mental Status: She is alert and oriented to person, place, and time. Ms. Nirmal Matos has a reminder for a \"due or due soon\" health maintenance. I have asked that she contact her primary care provider for follow-up on this health maintenance. No flowsheet data found. I have personally reviewed patient's records available from hospital and other providers and incorporated findings in patient care. Notes, lab,Old EKG, old CAT scan, old chest x-ray  Interpretation Summary 6/2021    · LV: Estimated LVEF is 55 - 60%. Normal cavity size, wall thickness and systolic function (ejection fraction normal). Wall motion: normal. Mild (grade 1) left ventricular diastolic dysfunction. · LA: Left Atrium volume index is 27.72 mL/m2. · TV: Right Ventricular Arterial Pressure (RVSP) is 25 mmHg. Pulmonary hypertension not suggested by Doppler findings. 6/2021  Event monitor-rare PAC PVC on 122 beat SVT in 140s. Assessment         ICD-10-CM ICD-9-CM    1. Essential hypertension  I10 401.9     Stable symptoms continue treatment monitor   2. SVT (supraventricular tachycardia) ([facility])  I47.1 427.89 TSH 3RD GENERATION      T4, FREE    Stable symptom unable to tolerate Toprol due to diarrhea. Will try low-dose Cardizem   3. Rheumatoid arthritis, involving unspecified site, unspecified whether rheumatoid factor present ([facility])  M06.9 714.0     Stable monitor   4.  Tachycardia R00.0 785.0     Stable on treatment monitor   6/2021  Seen for tachycardia palpitation shortness of breath. History of hypertension and rheumatoid arthritis. Set up for event monitor and echo. Currently has significant back problem being evaluated. Low blood pressure today but currently on low-dose lisinopril will monitor  7/2021  Episode of SVT likely cause for palpitation. Check TSH. Add low-dose beta-blocker after back surgery if needed  10/2021  Cardiac status stable. Unable to tolerate Toprol because of diarrhea. Will try low-dose Cardizem. Continue other treatment. Will stop lisinopril after starting Cardizem  Medications Discontinued During This Encounter   Medication Reason    metoprolol succinate (TOPROL-XL) 25 mg XL tablet Not A Current Medication    fluocinoNIDE (LIDEX) 0.05 % topical cream Not A Current Medication       Orders Placed This Encounter    TSH 3RD GENERATION     Standing Status:   Future     Standing Expiration Date:   11/6/2021    T4, FREE     Standing Status:   Future     Standing Expiration Date:   10/8/2022    dilTIAZem ER (CARDIZEM CD) 120 mg capsule     Sig: Take 1 Capsule by mouth daily. Dispense:  90 Capsule     Refill:  3       Follow-up and Dispositions    · Return in about 6 months (around 4/7/2022).

## 2021-10-07 NOTE — PROGRESS NOTES
1. Have you been to the ER, urgent care clinic since your last visit? Hospitalized since your last visit?     no    2. Have you seen or consulted any other health care providers outside of the [address] [place] [address] since your last visit? Include any pap smears or colon screening.       Yes pcp

## 2021-11-02 ENCOUNTER — TRANSCRIBE ORDER (OUTPATIENT)
Dept: SCHEDULING | Age: 71
End: 2021-11-02

## 2021-11-02 DIAGNOSIS — M25.551 RIGHT HIP PAIN: Primary | ICD-10-CM

## 2021-11-16 DIAGNOSIS — G47.00 INSOMNIA: ICD-10-CM

## 2021-11-16 RX ORDER — ZOLPIDEM TARTRATE 5 MG/1
TABLET ORAL
Qty: 90 TABLET | Refills: 0 | Status: SHIPPED | OUTPATIENT
Start: 2021-11-16 | End: 2022-03-21 | Stop reason: SDUPTHER

## 2021-11-17 RX ORDER — DILTIAZEM HYDROCHLORIDE 120 MG/1
120 CAPSULE, COATED, EXTENDED RELEASE ORAL DAILY
Qty: 90 CAPSULE | Refills: 3 | Status: SHIPPED | OUTPATIENT
Start: 2021-11-17 | End: 2022-05-24

## 2021-11-19 ENCOUNTER — HOSPITAL ENCOUNTER (OUTPATIENT)
Dept: NUCLEAR MEDICINE | Age: 71
Discharge: HOME | End: 2021-11-19
Attending: ORTHOPAEDIC SURGERY
Payer: MEDICARE

## 2021-11-19 DIAGNOSIS — M25.551 RIGHT HIP PAIN: ICD-10-CM

## 2021-11-19 PROCEDURE — 78315 BONE IMAGING 3 PHASE: CPT

## 2021-12-09 ENCOUNTER — TELEPHONE (OUTPATIENT)
Dept: FAMILY MEDICINE CLINIC | Age: 71
End: 2021-12-09

## 2021-12-09 DIAGNOSIS — M54.6 ACUTE BILATERAL THORACIC BACK PAIN: Primary | ICD-10-CM

## 2021-12-09 NOTE — TELEPHONE ENCOUNTER
This patient contacted office for the following prescriptions to be filled:    Medication requested : Tramadol  PCP: Dr. Lane Lynn or Print: [facility]   Mail order or Local pharmacy: [phone]    Scheduled appointment if not seen by current providers in office: [address] [place] [address] 10/5/21, no f/u      Patient is also asking about the booster shot since she had so much trouble with previous shots. She is concerned now since recent data shows you are not fully vaccinated without the booster. She would like to know Dr. Padmini Morejon recommendation.

## 2021-12-22 RX ORDER — TRAMADOL HYDROCHLORIDE 50 MG/1
50 TABLET ORAL
Qty: 60 TABLET | Refills: 2 | Status: SHIPPED | OUTPATIENT
Start: 2021-12-22 | End: 2022-01-21

## 2021-12-30 DIAGNOSIS — M54.6 ACUTE BILATERAL THORACIC BACK PAIN: Primary | ICD-10-CM

## 2021-12-30 RX ORDER — TRAMADOL HYDROCHLORIDE 100 MG/1
CAPSULE ORAL
COMMUNITY
Start: 2021-02-08 | End: 2022-03-30

## 2021-12-30 NOTE — TELEPHONE ENCOUNTER
Patient called requesting an increase to 100 MG ER for her traMADoL (ULTRAM. Please review and advise, she can reached at [phone].

## 2022-01-03 RX ORDER — TRAMADOL HYDROCHLORIDE 100 MG/1
100 TABLET, FILM COATED, EXTENDED RELEASE ORAL DAILY
Qty: 90 TABLET | Refills: 0 | Status: SHIPPED | OUTPATIENT
Start: 2022-01-03 | End: 2022-02-02

## 2022-01-27 ENCOUNTER — OFFICE VISIT (OUTPATIENT)
Dept: FAMILY MEDICINE CLINIC | Age: 72
End: 2022-01-27
Payer: MEDICARE

## 2022-01-27 VITALS — DIASTOLIC BLOOD PRESSURE: 76 MMHG | SYSTOLIC BLOOD PRESSURE: 133 MMHG

## 2022-01-27 DIAGNOSIS — I47.1 SVT (SUPRAVENTRICULAR TACHYCARDIA) (HCC): ICD-10-CM

## 2022-01-27 DIAGNOSIS — M54.50 LUMBAR BACK PAIN: Primary | ICD-10-CM

## 2022-01-27 DIAGNOSIS — M06.9 RHEUMATOID ARTHRITIS, INVOLVING UNSPECIFIED SITE, UNSPECIFIED WHETHER RHEUMATOID FACTOR PRESENT (HCC): ICD-10-CM

## 2022-01-27 DIAGNOSIS — L98.9 SKIN LESION: ICD-10-CM

## 2022-01-27 PROCEDURE — G8417 CALC BMI ABV UP PARAM F/U: HCPCS | Performed by: FAMILY MEDICINE

## 2022-01-27 PROCEDURE — 99214 OFFICE O/P EST MOD 30 MIN: CPT | Performed by: FAMILY MEDICINE

## 2022-01-27 PROCEDURE — G8752 SYS BP LESS 140: HCPCS | Performed by: FAMILY MEDICINE

## 2022-01-27 PROCEDURE — G8427 DOCREV CUR MEDS BY ELIG CLIN: HCPCS | Performed by: FAMILY MEDICINE

## 2022-01-27 PROCEDURE — 1090F PRES/ABSN URINE INCON ASSESS: CPT | Performed by: FAMILY MEDICINE

## 2022-01-27 PROCEDURE — G8536 NO DOC ELDER MAL SCRN: HCPCS | Performed by: FAMILY MEDICINE

## 2022-01-27 PROCEDURE — 3017F COLORECTAL CA SCREEN DOC REV: CPT | Performed by: FAMILY MEDICINE

## 2022-01-27 PROCEDURE — G8510 SCR DEP NEG, NO PLAN REQD: HCPCS | Performed by: FAMILY MEDICINE

## 2022-01-27 PROCEDURE — 1101F PT FALLS ASSESS-DOCD LE1/YR: CPT | Performed by: FAMILY MEDICINE

## 2022-01-27 PROCEDURE — G9899 SCRN MAM PERF RSLTS DOC: HCPCS | Performed by: FAMILY MEDICINE

## 2022-01-27 PROCEDURE — G8754 DIAS BP LESS 90: HCPCS | Performed by: FAMILY MEDICINE

## 2022-01-27 RX ORDER — CYCLOBENZAPRINE HCL 10 MG
10 TABLET ORAL
Qty: 30 TABLET | Refills: 2 | Status: SHIPPED | OUTPATIENT
Start: 2022-01-27 | End: 2022-08-21 | Stop reason: SDUPTHER

## 2022-01-27 NOTE — PROGRESS NOTES
Chief Complaint   Patient presents with    Back Pain     Pt in office for back pain. Maxim Rand a few days ago and c/o pain in back.

## 2022-01-27 NOTE — PATIENT INSTRUCTIONS
Back Pain: Care Instructions  Your Care Instructions     Back pain has many possible causes. It is often related to problems with muscles and ligaments of the back. It may also be related to problems with the nerves, discs, or bones of the back. Moving, lifting, standing, sitting, or sleeping in an awkward way can strain the back. Sometimes you don't notice the injury until later. Arthritis is another common cause of back pain. Although it may hurt a lot, back pain usually improves on its own within several weeks. Most people recover in 12 weeks or less. Using good home treatment and being careful not to stress your back can help you feel better sooner. Follow-up care is a key part of your treatment and safety. Be sure to make and go to all appointments, and call your doctor if you are having problems. It's also a good idea to know your test results and keep a list of the medicines you take. How can you care for yourself at home? · Sit or lie in positions that are most comfortable and reduce your pain. Try one of these positions when you lie down:  ? Lie on your back with your knees bent and supported by large pillows. ? Lie on the floor with your legs on the seat of a sofa or chair. ? Lie on your side with your knees and hips bent and a pillow between your legs. ? Lie on your stomach if it does not make pain worse. · Do not sit up in bed, and avoid soft couches and twisted positions. Bed rest can help relieve pain at first, but it delays healing. Avoid bed rest after the first day of back pain. · Change positions every 30 minutes. If you must sit for long periods of time, take breaks from sitting. Get up and walk around, or lie in a comfortable position. · Try using a heating pad on a low or medium setting for 15 to 20 minutes every 2 or 3 hours. Try a warm shower in place of one session with the heating pad. · You can also try an ice pack for 10 to 15 minutes every 2 to 3 hours.  Put a thin cloth between the ice pack and your skin. · Take pain medicines exactly as directed. ? If the doctor gave you a prescription medicine for pain, take it as prescribed. ? If you are not taking a prescription pain medicine, ask your doctor if you can take an over-the-counter medicine. · Take short walks several times a day. You can start with 5 to 10 minutes, 3 or 4 times a day, and work up to longer walks. Walk on level surfaces and avoid hills and stairs until your back is better. · Return to work and other activities as soon as you can. Continued rest without activity is usually not good for your back. · To prevent future back pain, do exercises to stretch and strengthen your back and stomach. Learn how to use good posture, safe lifting techniques, and proper body mechanics. When should you call for help? Call your doctor now or seek immediate medical care if:    · You have new or worsening numbness in your legs.     · You have new or worsening weakness in your legs. (This could make it hard to stand up.)     · You lose control of your bladder or bowels. Watch closely for changes in your health, and be sure to contact your doctor if:    · You have a fever, lose weight, or don't feel well.     · You do not get better as expected. Where can you learn more? Go to http://www.[facility].com/  Enter I594 in the search box to learn more about \"Back Pain: Care Instructions. \"  Current as of: July 1, 2021               Content Version: 13.0  © 3508-2265 [employer]. Care instructions adapted under license by [employer] (which disclaims liability or warranty for this information). If you have questions about a medical condition or this instruction, always ask your healthcare professional. [place] [address] any warranty or liability for your use of this information.

## 2022-02-02 ENCOUNTER — APPOINTMENT (OUTPATIENT)
Dept: PHYSICAL THERAPY | Age: 72
End: 2022-02-02
Attending: FAMILY MEDICINE

## 2022-02-02 ENCOUNTER — HOSPITAL ENCOUNTER (OUTPATIENT)
Dept: PHYSICAL THERAPY | Age: 72
Discharge: HOME | End: 2022-02-02
Attending: FAMILY MEDICINE
Payer: MEDICARE

## 2022-02-02 PROCEDURE — 97140 MANUAL THERAPY 1/> REGIONS: CPT

## 2022-02-02 PROCEDURE — 97161 PT EVAL LOW COMPLEX 20 MIN: CPT

## 2022-02-02 PROCEDURE — 97110 THERAPEUTIC EXERCISES: CPT

## 2022-02-02 NOTE — PROGRESS NOTES
[facility] at [address] [facility], [facility] [address]  Phone: [phone]      Fax:  [phone]    Plan of Care/ Statement of Necessity for Physical Therapy Services  Patient name: Celsa Franco Start of Care: 2022   Referral source: Eliana Uribe MD : 1950    Medical Diagnosis: Other low back pain [M54.59]  Payor: [place] MEDICARE / Plan: [place] MEDICARE PART A & B / Product Type: Medicare /  Onset Date: On or about 22    Treatment Diagnosis: back pain   Prior Hospitalization: see medical history Provider#: [id number]   Medications: Verified on Patient summary List   Comorbidities: HD, OP, OA, CPPD, HTN, Asthma, Scoliosis  Prior Level of Function: Periodic pain and discofort in the low back but less discomfort at the left buttocks and left flank         The Plan of Care and following information is based on the information from the initial evaluation. Assessment/ key information: Patient is a 66 y.o. female referred to PT with the above Dx. Patient seen today for c/o left low back and buttocks pain with reaching with the left UE or leaning to the right side. Patient reports that she had screws removed in Aug that resulted in the increased pain. MD follow up due to exacerbation on 22  Back pain keeps her up at night, losing about 2 hrs of sleep each night.         Patient presents to PT with an impaired gait, decreased balance, decreased strength, decreased flexibility, and decreased mobility of the low back. Patient s/s appear to be consistent w/ diagnosis. Patient demonstrates the potential to make functional gains within a reasonable time frame. Patient will benefit from skilled PT to address impairments and improve functional mobility, strength, gait and balance for an improved quality of life.   Fall Risk Assessment: Patient demonstrates no Fall Risk       Evaluation Complexity History HIGH Complexity :3+ comorbidities / personal factors will impact the outcome/ POC ; Examination LOW Complexity : 1-2 Standardized tests and measures addressing body structure, function, activity limitation and / or participation in recreation  ;Presentation LOW Complexity : Stable, uncomplicated  ;Clinical Decision Making HIGH Complexity : FOTO score of 1- 25   Overall Complexity Rating: LOW   Problem List: pain affecting function, decrease ROM, decrease strength, impaired gait/ balance, decrease ADL/ functional abilitiies, decrease activity tolerance, decrease flexibility/ joint mobility, decrease transfer abilities and other FOTO = 23   Treatment Plan may include any combination of the following: Therapeutic exercise, Therapeutic activities, Neuromuscular re-education, Physical agent/modality, Gait/balance training, Manual therapy, Patient education, Self Care training, Functional mobility training, Home safety training and Stair training  Patient / Family readiness to learn indicated by: asking questions, trying to perform skills and interest  Persons(s) to be included in education: patient (P)  Barriers to Learning/Limitations: yes;  physical  Patient Goal (s): Stretch out the muscles so that I don't have the discomfort and help with the muscle TrP at the mid to upper back  Patient Self Reported Health Status: good  Rehabilitation Potential: good    Short Term Goals: To be accomplished in 5 treatments:  1. Pt will be compliant and independent with HEP in order to facilitate PT sessions and aid with self management   Eval Status:  Initiated   Current Status:  2. Pt to tolerate 30 min or more of TE and/or Interventions w/o increased s/s   Eval Status:  Initiated   Current Status:    Long Term Goals: To be accomplished in 10 treatments:  1. Pt will report 50% improvement or better with low back dysfunction to show a significant increase in ability to tolerate ADL   Eval Status:  Initiated   Current Status:  2.   Pt will demonstrate good pelvic alignment and mobility upon arrival to treatment session to allow her normal pelvic function for sustained Lx and pelvic mobility  during (I) standing activity    Eval Status:  Some difficulty reported with (I) stance   Current Status:  3. Pt will have decreased TTP and muscle tightness at the left Quadratus Lumborum and left Flank to allow increased ease of motion with daily activity     Eval Status:  TTP and muscle tightness at the left Flank and Quadratus Lumborm   Current Status:  4. Pt will demonstrate stair negotiation of 20 6\" steps with no hand rail assistance for carryover to walking her stairs at home with no difficulty or LBP     Eval Status:  LBP when walking stairs at home   Current Status:  5. Pt will ambulate on TM for 1/4 mile with little difficulty for carryover to walking room to room in her home without significant fatigue or low back discomfort to improve her quality of life and ease of transitioning through her home   Eval Status:  Fatigue and difficulty with walking, need rest breaks   Current Status:   6. Pt will improve FOTO score to 47 in 14 visits to show significant improvement in function for progress to moderate difficulty performing usual work or household activities   Eval Status: FOTO = 23   Current Status:     Frequency / Duration: Patient to be seen 2-3 times per week for 10 treatments. Patient/ Caregiver education and instruction: Diagnosis, prognosis, self care, activity modification and exercises   [x]  Plan of care has been reviewed with PTA    Certification Period: 2/2/22 - 3/4/22  Briana Whitfield, PT 2/2/2022 4:23 PM  _____________________________________________________________________  I certify that the above Therapy Services are being furnished while the patient is under my care. I agree with the treatment plan and certify that this therapy is necessary.     Physician's Signature:____________Date:_________TIME:________     Loretta Fontaine MD  ** Signature, Date and Time must be completed for valid certification **    Please sign and return to [facility] at [address]  [place], [facility] [address]  Phone: [phone]      Fax:  [phone]

## 2022-02-02 NOTE — PROGRESS NOTES
PT DAILY TREATMENT NOTE/LUMBAR EVAL     Patient Name: Bufford Dance  Date:2022  : 1950  [x]  Patient  Verified  Payor: Goldywendy Viji / Plan: [place] MEDICARE PART A & B / Product Type: Medicare /    In time:4:23  Out time:5:22  Total Treatment Time (min): 61  Visit #: 1 of 10    Medicare/BCBS Only   Total Timed Codes (min):  29 1:1 Treatment Time:  59       Treatment Area: Other low back pain [M54.59]      SUBJECTIVE  Pain Level (0-10 scale): 7  []constant [x]intermittent []improving []worsening []no change since onset     Any medication changes, allergies to medications, adverse drug reactions, diagnosis change, or new procedure performed?: [x] No    [] Yes (see summary sheet for update)  Subjective functional status/changes:       Subjective: Patient is a 66 y.o. female referred to PT with the above Dx. Patient seen today for c/o left low back and buttocks pain with reaching with the left UE or leaning to the right side. Patient reports that she had screws removed in Aug that resulted in the increased pain. MD follow up due to exacerbation on 22  Back pain keeps her up at night, losing about 2 hrs of sleep each night. Patient presents to PT with an impaired gait, decreased balance, decreased strength, decreased flexibility, and decreased mobility of the low back. Patient s/s appear to be consistent w/ diagnosis. Patient demonstrates the potential to make functional gains within a reasonable time frame. Patient will benefit from skilled PT to address impairments and improve functional mobility, strength, gait and balance for an improved quality of life.   Fall Risk Assessment: Patient demonstrates no Fall Risk      Mechanism of Injury: Post surgical screw removal at the left Lx region    Comorbidities: HD, OP, OA, CPPD, HTN, Asthma, Scoliosis  Prior Level of Function: Periodic pain and discofort in the low back but less discomfort at the left buttocks and left flank    FOTO:  in 14 visits    Goal: Stretch out the muscles so that I don't have the discomfort and help with the muscle TrP at the mid to upper back    Health Status: Good    What type of work do you do? N/A    Living Situation/Domestic Life: Lives at home with   Mobility: (I)  Self Care (I)  Stairs in the home? Yes, no trouble when using the rails. Increased back pain with trying to walk stairs without rails    What type of daily activities/hobbies?  Caring for dogs, going to floor for dogs,     Limitations to Activity/Recreation/PLOF: Bending, walking with left leg flopping, hard to lift left leg, Feels unstable on boat deck due to nothing to hold on to         Barriers: [x]pain []financial []time []transportation []other    Motivation: High    FABQ Score: []low []elevate    Cognition: A & O x 3    Other:    Risk For Falls:   [x]No  []low []elevate           OBJECTIVE/EXAMINATION  Demonstrated Balance: General balance Fair with standing and walking activity with no LOB but some instability              SLS: Right NT  Left  NT     Tandem Stance: NT          Romberg:  EO NT   EC NT  Demonstrated Mobility: (I)  Gait: slow reciprocal gait with decreased trunk Rot and Fwd Flexed posture  - Forward Flxed Posture 26*  - TTP left flank/glute medius region  - TTP (B) Lx paraspinals  - Decr mobility right innominate in stork stance  - Fused spinal segments                        30 min [x]Eval                  []Re-Eval         15 min Therapeutic Exercise:  [] See flow sheet : Develop and instruct HEP   Rationale: increase ROM, increase strength, and improve coordination to improve the patients ability to Initiate HEP and improve daily function     14 min Manual Therapy:  STM/DTM/TPR left superior glute/glute medius/illiacus/QL/Ls paraspinals   Rationale: decrease pain, increase ROM, increase tissue extensibility and decrease trigger points to decreased muscle tension With   [x] TE   [] TA   [] neuro   [] other: Patient Education: [x] Review HEP    [] Progressed/Changed HEP based on:   [] positioning   [] body mechanics   [] transfers   [] heat/ice application    [] other:       Other Objective/Functional Measures:     Access Code: [email or web address]  URL: [email or web address]  Date: 02/02/2022  Prepared by: Priti Viper    Exercises  Standing Marching - 2 x daily - 7 x weekly - 3 sets - 10 reps  Toe Touches - 2 x daily - 7 x weekly - 3 sets - 10 reps  Seated Lumbar Flexion Stretch - 2 x daily - 7 x weekly - 1 sets - 10 reps - 5 hold  Supine Bridge with Spinal Articulation - 2 x daily - 7 x weekly - 3 sets - 10 reps - hold  Supine Lower Trunk Rotation - 2 x daily - 7 x weekly - 3 sets - 10 reps  Standing Shoulder and Trunk Flexion at Table - 2 x daily - 7 x weekly - 3 sets - 10 reps  Standing Lower Cervical and Upper Thoracic Stretch - 2 x daily - 7 x weekly - 1 sets - 10 reps - 10 hold  Seated Quadratus Lumborum Stretch with Arm Overhead - 2 x daily - 7 x weekly - 3 sets - 10 reps  Standing Quadratus Lumborum Stretch with Doorway - 1 x daily - 7 x weekly - 3 sets - 10 reps     Pain Level (0-10 scale) post treatment: 4-5     ASSESSMENT/Changes in Function:        [x]  See Plan of Care  []  See progress note/recertification  []  See Discharge Summary         Progress towards goals / Updated goals:  Short Term Goals: To be accomplished in 5 treatments:  1. Pt will be compliant and independent with HEP in order to facilitate PT sessions and aid with self management   Eval Status:  Initiated   Current Status:  2. Pt to tolerate 30 min or more of TE and/or Interventions w/o increased s/s   Eval Status:  Initiated   Current Status:    Long Term Goals: To be accomplished in 10 treatments:  1. Pt will report 50% improvement or better with low back dysfunction to show a significant increase in ability to tolerate ADL   Eval Status:  Initiated   Current Status:  2. Pt will demonstrate good pelvic alignment and mobility upon arrival to treatment session to allow her normal pelvic function for sustained Lx and pelvic mobility  during (I) standing activity    Eval Status:  Some difficulty reported with (I) stance   Current Status:  3. Pt will have decreased TTP and muscle tightness at the left Quadratus Lumborum and left Flank to allow increased ease of motion with daily activity     Eval Status:  TTP and muscle tightness at the left Flank and Quadratus Lumborm   Current Status:  4. Pt will demonstrate stair negotiation of 20 6\" steps with no hand rail assistance for carryover to walking her stairs at home with no difficulty or LBP     Eval Status:  LBP when walking stairs at home   Current Status:  5. Pt will ambulate on TM for 1/4 mile with little difficulty for carryover to walking room to room in her home without significant fatigue or low back discomfort to improve her quality of life and ease of transitioning through her home   Eval Status:  Fatigue and difficulty with walking, need rest breaks   Current Status:   6.   Pt will improve FOTO score to 47 in 14 visits to show significant improvement in function for progress to moderate difficulty performing usual work or household activities   Eval Status: FOTO = 23   Current Status:    PLAN  [x]  Upgrade activities as tolerated     [x]  Continue plan of care  []  Update interventions per flow sheet       []  Discharge due to:_  []  Other:_         Bree Musa, PT 2/2/2022  4:23 PM

## 2022-02-07 ENCOUNTER — HOSPITAL ENCOUNTER (OUTPATIENT)
Dept: PHYSICAL THERAPY | Age: 72
Discharge: HOME | End: 2022-02-07
Attending: FAMILY MEDICINE
Payer: MEDICARE

## 2022-02-07 PROCEDURE — 97140 MANUAL THERAPY 1/> REGIONS: CPT

## 2022-02-07 PROCEDURE — 97110 THERAPEUTIC EXERCISES: CPT

## 2022-02-07 NOTE — PROGRESS NOTES
PT DAILY TREATMENT NOTE     Patient Name: Nancy Nogueira  Date:2022  : 1950  [x]  Patient  Verified  Payor: VA MEDICARE / Plan: VA MEDICARE PART A & B / Product Type: Medicare /    In time:1225  Out time:110  Total Treatment Time (min): 45  Visit #: 2 of 10     Medicare/BCBS Only   Total Timed Codes (min):  45 1:1 Treatment Time:  45       Treatment Area: Other low back pain [M54.59]    SUBJECTIVE  Pain Level (0-10 scale): 4/10   Any medication changes, allergies to medications, adverse drug reactions, diagnosis change, or new procedure performed?: [x] No    [] Yes (see summary sheet for update)  Subjective functional status/changes:   [] No changes reported  Reports pain with ambulation and increased stiffness    OBJECTIVE      30 min Therapeutic Exercise:  [x] See flow sheet :   Rationale: increase ROM, increase strength and improve coordination to improve the patients ability to perform ADLs    15 min Manual Therapy:  STM/DTM/TPR Lx/Tx paraspinals and left superior glute   The manual therapy interventions were performed at a separate and distinct time from the therapeutic activities interventions. Rationale: decrease pain, increase ROM, increase tissue extensibility and decrease trigger points to decrease muscle tension             With   [x] TE   [] TA   [] neuro   [] other: Patient Education: [x] Review HEP    [] Progressed/Changed HEP based on:   [] positioning   [] body mechanics   [] transfers   [] heat/ice application    [] other:       Other Objective/Functional Measures:   - Declined to participate in standing TE or bike  - Good participation in seated exercises     Pain Level (0-10 scale) post treatment: 1/10     ASSESSMENT/Changes in Function: Pt able to tolerate exercises in seated. Required occasional cues for proper form throughout.      Patient will continue to benefit from skilled PT services to modify and progress therapeutic interventions, address functional mobility deficits, address ROM deficits, address strength deficits and analyze and address soft tissue restrictions to attain remaining goals. []  See Plan of Care  []  See progress note/recertification  []  See Discharge Summary         Progress towards goals / Updated goals:  Short Term Goals: To be accomplished in 5 treatments:  1. Pt will be compliant and independent with HEP in order to facilitate PT sessions and aid with self management             Eval Status:  Initiated             Current Status: Pt reports compliance with HEP currently (2/7/22)  2. Pt to tolerate 30 min or more of TE and/or Interventions w/o increased s/s             Eval Status:  Initiated             Current Status: Tolerated current TE/interventions, but declines use of bike and standing TE (2/7/22)      Long Term Goals: To be accomplished in 10 treatments:  1. Pt will report 50% improvement or better with low back dysfunction to show a significant increase in ability to tolerate ADL             Eval Status:  Initiated             Current Status:  2. Pt will demonstrate good pelvic alignment and mobility upon arrival to treatment session to allow her normal pelvic function for sustained Lx and pelvic mobility  during (I) standing activity              Eval Status:  Some difficulty reported with (I) stance             Current Status:  3. Pt will have decreased TTP and muscle tightness at the left Quadratus Lumborum and left Flank to allow increased ease of motion with daily activity               Eval Status:  TTP and muscle tightness at the left Flank and Quadratus Lumborm             Current Status:  4. Pt will demonstrate stair negotiation of 20 6\" steps with no hand rail assistance for carryover to walking her stairs at home with no difficulty or LBP               Eval Status:  LBP when walking stairs at home             Current Status:  5.   Pt will ambulate on TM for 1/4 mile with little difficulty for carryover to walking room to room in her home without significant fatigue or low back discomfort to improve her quality of life and ease of transitioning through her home             Eval Status:  Fatigue and difficulty with walking, need rest breaks             Current Status:   6.  Pt will improve FOTO score to 47 in 14 visits to show significant improvement in function for progress to moderate difficulty performing usual work or household activities             Eval Status: FOTO = 23             Current Status:      PLAN  [x]  Upgrade activities as tolerated     []  Continue plan of care  []  Update interventions per flow sheet       []  Discharge due to:_  []  Other:_      Michoacano Lyn PTA 2/7/2022  1:10 PM    Future Appointments   Date Time Provider Jonathan Colmenares   2/9/2022  8:00 AM Fredrick Pack PTA [facility]   2/11/2022 11:00 AM Melbourne Romano [facility]   2[facility]14/2022  9:30 AM Fredrick Pack PTA [facility]   2/16/2022  9:30 AM Melbourne Romano [facility]   2/18/2022 11:45 AM Fredrick Pack PTA [facility]   4/5/2022  9:30 AM MD KOBI Rodas AMB

## 2022-02-09 ENCOUNTER — HOSPITAL ENCOUNTER (OUTPATIENT)
Dept: PHYSICAL THERAPY | Age: 72
Discharge: HOME | End: 2022-02-09
Attending: FAMILY MEDICINE
Payer: MEDICARE

## 2022-02-09 PROCEDURE — 97014 ELECTRIC STIMULATION THERAPY: CPT

## 2022-02-09 PROCEDURE — 97140 MANUAL THERAPY 1/> REGIONS: CPT

## 2022-02-09 PROCEDURE — 97110 THERAPEUTIC EXERCISES: CPT

## 2022-02-09 NOTE — PROGRESS NOTES
PT DAILY TREATMENT NOTE     Patient Name: Arin Menendez  Date:2022  : 1950  [x]  Patient  Verified  Payor: [place] MEDICARE / Plan: [place] MEDICARE PART A & B / Product Type: Medicare /    In time:800  Out time:840  Total Treatment Time (min): 40  Visit #: 3 of 10    Medicare/BCBS Only   Total Timed Codes (min):  40 1:1 Treatment Time:  30       Treatment Area:  Other low back pain [M54.59]    SUBJECTIVE  Pain Level (0-10 scale): 7  Any medication changes, allergies to medications, adverse drug reactions, diagnosis change, or new procedure performed?: [x] No    [] Yes (see summary sheet for update)  Subjective functional status/changes:   [] No changes reported  Patient reported increased left hip pain today    OBJECTIVE    Modality rationale: decrease inflammation and decrease pain to improve the patients ability to perform ADLs   Min Type Additional Details   10 [x] Estim:  [x]Unatt       []IFC  [x]Premod                        []Other:  []w/ice   [x]w/heat  Position:S/L on right  Location: left lumbar paraspinals    [] Estim: []Att    []TENS instruct  []NMES                    []Other:  []w/US   []w/ice   []w/heat  Position:  Location:    []  Traction: [] Cervical       []Lumbar                       [] Prone          []Supine                       []Intermittent   []Continuous Lbs:  [] before manual  [] after manual    []  Ultrasound: []Continuous   [] Pulsed                           []1MHz   []3MHz W/cm2:  Location:    []  Iontophoresis with dexamethasone         Location: [] Take home patch   [] In clinic    []  Ice     []  heat  []  Ice massage  []  Laser   []  Anodyne Position:  Location:    []  Laser with stim  []  Other:  Position:  Location:    []  Vasopneumatic Device    []  Right     []  Left  Pre-treatment girth:  Post-treatment girth:  Measured at (location):  Pressure:       [] lo [] med [] hi   Temperature: [] lo [] med [] hi   [] Skin assessment post-treatment:  []intact []redness- no adverse reaction    []redness  adverse reaction:       20 min Therapeutic Exercise:  [] See flow sheet :   Rationale: increase ROM and increase strength to improve the patients ability to perform ADLs    10 min Manual Therapy:  STM/DTM/TPR Lx/Tx paraspinals and left superior glute   The manual therapy interventions were performed at a separate and distinct time from the therapeutic activities interventions. Rationale: decrease pain, increase ROM, increase tissue extensibility and decrease trigger points to perform ADLs              With   [] TE   [] TA   [] neuro   [] other: Patient Education: [x] Review HEP    [] Progressed/Changed HEP based on:   [] positioning   [] body mechanics   [] transfers   [] heat/ice application    [] other:      Other Objective/Functional Measures: trial Premod to decrease pain- assess tolerance the NV     Pain Level (0-10 scale) post treatment: 3    ASSESSMENT/Changes in Function: patient continues to be combative and require frequent reassurance to perform any therex. Continue trying to introduce exercises as tolerated to increase mobility. Patient will continue to benefit from skilled PT services to modify and progress therapeutic interventions, address functional mobility deficits, address ROM deficits, address strength deficits, analyze and address soft tissue restrictions and analyze and cue movement patterns to attain remaining goals.      [x]  See Plan of Care  []  See progress note/recertification  []  See Discharge Summary         Progress towards goals / Updated goals:  Short Term Goals: To be accomplished in 5 treatments:  1.  Pt will be compliant and independent with HEP in order to facilitate PT sessions and aid with self management             Eval Status:  Initiated             Current Status: Pt reports compliance with HEP currently (2/7/22)  2.  Pt to tolerate 30 min or more of TE and/or Interventions w/o increased s/s             Eval Status:  Initiated             Current Status: Patient continued to deny participation in much of therex program before attempting therex (2/9/22)      Long Term Goals: To be accomplished in 10 treatments:  1.  Pt will report 50% improvement or better with low back dysfunction to show a significant increase in ability to tolerate ADL             Eval Status:  Initiated             Current Status:  2.  Pt will demonstrate good pelvic alignment and mobility upon arrival to treatment session to allow her normal pelvic function for sustained Lx and pelvic mobility  during (I) standing activity              Eval Status:  Some difficulty reported with (I) stance             Current Status:  3.  Pt will have decreased TTP and muscle tightness at the left Quadratus Lumborum and left Flank to allow increased ease of motion with daily activity               Eval Status:  TTP and muscle tightness at the left Flank and Quadratus Lumborm             Current Status:  4.  Pt will demonstrate stair negotiation of 20 6\" steps with no hand rail assistance for carryover to walking her stairs at home with no difficulty or LBP               Eval Status:  LBP when walking stairs at home             Current Status:  5.  Pt will ambulate on TM for 1/4 mile with little difficulty for carryover to walking room to room in her home without significant fatigue or low back discomfort to improve her quality of life and ease of transitioning through her home             Eval Status:  Fatigue and difficulty with walking, need rest breaks             Current Status:   6.  Pt will improve FOTO score to 47 in 14 visits to show significant improvement in function for progress to moderate difficulty performing usual work or household activities             Eval Status: FOTO = 23             Current Status:         PLAN  [x]  Upgrade activities as tolerated     [x]  Continue plan of care  []  Update interventions per flow sheet       []  Discharge due to:_  [] Other:_      Gladys Osuna, PTA 2/9/2022  8:07 AM    Future Appointments   Date Time Provider Department Center   2/11/2022 11:00 AM Dayne Longo [place] [facility]   2/14/2022  9:30 AM Geri Matas [facility]   2/16/2022  9:30 AM Geri Matas [facility]   2/18/2022 11:45 AM Geri Matas [facility]   4/5/2022  9:30 AM Nell Guzman MD [facility]

## 2022-02-11 ENCOUNTER — HOSPITAL ENCOUNTER (OUTPATIENT)
Dept: PHYSICAL THERAPY | Age: 72
Discharge: HOME | End: 2022-02-11
Attending: FAMILY MEDICINE
Payer: MEDICARE

## 2022-02-11 PROCEDURE — 97110 THERAPEUTIC EXERCISES: CPT

## 2022-02-11 PROCEDURE — 97140 MANUAL THERAPY 1/> REGIONS: CPT

## 2022-02-11 NOTE — PROGRESS NOTES
PT DAILY TREATMENT NOTE     Patient Name: Jorge Damian  Date:2022  : 1950  [x]  Patient  Verified  Payor: Brandi Ruggiero / Plan: [place] MEDICARE PART A & B / Product Type: Medicare /    In time:1100  Out time:1145  Total Treatment Time (min): 45  Visit #: 4 of 10    Medicare/BCBS Only   Total Timed Codes (min):  45 1:1 Treatment Time:  45       Treatment Area: Other low back pain [M54.59]    SUBJECTIVE  Pain Level (0-10 scale): 3/10  Any medication changes, allergies to medications, adverse drug reactions, diagnosis change, or new procedure performed?: [x] No    [] Yes (see summary sheet for update)  Subjective functional status/changes:   [] No changes reported  Reports decreased pain since last treatment    OBJECTIVE     30 min Therapeutic Exercise:  [x] See flow sheet :   Rationale: increase ROM and increase strength to improve the patients ability to perform ADLs    15 min Manual Therapy:  STM/DTM/TPR Lx/Tx paraspinals and left superior glute   The manual therapy interventions were performed at a separate and distinct time from the therapeutic activities interventions.   Rationale: decrease pain, increase ROM, increase tissue extensibility and decrease trigger points to perform ADLs            With   [x] TE   [] TA   [] neuro   [] other: Patient Education: [x] Review HEP    [] Progressed/Changed HEP based on:   [] positioning   [] body mechanics   [] transfers   [] heat/ice application    [] other:      Other Objective/Functional Measures:   - Increased sets of trunk rot from 1x10 to 2x10   - Added 3 way glute 1x10      Pain Level (0-10 scale) post treatment: 3/10 LBP     ASSESSMENT/Changes in Function: Pt actively participates in therapy and was more agreeable to perform TE compared to last visits     Patient will continue to benefit from skilled PT services to modify and progress therapeutic interventions, address functional mobility deficits, address ROM deficits, address strength deficits, analyze and address soft tissue restrictions and analyze and cue movement patterns to attain remaining goals.      []  See Plan of Care  []  See progress note/recertification  []  See Discharge Summary         Progress towards goals / Updated goals:  Short Term Goals: To be accomplished in 5 treatments:  1.  Pt will be compliant and independent with HEP in order to facilitate PT sessions and aid with self management             Eval Status:  Initiated             Current Status: Pt reports compliance with HEP currently (2/7/22)  2.  Pt to tolerate 30 min or more of TE and/or Interventions w/o increased s/s             Eval Status:  Initiated             Current Status: Patient continued to deny participation in much of therex program before attempting therex (2/9/22)      Long Term Goals: To be accomplished in 10 treatments:  1.  Pt will report 50% improvement or better with low back dysfunction to show a significant increase in ability to tolerate ADL             Eval Status:  Initiated             Current Status:  2.  Pt will demonstrate good pelvic alignment and mobility upon arrival to treatment session to allow her normal pelvic function for sustained Lx and pelvic mobility  during (I) standing activity              Eval Status:  Some difficulty reported with (I) stance             Current Status:  3.  Pt will have decreased TTP and muscle tightness at the left Quadratus Lumborum and left Flank to allow increased ease of motion with daily activity               Eval Status:  TTP and muscle tightness at the left Flank and Quadratus Lumborm             Current Status:  4.  Pt will demonstrate stair negotiation of 20 6\" steps with no hand rail assistance for carryover to walking her stairs at home with no difficulty or LBP               Eval Status:  LBP when walking stairs at home             Current Status:  5.  Pt will ambulate on TM for 1/4 mile with little difficulty for carryover to walking room to room in her home without significant fatigue or low back discomfort to improve her quality of life and ease of transitioning through her home             Eval Status:  Fatigue and difficulty with walking, need rest breaks             Current Status:   6.  Pt will improve FOTO score to 47 in 14 visits to show significant improvement in function for progress to moderate difficulty performing usual work or household activities             Eval Status: FOTO = 23             Current Status:      PLAN  [x]  Upgrade activities as tolerated     []  Continue plan of care  []  Update interventions per flow sheet       []  Discharge due to:_  []  Other:_      Oralia Pena PTA 2/11/2022  11:10 AM    Future Appointments   Date Time Provider Jonathan Colmenares   2/14/2022  9:30 AM Brandyn Self PTA [facility]   2/16/2022  9:30 AM Natalya Miller [facility]   2/18/2022 11:45 AM Jerald Spurling, PTA [facility]   4/5/2022  9:30 AM Husdon Clark MD [facility]

## 2022-02-14 ENCOUNTER — HOSPITAL ENCOUNTER (OUTPATIENT)
Dept: PHYSICAL THERAPY | Age: 72
Discharge: HOME | End: 2022-02-14
Attending: FAMILY MEDICINE
Payer: MEDICARE

## 2022-02-14 PROCEDURE — 97140 MANUAL THERAPY 1/> REGIONS: CPT

## 2022-02-14 PROCEDURE — 97110 THERAPEUTIC EXERCISES: CPT

## 2022-02-14 NOTE — PROGRESS NOTES
PT DAILY TREATMENT NOTE     Patient Name: Chela Christina  Date:2022  : 1950  [x]  Patient  Verified  Payor: Yuli Turpin / Plan: [place] MEDICARE PART A & B / Product Type: Medicare /    In time:923  Out time:1011   Total Treatment Time (min): 48  Visit #: 5 of 10    Medicare/BCBS Only   Total Timed Codes (min):  48 1:1 Treatment Time:  48       Treatment Area: Other low back pain [M54.59]    SUBJECTIVE  Pain Level (0-10 scale): 2/10   Any medication changes, allergies to medications, adverse drug reactions, diagnosis change, or new procedure performed?: [x] No    [] Yes (see summary sheet for update)  Subjective functional status/changes:   [] No changes reported  Patient reported decreased pain     OBJECTIVE    33 min Therapeutic Exercise:  [x] See flow sheet :   Rationale: increase ROM and increase strength to improve the patients ability to perform ADLs    15 min Manual Therapy:  STM/DTM/TPR Lx paraspinals and left superior glute   The manual therapy interventions were performed at a separate and distinct time from the therapeutic activities interventions. Rationale: decrease pain, increase ROM, increase tissue extensibility and decrease trigger points to perform ADLs        With   [x] TE   [x] TA   [] neuro   [] other: Patient Education: [x] Review HEP    [] Progressed/Changed HEP based on:   [] positioning   [] body mechanics   [] transfers   [] heat/ice application    [] other:      Other Objective/Functional Measures:   - Pt agreeable to use the bike   - Increased sets of high knees and toe touches to 2x10  - Ab iso increased sets to 2x10   - Pt c/o tightness and TTP in the right Lx and superior glute    Pain Level (0-10 scale) post treatment: 2/10    ASSESSMENT/Changes in Function: Pt actively participates in therapy and agreeable to perform all TE. Required minimal cues for form.       Patient will continue to benefit from skilled PT services to modify and progress therapeutic interventions, address functional mobility deficits, address ROM deficits, address strength deficits, analyze and address soft tissue restrictions and analyze and cue movement patterns to attain remaining goals.      []  See Plan of Care  []  See progress note/recertification  []  See Discharge Summary         Progress towards goals / Updated goals:  Short Term Goals: To be accomplished in 5 treatments:  1.  Pt will be compliant and independent with HEP in order to facilitate PT sessions and aid with self management             Eval Status:  Initiated             Current Status: Met (2/14/22)  2.  Pt to tolerate 30 min or more of TE and/or Interventions w/o increased s/s             Eval Status:  Initiated             Current Status: Met, patient participated in 33 min of TE with no increase in pain or s/s     Long Term Goals: To be accomplished in 10 treatments:  1.  Pt will report 50% improvement or better with low back dysfunction to show a significant increase in ability to tolerate ADL             Eval Status:  Initiated             Current Status:  2.  Pt will demonstrate good pelvic alignment and mobility upon arrival to treatment session to allow her normal pelvic function for sustained Lx and pelvic mobility  during (I) standing activity              Eval Status:  Some difficulty reported with (I) stance             Current Status:  3.  Pt will have decreased TTP and muscle tightness at the left Quadratus Lumborum and left Flank to allow increased ease of motion with daily activity               Eval Status:  TTP and muscle tightness at the left Flank and Quadratus Lumborm             Current Status:  4.  Pt will demonstrate stair negotiation of 20 6\" steps with no hand rail assistance for carryover to walking her stairs at home with no difficulty or LBP               Eval Status:  LBP when walking stairs at home             Current Status:  5.  Pt will ambulate on TM for 1/4 mile with little difficulty for carryover to walking room to room in her home without significant fatigue or low back discomfort to improve her quality of life and ease of transitioning through her home             Eval Status:  Fatigue and difficulty with walking, need rest breaks             Current Status:   6.  Pt will improve FOTO score to 47 in 14 visits to show significant improvement in function for progress to moderate difficulty performing usual work or household activities             Eval Status: FOTO = 23             Current Status    PLAN  []  Upgrade activities as tolerated     []  Continue plan of care  []  Update interventions per flow sheet       []  Discharge due to:_  []  Other:_      Cornelio Koroma PTA 2/14/2022  9:26 AM    Future Appointments   Date Time Provider Jonathan Colmenares   2/14/2022  9:30 AM James Daniel PTA [facility]   2/16/2022  9:30 AM Pierre Diego [facility]   2/18/2022 11:45 AM Ana Maria Donis PTA [facility]   4/5/2022  9:30 AM Celia Hamm MD [facility]

## 2022-02-16 ENCOUNTER — HOSPITAL ENCOUNTER (OUTPATIENT)
Dept: PHYSICAL THERAPY | Age: 72
Discharge: HOME | End: 2022-02-16
Attending: FAMILY MEDICINE
Payer: MEDICARE

## 2022-02-16 PROCEDURE — 97110 THERAPEUTIC EXERCISES: CPT

## 2022-02-16 PROCEDURE — 97140 MANUAL THERAPY 1/> REGIONS: CPT

## 2022-02-16 NOTE — PROGRESS NOTES
PT DAILY TREATMENT NOTE     Patient Name: Denice Ramirez  Date:2022  : 1950  [x]  Patient  Verified  Payor: Fernando Nones / Plan: [place] MEDICARE PART A & B / Product Type: Medicare /    In time:2:01  Out time:2:45  Total Treatment Time (min): 44  Visit #: 5 of 10    Medicare/BCBS Only   Total Timed Codes (min):  44 1:1 Treatment Time:  44       Treatment Area: Other low back pain [M54.59]    SUBJECTIVE  Pain Level (0-10 scale): 0  Any medication changes, allergies to medications, adverse drug reactions, diagnosis change, or new procedure performed?: [x] No    [] Yes (see summary sheet for update)  Subjective functional status/changes:   [] No changes reported  Do good when the weather is great and I take my arthritis meds, I do fine. OBJECTIVE    30 min Therapeutic Exercise:  [] See flow sheet :   Rationale: increase ROM, increase strength and improve coordination to improve the patients ability to improve activity tolerance    14 min Manual Therapy:  STM/DTM/TPR (B) Superior Gliute/Glute Medius/(B) Lx Paraspinals and Left Quadratus Lumborum   The manual therapy interventions were performed at a separate and distinct time from the therapeutic activities interventions.   Rationale: decrease pain, increase ROM, increase tissue extensibility and decrease trigger points to tolerate increased activity          With   [x] TE   [] TA   [] neuro   [] other: Patient Education: [x] Review HEP    [] Progressed/Changed HEP based on:   [] positioning   [] body mechanics   [] transfers   [] heat/ice application    [] other:      Other Objective/Functional Measures:   - Elev left crest  - Decreased right innominate mobility in stork stance  - TTP (B) Superior glute, Left Lx paraspinal/QL     Pain Level (0-10 scale) post treatment: 0    ASSESSMENT/Changes in Function:   - Good response to treatment with improved ease of motion and less muscle tightness after MT    Patient will continue to benefit from skilled PT services to modify and progress therapeutic interventions, address functional mobility deficits, address ROM deficits, address strength deficits, analyze and address soft tissue restrictions and analyze and cue movement patterns to attain remaining goals.      []  See Plan of Care  []  See progress note/recertification  []  See Discharge Summary         Progress towards goals / Updated goals:  Short Term Goals: To be accomplished in 5 treatments:  1.  Pt will be compliant and independent with HEP in order to facilitate PT sessions and aid with self management             Eval Status:  Initiated             Current Status: Met (2/14/22)  2.  Pt to tolerate 30 min or more of TE and/or Interventions w/o increased s/s             Eval Status:  Initiated             Current Status: Met, patient participated in 33 min of TE with no increase in pain or s/s     Long Term Goals: To be accomplished in 10 treatments:  1.  Pt will report 50% improvement or better with low back dysfunction to show a significant increase in ability to tolerate ADL             Eval Status:  Initiated             Current Status:  2.  Pt will demonstrate good pelvic alignment and mobility upon arrival to treatment session to allow her normal pelvic function for sustained Lx and pelvic mobility  during (I) standing activity              Eval Status:  Some difficulty reported with (I) stance             Current Status: Continued pelvic obliquity before exercise 2/16/22  3.  Pt will have decreased TTP and muscle tightness at the left Quadratus Lumborum and left Flank to allow increased ease of motion with daily activity               Eval Status:  TTP and muscle tightness at the left Flank and Quadratus Lumborm             Current Status: Continued muscle tightness with decreased symptoms after treatment 2/16/22  4.  Pt will demonstrate stair negotiation of 20 6\" steps with no hand rail assistance for carryover to walking her stairs at home with no difficulty or LBP               Eval Status:  LBP when walking stairs at home             Current Status: Progressing with 2x10 step up with 4\" step  5.  Pt will ambulate on TM for 1/4 mile with little difficulty for carryover to walking room to room in her home without significant fatigue or low back discomfort to improve her quality of life and ease of transitioning through her home             Eval Status:  Fatigue and difficulty with walking, need rest breaks             Current Status:   6.  Pt will improve FOTO score to 47 in 14 visits to show significant improvement in function for progress to moderate difficulty performing usual work or household activities             Eval Status: FOTO = 23             Current Status    PLAN  [x]  Upgrade activities as tolerated     [x]  Continue plan of care  []  Update interventions per flow sheet       []  Discharge due to:_  []  Other:_      Boby Jain, PT 2/16/2022  2:01 PM    Future Appointments   Date Time Provider Jonathan Colmenares   2/18/2022 11:45 AM Belkis Castro PTA [place] [facility]   4/5/2022  9:30 AM Pelon Galo MD [facility]

## 2022-02-18 ENCOUNTER — APPOINTMENT (OUTPATIENT)
Dept: PHYSICAL THERAPY | Age: 72
End: 2022-02-18
Attending: FAMILY MEDICINE
Payer: MEDICARE

## 2022-02-23 ENCOUNTER — APPOINTMENT (OUTPATIENT)
Dept: PHYSICAL THERAPY | Age: 72
End: 2022-02-23
Attending: FAMILY MEDICINE
Payer: MEDICARE

## 2022-02-25 ENCOUNTER — HOSPITAL ENCOUNTER (OUTPATIENT)
Dept: PHYSICAL THERAPY | Age: 72
Discharge: HOME | End: 2022-02-25
Attending: FAMILY MEDICINE
Payer: MEDICARE

## 2022-02-25 PROCEDURE — 97140 MANUAL THERAPY 1/> REGIONS: CPT

## 2022-02-25 PROCEDURE — 97110 THERAPEUTIC EXERCISES: CPT

## 2022-02-25 NOTE — PROGRESS NOTES
PT DAILY TREATMENT NOTE     Patient Name: Arin Menendez  Date:2022  : 1950  [x]  Patient  Verified  Payor: Anival Munson / Plan: [place] MED[place] & B / Product Type: Medicare /    In time:157  Out time: 245  Total Treatment Time (min): 48  Visit #: 7 of 10    Medicare/BCBS Only   Total Timed Codes (min):  48 1:1 Treatment Time:  745       Treatment Area: Other low back pain [M54.59]    SUBJECTIVE  Pain Level (0-10 scale): 2/10  Any medication changes, allergies to medications, adverse drug reactions, diagnosis change, or new procedure performed?: [x] No    [] Yes (see summary sheet for update)  Subjective functional status/changes:   [] No changes reported  Patient states she is on steroids for left ankle     OBJECTIVE    33 min Therapeutic Exercise:  [] See flow sheet :   Rationale: increase ROM and increase strength to improve the patients ability to perform ADLs    15 min Manual Therapy:  STM/DTM/TPR Left superior glute/glute medius    The manual therapy interventions were performed at a separate and distinct time from the therapeutic activities interventions. Rationale: decrease pain, increase ROM, increase tissue extensibility and decrease trigger points to perform ADLs          With   [x] TE   [] TA   [] neuro   [] other: Patient Education: [x] Review HEP    [] Progressed/Changed HEP based on:   [] positioning   [] body mechanics   [] transfers   [] heat/ice application    [] other:      Other Objective/Functional Measures:   - MT to address patients pain and tenderness      Pain Level (0-10 scale) post treatment: 1/10    ASSESSMENT/Changes in Function: Patient participates in therapy requiring occasional encouragement to perform. Declines closed chain TE d/t pain in the left ankle.      Patient will continue to benefit from skilled PT services to modify and progress therapeutic interventions, address functional mobility deficits, address ROM deficits, address strength deficits, analyze and address soft tissue restrictions and analyze and cue movement patterns to attain remaining goals.      []  See Plan of Care  []  See progress note/recertification  []  See Discharge Summary         Progress towards goals / Updated goals:  Short Term Goals: To be accomplished in 5 treatments:  1.  Pt will be compliant and independent with HEP in order to facilitate PT sessions and aid with self management             Eval Status:  Initiated             Current Status: Met (2/14/22)  2.  Pt to tolerate 30 min or more of TE and/or Interventions w/o increased s/s             Eval Status:  Initiated             Current Status: Met, patient participated in 33 min of TE with no increase in pain or s/s     Long Term Goals: To be accomplished in 10 treatments:  1.  Pt will report 50% improvement or better with low back dysfunction to show a significant increase in ability to tolerate ADL             Eval Status:  Initiated             Current Status:  2.  Pt will demonstrate good pelvic alignment and mobility upon arrival to treatment session to allow her normal pelvic function for sustained Lx and pelvic mobility  during (I) standing activity              Eval Status:  Some difficulty reported with (I) stance             Current Status: Continued pelvic obliquity before exercise 2/16/22  3.  Pt will have decreased TTP and muscle tightness at the left Quadratus Lumborum and left Flank to allow increased ease of motion with daily activity               Eval Status:  TTP and muscle tightness at the left Flank and Quadratus Lumborm             Current Status: Continued muscle tightness with decreased symptoms after treatment 2/16/22  4.  Pt will demonstrate stair negotiation of 20 6\" steps with no hand rail assistance for carryover to walking her stairs at home with no difficulty or LBP               Eval Status:  LBP when walking stairs at home             Current Status: Progressing with 2x10 step up with 4\" step  5.  Pt will ambulate on TM for 1/4 mile with little difficulty for carryover to walking room to room in her home without significant fatigue or low back discomfort to improve her quality of life and ease of transitioning through her home             Eval Status:  Fatigue and difficulty with walking, need rest breaks             Current Status:   6.  Pt will improve FOTO score to 47 in 14 visits to show significant improvement in function for progress to moderate difficulty performing usual work or household activities             Eval Status: FOTO = 23             Current Status    PLAN  [x]  Upgrade activities as tolerated     [x]  Continue plan of care  []  Update interventions per flow sheet       []  Discharge due to:_  []  Other:_      Dwayne Villegas PTA 2/25/2022  2:04 PM    Future Appointments   Date Time Provider Jonathan Colmenares   2/28/2022  9:30 AM Efra Oconnell PTA [facility]   4/5/2022  9:30 AM Ria Azul MD [facility]

## 2022-02-28 ENCOUNTER — HOSPITAL ENCOUNTER (OUTPATIENT)
Dept: PHYSICAL THERAPY | Age: 72
Discharge: HOME | End: 2022-02-28
Attending: FAMILY MEDICINE
Payer: MEDICARE

## 2022-02-28 PROCEDURE — 97110 THERAPEUTIC EXERCISES: CPT

## 2022-02-28 PROCEDURE — 97140 MANUAL THERAPY 1/> REGIONS: CPT

## 2022-02-28 NOTE — PROGRESS NOTES
PT DAILY TREATMENT NOTE     Patient Name: Moira Xie  Date:2022  : 1950  [x]  Patient  Verified  Payor: Lulu Sharpe / Plan: [place] MEDICARE PART A & B / Product Type: Medicare /    In time:930  Out time:1016  Total Treatment Time (min): 46  Visit #: 8 of 10    Medicare/BCBS Only   Total Timed Codes (min):  46 1:1 Treatment Time:  46       Treatment Area: Other low back pain [M54.59]    SUBJECTIVE  Pain Level (0-10 scale): 2/10  Any medication changes, allergies to medications, adverse drug reactions, diagnosis change, or new procedure performed?: [x] No    [] Yes (see summary sheet for update)  Subjective functional status/changes:   [] No changes reported  Patient reports decreased pain today d/t therapy time being soon after taking medication     OBJECTIVE    31 min Therapeutic Exercise:  [x] See flow sheet :   Rationale: increase ROM and increase strength to improve the patients ability to perform ADLs    15 min Manual Therapy:  STM/DTM/TPR left superior glute/glute medius. The manual therapy interventions were performed at a separate and distinct time from the therapeutic activities interventions. Rationale: decrease pain, increase ROM, increase tissue extensibility and decrease trigger points to perform ADLs          With   [x] TE   [] TA   [] neuro   [] other: Patient Education: [x] Review HEP    [] Progressed/Changed HEP based on:   [] positioning   [] body mechanics   [] transfers   [] heat/ice application    [] other:      Other Objective/Functional Measures:   - Increased step ups to 6\" box for 1x10      Pain Level (0-10 scale) post treatment: 2/10    ASSESSMENT/Changes in Function: Patient requires encouragement throughout tx to perform. Occasional cues throughout for proper form in TE.      Patient will continue to benefit from skilled PT services to modify and progress therapeutic interventions, address functional mobility deficits, address ROM deficits, address strength deficits, analyze and address soft tissue restrictions and analyze and cue movement patterns to attain remaining goals.      []  See Plan of Care  []  See progress note/recertification  []  See Discharge Summary         Progress towards goals / Updated goals:  Short Term Goals: To be accomplished in 5 treatments:  1.  Pt will be compliant and independent with HEP in order to facilitate PT sessions and aid with self management             Eval Status:  Initiated             Current Status: Met (2/14/22)  2.  Pt to tolerate 30 min or more of TE and/or Interventions w/o increased s/s             Eval Status:  Initiated             Current Status: Met, patient participated in 33 min of TE with no increase in pain or s/s     Long Term Goals: To be accomplished in 10 treatments:  1.  Pt will report 50% improvement or better with low back dysfunction to show a significant increase in ability to tolerate ADL             Eval Status:  Initiated             Current Status:  2.  Pt will demonstrate good pelvic alignment and mobility upon arrival to treatment session to allow her normal pelvic function for sustained Lx and pelvic mobility  during (I) standing activity              Eval Status:  Some difficulty reported with (I) stance             Current Status: Continued pelvic obliquity before exercise 2/16/22  3.  Pt will have decreased TTP and muscle tightness at the left Quadratus Lumborum and left Flank to allow increased ease of motion with daily activity               Eval Status:  TTP and muscle tightness at the left Flank and Quadratus Lumborm             Current Status: Continued muscle tightness with decreased symptoms after treatment 2/16/22  4.  Pt will demonstrate stair negotiation of 20 6\" steps with no hand rail assistance for carryover to walking her stairs at home with no difficulty or LBP               Eval Status:  LBP when walking stairs at home             Current Status: Progressing with 1x10 step up with 6\" step (2/28/22)   5.  Pt will ambulate on TM for 1/4 mile with little difficulty for carryover to walking room to room in her home without significant fatigue or low back discomfort to improve her quality of life and ease of transitioning through her home             Eval Status:  Fatigue and difficulty with walking, need rest breaks             Current Status:   6.  Pt will improve FOTO score to 47 in 14 visits to show significant improvement in function for progress to moderate difficulty performing usual work or household activities             Eval Status: FOTO = 23             Current Status    PLAN  [x]  Upgrade activities as tolerated     [x]  Continue plan of care  []  Update interventions per flow sheet       []  Discharge due to:_  []  Other:_      Elvin Dent PTA 2/28/2022  9:36 AM    Future Appointments   Date Time Provider Jonathan Colmenares   4/5/2022  9:30 AM Astrid Valero MD [facility] AMB

## 2022-03-04 ENCOUNTER — TELEPHONE (OUTPATIENT)
Dept: ORTHOPEDIC SURGERY | Age: 72
End: 2022-03-04

## 2022-03-04 NOTE — TELEPHONE ENCOUNTER
Rec'd a referral from Dr Xiao Augustin office to make patient an appt with Dr Darian Grimes. Referral scanned into CC.   LMOVM to male appt

## 2022-03-09 NOTE — PROGRESS NOTES
[facility] at [address] [facility] [facility], [address] [place] [address]  Phone: [phone]      Fax:  [phone]    Discharge Summary    Patient name: Yulia Mckay Start of Care: 2022   Referral source: Alexei Ribeiro MD : 1950               Medical Diagnosis: Other low back pain [M54.59]  Payor: VA MEDICARE / Plan: [place] MEDICARE PART A & B / Product Type: Medicare /  Onset Date: On or about 22               Treatment Diagnosis: back pain   Prior Hospitalization: see medical history Provider#: [id number]   Medications: Verified on Patient summary List   Comorbidities: HD, OP, OA, CPPD, HTN, Asthma, Scoliosis  Prior Level of Function: Periodic pain and discofort in the low back but less discomfort at the left buttocks and left flank    Visits from Start of Care: 8    Missed Visits: 1    Reporting Period : 22 to 22    Progress towards goals / Updated goals:  Short Term Goals: To be accomplished in 5 treatments:  1.  Pt will be compliant and independent with HEP in order to facilitate PT sessions and aid with self management             Eval Status:  Initiated             Current Status: Met, patient reports compliance with HEP   2.  Pt to tolerate 30 min or more of TE and/or Interventions w/o increased s/s             Eval Status:  Initiated             Current Status: Met, patient participated in 33 min of TE with no increase in pain or s/s     Long Term Goals: To be accomplished in 10 treatments:  1.  Pt will report 50% improvement or better with low back dysfunction to show a significant increase in ability to tolerate ADL             Eval Status:  Initiated             Current Status: Not met, unable to assess   2.  Pt will demonstrate good pelvic alignment and mobility upon arrival to treatment session to allow her normal pelvic function for sustained Lx and pelvic mobility  during (I) standing activity              Eval Status:  Some difficulty reported with (I) stance             Current Status: Continued pelvic obliquity before exercise 2/16/22  3.  Pt will have decreased TTP and muscle tightness at the left Quadratus Lumborum and left Flank to allow increased ease of motion with daily activity               Eval Status:  TTP and muscle tightness at the left Flank and Quadratus Lumborm             Current Status: Continued muscle tightness with decreased symptoms after treatment   4.  Pt will demonstrate stair negotiation of 20 6\" steps with no hand rail assistance for carryover to walking her stairs at home with no difficulty or LBP               Eval Status:  LBP when walking stairs at home             Current Status: Progressing with 1x10 step up with 6\" step   5.  Pt will ambulate on TM for 1/4 mile with little difficulty for carryover to walking room to room in her home without significant fatigue or low back discomfort to improve her quality of life and ease of transitioning through her home             Eval Status:  Fatigue and difficulty with walking, need rest breaks             Current Status: Not met, unable to assess   6.  Pt will improve FOTO score to 47 in 14 visits to show significant improvement in function for progress to moderate difficulty performing usual work or household activities             Eval Status: FOTO = 23             Current Status Not met, unable to assess     Assessment/ Summary of Care: Patient has shown fair progress with this treatment program. Pain as of last visit was 2/10. FOTO score is unable to opal assessed. Patient has made little progress in meeting goals, required continuous encouragement throughout treatment to perform TE. Treatment has been discontinued, per patient request. Patient will be receiving injections and will reassess need for therapy at a later date.      RECOMMENDATIONS:  [x]Discontinue therapy: []Patient has reached or is progressing toward set goals      [x]Patient is non-compliant or has abdicated      []Due to lack of appreciable progress towards set [facility], PTA 3/9/2022 10:44 AM  Yancy Wiley., MPT

## 2022-03-18 PROBLEM — M81.0 OSTEOPOROSIS: Status: ACTIVE | Noted: 2021-05-04

## 2022-03-18 PROBLEM — G89.29 CHRONIC LEFT SHOULDER PAIN: Status: ACTIVE | Noted: 2017-07-25

## 2022-03-18 PROBLEM — K13.79 LESION OF HARD PALATE: Status: ACTIVE | Noted: 2018-07-05

## 2022-03-18 PROBLEM — R20.0 NUMBNESS AND TINGLING OF RIGHT ARM: Status: ACTIVE | Noted: 2021-05-04

## 2022-03-18 PROBLEM — R20.2 NUMBNESS AND TINGLING OF RIGHT ARM: Status: ACTIVE | Noted: 2021-05-04

## 2022-03-18 PROBLEM — Z02.89 PAIN MANAGEMENT CONTRACT AGREEMENT: Status: ACTIVE | Noted: 2017-06-22

## 2022-03-18 PROBLEM — M25.512 CHRONIC LEFT SHOULDER PAIN: Status: ACTIVE | Noted: 2017-07-25

## 2022-03-18 PROBLEM — M48.05 SPINAL STENOSIS, THORACOLUMBAR REGION: Status: ACTIVE | Noted: 2018-11-06

## 2022-03-18 PROBLEM — Z71.89 ACP (ADVANCE CARE PLANNING): Status: ACTIVE | Noted: 2017-06-28

## 2022-03-19 PROBLEM — M48.061 SPINAL STENOSIS OF LUMBAR REGION: Status: ACTIVE | Noted: 2018-12-03

## 2022-03-19 PROBLEM — M85.80 OSTEOPENIA: Status: ACTIVE | Noted: 2017-09-08

## 2022-03-19 PROBLEM — R33.9 URINARY RETENTION WITH INCOMPLETE BLADDER EMPTYING: Status: ACTIVE | Noted: 2021-05-04

## 2022-03-19 PROBLEM — I47.10 SVT (SUPRAVENTRICULAR TACHYCARDIA): Status: ACTIVE | Noted: 2021-07-26

## 2022-03-19 PROBLEM — L98.9 LESION OF NECK: Status: ACTIVE | Noted: 2019-05-20

## 2022-03-19 PROBLEM — M85.89 OSTEOPENIA OF MULTIPLE SITES: Status: ACTIVE | Noted: 2017-09-12

## 2022-03-19 PROBLEM — G47.00 INSOMNIA: Status: ACTIVE | Noted: 2021-05-04

## 2022-03-19 PROBLEM — K58.9 IBS (IRRITABLE BOWEL SYNDROME): Status: ACTIVE | Noted: 2017-05-30

## 2022-03-19 PROBLEM — K11.5 SIALOLITHIASIS: Status: ACTIVE | Noted: 2019-05-20

## 2022-03-19 PROBLEM — R07.9 CHEST PAIN: Status: ACTIVE | Noted: 2019-02-01

## 2022-03-19 PROBLEM — M54.6 ACUTE BILATERAL THORACIC BACK PAIN: Status: ACTIVE | Noted: 2019-01-14

## 2022-03-19 PROBLEM — M53.2X6 LUMBAR SPINE INSTABILITY: Status: ACTIVE | Noted: 2018-11-06

## 2022-03-19 PROBLEM — M16.11 OSTEOARTHRITIS OF RIGHT HIP: Status: ACTIVE | Noted: 2017-05-30

## 2022-03-19 PROBLEM — S22.009A CLOSED FRACTURE OF THORACIC SPINE WITHOUT SPINAL CORD LESION (HCC): Status: ACTIVE | Noted: 2018-10-05

## 2022-03-19 PROBLEM — I47.1 SVT (SUPRAVENTRICULAR TACHYCARDIA) (HCC): Status: ACTIVE | Noted: 2021-07-26

## 2022-03-19 PROBLEM — M48.061 LUMBAR STENOSIS: Status: ACTIVE | Noted: 2018-12-04

## 2022-03-19 PROBLEM — H26.9 CATARACTS, BILATERAL: Status: ACTIVE | Noted: 2021-05-04

## 2022-03-19 PROBLEM — M48.062 SPINAL STENOSIS OF LUMBAR REGION WITH NEUROGENIC CLAUDICATION: Status: ACTIVE | Noted: 2018-11-06

## 2022-03-19 PROBLEM — M16.12 OSTEOARTHRITIS OF LEFT HIP: Status: ACTIVE | Noted: 2017-10-24

## 2022-03-19 PROBLEM — R11.0 NAUSEA: Status: ACTIVE | Noted: 2019-01-14

## 2022-03-19 PROBLEM — M06.9 RA (RHEUMATOID ARTHRITIS) (HCC): Status: ACTIVE | Noted: 2017-05-30

## 2022-03-19 PROBLEM — M35.00 SICCA SYNDROME (HCC): Status: ACTIVE | Noted: 2017-05-30

## 2022-03-20 PROBLEM — F41.9 ANXIETY: Status: ACTIVE | Noted: 2019-01-14

## 2022-03-20 PROBLEM — I10 ESSENTIAL HYPERTENSION: Status: ACTIVE | Noted: 2019-02-01

## 2022-03-20 PROBLEM — Z87.39 H/O CALCIUM PYROPHOSPHATE DEPOSITION DISEASE (CPPD): Status: ACTIVE | Noted: 2017-05-30

## 2022-03-21 DIAGNOSIS — G47.00 INSOMNIA: ICD-10-CM

## 2022-03-21 DIAGNOSIS — M54.50 LUMBAR BACK PAIN: Primary | ICD-10-CM

## 2022-03-21 RX ORDER — ZOLPIDEM TARTRATE 5 MG/1
5 TABLET ORAL
Qty: 90 TABLET | Refills: 0 | Status: SHIPPED | OUTPATIENT
Start: 2022-03-21 | End: 2022-08-21 | Stop reason: SDUPTHER

## 2022-03-21 RX ORDER — TRAMADOL HYDROCHLORIDE 100 MG/1
100 TABLET, FILM COATED, EXTENDED RELEASE ORAL DAILY
Qty: 90 TABLET | Refills: 0 | Status: SHIPPED | OUTPATIENT
Start: 2022-03-21 | End: 2022-04-20

## 2022-03-30 ENCOUNTER — OFFICE VISIT (OUTPATIENT)
Dept: ORTHOPEDIC SURGERY | Age: 72
End: 2022-03-30
Payer: MEDICARE

## 2022-03-30 VITALS
WEIGHT: 174 LBS | TEMPERATURE: 97 F | BODY MASS INDEX: 32.02 KG/M2 | HEART RATE: 69 BPM | OXYGEN SATURATION: 96 % | HEIGHT: 62 IN

## 2022-03-30 DIAGNOSIS — M43.25 FUSION OF SPINE OF THORACOLUMBAR REGION: ICD-10-CM

## 2022-03-30 DIAGNOSIS — M53.3 SACROILIAC JOINT DYSFUNCTION OF BOTH SIDES: Primary | ICD-10-CM

## 2022-03-30 PROCEDURE — G8417 CALC BMI ABV UP PARAM F/U: HCPCS | Performed by: PHYSICAL MEDICINE & REHABILITATION

## 2022-03-30 PROCEDURE — G8756 NO BP MEASURE DOC: HCPCS | Performed by: PHYSICAL MEDICINE & REHABILITATION

## 2022-03-30 PROCEDURE — 3017F COLORECTAL CA SCREEN DOC REV: CPT | Performed by: PHYSICAL MEDICINE & REHABILITATION

## 2022-03-30 PROCEDURE — G8427 DOCREV CUR MEDS BY ELIG CLIN: HCPCS | Performed by: PHYSICAL MEDICINE & REHABILITATION

## 2022-03-30 PROCEDURE — G8536 NO DOC ELDER MAL SCRN: HCPCS | Performed by: PHYSICAL MEDICINE & REHABILITATION

## 2022-03-30 PROCEDURE — 1101F PT FALLS ASSESS-DOCD LE1/YR: CPT | Performed by: PHYSICAL MEDICINE & REHABILITATION

## 2022-03-30 PROCEDURE — G9899 SCRN MAM PERF RSLTS DOC: HCPCS | Performed by: PHYSICAL MEDICINE & REHABILITATION

## 2022-03-30 PROCEDURE — G8432 DEP SCR NOT DOC, RNG: HCPCS | Performed by: PHYSICAL MEDICINE & REHABILITATION

## 2022-03-30 PROCEDURE — 1090F PRES/ABSN URINE INCON ASSESS: CPT | Performed by: PHYSICAL MEDICINE & REHABILITATION

## 2022-03-30 PROCEDURE — 99214 OFFICE O/P EST MOD 30 MIN: CPT | Performed by: PHYSICAL MEDICINE & REHABILITATION

## 2022-03-30 RX ORDER — BENZONATATE 100 MG/1
CAPSULE ORAL
COMMUNITY
Start: 2021-04-29 | End: 2022-05-24

## 2022-03-30 RX ORDER — CIPROFLOXACIN 500 MG/1
TABLET ORAL
COMMUNITY
Start: 2021-08-09 | End: 2022-03-30

## 2022-03-30 RX ORDER — HYDROXYZINE 50 MG/1
TABLET, FILM COATED ORAL
COMMUNITY
Start: 2021-08-12 | End: 2022-03-30

## 2022-03-30 RX ORDER — FLUTICASONE FUROATE AND VILANTEROL TRIFENATATE 100; 25 UG/1; UG/1
POWDER RESPIRATORY (INHALATION)
COMMUNITY
Start: 2021-09-15 | End: 2022-03-30

## 2022-03-30 RX ORDER — BUDESONIDE AND FORMOTEROL FUMARATE DIHYDRATE 160; 4.5 UG/1; UG/1
AEROSOL RESPIRATORY (INHALATION)
COMMUNITY
Start: 2021-10-04 | End: 2022-03-30

## 2022-03-30 RX ORDER — NABUMETONE 750 MG/1
TABLET, FILM COATED ORAL
COMMUNITY
Start: 2021-11-02 | End: 2022-03-30

## 2022-03-30 RX ORDER — DOXYCYCLINE 100 MG/1
CAPSULE ORAL
COMMUNITY
Start: 2021-04-29 | End: 2022-03-30

## 2022-03-30 RX ORDER — FLUTICASONE PROPIONATE 50 MCG
SPRAY, SUSPENSION (ML) NASAL
COMMUNITY
Start: 2021-04-29 | End: 2022-03-30

## 2022-03-30 RX ORDER — MONTELUKAST SODIUM 10 MG/1
TABLET ORAL
COMMUNITY
Start: 2021-04-29 | End: 2022-03-30

## 2022-03-30 RX ORDER — METOPROLOL SUCCINATE 25 MG/1
TABLET, EXTENDED RELEASE ORAL
COMMUNITY
Start: 2021-09-01 | End: 2022-03-30

## 2022-03-30 RX ORDER — METRONIDAZOLE 500 MG/1
TABLET ORAL
COMMUNITY
Start: 2021-10-05 | End: 2022-03-30

## 2022-03-30 RX ORDER — CLINDAMYCIN HYDROCHLORIDE 300 MG/1
CAPSULE ORAL
COMMUNITY
Start: 2021-11-22 | End: 2022-03-30

## 2022-03-30 RX ORDER — OXYCODONE HYDROCHLORIDE 5 MG/1
TABLET ORAL
COMMUNITY
Start: 2021-08-02 | End: 2022-03-30

## 2022-03-30 RX ORDER — FLUOCINONIDE 0.5 MG/G
CREAM TOPICAL
COMMUNITY
Start: 2021-08-12 | End: 2022-05-24

## 2022-03-30 NOTE — LETTER
3/31/2022    Patient: Sharlene Ta   YOB: 1950   Date of Visit: 3/30/2022     Goldie Arnold, [address] [facility] JEANNETTE MIRZA [facility]  [address] [place] [facility] [address]  Via In [address] [place] [address], Virgil Newton   [address]  [address] [place] [address]  Via In [facility] [address]    Dear MD Mora Davenport MD,      Thank you for referring Ms. Valeria Walker to [facility] for evaluation. My notes for this consultation are attached. If you have questions, please do not hesitate to call me. I look forward to following your patient along with you.       Sincerely,    Edin Garcia MD

## 2022-03-30 NOTE — H&P (VIEW-ONLY)
[facility] [address], [address]  [place], [address] [place] [address] Street  Phone: [phone]  Fax: [phone]        Alondra Farrell  : 1950  PCP: Daxa Centeno MD    PROGRESS NOTE      ASSESSMENT AND PLAN    Diagnoses and all orders for this visit:    1. Sacroiliac joint dysfunction of both sides  -     SCHEDULE SURGERY    2. Fusion of spine of thoracolumbar region  -     AMB SUPPLY ORDER        1. Johnson Tran is a 74 y.o. female w/RA s/p fall requiring T10 screw removal presenting w/ongoing SIJ pain pain x 9 months. Failed meds/HEP. Has done well w/SIJ blocks in past.  2. Schedule B/L SIJ injection   3. DME order for TENS unit          HISTORY OF PRESENT ILLNESS      Johnson Tran is a 74 y.o. female presents for follow up of back pain. Referred by Dr. Harmeet Maxwell for SIJ injections. She reports a severe fall 2021. T10 painful hardware, improved post removal.   Paini n low back, denies sciatica. She also reports pain that radiates into her anterior thigh. Hip investigated, cleared by bone scan. Her pain is exacerbated with standing and walking. She takes Mobic and Flexeril PRN with benefit. Denies side effects. Patient has not taken pain medications in two weeks due to miscommunications in scheduling for her SIJ injection. Patient thought she was getting her injection today. She received a SIJ in  with benefit. Pain Assessment  3/30/2022   Location of Pain Back   Location Modifiers -   Severity of Pain 9   Quality of Pain Other (Comment)   Quality of Pain Comment swellen and tinder, hurt to the touch   Duration of Pain Persistent   Frequency of Pain Constant   Aggravating Factors Other (Comment); Exercise;Walking   Aggravating Factors Comment pt, massage   Limiting Behavior Yes   Relieving Factors Other (Comment)   Relieving Factors Comment laying down on side, moving back and forth   Result of Injury Yes   Work-Related Injury -   Type of Injury Fall Type of Injury Comment broke her back 2001 and 2018       Investigations:   T CT 6/2021: hardware failure T10, listhesis C7-T1  L CT 6/2021: fused T12-L5      Treatments:  Physical therapy: unknown  Spinal injections: B/L SNRB 2020 by Dr. Mariola Gale with no benefit, SIJ 2020 with benefit  Spinal surgery- thoracolumbar fusion, removal of T10 screws 8/2021 with benefit  Beneficial medications: Mobic, Flexeril  Failed medications: unknown    Work Status:  Pertinent PMHx:  RA, Neuropathy, IBS, HTN, GERD, BL KELLY      PHYSICAL EXAMINATION    Visit Vitals  Pulse 69   Temp 97 °F (36.1 °C) (Tympanic)   Ht 5' 2\" (1.575 m)   Wt 174 lb (78.9 kg)   SpO2 96%   BMI 31.83 kg/m²       TTP B/L iliac crest, B/L SIJ (R > L)  Positive SIJ compression, thrust, and distraction B/L  Negative BERNIE maneuver  SLR negative  DTRs hypoactive  LE motor non-focal            Written by Justina Ndiaye, as dictated by Pablo Sewell MD.

## 2022-03-30 NOTE — PROGRESS NOTES
Jorge Damian presents today for   Chief Complaint   Patient presents with    Back Pain       Is someone accompanying this pt? no    Is the patient using any DME equipment during OV? no    Depression Screening:  3 most recent PHQ Screens 1/27/2022   PHQ Not Done -   Little interest or pleasure in doing things Not at all   Feeling down, depressed, irritable, or hopeless Not at all   Total Score PHQ 2 0       Learning Assessment:  Learning Assessment 12/18/2014   PRIMARY LEARNER Patient   BARRIERS PRIMARY LEARNER [address] CAREGIVER No   PRIMARY LANGUAGE ENGLISH   LEARNER PREFERENCE PRIMARY DEMONSTRATION   ANSWERED BY pateint   RELATIONSHIP SELF       Abuse Screening:  Abuse Screening Questionnaire 1/4/2021   Do you ever feel afraid of your partner? N   Are you in a relationship with someone who physically or mentally threatens you? N   Is it safe for you to go home? N       Fall Risk  Fall Risk Assessment, last 12 mths 10/7/2021   Able to walk? Yes   Fall in past 12 months? 0   Do you feel unsteady? 0   Are you worried about falling 0   Number of falls in past 12 months -   Fall with injury? -       OPIOID RISK TOOL  No flowsheet data found. Coordination of Care:  1. Have you been to the ER, urgent care clinic since your last visit? no  Hospitalized since your last visit? no    2. Have you seen or consulted any other health care providers outside of the [address] [place] [address] since your last visit? no Include any pap smears or colon screening.  no

## 2022-03-30 NOTE — PROGRESS NOTES
[facility] [address]  [address], [address]  [facility], [address]  Phone: [phone]  Fax: [phone]        Britney Alfaro  : 1950  PCP: Alexei Ribeiro MD    PROGRESS NOTE      ASSESSMENT AND PLAN    Diagnoses and all orders for this visit:    1. Sacroiliac joint dysfunction of both sides  -     SCHEDULE SURGERY    2. Fusion of spine of thoracolumbar region  -     AMB SUPPLY ORDER        1. Yulia Mckay is a 74 y.o. female w/RA s/p fall requiring T10 screw removal presenting w/ongoing SIJ pain pain x 9 months. Failed meds/HEP. Has done well w/SIJ blocks in past.  2. Schedule B/L SIJ injection   3. DME order for TENS unit          HISTORY OF PRESENT ILLNESS      Yulia Mckay is a 74 y.o. female presents for follow up of back pain. Referred by Dr. Mayda Nichols for SIJ injections. She reports a severe fall 2021. T10 painful hardware, improved post removal.   Paini n low back, denies sciatica. She also reports pain that radiates into her anterior thigh. Hip investigated, cleared by bone scan. Her pain is exacerbated with standing and walking. She takes Mobic and Flexeril PRN with benefit. Denies side effects. Patient has not taken pain medications in two weeks due to miscommunications in scheduling for her SIJ injection. Patient thought she was getting her injection today. She received a SIJ in  with benefit. Pain Assessment  3/30/2022   Location of Pain Back   Location Modifiers -   Severity of Pain 9   Quality of Pain Other (Comment)   Quality of Pain Comment swellen and tinder, hurt to the touch   Duration of Pain Persistent   Frequency of Pain Constant   Aggravating Factors Other (Comment); Exercise;Walking   Aggravating Factors Comment pt, massage   Limiting Behavior Yes   Relieving Factors Other (Comment)   Relieving Factors Comment laying down on side, moving back and forth   Result of Injury Yes   Work-Related Injury -   Type of Injury Fall Type of Injury Comment broke her back 2001 and 2018       Investigations:   T CT 6/2021: hardware failure T10, listhesis C7-T1  L CT 6/2021: fused T12-L5      Treatments:  Physical therapy: unknown  Spinal injections: B/L SNRB 2020 by Dr. John Ball with no benefit, SIJ 2020 with benefit  Spinal surgery- thoracolumbar fusion, removal of T10 screws 8/2021 with benefit  Beneficial medications: Mobic, Flexeril  Failed medications: unknown    Work Status:  Pertinent PMHx:  RA, Neuropathy, IBS, HTN, GERD, BL KELLY      PHYSICAL EXAMINATION    Visit Vitals  Pulse 69   Temp 97 °F (36.1 °C) (Tympanic)   Ht 5' 2\" (1.575 m)   Wt 174 lb (78.9 kg)   SpO2 96%   BMI 31.83 kg/m²       TTP B/L iliac crest, B/L SIJ (R > L)  Positive SIJ compression, thrust, and distraction B/L  Negative BERNIE maneuver  SLR negative  DTRs hypoactive  LE motor non-focal            Written by Makayla Ward, as dictated by Elva Irvin MD.

## 2022-04-19 ENCOUNTER — APPOINTMENT (OUTPATIENT)
Dept: GENERAL RADIOLOGY | Age: 72
End: 2022-04-19
Attending: PHYSICAL MEDICINE & REHABILITATION
Payer: MEDICARE

## 2022-04-19 ENCOUNTER — HOSPITAL ENCOUNTER (OUTPATIENT)
Age: 72
Setting detail: OUTPATIENT SURGERY
Discharge: HOME | End: 2022-04-19
Attending: PHYSICAL MEDICINE & REHABILITATION | Admitting: PHYSICAL MEDICINE & REHABILITATION
Payer: MEDICARE

## 2022-04-19 VITALS
SYSTOLIC BLOOD PRESSURE: 131 MMHG | DIASTOLIC BLOOD PRESSURE: 81 MMHG | TEMPERATURE: 97.8 F | HEART RATE: 58 BPM | RESPIRATION RATE: 16 BRPM | OXYGEN SATURATION: 96 %

## 2022-04-19 PROCEDURE — 76010000009 HC PAIN MGT 0 TO 30 MIN PROC: Performed by: PHYSICAL MEDICINE & REHABILITATION

## 2022-04-19 PROCEDURE — 74011000250 HC RX REV CODE- 250: Performed by: PHYSICAL MEDICINE & REHABILITATION

## 2022-04-19 PROCEDURE — 2709999900 HC NON-CHARGEABLE SUPPLY: Performed by: PHYSICAL MEDICINE & REHABILITATION

## 2022-04-19 PROCEDURE — 27096 INJECT SACROILIAC JOINT: CPT | Performed by: PHYSICAL MEDICINE & REHABILITATION

## 2022-04-19 PROCEDURE — 77030003672 HC NDL SPN HALY -A: Performed by: PHYSICAL MEDICINE & REHABILITATION

## 2022-04-19 PROCEDURE — 74011250637 HC RX REV CODE- 250/637: Performed by: PHYSICAL MEDICINE & REHABILITATION

## 2022-04-19 PROCEDURE — 77030039433 HC TY MYLEOGRAM BD -B: Performed by: PHYSICAL MEDICINE & REHABILITATION

## 2022-04-19 PROCEDURE — 74011250636 HC RX REV CODE- 250/636: Performed by: PHYSICAL MEDICINE & REHABILITATION

## 2022-04-19 RX ORDER — DEXAMETHASONE SODIUM PHOSPHATE 100 MG/10ML
INJECTION INTRAMUSCULAR; INTRAVENOUS AS NEEDED
Status: DISCONTINUED | OUTPATIENT
Start: 2022-04-19 | End: 2022-04-19 | Stop reason: HOSPADM

## 2022-04-19 RX ORDER — LIDOCAINE HYDROCHLORIDE 10 MG/ML
INJECTION, SOLUTION EPIDURAL; INFILTRATION; INTRACAUDAL; PERINEURAL AS NEEDED
Status: DISCONTINUED | OUTPATIENT
Start: 2022-04-19 | End: 2022-04-19 | Stop reason: HOSPADM

## 2022-04-19 RX ORDER — DIAZEPAM 5 MG/1
5-20 TABLET ORAL ONCE
Status: COMPLETED | OUTPATIENT
Start: 2022-04-19 | End: 2022-04-19

## 2022-04-19 RX ADMIN — DIAZEPAM 10 MG: 5 TABLET ORAL at 10:05

## 2022-04-19 NOTE — PROCEDURES
Procedure Note    Patient Name: Davon Hawkins    Date of Procedure: April 19, 2022    Preoperative Diagnosis: Sacroiliac Dysfunction    Post Operative Diagnosis: same    Procedure: SI Joint Intraarticular and Extra-articular Injection - Bilateral    Consent: Informed consent was obtained prior to the procedure. The patient was given the opportunity to ask questions regarding the procedure and its associated risks. In addition to the potential risks associated with the procedure itself, the patient was informed both verbally and in writing of potential side effects of the use of glucocorticoids. The patient appeared to comprehend the informed consent and desired to have the procedure performed. Procedure: The patient was placed in the prone position on the flouroscopy table and the back was prepped and draped in the usual sterile manner. After local Lidocaine 1% infiltration, a #22 gauge spinal needle was then advanced to lie within the SI joint. The procedure was repeated for the contralateral SI joint. No vascular uptake was noted. A total of 10 mg of Dexamethasone  and 5 ml of Lidocaine was introduced in and around the SI joint from two different needle placements. The injection area was cleaned and bandaids applied. No excessive bleeding was noted. Patient dressed and was discharged to home with instructions. Discussion:  The patient tolerated the procedure well. Patient reported landry-procedural pain on Visual Analog Scale:  pre-6; post-2.           Angelica Guevara MD  April 19, 2022

## 2022-04-19 NOTE — INTERVAL H&P NOTE
Update History & Physical    The Patient's History and Physical of March 30, 2022 was reviewed. There was no change. The surgical site was confirmed by the patient and me. Plan:  The risk, benefits, expected outcome, and alternative to the recommended procedure have been discussed with the patient. Patient understands and wants to proceed with the procedure.     Electronically signed by Saurabh Mustafa MD on 4/19/2022 at 10:19 AM

## 2022-04-19 NOTE — PERIOP NOTES
Patient verbally consents to HIPAA and verbalizes understanding of discharge instructions. Signature pad is not working.

## 2022-05-24 ENCOUNTER — OFFICE VISIT (OUTPATIENT)
Dept: CARDIOLOGY CLINIC | Age: 72
End: 2022-05-24
Payer: MEDICARE

## 2022-05-24 VITALS
HEIGHT: 62 IN | SYSTOLIC BLOOD PRESSURE: 132 MMHG | WEIGHT: 181 LBS | DIASTOLIC BLOOD PRESSURE: 80 MMHG | HEART RATE: 58 BPM | BODY MASS INDEX: 33.31 KG/M2

## 2022-05-24 DIAGNOSIS — I47.1 SVT (SUPRAVENTRICULAR TACHYCARDIA) (HCC): ICD-10-CM

## 2022-05-24 DIAGNOSIS — M06.9 RHEUMATOID ARTHRITIS, INVOLVING UNSPECIFIED SITE, UNSPECIFIED WHETHER RHEUMATOID FACTOR PRESENT (HCC): ICD-10-CM

## 2022-05-24 DIAGNOSIS — I10 ESSENTIAL HYPERTENSION: Primary | ICD-10-CM

## 2022-05-24 DIAGNOSIS — R06.02 SHORTNESS OF BREATH: ICD-10-CM

## 2022-05-24 PROCEDURE — 1101F PT FALLS ASSESS-DOCD LE1/YR: CPT | Performed by: INTERNAL MEDICINE

## 2022-05-24 PROCEDURE — 3017F COLORECTAL CA SCREEN DOC REV: CPT | Performed by: INTERNAL MEDICINE

## 2022-05-24 PROCEDURE — G8752 SYS BP LESS 140: HCPCS | Performed by: INTERNAL MEDICINE

## 2022-05-24 PROCEDURE — 1090F PRES/ABSN URINE INCON ASSESS: CPT | Performed by: INTERNAL MEDICINE

## 2022-05-24 PROCEDURE — G8427 DOCREV CUR MEDS BY ELIG CLIN: HCPCS | Performed by: INTERNAL MEDICINE

## 2022-05-24 PROCEDURE — 99214 OFFICE O/P EST MOD 30 MIN: CPT | Performed by: INTERNAL MEDICINE

## 2022-05-24 PROCEDURE — G8432 DEP SCR NOT DOC, RNG: HCPCS | Performed by: INTERNAL MEDICINE

## 2022-05-24 PROCEDURE — G8536 NO DOC ELDER MAL SCRN: HCPCS | Performed by: INTERNAL MEDICINE

## 2022-05-24 PROCEDURE — G8754 DIAS BP LESS 90: HCPCS | Performed by: INTERNAL MEDICINE

## 2022-05-24 PROCEDURE — G8417 CALC BMI ABV UP PARAM F/U: HCPCS | Performed by: INTERNAL MEDICINE

## 2022-05-24 PROCEDURE — G9899 SCRN MAM PERF RSLTS DOC: HCPCS | Performed by: INTERNAL MEDICINE

## 2022-05-24 NOTE — PROGRESS NOTES
1. Have you been to the ER, urgent care clinic since your last visit? Hospitalized since your last visit? No    2. Have you seen or consulted any other health care providers outside of the [address] [place] [address] since your last visit? Include any pap smears or colon screening.  No

## 2022-05-24 NOTE — PROGRESS NOTES
HISTORY OF PRESENT ILLNESS  Sherie Hurst is a 74 y.o. female. 6/1/2021  Patient seen today for new patient evaluation. She is referred here for evaluation of tachycardia. For past few weeks she has been having episode of sudden tachycardia with heart rate going in 120s. She is short of breath at that time. Denies any dizziness or syncope. She has history of rheumatoid arthritis with extensive joint problems including back problem that is being evaluated by Dr. Anabel Patel. She has no prior cardiac history. She has history of hypertension on treatment. Follow-up  Associated symptoms include shortness of breath. Pertinent negatives include no chest pain, no abdominal pain and no headaches. Review of Systems   Constitutional: Negative for chills and fever. HENT: Negative for nosebleeds. Eyes: Negative for blurred vision and double vision. Respiratory: Positive for shortness of breath. Negative for cough, hemoptysis, sputum production and wheezing. Cardiovascular: Positive for palpitations. Negative for chest pain, orthopnea, claudication, leg swelling and PND. Gastrointestinal: Negative for abdominal pain, heartburn, nausea and vomiting. Musculoskeletal: Negative for myalgias. Skin: Negative for rash. Neurological: Negative for dizziness, weakness and headaches. Endo/Heme/Allergies: Does not bruise/bleed easily.      Family History   Problem Relation Age of Onset    Other Other         Orthopedic (Sister)   Robin OSTEOARTHRITIS Sister     Heart Attack Brother 58    Cancer Neg Hx     Diabetes Neg Hx     Heart Disease Neg Hx     Hypertension Neg Hx     Stroke Neg Hx     Malignant Hyperthermia Neg Hx     Pseudocholinesterase Deficiency Neg Hx     Delayed Awakening Neg Hx     Post-op Nausea/Vomiting Neg Hx     Emergence Delirium Neg Hx     Post-op Cognitive Dysfunction Neg Hx        Past Medical History:   Diagnosis Date    Abdominal abscess 2009    Adverse effect of anesthesia      Be careful with intubation.  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 )    Irritable bowel syndrome     Neuropathy     bilateral hands/wrist    Osteoporosis     Pneumonia     RA (rheumatoid arthritis) ([address]     Seizures ([address] 6-1-2008    one episode- after high dose of epinepherine       Past Surgical History:   Procedure Laterality Date    HX ABDOMINAL WALL DEFECT REPAIR      HX APPENDECTOMY      HX BREAST REDUCTION      HX CATARACT REMOVAL Bilateral     HX COLONOSCOPY      HX GI      repair rectal prolaspe    HX GYN      dermoid cyst    HX HEENT      tonsillectomy    HX HYSTERECTOMY  1976    HX LUMBAR FUSION      x 2  L 3-4 , L-5-6    HX LUMBAR FUSION  12/03/2018    T-8    HX ORTHOPAEDIC Bilateral     CTR    HX ORTHOPAEDIC Bilateral     arthroplasty thumbs    HX ORTHOPAEDIC Left     Ankle     HX ORTHOPAEDIC Right     \"Nerve Release Elbow\"    HX OTHER SURGICAL Left 1985    vein stripping    HX SALPINGO-OOPHORECTOMY Bilateral     HX SHOULDER REPLACEMENT Left     HX UROLOGICAL      bladder repair    NE TOTAL HIP ARTHROPLASTY Bilateral        Social History     Tobacco Use    Smoking status: Never Smoker    Smokeless tobacco: Never Used   Substance Use Topics    Alcohol use: No       Allergies   Allergen Reactions    Celebrex [Celecoxib] Swelling    Shellfish Derived Other (comments)      Pt.denies     Sulfa (Sulfonamide Antibiotics) Hives and Swelling     Lips swelling    Gabapentin Diarrhea and Palpitations     Chest and Abdominal pain    Humira [Adalimumab] Other (comments)     Lupus    Influenza Virus Vaccines Hives    Iodine Itching     Takes benadryl and is OK.  Optiray 320 [Ioversol] Hives and Itching     Pt states when she gets iv contrast she itches a little from hives?  Penicillins Hives    Shrimp Other (comments)     Sodium puffy       Prior to Admission medications    Medication Sig Start Date End Date Taking? Authorizing Provider   zolpidem (AMBIEN) 5 mg tablet Take 1 Tablet by mouth nightly. 3/21/22  Yes Carmen Lunsford MD   cyclobenzaprine (FLEXERIL) 10 mg tablet Take 1 Tablet by mouth three (3) times daily as needed for Muscle Spasm(s). 1/27/22  Yes Carmen Lunsford MD   lisinopriL (PRINIVIL, ZESTRIL) 5 mg tablet Take 1 Tablet by mouth daily. 10/4/21  Yes Carmen Lunsford MD   multivitamin (ONE A DAY) tablet Take 1 Tablet by mouth daily. 2/8/21  Yes Provider, Historical   meloxicam (MOBIC) 15 mg tablet TAKE 1 TABLET DAILY 3/17/21  Yes Carmen Lunsford MD   tofacitinib Rhodia Lango) 5 mg tab Take  by mouth two (2) times a day. Yes Provider, Historical   cetirizine (ZYRTEC) 10 mg tablet Take 10 mg by mouth daily as needed. Yes Provider, Historical   acetaminophen (TYLENOL EXTRA STRENGTH) 500 mg tablet Take 1,000 mg by mouth every six (6) hours as needed for Pain. Yes Provider, Historical   cholecalciferol (VITAMIN D3) 1,000 unit tablet Take 1,000 Units by mouth five (5) days a week. Yes Provider, Historical   calcium carbonate (OS-BARBARA) 500 mg calcium (1,250 mg) tablet Take 1,500 mg by mouth daily. Yes Provider, Historical         Visit Vitals  /80 (BP 1 Location: Left upper arm, BP Patient Position: Sitting, BP Cuff Size: Adult)   Pulse (!) 58   Ht 5' 2\" (1.575 m)   Wt 82.1 kg (181 lb)   BMI 33.11 kg/m²       Physical Exam  Constitutional:       Appearance: She is well-developed. HENT:      Head: Normocephalic and atraumatic. Eyes:      Conjunctiva/sclera: Conjunctivae normal.   Neck:      Thyroid: No thyromegaly. Vascular: No JVD. Trachea: No tracheal deviation.    Cardiovascular:      Rate and Rhythm: Normal rate and regular rhythm. Chest Wall: PMI is not displaced. Pulses: No decreased pulses. Heart sounds: No murmur heard. No gallop. No S3 sounds. Pulmonary:      Effort: No respiratory distress. Breath sounds: No wheezing or rales. Chest:      Chest wall: No tenderness. Abdominal:      Palpations: Abdomen is soft. Tenderness: There is no abdominal tenderness. Musculoskeletal:      Cervical back: Neck supple. Skin:     General: Skin is warm. Neurological:      Mental Status: She is alert and oriented to person, place, and time. Ms. Jennie Abel has a reminder for a \"due or due soon\" health maintenance. I have asked that she contact her primary care provider for follow-up on this health maintenance. No flowsheet data found. I have personally reviewed patient's records available from hospital and other providers and incorporated findings in patient care. Notes, lab,Old EKG, old CAT scan, old chest x-ray  Interpretation Summary 6/2021    · LV: Estimated LVEF is 55 - 60%. Normal cavity size, wall thickness and systolic function (ejection fraction normal). Wall motion: normal. Mild (grade 1) left ventricular diastolic dysfunction. · LA: Left Atrium volume index is 27.72 mL/m2. · TV: Right Ventricular Arterial Pressure (RVSP) is 25 mmHg. Pulmonary hypertension not suggested by Doppler findings. 6/2021  Event monitor-rare PAC PVC on 122 beat SVT in 140s. Assessment         ICD-10-CM ICD-9-CM    1. Essential hypertension  I10 401.9     Stable blood pressure controlled continue treatment   2. SVT (supraventricular tachycardia) (HCC)  I47.1 427.89     Symptoms stable monitor continue therapy   3. Rheumatoid arthritis, involving unspecified site, unspecified whether rheumatoid factor present ([facility]  M06.9 714.0     Stable on treatment follow-up with PCP   4.  Shortness of breath  R06.02 786.05     Stable stable symptom   6/2021  Seen for tachycardia palpitation shortness of breath. History of hypertension and rheumatoid arthritis. Set up for event monitor and echo. Currently has significant back problem being evaluated. Low blood pressure today but currently on low-dose lisinopril will monitor  7/2021  Episode of SVT likely cause for palpitation. Check TSH. Add low-dose beta-blocker after back surgery if needed  10/2021  Cardiac status stable. Unable to tolerate Toprol because of diarrhea. Will try low-dose Cardizem. Continue other treatment. Will stop lisinopril after starting Cardizem  5/2022  Cardiac status stable. No recurrence of tachycardia. Blood pressure controlled. Back on lisinopril. Medications Discontinued During This Encounter   Medication Reason    benzonatate (TESSALON) 100 mg capsule Not A Current Medication    fluocinoNIDE (LIDEX) 0.05 % topical cream Not A Current Medication    dilTIAZem ER (CARDIZEM CD) 120 mg capsule Not A Current Medication       No orders of the defined types were placed in this encounter. Follow-up and Dispositions    · Return in about 1 year (around 5/24/2023).

## 2022-06-01 ENCOUNTER — OFFICE VISIT (OUTPATIENT)
Dept: ORTHOPEDIC SURGERY | Age: 72
End: 2022-06-01
Payer: MEDICARE

## 2022-06-01 VITALS
HEIGHT: 62 IN | TEMPERATURE: 96.8 F | OXYGEN SATURATION: 96 % | WEIGHT: 180 LBS | BODY MASS INDEX: 33.13 KG/M2 | HEART RATE: 71 BPM

## 2022-06-01 DIAGNOSIS — M53.3 SACROILIAC JOINT DYSFUNCTION OF BOTH SIDES: ICD-10-CM

## 2022-06-01 DIAGNOSIS — M25.551 CHRONIC PAIN OF BOTH HIPS: ICD-10-CM

## 2022-06-01 DIAGNOSIS — M43.25 FUSION OF SPINE OF THORACOLUMBAR REGION: ICD-10-CM

## 2022-06-01 DIAGNOSIS — M54.16 RIGHT LUMBAR RADICULITIS: Primary | ICD-10-CM

## 2022-06-01 DIAGNOSIS — G89.29 CHRONIC PAIN OF BOTH HIPS: ICD-10-CM

## 2022-06-01 DIAGNOSIS — M25.552 CHRONIC PAIN OF BOTH HIPS: ICD-10-CM

## 2022-06-01 PROCEDURE — G8756 NO BP MEASURE DOC: HCPCS | Performed by: PHYSICAL MEDICINE & REHABILITATION

## 2022-06-01 PROCEDURE — 1090F PRES/ABSN URINE INCON ASSESS: CPT | Performed by: PHYSICAL MEDICINE & REHABILITATION

## 2022-06-01 PROCEDURE — G8427 DOCREV CUR MEDS BY ELIG CLIN: HCPCS | Performed by: PHYSICAL MEDICINE & REHABILITATION

## 2022-06-01 PROCEDURE — G8417 CALC BMI ABV UP PARAM F/U: HCPCS | Performed by: PHYSICAL MEDICINE & REHABILITATION

## 2022-06-01 PROCEDURE — 1101F PT FALLS ASSESS-DOCD LE1/YR: CPT | Performed by: PHYSICAL MEDICINE & REHABILITATION

## 2022-06-01 PROCEDURE — 3017F COLORECTAL CA SCREEN DOC REV: CPT | Performed by: PHYSICAL MEDICINE & REHABILITATION

## 2022-06-01 PROCEDURE — G8536 NO DOC ELDER MAL SCRN: HCPCS | Performed by: PHYSICAL MEDICINE & REHABILITATION

## 2022-06-01 PROCEDURE — 99214 OFFICE O/P EST MOD 30 MIN: CPT | Performed by: PHYSICAL MEDICINE & REHABILITATION

## 2022-06-01 PROCEDURE — 72110 X-RAY EXAM L-2 SPINE 4/>VWS: CPT | Performed by: PHYSICAL MEDICINE & REHABILITATION

## 2022-06-01 PROCEDURE — G9899 SCRN MAM PERF RSLTS DOC: HCPCS | Performed by: PHYSICAL MEDICINE & REHABILITATION

## 2022-06-01 PROCEDURE — 1123F ACP DISCUSS/DSCN MKR DOCD: CPT | Performed by: PHYSICAL MEDICINE & REHABILITATION

## 2022-06-01 PROCEDURE — G8432 DEP SCR NOT DOC, RNG: HCPCS | Performed by: PHYSICAL MEDICINE & REHABILITATION

## 2022-06-01 RX ORDER — TOPIRAMATE 25 MG/1
TABLET ORAL
Qty: 60 TABLET | Refills: 1 | Status: SHIPPED | OUTPATIENT
Start: 2022-06-01 | End: 2022-08-24

## 2022-06-01 NOTE — PROGRESS NOTES
[address]  [address], [address]  [place] [address]  Phone: [phone]  Fax: [phone]        Kiran Christie  : 1950  PCP: Soraya Anderson MD    PROGRESS NOTE      ASSESSMENT AND PLAN    Diagnoses and all orders for this visit:    1. Right lumbar radiculitis  -     AMB POC XRAY, SPINE, LUMBOSACRAL; 4+  -     topiramate (TOPAMAX) 25 mg tablet; One po qhs x 1 week then increase to 2 po qhs  -     REFERRAL TO PHYSICAL THERAPY    2. Fusion of spine of thoracolumbar region  -     REFERRAL TO PHYSICAL THERAPY  -     AMB SUPPLY ORDER    3. Chronic pain of both hips  -     REFERRAL TO PHYSICAL THERAPY    4. Sacroiliac joint dysfunction of both sides  -     REFERRAL TO PHYSICAL THERAPY  -     AMB SUPPLY ORDER        1. Halle Villegas is a 74 y.o. femalew/RA, fused T11-L5 with improvement of sacroiliac pain post injections. She is having right L5 radiculitis today. She has not had benefit with previous nerve root blocks. 2. Trial of Topamax 25 mg. Take 1 po QHS x one week then increase to 2 po QHS thereafter  3. Referral to Physical Therapy for B/L hip pain, lumbar pain, right L5 radicular pain  4. DME order for E-Stim unit, medically necessary for chronic pain unresponsive to medication. Follow-up and Dispositions    · Return in about 2 months (around 2022) for medication management. HISTORY OF PRESENT ILLNESS      Halle Villegas is a 74 y.o. female presents for follow up of low back pain. LV ordered a E STIM unit    Patient received B/L SIJ injections 2022 with % benefit. Denies side effects. She reports pain radiating into her right anterior and lateral  thigh and lateral calf. Her pain is exacerbated with standing. Patient describes a burning sensation in her right anterior thigh. Denies recent falls. She walks a half to one mile a day for exercise. She also complains of B/L hip pain exacerbated with standing.   Her hips have been replaced. Patient has failed Gabapentin and Lodine. Denies history of renal stones. Pain Assessment  6/1/2022   Location of Pain Leg   Location Modifiers -   Severity of Pain 3   Quality of Pain -   Quality of Pain Comment -   Duration of Pain Persistent   Frequency of Pain Intermittent   Aggravating Factors Bending;Stretching;Straightening;Squatting;Standing;Walking   Aggravating Factors Comment -   Limiting Behavior -   Relieving Factors -   Relieving Factors Comment -   Result of Injury No   Work-Related Injury -   Type of Injury -   Type of Injury Comment -       Investigations:   L XR AP/lat/flex/ext 4V 6/1/2022 (I personally reviewed these images): intact hardware T11-L5, cages L1-2 L3-4  T CT 6/2021: hardware failure T10, listhesis C7-T1  L CT 6/2021: fused T12-L5     Treatments:  Physical therapy: unknown  Spinal injections: B/L SIJ injections 4/2022 with % benefit.  B/L SNRB 2020 by Dr. Marilee Leroy with no benefit, SIJ 2020 with benefit  Spinal surgery- thoracolumbar fusion, removal of T10 screws 8/2021 with benefit  Beneficial medications: Mobic, Flexeril  Failed medications: Gabapentin, Celebrex     Work Status: retired  Pertinent PMHx:  RA (Dr. Lucie Velasco), CPPD, neuropathy, IBS, HTN, GERD, BL KELLY    PHYSICAL EXAMINATION    Visit Vitals  Pulse 71   Temp 96.8 °F (36 °C)   Ht 5' 2\" (1.575 m)   Wt 180 lb (81.6 kg)   SpO2 96%   BMI 32.92 kg/m²       Forward flexed  TTP L3-4  LE strength intact  SLR negative                Written by Binta Bucio, as dictated by Anita Gerber MD.

## 2022-06-01 NOTE — LETTER
6/3/2022    Patient: César Mtz   YOB: 1950   Date of Visit: 6/1/2022     Eboni Suazo, [address] [place] [address]  [address] [place] [id number]  Eli Delarosa    Dear Eboni Suazo MD,      Thank you for referring Ms. Vivi Naik to [facility] for evaluation. My notes for this consultation are attached. If you have questions, please do not hesitate to call me. I look forward to following your patient along with you.       Sincerely,    Alisa Daniels MD

## 2022-06-06 ENCOUNTER — HOSPITAL ENCOUNTER (OUTPATIENT)
Dept: PHYSICAL THERAPY | Age: 72
Discharge: HOME | End: 2022-06-06
Attending: PHYSICAL MEDICINE & REHABILITATION
Payer: MEDICARE

## 2022-06-06 PROCEDURE — 97161 PT EVAL LOW COMPLEX 20 MIN: CPT

## 2022-06-06 PROCEDURE — 97140 MANUAL THERAPY 1/> REGIONS: CPT

## 2022-06-06 PROCEDURE — 97110 THERAPEUTIC EXERCISES: CPT

## 2022-06-06 NOTE — PROGRESS NOTES
PT DAILY TREATMENT NOTE/LUMBAR EVAL     Patient Name: Linda Bobo  Date:2022  : 1950  [x]  Patient  Verified  Payor: [place] MEDICARE / Plan: [place] MEDICARE PART A & B / Product Type: Medicare /    In RSFN:[id number]  Out time:1020  Total Treatment Time (min): 39  Visit #: 1 of 10    Medicare/BCBS Only   Total Timed Codes (min):  24 1:1 Treatment Time:  39       Treatment Area: Other low back pain [M54.59]  Right hip pain [M25.551]  Left hip pain [M25.552]      SUBJECTIVE  Pain Level (0-10 scale): 3  []constant [x]intermittent []improving []worsening []no change since onset     Any medication changes, allergies to medications, adverse drug reactions, diagnosis change, or new procedure performed?: [x] No    [] Yes (see summary sheet for update)  Subjective functional status/changes:       Subjective: Patient is a 74 y.o. female referred to PT with the above Dx. Patient seen today for c/o right sided back and leg pain. She reports grinding at the right hip when walking. Sleep is limited because of the pain in the low back and right hip/leg. Received injections in the both SI joints on May 19th that seemed to help with the discomfort. Had a fall at the end of 2022 while on an uneven boat dock that may have resulted in increased pain. Pain noted at the right ITB/Peroneals and Tib Anterior. Increased discomfort with sitting resulting  In pain going into the right LE      Patient presents to PT with an impaired gait, fearful (I) LE balance, decreased strength, decreased flexibility, and decreased mobility of the pelvis and low back. Patient s/s appear to be consistent w/ diagnosis. Patient demonstrates the potential to make functional gains within a reasonable time frame. Patient will benefit from skilled PT to address impairments and improve functional mobility, strength, gait and balance for an improved quality of life.   Fall Risk Assessment: Patient demonstrates a low Fall Risk      Mechanism of Injury: Fall on boat ramp    Comorbidities: OP, OA, HTN, Scoliosis  Prior Level of Function: Able to perform usual activity with pain but in less pain than at this poinnt    FOTO: 29/44 in 15 visits    Goal: Stop L5 pain, stretch Hip Flexors    Health Status: Good    What type of work do you do? N/A    Living Situation/Domestic Life: Lives at home with   Mobility: (I) but slow  Self Care (I)  Stairs in the home? Stairs, with no problems when using hand rails    What type of daily activities/hobbies?  Boating, Dogs    Limitations to Activity/Recreation/PLOF: Sleep, walking stairs without HR, sitting, walking       Barriers: [x]pain []financial []time []transportation []other    Motivation: High    FABQ Score: []low []elevate    Cognition: A & O x 3    Other:    Risk For Falls:   []No  [x]low []elevate           OBJECTIVE/EXAMINATION  Demonstrated Balance: Fair - Good                 SLS: Right PD  Left  PD     Tandem Stance: NT          Romberg:  EO 30   EC 30  Demonstrated Mobility: WFL  Gait: Fwd Flx Posture, Decr right pelvic sway  - Static trunk flx at 14*  - Lx Ext at -10* with right leg pain  - Lx Flx WFL and no added pain  - Trunk Rot Right 25% Left 40-50%  - TTP at the right TFL/Peroneals/Tib Ant/  - Elev right sacral base  - Decr mobility right Innominate in stork stance                       15 min [x]Eval                  []Re-Eval         14 min Therapeutic Exercise:  [] See flow sheet : Develop and instruct HEP   Rationale: increase ROM, increase strength, and improve coordination to improve the patients ability to Initiate HEP and improve daily function     10 min Manual Therapy:  Inf glide right sacral base, (B) Innominate p/a mobs, (B) LE LAD, (B) L/S and T/S Rot mobs, STM Right Lx paraspinal and superior glute     Rationale: decrease pain, increase ROM, increase tissue extensibility and decrease trigger points to improve daily function With   [x] TE   [] TA   [] neuro   [] other: Patient Education: [x] Review HEP    [] Progressed/Changed HEP based on:   [] positioning   [] body mechanics   [] transfers   [] heat/ice application    [] other:       Other Objective/Functional Measures:      Access Code: [email or web address]  URL: [email or web address]  Date: 06/06/2022  Prepared by: Zia Frey    Exercises  Prone Press Up On Elbows - 1 x daily - 7 x weekly - 3 sets - 10 reps  Seated Thoracic Lumbar Extension with Pectoralis Stretch - 1 x daily - 7 x weekly - 3 sets - 10 reps  Standing Back Extension - 1 x daily - 7 x weekly - 3 sets - 10 reps  Standing Lumbar Extension at Wall - 1 x daily - 7 x weekly - 3 sets - 10 reps  Seated Piriformis Stretch - 1 x daily - 7 x weekly - 3 sets - 1 reps - 30 hold  Sciatic Nerve Glide - 1 x daily - 7 x weekly - 3 sets - 20 reps    Pain Level (0-10 scale) post treatment: 3     ASSESSMENT/Changes in Function:        [x]  See Plan of Care  []  See progress note/recertification  []  See Discharge Summary         Progress towards goals / Updated goals:  Short Term Goals: To be accomplished in 5 treatments:  1. Pt will be compliant and independent with HEP in order to facilitate PT sessions and aid with self management   Eval Status:  Initiated   Current Status:  2. Pt to tolerate 30 min or more of TE and/or Interventions w/o increased s/s   Eval Status:  Initiated   Current Status:    Long Term Goals: To be accomplished in 10 treatments:  1. Pt will report 50% improvement or better with back dysfunction to show a significant increase in ability to tolerate ADL   Eval Status:  Initiated   Current Status:  2. Pt will demonstrate standing back extension at less than 10* with little to no added pain for progress to upright posturing with daily activity with little to no difficulty to return to usual daily activity with little difficulty     Eval Status: Limited back Ext to -10*    Current Status:  3. Pt will tolerate normal sitting and standing activity with little left leg pain for progress to performing usual daily activity with less discomfort     Eval Status:  Pain with sitting and full upright posturing   Current Status:  4. Pt will demonstrate stair negotiation of 10 6\" steps with no HRA and little to no difficulty for carryover to walking front steps with little difficulty and good control without HR assistance     Eval Status:  Need HR with stairs   Current Status:  5.   Pt will improve FOTO score to 44 in 15 visits to show significant improvement in function for progress to performing usual activity with little difficulty   Eval Status: FOTO = 29   Current Status:      PLAN  [x]  Upgrade activities as tolerated     [x]  Continue plan of care  []  Update interventions per flow sheet       []  Discharge due to:_  []  Other:_        Reynold Swan PT 6/6/2022  9:36 AM

## 2022-06-06 NOTE — PROGRESS NOTES
[facility] at [address] [facility], [facility] [address] [facility] [address]  Phone: [phone]      Fax:  [phone]    Plan of Care/ Statement of Necessity for Physical Therapy Services  Patient name: Chris Singh Start of Care: 2022   Referral source: Aleksandr Jacobsen MD : [id number]    Medical Diagnosis: Other low back pain [M54.59]  Right hip pain [M25.551]  Left hip pain [M25.552]  Payor: [place] MEDICARE / Plan: [place] MEDICARE PART A & B / Product Type: Medicare /  Onset Date: On or about 22    Treatment Diagnosis: Low back pain, Right Hip and leg pain   Prior Hospitalization: see medical history Provider#: [id number]   Medications: Verified on Patient summary List   Comorbidities: OP, OA, HTN, Scoliosis  Prior Level of Function: Able to perform usual activity with pain but in less pain than at this poinnt      The Plan of Care and following information is based on the information from the initial evaluation. Assessment/ key information: Patient is a 74 y.o. female referred to PT with the above Dx. Patient seen today for c/o right sided back and leg pain. She reports grinding at the right hip when walking. Sleep is limited because of the pain in the low back and right hip/leg. Received injections in the both SI joints on May 19th that seemed to help with the discomfort. Had a fall at the end of 2022 while [place] uneven boat dock that may have resulted in increased pain. Pain noted at the right ITB/Peroneals and Tib Anterior. Increased discomfort with sitting resulting  In pain going into the right LE        Patient presents to PT with an impaired gait, fearful (I) LE balance, decreased strength, decreased flexibility, and decreased mobility of the pelvis and low back. (+) Right Slump Test  Patient s/s appear to be consistent w/ diagnosis. Patient demonstrates the potential to make functional gains within a reasonable time frame.   Patient will benefit from skilled PT to address impairments and improve functional mobility, strength, gait and balance for an improved quality of life. Fall Risk Assessment: Patient demonstrates a low Fall Risk      Evaluation Complexity History HIGH Complexity :3+ comorbidities / personal factors will impact the outcome/ POC ; Examination MEDIUM Complexity : 3 Standardized tests and measures addressing body structure, function, activity limitation and / or participation in recreation  ;Presentation LOW Complexity : Stable, uncomplicated  ;Clinical Decision Making MEDIUM Complexity : FOTO score of 26-74  Overall Complexity Rating: LOW   Problem List: pain affecting function, decrease ROM, decrease strength, impaired gait/ balance, decrease ADL/ functional abilitiies, decrease activity tolerance, decrease flexibility/ joint mobility, decrease transfer abilities and other FOTO = 29   Treatment Plan may include any combination of the following: Therapeutic exercise, Therapeutic activities, Neuromuscular re-education, Physical agent/modality, Gait/balance training, Manual therapy, Patient education, Self Care training, Functional mobility training, Home safety training and Stair training  Patient / Family readiness to learn indicated by: asking questions, trying to perform skills and interest  Persons(s) to be included in education: patient (P)  Barriers to Learning/Limitations: yes;  physical  Patient Goal (s): Stop L5 pain, stretch Hip Flexors  Patient Self Reported Health Status: good  Rehabilitation Potential: good    Short Term Goals: To be accomplished in 5 treatments:  1. Pt will be compliant and independent with HEP in order to facilitate PT sessions and aid with self management   Eval Status:  Initiated   Current Status:  2. Pt to tolerate 30 min or more of TE and/or Interventions w/o increased s/s   Eval Status:  Initiated   Current Status:    Long Term Goals: To be accomplished in 10 treatments:  1.   Pt will report 50% improvement or better with back dysfunction to show a significant increase in ability to tolerate ADL   Eval Status:  Initiated   Current Status:  2. Pt will demonstrate standing back extension at less than 10* with little to no added pain for progress to upright posturing with daily activity with little to no difficulty to return to usual daily activity with little difficulty     Eval Status: Limited back Ext to -10*    Current Status:  3. Pt will tolerate normal sitting and standing activity with little left leg pain for progress to performing usual daily activity with less discomfort     Eval Status:  Pain with sitting and full upright posturing   Current Status:  4. Pt will demonstrate stair negotiation of 10 6\" steps with no HRA and little to no difficulty for carryover to walking front steps with little difficulty and good control without HR assistance     Eval Status:  Need HR with stairs   Current Status:  5. Pt will improve FOTO score to 44 in 15 visits to show significant improvement in function for progress to performing usual activity with little difficulty   Eval Status: FOTO = 29   Current Status:     Frequency / Duration: Patient to be seen 2-3 times per week for 10 treatments. Patient/ Caregiver education and instruction: Diagnosis, prognosis, self care, activity modification and exercises   [x]  Plan of care has been reviewed with PTA  Comments:  Patient provided w/HEP    Certification Period: 6/6/22 - 7/5/22  Mariel Delgadillo, PT 6/6/2022 9:35 AM  _____________________________________________________________________  I certify that the above Therapy Services are being furnished while the patient is under my care. I agree with the treatment plan and certify that this therapy is necessary.     Physician's Signature:____________Date:_________TIME:________     Cody Fuller MD  ** Signature, Date and Time must be completed for valid certification **    Please sign and return to [facility] at [facility] [address] [facility], [facility]  Phone: [phone]      Fax:  [phone]

## 2022-06-08 ENCOUNTER — OFFICE VISIT (OUTPATIENT)
Dept: FAMILY MEDICINE CLINIC | Age: 72
End: 2022-06-08
Payer: MEDICARE

## 2022-06-08 VITALS
BODY MASS INDEX: 32.57 KG/M2 | RESPIRATION RATE: 10 BRPM | HEIGHT: 62 IN | TEMPERATURE: 97.5 F | HEART RATE: 70 BPM | SYSTOLIC BLOOD PRESSURE: 102 MMHG | DIASTOLIC BLOOD PRESSURE: 70 MMHG | WEIGHT: 177 LBS

## 2022-06-08 DIAGNOSIS — R00.0 TACHYCARDIA: ICD-10-CM

## 2022-06-08 DIAGNOSIS — R35.0 URINE FREQUENCY: Primary | ICD-10-CM

## 2022-06-08 DIAGNOSIS — Z00.00 MEDICARE ANNUAL WELLNESS VISIT, SUBSEQUENT: ICD-10-CM

## 2022-06-08 DIAGNOSIS — M54.50 LUMBAR BACK PAIN: ICD-10-CM

## 2022-06-08 LAB
BILIRUB UR QL STRIP: NEGATIVE
GLUCOSE UR-MCNC: NEGATIVE MG/DL
KETONES P FAST UR STRIP-MCNC: NORMAL MG/DL
PH UR STRIP: 5.5 [PH] (ref 4.6–8)
PROT UR QL STRIP: NEGATIVE
SP GR UR STRIP: 1.02 (ref 1–1.03)
UA UROBILINOGEN AMB POC: NORMAL (ref 0.2–1)
URINALYSIS CLARITY POC: CLEAR
URINALYSIS COLOR POC: YELLOW
URINE BLOOD POC: NEGATIVE
URINE LEUKOCYTES POC: NORMAL
URINE NITRITES POC: NEGATIVE

## 2022-06-08 PROCEDURE — 3017F COLORECTAL CA SCREEN DOC REV: CPT | Performed by: FAMILY MEDICINE

## 2022-06-08 PROCEDURE — G8752 SYS BP LESS 140: HCPCS | Performed by: FAMILY MEDICINE

## 2022-06-08 PROCEDURE — 1090F PRES/ABSN URINE INCON ASSESS: CPT | Performed by: FAMILY MEDICINE

## 2022-06-08 PROCEDURE — 99214 OFFICE O/P EST MOD 30 MIN: CPT | Performed by: FAMILY MEDICINE

## 2022-06-08 PROCEDURE — G8754 DIAS BP LESS 90: HCPCS | Performed by: FAMILY MEDICINE

## 2022-06-08 PROCEDURE — 1101F PT FALLS ASSESS-DOCD LE1/YR: CPT | Performed by: FAMILY MEDICINE

## 2022-06-08 PROCEDURE — 81003 URINALYSIS AUTO W/O SCOPE: CPT | Performed by: FAMILY MEDICINE

## 2022-06-08 PROCEDURE — G8427 DOCREV CUR MEDS BY ELIG CLIN: HCPCS | Performed by: FAMILY MEDICINE

## 2022-06-08 PROCEDURE — 1123F ACP DISCUSS/DSCN MKR DOCD: CPT | Performed by: FAMILY MEDICINE

## 2022-06-08 PROCEDURE — G8536 NO DOC ELDER MAL SCRN: HCPCS | Performed by: FAMILY MEDICINE

## 2022-06-08 PROCEDURE — G0439 PPPS, SUBSEQ VISIT: HCPCS | Performed by: FAMILY MEDICINE

## 2022-06-08 PROCEDURE — G9899 SCRN MAM PERF RSLTS DOC: HCPCS | Performed by: FAMILY MEDICINE

## 2022-06-08 PROCEDURE — G8417 CALC BMI ABV UP PARAM F/U: HCPCS | Performed by: FAMILY MEDICINE

## 2022-06-08 PROCEDURE — G8510 SCR DEP NEG, NO PLAN REQD: HCPCS | Performed by: FAMILY MEDICINE

## 2022-06-08 RX ORDER — TRAMADOL HYDROCHLORIDE 50 MG/1
100 TABLET ORAL
Qty: 90 TABLET | Refills: 1 | Status: SHIPPED | OUTPATIENT
Start: 2022-06-08 | End: 2022-07-08

## 2022-06-08 RX ORDER — CIPROFLOXACIN 500 MG/1
500 TABLET ORAL 2 TIMES DAILY
Qty: 20 TABLET | Refills: 0 | Status: SHIPPED | OUTPATIENT
Start: 2022-06-08 | End: 2022-06-18

## 2022-06-08 NOTE — PROGRESS NOTES
Patient here today for her [address] and says she does heave some concerns. 1. \"Have you been to the ER, urgent care clinic since your last visit? Hospitalized since your last visit? \" No    2. \"Have you seen or consulted any other health care providers outside of the [address] [place] [address] since your last visit? \" Has seen her Dmeratology, pain managment     3. For patients aged 39-70: Has the patient had a colonoscopy / FIT/ Cologuard? Yes - no Care Gap present      If the patient is female:    4. For patients aged 41-77: Has the patient had a mammogram within the past 2 years? Yes - no Care Gap present      5. For patients aged 21-65: Has the patient had a pap smear?  NA - based on age or sex    [de-identified]

## 2022-06-08 NOTE — PROGRESS NOTES
Alli Taveras is a 74 y.o. female  presents for urine frequency. She has no assoc fever chills or back pain. Allergies   Allergen Reactions    Celebrex [Celecoxib] Swelling    Shellfish Derived Other (comments)      Pt.denies     Sulfa (Sulfonamide Antibiotics) Hives and Swelling     Lips swelling    Gabapentin Diarrhea and Palpitations     Chest and Abdominal pain    Humira [Adalimumab] Other (comments)     Lupus    Influenza Virus Vaccines Hives    Iodine Itching     Takes benadryl and is OK.  Optiray 320 [Ioversol] Hives and Itching     Pt states when she gets iv contrast she itches a little from hives?  Penicillins Hives    Shrimp Other (comments)     Sodium puffy     Outpatient Medications Marked as Taking for the 6/8/22 encounter (Office Visit) with Rooney Holstein, MD   Medication Sig Dispense Refill    topiramate (TOPAMAX) 25 mg tablet One po qhs x 1 week then increase to 2 po qhs 60 Tablet 1    zolpidem (AMBIEN) 5 mg tablet Take 1 Tablet by mouth nightly. 90 Tablet 0    cyclobenzaprine (FLEXERIL) 10 mg tablet Take 1 Tablet by mouth three (3) times daily as needed for Muscle Spasm(s). 30 Tablet 2    lisinopriL (PRINIVIL, ZESTRIL) 5 mg tablet Take 1 Tablet by mouth daily. 90 Tablet 2    multivitamin (ONE A DAY) tablet Take 1 Tablet by mouth daily.  meloxicam (MOBIC) 15 mg tablet TAKE 1 TABLET DAILY 90 Tab 3    tofacitinib (XELJANZ) 5 mg tab Take  by mouth two (2) times a day.  cetirizine (ZYRTEC) 10 mg tablet Take 10 mg by mouth daily as needed.  acetaminophen (TYLENOL EXTRA STRENGTH) 500 mg tablet Take 1,000 mg by mouth every six (6) hours as needed for Pain.  cholecalciferol (VITAMIN D3) 1,000 unit tablet Take 1,000 Units by mouth five (5) days a week.  calcium carbonate (OS-BARBARA) 500 mg calcium (1,250 mg) tablet Take 1,500 mg by mouth daily.        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    H/O calcium pyrophosphate deposition disease (CPPD) Z87.39    IBS (irritable bowel syndrome) K58.9    RA (rheumatoid arthritis) ([facility]) M06.9    Sicca syndrome ([facility]) M35.00    Osteoarthritis of right hip M16.11    Pain management contract agreement Z02.89    ACP (advance care planning) Z71.89    Chronic left shoulder pain M25.512, G89.29    Osteopenia M85.80    Osteopenia of multiple sites M85.89    Osteoarthritis of left hip M16.12    Closed fracture of thoracic spine without spinal cord lesion ([facility] S22.009A    Spinal stenosis, thoracolumbar region M48.05    Lumbar spine instability M53.2X6    Spinal stenosis of lumbar region with neurogenic claudication M48.062    Spinal stenosis of lumbar region M48.061    Lumbar stenosis M48.061    Nausea R11.0    Acute bilateral thoracic back pain M54.6    Anxiety F41.9    Chest pain R07.9    Essential hypertension I10    Cataracts, bilateral H26.9    Diverticulitis K57.92    Elevated blood pressure reading without diagnosis of hypertension R03.0    Fall at home W19. Miranda , Y92.009    Insomnia G47.00    Lesion of hard palate K13.79    Lesion of neck L98.9    Numbness and tingling of right arm R20.0, R20.2    Osteoporosis M81.0    Sciatica M54.30    Sialolithiasis K11.5    Urinary retention with incomplete bladder emptying R33.9    SVT (supraventricular tachycardia) ([facility]) I47.1    Failed back syndrome, lumbosacral M96.1     Past Medical History:   Diagnosis Date    Abdominal abscess 2009    Adverse effect of anesthesia      Be careful with intubation.  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 )    Irritable bowel syndrome     Neuropathy     bilateral hands/wrist    Osteoporosis     Pneumonia     RA [facility]oid arthritis) ([facility])     Seizures ([address] 6-1-2008    one episode- after high dose of epinepherine     Social History     Socioeconomic History    Marital status:    Tobacco Use    Smoking status: Never Smoker    Smokeless tobacco: Never Used   Vaping Use    Vaping Use: Never used   Substance and Sexual Activity    Alcohol use: No    Drug use: Never    Sexual activity: Not Currently     Family History   Problem Relation Age of Onset    Other Other         Orthopedic (Sister)   Ana Pro OSTEOARTHRITIS Sister     Heart Attack Brother 58    Cancer Neg Hx     Diabetes Neg Hx     Heart Disease Neg Hx     Hypertension Neg Hx     Stroke Neg Hx     Malignant Hyperthermia Neg Hx     Pseudocholinesterase Deficiency Neg Hx     Delayed Awakening Neg Hx     Post-op Nausea/Vomiting Neg Hx     Emergence Delirium Neg Hx     Post-op Cognitive Dysfunction Neg Hx         Review of Systems   Constitutional: Negative for chills, fever, malaise/fatigue and weight loss. Eyes: Negative for blurred vision. Respiratory: Negative for cough, shortness of breath and wheezing. Cardiovascular: Negative for chest pain. Gastrointestinal: Negative for nausea and vomiting. Genitourinary: Positive for frequency and urgency. Negative for dysuria, flank pain and hematuria. Musculoskeletal: Negative for myalgias. Skin: Negative for rash. Neurological: Negative for weakness. Psychiatric/Behavioral: Negative. Vitals:    06/08/22 1000   BP: 102/70   Pulse: 70   Resp: 10   Temp: 97.5 °F (36.4 °C)   TempSrc: Tympanic   Weight: 177 lb (80.3 kg)   Height: 5' 2\" (1.575 m)   PF: 96 L/min   PainSc:   2   PainLoc: Leg       Physical Exam  Vitals and nursing note reviewed. Constitutional:       Appearance: Normal appearance. She is obese. Musculoskeletal:         General: Normal range of motion. Skin:     General: Skin is warm. Capillary Refill: Capillary refill takes less than 2 seconds. Neurological:      General: No focal deficit present. Mental Status: She is alert. Psychiatric:         Mood and Affect: Mood normal.         Behavior: Behavior normal.         Thought Content: Thought content normal.         Judgment: Judgment normal.         Assessment/Plan      ICD-10-CM ICD-9-CM    1. Urine frequency  R35.0 788.41 AMB POC URINALYSIS DIP STICK AUTO W/O MICRO      ciprofloxacin HCl (CIPRO) 500 mg tablet   2. Medicare annual wellness visit, subsequent  Z00.00 V70.0    3. Tachycardia  R00.0 785.0 TSH AND FREE T4      METABOLIC PANEL, COMPREHENSIVE      CBC WITH AUTOMATED DIFF   4. Lumbar back pain  M54.50 724.2 traMADoL (ULTRAM) 50 mg tablet     I have discussed the diagnosis with the patient and the intended plan of care as seen in the above orders. The patient has received an after-visit summary and questions were answered concerning future plans. I have discussed medication, side effects, and warnings with the patient in detail. The patient verbalized understanding and is in agreement with the plan of care. The patient will contact the office with any additional concerns.         lab results and schedule of future lab studies reviewed with patient    Raheel Scanlon MD

## 2022-06-08 NOTE — PATIENT INSTRUCTIONS
Medicare Wellness Visit, Female     The best way to live healthy is to have a lifestyle where you eat a well-balanced diet, exercise regularly, limit alcohol use, and quit all forms of tobacco/nicotine, if applicable. Regular preventive services are another way to keep healthy. Preventive services (vaccines, screening tests, monitoring & exams) can help personalize your care plan, which helps you manage your own care. Screening tests can find health problems at the earliest stages, when they are easiest to treat. [facility] follows the current, evidence-based guidelines published by the [facility] ([facility]) when recommending preventive services for our patients. Because we follow these guidelines, sometimes recommendations change over time as research supports it. (For example, mammograms used to be recommended annually. Even though Medicare will still pay for an annual mammogram, the newer guidelines recommend a mammogram every two years for women of average risk). Of course, you and your doctor may decide to screen more often for some diseases, based on your risk and your co-morbidities (chronic disease you are already diagnosed with). Preventive services for you include:  - Medicare offers their members a free annual wellness visit, which is time for you and your primary care provider to discuss and plan for your preventive service needs. Take advantage of this benefit every year!  -All adults over the age of 72 should receive the recommended pneumonia vaccines. Current USPSTF guidelines recommend a series of two vaccines for the best pneumonia protection.   -All adults should have a flu vaccine yearly and a tetanus vaccine every 10 years.   -All adults age 48 and older should receive the shingles vaccines (series of two vaccines).       -All adults age 38-68 who are overweight should have a diabetes screening test once every three years.   -All adults born between [birth year] and 1967 should be screened once for Hepatitis C.  -Other screening tests and preventive services for persons with diabetes include: an eye exam to screen for diabetic retinopathy, a kidney function test, a foot exam, and stricter control over your cholesterol.   -Cardiovascular screening for adults with routine risk involves an electrocardiogram (ECG) at intervals determined by your doctor.   -Colorectal cancer screenings should be done for adults age 54-65 with no increased risk factors for colorectal cancer. There are a number of acceptable methods of screening for this type of cancer. Each test has its own benefits and drawbacks. Discuss with your doctor what is most appropriate for you during your annual wellness visit. The different tests include: colonoscopy (considered the best screening method), a fecal occult blood test, a fecal DNA test, and sigmoidoscopy.    -A bone mass density test is recommended when a woman turns 65 to screen for osteoporosis. This test is only recommended one time, as a screening. Some providers will use this same test as a disease monitoring tool if you already have osteoporosis. -Breast cancer screenings are recommended every other year for women of normal risk, age 54-69.  -Cervical cancer screenings for women over age 72 are only recommended with certain risk factors.      Here is a list of your current Health Maintenance items (your personalized list of preventive services) with a due date:  Health Maintenance Due   Topic Date Due    Shingles Vaccine (1 of 2) Never done    COVID-19 Vaccine (3 - Booster for Dorann Ano series) 08/28/2021    Annual Well Visit  01/05/2022

## 2022-06-08 NOTE — PROGRESS NOTES
This is the Subsequent Medicare Annual Wellness Exam, performed 12 months or more after the Initial AWV or the last Subsequent AWV    I have reviewed the patient's medical history in detail and updated the computerized patient record. Assessment/Plan   Education and counseling provided:  Has ACP on file. 1. Urine frequency  -     AMB POC URINALYSIS DIP STICK AUTO W/O MICRO  -     ciprofloxacin HCl (CIPRO) 500 mg tablet; Take 1 Tablet by mouth two (2) times a day for 10 days. , Normal, Disp-20 Tablet, R-0  2. Medicare annual wellness visit, subsequent  3. Tachycardia  -     TSH AND FREE T4; Future  -     METABOLIC PANEL, COMPREHENSIVE; Future  -     CBC WITH AUTOMATED DIFF; Future  4. Lumbar back pain  -     traMADoL (ULTRAM) 50 mg tablet; Take 2 Tablets by mouth every six (6) hours as needed for Pain for up to 30 days.  Max Daily Amount: 400 mg., Normal, Disp-90 Tablet, R-1       Depression Risk Factor Screening     3 most recent PHQ Screens 6/8/2022   PHQ Not Done -   Little interest or pleasure in doing things Not at all   Feeling down, depressed, irritable, or hopeless Not at all   Total Score PHQ 2 0   Trouble falling or staying asleep, or sleeping too much Not at all   Feeling tired or having little energy Not at all   Poor appetite, weight loss, or overeating Not at all   Feeling bad about yourself - or that you are a failure or have let yourself or your family down Not at all   Trouble concentrating on things such as school, work, reading, or watching TV Not at all   Moving or speaking so slowly that other people could have noticed; or the opposite being so fidgety that others notice Not at all   Thoughts of being better off dead, or hurting yourself in some way Not at all   PHQ 9 Score 0       Alcohol & Drug Abuse Risk Screen    Do you average more than 1 drink per night or more than 7 drinks a week:  No    On any one occasion in the past three months have you have had more than 3 drinks containing alcohol:  No          Functional Ability and Level of Safety    Hearing: Hearing is good. Activities of Daily Living: The home contains: no safety equipment. Patient does total self care      Ambulation: with no difficulty     Fall Risk:  Fall Risk Assessment, last 12 mths 6/8/2022   Able to walk? Yes   Fall in past 12 months? 1   Do you feel unsteady? 0   Are you worried about falling 1   Number of falls in past 12 months -   Fall with injury?  -      Abuse Screen:  Patient is not abused       Cognitive Screening    Has your family/caregiver stated any concerns about your memory: no     Cognitive Screening: Normal - MMSE (Mini Mental Status Exam)    Health Maintenance Due     Health Maintenance Due   Topic Date Due    Shingrix Vaccine Age 49> (1 of 2) Never done    COVID-19 Vaccine (3 - Booster for Joelyn Harshil series) 08/28/2021    Medicare Yearly Exam  01/05/2022       Patient Care Team   Patient Care Team:  Arlette Wolff MD as PCP - General (Family Medicine)  Arlette Wolff MD as PCP - [facility] Empaneled Provider  Ignacio Gresham MD (Gastroenterology)  Leigha Conrad MD (Physical Medicine and Rehabilitation Physician)    History     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    H/O calcium pyrophosphate deposition disease (CPPD) Z87.39    IBS (irritable bowel syndrome) K58.9    RA (rheumatoid arthritis) ([address] M06.9    Sicca syndrome ([address] M35.00    Osteoarthritis of right hip M16.11    Pain management contract agreement Z02.89    ACP (advance care planning) Z71.89    Chronic left shoulder pain M25.512, G89.29    Osteopenia M85.80    Osteopenia of multiple sites M85.89    Osteoarthritis of left hip M16.12    Closed fracture of thoracic spine without spinal cord lesion ([address] S22.009A    Spinal stenosis, thoracolumbar region M48.05    Lumbar spine instability M53.2X6    Spinal stenosis of lumbar region with neurogenic claudication M48.062    Spinal stenosis of lumbar region M48.061    Lumbar stenosis M48.061    Nausea R11.0    Acute bilateral thoracic back pain M54.6    Anxiety F41.9    Chest pain R07.9    Essential hypertension I10    Cataracts, bilateral H26.9    Diverticulitis K57.92    Elevated blood pressure reading without diagnosis of hypertension R03.0    Fall at home W19. Nitesh Brittney, Y92.009    Insomnia G47.00    Lesion of hard palate K13.79    Lesion of neck L98.9    Numbness and tingling of right arm R20.0, R20.2    Osteoporosis M81.0    Sciatica M54.30    Sialolithiasis K11.5    Urinary retention with incomplete bladder emptying R33.9    SVT (supraventricular tachycardia) ([facility]) I47.1    Failed back syndrome, lumbosacral M96.1     Past Medical History:   Diagnosis Date    Abdominal abscess 2009    Adverse effect of anesthesia      Be careful with intubation.  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 )    Irritable bowel syndrome     Neuropathy     bilateral hands/wrist    Osteoporosis     Pneumonia     RA (rheumatoid arthritis) ([address]     Seizures ([address] 6-1-2008    one episode- after high dose of epinepherine      Past Surgical History:   Procedure Laterality Date    HX ABDOMINAL WALL DEFECT REPAIR      HX APPENDECTOMY      HX BREAST REDUCTION      HX CATARACT REMOVAL Bilateral     HX COLONOSCOPY      HX GI      repair rectal prolaspe    HX GYN      dermoid cyst    HX HEENT      tonsillectomy    HX HYSTERECTOMY  1976    HX LUMBAR FUSION      x 2 L 3-4 , L-5-6    HX LUMBAR FUSION  12/03/2018    T-8    HX ORTHOPAEDIC Bilateral     CTR    HX ORTHOPAEDIC Bilateral     arthroplasty thumbs    HX ORTHOPAEDIC Left     Ankle     HX ORTHOPAEDIC Right     \"Nerve Release Elbow\"    HX OTHER SURGICAL Left 1985    vein stripping    HX SALPINGO-OOPHORECTOMY Bilateral     HX SHOULDER REPLACEMENT Left     HX UROLOGICAL      bladder repair    [place] TOTAL HIP ARTHROPLASTY Bilateral      Current Outpatient Medications   Medication Sig Dispense Refill    topiramate (TOPAMAX) 25 mg tablet One po qhs x 1 week then increase to 2 po qhs 60 Tablet 1    zolpidem (AMBIEN) 5 mg tablet Take 1 Tablet by mouth nightly. 90 Tablet 0    cyclobenzaprine (FLEXERIL) 10 mg tablet Take 1 Tablet by mouth three (3) times daily as needed for Muscle Spasm(s). 30 Tablet 2    lisinopriL (PRINIVIL, ZESTRIL) 5 mg tablet Take 1 Tablet by mouth daily. 90 Tablet 2    multivitamin (ONE A DAY) tablet Take 1 Tablet by mouth daily.  meloxicam (MOBIC) 15 mg tablet TAKE 1 TABLET DAILY 90 Tab 3    tofacitinib (XELJANZ) 5 mg tab Take  by mouth two (2) times a day.  cetirizine (ZYRTEC) 10 mg tablet Take 10 mg by mouth daily as needed.  acetaminophen (TYLENOL EXTRA STRENGTH) 500 mg tablet Take 1,000 mg by mouth every six (6) hours as needed for Pain.  cholecalciferol (VITAMIN D3) 1,000 unit tablet Take 1,000 Units by mouth five (5) days a week.  calcium carbonate (OS-BARBARA) 500 mg calcium (1,250 mg) tablet Take 1,500 mg by mouth daily. Allergies   Allergen Reactions    Celebrex [Celecoxib] Swelling    Shellfish Derived Other (comments)      Pt.denies     Sulfa (Sulfonamide Antibiotics) Hives and Swelling     Lips swelling    Gabapentin Diarrhea and Palpitations     Chest and Abdominal pain    Humira [Adalimumab] Other (comments)     Lupus    Influenza Virus Vaccines Hives    Iodine Itching     Takes benadryl and is OK.     Optiray 320 [Ioversol] Hives and Itching     Pt states when she gets iv contrast she itches a little from hives?     Penicillins Hives    Shrimp Other (comments)     Sodium puffy       Family History   Problem Relation Age of Onset    Other Other         Orthopedic (Sister)    OSTEOARTHRITIS Sister     Heart Attack Brother 58    Cancer Neg Hx     Diabetes Neg Hx     Heart Disease Neg Hx     Hypertension Neg Hx     Stroke Neg Hx     Malignant Hyperthermia Neg Hx     Pseudocholinesterase Deficiency Neg Hx     Delayed Awakening Neg Hx     Post-op Nausea/Vomiting Neg Hx     Emergence Delirium Neg Hx     Post-op Cognitive Dysfunction Neg Hx      Social History     Tobacco Use    Smoking status: Never Smoker    Smokeless tobacco: Never Used   Substance Use Topics    Alcohol use: No         Nitesh Cerda MD

## 2022-06-09 ENCOUNTER — HOSPITAL ENCOUNTER (OUTPATIENT)
Dept: PHYSICAL THERAPY | Age: 72
Discharge: HOME | End: 2022-06-09
Attending: PHYSICAL MEDICINE & REHABILITATION
Payer: MEDICARE

## 2022-06-09 LAB
ABSOLUTE LYMPHOCYTE COUNT, 10803: 2.3 K/UL (ref 1–4.8)
BASOPHILS # BLD: 0 K/UL (ref 0–0.2)
BASOPHILS NFR BLD: 1 % (ref 0–2)
EOSINOPHIL # BLD: 0.1 K/UL (ref 0–0.5)
EOSINOPHIL NFR BLD: 3 % (ref 0–6)
ERYTHROCYTE [DISTWIDTH] IN BLOOD BY AUTOMATED COUNT: 12.6 % (ref 10–15.5)
GRANULOCYTES,GRANS: 46 % (ref 40–75)
HCT VFR BLD AUTO: 44.4 % (ref 35.1–48.3)
HGB BLD-MCNC: 14.1 G/DL (ref 11.7–16.1)
LYMPHOCYTES, LYMLT: 42 % (ref 20–45)
MCH RBC QN AUTO: 31 PG (ref 26–34)
MCHC RBC AUTO-ENTMCNC: 32 G/DL (ref 31–36)
MCV RBC AUTO: 98 FL (ref 80–99)
MONOCYTES # BLD: 0.5 K/UL (ref 0.1–1)
MONOCYTES NFR BLD: 8 % (ref 3–12)
NEUTROPHILS # BLD AUTO: 2.6 K/UL (ref 1.8–7.7)
PLATELET # BLD AUTO: 277 K/UL (ref 140–440)
PMV BLD AUTO: 10.7 FL (ref 9–13)
RBC # BLD AUTO: 4.55 M/UL (ref 3.8–5.2)
T4 FREE SERPL-MCNC: 1.3 NG/DL (ref 0.9–1.8)
TSH SERPL DL<=0.005 MIU/L-ACNC: 1.38 MCU/ML (ref 0.27–4.2)
WBC # BLD AUTO: 5.5 K/UL (ref 4–11)

## 2022-06-09 PROCEDURE — 97530 THERAPEUTIC ACTIVITIES: CPT

## 2022-06-09 PROCEDURE — 97110 THERAPEUTIC EXERCISES: CPT

## 2022-06-09 PROCEDURE — 97140 MANUAL THERAPY 1/> REGIONS: CPT

## 2022-06-09 NOTE — PROGRESS NOTES
PT DAILY TREATMENT NOTE     Patient Name: Tim Pavon  Date:2022  : 1950  [x]  Patient  Verified  Payor: [place] MEDICARE / Plan: [place] MEDICARE PART A & B / Product Type: Medicare /    In time:800  Out time:843  Total Treatment Time (min): 43  Visit #: 2 of 10    Medicare/BCBS Only   Total Timed Codes (min):  43 1:1 Treatment Time:  43       Treatment Area: Other low back pain [M54.59]  Right hip pain [M25.551]  Left hip pain [M25.552]    SUBJECTIVE  Pain Level (0-10 scale): 5/10  Any medication changes, allergies to medications, adverse drug reactions, diagnosis change, or new procedure performed?: [x] No    [] Yes (see summary sheet for update)  Subjective functional status/changes:   [] No changes reported  5/10 LBP and 4/10 Right leg, patient reports difficulty with HEP. Reports a fall with standing back extension and inability to perform other exercises. Patient states pain has increased since starting HEP and is now experiencing a feeling of \"heaviness\" in both legs     OBJECTIVE    23 min Therapeutic Exercise:  [] See flow sheet :   Rationale: increase ROM and increase strength to improve the patients ability to perform ADLs    10 min Therapeutic Activity:  []  See flow sheet :   Rationale: increase ROM, increase strength and improve coordination  to improve the patients ability to perform ADLs     10 min Manual Therapy:  STM right lx paraspinals/right upper glute   The manual therapy interventions were performed at a separate and distinct time from the therapeutic activities interventions.   Rationale: decrease pain, increase ROM, increase tissue extensibility and decrease trigger points to perform ADLs            With   [x] TE   [] TA   [] neuro   [] other: Patient Education: [x] Review HEP    [] Progressed/Changed HEP based on:   [] positioning   [] body mechanics   [] transfers   [] heat/ice application    [] other:      Other Objective/Functional Measures:   - Initiated TE per flow sheet   - TTP with tightness in the right upper glute and paraspinals  - Reports pain down the left leg with STM to the right lx paraspinals  - increased difficulty with various hip flexor stretches, supine and standing     Pain Level (0-10 scale) post treatment: 4/10    ASSESSMENT/Changes in Function: Patient participates in therapy and tolerates treatment fairly. Reports Difficulty with standing on the right leg and increased pain down the left leg with MT. Patient will continue to benefit from skilled PT services to modify and progress therapeutic interventions, address functional mobility deficits, address ROM deficits, address strength deficits, analyze and address soft tissue restrictions and analyze and cue movement patterns to attain remaining goals. []  See Plan of Care  []  See progress note/recertification  []  See Discharge Summary         Progress towards goals / Updated goals:  Short Term Goals: To be accomplished in 5 treatments:  1. Pt will be compliant and independent with HEP in order to facilitate PT sessions and aid with self management             Eval Status:  Initiated             Current Status:  2. Pt to tolerate 30 min or more of TE and/or Interventions w/o increased s/s             Eval Status:  Initiated             Current Status:     Long Term Goals: To be accomplished in 10 treatments:  1. Pt will report 50% improvement or better with back dysfunction to show a significant increase in ability to tolerate ADL             Eval Status:  Initiated             Current Status:  2. Pt will demonstrate standing back extension at less than 10* with little to no added pain for progress to upright posturing with daily activity with little to no difficulty to return to usual daily activity with little difficulty               Eval Status: Limited back Ext to -10*              Current Status:  3.   Pt will tolerate normal sitting and standing activity with little left leg pain for progress to performing usual daily activity with less discomfort               Eval Status:  Pain with sitting and full upright posturing             Current Status:  4. Pt will demonstrate stair negotiation of 10 6\" steps with no HRA and little to no difficulty for carryover to walking front steps with little difficulty and good control without HR assistance               Eval Status:  Need HR with stairs             Current Status:  5.   Pt will improve FOTO score to 44 in 15 visits to show significant improvement in function for progress to performing usual activity with little difficulty             Eval Status: FOTO = 29             Current Status:     PLAN  [x]  Upgrade activities as tolerated     [x]  Continue plan of care  []  Update interventions per flow sheet       []  Discharge due to:_  []  Other:_      Azul Omaha, PTA 6/9/2022  8:08 AM    Future Appointments   Date Time Provider Jonathan Colmenares   6/13/2022  8:00 AM Hal Masters, PT [facility]   6/15/2022  8:00 AM Hal Masters, PT [facility]   6/17/2022  8:00 AM Hal Masters, PT [facility]   6/20/2022  8:00 AM Hal Masters, PT [facility]   6/22/2022  8:00 AM Hal Masters, PT [facility]   6/24/2022  8:00 AM Hal Masters, PT [facility]   8/22/2022  9:45 AM Ole Ferris MD [facility] AMB   5/19/2023  8:15 AM Sahra Mckeon MD [facility] AMB

## 2022-06-13 ENCOUNTER — HOSPITAL ENCOUNTER (OUTPATIENT)
Dept: PHYSICAL THERAPY | Age: 72
Discharge: HOME | End: 2022-06-13
Attending: PHYSICAL MEDICINE & REHABILITATION
Payer: MEDICARE

## 2022-06-13 PROCEDURE — 97110 THERAPEUTIC EXERCISES: CPT

## 2022-06-13 NOTE — PROGRESS NOTES
PT DAILY TREATMENT NOTE     Patient Name: Vernon Cruz  Date:2022  : 1950  [x]  Patient  Verified  Payor: Donaldo Tolebrt / Plan: [place] MEDICARE PART A & B / Product Type: Medicare /    In time:0800  Out time:0850  Total Treatment Time (min): 50  Visit #: 3 of 10    Medicare/BCBS Only   Total Timed Codes (min):  50 1:1 Treatment Time:  50       Treatment Area: Other low back pain [M54.59]  Right hip pain [M25.551]  Left hip pain [M25.552]    SUBJECTIVE  Pain Level (0-10 scale): 0  Any medication changes, allergies to medications, adverse drug reactions, diagnosis change, or new procedure performed?: [x] No    [] Yes (see summary sheet for update)  Subjective functional status/changes:   [] No changes reported  No pain today. Have been on Antibiotics since last Tue. For a bladder infection. Allergic to adhesive.     OBJECTIVE      50 min Therapeutic Exercise:  [x] See flow sheet :   Rationale: increase ROM, increase strength and improve coordination to improve the patients ability to improve daily function          With   [x] TE   [] TA   [] neuro   [] other: Patient Education: [x] Review HEP    [] Progressed/Changed HEP based on:   [] positioning   [] body mechanics   [] transfers   [] heat/ice application    [] other:      Other Objective/Functional Measures:   - Initiate JOSH 10x 15\"  - Add standing Ext with back against wall  - Add Prone Lx Ext and increase reps for LE Lx Stab to 10 reps  - Stand Ext @ 1*  - Steps x3 with light HHA       Pain Level (0-10 scale) post treatment: 0    ASSESSMENT/Changes in Function:   - Good exercise participation and effort with treatment today  - Improved Lx alignment and mobility with treatment today  - Increased ease of motion   - Tight ness at the front of the right hip with exercises and stretches after treatment    Patient will continue to benefit from skilled PT services to modify and progress therapeutic interventions, address functional mobility deficits, address ROM deficits, address strength deficits, analyze and address soft tissue restrictions and analyze and cue movement patterns to attain remaining goals.      []  See Plan of Care  []  See progress note/recertification  []  See Discharge Summary         Progress towards goals / Updated goals:  Short Term Goals: To be accomplished in 5 treatments:  1.  Pt will be compliant and independent with HEP in order to facilitate PT sessions and aid with self management             Eval Status:  Initiated             Current Status:  2.  Pt to tolerate 30 min or more of TE and/or Interventions w/o increased s/s             Eval Status:  Initiated             Current Status:     Long Term Goals: To be accomplished in 10 treatments:  1.  Pt will report 50% improvement or better with back dysfunction to show a significant increase in ability to tolerate ADL             Eval Status:  Initiated             Current Status:  2.  Pt will demonstrate standing back extension at less than 10* with little to no added pain for progress to upright posturing with daily activity with little to no difficulty to return to usual daily activity with little difficulty               Eval Status: Limited back Ext to -10*              Current Status:  3.  Pt will tolerate normal sitting and standing activity with little left leg pain for progress to performing usual daily activity with less discomfort               Eval Status:  Pain with sitting and full upright posturing             Current Status:  4.  Pt will demonstrate stair negotiation of 10 6\" steps with no HRA and little to no difficulty for carryover to walking front steps with little difficulty and good control without HR assistance               Eval Status:  Need HR with stairs             Current Status: Met at 1* Ext today 6/13/22  5.  Pt will improve FOTO score to 44 in 15 visits to show significant improvement in function for progress to performing usual activity with little difficulty             Eval Status: FOTO = 29             Current Status:     PLAN  [x]  Upgrade activities as tolerated     [x]  Continue plan of care  []  Update interventions per flow sheet       []  Discharge due to:_  []  Other:_      Abdi Meadows, PT 6/13/2022  8:04 AM    Future Appointments   Date Time Provider Jonathan Dedra   6/15/2022  8:00 AM Alvah Seip, PT [facility]   6/17/2022  8:00 AM Alvah Seip, PT [facility]   6/20/2022  8:00 AM Alvah Seip, PT [facility]   6/22/2022  8:00 AM Alvah Seip, PT [facility]   6/24/2022  8:00 AM Alvah Seip, PT [facility]   8/22/2022  9:45 AM Christianne Marie MD [facility] BS AMB   5/19/2023  8:15 AM Jake Mendoza MD [facility] AMB

## 2022-06-15 ENCOUNTER — HOSPITAL ENCOUNTER (OUTPATIENT)
Dept: PHYSICAL THERAPY | Age: 72
Discharge: HOME | End: 2022-06-15
Attending: PHYSICAL MEDICINE & REHABILITATION
Payer: MEDICARE

## 2022-06-15 PROCEDURE — 97110 THERAPEUTIC EXERCISES: CPT

## 2022-06-15 PROCEDURE — 97112 NEUROMUSCULAR REEDUCATION: CPT

## 2022-06-15 NOTE — PROGRESS NOTES
PT DAILY TREATMENT NOTE     Patient Name: Lala Baum  Date:6/15/2022  : 1950  [x]  Patient  Verified  Payor: [place] MED[place] / Plan: [place] MEDICARE PART A & B / Product Type: Medicare /    In time:0800  Out JYLW:[id number]  Total Treatment Time (min): 48  Visit #: 4 of 10    Medicare/BCBS Only   Total Timed Codes (min):  48 1:1 Treatment Time:  48       Treatment Area: Other low back pain [M54.59]  Right hip pain [M25.551]  Left hip pain [M25.552]    SUBJECTIVE  Pain Level (0-10 scale): 1  Any medication changes, allergies to medications, adverse drug reactions, diagnosis change, or new procedure performed?: [x] No    [] Yes (see summary sheet for update)  Subjective functional status/changes:   [] No changes reported  The right leg feels a little gimpy.   No left leg pain    OBJECTIVE      28 min Therapeutic Exercise:  [x] See flow sheet :   Rationale: increase ROM, increase strength and improve coordination to improve the patients ability to improve daily function    8 min Therapeutic Activity:  [x]  See flow sheet :   Rationale: increase ROM, increase strength and improve coordination  to improve the patients ability to perform increased activity     12 min Neuromuscular Re-education:  []  See flow sheet :   Rationale: increase strength and improve coordination  to improve the patients ability to improve daily function          With   [x] TE   [] TA   [] neuro   [] other: Patient Education: [x] Review HEP    [] Progressed/Changed HEP based on:   [] positioning   [] body mechanics   [] transfers   [] heat/ice application    [] other:      Other Objective/Functional Measures:   - Standing back ext at 2*  - Increased reps with LE Lx Stab exercises      Pain Level (0-10 scale) post treatment: 1    ASSESSMENT/Changes in Function:   - Good exercise participation  - Improved mobility during treatment session  - Good response to treatment with increased mobility and ease of motion   - Pt amb 400' with a good pace and no difficulty       Patient will continue to benefit from skilled PT services to modify and progress therapeutic interventions, address functional mobility deficits, address ROM deficits, address strength deficits, analyze and address soft tissue restrictions and analyze and cue movement patterns to attain remaining goals.      []  See Plan of Care  []  See progress note/recertification  []  See Discharge Summary         Progress towards goals / Updated goals:  Short Term Goals: To be accomplished in 5 treatments:  1.  Pt will be compliant and independent with HEP in order to facilitate PT sessions and aid with self management             Eval Status:  Initiated             Current Status: Pt reports compliance with HEP  2.  Pt to tolerate 30 min or more of TE and/or Interventions w/o increased s/s             Eval Status:  Initiated             Current Status:     Long Term Goals: To be accomplished in 10 treatments:  1.  Pt will report 50% improvement or better with back dysfunction to show a significant increase in ability to tolerate ADL             Eval Status:  Initiated             Current Status:  2.  Pt will demonstrate standing back extension at less than 10* with little to no added pain for progress to upright posturing with daily activity with little to no difficulty to return to usual daily activity with little difficulty               Eval Status: Limited back Ext to -10*              Current Status: Met with standing back Ext at 2*  3.  Pt will tolerate normal sitting and standing activity with little left leg pain for progress to performing usual daily activity with less discomfort               Eval Status:  Pain with sitting and full upright posturing             Current Status: Pt reports little left leg pain since taking antibiotics 6/15/22  4.  Pt will demonstrate stair negotiation of 10 6\" steps with no HRA and little to no difficulty for carryover to walking front steps with little difficulty and good control without HR assistance               Eval Status:  Need HRA with stairs             Current Status: Progressing at 2 x 8 steps with light HHA 6/15/22  5.  Pt will improve FOTO score to 44 in 15 visits to show significant improvement in function for progress to performing usual activity with little difficulty             Eval Status: FOTO = 29             Current Status:     PLAN  [x]  Upgrade activities as tolerated     [x]  Continue plan of care  []  Update interventions per flow sheet       []  Discharge due to:_  []  Other:_      Binu Rockwell, PT 6/15/2022  8:07 AM    Future Appointments   Date Time Provider Jonathan Colmenares   6/17/2022  8:00 AM Vivien Sandhu, PT [facility]   6/20/2022  8:00 AM Vivien Sandhu, PT [facility]   6/22/2022  8:00 AM Vivien Sandhu, PT [facility]   6/24/2022  8:00 AM Vivien Sandhu, PT [facility]   8/22/2022  9:45 AM Abby Gallegos MD [facility] BS AMB   5/19/2023  8:15 AM Jeremiah Umaña MD [facility] BS AMB

## 2022-06-16 ENCOUNTER — APPOINTMENT (OUTPATIENT)
Dept: PHYSICAL THERAPY | Age: 72
End: 2022-06-16
Attending: PHYSICAL MEDICINE & REHABILITATION
Payer: MEDICARE

## 2022-06-17 ENCOUNTER — HOSPITAL ENCOUNTER (OUTPATIENT)
Dept: PHYSICAL THERAPY | Age: 72
Discharge: HOME | End: 2022-06-17
Attending: PHYSICAL MEDICINE & REHABILITATION
Payer: MEDICARE

## 2022-06-17 PROCEDURE — 97110 THERAPEUTIC EXERCISES: CPT

## 2022-06-17 PROCEDURE — 97530 THERAPEUTIC ACTIVITIES: CPT

## 2022-06-17 NOTE — PROGRESS NOTES
PT DAILY TREATMENT NOTE     Patient Name: Antoine Lara  Date:2022  : 1950  [x]  Patient  Verified  Payor: Joanna Devlin / Plan: [place] MEDICARE PART A & B / Product Type: Medicare /    In time:0800  Out time: 0850  Total Treatment Time (min): 50  Visit #: 5 of 10    Medicare/BCBS Only   Total Timed Codes (min):  50 1:1 Treatment Time:  50       Treatment Area: Other low back pain [M54.59]  Right hip pain [M25.551]  Left hip pain [M25.552]    SUBJECTIVE  Pain Level (0-10 scale): 0  Any medication changes, allergies to medications, adverse drug reactions, diagnosis change, or new procedure performed?: [x] No    [] Yes (see summary sheet for update)  Subjective functional status/changes:   [] No changes reported  No back pain but reports right anterior and thigh pain    OBJECTIVE        24 min Therapeutic Exercise:  [x] See flow sheet :   Rationale: increase ROM, increase strength and improve coordination to improve the patients ability to perform increased activity    10 min Therapeutic Activity:  [x]  See flow sheet :   Rationale: increase ROM, increase strength and improve coordination  to improve the patients ability to improve daily function     12 min Neuromuscular Re-education:  []  See flow sheet :   Rationale: increase strength and improve coordination  to improve the patients ability to improve daily function    4 min Manual Therapy:  Rolling right TFL/Lateral thigh   The manual therapy interventions were performed at a separate and distinct time from the therapeutic activities interventions.   Rationale: decrease pain, increase ROM, increase tissue extensibility and decrease trigger points to limit discomfort at the lateral thigh      With   [x] TE   [] TA   [] neuro   [] other: Patient Education: [x] Review HEP    [] Progressed/Changed HEP based on:   [] positioning   [] body mechanics   [] transfers   [] heat/ice application    [] other:      Other Objective/Functional Measures:   - Standing back ext at 14*  - Improved ability to perform JOSH  - - Increased time for JOSH to 30\" x 4 Reps  - VCs for LE Lx Stabilization exercise  - Stairs, up/down 2x 12 stairs     Pain Level (0-10 scale) post treatment: 0    ASSESSMENT/Changes in Function:   - Pt demo improved Low back and pelvic mobility today  - Increased ease with JOSH  - Good tolerance with increased reps and stretch time  - Pt walking with more stability    Patient will continue to benefit from skilled PT services to modify and progress therapeutic interventions, address functional mobility deficits, address ROM deficits, address strength deficits, analyze and address soft tissue restrictions and analyze and cue movement patterns to attain remaining goals.      []  See Plan of Care  []  See progress note/recertification  []  See Discharge Summary         Progress towards goals / Updated goals:  Short Term Goals: To be accomplished in 5 treatments:  1.  Pt will be compliant and independent with HEP in order to facilitate PT sessions and aid with self management             Eval Status:  Initiated             Current Status: Pt reports compliance with HEP 6/17/22  2.  Pt to tolerate 30 min or more of TE and/or Interventions w/o increased s/s             Eval Status:  Initiated             Current Status:     Long Term Goals: To be accomplished in 10 treatments:  1.  Pt will report 50% improvement or better with back dysfunction to show a significant increase in ability to tolerate ADL             Eval Status:  Initiated             Current Status:  2.  Pt will demonstrate standing back extension at less than 10* with little to no added pain for progress to upright posturing with daily activity with little to no difficulty to return to usual daily activity with little difficulty               Eval Status: Limited back Ext to -10*              Current Status: Met with standing back Ext at 14* 6/17/22  3.  Pt will tolerate normal sitting and standing activity with little left leg pain for progress to performing usual daily activity with less discomfort               Eval Status:  Pain with sitting and full upright posturing             Current Status: Pt reports little left leg pain since taking antibiotics 6/15/22  4.  Pt will demonstrate stair negotiation of 10 6\" steps with no HRA and little to no difficulty for carryover to walking front steps with little difficulty and good control without HR assistance               Eval Status:  Need HRA with stairs             Current Status: Progressing at 1x12 steps 6/17/22  5.  Pt will improve FOTO score to 44 in 15 visits to show significant improvement in function for progress to performing usual activity with little difficulty             Eval Status: FOTO = 29             Current Status:     PLAN  [x]  Upgrade activities as tolerated     [x]  Continue plan of care  []  Update interventions per flow sheet       []  Discharge due to:_  []  Other:_      Celsa Zavala, PT 6/17/2022  8:07 AM    Future Appointments   Date Time Provider Jonathan Colmenares   6/20/2022  8:00 AM Bogdan Amezquita, PT [facility]   6/22/2022  8:00 AM Bogdan Amezquita, PT [facility]   6/24/2022  8:00 AM Bogdan Amezquita, PT [facility]   8/22/2022  9:45 AM MD [facility] BS AMB   5/19/2023  8:15 AM Atif Stafford MD [facility] BS AMB

## 2022-06-20 ENCOUNTER — HOSPITAL ENCOUNTER (OUTPATIENT)
Dept: PHYSICAL THERAPY | Age: 72
Discharge: HOME | End: 2022-06-20
Attending: PHYSICAL MEDICINE & REHABILITATION
Payer: MEDICARE

## 2022-06-20 PROCEDURE — 97140 MANUAL THERAPY 1/> REGIONS: CPT

## 2022-06-20 PROCEDURE — 97110 THERAPEUTIC EXERCISES: CPT

## 2022-06-20 PROCEDURE — 97530 THERAPEUTIC ACTIVITIES: CPT

## 2022-06-20 NOTE — PROGRESS NOTES
PT DAILY TREATMENT NOTE     Patient Name: Collette Ripple  Date:2022  : 1950  [x]  Patient  Verified  Payor: Brigid White / Plan: [place] MEDICARE PART A & B / Product Type: Medicare /    In time:0802  Out TZBX:  Total Treatment Time (min): 50  Visit #: 6 of 10    Medicare/BCBS Only   Total Timed Codes (min):  40 1:1 Treatment Time:  40       Treatment Area: Other low back pain [M54.59]  Right hip pain [M25.551]  Left hip pain [M25.552]    SUBJECTIVE  Pain Level (0-10 scale): 0  Any medication changes, allergies to medications, adverse drug reactions, diagnosis change, or new procedure performed?: [x] No    [] Yes (see summary sheet for update)  Subjective functional status/changes:   [] No changes reported  Diverticulitis is acting up. Don't think I can lay on my stomach.      OBJECTIVE    Modality rationale: decrease inflammation and decrease pain to improve the patients ability to limit post treatment soreness   Min Type Additional Details    [] Estim:  []Unatt       []IFC  []Premod                        []Other:  []w/ice   []w/heat  Position:  Location:    [] Estim: []Att    []TENS instruct  []NMES                    []Other:  []w/US   []w/ice   []w/heat  Position:  Location:    []  Traction: [] Cervical       []Lumbar                       [] Prone          []Supine                       []Intermittent   []Continuous Lbs:  [] before manual  [] after manual    []  Ultrasound: []Continuous   [] Pulsed                           []1MHz   []3MHz W/cm2:  Location:    []  Iontophoresis with dexamethasone         Location: [] Take home patch   [] In clinic   10 [x]  Ice     []  heat  []  Ice massage  []  Laser   []  Anodyne Position: Supine  Location: Right TFL    []  Laser with stim  []  Other:  Position:  Location:    []  Vasopneumatic Device    []  Right     []  Left  Pre-treatment girth:  Post-treatment girth:  Measured at (location):  Pressure:       [] lo [] med [] hi   Temperature: [] lo [] med [] hi   [x] Skin assessment post-treatment:  [x]intact []redness- no adverse reaction    []redness - adverse reaction:     20 min Therapeutic Exercise:  [x] See flow sheet :   Rationale: increase ROM, increase strength and improve coordination to improve the patients ability to perform increased activity    12 min Therapeutic Activity:  [x]  See flow sheet :   Rationale: increase ROM, increase strength and improve coordination  to improve the patients ability to improve activity tolerance       8 min Manual Therapy:  Rolling right TFL/Lateral thigh   The manual therapy interventions were performed at a separate and distinct time from the therapeutic activities interventions. Rationale: decrease pain, increase ROM, increase tissue extensibility and decrease trigger points to limit discomfort at the lateral thigh            With   [x] TE   [] TA   [] neuro   [] other: Patient Education: [x] Review HEP    [] Progressed/Changed HEP based on:   [] positioning   [] body mechanics   [] transfers   [] heat/ice application    [] other:      Other Objective/Functional Measures:   - Pain at the left TFL with performing glute strengthening  - Increased back extension time to 1 min  - Increase step up to 3x10 reps  - Pt amb 500' (I) with a good pace     Pain Level (0-10 scale) post treatment: 0    ASSESSMENT/Changes in Function:   - Good tolerance with increased reps today  - - Pt amb 500' with a good pace and minor SOB  - Increased stair negotiation to 16 steps with little HHA and no other difficulty    Patient will continue to benefit from skilled PT services to modify and progress therapeutic interventions, address functional mobility deficits, address ROM deficits, address strength deficits, analyze and address soft tissue restrictions and analyze and cue movement patterns to attain remaining goals.      []  See Plan of Care  []  See progress note/recertification  []  See Discharge Summary         Progress towards goals / Updated goals:  Short Term Goals: To be accomplished in 5 treatments:  1.  Pt will be compliant and independent with HEP in order to facilitate PT sessions and aid with self management             Eval Status:  Initiated             Current Status: Pt reports compliance with HEP 6/17/22  2.  Pt to tolerate 30 min or more of TE and/or Interventions w/o increased s/s             Eval Status:  Initiated             Current Status: Met with little to no increased s/s 6/20/22     Long Term Goals: To be accomplished in 10 treatments:  1.  Pt will report 50% improvement or better with back dysfunction to show a significant increase in ability to tolerate ADL             Eval Status:  Initiated             Current Status:  2.  Pt will demonstrate standing back extension at less than 10* with little to no added pain for progress to upright posturing with daily activity with little to no difficulty to return to usual daily activity with little difficulty               Eval Status: Limited back Ext to -10*              Current Status: Met with standing back Ext at 14* 6/17/22  3.  Pt will tolerate normal sitting and standing activity with little left leg pain for progress to performing usual daily activity with less discomfort               Eval Status:  Pain with sitting and full upright posturing             Current Status: Pt reports little left leg pain since taking antibiotics 6/15/22  4.  Pt will demonstrate stair negotiation of 10 6\" steps with no HRA and little to no difficulty for carryover to walking front steps with little difficulty and good control without HR assistance               Eval Status:  Need HRA with stairs             Current Status: Met at 16 6\" steps 6/20/22  5.  Pt will improve FOTO score to 44 in 15 visits to show significant improvement in function for progress to performing usual activity with little difficulty             Eval Status: FOTO = 29             Current Status:     PLAN  [x] Upgrade activities as tolerated     [x]  Continue plan of care  []  Update interventions per flow sheet       []  Discharge due to:_  []  Other:_      Mariel Delgadillo, PT 6/20/2022  8:03 AM    Future Appointments   Date Time Provider Jonathan Colmenares   6/22/2022  8:00 AM Troy Mujica, PT [facility]   6/24/2022  8:00 AM Troy Mujica PT [facility]   8/22/2022  9:45 AM Cody Fuller MD [facility] BS AMB   5/19/2023  8:15 AM Garrett Francis MD [facility] BS AMB

## 2022-06-22 ENCOUNTER — OFFICE VISIT (OUTPATIENT)
Dept: FAMILY MEDICINE CLINIC | Age: 72
End: 2022-06-22
Payer: MEDICARE

## 2022-06-22 ENCOUNTER — APPOINTMENT (OUTPATIENT)
Dept: PHYSICAL THERAPY | Age: 72
End: 2022-06-22
Attending: PHYSICAL MEDICINE & REHABILITATION
Payer: MEDICARE

## 2022-06-22 VITALS
WEIGHT: 174 LBS | OXYGEN SATURATION: 98 % | HEART RATE: 70 BPM | DIASTOLIC BLOOD PRESSURE: 68 MMHG | SYSTOLIC BLOOD PRESSURE: 98 MMHG | BODY MASS INDEX: 31.83 KG/M2 | TEMPERATURE: 98.3 F

## 2022-06-22 DIAGNOSIS — R35.0 URINE FREQUENCY: Primary | ICD-10-CM

## 2022-06-22 PROCEDURE — G8754 DIAS BP LESS 90: HCPCS | Performed by: FAMILY MEDICINE

## 2022-06-22 PROCEDURE — G9899 SCRN MAM PERF RSLTS DOC: HCPCS | Performed by: FAMILY MEDICINE

## 2022-06-22 PROCEDURE — 1090F PRES/ABSN URINE INCON ASSESS: CPT | Performed by: FAMILY MEDICINE

## 2022-06-22 PROCEDURE — G8427 DOCREV CUR MEDS BY ELIG CLIN: HCPCS | Performed by: FAMILY MEDICINE

## 2022-06-22 PROCEDURE — G8536 NO DOC ELDER MAL SCRN: HCPCS | Performed by: FAMILY MEDICINE

## 2022-06-22 PROCEDURE — G8417 CALC BMI ABV UP PARAM F/U: HCPCS | Performed by: FAMILY MEDICINE

## 2022-06-22 PROCEDURE — 1123F ACP DISCUSS/DSCN MKR DOCD: CPT | Performed by: FAMILY MEDICINE

## 2022-06-22 PROCEDURE — G8752 SYS BP LESS 140: HCPCS | Performed by: FAMILY MEDICINE

## 2022-06-22 PROCEDURE — G8510 SCR DEP NEG, NO PLAN REQD: HCPCS | Performed by: FAMILY MEDICINE

## 2022-06-22 PROCEDURE — 99213 OFFICE O/P EST LOW 20 MIN: CPT | Performed by: FAMILY MEDICINE

## 2022-06-22 PROCEDURE — 3017F COLORECTAL CA SCREEN DOC REV: CPT | Performed by: FAMILY MEDICINE

## 2022-06-22 PROCEDURE — 1101F PT FALLS ASSESS-DOCD LE1/YR: CPT | Performed by: FAMILY MEDICINE

## 2022-06-22 RX ORDER — CIPROFLOXACIN 500 MG/1
500 TABLET ORAL 2 TIMES DAILY
Qty: 20 TABLET | Refills: 1 | Status: SHIPPED | OUTPATIENT
Start: 2022-06-22 | End: 2022-07-02

## 2022-06-22 NOTE — PATIENT INSTRUCTIONS
Frequent Urination: Care Instructions  Your Care Instructions  An urge to urinate frequently but usually passing only small amounts of urine is a common symptom of urinary problems, such as urinary tract infections. The bladder may become inflamed. This can cause the urge to urinate. You may try to urinate more often than usual to try to soothe that urge. Frequent urination also may be caused by sexually transmitted infections (STIs) or kidney stones. Or it may happen when something irritates the tube that carries urine from the bladder to the outside of the body (urethra). It may also be a sign of diabetes. The cause may be hard to find. You may need tests. Follow-up care is a key part of your treatment and safety. Be sure to make and go to all appointments, and call your doctor if you are having problems. It's also a good idea to know your test results and keep a list of the medicines you take. How can you care for yourself at home? · Drink extra water for the next day or two. This will help make the urine less concentrated. (If you have kidney, heart, or liver disease and have to limit fluids, talk with your doctor before you increase the amount of fluids you drink.)  · Avoid drinks that are carbonated or have caffeine. They can irritate the bladder. For women:  · Urinate right after you have sex. · After you go to the bathroom, wipe from front to back. · Avoid douches, bubble baths, and feminine hygiene sprays. And avoid other feminine hygiene products that have deodorants. When should you call for help? Call your doctor now or seek immediate medical care if:    · You have new symptoms, such as fever, nausea, or vomiting.     · You have new or worse symptoms of a urinary problem. For example:  ? You have blood or pus in your urine. ? You have chills or body aches. ? It hurts to urinate. ? You have groin or belly pain. ? You have pain in your back just below your rib cage (the flank area). Watch closely for changes in your health, and be sure to contact your doctor if you feel thirstier than usual.  Where can you learn more? Go to http://www.gray.com/  Enter I431 in the search box to learn more about \"Frequent Urination: Care Instructions. \"  Current as of: October 18, 2021               Content Version: 13.2  © 2006-2022 [employer]. Care instructions adapted under license by [employer] (which disclaims liability or warranty for this information). If you have questions about a medical condition or this instruction, always ask your healthcare professional. [place] [address] any warranty or liability for your use of this information.

## 2022-06-22 NOTE — PROGRESS NOTES
Pt here c/o still having frequent urination and joint pain. Pt stated the abx that was given to her for UTI helped her tremendously and would like to continue for a extended time period. 1. \"Have you been to the ER, urgent care clinic since your last visit? Hospitalized since your last visit? \" No    2. \"Have you seen or consulted any other health care providers outside of the [address] [place] [address] since your last visit? \" No     3. For patients aged 39-70: Has the patient had a colonoscopy / FIT/ Cologuard? Yes - no Care Gap present      If the patient is female:    4. For patients aged 41-77: Has the patient had a mammogram within the past 2 years? Yes - no Care Gap present      5. For patients aged 21-65: Has the patient had a pap smear?  NA - based on age or sex

## 2022-06-22 NOTE — PROGRESS NOTES
Emil Potter is a 74 y.o. female  presents for follow up   She has dysuria and frequency. No fever or chills. sxs are starting to get worse. Allergies   Allergen Reactions    Celebrex [Celecoxib] Swelling    Shellfish Derived Other (comments)      Pt.denies     Sulfa (Sulfonamide Antibiotics) Hives and Swelling     Lips swelling    Adhesive Hives, Itching and Contact Dermatitis    Gabapentin Diarrhea and Palpitations     Chest and Abdominal pain    Humira [Adalimumab] Other (comments)     Lupus    Influenza Virus Vaccines Hives    Iodine Itching     Takes benadryl and is OK.  Optiray 320 [Ioversol] Hives and Itching     Pt states when she gets iv contrast she itches a little from hives?  Penicillins Hives    Shrimp Other (comments)     Sodium puffy     Outpatient Medications Marked as Taking for the 6/22/22 encounter (Office Visit) with Eileen Benavidez MD   Medication Sig Dispense Refill    traMADoL (ULTRAM) 50 mg tablet Take 2 Tablets by mouth every six (6) hours as needed for Pain for up to 30 days. Max Daily Amount: 400 mg. 90 Tablet 1    topiramate (TOPAMAX) 25 mg tablet One po qhs x 1 week then increase to 2 po qhs 60 Tablet 1    zolpidem (AMBIEN) 5 mg tablet Take 1 Tablet by mouth nightly. 90 Tablet 0    cyclobenzaprine (FLEXERIL) 10 mg tablet Take 1 Tablet by mouth three (3) times daily as needed for Muscle Spasm(s). 30 Tablet 2    lisinopriL (PRINIVIL, ZESTRIL) 5 mg tablet Take 1 Tablet by mouth daily. 90 Tablet 2    multivitamin (ONE A DAY) tablet Take 1 Tablet by mouth daily.  meloxicam (MOBIC) 15 mg tablet TAKE 1 TABLET DAILY 90 Tab 3    tofacitinib (XELJANZ) 5 mg tab Take  by mouth two (2) times a day.  cetirizine (ZYRTEC) 10 mg tablet Take 10 mg by mouth daily as needed.  acetaminophen (TYLENOL EXTRA STRENGTH) 500 mg tablet Take 1,000 mg by mouth every six (6) hours as needed for Pain.       cholecalciferol (VITAMIN D3) 1,000 unit tablet Take 1,000 Units by mouth five (5) days a week.  calcium carbonate (OS-BARBARA) 500 mg calcium (1,250 mg) tablet Take 1,500 mg by mouth daily. 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    H/O calcium pyrophosphate deposition disease (CPPD) Z87.39    IBS (irritable bowel syndrome) K58.9    RA (rheumatoid arthritis) ([facility]) M06.9    Sicca syndrome ([facility]) M35.00    Osteoarthritis of right hip M16.11    Pain management contract agreement Z02.89    ACP (advance care planning) Z71.89    Chronic left shoulder pain M25.512, G89.29    Osteopenia M85.80    Osteopenia of multiple sites M85.89    Osteoarthritis of left hip M16.12    Closed fracture of thoracic spine without spinal cord lesion ([facility] [address] S22.009A    Spinal stenosis, thoracolumbar region M48.05    Lumbar spine instability M53.2X6    Spinal stenosis of lumbar region with neurogenic claudication M48.062    Spinal stenosis of lumbar region M48.061    Lumbar stenosis M48.061    Nausea R11.0    Acute bilateral thoracic back pain M54.6    Anxiety F41.9    Chest pain R07.9    Essential hypertension I10    Cataracts, bilateral H26.9    Diverticulitis K57.92    Elevated blood pressure reading without diagnosis of hypertension R03.0    Fall at home W19. Sandra Gold, Y92.009    Insomnia G47.00    Lesion of hard palate K13.79    Lesion of neck L98.9    Numbness and tingling of right arm R20.0, R20.2    Osteoporosis M81.0    Sciatica M54.30    Sialolithiasis K11.5    Urinary retention with incomplete bladder emptying R33.9    SVT (supraventricular tachycardia) ([facility]) I47.1    Failed back syndrome, lumbosacral M96.1     Past Medical History:   Diagnosis Date    Abdominal abscess 2009    Adverse effect of anesthesia      Be careful with intubation.  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 )    Irritable bowel syndrome     Neuropathy     bilateral hands/wrist    Osteoporosis     Pneumonia     RA (rheumatoid arthritis) (HCC)     Seizures ([facility] [address] 6-1-2008    one episode- after high dose of epinepherine     Social History     Socioeconomic History    Marital status:    Tobacco Use    Smoking status: Never Smoker    Smokeless tobacco: Never Used   Vaping Use    Vaping Use: Never used   Substance and Sexual Activity    Alcohol use: No    Drug use: Never    Sexual activity: Not Currently     Family History   Problem Relation Age of Onset    Other Other         Orthopedic (Sister)    OSTEOARTHRITIS Sister     Heart Attack Brother 58    Cancer Neg Hx     Diabetes Neg Hx     Heart Disease Neg Hx     Hypertension Neg Hx     Stroke Neg Hx     Malignant Hyperthermia Neg Hx     Pseudocholinesterase Deficiency Neg Hx     Delayed Awakening Neg Hx     Post-op Nausea/Vomiting Neg Hx     Emergence Delirium Neg Hx     Post-op Cognitive Dysfunction Neg Hx         Review of Systems   Constitutional: Negative for chills, fever, malaise/fatigue and weight loss. Eyes: Negative for blurred vision. Respiratory: Negative for cough, shortness of breath and wheezing. Cardiovascular: Negative for chest pain. Gastrointestinal: Negative for nausea and vomiting. Musculoskeletal: Negative for myalgias. Skin: Negative for rash. Neurological: Negative for weakness.        Vitals:    06/22/22 1424   BP: 98/68   Pulse: 70   Temp: 98.3 °F (36.8 °C)   TempSrc: Tympanic   SpO2: 98%   Weight: 174 lb (78.9 kg)   PainSc:   4   PainLoc: Knee       Physical Exam  Vitals and nursing note reviewed. Constitutional:       Appearance: Normal appearance. She is obese. Neck:      Thyroid: No thyromegaly. Cardiovascular:      Rate and Rhythm: Normal rate and regular rhythm. Pulses: Normal pulses. Heart sounds: Normal heart sounds. Pulmonary:      Effort: Pulmonary effort is normal.      Breath sounds: Normal breath sounds. Musculoskeletal:         General: Normal range of motion. Cervical back: Normal range of motion and neck supple. Skin:     General: Skin is warm and dry. Neurological:      General: No focal deficit present. Mental Status: She is alert and oriented to person, place, and time. Psychiatric:         Mood and Affect: Mood normal.         Behavior: Behavior normal.         Thought Content: Thought content normal.         Judgment: Judgment normal.         Assessment/Plan      ICD-10-CM ICD-9-CM    1. Urine frequency  R35.0 788.41 ciprofloxacin HCl (CIPRO) 500 mg tablet     I have discussed the diagnosis with the patient and the intended plan of care as seen in the above orders. The patient has received an after-visit summary and questions were answered concerning future plans. I have discussed medication, side effects, and warnings with the patient in detail. The patient verbalized understanding and is in agreement with the plan of care. The patient will contact the office with any additional concerns.     lab results and schedule of future lab studies reviewed with patient    Shy Jones MD

## 2022-06-24 ENCOUNTER — HOSPITAL ENCOUNTER (OUTPATIENT)
Dept: PHYSICAL THERAPY | Age: 72
Discharge: HOME | End: 2022-06-24
Attending: PHYSICAL MEDICINE & REHABILITATION
Payer: MEDICARE

## 2022-06-24 PROCEDURE — 97110 THERAPEUTIC EXERCISES: CPT

## 2022-06-24 PROCEDURE — 97112 NEUROMUSCULAR REEDUCATION: CPT

## 2022-06-24 PROCEDURE — 97530 THERAPEUTIC ACTIVITIES: CPT

## 2022-06-24 NOTE — PROGRESS NOTES
PT DAILY TREATMENT NOTE     Patient Name: Hilda Harris  Date:2022  : 1950  [x]  Patient  Verified  [place] MED[place] / Plan: [place] & B / Product Type: Medicare /    In time:0800  Out time:09  Total Treatment Time (min): 57  Visit #: 7 of 10    Medicare/BCBS Only   Total Timed Codes (min):  57 1:1 Treatment Time:  57       Treatment Area: Other low back pain [M54.59]  Right hip pain [M25.551]  Left hip pain [M25.552]    SUBJECTIVE  Pain Level (0-10 scale): 0  Any medication changes, allergies to medications, adverse drug reactions, diagnosis change, or new procedure performed?: [x] No    [] Yes (see summary sheet for update)  Subjective functional status/changes:   [] No changes reported  Right leg feels knotty and sore    OBJECTIVE    25 min Therapeutic Exercise:  [x] See flow sheet :   Rationale: increase ROM, increase strength and improve coordination to improve the patients ability to increase daily activity    18 min Therapeutic Activity:  [x]  See flow sheet :   Rationale: increase ROM, increase strength and improve coordination  to improve the patients ability to tolerate increased activity     10 min Neuromuscular Re-education:  [x]  See flow sheet :   Rationale: increase strength, improve coordination, improve balance and increase proprioception  to improve the patients ability to improve daily function    4 min Manual Therapy:  STM/DTM Left Lx paraspinal   The manual therapy interventions were performed at a separate and distinct time from the therapeutic activities interventions.   Rationale: decrease pain, increase ROM, increase tissue extensibility and decrease trigger points to limit discomfort          With   [x] TE   [x] TA   [] neuro   [] other: Patient Education: [x] Review HEP    [] Progressed/Changed HEP based on:   [] positioning   [] body mechanics   [] transfers   [] heat/ice application    [] other:      Other Objective/Functional Measures:   - Static Trunk Flx 13* Back Ext 12*  - Increase bike to Lv 2.5 for 8'  - Add ITB Str  - Ext in JOSH at 25*  - Stairs increased to 2x 4 reps  - Step up 2x10 with 1x10 no HHA  - Add TM for 6'28\"  -      Pain Level (0-10 scale) post treatment: 0    ASSESSMENT/Changes in Function:   - Pt with improved Lx mobility with increased ease  - Good response to treatment with increased activity tolerance  - TM pace 1.0 - 1.2 pt req to stop due to feeling a twist in her left low back  - - Decreased discomfort after use of MT    Patient will continue to benefit from skilled PT services to modify and progress therapeutic interventions, address functional mobility deficits, address ROM deficits, address strength deficits, analyze and address soft tissue restrictions and analyze and cue movement patterns to attain remaining goals.      []  See Plan of Care  []  See progress note/recertification  []  See Discharge Summary         Progress towards goals / Updated goals:  Short Term Goals: To be accomplished in 5 treatments:  1.  Pt will be compliant and independent with HEP in order to facilitate PT sessions and aid with self management             Eval Status:  Initiated             Current Status: Met reports compliance with HEP 6/24/22  2.  Pt to tolerate 30 min or more of TE and/or Interventions w/o increased s/s             Eval Status:  Initiated             Current Status: Met with little to no increased s/s 6/20/22     Long Term Goals: To be accomplished in 10 treatments:  1.  Pt will report 50% improvement or better with back dysfunction to show a significant increase in ability to tolerate ADL             Eval Status:  Initiated             Current Status:  2.  Pt will demonstrate standing back extension at less than -10* with little to no added pain for progress to upright posturing with daily activity with little to no difficulty to return to usual daily activity with little difficulty               Eval Status: Limited back Ext to -10*              Current Status: Met with standing back Ext at 14* 6/17/22  3.  Pt will tolerate normal sitting and standing activity with little left leg pain for progress to performing usual daily activity with less discomfort               Eval Status:  Pain with sitting and full upright posturing             Current Status: Pt reports improvement with activity but still has difficulty at the left hip region 6/24/22  4.  Pt will demonstrate stair negotiation of 10 6\" steps with no HRA and little to no difficulty for carryover to walking front steps with little difficulty and good control without HR assistance               Eval Status:  Need HRA with stairs             Current Status: Met at 2x 16 6\" steps 6/24/22  5.  Pt will improve FOTO score to 44 in 15 visits to show significant improvement in function for progress to performing usual activity with little difficulty             Eval Status: FOTO = 29             Current Status: Progressing at 40 6/24/22    PLAN  [x]  Upgrade activities as tolerated     [x]  Continue plan of care  []  Update interventions per flow sheet       []  Discharge due to:_  []  Other:_      Viri Leal, PT 6/24/2022  8:05 AM    Future Appointments   Date Time Provider Jonathan Colmenares   8/22/2022  9:45 AM Abbey Arroyo MD [facility] AMB   5/19/2023  8:15 AM Amrita Enciso MD [facility] AMB

## 2022-06-30 ENCOUNTER — APPOINTMENT (OUTPATIENT)
Dept: PHYSICAL THERAPY | Age: 72
End: 2022-06-30
Attending: PHYSICAL MEDICINE & REHABILITATION
Payer: MEDICARE

## 2022-07-01 ENCOUNTER — HOSPITAL ENCOUNTER (OUTPATIENT)
Dept: PHYSICAL THERAPY | Age: 72
Discharge: HOME | End: 2022-07-01
Attending: PHYSICAL MEDICINE & REHABILITATION
Payer: MEDICARE

## 2022-07-01 PROCEDURE — 97530 THERAPEUTIC ACTIVITIES: CPT

## 2022-07-01 PROCEDURE — 97110 THERAPEUTIC EXERCISES: CPT

## 2022-07-01 NOTE — PROGRESS NOTES
PT DAILY TREATMENT NOTE     Patient Name: Rosario Montelongo  Date:2022  : 1950  [x]  Patient  Verified  Payor: [place] MEDICARE / Plan: [place] MEDICARE PART A & B / Product Type: Medicare /    In time:0800  Out INVW:[id number]  Total Treatment Time (min): 47  Visit #: 8 of 10    Medicare/BCBS Only   Total Timed Codes (min):  47 1:1 Treatment Time:  47       Treatment Area: Other low back pain [M54.59]  Right hip pain [M25.551]  Left hip pain [M25.552]    SUBJECTIVE  Pain Level (0-10 scale): 0  Any medication changes, allergies to medications, adverse drug reactions, diagnosis change, or new procedure performed?: [x] No    [] Yes (see summary sheet for update)  Subjective functional status/changes:   [] No changes reported  Doing better. No pain today    OBJECTIVE     27 min Therapeutic Exercise:  [x] See flow sheet :   Rationale: increase ROM, increase strength and improve coordination to improve the patients ability to perform increased activity    20 min Therapeutic Activity:  []  See flow sheet :   Rationale: increase ROM, increase strength and improve coordination  to improve the patients ability to improve activity tolerace        With   [x] TE   [] TA   [x] neuro   [] other: Patient Education: [x] Review HEP    [] Progressed/Changed HEP based on:   [] positioning   [] body mechanics   [] transfers   [] heat/ice application    [] other:      Other Objective/Functional Measures:   - Static trunk flx at 3* Ext 18*     Pain Level (0-10 scale) post treatment: 0    ASSESSMENT/Changes in Function:   - Good response to treatment with improved trunk mobility and increased ease of upright posturing    Patient will continue to benefit from skilled PT services to modify and progress therapeutic interventions, address functional mobility deficits, address ROM deficits, address strength deficits, analyze and address soft tissue restrictions and analyze and cue movement patterns to attain remaining goals.      []  See Plan of Care  []  See progress note/recertification  []  See Discharge Summary         Progress towards goals / Updated goals:  Short Term Goals: To be accomplished in 5 treatments:  1.  Pt will be compliant and independent with HEP in order to facilitate PT sessions and aid with self management             Eval Status:  Initiated             Current Status: Met reports compliance with HEP 6/24/22  2.  Pt to tolerate 30 min or more of TE and/or Interventions w/o increased s/s             Eval Status:  Initiated             Current Status: Met with little to no increased s/s 6/20/22     Long Term Goals: To be accomplished in 10 treatments:  1.  Pt will report 50% improvement or better with back dysfunction to show a significant increase in ability to tolerate ADL             Eval Status:  Initiated             Current Status:  2.  Pt will demonstrate standing back extension at less than -10* with little to no added pain for progress to upright posturing with daily activity with little to no difficulty to return to usual daily activity with little difficulty               Eval Status: Limited back Ext to -10*              Current Status: Met with standing back Ext at 18* 7/1/22  3.  Pt will tolerate normal sitting and standing activity with little left leg pain for progress to performing usual daily activity with less discomfort               Eval Status:  Pain with sitting and full upright posturing             Current Status: Continued discomfort of 1-2 at the [facility] region 7/1/22  4.  Pt will demonstrate stair negotiation of 10 6\" steps with no HRA and little to no difficulty for carryover to walking front steps with little difficulty and good control without HR assistance               Eval Status:  Need HRA with stairs             Current Status: Met at 2x 16 6\" steps 6/24/22  5.  Pt will improve FOTO score to 44 in 15 visits to show significant improvement in function for progress to performing usual activity with little difficulty             Eval Status: FOTO = 29             Current Status: Progressing at 40 6/24/22    PLAN  [x]  Upgrade activities as tolerated     [x]  Continue plan of care  []  Update interventions per flow sheet       []  Discharge due to:_  []  Other:_      Hari Reich, PT 7/1/2022  8:25 AM    Future Appointments   Date Time Provider Jonathan Colmenares   7/6/2022  8:00 AM Jose Gallardo, PT [facility]   7/13/2022  8:00 AM Jose Gallardo, PT [facility]   7/15/2022  8:45 AM Jose Gallardo, PT [facility]   7/20/2022  8:00 AM Jose Gallardo, PT [facility]   7/22/2022  8:45 AM Jose Gallardo, PT [facility]   8/22/2022  9:45 AM Glenna Wagner MD [facility] AMB   5/19/2023  8:15 AM Cheyanne Butt MD [facility] AMB

## 2022-07-01 NOTE — PROGRESS NOTES
[facility] at [address] [facility], [facility]  Phone: [phone]      Fax:  [phone]    Continued Plan of Care/ Re-certification for Physical Therapy Services    Patient name: Kaur Crespo Start of Care: 2022   Referral source: Davey Lovelace MD : 1950               Medical Diagnosis: Other low back pain [M54.59]  Right hip pain [M25.551]  Left hip pain [M25.552]  Payor: [place] MEDICARE / Plan: [place] MEDICARE PART A & B / Product Type: Medicare /  Onset Date:  On or about 22               Treatment Diagnosis: Low back pain, Right Hip and leg pain   Prior Hospitalization: see medical history Provider#: [id number]   Medications: Verified on Patient summary List   Comorbidities: OP, OA, HTN, Scoliosis  Prior Level of Function: Able to perform usual activity with pain but in less pain than at this poinnt    Visits from Start of Care: 8    Missed Visits: 0    The Plan of Care and following information is based on the patient's current status:  Progress towards goals / Updated goals:  Short Term Goals: To be accomplished in 5 treatments:  1.  Pt will be compliant and independent with HEP in order to facilitate PT sessions and aid with self management             Eval Status:  Initiated             Current Status: Met reports compliance with HEP  2.  Pt to tolerate 30 min or more of TE and/or Interventions w/o increased s/s             Eval Status:  Initiated             Current Status: Met with little to no increased s/s      Long Term Goals: To be accomplished in 10 treatments:  1.  Pt will report 50% improvement or better with back dysfunction to show a significant increase in ability to tolerate ADL             Eval Status:  Initiated             Current Status:  2.  Pt will demonstrate standing back extension at less than -10* with little to no added pain for progress to upright posturing with daily activity with little to no difficulty to return to usual daily activity with little difficulty               Eval Status: Limited back Ext to -10*              Current Status: Met with standing back Ext at 18*  3.  Pt will tolerate normal sitting and standing activity with little left leg pain for progress to performing usual daily activity with less discomfort               Eval Status:  Pain with sitting and full upright posturing             Current Status: Continued discomfort of 1-2 at the [facility] region   4.  Pt will demonstrate stair negotiation of 10 6\" steps with no HRA and little to no difficulty for carryover to walking front steps with little difficulty and good control without HR assistance               Eval Status:  Need HRA with stairs             Current Status: Met at 2x 16 6\"   5.  Pt will improve FOTO score to 44 in 15 visits to show significant improvement in function for progress to performing usual activity with little difficulty             Eval Status: FOTO = 29             Current Status: Progressing at 40     Key functional changes: Patient has shown good progress with this treatment program. Pain as of last visit was 0/10. Patient has shown decreased pain and increased strength, flexibility and mobility of the low back. Patient reports improvement with overall involvement. FOTO score is 40. All STG/LTGs achieved as identified above.     Fall Risk Assessment: Patient demonstrates no Fall Risk       Problems/ barriers to goal attainment: None     Problem List: pain affecting function, decrease ROM, decrease strength, impaired gait/ balance, decrease ADL/ functional abilitiies, decrease activity tolerance, decrease flexibility/ joint mobility and decrease transfer abilities    Treatment Plan: Therapeutic exercise, Therapeutic activities, Neuromuscular re-education, Physical agent/modality, Gait/balance training, Manual therapy, Patient education, Self Care training, Functional mobility training, Home safety training and Stair training     Patient Goal (s) has been updated and includes: \"Walking better when going down stairs\"     Goals for this certification period to be accomplished in 10 treatments:  1.  Pt will report 50% improvement or better with back dysfunction to show a significant increase in ability to tolerate ADL               Status at last note/certification: Initiated   Current Status:               2. Pt will tolerate normal sitting and standing activity with little left leg pain for progress to performing usual daily activity with less discomfort                  Status at last note/certification: Continued discomfort of 1-2 at the [facility] region    Current Status:            4.  Pt will demonstrate stair negotiation of 24 6\" steps with no HRA and little to no difficulty for carryover to walking front steps with little difficulty and good control without HR assistance                 Status at last note/certification: Progressing at 2x20 steps   Current Status:             5.  Pt will improve FOTO score to 44 in 15 visits to show significant improvement in function for progress to performing usual activity with little difficulty               Status at last note/certification: Progressing at 40   Current Status:               Frequency / Duration: Patient to be seen 2-3 times per week for 10 treatments:    Assessment / Recommendations:Patient is showing improved function with her current treatment program but it is not sustained to this point. Patient will continue to benefit from skilled PT to address impairments and continue to improve functional mobility and tolerance to daily activity. Certification Period: 7/6/22 - 8/4/22    Corby Doty, PT 7/1/2022 5:17 PM    ________________________________________________________________________  I certify that the above Therapy Services are being furnished while the patient is under my care.  I agree with the treatment plan and certify that this therapy is necessary. [] I have read the above and request that my patient continue as recommended.   [] I have read the above report and request that my patient continue therapy with the following changes/special instructions: ______________________________________  [] I have read the above report and request that my patient be discharged from therapy    Physician's Signature:____________Date:_________TIME:________     Manuela Jean MD  ** Signature, Date and Time must be completed for valid certification **    Please sign and return to [facility] at [address] [facility].[place] [address] [place] [address] S. ECounts include 234 beds at the [facility]  Phone: [phone]      Fax:  [phone]

## 2022-07-06 ENCOUNTER — HOSPITAL ENCOUNTER (OUTPATIENT)
Dept: PHYSICAL THERAPY | Age: 72
Discharge: HOME | End: 2022-07-06
Attending: PHYSICAL MEDICINE & REHABILITATION
Payer: MEDICARE

## 2022-07-06 PROCEDURE — 97530 THERAPEUTIC ACTIVITIES: CPT

## 2022-07-06 PROCEDURE — 97110 THERAPEUTIC EXERCISES: CPT

## 2022-07-06 PROCEDURE — 97140 MANUAL THERAPY 1/> REGIONS: CPT

## 2022-07-06 NOTE — PROGRESS NOTES
PT DAILY TREATMENT NOTE     Patient Name: Ary Akins  Date:2022  : 1950  [x]  Patient  Verified  Payor: VA MEDICARE / Plan: VA MEDICARE PART A & B / Product Type: Medicare /    In time:0803  Out YFHT:121  Total Treatment Time (min): 44  Visit #: 1 of 10    Medicare/BCBS Only   Total Timed Codes (min):  44 1:1 Treatment Time:  44       Treatment Area: Other low back pain [M54.59]  Right hip pain [M25.551]  Left hip pain [M25.552]    SUBJECTIVE  Pain Level (0-10 scale): 2-3  Any medication changes, allergies to medications, adverse drug reactions, diagnosis change, or new procedure performed?: [x] No    [] Yes (see summary sheet for update)  Subjective functional status/changes:   [] No changes reported  Right anterior hip pain with standing and walking. Some soreness in the lower back. Noticed increased discomfort when she came off the antibiotics.      OBJECTIVE    Modality rationale: decrease inflammation, decrease pain and increase tissue extensibility to improve the patients ability to improve daily activity   Min Type Additional Details    [] Estim:  []Unatt       []IFC  []Premod                        []Other:  []w/ice   []w/heat  Position:  Location:    [] Estim: []Att    []TENS instruct  []NMES                    []Other:  []w/US   []w/ice   []w/heat  Position:  Location:    []  Traction: [] Cervical       []Lumbar                       [] Prone          []Supine                       []Intermittent   []Continuous Lbs:  [] before manual  [] after manual   8 [x]  Ultrasound: [x]Continuous   [] Pulsed                           [x]1MHz   []3MHz W/cm2: 1.7  Location: Right TFL    []  Iontophoresis with dexamethasone         Location: [] Take home patch   [] In clinic    []  Ice     []  heat  []  Ice massage  []  Laser   []  Anodyne Position:  Location:    []  Laser with stim  []  Other:  Position:  Location:    []  Vasopneumatic Device    []  Right     []  Left  Pre-treatment girth:  Post-treatment girth:  Measured at (location):  Pressure:       [] lo [] med [] hi   Temperature: [] lo [] med [] hi   [x] Skin assessment post-treatment:  [x]intact []redness- no adverse reaction    []redness - adverse reaction:     10 min Therapeutic Exercise:  [x] See flow sheet :   Rationale: increase ROM, increase strength and improve coordination to improve the patients ability to perform increased activity    16 min Therapeutic Activity:  [x]  See flow sheet :   Rationale: increase ROM, increase strength, improve coordination, improve balance and increase proprioception  to improve the patients ability to improve activity tolerance     10 min Manual Therapy:  STM/Effleurage right TFL   The manual therapy interventions were performed at a separate and distinct time from the therapeutic activities interventions. Rationale: decrease pain, increase ROM, increase tissue extensibility and decrease trigger points to improve walking toleranc          With   [x] TE   [] TA   [] neuro   [] other: Patient Education: [x] Review HEP    [] Progressed/Changed HEP based on:   [] positioning   [] body mechanics   [] transfers   [] heat/ice application    [] other:      Other Objective/Functional Measures:   - Change Hip Ext Str at wall to 2 min ea x2  - Increased stairs to 24 6\" Steps     Pain Level (0-10 scale) post treatment: 1-2    ASSESSMENT/Changes in Function:   - Good upright posturing  - Good response to treatment of MT and US at the right hip region with decreased discomfort after treatment    Patient will continue to benefit from skilled PT services to modify and progress therapeutic interventions, address functional mobility deficits, address ROM deficits, address strength deficits, analyze and address soft tissue restrictions and analyze and cue movement patterns to attain remaining goals.      []  See Plan of Care  []  See progress note/recertification  []  See Discharge Summary         Progress towards goals / Updated goals:  Goals for this certification period to be accomplished in 10 treatments:  1.  Pt will report 50% improvement or better with back dysfunction to show a significant increase in ability to tolerate ADL                        Status at last note/certification: Initiated             Current Status:               2.   Pt will tolerate normal sitting and standing activity with little left leg pain for progress to performing usual daily activity with less discomfort                          Status at last note/certification: Continued discomfort of 1-2 at the [facility] region              Current Status:            4.  Pt will demonstrate stair negotiation of 24 6\" steps with no HRA and little to no difficulty for carryover to walking front steps with little difficulty and good control without HR assistance                          Status at last note/certification: Progressing at 2x20 steps             Current Status: Progressing at 2x24 steps with minor HHA 7/6/22             5.  Pt will improve FOTO score to 44 in 15 visits to show significant improvement in function for progress to performing usual activity with little difficulty                        Status at last note/certification: Progressing at 40              Current Status:               PLAN  [x]  Upgrade activities as tolerated     [x]  Continue plan of care  []  Update interventions per flow sheet       []  Discharge due to:_  []  Other:_      Michael Beltre, PT 7/6/2022  8:03 AM    Future Appointments   Date Time Provider Jonathan Colmenares   7/13/2022  8:00 AM Marleny Boss, PT [facility]   7/15/2022  8:45 AM Marleny Boss, PT [facility]   7/20/2022  8:00 AM Marleny Boss, PT [facility]   7/22/2022  8:45 AM Marleny Boss, PT [facility]   8/22/2022  9:45 AM Jie De Leon MD [facility] AMB   5/19/2023  8:15 AM Mahalia Kanner, MD [facility] AMB

## 2022-07-13 ENCOUNTER — HOSPITAL ENCOUNTER (OUTPATIENT)
Dept: PHYSICAL THERAPY | Age: 72
Discharge: HOME | End: 2022-07-13
Attending: PHYSICAL MEDICINE & REHABILITATION
Payer: MEDICARE

## 2022-07-13 PROCEDURE — 97110 THERAPEUTIC EXERCISES: CPT

## 2022-07-13 PROCEDURE — 97140 MANUAL THERAPY 1/> REGIONS: CPT

## 2022-07-13 PROCEDURE — 97530 THERAPEUTIC ACTIVITIES: CPT

## 2022-07-13 NOTE — PROGRESS NOTES
PT DAILY TREATMENT NOTE     Patient Name: Shyla Graff  Date:2022  : 1950  [x]  Patient  Verified  Payor: [facility] / Plan: [place] MEDICARE PART A & B / Product Type: Medicare /    In SBPN:[id number]  Out XUPU:  Total Treatment Time (min): 47  Visit #: 2 of 10    Medicare/BCBS Only   Total Timed Codes (min):  47 1:1 Treatment Time:  47       Treatment Area: Other low back pain [M54.59]  Right hip pain [M25.551]  Left hip pain [M25.552]    SUBJECTIVE  Pain Level (0-10 scale): 4  Any medication changes, allergies to medications, adverse drug reactions, diagnosis change, or new procedure performed?: [x] No    [] Yes (see summary sheet for update)  Subjective functional status/changes:   [] No changes reported  Pain is getting worse since coming off the antibiotics. Pain at the left low back and right hip is keeping her up at night. OBJECTIVE    Modality rationale: decrease inflammation, decrease pain and increase tissue extensibility to improve the patients ability to improve daily activity   Min Type Additional Details      []? Estim:  []? Unatt       []? IFC  []? Premod                        []?Other:  []?w/ice   []?w/heat  Position:  Location:      []? Estim: []? Att    []? TENS instruct  []? NMES                    []?Other:  []?w/US   []?w/ice   []?w/heat  Position:  Location:      []? Traction: []? Cervical       []? Lumbar                       []? Prone          []? Supine                       []?Intermittent   []? Continuous Lbs:  []? before manual  []? after manual    10 [x]? Ultrasound: [x]? Continuous   []? Pulsed                           [x]? 1MHz   []? 3MHz W/cm2: 1.7  Location: Right TFL, glute, left glute      []? Iontophoresis with dexamethasone         Location: []? Take home patch   []? In clinic      []? Ice     []?  heat  []? Ice massage  []? Laser   []? Anodyne Position:  Location:      []? Laser with stim  []? Other:  Position:  Location:      []?   Vasopneumatic Device []?  Right     []? Left  Pre-treatment girth:  Post-treatment girth:  Measured at (location):  Pressure:       []? lo []? med []? hi   Temperature: []? lo []? med []? hi    [x]? Skin assessment post-treatment:  [x]? intact []? redness- no adverse reaction    19 min Therapeutic Exercise:  [x]? See flow sheet :   Rationale: increase ROM, increase strength and improve coordination to improve the patients ability to perform increased activity     8 min Therapeutic Activity:  [x]? See flow sheet :   Rationale: increase ROM, increase strength, improve coordination, improve balance and increase proprioception  to improve the patients ability to improve activity tolerance     10 min Manual Therapy:  STM/Effleurage right TFL   The manual therapy interventions were performed at a separate and distinct time from the therapeutic activities interventions. Rationale: decrease pain, increase ROM, increase tissue extensibility and decrease trigger points to improve walking toleranc          With   [x] TE   [] TA   [] neuro   [] other: Patient Education: [x] Review HEP    [] Progressed/Changed HEP based on:   [] positioning   [] body mechanics   [] transfers   [] heat/ice application    [] other:      Other Objective/Functional Measures:   - Fwd flx posture in standing and walking  - Bike for 8 min with no difficulty  - 20* static Flx in standing  - Back Ext to N  - 15* Static Flx after trunk ext str     Pain Level (0-10 scale) post treatment: 3    ASSESSMENT/Changes in Function:   - Good response to treatment with improved trunk ext after perform Lx ext str at wall  - Improved static trunk flx to 8* and Lx Ext to 6*      Patient will continue to benefit from skilled PT services to modify and progress therapeutic interventions, address functional mobility deficits, address ROM deficits, address strength deficits, analyze and address soft tissue restrictions and analyze and cue movement patterns to attain remaining goals.      [] See Plan of Care  []  See progress note/recertification  []  See Discharge Summary         Progress towards goals / Updated goals:  Goals for this certification period to be accomplished in 10 treatments:  1.  Pt will report 50% improvement or better with back dysfunction to show a significant increase in ability to tolerate ADL                        Status at last note/certification: Initiated             Current Status:               2.  Pt will tolerate normal sitting and standing activity with little left leg pain for progress to performing usual daily activity with less discomfort                          WBZHP at last note/certification: Continued discomfort of 1-2 at the [facility] region              Current Status: Less ability for sitting and standing over the last few days 7/13/22           4.  Pt will demonstrate stair negotiation of 24 6\" steps with no HRA and little to no difficulty for carryover to walking front steps with little difficulty and good control without HR assistance                          Status at last note/certification: Progressing at 2x20 steps             Current Status: Progressing at 2x24 steps with minor HHA 7/6/22             5.  Pt will improve FOTO score to 44 in 15 visits to show significant improvement in function for progress to performing usual activity with little difficulty                        Status at last note/certification: Progressing at 40              Current Status:               PLAN  [x]  Upgrade activities as tolerated     [x]  Continue plan of care  []  Update interventions per flow sheet       []  Discharge due to:_  []  Other:_      Kristina De La Garza, PT 7/13/2022  8:07 AM    Future Appointments   Date Time Provider Jonathan Colmenares   7/15/2022  8:45 AM Delano Lal, PT [facility]   7/20/2022  8:00 AM Delano Lal PT [facility]   7/22/2022  8:45 AM Delano Lal PT [facility]   8/22/2022  9:45 AM Lucrecia Koroma MD [facility] AMB 5/19/2023  8:15 AM Felix Sandhu MD [facility] AMB

## 2022-07-15 ENCOUNTER — APPOINTMENT (OUTPATIENT)
Dept: PHYSICAL THERAPY | Age: 72
End: 2022-07-15
Attending: PHYSICAL MEDICINE & REHABILITATION
Payer: MEDICARE

## 2022-07-20 ENCOUNTER — HOSPITAL ENCOUNTER (OUTPATIENT)
Dept: PHYSICAL THERAPY | Age: 72
Discharge: HOME | End: 2022-07-20
Attending: PHYSICAL MEDICINE & REHABILITATION
Payer: MEDICARE

## 2022-07-20 PROCEDURE — 97140 MANUAL THERAPY 1/> REGIONS: CPT

## 2022-07-20 PROCEDURE — 97035 APP MDLTY 1+ULTRASOUND EA 15: CPT

## 2022-07-20 PROCEDURE — 97110 THERAPEUTIC EXERCISES: CPT

## 2022-07-20 NOTE — PROGRESS NOTES
PT DAILY TREATMENT NOTE     Patient Name: Phan Spicer  Date:2022  : 1950  [x]  Patient  Verified  Payor: [place] MEDICARE / Plan: [place] MEDICARE PART A & B / Product Type: Medicare /    In time:0802  Out PYUX:527  Total Treatment Time (min): 54  Visit #: 3 of 10    Medicare/BCBS Only   Total Timed Codes (min):  54 1:1 Treatment Time:  54       Treatment Area:  Other low back pain [M54.59]  Right hip pain [M25.551]  Left hip pain [M25.552]    SUBJECTIVE  Pain Level (0-10 scale): 3  Any medication changes, allergies to medications, adverse drug reactions, diagnosis change, or new procedure performed?: [x] No    [] Yes (see summary sheet for update)  Subjective functional status/changes:   [] No changes reported  Having increased pain at the right TFL region    OBJECTIVE    Modality rationale: decrease inflammation, decrease pain, and increase tissue extensibility to improve the patients ability to tolerate increased activity   Min Type Additional Details    [] Estim:  []Unatt       []IFC  []Premod                        []Other:  []w/ice   []w/heat  Position:  Location:    [] Estim: []Att    []TENS instruct  []NMES                    []Other:  []w/US   []w/ice   []w/heat  Position:  Location:    []  Traction: [] Cervical       []Lumbar                       [] Prone          []Supine                       []Intermittent   []Continuous Lbs:  [] before manual  [] after manual   8 [x]  Ultrasound: [x]Continuous   [] Pulsed                           [x]1MHz   []3MHz W/cm2: 1.7  Location: Right TFL    []  Iontophoresis with dexamethasone         Location: [] Take home patch   [] In clinic    []  Ice     []  heat  []  Ice massage  []  Laser   []  Anodyne Position:  Location:    []  Laser with stim  []  Other:  Position:  Location:    []  Vasopneumatic Device    []  Right     []  Left  Pre-treatment girth:  Post-treatment girth:  Measured at (location):  Pressure:       [] lo [] med [] hi   Temperature: [] lo [] med [] hi   [x] Skin assessment post-treatment:  [x]intact []redness- no adverse reaction    []redness - adverse reaction:   36 min Therapeutic Exercise:  [x] See flow sheet :   Rationale: increase ROM, increase strength, and improve coordination to improve the patients ability to improve activity tolerance  10 min Manual Therapy:  STM/DTM/TPR Right TFL   The manual therapy interventions were performed at a separate and distinct time from the therapeutic activities interventions. Rationale: decrease pain, increase ROM, increase tissue extensibility, and decrease trigger points to improve daily activity          With   [x] TE   [] TA   [] neuro   [] other: Patient Education: [x] Review HEP    [] Progressed/Changed HEP based on:   [] positioning   [] body mechanics   [] transfers   [] heat/ice application    [] other:      Other Objective/Functional Measures:   - Static 20* Flx in standing  - AROM Back Ext 3* with Right TFL pain  - Wall Back Ext Str with good tolerance     Pain Level (0-10 scale) post treatment: 3    ASSESSMENT/Changes in Function:   - Improved upright posturing after treatment    Patient will continue to benefit from skilled PT services to modify and progress therapeutic interventions, address functional mobility deficits, address ROM deficits, address strength deficits, analyze and address soft tissue restrictions, and analyze and cue movement patterns to attain remaining goals. []  See Plan of Care  []  See progress note/recertification  []  See Discharge Summary         Progress towards goals / Updated goals:  Goals for this certification period to be accomplished in 10 treatments:  1.   Pt will report 50% improvement or better with back dysfunction to show a significant increase in ability to tolerate ADL                        Status at last note/certification: Initiated             Current Status:               2.  Pt will tolerate normal sitting and standing activity with little left leg pain for progress to performing usual daily activity with less discomfort                          Status at last note/certification: Continued discomfort of 1-2 at the [facility] region              Current Status: Continues to have Less ability for sitting and standing over the last few days 7/20/22           4.   Pt will demonstrate stair negotiation of 24 6\" steps with no HRA and little to no difficulty for carryover to walking front steps with little difficulty and good control without HR assistance                          Status at last note/certification: Progressing at 2x20 steps             Current Status: Progressing at 2x24 steps with minor HHA 7/6/22             5.  Pt will improve FOTO score to 44 in 15 visits to show significant improvement in function for progress to performing usual activity with little difficulty                        Status at last note/certification: Progressing at 40              Current Status:               PLAN  [x]  Upgrade activities as tolerated     [x]  Continue plan of care  []  Update interventions per flow sheet       []  Discharge due to:_  []  Other:_      Emerita Contreras, PT 7/20/2022  8:12 AM    Future Appointments   Date Time Provider Jonathan Colmenares   7/22/2022  8:45 AM Eric Vieira, PT [facility]   8/22/2022  9:45 AM David Friedman MD [facility] BS AMB   5/19/2023  8:15 AM Jose A Quan MD [facility] BS AMB

## 2022-07-22 ENCOUNTER — HOSPITAL ENCOUNTER (OUTPATIENT)
Dept: PHYSICAL THERAPY | Age: 72
Discharge: HOME | End: 2022-07-22
Attending: PHYSICAL MEDICINE & REHABILITATION
Payer: MEDICARE

## 2022-07-22 PROCEDURE — 97112 NEUROMUSCULAR REEDUCATION: CPT

## 2022-07-22 PROCEDURE — 97035 APP MDLTY 1+ULTRASOUND EA 15: CPT

## 2022-07-22 PROCEDURE — 97110 THERAPEUTIC EXERCISES: CPT

## 2022-07-22 NOTE — PROGRESS NOTES
PT DAILY TREATMENT NOTE     Patient Name: Miladys eDan  Date:2022  : 1950  [x]  Patient  Verified  Payor: Radha Sewellors / Plan: VA MEDICARE PART A & B / Product Type: Medicare /    In [id number]  Out time:930  Total Treatment Time (min): 38  Visit #: 4 of 10    Medicare/BCBS Only   Total Timed Codes (min):  38 1:1 Treatment Time:  38       Treatment Area: Other low back pain [M54.59]  Right hip pain [M25.551]  Left hip pain [M25.552]    SUBJECTIVE  Pain Level (0-10 scale): 2-3  Any medication changes, allergies to medications, adverse drug reactions, diagnosis change, or new procedure performed?: [x] No    [] Yes (see summary sheet for update)  Subjective functional status/changes:   [] No changes reported  Continues to have discomfort and will be seeing an MD on Monday about an infection in the body. Exercises that you have shown me are working well. Pt reports 65% improvement with overall function. Walking is getting easier. Tape worked well on the ankle    OBJECTIVE    24 min Therapeutic Exercise:  [x] See flow sheet :   Rationale: increase ROM, increase strength, and improve coordination to improve the patients ability to improve activity tolerance  4 min Manual Therapy:  KT I-Strip for spacing at the Right anterior ligament and TFL region     The manual therapy interventions were performed at a separate and distinct time from the therapeutic activities interventions.   Rationale: decrease pain, increase ROM, increase tissue extensibility, and decrease trigger points to improve daily activity  10 min Neuromuscular Re-education:  []  See flow sheet :   Rationale: increase strength and improve coordination  to improve the patients ability to perform increased activity                                                       With   [x] TE   [] TA   [x] neuro   [] other: Patient Education: [x] Review HEP    [] Progressed/Changed HEP based on:   [] positioning   [] body mechanics   [] transfers [] heat/ice application    [] other:      Other Objective/Functional Measures:   - Minor static trunk flexion in standing  - Stair negotiation 2x4      Pain Level (0-10 scale) post treatment: 2    ASSESSMENT/Changes in Function:   - Improved motion with all exercises    Patient will continue to benefit from skilled PT services to modify and progress therapeutic interventions, address functional mobility deficits, address ROM deficits, address strength deficits, analyze and address soft tissue restrictions, and analyze and cue movement patterns to attain remaining goals. []  See Plan of Care  []  See progress note/recertification  []  See Discharge Summary         Progress towards goals / Updated goals:  Goals for this certification period to be accomplished in 10 treatments:  1. Pt will report 50% improvement or better with back dysfunction to show a significant increase in ability to tolerate ADL                        Status at last note/certification: Initiated             Current Status: 65% improvement noted 7/22/22              2.  Pt will tolerate normal sitting and standing activity with little left leg pain for progress to performing usual daily activity with less discomfort                          Status at last note/certification: Continued discomfort of 1-2 at the [facility] region              Current Status: Continues to have Less ability for sitting and standing 7/20/22           4.   Pt will demonstrate stair negotiation of 24 6\" steps with no HRA and little to no difficulty for carryover to walking front steps with little difficulty and good control without HR assistance                          Status at last note/certification: Progressing at 2x20 steps             Current Status: Progressing at 2x24 steps with minor HHA 7/2222             5.  Pt will improve FOTO score to 44 in 15 visits to show significant improvement in function for progress to performing usual activity with little difficulty                        Status at last note/certification: Progressing at 40              Current Status:    PLAN  [x]  Upgrade activities as tolerated     [x]  Continue plan of care  []  Update interventions per flow sheet       []  Discharge due to:_  []  Other:_      Corby Doty, PT 7/22/2022  9:02 AM    Future Appointments   Date Time Provider Jonathan Colmenares   8/22/2022  9:45 AM Brisa Foy MD [facility] BS AMB   5/19/2023  8:15 AM Jennifer Kang MD [facility] BS AMB

## 2022-07-26 ENCOUNTER — TELEPHONE (OUTPATIENT)
Dept: FAMILY MEDICINE CLINIC | Age: 72
End: 2022-07-26

## 2022-07-26 DIAGNOSIS — L98.9 SKIN LESION: Primary | ICD-10-CM

## 2022-07-26 NOTE — TELEPHONE ENCOUNTER
Patient called the office stating she has a UTI that she can not seem to get rid of and is seeing Gyn for this who did urine culture and noted blood in patients urine. Patient has been scheduled by her GYN to have a cystoscopy as well as an ultra sound of her bladder. She says she also has basal cell carcinoma on her back and has seen Dermatology for this who does not want to do surgery for another 10 weeks and has prescribed patient chemo cream. Patient says her GYN says this does not need to wait 10 weeks and she needs Mohs procedure asap. Patient is currently seeing Dr. Delano Vee and says she does not preform this procedure so she is asking for a referral to a Dermatologists who does this and [place] in the [place]. She has been told Roxy Varela does procedure and I will send a message to Dr. Phill Morris to see if he can place a referral for her and I will call her back once everything has been placed and faxed. Referral has been pended pleaser review and sign if appropriate.

## 2022-07-26 NOTE — TELEPHONE ENCOUNTER
Referral order, and ins info has been faxed to [facility] requesting Dr. Mari Murphy at the [facility] location, patient has been notified and given contact info

## 2022-07-28 ENCOUNTER — TELEPHONE (OUTPATIENT)
Dept: PHYSICAL THERAPY | Age: 72
End: 2022-07-28

## 2022-07-30 NOTE — PROGRESS NOTES
[facility] at [address] [facility], [facility]  Phone: [phone]      Fax:  [phone]    Continued Plan of Care/ Re-certification for Physical Therapy Services    Patient name: Whit Spence Start of Care: 2022   Referral source: Luke Santana MD : 1950               Medical Diagnosis: Other low back pain [M54.59]  Right hip pain [M25.551]  Left hip pain [M25.552]  Payor: VA MEDICARE / Plan: VA MEDICARE PART A & B / Product Type: Medicare /  Onset Date: On or about 22               Treatment Diagnosis: Low back pain, Right Hip and leg pain   Prior Hospitalization: see medical history Provider#: [id number]   Medications: Verified on Patient summary List   Comorbidities: OP, OA, HTN, Scoliosis  Prior Level of Function: Able to perform usual activity with pain but in less pain than at this poinnt    Visits from Start of Care: 12    Missed Visits: 1    The Plan of Care and following information is based on the patient's current status:  Progress towards goals / Updated goals:  Goals for this certification period to be accomplished in 10 treatments:  1.   Pt will report 50% improvement or better with back dysfunction to show a significant increase in ability to tolerate ADL                        Status at last note/certification: Initiated             Current Status: 65% improvement noted               2.  Pt will tolerate normal sitting and standing activity with little left leg pain for progress to performing usual daily activity with less discomfort                          Status at last note/certification: Continued discomfort of 1-2 at the [facility] TFL region              Current Status: Continues to have Less ability for sitting and standing            4.  Pt will demonstrate stair negotiation of 24 6\" steps with no HRA and little to no difficulty for carryover to walking front steps with little difficulty and good control without HR assistance Status at last note/certification: Progressing at 2x20 steps             Current Status: Progressing at 2x24 steps with minor HHA              5.  Pt will improve FOTO score to 44 in 15 visits to show significant improvement in function for progress to performing usual activity with little difficulty                        Status at last note/certification: Progressing at 40              Current Status:    Key functional changes: Patient has shown good progress with this treatment program. Pain as of last visit was 2/10. Patient has shown decreased pain and increased strength and mobility. Patient has shown improved postural alignment but it fluctuates with reports of pain due to a possible infection in her body. Symptoms seem to improved with the use of antibiotics and reverts bact to decreased postural stability when off the antibiotics. Patient reports 65% improvement with overall involvement. FOTO score is 40. All STG/LTGs achieved as identified below. Problems/ barriers to goal attainment: Awaiting MD assessment for possible infection. Problem List: pain affecting function, decrease ROM, decrease strength, impaired gait/ balance, decrease ADL/ functional abilitiies, decrease activity tolerance, decrease flexibility/ joint mobility, and decrease transfer abilities    Treatment Plan: Therapeutic exercise, Therapeutic activities, Neuromuscular re-education, Physical agent/modality, Gait/balance training, Manual therapy, Patient education, Self Care training, Functional mobility training, Home safety training, and Stair training     Patient Goal (s) has been updated and includes: \"To find out what is causing the infection and increased pain\"     Goals for this certification period to be accomplished in 10 treatments:  1.   Pt will report 75% improvement or better with back dysfunction to show a significant increase in ability to tolerate ADL                        Status at last note/certification:  91% improvement noted             Current Status:               2.  Pt will tolerate normal sitting and standing activity with little left leg pain for progress to performing usual daily activity with less discomfort                          Status at last note/certification:  Continues to have Less ability for sitting and standing              Current Status:           4. Pt will demonstrate stair negotiation of 24 6\" steps with no HRA and little to no difficulty for carryover to walking front steps with little difficulty and good control without HR assistance                          Status at last note/certification:  Progressing at 2x24 steps with minor HHA             Current Status:              5.  Pt will improve FOTO score to 44 in 15 visits to show significant improvement in function for progress to performing usual activity with little difficulty                        Status at last note/certification: Progressing at 40              Current Status:    Frequency / Duration: Patient to be seen 2-3 times per week for 10 treatments:    Assessment / Recommendations:Patient is showing improved function with her current treatment program but it is not sustained to this point. Patient will continue to benefit from skilled PT to address impairments and continue to improve functional mobility and tolerance to daily activity. Certification Period: 8/5/22 - 9/4/22    Nitin Gonzales, PT 7/30/2022 9:23 AM    ________________________________________________________________________  I certify that the above Therapy Services are being furnished while the patient is under my care. I agree with the treatment plan and certify that this therapy is necessary. [] I have read the above and request that my patient continue as recommended.   [] I have read the above report and request that my patient continue therapy with the following changes/special instructions: ______________________________________  [] I have read the above report and request that my patient be discharged from therapy    Physician's Signature:____________Date:_________TIME:________     Manuela Jean MD  ** Signature, Date and Time must be completed for valid certification **    Please sign and return to [facility] [address] [facility] [address] [address] [place] [address] [address]  Phone: [phone]      Fax:  [phone]

## 2022-08-21 DIAGNOSIS — G47.00 INSOMNIA: ICD-10-CM

## 2022-08-21 DIAGNOSIS — M54.50 LUMBAR BACK PAIN: ICD-10-CM

## 2022-08-22 ENCOUNTER — OFFICE VISIT (OUTPATIENT)
Dept: ORTHOPEDIC SURGERY | Age: 72
End: 2022-08-22
Payer: MEDICARE

## 2022-08-22 VITALS
HEIGHT: 62 IN | TEMPERATURE: 97.3 F | SYSTOLIC BLOOD PRESSURE: 112 MMHG | HEART RATE: 89 BPM | OXYGEN SATURATION: 96 % | DIASTOLIC BLOOD PRESSURE: 79 MMHG | BODY MASS INDEX: 31.83 KG/M2

## 2022-08-22 DIAGNOSIS — M43.25 FUSION OF SPINE OF THORACOLUMBAR REGION: Primary | ICD-10-CM

## 2022-08-22 DIAGNOSIS — M54.16 RIGHT LUMBAR RADICULITIS: ICD-10-CM

## 2022-08-22 DIAGNOSIS — M54.9 TRIGGER POINT WITH BACK PAIN: ICD-10-CM

## 2022-08-22 PROBLEM — C80.1 CANCER (HCC): Status: ACTIVE | Noted: 2022-08-22

## 2022-08-22 PROCEDURE — 3017F COLORECTAL CA SCREEN DOC REV: CPT | Performed by: PHYSICAL MEDICINE & REHABILITATION

## 2022-08-22 PROCEDURE — 1123F ACP DISCUSS/DSCN MKR DOCD: CPT | Performed by: PHYSICAL MEDICINE & REHABILITATION

## 2022-08-22 PROCEDURE — G8536 NO DOC ELDER MAL SCRN: HCPCS | Performed by: PHYSICAL MEDICINE & REHABILITATION

## 2022-08-22 PROCEDURE — 20552 NJX 1/MLT TRIGGER POINT 1/2: CPT | Performed by: PHYSICAL MEDICINE & REHABILITATION

## 2022-08-22 PROCEDURE — G8427 DOCREV CUR MEDS BY ELIG CLIN: HCPCS | Performed by: PHYSICAL MEDICINE & REHABILITATION

## 2022-08-22 PROCEDURE — G9899 SCRN MAM PERF RSLTS DOC: HCPCS | Performed by: PHYSICAL MEDICINE & REHABILITATION

## 2022-08-22 PROCEDURE — 1090F PRES/ABSN URINE INCON ASSESS: CPT | Performed by: PHYSICAL MEDICINE & REHABILITATION

## 2022-08-22 PROCEDURE — 1101F PT FALLS ASSESS-DOCD LE1/YR: CPT | Performed by: PHYSICAL MEDICINE & REHABILITATION

## 2022-08-22 PROCEDURE — G8432 DEP SCR NOT DOC, RNG: HCPCS | Performed by: PHYSICAL MEDICINE & REHABILITATION

## 2022-08-22 PROCEDURE — G8752 SYS BP LESS 140: HCPCS | Performed by: PHYSICAL MEDICINE & REHABILITATION

## 2022-08-22 PROCEDURE — G8754 DIAS BP LESS 90: HCPCS | Performed by: PHYSICAL MEDICINE & REHABILITATION

## 2022-08-22 PROCEDURE — 99214 OFFICE O/P EST MOD 30 MIN: CPT | Performed by: PHYSICAL MEDICINE & REHABILITATION

## 2022-08-22 PROCEDURE — G8417 CALC BMI ABV UP PARAM F/U: HCPCS | Performed by: PHYSICAL MEDICINE & REHABILITATION

## 2022-08-22 RX ORDER — ESTRADIOL 10 UG/1
INSERT VAGINAL
COMMUNITY
Start: 2022-07-26

## 2022-08-22 RX ORDER — CYCLOBENZAPRINE HCL 10 MG
10 TABLET ORAL
Qty: 30 TABLET | Refills: 2 | Status: SHIPPED | OUTPATIENT
Start: 2022-08-22 | End: 2022-08-24

## 2022-08-22 RX ORDER — CLINDAMYCIN HYDROCHLORIDE 300 MG/1
CAPSULE ORAL
COMMUNITY
Start: 2022-05-23 | End: 2022-08-29 | Stop reason: ALTCHOICE

## 2022-08-22 RX ORDER — IMIQUIMOD 12.5 MG/.25G
CREAM TOPICAL
COMMUNITY
Start: 2022-07-25

## 2022-08-22 RX ORDER — MUPIROCIN 20 MG/G
OINTMENT TOPICAL
COMMUNITY
Start: 2022-06-02

## 2022-08-22 RX ORDER — CLOBETASOL PROPIONATE 0.5 MG/G
CREAM TOPICAL
COMMUNITY
Start: 2022-06-20

## 2022-08-22 RX ORDER — FLUOCINONIDE TOPICAL SOLUTION USP, 0.05% 0.5 MG/ML
SOLUTION TOPICAL
COMMUNITY
Start: 2022-05-19

## 2022-08-22 RX ORDER — METHENAMINE, SODIUM PHOSPHATE, MONOBASIC, MONOHYDRATE, PHENYL SALICYLATE, METHYLENE BLUE, AND HYOSCYAMINE SULFATE 118; 40.8; 36; 10; .12 MG/1; MG/1; MG/1; MG/1; MG/1
CAPSULE ORAL
COMMUNITY
Start: 2022-08-04

## 2022-08-22 RX ORDER — ZOLPIDEM TARTRATE 5 MG/1
5 TABLET ORAL
Qty: 90 TABLET | Refills: 0 | Status: SHIPPED | OUTPATIENT
Start: 2022-08-22

## 2022-08-22 RX ORDER — KETOCONAZOLE 20 MG/ML
SHAMPOO TOPICAL
COMMUNITY
Start: 2022-05-19

## 2022-08-22 NOTE — LETTER
8/24/2022    Patient: All Beverly   YOB: 1950   Date of Visit: 8/22/2022     Mandy Saucedo, [address] [facility] [address] [place] Count includes the [facility]  [address] [facility] [address]  Gerda Christelmorena    Dear Mandy Saucedo MD,      Thank you for referring Ms. Noni Vera to [facility] for evaluation. My notes for this consultation are attached. If you have questions, please do not hesitate to call me. I look forward to following your patient along with you.       Sincerely,    Colletta Medico, MD

## 2022-08-22 NOTE — PROGRESS NOTES
Dimitry Juma presents today for   Chief Complaint   Patient presents with    Back Pain     lower       Is someone accompanying this pt? no    Is the patient using any DME equipment during OV? no    Depression Screening:  3 most recent PHQ Screens 6/22/2022   PHQ Not Done -   Little interest or pleasure in doing things Not at all   Feeling down, depressed, irritable, or hopeless Not at all   Total Score PHQ 2 0   Trouble falling or staying asleep, or sleeping too much -   Feeling tired or having little energy -   Poor appetite, weight loss, or overeating -   Feeling bad about yourself - or that you are a failure or have let yourself or your family down -   Trouble concentrating on things such as school, work, reading, or watching TV -   Moving or speaking so slowly that other people could have noticed; or the opposite being so fidgety that others notice -   Thoughts of being better off dead, or hurting yourself in some way -   PHQ 9 Score -       Learning Assessment:  Learning Assessment 12/18/2014   PRIMARY LEARNER Patient   BARRIERS PRIMARY LEARNER NONE   CO-LEARNER CAREGIVER No   PRIMARY LANGUAGE ENGLISH   LEARNER PREFERENCE PRIMARY DEMONSTRATION   ANSWERED BY pateint   RELATIONSHIP SELF       Abuse Screening:  Abuse Screening Questionnaire 6/8/2022   Do you ever feel afraid of your partner? N   Are you in a relationship with someone who physically or mentally threatens you? N   Is it safe for you to go home? Y       Fall Risk  Fall Risk Assessment, last 12 mths 6/22/2022   Able to walk? Yes   Fall in past 12 months? 1   Do you feel unsteady? 1   Are you worried about falling 1   Number of falls in past 12 months 2   Fall with injury? 0       OPIOID RISK TOOL  No flowsheet data found. Coordination of Care:  1. Have you been to the ER, urgent care clinic since your last visit? no  Hospitalized since your last visit? no    2.  Have you seen or consulted any other health care providers outside of the [facility] [address] since your last visit? Yes, dermatology and Urology (for women). Include any pap smears or colon screening.  no

## 2022-08-22 NOTE — PROGRESS NOTES
[address]  [address], [address]  [place], [address] [place] [address] Street  Phone: [phone]  Fax: [phone]        Chantelle Kraft  : 1950  PCP: Patricia Hebert MD    PROGRESS NOTE      ASSESSMENT AND PLAN    Diagnoses and all orders for this visit:    1. Fusion of spine of thoracolumbar region, T11-L5    2. Right lumbar radiculitis    3. Trigger point with back pain  -     lidocaine (XYLOCAINE) 20 mg/mL (2 %) injection 10 mg  -     betamethasone (CELESTONE) injection 12 mg  -     bupivacaine HCl (MARCAINE) 0.25 % (2.5 mg/mL) injection 1.25 mg  -     INJECT TRIGGER POINT, 1 OR 2      Karen Moy is a 74 y.o. female with history of RA, frequent UTIs, extensive thoracolumbar fusion presenting with focal myofascial pain unresponsive to home exercise and medication. .   Advised to continue HEP as tolerated. Continue Flexeril PRN. Discussed taking a half tab during the day. Trigger point injections B/L iliolumbar, medically necessary due to unrelenting pain unresponsive to more conservative measures. HISTORY OF PRESENT ILLNESS      Karen Moy is a 74 y.o. female presents for follow up of back pain. LV trial of Topamax 50 mg QHS, referral to PT, DME for E-Stim unit. Pt reports low back pain, intermittently radiates into her right anterior thigh. Her pain is exacerbated with standing and walking. Pt is forward flexed. She was given antibiotics for her UTI, which relieved her pain. Pt states her pain returns whenever she completes her round of antibiotics. Pt found no relief with Topamax. Denies side effects. She uses Flexeril QHS PRN, unable to tolerate daytime dose due to somnolence. No current infection. Pain Assessment  2022   Location of Pain Back   Location Modifiers -   Severity of Pain 2   Quality of Pain Sharp; Other (Comment)   Quality of Pain Comment stabbing   Duration of Pain Persistent   Frequency of Pain Constant   Aggravating Factors Standing;Walking; Other (Comment)   Aggravating Factors Comment sitting   Limiting Behavior Some   Relieving Factors Other (Comment)   Relieving Factors Comment laying flat. Result of Injury No   Work-Related Injury -   Type of Injury -   Type of Injury Comment -         Investigations:   L XR AP/lat/flex/ext 4V 6/1/2022 (I personally reviewed these images): intact hardware T11-L5, cages L1-2 L3-4  T CT 6/2021: hardware failure T10, listhesis C7-T1  L CT 6/2021: fused T12-L5     Treatments:  Physical therapy: 7/2022 with benefit  Spinal injections: B/L SIJ injections 4/2022 with % benefit. B/L SNRB 2020 by Dr. Aubree Delarosa with no benefit, SIJ 2020 with benefit  Spinal surgery- thoracolumbar fusion, removal of T10 screws 8/2021 with benefit  Beneficial medications: Mobic, Flexeril  Failed medications: Gabapentin, Celebrex, Topamax     Work Status: retired  Pertinent PMHx:  RA (Dr. Sulma Holm), CPPD, neuropathy, IBS, HTN, GERD, BL KELLY    PHYSICAL EXAMINATION    Visit Vitals  /79 (BP 1 Location: Left upper arm, BP Patient Position: Sitting)   Pulse 89   Temp 97.3 °F (36.3 °C) (Temporal)   Ht 5' 2\" (1.575 m)   SpO2 96%   BMI 31.83 kg/m²       TTP B/L L4-5, B/L L5-S1  LE strength intact  SLR negative      [facility]  OFFICE PROCEDURE PROGRESS NOTE      PROCEDURE: In the office today after informed consent using aseptic technique, the patient was injected with a total of 2 cc of 6 mg/cc Celestone, 0.5 cc each of Lidocaine and Marcaine into her B/L iliolumbar trigger point. Chart reviewed for the following:   IDr. Sagrario, have reviewed the History, Physical and updated the Allergic reactions for Jaqui Mora. Local measures (ice/heat) and medications have not alleviated the symptoms.      TIME OUT performed immediately prior to start of procedure:   IDr. Sagrario, have performed the following reviews on Jaqui Mora prior to the start of the procedure: Patient denies any recent fevers, chills, antibiotics, recent cortisone injections, or infections. * Patient was identified by name and date of birth   * Agreement on procedure being performed was verified  * Risks and Benefits explained to the patient  * Procedure site verified and marked as necessary  * Patient was positioned for comfort  * Consent was signed and verified     Time: 10:13 AM      Date of procedure: 8/22/2022    Procedure performed by:  Adelaide Deshpande MD    Provider assisted by: None    Patient assisted by: Self    How tolerated by patient: Pt tolerated the procedure well with no complications.                  Written by Joel Lara, as dictated by Diaz Jones MD.

## 2022-08-24 DIAGNOSIS — M54.50 LUMBAR BACK PAIN: Primary | ICD-10-CM

## 2022-08-24 RX ORDER — TRAMADOL HYDROCHLORIDE 100 MG/1
1 CAPSULE ORAL
Qty: 30 TABLET | Refills: 2 | Status: SHIPPED | OUTPATIENT
Start: 2022-08-24 | End: 2022-09-23

## 2022-08-24 RX ORDER — BUPIVACAINE HYDROCHLORIDE 2.5 MG/ML
0.5 INJECTION, SOLUTION INFILTRATION; PERINEURAL ONCE
Status: SHIPPED | OUTPATIENT
Start: 2022-08-24 | End: 2022-08-25

## 2022-08-24 RX ORDER — BETAMETHASONE SODIUM PHOSPHATE AND BETAMETHASONE ACETATE 3; 3 MG/ML; MG/ML
12 INJECTION, SUSPENSION INTRA-ARTICULAR; INTRALESIONAL; INTRAMUSCULAR; SOFT TISSUE ONCE
Status: SHIPPED | OUTPATIENT
Start: 2022-08-24 | End: 2022-08-25

## 2022-08-24 RX ORDER — LIDOCAINE HYDROCHLORIDE 20 MG/ML
0.5 INJECTION, SOLUTION INFILTRATION; PERINEURAL ONCE
Status: SHIPPED | OUTPATIENT
Start: 2022-08-24 | End: 2022-08-25

## 2022-08-29 ENCOUNTER — OFFICE VISIT (OUTPATIENT)
Dept: FAMILY MEDICINE CLINIC | Age: 72
End: 2022-08-29
Payer: MEDICARE

## 2022-08-29 VITALS
DIASTOLIC BLOOD PRESSURE: 72 MMHG | HEART RATE: 67 BPM | RESPIRATION RATE: 14 BRPM | SYSTOLIC BLOOD PRESSURE: 102 MMHG | OXYGEN SATURATION: 97 % | TEMPERATURE: 97.1 F

## 2022-08-29 DIAGNOSIS — N30.91 HEMORRHAGIC CYSTITIS: Primary | ICD-10-CM

## 2022-08-29 PROCEDURE — G8536 NO DOC ELDER MAL SCRN: HCPCS | Performed by: FAMILY MEDICINE

## 2022-08-29 PROCEDURE — 1101F PT FALLS ASSESS-DOCD LE1/YR: CPT | Performed by: FAMILY MEDICINE

## 2022-08-29 PROCEDURE — 99213 OFFICE O/P EST LOW 20 MIN: CPT | Performed by: FAMILY MEDICINE

## 2022-08-29 PROCEDURE — G9899 SCRN MAM PERF RSLTS DOC: HCPCS | Performed by: FAMILY MEDICINE

## 2022-08-29 PROCEDURE — G8510 SCR DEP NEG, NO PLAN REQD: HCPCS | Performed by: FAMILY MEDICINE

## 2022-08-29 PROCEDURE — G8752 SYS BP LESS 140: HCPCS | Performed by: FAMILY MEDICINE

## 2022-08-29 PROCEDURE — G8427 DOCREV CUR MEDS BY ELIG CLIN: HCPCS | Performed by: FAMILY MEDICINE

## 2022-08-29 PROCEDURE — G8417 CALC BMI ABV UP PARAM F/U: HCPCS | Performed by: FAMILY MEDICINE

## 2022-08-29 PROCEDURE — 3017F COLORECTAL CA SCREEN DOC REV: CPT | Performed by: FAMILY MEDICINE

## 2022-08-29 PROCEDURE — G8754 DIAS BP LESS 90: HCPCS | Performed by: FAMILY MEDICINE

## 2022-08-29 PROCEDURE — 1090F PRES/ABSN URINE INCON ASSESS: CPT | Performed by: FAMILY MEDICINE

## 2022-08-29 PROCEDURE — 1123F ACP DISCUSS/DSCN MKR DOCD: CPT | Performed by: FAMILY MEDICINE

## 2022-08-29 NOTE — PROGRESS NOTES
1. \"Have you been to the ER, urgent care clinic since your last visit? Hospitalized since your last visit? \" No    2. \"Have you seen or consulted any other health care providers outside of the [address] [place] [address] since your last visit? \" Yes Dr. Jayda Holcomb       3. For patients aged 39-70: Has the patient had a colonoscopy / FIT/ Cologuard? Yes - no Care Gap present      If the patient is female:    4. For patients aged 41-77: Has the patient had a mammogram within the past 2 years? Yes - no Care Gap present      5. For patients aged 21-65: Has the patient had a pap smear?  NA - based on age or sex    Chief Complaint   Patient presents with    Medication Evaluation     Patient was diagnosed with hemophage     Uribel and xeljamz(tofacitinib)

## 2022-08-29 NOTE — PROGRESS NOTES
Marcus Diaz is a 74 y.o. female  presents for follow up after diagnosis of hemorrhagic cystitis. No sxs today. Allergies   Allergen Reactions    Celebrex [Celecoxib] Swelling    Shellfish Derived Other (comments)      Pt.denies     Sulfa (Sulfonamide Antibiotics) Hives and Swelling     Lips swelling    Adhesive Hives, Itching and Contact Dermatitis    Gabapentin Diarrhea and Palpitations     Chest and Abdominal pain    Humira [Adalimumab] Other (comments)     Lupus    Influenza Virus Vaccines Hives    Iodine Itching     Takes benadryl and is OK. Optiray 320 [Ioversol] Hives and Itching     Pt states when she gets iv contrast she itches a little from hives? Penicillins Hives    Shrimp Other (comments)     Sodium puffy     Outpatient Medications Marked as Taking for the 8/29/22 encounter (Office Visit) with Maldonado Barrett MD   Medication Sig Dispense Refill    traMADoL 100 mg BP25 Take 1 Tablet by mouth daily as needed for Pain for up to 30 days. 30 Tablet 2    zolpidem (AMBIEN) 5 mg tablet Take 1 Tablet by mouth nightly. 90 Tablet 0    UribeL 118-10-40.8-36 mg cap capsule take 1 capsule by mouth every 6 hours if needed      lisinopriL (PRINIVIL, ZESTRIL) 5 mg tablet Take 1 Tablet by mouth daily. 90 Tablet 2    multivitamin (ONE A DAY) tablet Take 1 Tablet by mouth daily. meloxicam (MOBIC) 15 mg tablet TAKE 1 TABLET DAILY 90 Tab 3    tofacitinib (XELJANZ) 5 mg tab Take  by mouth two (2) times a day. cetirizine (ZYRTEC) 10 mg tablet Take 10 mg by mouth daily as needed. acetaminophen (TYLENOL) 500 mg tablet Take 1,000 mg by mouth every six (6) hours as needed for Pain. cholecalciferol (VITAMIN D3) (1000 Units /25 mcg) tablet Take 1,000 Units by mouth five (5) days a week. calcium carbonate (OS-BARBARA) 500 mg calcium (1,250 mg) tablet Take 1,500 mg by mouth daily.        Patient Active Problem List   Diagnosis Code    DDD (degenerative disc disease), lumbar M51.36    Spondylolisthesis of lumbar region M43.16    Status post lumbar surgery Z98.890    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    H/O calcium pyrophosphate deposition disease (CPPD) Z87.39    IBS (irritable bowel syndrome) K58.9    RA (rheumatoid arthritis) ([facility]) M06.9    Sicca syndrome ([facility]) M35.00    Osteoarthritis of right hip M16.11    Pain management contract agreement Z02.89    ACP (advance care planning) Z71.89    Chronic left shoulder pain M25.512, G89.29    Osteopenia M85.80    Osteopenia of multiple sites M85.89    Osteoarthritis of left hip M16.12    Closed fracture of thoracic spine without spinal cord lesion ([facility] [address] S22.009A    Spinal stenosis, thoracolumbar region M48.05    Lumbar spine instability M53.2X6    Spinal stenosis of lumbar region with neurogenic claudication M48.062    Spinal stenosis of lumbar region M48.061    Lumbar stenosis M48.061    Nausea R11.0    Acute bilateral thoracic back pain M54.6    Anxiety F41.9    Chest pain R07.9    Essential hypertension I10    Cataracts, bilateral H26.9    Diverticulitis K57.92    Elevated blood pressure reading without diagnosis of hypertension R03.0    Fall at home [address] XXXA, Y92.009    Insomnia G47.00    Lesion of hard palate K13.79    Lesion of neck L98.9    Numbness and tingling of right arm R20.0, R20.2    Osteoporosis M81.0    Sciatica M54.30    Sialolithiasis K11.5    Urinary retention with incomplete bladder emptying R33.9    SVT (supraventricular tachycardia) ([facility]) I47.1    Failed back syndrome, lumbosacral M96.1    Cancer ([facility]) C80.1    Fusion of spine of thoracolumbar region M43.25     Past Medical History:   Diagnosis Date    Abdominal abscess 2009    Adverse effect of anesthesia      Be careful with intubation.  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 )    Irritable bowel syndrome     Neuropathy     bilateral hands/wrist    Osteoporosis     Pneumonia     RA (rheumatoid arthritis) ([facility])     Seizures ([facility] [address] 06/01/2008    one episode- after high dose of epinepherine     Social History     Socioeconomic History    Marital status:    Tobacco Use    Smoking status: Never    Smokeless tobacco: Never   Vaping Use    Vaping Use: Never used   Substance and Sexual Activity    Alcohol use: No    Drug use: Never    Sexual activity: Not Currently     Family History   Problem Relation Age of Onset    Other Other         Orthopedic (Sister)    OSTEOARTHRITIS Sister     Heart Attack Brother 58    Cancer Neg Hx     Diabetes Neg Hx     Heart Disease Neg Hx     Hypertension Neg Hx     Stroke Neg Hx     Malignant Hyperthermia Neg Hx     Pseudocholinesterase Deficiency Neg Hx     Delayed Awakening Neg Hx     Post-op Nausea/Vomiting Neg Hx     Emergence Delirium Neg Hx     Post-op Cognitive Dysfunction Neg Hx         Review of Systems   Constitutional:  Negative for chills, fever, malaise/fatigue and weight loss. Eyes:  Negative for blurred vision. Respiratory:  Negative for shortness of breath and wheezing. Cardiovascular:  Negative for chest pain. Gastrointestinal:  Negative for nausea and vomiting. Musculoskeletal:  Negative for myalgias. Skin:  Negative for rash. Neurological:  Negative for weakness. Psychiatric/Behavioral: Negative. Vitals:    08/29/22 1029   BP: 102/72   Pulse: 67   Resp: 14   Temp: 97.1 °F (36.2 °C)   TempSrc: Tympanic   SpO2: 97%       Physical Exam  Vitals and nursing note reviewed. Constitutional:       Appearance: Normal appearance. Cardiovascular:      Rate and Rhythm: Normal rate and regular rhythm. Pulses: Normal pulses. Heart sounds: Normal heart sounds. Pulmonary:      Effort: Pulmonary effort is normal.      Breath sounds: Normal breath sounds. Musculoskeletal:         General: Normal range of motion. Cervical back: Normal range of motion and neck supple. Skin:     General: Skin is warm and dry. Neurological:      Mental Status: She is alert. Psychiatric:         Mood and Affect: Mood normal.         Behavior: Behavior normal.         Thought Content: Thought content normal.         Judgment: Judgment normal.       Assessment/Plan      ICD-10-CM ICD-9-CM    1. Hemorrhagic cystitis  N30.91 595.9         Will continue current meds protonix bid. I have discussed the diagnosis with the patient and the intended plan of care as seen in the above orders. The patient has received an after-visit summary and questions were answered concerning future plans. I have discussed medication, side effects, and warnings with the patient in detail. The patient verbalized understanding and is in agreement with the plan of care. The patient will contact the office with any additional concerns.       lab results and schedule of future lab studies reviewed with patient    Brent Ojeda MD

## 2022-09-02 DIAGNOSIS — I10 ESSENTIAL HYPERTENSION WITH GOAL BLOOD PRESSURE LESS THAN 130/85: ICD-10-CM

## 2022-09-02 RX ORDER — LISINOPRIL 5 MG/1
TABLET ORAL
Qty: 90 TABLET | Refills: 3 | Status: SHIPPED | OUTPATIENT
Start: 2022-09-02

## 2022-09-14 DIAGNOSIS — M51.36 DDD (DEGENERATIVE DISC DISEASE), LUMBAR: ICD-10-CM

## 2022-09-15 NOTE — PROGRESS NOTES
[facility] at [address] [facility], [facility]  Phone: [phone]      Fax:  [phone]    Discharge Summary    Patient name: Ramesh Mays Start of Care: 2022   Referral source: Stewart Alves MD : [id number]               Medical Diagnosis: Other low back pain [M54.59]  Right hip pain [M25.551]  Left hip pain [M25.552]  Payor: [place] MEDICARE / Plan: VA MEDICARE PART A & B / Product Type: Medicare /  Onset Date: On or about 22               Treatment Diagnosis: Low back pain, Right Hip and leg pain   Prior Hospitalization: see medical history Provider#: [id number]   Medications: Verified on Patient summary List   Comorbidities: OP, OA, HTN, Scoliosis  Prior Level of Function: Able to perform usual activity with pain but in less pain than at this poinnt      Visits from Start of Care: 12    Missed Visits: 1    Reporting Period : 2022 to 2022         Goals for this certification period to be accomplished in 10 treatments:  1.   Pt will report 75% improvement or better with back dysfunction to show a significant increase in ability to tolerate ADL                        Status at last note/certification:  99% improvement noted             Current Status:  Not met, unable to assess d/t self-discharge                2.  Pt will tolerate normal sitting and standing activity with little left leg pain for progress to performing usual daily activity with less discomfort                          Status at last note/certification:  Continues to have Less ability for sitting and standing              Current Status: Not met, unable to assess d/t self-discharge              4.  Pt will demonstrate stair negotiation of 24 6\" steps with no HRA and little to no difficulty for carryover to walking front steps with little difficulty and good control without HR assistance                          Status at last note/certification:  Progressing at 2x24 steps with minor HHA             Current Status: Not met, unable to assess d/t self-discharge                 5.  Pt will improve FOTO score to 44 in 15 visits to show significant improvement in function for progress to performing usual activity with little difficulty                        Status at last note/certification: Progressing at 40              Current Status: Not met, unable to assess d/t self-discharge         Assessment/ Summary of Care:  Patient has shown good progress with this treatment program. Pain as of last visit was 2/10. Patient has shown decreased pain and increased strength and mobility. Patient has shown improved postural alignment but it fluctuates with reports of pain due to a possible infection in her body. Symptoms seem to improved with the use of antibiotics and reverts back to decreased postural stability when off the antibiotics. Patient reports 65% improvement with overall involvement. FOTO score is 40. All STG/LTGs achieved as identified below. Patient requested to be put on hold d/t being seen for other health issues. However, patient has not made any further appointments and will be discharged at the time.      RECOMMENDATIONS:  [x]Discontinue therapy: []Patient has reached or is progressing toward set goals      [x]Patient is non-compliant or has abdicated      []Due to lack of appreciable progress towards set goals    Joyce Morejon, PTA 9/15/2022 9:54 AM  Edna Santiago, PT 9/18/2022

## 2022-09-16 RX ORDER — MELOXICAM 15 MG/1
15 TABLET ORAL DAILY
Qty: 90 TABLET | Refills: 1 | Status: SHIPPED | OUTPATIENT
Start: 2022-09-16

## 2022-09-25 ENCOUNTER — HOSPITAL ENCOUNTER (EMERGENCY)
Age: 72
Discharge: HOME | End: 2022-09-25
Attending: EMERGENCY MEDICINE
Payer: MEDICARE

## 2022-09-25 ENCOUNTER — APPOINTMENT (OUTPATIENT)
Dept: CT IMAGING | Age: 72
End: 2022-09-25
Attending: EMERGENCY MEDICINE
Payer: MEDICARE

## 2022-09-25 VITALS
HEIGHT: 62 IN | HEART RATE: 58 BPM | WEIGHT: 172 LBS | OXYGEN SATURATION: 98 % | BODY MASS INDEX: 31.65 KG/M2 | DIASTOLIC BLOOD PRESSURE: 80 MMHG | TEMPERATURE: 98.4 F | RESPIRATION RATE: 13 BRPM | SYSTOLIC BLOOD PRESSURE: 135 MMHG

## 2022-09-25 DIAGNOSIS — R68.83 CHILLS: Primary | ICD-10-CM

## 2022-09-25 LAB
ALBUMIN SERPL-MCNC: 3.8 G/DL (ref 3.4–5)
ALBUMIN/GLOB SERPL: 1.2 {RATIO} (ref 0.8–1.7)
ALP SERPL-CCNC: 78 U/L (ref 45–117)
ALT SERPL-CCNC: 16 U/L (ref 13–56)
ANION GAP SERPL CALC-SCNC: 5 MMOL/L (ref 3–18)
APPEARANCE UR: CLEAR
AST SERPL-CCNC: 17 U/L (ref 10–38)
BASOPHILS # BLD: 0 K/UL (ref 0–0.1)
BASOPHILS NFR BLD: 0 % (ref 0–2)
BILIRUB SERPL-MCNC: 0.5 MG/DL (ref 0.2–1)
BILIRUB UR QL: NEGATIVE
BUN SERPL-MCNC: 20 MG/DL (ref 7–18)
BUN/CREAT SERPL: 19 (ref 12–20)
CALCIUM SERPL-MCNC: 8.9 MG/DL (ref 8.5–10.1)
CHLORIDE SERPL-SCNC: 106 MMOL/L (ref 100–111)
CO2 SERPL-SCNC: 26 MMOL/L (ref 21–32)
COLOR UR: YELLOW
CREAT SERPL-MCNC: 1.04 MG/DL (ref 0.6–1.3)
DIFFERENTIAL METHOD BLD: NORMAL
EOSINOPHIL # BLD: 0.1 K/UL (ref 0–0.4)
EOSINOPHIL NFR BLD: 3 % (ref 0–5)
ERYTHROCYTE [DISTWIDTH] IN BLOOD BY AUTOMATED COUNT: 12.6 % (ref 11.6–14.5)
GLOBULIN SER CALC-MCNC: 3.1 G/DL (ref 2–4)
GLUCOSE SERPL-MCNC: 129 MG/DL (ref 74–99)
GLUCOSE UR STRIP.AUTO-MCNC: NEGATIVE MG/DL
HCT VFR BLD AUTO: 39.7 % (ref 35–45)
HGB BLD-MCNC: 13.2 G/DL (ref 12–16)
HGB UR QL STRIP: NEGATIVE
IMM GRANULOCYTES # BLD AUTO: 0 K/UL (ref 0–0.04)
IMM GRANULOCYTES NFR BLD AUTO: 0 % (ref 0–0.5)
KETONES UR QL STRIP.AUTO: NEGATIVE MG/DL
LEUKOCYTE ESTERASE UR QL STRIP.AUTO: NEGATIVE
LYMPHOCYTES # BLD: 2 K/UL (ref 0.9–3.6)
LYMPHOCYTES NFR BLD: 37 % (ref 21–52)
MCH RBC QN AUTO: 30.8 PG (ref 24–34)
MCHC RBC AUTO-ENTMCNC: 33.2 G/DL (ref 31–37)
MCV RBC AUTO: 92.8 FL (ref 78–100)
MONOCYTES # BLD: 0.5 K/UL (ref 0.05–1.2)
MONOCYTES NFR BLD: 9 % (ref 3–10)
NEUTS SEG # BLD: 2.7 K/UL (ref 1.8–8)
NEUTS SEG NFR BLD: 51 % (ref 40–73)
NITRITE UR QL STRIP.AUTO: NEGATIVE
NRBC # BLD: 0 K/UL (ref 0–0.01)
NRBC BLD-RTO: 0 PER 100 WBC
PH UR STRIP: 5.5 [PH] (ref 5–8)
PLATELET # BLD AUTO: 250 K/UL (ref 135–420)
PMV BLD AUTO: 9.4 FL (ref 9.2–11.8)
POTASSIUM SERPL-SCNC: 4.1 MMOL/L (ref 3.5–5.5)
PROT SERPL-MCNC: 6.9 G/DL (ref 6.4–8.2)
PROT UR STRIP-MCNC: NEGATIVE MG/DL
RBC # BLD AUTO: 4.28 M/UL (ref 4.2–5.3)
SODIUM SERPL-SCNC: 137 MMOL/L (ref 136–145)
SP GR UR REFRACTOMETRY: 1.02 (ref 1–1.03)
UROBILINOGEN UR QL STRIP.AUTO: 1 EU/DL (ref 0.2–1)
WBC # BLD AUTO: 5.3 K/UL (ref 4.6–13.2)

## 2022-09-25 PROCEDURE — 80053 COMPREHEN METABOLIC PANEL: CPT

## 2022-09-25 PROCEDURE — 99284 EMERGENCY DEPT VISIT MOD MDM: CPT

## 2022-09-25 PROCEDURE — 85025 COMPLETE CBC W/AUTO DIFF WBC: CPT

## 2022-09-25 PROCEDURE — 81003 URINALYSIS AUTO W/O SCOPE: CPT

## 2022-09-25 PROCEDURE — 74176 CT ABD & PELVIS W/O CONTRAST: CPT

## 2022-09-25 PROCEDURE — 74011250636 HC RX REV CODE- 250/636: Performed by: EMERGENCY MEDICINE

## 2022-09-25 RX ADMIN — SODIUM CHLORIDE 1000 ML: 9 INJECTION, SOLUTION INTRAVENOUS at 09:49

## 2022-09-25 NOTE — ED PROVIDER NOTES
EMERGENCY DEPARTMENT HISTORY AND PHYSICAL EXAM          Date: 9/25/2022  Patient Name: Ekaterina Loza    History of Presenting Illness     Chief Complaint   Patient presents with    Abdominal Pain    Chills       History Provided By: Patient    HPI: Ekaterina Loza is a 74 y.o. female, pmhx hemorrhagic cystitis, diverticulosis, IBS, GERD, chronic back pain, autoimmune disease (lupus and rheumatoid arthritis) who presents ambulatory to the ED c/o back pain and chills    Patient explains she has been having trouble with frequent UTIs. On recent cystoscopy she was diagnosed with hemorrhagic cystitis and started on Uribel. Since Tuesday patient states she has not been feeling well she been having some back pain and chills. She denies any fevers as well as any nausea, vomiting and abdominal pain with the symptoms. She does note she has some diarrhea which is pretty constant for her due to her diverticulosis. She notes whenever she goes the bathroom she will of right-sided abdominal pain associate with her diverticulosis but she states the pain is unchanged from her baseline and is not present if she is not stooling. PCP: Emerita Shaw MD    Allergies: Multiple  Social Hx: -tobacco, -vaping, -EtOH, -Illicit Drugs    There are no other complaints, changes, or physical findings at this time. Current Outpatient Medications   Medication Sig Dispense Refill    meloxicam (MOBIC) 15 mg tablet Take 1 Tablet by mouth daily. 90 Tablet 1    lisinopriL (PRINIVIL, ZESTRIL) 5 mg tablet TAKE 1 TABLET DAILY 90 Tablet 3    zolpidem (AMBIEN) 5 mg tablet Take 1 Tablet by mouth nightly. 90 Tablet 0    clobetasoL (TEMOVATE) 0.05 % topical cream APPLY TO AFFECTED AREA OF BACK TWICE A DAY AS NEEDED FOR RASH FLARE UPS      Imvexxy Maintenance Pack 10 mcg inst insert 1 vaginally (10MCG) two times a week (EVERY 3 OR 4 DAYS)      Imvexxy Starter Pack 10 mcg InPk insert 1 capsule (10MCG) vaginally once daily for 2 weeks then 1 ... (REFER TO PRESCRIPTION NOTES). fluocinoNIDE (LIDEX) 0.05 % external solution APPLY TO SCALP ONCE DAILY AS NEEDED FOR ITCHING AND FLAKING      imiquimod (ALDARA) 5 % cream APPLY SPARINGLY TO SPOT ON BACK 5 TIMES A WEEK AT NIGHT FOR 6 WEEKS      ketoconazole (NIZORAL) 2 % shampoo WASH SCALP ONCE WEEKLY, ALLOW LATHER TO SIT 4-5 MINUTES THEN RINS. ..  (REFER TO PRESCRIPTION NOTES). UribeL 118-10-40.8-36 mg cap capsule take 1 capsule by mouth every 6 hours if needed      mupirocin (BACTROBAN) 2 % ointment APPLY TO AFFECTED AREA OF BACK ONCE DAILY AFTER CLEASING WITH SOAP AND WATER      multivitamin (ONE A DAY) tablet Take 1 Tablet by mouth daily. tofacitinib (XELJANZ) 5 mg tab Take  by mouth two (2) times a day. cetirizine (ZYRTEC) 10 mg tablet Take 10 mg by mouth daily as needed. acetaminophen (TYLENOL) 500 mg tablet Take 1,000 mg by mouth every six (6) hours as needed for Pain. cholecalciferol (VITAMIN D3) (1000 Units /25 mcg) tablet Take 1,000 Units by mouth five (5) days a week. calcium carbonate (OS-BARBARA) 500 mg calcium (1,250 mg) tablet Take 1,500 mg by mouth daily. Past History     Past Medical History:  Past Medical History:   Diagnosis Date    Abdominal abscess 2009    Adverse effect of anesthesia      Be careful with intubation.  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 )    Irritable bowel syndrome     Neuropathy     bilateral hands/wrist    Osteoporosis     Pneumonia     RA (rheumatoid arthritis) ([address]     Seizures ([address] 06/01/2008    one episode- after high dose of epinepherine       Past Surgical History:  Past Surgical History:   Procedure Laterality Date    HX ABDOMINAL WALL DEFECT REPAIR      HX APPENDECTOMY      HX BREAST REDUCTION      HX CATARACT REMOVAL Bilateral     HX COLONOSCOPY      HX GI      repair rectal prolaspe    HX GYN      dermoid cyst    HX HEENT      tonsillectomy    HX HYSTERECTOMY  1976    HX LUMBAR FUSION      x 2  L 3-4 , L-5-6    HX LUMBAR FUSION  12/03/2018    T-8    HX ORTHOPAEDIC Bilateral     CTR    HX ORTHOPAEDIC Bilateral     arthroplasty thumbs    HX ORTHOPAEDIC Left     Ankle     HX ORTHOPAEDIC Right     \"Nerve Release Elbow\"    HX OTHER SURGICAL Left 1985    vein stripping    HX SALPINGO-OOPHORECTOMY Bilateral     HX SHOULDER REPLACEMENT Left     HX UROLOGICAL      bladder repair    LA TOTAL HIP ARTHROPLASTY Bilateral        Family History:  Family History   Problem Relation Age of Onset    Other Other         Orthopedic (Sister)    OSTEOARTHRITIS Sister     Heart Attack Brother 58    Cancer Neg Hx     Diabetes Neg Hx     Heart Disease Neg Hx     Hypertension Neg Hx     Stroke Neg Hx     Malignant Hyperthermia Neg Hx     Pseudocholinesterase Deficiency Neg Hx     Delayed Awakening Neg Hx     Post-op Nausea/Vomiting Neg Hx     Emergence Delirium Neg Hx     Post-op Cognitive Dysfunction Neg Hx        Social History:  Social History     Tobacco Use    Smoking status: Never    Smokeless tobacco: Never   Vaping Use    Vaping Use: Never used   Substance Use Topics    Alcohol use: No    Drug use: Never       Allergies:   Allergies   Allergen Reactions    Celebrex [Celecoxib] Swelling    Shellfish Derived Other (comments)      Pt.denies     Sulfa (Sulfonamide Antibiotics) Hives and Swelling     Lips swelling    Adhesive Hives, Itching and Contact Dermatitis    Gabapentin Diarrhea and Palpitations     Chest and Abdominal pain    Humira [Adalimumab] Other (comments)     Lupus    Influenza Virus Vaccines Hives    Iodine Itching     Takes benadryl and is OK. Optiray 320 [Ioversol] Hives and Itching     Pt states when she gets iv contrast she itches a little from hives? Penicillins Hives    Shrimp Other (comments)     Sodium puffy         Review of Systems   Review of Systems   Constitutional:  Positive for chills. Negative for activity change, appetite change, fever and unexpected weight change. HENT:  Negative for congestion. Eyes:  Negative for pain and visual disturbance. Respiratory:  Negative for cough and shortness of breath. Cardiovascular:  Negative for chest pain. Gastrointestinal:  Negative for abdominal pain, diarrhea, nausea and vomiting. Genitourinary:  Negative for dysuria. Musculoskeletal:  Positive for back pain. Skin:  Negative for rash. Neurological:  Negative for headaches. Physical Exam   Physical Exam  Vitals and nursing note reviewed. Constitutional:       Appearance: She is well-developed. She is not diaphoretic. Comments: This is a healthy-appearing elderly female, with slightly elevated blood pressure, otherwise in minimal acute distress   HENT:      Head: Normocephalic and atraumatic. Eyes:      General:         Right eye: No discharge. Left eye: No discharge. Conjunctiva/sclera: Conjunctivae normal.      Pupils: Pupils are equal, round, and reactive to light. Cardiovascular:      Rate and Rhythm: Normal rate and regular rhythm. Heart sounds: Normal heart sounds. No murmur heard. Pulmonary:      Effort: Pulmonary effort is normal. No respiratory distress. Breath sounds: Normal breath sounds. No wheezing or rales. Abdominal:      General: Bowel sounds are normal. There is no distension. Palpations: Abdomen is soft. Tenderness: There is no abdominal tenderness. There is no guarding or rebound. Negative signs include Mcintyre's sign, Rovsing's sign and McBurney's sign. Musculoskeletal:         General: Normal range of motion.       Cervical back: Normal range of motion and neck supple. Skin:     General: Skin is warm and dry. Findings: No rash. Neurological:      Mental Status: She is alert and oriented to person, place, and time. Cranial Nerves: No cranial nerve deficit. Motor: No abnormal muscle tone. Diagnostic Study Results     Labs -     Recent Results (from the past 12 hour(s))   CBC WITH AUTOMATED DIFF    Collection Time: 09/25/22  9:32 AM   Result Value Ref Range    WBC 5.3 4.6 - 13.2 K/uL    RBC 4.28 4.20 - 5.30 M/uL    HGB 13.2 12.0 - 16.0 g/dL    HCT 39.7 35.0 - 45.0 %    MCV 92.8 78.0 - 100.0 FL    MCH 30.8 24.0 - 34.0 PG    MCHC 33.2 31.0 - 37.0 g/dL    RDW 12.6 11.6 - 14.5 %    PLATELET 575 103 - 490 K/uL    MPV 9.4 9.2 - 11.8 FL    NRBC 0.0 0  WBC    ABSOLUTE NRBC 0.00 0.00 - 0.01 K/uL    NEUTROPHILS 51 40 - 73 %    LYMPHOCYTES 37 21 - 52 %    MONOCYTES 9 3 - 10 %    EOSINOPHILS 3 0 - 5 %    BASOPHILS 0 0 - 2 %    IMMATURE GRANULOCYTES 0 0.0 - 0.5 %    ABS. NEUTROPHILS 2.7 1.8 - 8.0 K/UL    ABS. LYMPHOCYTES 2.0 0.9 - 3.6 K/UL    ABS. MONOCYTES 0.5 0.05 - 1.2 K/UL    ABS. EOSINOPHILS 0.1 0.0 - 0.4 K/UL    ABS. BASOPHILS 0.0 0.0 - 0.1 K/UL    ABS. IMM. GRANS. 0.0 0.00 - 0.04 K/UL    DF AUTOMATED     METABOLIC PANEL, COMPREHENSIVE    Collection Time: 09/25/22  9:32 AM   Result Value Ref Range    Sodium 137 136 - 145 mmol/L    Potassium 4.1 3.5 - 5.5 mmol/L    Chloride 106 100 - 111 mmol/L    CO2 26 21 - 32 mmol/L    Anion gap 5 3.0 - 18 mmol/L    Glucose 129 (H) 74 - 99 mg/dL    BUN 20 (H) 7.0 - 18 MG/DL    Creatinine 1.04 0.6 - 1.3 MG/DL    BUN/Creatinine ratio 19 12 - 20      GFR est AA >60 >60 ml/min/1.73m2    GFR est non-AA 52 (L) >60 ml/min/1.73m2    Calcium 8.9 8.5 - 10.1 MG/DL    Bilirubin, total 0.5 0.2 - 1.0 MG/DL    ALT (SGPT) 16 13 - 56 U/L    AST (SGOT) 17 10 - 38 U/L    Alk.  phosphatase 78 45 - 117 U/L    Protein, total 6.9 6.4 - 8.2 g/dL    Albumin 3.8 3.4 - 5.0 g/dL    Globulin 3.1 2.0 - 4.0 g/dL    A-G Ratio 1.2 0.8 - 1.7     URINALYSIS W/ RFLX MICROSCOPIC    Collection Time: 09/25/22  9:45 AM   Result Value Ref Range    Color YELLOW      Appearance CLEAR      Specific gravity 1.016 1.005 - 1.030      pH (UA) 5.5 5.0 - 8.0      Protein Negative NEG mg/dL    Glucose Negative NEG mg/dL    Ketone Negative NEG mg/dL    Bilirubin Negative NEG      Blood Negative NEG      Urobilinogen 1.0 0.2 - 1.0 EU/dL    Nitrites Negative NEG      Leukocyte Esterase Negative NEG         Radiologic Studies -   CT ABD PELV WO CONT   Final Result      Diverticulosis coli. No evidence of diverticulitis. Splenic granuloma   No abnormality of the urinary system. Small portion of the urinary bladder is   obscured by beam hardening artifact. Stable mild compression fracture of T12. Stable grade 1 spondylolisthesis with disc space narrowing L4/5. Interval removal of the hardware fixation at T9 and T10. Stable appearance of   the hardware stabilization from T11 through L5        CT Results  (Last 48 hours)                 09/25/22 1216  CT ABD PELV WO CONT Final result    Impression:      Diverticulosis coli. No evidence of diverticulitis. Splenic granuloma   No abnormality of the urinary system. Small portion of the urinary bladder is   obscured by beam hardening artifact. Stable mild compression fracture of T12. Stable grade 1 spondylolisthesis with disc space narrowing L4/5. Interval removal of the hardware fixation at T9 and T10. Stable appearance of   the hardware stabilization from T11 through L5       Narrative:  EXAM: CT ABDOMEN AND PELVIS WITHOUT CONTRAST       CLINICAL HISTORY/INDICATION:  lower abdominal pain, chills, and difficulty   voiding x5 days       COMPARISON: CT abdomen and pelvis 12/5/18. TECHNIQUE: No intravenous contrast was utilized for this helical thin section CT   of the abdomen and pelvis. Coronal and sagittal reformations obtained.    Evaluation of the solid organs, bowel wall, and vascular structures are therefore limited. All CT scans at this facility are performed using dose optimization technique as   appropriate to a performed exam, to include automated exposure control,   adjustment of the mA and/or kV according to patient's size (including   appropriate matching for site-specific examinations), or use of iterative   reconstruction technique. FINDINGS:        Abdomen-        The lung bases are clear; no pleural fluid or pneumothorax evident. The included portions of the chest wall and the abdominal wall  are   unremarkable. The liver and spleen are normal in size and density. Calcified granuloma in the   spleen The biliary tree is not dilated. The gallbladder is unremarkable. The kidneys appear to be normal in size and morphology. No calculi are identified in the kidney or ureter. Adrenals and Pancreas  are of normal CT appearance. There is no free fluid or free air. Diverticula scattered around the colon with high density clustering at the   rectosigmoid. No adjacent fluid nor surrounding inflammation. Appendix not demonstrated. Pelvis -       There is no free pelvic fluid. Streak artifact at the lower to mid pelvis secondary to beam hardening from   bilateral metallic hip replacements       The bladder is moderately but unremarkable. Prior laminectomies with hardware fusion extending from T11 through L5 with skip   at L1. Interspace areas of L1-L4. 6 mm of anterior offset of L4 on L5. Mild disc   space narrowing L4/5. About 25% compression of T12 without change. CXR Results  (Last 48 hours)      None              Medical Decision Making   I am the first provider for this patient. I reviewed the vital signs, available nursing notes, past medical history, past surgical history, family history and social history. Vital Signs-Reviewed the patient's vital signs.   Patient Vitals for the past 12 hrs:   Temp Pulse Resp BP SpO2   09/25/22 1130 -- (!) 58 13 135/80 98 %   09/25/22 1059 -- 60 14 132/72 98 %   09/25/22 1029 -- (!) 56 14 136/77 96 %   09/25/22 1007 -- -- -- -- 98 %   09/25/22 0913 98.4 °F (36.9 °C) 82 18 (!) 142/91 98 %       Pulse Oximetry Analysis - 98% on RA    Records Reviewed: Nursing Notes and Old Medical Records    Provider Notes (Medical Decision Making):   MDM: Really female presenting with back pain and chills. Differential could be diverticulitis, UTI, Pylo, viral syndrome causing myalgias. ED Course:   Initial assessment performed. The patients presenting problems have been discussed, and they are in agreement with the care plan formulated and outlined with them. I have encouraged them to ask questions as they arise throughout their visit. ED Course as of 09/25/22 1833   Nasra Vo Sep 25, 2022   1130 Reevaluated and we discussed her lab and urinalysis results. Patient states she knows there is something wrong because she has chills. Offered CT scan and she decided after long discussion to go ahead and move forward with imaging to make sure there is not something else causing her symptoms. [JT]      ED Course User Index  [JT] Tammie Arreola MD        Discharge note:  12:30  Pt re-evaluated and noted to be feeling better, ready for discharge. Updated pt on all final lab and CT findings. Will follow up as instructed. All questions have been answered, pt voiced understanding and agreement with plan. Specific return precautions provided as well as instructions to return to the ED should sx worsen at any time. Vital signs stable for discharge. Critical Care Time:   0      Diagnosis     Clinical Impression:   1. Chills        PLAN:  1. Discharge Medication List as of 9/25/2022  1:28 PM        2.    Follow-up Information       Follow up With Specialties Details Why Contact Info    Delma Hamlin MD Family Medicine Schedule an appointment as soon as possible for a visit   [address]  [phone]      [facility] EMERGENCY DEPT Emergency Medicine  If symptoms worsen [address] [facility]  [phone]          Return to ED if worse     Disposition:  Home       Please note, this dictation was completed with [employer], the computer voice recognition software. Quite often unanticipated grammatical, syntax, homophones, and other interpretive errors are inadvertently transcribed by the computer software. Please disregard these errors. Please excuse any errors that have escaped final proof reading.

## 2022-10-05 ENCOUNTER — OFFICE VISIT (OUTPATIENT)
Dept: FAMILY MEDICINE CLINIC | Age: 72
End: 2022-10-05
Payer: MEDICARE

## 2022-10-05 VITALS
OXYGEN SATURATION: 99 % | SYSTOLIC BLOOD PRESSURE: 118 MMHG | HEART RATE: 69 BPM | RESPIRATION RATE: 10 BRPM | TEMPERATURE: 97.4 F | DIASTOLIC BLOOD PRESSURE: 72 MMHG

## 2022-10-05 DIAGNOSIS — M54.6 ACUTE BILATERAL THORACIC BACK PAIN: ICD-10-CM

## 2022-10-05 DIAGNOSIS — F11.99 OPIOID USE, UNSPECIFIED WITH UNSPECIFIED OPIOID-INDUCED DISORDER (HCC): ICD-10-CM

## 2022-10-05 DIAGNOSIS — C80.1 CANCER (HCC): ICD-10-CM

## 2022-10-05 DIAGNOSIS — M48.062 SPINAL STENOSIS OF LUMBAR REGION WITH NEUROGENIC CLAUDICATION: Primary | ICD-10-CM

## 2022-10-05 PROCEDURE — G8417 CALC BMI ABV UP PARAM F/U: HCPCS | Performed by: FAMILY MEDICINE

## 2022-10-05 PROCEDURE — 1123F ACP DISCUSS/DSCN MKR DOCD: CPT | Performed by: FAMILY MEDICINE

## 2022-10-05 PROCEDURE — G8536 NO DOC ELDER MAL SCRN: HCPCS | Performed by: FAMILY MEDICINE

## 2022-10-05 PROCEDURE — G8752 SYS BP LESS 140: HCPCS | Performed by: FAMILY MEDICINE

## 2022-10-05 PROCEDURE — 1101F PT FALLS ASSESS-DOCD LE1/YR: CPT | Performed by: FAMILY MEDICINE

## 2022-10-05 PROCEDURE — G8427 DOCREV CUR MEDS BY ELIG CLIN: HCPCS | Performed by: FAMILY MEDICINE

## 2022-10-05 PROCEDURE — 99213 OFFICE O/P EST LOW 20 MIN: CPT | Performed by: FAMILY MEDICINE

## 2022-10-05 PROCEDURE — G8432 DEP SCR NOT DOC, RNG: HCPCS | Performed by: FAMILY MEDICINE

## 2022-10-05 PROCEDURE — G9899 SCRN MAM PERF RSLTS DOC: HCPCS | Performed by: FAMILY MEDICINE

## 2022-10-05 PROCEDURE — 1090F PRES/ABSN URINE INCON ASSESS: CPT | Performed by: FAMILY MEDICINE

## 2022-10-05 PROCEDURE — 3017F COLORECTAL CA SCREEN DOC REV: CPT | Performed by: FAMILY MEDICINE

## 2022-10-05 PROCEDURE — G8754 DIAS BP LESS 90: HCPCS | Performed by: FAMILY MEDICINE

## 2022-10-05 RX ORDER — TRAMADOL HYDROCHLORIDE 100 MG/1
100 TABLET, EXTENDED RELEASE ORAL DAILY
Qty: 90 TABLET | Refills: 0 | Status: SHIPPED | OUTPATIENT
Start: 2022-10-05 | End: 2023-01-03

## 2022-10-05 RX ORDER — TRAMADOL HYDROCHLORIDE 100 MG/1
100 TABLET, EXTENDED RELEASE ORAL DAILY
Qty: 90 TABLET | Refills: 0 | Status: CANCELLED | OUTPATIENT
Start: 2022-10-05 | End: 2023-01-03

## 2022-10-05 NOTE — PROGRESS NOTES
Miryam Rebolledo is a 74 y.o. female  presents for follow up on chronic pain and labs. Allergies   Allergen Reactions    Celebrex [Celecoxib] Swelling    Shellfish Derived Other (comments)      Pt.denies     Sulfa (Sulfonamide Antibiotics) Hives and Swelling     Lips swelling    Adhesive Hives, Itching and Contact Dermatitis    Gabapentin Diarrhea and Palpitations     Chest and Abdominal pain    Humira [Adalimumab] Other (comments)     Lupus    Influenza Virus Vaccines Hives    Iodine Itching     Takes benadryl and is OK. Optiray 320 [Ioversol] Hives and Itching     Pt states when she gets iv contrast she itches a little from hives? Penicillins Hives    Shrimp Other (comments)     Sodium puffy     Outpatient Medications Marked as Taking for the 10/5/22 encounter (Office Visit) with Severiano Commander, MD   Medication Sig Dispense Refill    traMADoL (ULTRAM-ER) 100 mg Tb24 Take 1 Tablet by mouth daily for 90 days.  Max Daily Amount: 100 mg. 90 Tablet 0     Patient Active Problem List   Diagnosis Code    DDD (degenerative disc disease), lumbar M51.36    Spondylolisthesis of lumbar region M43.16    Status post lumbar surgery Z98.890    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    H/O calcium pyrophosphate deposition disease (CPPD) Z87.39    IBS (irritable bowel syndrome) K58.9    RA (rheumatoid arthritis) ([facility]) M06.9    Sicca syndrome ([facility]) M35.00    Osteoarthritis of right hip M16.11    Pain management contract agreement Z02.89    ACP (advance care planning) Z71.89    Chronic left shoulder pain M25.512, G89.29    Osteopenia M85.80    Osteopenia of multiple sites M85.89    Osteoarthritis of left hip M16.12    Closed fracture of thoracic spine without spinal cord lesion ([facility] S22.009A    Spinal stenosis, thoracolumbar region M48.05    Lumbar spine instability M53.2X6    Spinal stenosis of lumbar region with neurogenic claudication M48.062    Spinal stenosis of lumbar region M48.061    Lumbar stenosis M48.061    Nausea R11.0    Acute bilateral thoracic back pain M54.6    Anxiety F41.9    Chest pain R07.9    Essential hypertension I10    Cataracts, bilateral H26.9    Diverticulitis K57.92    Elevated blood pressure reading without diagnosis of hypertension R03.0    Fall at home Dipika Becerra. XXXA, Y92.009    Insomnia G47.00    Lesion of hard palate K13.79    Lesion of neck L98.9    Numbness and tingling of right arm R20.0, R20.2    Osteoporosis M81.0    Sciatica M54.30    Sialolithiasis K11.5    Urinary retention with incomplete bladder emptying R33.9    SVT (supraventricular tachycardia) ([facility]) I47.1    Failed back syndrome, lumbosacral M96.1    Cancer (HCC) C80.1    Fusion of spine of thoracolumbar region M43.25    Opioid use, unspecified with unspecified opioid-induced disorder F11.99     Past Medical History:   Diagnosis Date    Abdominal abscess 2009    Adverse effect of anesthesia      Be careful with intubation.  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 )    Irritable bowel syndrome     Neuropathy     bilateral hands/wrist    Osteoporosis     Pneumonia     RA (rheumatoid arthritis) ([address]     Seizures ([address] 06/01/2008    one episode- after high dose of epinepherine     Social History     Socioeconomic History    Marital status:    Tobacco Use    Smoking status: Never    Smokeless tobacco: Never   Vaping Use    Vaping Use: Never used   Substance and Sexual Activity    Alcohol use: No    Drug use: Never    Sexual activity: Not Currently     Family History   Problem Relation Age of Onset    Other Other         Orthopedic (Sister)    OSTEOARTHRITIS Sister     Heart Attack Brother 58    Cancer Neg Hx     Diabetes Neg Hx     Heart Disease Neg Hx     Hypertension Neg Hx     Stroke Neg Hx     Malignant Hyperthermia Neg Hx     Pseudocholinesterase Deficiency Neg Hx     Delayed Awakening Neg Hx     Post-op Nausea/Vomiting Neg Hx     Emergence Delirium Neg Hx     Post-op Cognitive Dysfunction Neg Hx         ROS    Vitals:    10/05/22 0853   BP: 118/72   Pulse: 69   Resp: 10   Temp: 97.4 °F (36.3 °C)   SpO2: 99%   PainSc:   0 - No pain       Physical Exam    Assessment/Plan      ICD-10-CM ICD-9-CM    1. Spinal stenosis of lumbar region with neurogenic claudication  M48.062 724.03       2. Acute bilateral thoracic back pain  M54.6 724.1 traMADoL (ULTRAM-ER) 100 mg Tb24      3. Cancer (HCC)  A92.8 235.5 METABOLIC PANEL, BASIC      4. Opioid use, unspecified with unspecified opioid-induced disorder  F11.99 292.9      305.50         Follow-up and Dispositions    Return if symptoms worsen or fail to improve. I have discussed the diagnosis with the patient and the intended plan of care as seen in the above orders. The patient has received an after-visit summary and questions were answered concerning future plans. I have discussed medication, side effects, and warnings with the patient in detail. The patient verbalized understanding and is in agreement with the plan of care. The patient will contact the office with any additional concerns.     lab results and schedule of future lab studies reviewed with patient    Shakeel Meyers MD

## 2022-10-20 ENCOUNTER — VIRTUAL VISIT (OUTPATIENT)
Dept: FAMILY MEDICINE CLINIC | Age: 72
End: 2022-10-20
Payer: MEDICARE

## 2022-10-20 DIAGNOSIS — U07.1 COVID-19: Primary | ICD-10-CM

## 2022-10-20 PROCEDURE — G8432 DEP SCR NOT DOC, RNG: HCPCS | Performed by: FAMILY MEDICINE

## 2022-10-20 PROCEDURE — G8536 NO DOC ELDER MAL SCRN: HCPCS | Performed by: FAMILY MEDICINE

## 2022-10-20 PROCEDURE — G8427 DOCREV CUR MEDS BY ELIG CLIN: HCPCS | Performed by: FAMILY MEDICINE

## 2022-10-20 PROCEDURE — 1123F ACP DISCUSS/DSCN MKR DOCD: CPT | Performed by: FAMILY MEDICINE

## 2022-10-20 PROCEDURE — G8756 NO BP MEASURE DOC: HCPCS | Performed by: FAMILY MEDICINE

## 2022-10-20 PROCEDURE — 1101F PT FALLS ASSESS-DOCD LE1/YR: CPT | Performed by: FAMILY MEDICINE

## 2022-10-20 PROCEDURE — 99213 OFFICE O/P EST LOW 20 MIN: CPT | Performed by: FAMILY MEDICINE

## 2022-10-20 PROCEDURE — G8417 CALC BMI ABV UP PARAM F/U: HCPCS | Performed by: FAMILY MEDICINE

## 2022-10-20 PROCEDURE — 1090F PRES/ABSN URINE INCON ASSESS: CPT | Performed by: FAMILY MEDICINE

## 2022-10-20 PROCEDURE — G9899 SCRN MAM PERF RSLTS DOC: HCPCS | Performed by: FAMILY MEDICINE

## 2022-10-20 PROCEDURE — 3017F COLORECTAL CA SCREEN DOC REV: CPT | Performed by: FAMILY MEDICINE

## 2022-10-20 RX ORDER — CODEINE PHOSPHATE AND GUAIFENESIN 10; 100 MG/5ML; MG/5ML
5 SOLUTION ORAL
Qty: 118 ML | Refills: 0 | Status: SHIPPED | OUTPATIENT
Start: 2022-10-20 | End: 2022-11-03

## 2022-10-20 NOTE — PROGRESS NOTES
Yulia Mckay is a 74 y.o. female who was seen by synchronous (real-time) audio-video technology on 10/20/2022 for Positive For Covid-19    She has fever and chills with cough. Assessment & Plan:   Diagnoses and all orders for this visit:    1. COVID-19  -     nirmatrelvir-ritonavir (PAXLOVID) 300 mg (150 mg x 2)-100 mg; 3 tabs po bid for 5 days  -     guaiFENesin-codeine (ROBITUSSIN AC) 100-10 mg/5 mL solution; Take 5 mL by mouth three (3) times daily as needed for Cough for up to 14 days. Max Daily Amount: 15 mL. 712  Subjective:       Prior to Admission medications    Medication Sig Start Date End Date Taking? Authorizing Provider   traMADoL (ULTRAM-ER) 100 mg Tb24 Take 1 Tablet by mouth daily for 90 days. Max Daily Amount: 100 mg. 10/5/22 1/3/23 Yes Alexei Ribeiro MD   meloxicam (MOBIC) 15 mg tablet Take 1 Tablet by mouth daily. 9/16/22  Yes Alexei Ribeiro MD   lisinopriL (PRINIVIL, ZESTRIL) 5 mg tablet TAKE 1 TABLET DAILY 9/2/22  Yes Alexei Ribeiro MD   zolpidem (AMBIEN) 5 mg tablet Take 1 Tablet by mouth nightly. 8/22/22  Yes Alexei Ribeiro MD   clobetasoL (TEMOVATE) 0.05 % topical cream APPLY TO AFFECTED AREA OF BACK TWICE A DAY AS NEEDED FOR RASH FLARE UPS 6/20/22  Yes Provider, Historical   Imvexxy Maintenance Pack 10 mcg inst insert 1 vaginally (10MCG) two times a week (EVERY 3 OR 4 DAYS) 7/26/22  Yes Provider, Historical   Imvexxy Starter Pack 10 mcg InPk insert 1 capsule (10MCG) vaginally once daily for 2 weeks then 1 ...  (REFER TO PRESCRIPTION NOTES). 7/26/22  Yes Provider, Historical   fluocinoNIDE (LIDEX) 0.05 % external solution APPLY TO SCALP ONCE DAILY AS NEEDED FOR ITCHING AND FLAKING 5/19/22  Yes Provider, Historical   imiquimod (ALDARA) 5 % cream APPLY SPARINGLY TO SPOT ON BACK 5 TIMES A WEEK AT NIGHT FOR 6 WEEKS 7/25/22  Yes Provider, Historical   ketoconazole (NIZORAL) 2 % shampoo WASH SCALP ONCE WEEKLY, ALLOW LATHER TO SIT 4-5 MINUTES THEN RINS. ..  (REFER TO PRESCRIPTION NOTES). 5/19/22  Yes Provider, Historical   UribeL 118-10-40.8-36 mg cap capsule take 1 capsule by mouth every 6 hours if needed 8/4/22  Yes Provider, Historical   mupirocin (BACTROBAN) 2 % ointment APPLY TO AFFECTED AREA OF BACK ONCE DAILY AFTER CLEASING WITH SOAP AND WATER 6/2/22  Yes Provider, Historical   multivitamin (ONE A DAY) tablet Take 1 Tablet by mouth daily. 2/8/21  Yes Provider, Historical   tofacitinib Jessica Aspen) 5 mg tab Take  by mouth two (2) times a day. Yes Provider, Historical   cetirizine (ZYRTEC) 10 mg tablet Take 10 mg by mouth daily as needed. Yes Provider, Historical   acetaminophen (TYLENOL) 500 mg tablet Take 1,000 mg by mouth every six (6) hours as needed for Pain. Yes Provider, Historical   cholecalciferol (VITAMIN D3) (1000 Units /25 mcg) tablet Take 1,000 Units by mouth five (5) days a week. Yes Provider, Historical   calcium carbonate (OS-BARBARA) 500 mg calcium (1,250 mg) tablet Take 1,500 mg by mouth daily.    Yes Provider, Historical     Patient Active Problem List   Diagnosis Code    DDD (degenerative disc disease), lumbar M51.36    Spondylolisthesis of lumbar region M43.16    Status post lumbar surgery Z98.890    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    H/O calcium pyrophosphate deposition disease (CPPD) Z87.39    IBS (irritable bowel syndrome) K58.9    RA (rheumatoid arthritis) ([facility] M06.9    Sicca syndrome (HCC) M35.00    Osteoarthritis of right hip M16.11    Pain management contract agreement Z02.89    ACP (advance care planning) Z71.89    Chronic left shoulder pain M25.512, G89.29    Osteopenia M85.80    Osteopenia of multiple sites M85.89    Osteoarthritis of left hip M16.12    Closed fracture of thoracic spine without spinal cord lesion ([facility] S22.009A    Spinal stenosis, thoracolumbar region M48.05    Lumbar spine instability M53.2X6    Spinal stenosis of lumbar region with neurogenic claudication M48.062    Spinal stenosis of lumbar region M48.061    Lumbar stenosis M48.061    Nausea R11.0    Acute bilateral thoracic back pain M54.6    Anxiety F41.9    Chest pain R07.9    Essential hypertension I10    Cataracts, bilateral H26.9    Diverticulitis K57.92    Elevated blood pressure reading without diagnosis of hypertension R03.0    Fall at home [address] XXXA, Y92.009    Insomnia G47.00    Lesion of hard palate K13.79    Lesion of neck L98.9    Numbness and tingling of right arm R20.0, R20.2    Osteoporosis M81.0    Sciatica M54.30    Sialolithiasis K11.5    Urinary retention with incomplete bladder emptying R33.9    SVT (supraventricular tachycardia) ([facility]) I47.1    Failed back syndrome, lumbosacral M96.1    Cancer (HCC) C80.1    Fusion of spine of thoracolumbar region M43.25    Opioid use, unspecified with unspecified opioid-induced disorder F11.99     Past Medical History:   Diagnosis Date    Abdominal abscess 2009    Adverse effect of anesthesia      Be careful with intubation. 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 Negative for malaise/fatigue and weight loss. HENT:  Positive for congestion. Eyes:  Negative for blurred vision. Respiratory:  Positive for cough. Negative for shortness of breath and wheezing. Cardiovascular:  Negative for chest pain.    Gastrointestinal:  Negative for nausea and vomiting. Musculoskeletal:  Negative for myalgias. Skin:  Negative for rash. Neurological:  Negative for weakness. Objective:     Patient-Reported Vitals 10/20/2022   Patient-Reported Weight 175   Patient-Reported Height -   Patient-Reported Pulse na   Patient-Reported Temperature 100.1   Patient-Reported SpO2 na   Patient-Reported Systolic  0   Patient-Reported Diastolic 0   Patient-Reported Peak Flow na   Patient-Reported LMP na      General: alert, cooperative, no distress   Mental  status: normal mood, behavior, speech, dress, motor activity, and thought processes, able to follow commands   HENT: NCAT   Neck: no visualized mass   Resp: no respiratory distress   Neuro: no gross deficits   Skin: no discoloration or lesions of concern on visible areas   Psychiatric: normal affect, consistent with stated mood, no evidence of hallucinations     Additional exam findings: We discussed the expected course, resolution and complications of the diagnosis(es) in detail. Medication risks, benefits, costs, interactions, and alternatives were discussed as indicated. I advised her to contact the office if her condition worsens, changes or fails to improve as anticipated. She expressed understanding with the diagnosis(es) and plan. Moira Xie, was evaluated through a synchronous (real-time) audio-video encounter. The patient (or guardian if applicable) is aware that this is a billable service, which includes applicable co-pays. This Virtual Visit was conducted with patient's (and/or legal guardian's) consent. The visit was conducted pursuant to the emergency declaration under the [address] [place] [address], [address] [place] [address] authority and the [employer] and [employer] General Act. Patient identification was verified, and a caregiver was present when appropriate.   The patient was located at: Home: [address] [place] [id number]  The provider was located at:  Facility (Appt Department): [facility]  [address]  [phone]        Devaughn Francisco MD

## 2022-10-20 NOTE — PROGRESS NOTES
Chief Complaint   Patient presents with    Positive For Covid-19     Patient being seen today for positive COVID on 10/18/2022. She says she has a bad cough and fever of 102. C/o muscle pain in legs, back. 1. \"Have you been to the ER, urgent care clinic since your last visit? Hospitalized since your last visit? \" No    2. \"Have you seen or consulted any other health care providers outside of the [address] [place] [address] since your last visit? \" No     3. For patients aged 39-70: Has the patient had a colonoscopy / FIT/ Cologuard? Yes - no Care Gap present      If the patient is female:    4. For patients aged 41-77: Has the patient had a mammogram within the past 2 years? Yes - no Care Gap present      5. For patients aged 21-65: Has the patient had a pap smear?  NA - based on age or sex

## 2022-11-21 ENCOUNTER — VIRTUAL VISIT (OUTPATIENT)
Dept: FAMILY MEDICINE CLINIC | Age: 72
End: 2022-11-21
Payer: MEDICARE

## 2022-11-21 DIAGNOSIS — J40 BRONCHITIS: Primary | ICD-10-CM

## 2022-11-21 PROCEDURE — 1090F PRES/ABSN URINE INCON ASSESS: CPT | Performed by: FAMILY MEDICINE

## 2022-11-21 PROCEDURE — G9899 SCRN MAM PERF RSLTS DOC: HCPCS | Performed by: FAMILY MEDICINE

## 2022-11-21 PROCEDURE — 3017F COLORECTAL CA SCREEN DOC REV: CPT | Performed by: FAMILY MEDICINE

## 2022-11-21 PROCEDURE — G8427 DOCREV CUR MEDS BY ELIG CLIN: HCPCS | Performed by: FAMILY MEDICINE

## 2022-11-21 PROCEDURE — 99213 OFFICE O/P EST LOW 20 MIN: CPT | Performed by: FAMILY MEDICINE

## 2022-11-21 PROCEDURE — 1123F ACP DISCUSS/DSCN MKR DOCD: CPT | Performed by: FAMILY MEDICINE

## 2022-11-21 PROCEDURE — G8756 NO BP MEASURE DOC: HCPCS | Performed by: FAMILY MEDICINE

## 2022-11-21 PROCEDURE — G8536 NO DOC ELDER MAL SCRN: HCPCS | Performed by: FAMILY MEDICINE

## 2022-11-21 PROCEDURE — G8432 DEP SCR NOT DOC, RNG: HCPCS | Performed by: FAMILY MEDICINE

## 2022-11-21 PROCEDURE — G8417 CALC BMI ABV UP PARAM F/U: HCPCS | Performed by: FAMILY MEDICINE

## 2022-11-21 PROCEDURE — 1101F PT FALLS ASSESS-DOCD LE1/YR: CPT | Performed by: FAMILY MEDICINE

## 2022-11-21 RX ORDER — AZITHROMYCIN 250 MG/1
TABLET, FILM COATED ORAL
Qty: 6 TABLET | Refills: 0 | Status: SHIPPED | OUTPATIENT
Start: 2022-11-21 | End: 2022-11-26

## 2022-11-21 NOTE — PROGRESS NOTES
Sania Camarena is a 74 y.o. female who was seen by synchronous (real-time) audio-video technology on 11/21/2022 for Cough (No fever or chills. Has had sxs for about a week. )        Assessment & Plan:   Diagnoses and all orders for this visit:    1. Bronchitis  -     azithromycin (ZITHROMAX) 250 mg tablet; Take 2 tablets today, then take 1 tablet daily        712  Subjective:       Prior to Admission medications    Medication Sig Start Date End Date Taking? Authorizing Provider   nirmatrelvir-ritonavir (PAXLOVID) 300 mg (150 mg x 2)-100 mg 3 tabs po bid for 5 days 10/20/22   Malachi Elaine MD   traMADoL (ULTRAM-ER) 100 mg Tb24 Take 1 Tablet by mouth daily for 90 days. Max Daily Amount: 100 mg. 10/5/22 1/3/23  Malachi Elaine MD   meloxicam (MOBIC) 15 mg tablet Take 1 Tablet by mouth daily. 9/16/22   Malachi Elaine MD   lisinopriL (PRINIVIL, ZESTRIL) 5 mg tablet TAKE 1 TABLET DAILY 9/2/22   Malachi Elaine MD   zolpidem (AMBIEN) 5 mg tablet Take 1 Tablet by mouth nightly. 8/22/22   Malachi Elaine MD   clobetasoL (TEMOVATE) 0.05 % topical cream APPLY TO AFFECTED AREA OF BACK TWICE A DAY AS NEEDED FOR RASH FLARE UPS 6/20/22   Provider, Historical   Imvexxy Maintenance Pack 10 mcg inst insert 1 vaginally (10MCG) two times a week (EVERY 3 OR 4 DAYS) 7/26/22   Provider, Historical   Imvexxy Starter Pack 10 mcg InPk insert 1 capsule (10MCG) vaginally once daily for 2 weeks then 1 ...  (REFER TO PRESCRIPTION NOTES). 7/26/22   Provider, Historical   fluocinoNIDE (LIDEX) 0.05 % external solution APPLY TO SCALP ONCE DAILY AS NEEDED FOR ITCHING AND FLAKING 5/19/22   Provider, Historical   imiquimod (ALDARA) 5 % cream APPLY SPARINGLY TO SPOT ON BACK 5 TIMES A WEEK AT NIGHT FOR 6 WEEKS 7/25/22   Provider, Historical   ketoconazole (NIZORAL) 2 % shampoo WASH SCALP ONCE WEEKLY, ALLOW LATHER TO SIT 4-5 MINUTES THEN RINS. ..  (REFER TO PRESCRIPTION NOTES).  5/19/22   Provider, Historical   UribeL 118-10-40.8-36 mg cap capsule take 1 capsule by mouth every 6 hours if needed 8/4/22   Provider, Historical   mupirocin (BACTROBAN) 2 % ointment APPLY TO AFFECTED AREA OF BACK ONCE DAILY AFTER CLEASING WITH SOAP AND WATER 6/2/22   Provider, Historical   multivitamin (ONE A DAY) tablet Take 1 Tablet by mouth daily. 2/8/21   Provider, Historical   cetirizine (ZYRTEC) 10 mg tablet Take 10 mg by mouth daily as needed. Provider, Historical   acetaminophen (TYLENOL) 500 mg tablet Take 1,000 mg by mouth every six (6) hours as needed for Pain. Provider, Historical   cholecalciferol (VITAMIN D3) (1000 Units /25 mcg) tablet Take 1,000 Units by mouth five (5) days a week. Provider, Historical   calcium carbonate (OS-BARBARA) 500 mg calcium (1,250 mg) tablet Take 1,500 mg by mouth daily.     Provider, Historical     Patient Active Problem List   Diagnosis Code    DDD (degenerative disc disease), lumbar M51.36    Spondylolisthesis of lumbar region M43.16    Status post lumbar surgery Z98.890    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    H/O calcium pyrophosphate deposition disease (CPPD) Z87.39    IBS (irritable bowel syndrome) K58.9    RA (rheumatoid arthritis) ([facility]) M06.9    Sicca syndrome ([facility]) M35.00    Osteoarthritis of right hip M16.11    Pain management contract agreement Z02.89    ACP (advance care planning) Z71.89    Chronic left shoulder pain M25.512, G89.29    Osteopenia M85.80    Osteopenia of multiple sites M85.89    Osteoarthritis of left hip M16.12    Closed fracture of thoracic spine without spinal cord lesion ([facility] S22.009A    Spinal stenosis, thoracolumbar region M48.05    Lumbar spine instability M53.2X6    Spinal stenosis of lumbar region with neurogenic claudication M48.062    Spinal stenosis of lumbar region M48.061    Lumbar stenosis M48.061    Nausea R11.0    Acute bilateral thoracic back pain M54.6    Anxiety F41.9    Chest pain R07.9    Essential hypertension I10    Cataracts, bilateral H26.9    Diverticulitis K57.92    Elevated blood pressure reading without diagnosis of hypertension R03.0    Fall at home [address] XXXA, Y92.009    Insomnia G47.00    Lesion of hard palate K13.79    Lesion of neck L98.9    Numbness and tingling of right arm R20.0, R20.2    Osteoporosis M81.0    Sciatica M54.30    Sialolithiasis K11.5    Urinary retention with incomplete bladder emptying R33.9    SVT (supraventricular tachycardia) (HCC) I47.1    Failed back syndrome, lumbosacral M96.1    Cancer (HCC) C80.1    Fusion of spine of thoracolumbar region M43.25    Opioid use, unspecified with unspecified opioid-induced disorder F11.99     Past Medical History:   Diagnosis Date    Abdominal abscess 2009    Adverse effect of anesthesia      Be careful with intubation. 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 HENT:  Positive for congestion. Eyes:  Negative for blurred vision. Respiratory:  Positive for cough. Negative for shortness of breath and wheezing. Cardiovascular:  Negative for chest pain. Gastrointestinal:  Negative for nausea and vomiting. Musculoskeletal:  Negative for myalgias. Skin:  Negative for rash. Neurological:  Negative for weakness.      Objective:     Patient-Reported Vitals 11/21/2022   Patient-Reported Weight 174 Patient-Reported Height -   Patient-Reported Pulse 108   Patient-Reported Temperature 99.0   Patient-Reported SpO2 93   Patient-Reported Systolic  811   Patient-Reported Diastolic 90   Patient-Reported Peak Flow -   Patient-Reported LMP -      General: alert, cooperative, no distress   Mental  status: normal mood, behavior, speech, dress, motor activity, and thought processes, able to follow commands   HENT: NCAT   Neck: no visualized mass   Resp: no respiratory distress   Neuro: no gross deficits   Skin: no discoloration or lesions of concern on visible areas   Psychiatric: normal affect, consistent with stated mood, no evidence of hallucinations     Additional exam findings: We discussed the expected course, resolution and complications of the diagnosis(es) in detail. Medication risks, benefits, costs, interactions, and alternatives were discussed as indicated. I advised her to contact the office if her condition worsens, changes or fails to improve as anticipated. She expressed understanding with the diagnosis(es) and plan. Carrie Agarwal, was evaluated through a synchronous (real-time) audio-video encounter. The patient (or guardian if applicable) is aware that this is a billable service, which includes applicable co-pays. This Virtual Visit was conducted with patient's (and/or legal guardian's) consent. The visit was conducted pursuant to the emergency declaration under the [address] [place] [address] authority and the [employer] General Act. Patient identification was verified, and a caregiver was present when appropriate. The patient was located at: Home: [address] [place], [place] [id number]  The provider was located at:  Facility (Appt Department): [address] [place] [address]  [address] [place] [address]  [phone]        Rita Craig MD

## 2022-11-29 NOTE — IP AVS SNAPSHOT
Date of last visit:  10/6/2022  Date of next visit:  1/12/2023    Requested Prescriptions     Pending Prescriptions Disp Refills    PARoxetine (PAXIL) 20 MG tablet 180 tablet 1     Sig: Take 1 tablet by mouth twice daily Merlin Laroche 
 
 
 [address] [facility] [address] 
[phone] Patient: Armaan Winkler MRN: [id number] POY:0[birth year]43[birth year] My Medications STOP taking these medications   
 meloxicam 15 mg tablet Commonly known as:  MOBIC  
   
  
 XELJANZ 5 mg tab Generic drug:  tofacitinib TAKE these medications as instructed Instructions Each Dose to Equal  
 Morning Noon Evening Bedtime  
 aspirin 325 mg tablet Commonly known as:  ASPIRIN Your last dose was: Your next dose is: Take 1 Tab by mouth two (2) times a day. Take for 3 weeks for DVT prophylaxis. 325 mg  
    
   
   
   
  
 calcium carbonate 500 mg calcium (1,250 mg) tablet Commonly known as:  OS-BARBARA Your last dose was: Your next dose is: Take 1,500 mg by mouth daily. 1500 mg  
    
   
   
   
  
 cyclobenzaprine 10 mg tablet Commonly known as:  FLEXERIL Your last dose was: Your next dose is: Take 1 Tab by mouth three (3) times daily as needed. Indications: RA  
 10 mg HYDROmorphone 2 mg tablet Commonly known as:  DILAUDID Your last dose was: Your next dose is: Take 1-2 Tabs by mouth every four (4) hours as needed for Pain. Max Daily Amount: 24 mg.  
 2-4 mg  
    
   
   
   
  
 lisinopril 10 mg tablet Commonly known as:  Ora Bee Your last dose was: Your next dose is: TAKE 1 TABLET DAILY MIRALAX 17 gram packet Generic drug:  polyethylene glycol Your last dose was: Your next dose is: Take 17 g by mouth daily. 17 g  
    
   
   
   
  
 naloxone 2 mg/actuation Spry Commonly known as:  [employer] Your last dose was: Your next dose is:    
   
   
 Use 1 spray intranasally into 1 nostril.  Use a new Narcan nasal spray for subsequent doses and administer into alternating nostrils. May repeat every 2 to 3 minutes as needed. sucralfate 100 mg/mL suspension Commonly known as:  González Mcmahon Your last dose was: Your next dose is: Take 5 mL by mouth four (4) times daily. 1 tsp  
    
   
   
   
  
 traMADol 100 mg Tb24 Commonly known as:  ULTRAM-ER Your last dose was: Your next dose is: Take 1 Tab by mouth daily. Max Daily Amount: 100 mg. Indications: Chronic Pain 100 mg  
    
   
   
   
  
 TYLENOL EXTRA STRENGTH 500 mg tablet Generic drug:  acetaminophen Your last dose was: Your next dose is: Take 1,000 mg by mouth every six (6) hours as needed for Pain. 1000 mg  
    
   
   
   
  
 VITAMIN D3 1,000 unit tablet Generic drug:  cholecalciferol Your last dose was: Your next dose is: Take 1,000 Units by mouth five (5) days a week. 1000 Units Where to Get Your Medications Information on where to get these meds will be given to you by the nurse or doctor. ! Ask your nurse or doctor about these medications  
  aspirin 325 mg tablet HYDROmorphone 2 mg tablet  
 naloxone 2 mg/actuation Spry

## 2022-12-07 DIAGNOSIS — M54.6 ACUTE BILATERAL THORACIC BACK PAIN: ICD-10-CM

## 2022-12-07 DIAGNOSIS — M51.36 DDD (DEGENERATIVE DISC DISEASE), LUMBAR: ICD-10-CM

## 2022-12-07 DIAGNOSIS — G47.00 INSOMNIA: ICD-10-CM

## 2022-12-08 RX ORDER — TRAMADOL HYDROCHLORIDE 100 MG/1
100 TABLET, EXTENDED RELEASE ORAL DAILY
Qty: 90 TABLET | Refills: 0 | Status: SHIPPED | OUTPATIENT
Start: 2022-12-08 | End: 2023-03-08

## 2022-12-08 RX ORDER — ZOLPIDEM TARTRATE 5 MG/1
5 TABLET ORAL
Qty: 90 TABLET | Refills: 0 | Status: SHIPPED | OUTPATIENT
Start: 2022-12-08

## 2022-12-08 RX ORDER — MELOXICAM 15 MG/1
15 TABLET ORAL DAILY
Qty: 90 TABLET | Refills: 1 | Status: SHIPPED | OUTPATIENT
Start: 2022-12-08

## 2022-12-17 DIAGNOSIS — M54.50 LUMBAR BACK PAIN: ICD-10-CM

## 2022-12-19 RX ORDER — CYCLOBENZAPRINE HCL 10 MG
TABLET ORAL
Qty: 30 TABLET | Refills: 35 | Status: SHIPPED | OUTPATIENT
Start: 2022-12-19

## 2023-01-01 NOTE — H&P
Date of Surgery Update:  Nitin Higgins was seen and examined. History and physical has been reviewed. The patient has been examined. There have been no significant clinical changes since the last office visit. The patient was counseled at length about the risks of donovan Covid-19 during their perioperative period and any recovery window from their procedure. The patient was made aware that donovan Covid-19  may worsen their prognosis for recovering from their procedure and lend to a higher morbidity and/or mortality risk. All material risks, benefits, and reasonable alternatives including postponing the procedure were discussed. The patient does  wish to proceed with the procedure at this time.         Signed By: Dwayne Hunter MD     September 23, 2020 1:14 PM Mother signed infant's discharge instructions and was given a copy for her records. Final ID band check completed with mother and infant. Correct ID confirmed. Hugs tag disabled and removed.

## 2023-02-09 ENCOUNTER — TELEPHONE (OUTPATIENT)
Dept: FAMILY MEDICINE CLINIC | Age: 73
End: 2023-02-09

## 2023-02-09 NOTE — TELEPHONE ENCOUNTER
Patient called stating she is experiencing left leg swelling, and would like Dr. Olman Edouard to send an alternative medicine for meloxicam (MOBIC) 15 mg. Please review and advise.

## 2023-02-10 RX ORDER — DICLOFENAC SODIUM 50 MG/1
50 TABLET, DELAYED RELEASE ORAL 2 TIMES DAILY
Qty: 40 TABLET | Refills: 1 | Status: SHIPPED | OUTPATIENT
Start: 2023-02-10

## 2023-02-13 SDOH — ECONOMIC STABILITY: TRANSPORTATION INSECURITY
IN THE PAST 12 MONTHS, HAS LACK OF TRANSPORTATION KEPT YOU FROM MEETINGS, WORK, OR FROM GETTING THINGS NEEDED FOR DAILY LIVING?: PATIENT DECLINED

## 2023-02-13 SDOH — ECONOMIC STABILITY: INCOME INSECURITY: HOW HARD IS IT FOR YOU TO PAY FOR THE VERY BASICS LIKE FOOD, HOUSING, MEDICAL CARE, AND HEATING?: PATIENT DECLINED

## 2023-02-13 SDOH — ECONOMIC STABILITY: FOOD INSECURITY: WITHIN THE PAST 12 MONTHS, THE FOOD YOU BOUGHT JUST DIDN'T LAST AND YOU DIDN'T HAVE MONEY TO GET MORE.: PATIENT DECLINED

## 2023-02-13 SDOH — ECONOMIC STABILITY: HOUSING INSECURITY
IN THE LAST 12 MONTHS, WAS THERE A TIME WHEN YOU DID NOT HAVE A STEADY PLACE TO SLEEP OR SLEPT IN A SHELTER (INCLUDING NOW)?: PATIENT REFUSED

## 2023-02-13 SDOH — ECONOMIC STABILITY: FOOD INSECURITY: WITHIN THE PAST 12 MONTHS, YOU WORRIED THAT YOUR FOOD WOULD RUN OUT BEFORE YOU GOT MONEY TO BUY MORE.: PATIENT DECLINED

## 2023-02-15 ENCOUNTER — OFFICE VISIT (OUTPATIENT)
Facility: CLINIC | Age: 73
End: 2023-02-15
Payer: MEDICARE

## 2023-02-15 VITALS
OXYGEN SATURATION: 96 % | HEART RATE: 71 BPM | BODY MASS INDEX: 32.74 KG/M2 | SYSTOLIC BLOOD PRESSURE: 108 MMHG | DIASTOLIC BLOOD PRESSURE: 67 MMHG | WEIGHT: 179 LBS | TEMPERATURE: 97.8 F

## 2023-02-15 DIAGNOSIS — Z91.81 AT HIGH RISK FOR FALLS: ICD-10-CM

## 2023-02-15 DIAGNOSIS — N30.90 CYSTITIS: ICD-10-CM

## 2023-02-15 DIAGNOSIS — M25.562 ACUTE PAIN OF LEFT KNEE: Primary | ICD-10-CM

## 2023-02-15 PROCEDURE — 4004F PT TOBACCO SCREEN RCVD TLK: CPT | Performed by: FAMILY MEDICINE

## 2023-02-15 PROCEDURE — 3074F SYST BP LT 130 MM HG: CPT | Performed by: FAMILY MEDICINE

## 2023-02-15 PROCEDURE — 3017F COLORECTAL CA SCREEN DOC REV: CPT | Performed by: FAMILY MEDICINE

## 2023-02-15 PROCEDURE — 1123F ACP DISCUSS/DSCN MKR DOCD: CPT | Performed by: FAMILY MEDICINE

## 2023-02-15 PROCEDURE — G8484 FLU IMMUNIZE NO ADMIN: HCPCS | Performed by: FAMILY MEDICINE

## 2023-02-15 PROCEDURE — 99213 OFFICE O/P EST LOW 20 MIN: CPT | Performed by: FAMILY MEDICINE

## 2023-02-15 PROCEDURE — 1090F PRES/ABSN URINE INCON ASSESS: CPT | Performed by: FAMILY MEDICINE

## 2023-02-15 PROCEDURE — G8427 DOCREV CUR MEDS BY ELIG CLIN: HCPCS | Performed by: FAMILY MEDICINE

## 2023-02-15 PROCEDURE — G8417 CALC BMI ABV UP PARAM F/U: HCPCS | Performed by: FAMILY MEDICINE

## 2023-02-15 PROCEDURE — 3078F DIAST BP <80 MM HG: CPT | Performed by: FAMILY MEDICINE

## 2023-02-15 PROCEDURE — G8399 PT W/DXA RESULTS DOCUMENT: HCPCS | Performed by: FAMILY MEDICINE

## 2023-02-15 RX ORDER — CIPROFLOXACIN 250 MG/1
250 TABLET, FILM COATED ORAL 2 TIMES DAILY
Qty: 14 TABLET | Refills: 0 | Status: SHIPPED | OUTPATIENT
Start: 2023-02-15 | End: 2023-02-22

## 2023-02-15 ASSESSMENT — PATIENT HEALTH QUESTIONNAIRE - PHQ9
SUM OF ALL RESPONSES TO PHQ QUESTIONS 1-9: 0
SUM OF ALL RESPONSES TO PHQ QUESTIONS 1-9: 0
SUM OF ALL RESPONSES TO PHQ9 QUESTIONS 1 & 2: 0
2. FEELING DOWN, DEPRESSED OR HOPELESS: 0
SUM OF ALL RESPONSES TO PHQ QUESTIONS 1-9: 0
1. LITTLE INTEREST OR PLEASURE IN DOING THINGS: 0
SUM OF ALL RESPONSES TO PHQ QUESTIONS 1-9: 0

## 2023-02-15 ASSESSMENT — ENCOUNTER SYMPTOMS: RESPIRATORY NEGATIVE: 1

## 2023-02-15 NOTE — PROGRESS NOTES
Марина Agudelo is a 74 y.o. female  presents for left knee pain. She had and injury getting in car about 6 weeks ago. Sxs have not improved  she also has some urine frequency. Chief Complaint   Patient presents with    Knee Pain     Left       Swelling        Allergies   Allergen Reactions    Celecoxib Swelling    Shellfish Allergy Other (See Comments)      Pt.denies     Sulfa Antibiotics Hives and Swelling     Lips swelling    Adalimumab Other (See Comments)     Lupus    Adhesive Tape Dermatitis, Hives and Itching    Influenza Vaccines Hives    Iodine Itching     Takes benadryl and is OK. Ioversol Hives and Itching     Pt states when she gets iv contrast she itches a little from hives?     Nsaids Swelling    Penicillins Hives    Shrimp Extract Allergy Skin Test Other (See Comments)     Sodium puffy    Gabapentin Diarrhea and Palpitations     Chest and Abdominal pain       Current Outpatient Medications:     Menaquinone-7 (K2 PO), Take by mouth, Disp: , Rfl:     acetaminophen (TYLENOL) 500 MG tablet, Take 1,000 mg by mouth every 6 hours as needed, Disp: , Rfl:     calcium carbonate (OYSTER SHELL CALCIUM 500 MG) 1250 (500 Ca) MG tablet, Take 1,500 mg by mouth daily, Disp: , Rfl:     cetirizine (ZYRTEC) 10 MG tablet, Take 10 mg by mouth daily as needed, Disp: , Rfl:     vitamin D (CHOLECALCIFEROL) 25 MCG (1000 UT) TABS tablet, Take 1,000 Units by mouth, Disp: , Rfl:     clobetasol (TEMOVATE) 0.05 % cream, APPLY TO AFFECTED AREA OF BACK TWICE A DAY AS NEEDED FOR RASH FLARE UPS, Disp: , Rfl:     cyclobenzaprine (FLEXERIL) 10 MG tablet, TAKE 1 TABLET THREE TIMES A DAY AS NEEDED FOR MUSCLE SPASMS, Disp: , Rfl:     Estradiol (VAGIFEM) 10 MCG TABS vaginal tablet, insert 1 vaginally (10MCG) two times a week (EVERY 3 OR 4 DAYS), Disp: , Rfl:     fluocinonide (LIDEX) 0.05 % external solution, APPLY TO SCALP ONCE DAILY AS NEEDED FOR ITCHING AND FLAKING, Disp: , Rfl:     ketoconazole (NIZORAL) 2 % shampoo, WASH SCALP ONCE WEEKLY, ALLOW LATHER TO SIT 4-5 MINUTES THEN RINS. ..  (REFER TO PRESCRIPTION NOTES). , Disp: , Rfl:     lisinopril (PRINIVIL;ZESTRIL) 5 MG tablet, TAKE 1 TABLET DAILY, Disp: , Rfl:     Meth-Hyo-M Bl-Na Phos-Ph Sal (URIBEL) 118 MG CAPS, take 1 capsule by mouth every 6 hours if needed, Disp: , Rfl:     mupirocin (BACTROBAN) 2 % ointment, APPLY TO AFFECTED AREA OF BACK ONCE DAILY AFTER CLEASING WITH SOAP AND WATER, Disp: , Rfl:     traMADol (ULTRAM ER) 100 MG extended release tablet, Take 100 mg by mouth daily. , Disp: , Rfl:     zolpidem (AMBIEN) 5 MG tablet, Take 5 mg by mouth., Disp: , Rfl:     Estradiol Starter Pack (IMVEXXY STARTER PACK) 10 MCG INST, insert 1 capsule (10MCG) vaginally once daily for 2 weeks then 1 ...  (REFER TO PRESCRIPTION NOTES).  (Patient not taking: Reported on 2/15/2023), Disp: , Rfl:     imiquimod (ALDARA) 5 % cream, APPLY SPARINGLY TO SPOT ON BACK 5 TIMES A WEEK AT NIGHT FOR 6 WEEKS (Patient not taking: Reported on 2/15/2023), Disp: , Rfl:     meloxicam (MOBIC) 15 MG tablet, Take 15 mg by mouth daily (Patient not taking: Reported on 2/15/2023), Disp: , Rfl:     nirmatrelvir/ritonavir (PAXLOVID) 20 x 150 MG & 10 x 100MG TBPK, 3 tabs po bid for 5 days (Patient not taking: Reported on 2/15/2023), Disp: , Rfl:    Patient Active Problem List   Diagnosis    ACP (advance care planning)    Pain management contract agreement    Osteoporosis    DDD (degenerative disc disease), lumbar    Chronic left shoulder pain    Numbness and tingling of right arm    Lesion of hard palate    Spinal stenosis, thoracolumbar region    Fall at home    Elevated blood pressure reading without diagnosis of hypertension    SVT (supraventricular tachycardia) (HCC)    Nausea    Osteoarthritis of right hip    Sicca syndrome (HCC)    Spondylolisthesis of lumbar region    Osteopenia of multiple sites    Fibrosis of left subtalar joint    Spinal stenosis of lumbar region    Lumbar stenosis    Insomnia    Status post lumbar surgery    Closed fracture of thoracic spine without spinal cord lesion (HCC)    Lesion of neck    Acute bilateral thoracic back pain    Spinal stenosis of lumbar region with neurogenic claudication    Chest pain    IBS (irritable bowel syndrome)    Cataracts, bilateral    Diverticulitis    Sialolithiasis    Urinary retention with incomplete bladder emptying    Osteopenia    Lumbar spine instability    RA (rheumatoid arthritis) ([facility])    Osteoarthritis of left hip    Anxiety    Essential hypertension    H/O calcium pyrophosphate deposition disease (CPPD)    Sciatica    Failed back syndrome, lumbosacral    Cancer (HCC)    Fusion of spine of thoracolumbar region    Opioid use, unspecified with unspecified opioid-induced disorder [facility])     Past Medical History:   Diagnosis Date    Abdominal abscess 2009    Adverse effect of anesthesia      Be careful with intubation. 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  Deficiency Neg Hx     Delayed Awakening Neg Hx     Post-op Nausea/Vomiting Neg Hx Emergence Delirium Neg Hx     Post-op Cognitive Dysfunction Neg Hx     Other Other         Orthopedic (Sister)    Osteoarthritis Sister     Heart Attack Brother 58    Cancer Neg Hx     Diabetes Neg Hx     Heart Disease Neg Hx     Hypertension Neg Hx         Review of Systems   HENT:  Negative for congestion. Respiratory: Negative. Cardiovascular:  Negative for chest pain. Genitourinary:  Positive for dysuria and urgency. Musculoskeletal:  Positive for gait problem and joint swelling. Skin: Negative. Neurological:  Negative for dizziness. Hematological: Negative. Psychiatric/Behavioral: Negative. Vitals:    02/15/23 1001   BP: 108/67   Pulse: 71   Temp: 97.8 °F (36.6 °C)   SpO2: 96%        Physical Exam  Constitutional:       Appearance: Normal appearance. Cardiovascular:      Rate and Rhythm: Normal rate and regular rhythm. Pulses: Normal pulses. Heart sounds: Normal heart sounds. No murmur heard. Pulmonary:      Effort: Pulmonary effort is normal.      Breath sounds: Normal breath sounds. Musculoskeletal:         General: Swelling (left knee) and tenderness present. Skin:     General: Skin is warm and dry. Capillary Refill: Capillary refill takes less than 2 seconds. Neurological:      General: No focal deficit present. Mental Status: She is alert and oriented to person, place, and time. Psychiatric:         Mood and Affect: Mood normal.         Behavior: Behavior normal.         Thought Content: Thought content normal.         Judgment: Judgment normal.        Assessment/Plan     Diagnosis Orders   1. Acute pain of left knee  [facility] - Rowan Hanson MD, Orthopedic Surgery(General/Arthroscopic/Total Joint), [facility])      2. Cystitis  ciprofloxacin (CIPRO) 250 MG tablet      3. At high risk for falls          I have discussed the diagnosis with the patient and the intended plan of care as seen in the above orders.  The patient has received an after-visit summary and questions were answered concerning future plans. I have discussed medication, side effects, and warnings with the patient in detail. The patient verbalized understanding and is in agreement with the plan of care. The patient will contact the office with any additional concerns.   lab results and schedule of future lab studies reviewed with patient    Donn Ng MD

## 2023-02-22 ENCOUNTER — TELEPHONE (OUTPATIENT)
Facility: CLINIC | Age: 73
End: 2023-02-22

## 2023-02-22 DIAGNOSIS — G47.00 INSOMNIA, UNSPECIFIED TYPE: ICD-10-CM

## 2023-02-22 DIAGNOSIS — M25.562 ACUTE PAIN OF LEFT KNEE: Primary | ICD-10-CM

## 2023-02-22 RX ORDER — CYCLOBENZAPRINE HCL 10 MG
10 TABLET ORAL 3 TIMES DAILY PRN
Qty: 21 TABLET | Refills: 2 | Status: SHIPPED | OUTPATIENT
Start: 2023-02-22 | End: 2023-03-04

## 2023-02-22 RX ORDER — ZOLPIDEM TARTRATE 5 MG/1
5 TABLET ORAL NIGHTLY PRN
Qty: 30 TABLET | Refills: 2 | Status: SHIPPED | OUTPATIENT
Start: 2023-02-22 | End: 2023-03-08

## 2023-02-22 RX ORDER — TRAMADOL HYDROCHLORIDE 50 MG/1
50 TABLET ORAL EVERY 6 HOURS PRN
Qty: 28 TABLET | Refills: 1 | Status: SHIPPED | OUTPATIENT
Start: 2023-02-22 | End: 2023-03-24

## 2023-02-22 NOTE — TELEPHONE ENCOUNTER
This patient contacted office for the following prescriptions to be filled:    Medication requested : traMADol (ULTRAM ER) 100 MG extended release tablet     cyclobenzaprine (FLEXERIL) 10 MG tablet     zolpidem (AMBIEN) 5 MG tablet     PCP: Tito Ramirez or Print:   Mail order or Local pharmacy    Scheduled appointment if not seen by current providers in office: LOV: 2/15/23  UPCOMING: NO UPCOMING APPT

## 2023-03-01 ENCOUNTER — OFFICE VISIT (OUTPATIENT)
Age: 73
End: 2023-03-01

## 2023-03-01 VITALS — HEIGHT: 62 IN | BODY MASS INDEX: 32.94 KG/M2 | WEIGHT: 179 LBS

## 2023-03-01 DIAGNOSIS — M48.062 SPINAL STENOSIS, LUMBAR REGION WITH NEUROGENIC CLAUDICATION: Primary | ICD-10-CM

## 2023-03-01 DIAGNOSIS — M81.8 STEROID-INDUCED OSTEOPOROSIS: ICD-10-CM

## 2023-03-01 DIAGNOSIS — Z87.828 HISTORY OF TEAR OF ACL (ANTERIOR CRUCIATE LIGAMENT): ICD-10-CM

## 2023-03-01 DIAGNOSIS — M06.9 RHEUMATOID ARTHRITIS, INVOLVING UNSPECIFIED SITE, UNSPECIFIED WHETHER RHEUMATOID FACTOR PRESENT (HCC): ICD-10-CM

## 2023-03-01 DIAGNOSIS — S83.92XA SPRAIN OF LEFT KNEE, UNSPECIFIED LIGAMENT, INITIAL ENCOUNTER: Primary | ICD-10-CM

## 2023-03-01 DIAGNOSIS — M25.562 LEFT KNEE PAIN, UNSPECIFIED CHRONICITY: ICD-10-CM

## 2023-03-01 DIAGNOSIS — T38.0X5A STEROID-INDUCED OSTEOPOROSIS: ICD-10-CM

## 2023-03-01 DIAGNOSIS — M11.20 CALCIUM PYROPHOSPHATE DEPOSITION DISEASE (CPPD): ICD-10-CM

## 2023-03-01 DIAGNOSIS — G47.00 INSOMNIA, UNSPECIFIED TYPE: ICD-10-CM

## 2023-03-01 DIAGNOSIS — M17.12 PRIMARY OSTEOARTHRITIS OF LEFT KNEE: ICD-10-CM

## 2023-03-01 RX ORDER — TRAMADOL HYDROCHLORIDE 100 MG/1
100 TABLET, EXTENDED RELEASE ORAL DAILY
Qty: 90 TABLET | Refills: 0 | Status: SHIPPED | OUTPATIENT
Start: 2023-03-01 | End: 2023-05-30

## 2023-03-01 RX ORDER — CYCLOBENZAPRINE HCL 10 MG
10 TABLET ORAL 3 TIMES DAILY PRN
Qty: 60 TABLET | Refills: 2 | Status: SHIPPED | OUTPATIENT
Start: 2023-03-01 | End: 2023-03-11

## 2023-03-01 RX ORDER — ZOLPIDEM TARTRATE 5 MG/1
5 TABLET ORAL NIGHTLY PRN
Qty: 90 TABLET | Refills: 2 | Status: SHIPPED | OUTPATIENT
Start: 2023-03-01 | End: 2023-03-15

## 2023-03-01 NOTE — PROGRESS NOTES
Patient: Mercedes Wei                MRN: [id number]       SSN: [id number]  YOB: 1950        AGE: 74 y.o. SEX: female      PCP: Enmanuel Bryant MD  03/01/23    Chief Complaint   Patient presents with    Knee Pain     Lt        HISTORY:  Mercedes Wei is a 74 y.o. female who is seen for left knee pain. She fell in January and twisted her knee. She notes soreness in the posterior knee as well as anterior knee pain. She also reports swelling in the lower leg. She has tried wearing knee braces but has difficulty with walking while wearing the knee brace. She notes [place] [facility] and tore her   ACL. Occupation, etc: She moved to [facility] from [facility] in the 1980's because her  was in the [employer] who is now retired. She lives in [place] with her 39year old special needs son and . She also has an older 54year old daughter who is an oceanographer and lives in [place]. She also has a great grandson. She has a history of RA which is managed by Dr. Yany Gallegos. She reports history of anterior approach bilateral hip replacements by Dr. Flavio Kirk, history of 3 lumbar spinal fusion surgeries by Dr. Alessia Blandon in January 2019, and previous shoulder replacement by Dr. Manny Alexandra. She also has an identical twin sister with similar history of RA. She also reports history of CPPD,  right knee ACL tear & osteoporosis. She was diagnosed with COVID-19 in November and she recently gained 7 pounds while recovering at home. Wt Readings from Last 3 Encounters:   03/01/23 179 lb (81.2 kg)   02/15/23 179 lb (81.2 kg)   06/22/22 174 lb (78.9 kg)      Body mass index is 32.74 kg/m².     Patient Active Problem List   Diagnosis    ACP (advance care planning)    Pain management contract agreement    Osteoporosis    DDD (degenerative disc disease), lumbar    Chronic left shoulder pain    Numbness and tingling of right arm    Lesion of hard palate    Spinal stenosis, thoracolumbar region    Fall at home    Elevated blood pressure reading without diagnosis of hypertension    SVT (supraventricular tachycardia) ([facility])    Nausea    Osteoarthritis of right hip    Sicca syndrome ([facility])    Spondylolisthesis of lumbar region    Osteopenia of multiple sites    Fibrosis of left subtalar joint    Spinal stenosis of lumbar region    Lumbar stenosis    Insomnia    Status post lumbar surgery    Closed fracture of thoracic spine without spinal cord lesion ([facility])    Lesion of neck    Acute bilateral thoracic back pain    Spinal stenosis of lumbar region with neurogenic claudication    Chest pain    IBS (irritable bowel syndrome)    Cataracts, bilateral    Diverticulitis    Sialolithiasis    Urinary retention with incomplete bladder emptying    Osteopenia    Lumbar spine instability    RA (rheumatoid arthritis) ([facility])    Osteoarthritis of left hip    Anxiety    Essential hypertension    H/O calcium pyrophosphate deposition disease (CPPD)    Sciatica    Failed back syndrome, lumbosacral    Cancer ([facility])    Fusion of spine of thoracolumbar region    Opioid use, unspecified with unspecified opioid-induced disorder ([facility]       Social History     Tobacco Use    Smoking status: Never    Smokeless tobacco: Never   Substance Use Topics    Alcohol use: No    Drug use: Never        Allergies   Allergen Reactions    Celecoxib Swelling    Shellfish Allergy Other (See Comments)      Pt.denies     Sulfa Antibiotics Hives and Swelling     Lips swelling    Adalimumab Other (See Comments)     Lupus    Adhesive Tape Dermatitis, Hives and Itching    Influenza Vaccines Hives    Iodine Itching     Takes benadryl and is OK. Ioversol Hives and Itching     Pt states when she gets iv contrast she itches a little from hives?     Nsaids Swelling    Penicillins Hives    Shrimp Extract Allergy Skin Test Other (See Comments)     Sodium puffy    Gabapentin Diarrhea and Palpitations     Chest and Abdominal pain Current Outpatient Medications   Medication Sig    zolpidem (AMBIEN) 5 MG tablet Take 1 tablet by mouth nightly as needed for Sleep for up to 14 days. Max Daily Amount: 5 mg    cyclobenzaprine (FLEXERIL) 10 MG tablet Take 1 tablet by mouth 3 times daily as needed for Muscle spasms    traMADol (ULTRAM) 50 MG tablet Take 1 tablet by mouth every 6 hours as needed for Pain for up to 30 days. Intended supply: 7 days. Take lowest dose possible to manage pain Max Daily Amount: 200 mg    diclofenac (VOLTAREN) 50 MG EC tablet Take 50 mg by mouth 2 times daily    Menaquinone-7 (K2 PO) Take by mouth    acetaminophen (TYLENOL) 500 MG tablet Take 1,000 mg by mouth every 6 hours as needed    calcium carbonate (OYSTER SHELL CALCIUM 500 MG) 1250 (500 Ca) MG tablet Take 1,500 mg by mouth daily    cetirizine (ZYRTEC) 10 MG tablet Take 10 mg by mouth daily as needed    vitamin D (CHOLECALCIFEROL) 25 MCG (1000 UT) TABS tablet Take 1,000 Units by mouth    clobetasol (TEMOVATE) 0.05 % cream APPLY TO AFFECTED AREA OF BACK TWICE A DAY AS NEEDED FOR RASH FLARE UPS    cyclobenzaprine (FLEXERIL) 10 MG tablet TAKE 1 TABLET THREE TIMES A DAY AS NEEDED FOR MUSCLE SPASMS    Estradiol (VAGIFEM) 10 MCG TABS vaginal tablet insert 1 vaginally (10MCG) two times a week (EVERY 3 OR 4 DAYS)    fluocinonide (LIDEX) 0.05 % external solution APPLY TO SCALP ONCE DAILY AS NEEDED FOR ITCHING AND FLAKING    ketoconazole (NIZORAL) 2 % shampoo WASH SCALP ONCE WEEKLY, ALLOW LATHER TO SIT 4-5 MINUTES THEN RINS. ..  (REFER TO PRESCRIPTION NOTES). lisinopril (PRINIVIL;ZESTRIL) 5 MG tablet TAKE 1 TABLET DAILY    Meth-Hyo-M Bl-Na Phos-Ph Sal (URIBEL) 118 MG CAPS take 1 capsule by mouth every 6 hours if needed    mupirocin (BACTROBAN) 2 % ointment APPLY TO AFFECTED AREA OF BACK ONCE DAILY AFTER CLEASING WITH SOAP AND WATER    traMADol (ULTRAM ER) 100 MG extended release tablet Take 100 mg by mouth daily. zolpidem (AMBIEN) 5 MG tablet Take 5 mg by mouth. Estradiol Starter Pack (IMVEXXY STARTER PACK) 10 MCG INST insert 1 capsule (10MCG) vaginally once daily for 2 weeks then 1 ...  (REFER TO PRESCRIPTION NOTES). (Patient not taking: No sig reported)    imiquimod (ALDARA) 5 % cream APPLY SPARINGLY TO SPOT ON BACK 5 TIMES A WEEK AT NIGHT FOR 6 WEEKS (Patient not taking: No sig reported)    meloxicam (MOBIC) 15 MG tablet Take 15 mg by mouth daily (Patient not taking: No sig reported)    nirmatrelvir/ritonavir (PAXLOVID) 20 x 150 MG & 10 x 100MG TBPK 3 tabs po bid for 5 days (Patient not taking: No sig reported)     No current facility-administered medications for this visit.        PHYSICAL EXAMINATION:  Ht 5' 2\" (1.575 m)   Wt 179 lb (81.2 kg)   BMI 32.74 kg/m²      ORTHO EXAMINATION:  Examination Left knee   Skin Intact   Range of motion 110-5   Effusion -   Medial joint line tenderness +   Lateral joint line tenderness -   Popliteal tenderness -   Osteophytes palpable -   Sunita’s -   Patella crepitus -   Anterior drawer -   Lateral laxity -   Medial laxity -   Varus deformity -   Valgus deformity -   Pretibial edema -   Calf tenderness -     Mild valgus deformity  Lower leg swelling bilateral    RADIOGRAPHS:  03/01/23  3 VIEWS KNEE SANDAR  Three views of left knee: no fractures, moderate joint space narrowing, osteophytes present. Small loose body on the right  .    IMPRESSION:      ICD-10-CM    1. Sprain of left knee, unspecified ligament, initial encounter  S83.92XA Ambulatory referral to Physical Therapy     Ambulatory Referral to Ortho Injection      2. Left knee pain, unspecified chronicity  M25.562 [21334] Knee 3V     Ambulatory referral to Physical Therapy     Ambulatory Referral to Ortho Injection      3. Calcium pyrophosphate deposition disease (CPPD)  M11.20       4. Rheumatoid arthritis, involving unspecified site, unspecified whether rheumatoid factor present (HCC)  M06.9       5. Primary osteoarthritis of left knee  M17.12 Ambulatory referral to  Physical Therapy     Ambulatory Referral to Ortho Injection      6. History of tear of ACL (anterior cruciate ligament)  Z87.828       7. Steroid-induced osteoporosis  M81.8     T38.0X5A           PLAN:  She will start a brief course of outpatient physical therapy. I will see her back in 1 -2 months. Consider viscosupplementation if pain continues.   Scribed by Taniya Peterson) as dictated by Brianda Marquez MD

## 2023-03-03 LAB — CREATININE, EXTERNAL: 0.9

## 2023-03-07 ENCOUNTER — HOSPITAL ENCOUNTER (OUTPATIENT)
Facility: HOSPITAL | Age: 73
Setting detail: RECURRING SERIES
Discharge: HOME | End: 2023-03-10
Payer: MEDICARE

## 2023-03-07 PROCEDURE — 97035 APP MDLTY 1+ULTRASOUND EA 15: CPT

## 2023-03-07 PROCEDURE — 97110 THERAPEUTIC EXERCISES: CPT

## 2023-03-07 PROCEDURE — 97161 PT EVAL LOW COMPLEX 20 MIN: CPT

## 2023-03-07 NOTE — PROGRESS NOTES
[facility] at [address] [facility] [facility], [address] [place] [address]  Phone: [phone]      Fax:  [phone]     Plan of Care/ Statement of Necessity for Physical Therapy Services            Patient name: Nancy Covert Start of Care: 3/7/2023   Referral source: David Pierre MD : [id number]    Medical Diagnosis: Left knee pain [M25.562]       Onset Date:2023 with exacerbations on or about 3/1/23    Treatment Diagnosis: Left Knee Pain                                     Prior Hospitalization: see medical history Provider#: [id number]   Medications: Verified on Patient Summary List   Insurance: Payor: Demi Zavala / Plan: MEDICARE PART A AND B / Product Type: *No Product type* /       Comorbidities: OP, OA, Alergic to Adhesive, CPPD Left Ankle  Prior Level of Function: Patient had no previous left knee deficits that prevented her normal function and was able to perform self care activities and normal tasks without difficulty or pain in the left knee. The Plan of Care and following information is based on the information from the initial evaluation. Assessment/ key information: Patient is a 74 y.o. female referred to PT with the above Dx. Patient seen today for c/o left knee pain after twisting the knee when getting out of the car resulting in a fall and medial knee sprain/strain. Pain at what is shown as the Pes Anserine and medial knee. Pain also noted at the tip of the patella. Patient presents to PT with an impaired gait, decreased balance, decreased strength, decreased flexibility, and decreased mobility of the left LE. TTP with muscle tightness distal medial HS and ADD region  Patient s/s appear to be consistent w/ diagnosis. Patient demonstrates the potential to make functional gains within a reasonable time frame.   Patient will benefit from skilled PT to address impairments and improve functional mobility, strength, gait and balance for an improved quality of life. Fall Risk Assessment: Patient demonstrates an elevated Fall Risk    Evaluation Complexity HistoryMEDIUM  Complexity : 1-2 comorbidities / personal factors will impact the outcome/ POC  ; Examination MEDIUM Complexity : 3 Standardized tests and measures addressin body structure, function, activity limitation and / or participation in recreation  ;Presentation LOW Complexity : Stable, uncomplicated  ;Clinical Decision Making HIGH Complexity : FOTO score of 1- 25  FOTO score = an established functional score where 100 = no disability  Overall Complexity Rating: LOW   Problem List: pain affecting function, decrease ROM, decrease strength, edema affection function, impaired gait/balance, decrease ADL/functional abilities, decrease activity tolerance, decrease flexibility/joint mobility, decrease transfer abilities , and other FOTO = 18    Treatment Plan may include any combination of the followin Therapeutic Exercise, 50399 Neuromuscular Re-Education, 23890 Manual Therapy, 23266 Therapeutic Activity, 62399 Self Care/Home Management, 98440 Electrical Stim unattended, 10333 Electrical Stim attended, 98243 Gait Training, 52704 Ultrasound, [id number] Mechanical Traction, [id number] Needle Insertion w/o Injection (1 or 2 muscles), and 11189 Needle Insertion w/o Injection (3+ muscles)  Vasopnuematic compression justification:  Per bilateral girth measures taken and listed above the edema is considered significant and having an impact on the patient's   Patient / Family readiness to learn indicated by: asking questions, trying to perform skills, interest, return verbalization , and return demonstration   Persons(s) to be included in education: patient (P)  Barriers to Learning/Limitations: None  Measures taken if barriers to learning present: N/A  Patient Goal (s):  Get the swelling and soreness out of the knee and calf  Patient Self Reported Health Status: good  Rehabilitation Potential: good    Short Term Goals:  To be accomplished in 5 treatments:  1. Pt will be compliant and independent with HEP in order to facilitate PT sessions and aid with self management   Eval Status:  Initiated   Current Status:  2. Pt to tolerate 30 min or more of TE and/or Interventions w/o increased s/s   Eval Status:  Initiated   Current Status:    Long Term Goals: To be accomplished in 10 treatments:  1. Pt will report 50% improvement or better with left knee dysfunction to show a significant increase in ability to tolerate ADL   Eval Status:  Initiated   Current Status:  2. Pt will ambulate with turning and perform standing hip rotation without increased reports of pain at the     Eval Status:  Pain with turning and twisting   Current Status:  3. Pt will ambulate 500' (I) with little to no increased pain of the left knee or difficulty for carryover to good walking tolerance to return to [place] with little to no difficulty     Eval Status:  Increased pain and difficulty with walking about 100' or with walking on uneven surfaces   Current Status:  4. Pt will improve FOTO score to 44 in 18 visits to show significant improvement in function for progress to boating and climbing ladders with little to no difficulty   Eval Status: FOTO = 18   Current Status:   Frequency / Duration: Patient to be seen 2-3 times per week for 24 treatments    Patient/ Caregiver education and instruction: Diagnosis, prognosis, self care, activity modification, and exercises     [x]  Plan of care has been reviewed with PTA    Certification Period: 3/7/23 - 6/3/23  Nader Aguilera, PT 3/7/2023 11:46 AM  _____________________________________________________________________  I certify that the above Therapy Services are being furnished while the patient is under my care. I agree with the treatment plan and certify that this therapy is necessary.     Physician's Signature:____________Date:_________TIME:________                                      Andres Mendez Silvio Nguyen MD    ** Signature, Date and Time must be completed for valid certification **    Please sign and return to     [facility] at [address] [facility]  [place], [facility]  Phone: [phone]      Fax:  [phone]

## 2023-03-07 NOTE — PROGRESS NOTES
PT DAILY TREATMENT NOTE/KNEE EVAL       Patient Name: Chava Pate    Date: 3/7/2023    : 1950  Insurance: Payor: MEDICARE / Plan: MEDICARE PART A AND B / Product Type: *No Product type* /      Patient  verified yes     Visit #   Current / Total 1 24   Time   In / Out 1144 1235   Pain   In / Out 3 2-3   Subjective Functional Status/Changes: See subjective below    Changes to:  Meds, Allergies, Med Hx, Sx Hx? If yes, update Summary List no       Treatment Area: Left knee pain [M25.562]    SUBJECTIVE  Pain Level (0-10 scale): 3  []constant [x]intermittent []improving []worsening []no change since onset     Any medication changes, allergies to medications, adverse drug reactions, diagnosis change, or new procedure performed?: [x] No    [] Yes (see summary sheet for update)  Subjective functional status/changes:       Subjective: Patient is a 74 y.o. female referred to PT with the above Dx. Patient seen today for c/o left knee pain after twisting the knee when getting out of the car resulting in a fall and medial knee sprain/strain. Pain at what is shown as the Pes Anserine and medial knee. Pain also noted at the tip of the patella. Patient presents to PT with an impaired gait, decreased balance, decreased strength, decreased flexibility, and decreased mobility of the left LE. TTP with muscle tightness distal medial HS and ADD region  Patient s/s appear to be consistent w/ diagnosis. Patient demonstrates the potential to make functional gains within a reasonable time frame. Patient will benefit from skilled PT to address impairments and improve functional mobility, strength, gait and balance for an improved quality of life.   Fall Risk Assessment: Patient demonstrates an elevated Fall Risk      Mechanism of Injury: Twist and fall with a Valgus Knee    Comorbidities: OP, OA, Alergic to Adhesive, CPPD Left Ankle  Prior Level of Function: Left knee was doing fine with no Medial knee pain and the ability to perform usual daily activity    FOTO: 18 / 44 in 18 visits    Goal: Get the swelling and soreness out of the knee and calf    Health Status: Good    What type of work do you do? N/A    Living Situation/Domestic Life: Lives at home with   Mobility: (I)  Self Care (I)  Stairs in the home? Yes with no trouble    What type of daily activities/hobbies? Boating, Sailing    Limitations to Activity/Recreation/PLOF: Can't walk very far, twisting and turning is difficulty, Climbing Ladder on the boat, Get the swelling out of the knee       Barriers: [x]pain []financial []time []transportation []other    Motivation: High    FABQ Score: []low []elevate    Cognition: A & O x 3    Other:    Risk For Falls:   []No  [x]low []elevate           OBJECTIVE/EXAMINATION  Demonstrated Balance: Good with standing and walking  Specific balance not assessed due to reports of increased pain at the left knee                Demonstrated Functional Mobility: Good      Gait: Left Antalgic gait with (B) valgus  - Girth 46.7cm  - TTP Left Pes Anserine, distal medial HS, ADD,  - Negative Left Valgus/Varus stress test  - Negative Anterior Drawer/Post Drawer  - Negative Medial and lateral Sunita's Test  -          AROM PROM Strength Pain? ?    Right Left Right Left Right Left    Knee Flx  WNL    5-    Knee Ext  -2    5    HS 90/90 WNL WNL        SLR                                   Reflexes: [x] Not Tested   Left Right   Biceps (C5)     Brachioradiais (C6)     Triceps (C7)     Knee Jerk (L4)     Ankle Jerk (SI)         Lachmans  [x] Neg    [] Pos Posterior Drawer [x] Neg    [] Pos  Pivot Shift  [] Neg    [] Pos Posterior Sag  [] Neg    [] Pos  GALINA   [] Neg    [] Pos Winnie's Test [] Neg    [] Pos  ALRI   [] Neg    [] Pos Squat   [] Neg    [] Pos  Valgus@ 0 Degrees [x] Neg    [] Pos Sunita-Ruel [x] Neg    [] Pos  Valgus@ 30 Degrees [x] Neg    [] Pos Patellar Apprehension [] Neg    [] Pos  Varus@ 0 Degrees [x] Neg    [] Pos Chan's Compression [] Neg    [] Pos  Varus@ 30 Degrees [x] Neg    [] Pos Ely's Test  [] Neg    [] Pos  Apley's Compression [] Neg    [] Pos Mark's Test  [] Neg    [] Pos  Apley's Distraction [] Neg    [] Pos Stroke Test  [] Neg    [] Pos   Anterior Drawer [x] Neg    [] Pos Fluctuation Test [] Neg    [] Pos  Other:                  [] Neg    [] Pos                 Modalities Rationale:     decrease edema, decrease inflammation, decrease pain, and increase tissue extensibility to improve patient's ability to progress to PLOF and address remaining functional goals. min [] Estim Unattended, type/location:                                      []  w/ice    []  w/heat    min [] Estim Attended, type/location:                                     []  w/US     []  w/ice    []  w/heat    []  TENS insruct      min []  Mechanical Traction: type/lbs                   []  pro   []  sup   []  int   []  cont    []  before manual    []  after manual   8 min [x]  Ultrasound, settings/location:  3.3Mhz. 1.3 W/cm2  Left knee/Pes Anserine    min []  Iontophoresis w/ dexamethasone, location:                                               []  take home patch       []  in clinic    min  unbilled []  Ice     []  Heat    location/position:     min []  Vasopneumatic Device, press/temp:    If using vaso (only need to measure limb vaso being performed on)      pre-treatment girth :       post-treatment girth :       measured at (landmark location) :      min []  Other:    Skin assessment post-treatment (if applicable):    []  intact    []  redness- no adverse reaction                  []redness - adverse reaction:          30 min [x]Eval     - untimed          Therapeutic Procedures:   Tx Min Billable or 1:1 Min (if diff from Tx Min) Procedure, Rationale, Specifics   [id number]  [id number] Therapeutic Exercise (timed):  increase ROM, strength, coordination, balance, and proprioception to improve patient's ability to progress to PLOF and address remaining functional goals. (see flow sheet as applicable)     Details if applicable:            Details if applicable:            Details if applicable:            Details if applicable:            Details if applicable: Total Total Reminder: MC/BC bill using total billable min of TIMED therapeutic procedures (example: do not include dry needle or estim unattended, both untimed codes, in totals to left)     [x]  Patient Education billed concurrently with other procedures     Other Objective/Functional Measures:    Access Code: [email or web address]  URL: [email or web address]  Date: 03/07/2023  Prepared by: Dennis Salazarmer    Exercises  Hooklying Hamstring Stretch with Strap - 2 x daily - 7 x weekly - 1 sets - 10 reps - 10 hold  Seated Hamstring Stretch - 2 x daily - 7 x weekly - 1 sets - 10 reps - 10 hold  V Sit Hip Adductor Hamstring Stretch - 2 x daily - 7 x weekly - 1 sets - 10 reps - 10 hold  Standing Knee Flexion - 2 x daily - 7 x weekly - 3 sets - 10 reps  Long Sitting Hamstring Set - 2 x daily - 7 x weekly - 3 sets - 10 reps  Seated Heel Raise - 2 x daily - 7 x weekly - 3 sets - 10 reps  Seated Toe Raise - 2 x daily - 7 x weekly - 3 sets - 10 reps  Gastroc Stretch with Foot at Wall - 2 x daily - 7 x weekly - 3 sets - 10 reps        Pain Level (0-10 scale) post treatment: 2-3    ASSESSMENT/Changes in Function:    Patient tolerated this evaluation well without increased s/s during the initial assessment. Patient was provided with a HEP and demonstrated understanding for it's performance. Pain as of initial visit was 3/10.      Patient will continue to benefit from skilled PT services to modify and progress therapeutic interventions, analyze and address functional mobility deficits, analyze and address ROM deficits, analyze and address strength deficits, analyze and address soft tissue restrictions, analyze and cue for proper movement patterns, and analyze and address imbalance/dizziness to address functional deficits and attain remaining goals.        [x]  See Plan of Care - for goals and assessment     PLAN  [x]  Upgrade activities as tolerated     [x]  Continue plan of care  []  Update interventions per flow sheet       []  Other:_      Ronald Adrian, PT 3/7/2023  11:51 AM  Justification for Eval Code Complexity:  Patient History : Unremarkable for Pes Anserine pain with activity  Examination see exam    Clinical Presentation: Consistent with Dx  Clinical Decision Making : FOTO : 18 /100

## 2023-03-08 ENCOUNTER — OFFICE VISIT (OUTPATIENT)
Age: 73
End: 2023-03-08
Payer: MEDICARE

## 2023-03-08 ENCOUNTER — HOSPITAL ENCOUNTER (OUTPATIENT)
Facility: HOSPITAL | Age: 73
Discharge: HOME | End: 2023-03-11
Payer: MEDICARE

## 2023-03-08 VITALS
TEMPERATURE: 97.2 F | HEIGHT: 62 IN | BODY MASS INDEX: 31.76 KG/M2 | RESPIRATION RATE: 18 BRPM | OXYGEN SATURATION: 99 % | HEART RATE: 78 BPM | WEIGHT: 172.6 LBS

## 2023-03-08 DIAGNOSIS — G89.29 CHRONIC LEFT-SIDED THORACIC BACK PAIN: ICD-10-CM

## 2023-03-08 DIAGNOSIS — M54.6 CHRONIC LEFT-SIDED THORACIC BACK PAIN: Primary | ICD-10-CM

## 2023-03-08 DIAGNOSIS — M53.3 SACROILIAC JOINT PAIN: ICD-10-CM

## 2023-03-08 DIAGNOSIS — G89.29 CHRONIC LEFT-SIDED THORACIC BACK PAIN: Primary | ICD-10-CM

## 2023-03-08 DIAGNOSIS — R07.81 RIB PAIN ON LEFT SIDE: ICD-10-CM

## 2023-03-08 DIAGNOSIS — M54.6 CHRONIC LEFT-SIDED THORACIC BACK PAIN: ICD-10-CM

## 2023-03-08 PROBLEM — K59.01 CONSTIPATION BY DELAYED COLONIC TRANSIT: Status: ACTIVE | Noted: 2017-05-30

## 2023-03-08 PROBLEM — J32.9 CHRONIC SINUSITIS: Status: ACTIVE | Noted: 2022-08-25

## 2023-03-08 PROBLEM — R33.9 URINARY RETENTION WITH INCOMPLETE BLADDER EMPTYING: Status: ACTIVE | Noted: 2021-05-04

## 2023-03-08 PROBLEM — R07.9 CHEST PAIN: Status: ACTIVE | Noted: 2019-02-01

## 2023-03-08 PROBLEM — M06.9 RHEUMATOID ARTHRITIS (HCC): Status: ACTIVE | Noted: 2017-05-30

## 2023-03-08 PROBLEM — H26.9 CATARACTS, BILATERAL: Status: ACTIVE | Noted: 2021-05-04

## 2023-03-08 PROBLEM — I47.1 SVT (SUPRAVENTRICULAR TACHYCARDIA) (HCC): Status: ACTIVE | Noted: 2021-07-26

## 2023-03-08 PROBLEM — I47.10 SVT (SUPRAVENTRICULAR TACHYCARDIA): Status: ACTIVE | Noted: 2021-07-26

## 2023-03-08 PROBLEM — R20.2 NUMBNESS AND TINGLING OF RIGHT ARM: Status: ACTIVE | Noted: 2021-05-04

## 2023-03-08 PROBLEM — R20.0 NUMBNESS AND TINGLING OF RIGHT ARM: Status: ACTIVE | Noted: 2021-05-04

## 2023-03-08 PROCEDURE — 72070 X-RAY EXAM THORAC SPINE 2VWS: CPT

## 2023-03-08 PROCEDURE — 1036F TOBACCO NON-USER: CPT | Performed by: NURSE PRACTITIONER

## 2023-03-08 PROCEDURE — 99213 OFFICE O/P EST LOW 20 MIN: CPT | Performed by: NURSE PRACTITIONER

## 2023-03-08 PROCEDURE — 3017F COLORECTAL CA SCREEN DOC REV: CPT | Performed by: NURSE PRACTITIONER

## 2023-03-08 PROCEDURE — G8417 CALC BMI ABV UP PARAM F/U: HCPCS | Performed by: NURSE PRACTITIONER

## 2023-03-08 PROCEDURE — 71100 X-RAY EXAM RIBS UNI 2 VIEWS: CPT

## 2023-03-08 PROCEDURE — 1090F PRES/ABSN URINE INCON ASSESS: CPT | Performed by: NURSE PRACTITIONER

## 2023-03-08 PROCEDURE — G8484 FLU IMMUNIZE NO ADMIN: HCPCS | Performed by: NURSE PRACTITIONER

## 2023-03-08 PROCEDURE — G8399 PT W/DXA RESULTS DOCUMENT: HCPCS | Performed by: NURSE PRACTITIONER

## 2023-03-08 PROCEDURE — 1123F ACP DISCUSS/DSCN MKR DOCD: CPT | Performed by: NURSE PRACTITIONER

## 2023-03-08 PROCEDURE — G8427 DOCREV CUR MEDS BY ELIG CLIN: HCPCS | Performed by: NURSE PRACTITIONER

## 2023-03-08 NOTE — PROGRESS NOTES
Chief complaint   Chief Complaint   Patient presents with    Lower Back Pain       History of Present Illness:  Norberto Aguiar is a  74 y.o.  female who comes in today after last seen Dr. Mounika Fallon on August 22, 2022. She has a history of a L1-2 XLIF, T11 rib resection on the left, posterior fusion T10-L2 and spinal instrumentation from L2-L5. This was all done by Dr. Jenny Powell on December 3, 2018. She states she has had SI joint pain bilaterally for quite some time since that fusion. She has had bilateral SI joint blocks which have been very effective for her and allowed her to walk and do things more easily. She would like to have that repeated. She also states in January she had a fall onto her left side and now has some left-sided rib pain. She would like to have that investigated also. She denies fever bowel bladder dysfunction. Physical Exam: The patient is a 77-year-old female well-developed well-nourished who is alert and oriented with a normal mood and affect. She has a full weightbearing nonantalgic gait. She did not use any assist device. She has 4 out of 5 strength bilateral lower extremities. Negative straight leg raise. The patient was TTP over the bilateral SI joint and positive on the Art's , Distraction, Thigh Thrust and Compression tests. She is also little tender over her left lower ribs. Assessment and Plan: This is a patient who has had a extensive fusion from T10 to L5. She has increased SI joint pain. She has benefited from [facility] joint blocks in the past.  We will go ahead and reorder them for her today. I will also get a x-ray of her thoracic spine and her left ribs. We will let her know what that says when we get the result. She can use over-the-counter Tylenol for any discomfort currently. She will follow-up after the block. Jose Ruiz was seen today for lower back pain.     Diagnoses and all orders for this visit:    Chronic left-sided thoracic back pain  -     XR THORACIC SPINE (2 VIEWS); Future    Sacroiliac joint pain  -     SCHEDULE SURGERY    Rib pain on left side  -     XR RIBS LEFT (2 VIEWS); Future      Return for After the block.  has been . reviewed and is appropriate    Medications:  Current Outpatient Medications   Medication Sig Dispense Refill    traMADol (ULTRAM ER) 100 MG extended release tablet Take 1 tablet by mouth daily for 90 days. Max Daily Amount: 100 mg 90 tablet 0    cyclobenzaprine (FLEXERIL) 10 MG tablet Take 1 tablet by mouth 3 times daily as needed for Muscle spasms 60 tablet 2    zolpidem (AMBIEN) 5 MG tablet Take 1 tablet by mouth nightly as needed for Sleep for up to 14 days. Max Daily Amount: 5 mg 90 tablet 2    Menaquinone-7 (K2 PO) Take by mouth      acetaminophen (TYLENOL) 500 MG tablet Take 1,000 mg by mouth every 6 hours as needed      calcium carbonate (OYSTER SHELL CALCIUM 500 MG) 1250 (500 Ca) MG tablet Take 1,500 mg by mouth daily      vitamin D (CHOLECALCIFEROL) 25 MCG (1000 UT) TABS tablet Take 1,000 Units by mouth      cyclobenzaprine (FLEXERIL) 10 MG tablet TAKE 1 TABLET THREE TIMES A DAY AS NEEDED FOR MUSCLE SPASMS      Estradiol (VAGIFEM) 10 MCG TABS vaginal tablet insert 1 vaginally (10MCG) two times a week (EVERY 3 OR 4 DAYS)      meloxicam (MOBIC) 15 MG tablet Take 15 mg by mouth daily      Meth-Hyo-M Bl-Na Phos-Ph Sal (URIBEL) 118 MG CAPS take 1 capsule by mouth every 6 hours if needed      zolpidem (AMBIEN) 5 MG tablet Take 5 mg by mouth. traMADol (ULTRAM) 50 MG tablet Take 1 tablet by mouth every 6 hours as needed for Pain for up to 30 days. Intended supply: 7 days.  Take lowest dose possible to manage pain Max Daily Amount: 200 mg (Patient not taking: Reported on 3/8/2023) 28 tablet 1    diclofenac (VOLTAREN) 50 MG EC tablet Take 50 mg by mouth 2 times daily (Patient not taking: Reported on 3/8/2023)      cetirizine (ZYRTEC) 10 MG tablet Take 10 mg by mouth daily as needed      clobetasol (TEMOVATE) 0.05 % cream APPLY TO AFFECTED AREA OF BACK TWICE A DAY AS NEEDED FOR RASH FLARE UPS      Estradiol Starter Pack (IMVEXXY STARTER PACK) 10 MCG INST insert 1 capsule (10MCG) vaginally once daily for 2 weeks then 1 ...  (REFER TO PRESCRIPTION NOTES). (Patient not taking: No sig reported)      fluocinonide (LIDEX) 0.05 % external solution APPLY TO SCALP ONCE DAILY AS NEEDED FOR ITCHING AND FLAKING      imiquimod (ALDARA) 5 % cream APPLY SPARINGLY TO SPOT ON BACK 5 TIMES A WEEK AT NIGHT FOR 6 WEEKS (Patient not taking: No sig reported)      ketoconazole (NIZORAL) 2 % shampoo WASH SCALP ONCE WEEKLY, ALLOW LATHER TO SIT 4-5 MINUTES THEN RINS. ..  (REFER TO PRESCRIPTION NOTES). lisinopril (PRINIVIL;ZESTRIL) 5 MG tablet TAKE 1 TABLET DAILY      mupirocin (BACTROBAN) 2 % ointment APPLY TO AFFECTED AREA OF BACK ONCE DAILY AFTER CLEASING WITH SOAP AND WATER      nirmatrelvir/ritonavir (PAXLOVID) 20 x 150 MG & 10 x 100MG TBPK 3 tabs po bid for 5 days (Patient not taking: No sig reported)       No current facility-administered medications for this visit. Review of systems:    Past Medical History:   Diagnosis Date    Abdominal abscess 2009    Adverse effect of anesthesia      Be careful with intubation.  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 )    Irritable bowel syndrome     Neuropathy     bilateral hands/wrist    Osteoporosis     Pneumonia     RA (rheumatoid arthritis) ([address]     Seizures ([address] 06/01/2008    one episode- after high dose of epinepherine     Past Surgical History:   Procedure Laterality Date    ABDOMINAL WALL DEFECT REPAIR      APPENDECTOMY      BREAST REDUCTION SURGERY      CATARACT REMOVAL Bilateral     COLONOSCOPY      GI      repair rectal prolaspe    GYN      dermoid cyst    HEENT      tonsillectomy    HYSTERECTOMY (CERVIX STATUS UNKNOWN)  1976    LUMBAR FUSION  12/03/2018    T-8    LUMBAR FUSION      x 2  L 3-4 , L-5-6    ORTHOPEDIC SURGERY Bilateral     CTR    ORTHOPEDIC SURGERY Bilateral     arthroplasty thumbs    ORTHOPEDIC SURGERY Left     Ankle     ORTHOPEDIC SURGERY Right     \"Nerve Release Elbow\"    OTHER SURGICAL HISTORY Left 1985    vein stripping    SALPINGO-OOPHORECTOMY Bilateral     SHOULDER ARTHROPLASTY Left     TOTAL HIP ARTHROPLASTY Bilateral     UROLOGICAL SURGERY      bladder repair     Social History     Socioeconomic History    Marital status:      Spouse name: Not on file    Number of children: Not on file    Years of education: Not on file    Highest education level: Not on file   Occupational History    Not on file   Tobacco Use    Smoking status: Never    Smokeless tobacco: Never   Substance and Sexual Activity    Alcohol use: No    Drug use: Never    Sexual activity: Not on file   Other Topics Concern    Not on file   Social History Narrative    Not on file     Social Determinants of Health     Financial Resource Strain: Not on file   Food Insecurity: Not on file   Transportation Needs: Not on file   Physical Activity: Not on file   Stress: Not on file   Social Connections: Not on file   Intimate Partner Violence: Not on file   Housing Stability: Not on file     Family History   Problem Relation Age of Onset    Stroke Neg Hx     Malig Hypertherm Neg Hx     Pseudochol.  Deficiency Neg Hx     Delayed Awakening Neg Hx     Post-op Nausea/Vomiting Neg Hx     Emergence Delirium Neg Hx     Post-op Cognitive Dysfunction Neg Hx     Other Other         Orthopedic (Sister)    Osteoarthritis Sister     Heart Attack Brother 58    Cancer Neg Hx     Diabetes Neg Hx     Heart Disease Neg Hx     Hypertension Neg Hx        Physical Exam:  Pulse 78   Temp 97.2 °F (36.2 °C) (Temporal)   Resp 18   Ht 5' 2\" (1.575 m)   Wt 172 lb 9.6 oz (78.3 kg)   SpO2 99%   BMI 31.57 kg/m²   Pain Scale: 3/10      We have informed Brian Mejia to notify us for immediate appointment if she has any worsening neurogical symptoms or if an emergency situation presents, then call [phone]    Please note that this dictation was completed with [employer], the computer voice recognition software. Quite often unanticipated grammatical, syntax, homophones, and other interpretive errors are inadvertently transcribed by the computer software. Please disregard these errors. Please excuse any errors that have escaped final proofreading.      ZITA Do NP

## 2023-03-09 ENCOUNTER — APPOINTMENT (OUTPATIENT)
Facility: HOSPITAL | Age: 73
End: 2023-03-09
Payer: MEDICARE

## 2023-03-14 ENCOUNTER — OFFICE VISIT (OUTPATIENT)
Facility: CLINIC | Age: 73
End: 2023-03-14
Payer: MEDICARE

## 2023-03-14 ENCOUNTER — HOSPITAL ENCOUNTER (OUTPATIENT)
Facility: HOSPITAL | Age: 73
Setting detail: RECURRING SERIES
Discharge: HOME | End: 2023-03-17
Payer: MEDICARE

## 2023-03-14 VITALS
SYSTOLIC BLOOD PRESSURE: 124 MMHG | OXYGEN SATURATION: 97 % | BODY MASS INDEX: 31.87 KG/M2 | DIASTOLIC BLOOD PRESSURE: 76 MMHG | TEMPERATURE: 98.2 F | RESPIRATION RATE: 16 BRPM | HEIGHT: 62 IN | HEART RATE: 69 BPM | WEIGHT: 173.2 LBS

## 2023-03-14 DIAGNOSIS — C80.1 MALIGNANT (PRIMARY) NEOPLASM, UNSPECIFIED (HCC): ICD-10-CM

## 2023-03-14 DIAGNOSIS — F11.99 OPIOID USE, UNSPECIFIED WITH UNSPECIFIED OPIOID-INDUCED DISORDER (HCC): ICD-10-CM

## 2023-03-14 DIAGNOSIS — I47.1 SUPRAVENTRICULAR TACHYCARDIA (HCC): ICD-10-CM

## 2023-03-14 DIAGNOSIS — R60.0 LOCALIZED EDEMA: Primary | ICD-10-CM

## 2023-03-14 PROCEDURE — 3078F DIAST BP <80 MM HG: CPT | Performed by: FAMILY MEDICINE

## 2023-03-14 PROCEDURE — 1123F ACP DISCUSS/DSCN MKR DOCD: CPT | Performed by: FAMILY MEDICINE

## 2023-03-14 PROCEDURE — 97035 APP MDLTY 1+ULTRASOUND EA 15: CPT

## 2023-03-14 PROCEDURE — G8417 CALC BMI ABV UP PARAM F/U: HCPCS | Performed by: FAMILY MEDICINE

## 2023-03-14 PROCEDURE — 97110 THERAPEUTIC EXERCISES: CPT

## 2023-03-14 PROCEDURE — G8427 DOCREV CUR MEDS BY ELIG CLIN: HCPCS | Performed by: FAMILY MEDICINE

## 2023-03-14 PROCEDURE — G8399 PT W/DXA RESULTS DOCUMENT: HCPCS | Performed by: FAMILY MEDICINE

## 2023-03-14 PROCEDURE — 3074F SYST BP LT 130 MM HG: CPT | Performed by: FAMILY MEDICINE

## 2023-03-14 PROCEDURE — 1036F TOBACCO NON-USER: CPT | Performed by: FAMILY MEDICINE

## 2023-03-14 PROCEDURE — 1090F PRES/ABSN URINE INCON ASSESS: CPT | Performed by: FAMILY MEDICINE

## 2023-03-14 PROCEDURE — 97140 MANUAL THERAPY 1/> REGIONS: CPT

## 2023-03-14 PROCEDURE — G8484 FLU IMMUNIZE NO ADMIN: HCPCS | Performed by: FAMILY MEDICINE

## 2023-03-14 PROCEDURE — 99213 OFFICE O/P EST LOW 20 MIN: CPT | Performed by: FAMILY MEDICINE

## 2023-03-14 PROCEDURE — 3017F COLORECTAL CA SCREEN DOC REV: CPT | Performed by: FAMILY MEDICINE

## 2023-03-14 RX ORDER — FUROSEMIDE 20 MG/1
20 TABLET ORAL DAILY
Qty: 60 TABLET | Refills: 3 | Status: SHIPPED | OUTPATIENT
Start: 2023-03-14

## 2023-03-14 ASSESSMENT — ANXIETY QUESTIONNAIRES
6. BECOMING EASILY ANNOYED OR IRRITABLE: 0
7. FEELING AFRAID AS IF SOMETHING AWFUL MIGHT HAPPEN: 0
3. WORRYING TOO MUCH ABOUT DIFFERENT THINGS: 0
GAD7 TOTAL SCORE: 0
IF YOU CHECKED OFF ANY PROBLEMS ON THIS QUESTIONNAIRE, HOW DIFFICULT HAVE THESE PROBLEMS MADE IT FOR YOU TO DO YOUR WORK, TAKE CARE OF THINGS AT HOME, OR GET ALONG WITH OTHER PEOPLE: NOT DIFFICULT AT ALL
2. NOT BEING ABLE TO STOP OR CONTROL WORRYING: 0
5. BEING SO RESTLESS THAT IT IS HARD TO SIT STILL: 0
4. TROUBLE RELAXING: 0
1. FEELING NERVOUS, ANXIOUS, OR ON EDGE: 0

## 2023-03-14 ASSESSMENT — PATIENT HEALTH QUESTIONNAIRE - PHQ9
SUM OF ALL RESPONSES TO PHQ QUESTIONS 1-9: 0
2. FEELING DOWN, DEPRESSED OR HOPELESS: 0
SUM OF ALL RESPONSES TO PHQ9 QUESTIONS 1 & 2: 0
SUM OF ALL RESPONSES TO PHQ QUESTIONS 1-9: 0
1. LITTLE INTEREST OR PLEASURE IN DOING THINGS: 0
SUM OF ALL RESPONSES TO PHQ QUESTIONS 1-9: 0
SUM OF ALL RESPONSES TO PHQ QUESTIONS 1-9: 0

## 2023-03-14 ASSESSMENT — ENCOUNTER SYMPTOMS
VOMITING: 0
COUGH: 0
RESPIRATORY NEGATIVE: 1
NAUSEA: 0

## 2023-03-14 NOTE — PROGRESS NOTES
Kristy Gerard is a 74 y.o. female  presents for edema of lower legs. Chest pains or SOB    Chief Complaint   Patient presents with    Leg Swelling        Allergies   Allergen Reactions    Celecoxib Swelling     Other reaction(s): unknown  Other reaction(s): unknown      Shellfish Allergy Other (See Comments)      Pt.denies     Shellfish-Derived Products Swelling     \"makes me feel puffy\"    Sulfa Antibiotics Hives and Swelling     Lips swelling  Other reaction(s): unknown    Adalimumab Other (See Comments)     Lupus  Other reaction(s): neurological reaction  Other reaction(s): neurological reaction, Other (comments)  Lupus      Adhesive Tape Dermatitis, Hives and Itching    Influenza Vaccines Hives    Iodine Itching     Takes benadryl and is OK. Ioversol Hives and Itching     Pt states when she gets iv contrast she itches a little from hives? Other reaction(s): rash/itching  Other reaction(s): mild rash/itching  Pt states when she gets iv contrast she itches a little from hives? Pt states when she gets iv contrast she itches a little from hives? Pt states when she gets iv contrast she itches a little from hives? Meloxicam     Nsaids Swelling    Penicillins Hives    Shrimp Extract Allergy Skin Test Other (See Comments)     Sodium puffy    Gabapentin Diarrhea and Palpitations     Chest and Abdominal pain       Current Outpatient Medications:     traMADol (ULTRAM ER) 100 MG extended release tablet, Take 1 tablet by mouth daily for 90 days. Max Daily Amount: 100 mg, Disp: 90 tablet, Rfl: 0    zolpidem (AMBIEN) 5 MG tablet, Take 1 tablet by mouth nightly as needed for Sleep for up to 14 days. Max Daily Amount: 5 mg, Disp: 90 tablet, Rfl: 2    traMADol (ULTRAM) 50 MG tablet, Take 1 tablet by mouth every 6 hours as needed for Pain for up to 30 days. Intended supply: 7 days.  Take lowest dose possible to manage pain Max Daily Amount: 200 mg, Disp: 28 tablet, Rfl: 1    Menaquinone-7 (K2 PO), Take by mouth, Disp: , Rfl:     acetaminophen (TYLENOL) 500 MG tablet, Take 1,000 mg by mouth every 6 hours as needed, Disp: , Rfl:     calcium carbonate (OYSTER SHELL CALCIUM 500 MG) 1250 (500 Ca) MG tablet, Take 1,500 mg by mouth daily, Disp: , Rfl:     cetirizine (ZYRTEC) 10 MG tablet, Take 10 mg by mouth daily as needed, Disp: , Rfl:     vitamin D (CHOLECALCIFEROL) 25 MCG (1000 UT) TABS tablet, Take 1,000 Units by mouth, Disp: , Rfl:     clobetasol (TEMOVATE) 0.05 % cream, APPLY TO AFFECTED AREA OF BACK TWICE A DAY AS NEEDED FOR RASH FLARE UPS, Disp: , Rfl:     cyclobenzaprine (FLEXERIL) 10 MG tablet, TAKE 1 TABLET THREE TIMES A DAY AS NEEDED FOR MUSCLE SPASMS, Disp: , Rfl:     Estradiol (VAGIFEM) 10 MCG TABS vaginal tablet, insert 1 vaginally (10MCG) two times a week (EVERY 3 OR 4 DAYS), Disp: , Rfl:     fluocinonide (LIDEX) 0.05 % external solution, APPLY TO SCALP ONCE DAILY AS NEEDED FOR ITCHING AND FLAKING, Disp: , Rfl:     ketoconazole (NIZORAL) 2 % shampoo, WASH SCALP ONCE WEEKLY, ALLOW LATHER TO SIT 4-5 MINUTES THEN RINS. ..  (REFER TO PRESCRIPTION NOTES). , Disp: , Rfl:     lisinopril (PRINIVIL;ZESTRIL) 5 MG tablet, TAKE 1 TABLET DAILY, Disp: , Rfl:     meloxicam (MOBIC) 15 MG tablet, Take 15 mg by mouth daily, Disp: , Rfl:     Meth-Hyo-M Bl-Na Phos-Ph Sal (URIBEL) 118 MG CAPS, take 1 capsule by mouth every 6 hours if needed, Disp: , Rfl:     mupirocin (BACTROBAN) 2 % ointment, APPLY TO AFFECTED AREA OF BACK ONCE DAILY AFTER CLEASING WITH SOAP AND WATER, Disp: , Rfl:     zolpidem (AMBIEN) 5 MG tablet, Take 5 mg by mouth., Disp: , Rfl:     Estradiol Starter Pack (IMVEXXY STARTER PACK) 10 MCG INST, insert 1 capsule (10MCG) vaginally once daily for 2 weeks then 1 ...  (REFER TO PRESCRIPTION NOTES).  (Patient not taking: No sig reported), Disp: , Rfl:     imiquimod (ALDARA) 5 % cream, APPLY SPARINGLY TO SPOT ON BACK 5 TIMES A WEEK AT NIGHT FOR 6 WEEKS (Patient not taking: No sig reported), Disp: , Rfl:     nirmatrelvir/ritonavir (PAXLOVID) 20 x 150 MG & 10 x 100MG TBPK, 3 tabs po bid for 5 days (Patient not taking: No sig reported), Disp: , Rfl:    Patient Active Problem List   Diagnosis    ACP (advance care planning)    Pain management contract agreement    Osteoporosis    DDD (degenerative disc disease), lumbar    Chronic left shoulder pain    Numbness and tingling of right arm    Lesion of hard palate    Spinal stenosis, thoracolumbar region    Fall at home    Elevated blood pressure reading without diagnosis of hypertension    SVT (supraventricular tachycardia) (HCC)    Nausea    Osteoarthritis of right hip    Sicca syndrome (HCC)    Spondylolisthesis of lumbar region    Osteopenia of multiple sites    Fibrosis of left subtalar joint    Spinal stenosis of lumbar region    Lumbar stenosis    Insomnia    Status post lumbar surgery    Closed fracture of thoracic spine without spinal cord lesion (HCC)    Lesion of neck    Acute bilateral thoracic back pain    Spinal stenosis of lumbar region with neurogenic claudication    Chest pain    Constipation by delayed colonic transit    Cataracts, bilateral    Diverticulitis    Sialolithiasis    Urinary retention with incomplete bladder emptying    Osteopenia    Lumbar spine instability    Rheumatoid arthritis (HCC)    Osteoarthritis of left hip    Anxiety    Essential hypertension    H/O calcium pyrophosphate deposition disease (CPPD)    Sciatica    Failed back syndrome, lumbosacral    Cancer (HCC)    Fusion of spine of thoracolumbar region    Opioid use, unspecified with unspecified opioid-induced disorder ([address]    Chronic sinusitis     Past Medical History:   Diagnosis Date    Abdominal abscess 2009    Adverse effect of anesthesia      Be careful with intubation.  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 Deficiency Neg Hx     Delayed Awakening Neg Hx     Post-op Nausea/Vomiting Neg Hx     Emergence Delirium Neg Hx     Post-op Cognitive Dysfunction Neg Hx     Other Other         Orthopedic (Sister)    Osteoarthritis Sister     Heart Attack Brother 62    Cancer Neg Hx     Diabetes Neg Hx     Heart Disease Neg Hx     Hypertension Neg Hx         Review of Systems   Constitutional: Negative.  Negative for fever.   Respiratory: Negative.  Negative for cough.    Cardiovascular:  Positive for leg swelling. Negative for chest pain.   Gastrointestinal:  Negative for nausea and vomiting.   Neurological: Negative.  Negative for weakness.   Psychiatric/Behavioral: Negative.  Negative for behavioral problems and suicidal ideas.       Vitals:    03/14/23 1012   BP: 124/76   Pulse: 69   Resp: 16   Temp: 98.2 °F (36.8 °C)   SpO2: 97%        Physical Exam  Constitutional:       Appearance: Normal appearance.   Cardiovascular:      Rate and Rhythm: Normal rate and regular rhythm.      Pulses: Normal pulses.      Heart sounds: Normal heart sounds. No murmur heard.  Pulmonary:      Effort: Pulmonary effort is normal.      Breath sounds: Normal breath sounds.   Musculoskeletal:      Right lower  leg: Edema present.      Left lower leg: Edema present.   Skin:     Capillary Refill: Capillary refill takes less than 2 seconds.   Neurological:      General: No focal deficit present.      Mental Status: She is alert and oriented to person, place, and time.   Psychiatric:         Mood and Affect: Mood normal.         Behavior: Behavior normal.         Thought Content: Thought content normal.         Judgment: Judgment normal.        Assessment/Plan     Diagnosis Orders   1. Localized edema  furosemide (LASIX) 20 MG tablet      2. Opioid use, unspecified with unspecified opioid-induced disorder (HCC)        3. Supraventricular tachycardia (HCC)        4. Malignant (primary) neoplasm, unspecified (HCC)          I have discussed the diagnosis with the patient and the intended plan of care as seen in the above orders. The patient has received an after-visit summary and questions were answered concerning future plans. I have discussed medication, side effects, and warnings with the patient in detail. The patient verbalized understanding and is in agreement with the plan of care. The patient will contact the office with any additional concerns.    lab results and schedule of future lab studies reviewed with patient    FLORES GONZALES MD

## 2023-03-14 NOTE — PROGRESS NOTES
PHYSICAL / OCCUPATIONAL THERAPY - DAILY TREATMENT NOTE (updated )    Patient Name: Rosalia Diaz    Date: 3/14/2023    : 1950  Insurance: Payor: MEDICARE / Plan: MEDICARE PART A AND B / Product Type: *No Product type* /      Patient  verified Yes     Visit #   Current / Total 2 24   Time   In / Out 9:00 9:38   Pain   In / Out 3 3   Subjective Functional Status/Changes: Pt states pain is mainly when she twists her knee   Changes to:  Meds, Allergies, Med Hx, Sx Hx? If yes, update Summary List no       TREATMENT AREA =  Left knee pain [M25.562]    OBJECTIVE    Modalities Rationale:     decrease pain to improve patient's ability to progress to PLOF and address remaining functional goals. min [] Estim Unattended, type/location:                                      []  w/ice    []  w/heat    min [] Estim Attended, type/location:                                     []  w/US     []  w/ice    []  w/heat    []  TENS insruct      min []  Mechanical Traction: type/lbs                   []  pro   []  sup   []  int   []  cont    []  before manual    []  after manual   8 min [x]  Ultrasound, settings/location:  1.2/ medial knee    min []  Iontophoresis w/ dexamethasone, location:                                               []  take home patch       []  in clinic        min  unbilled []  Ice     []  Heat    location/position:     min []  Paraffin,  details:     min []  Vasopneumatic Device, press/temp:     min []  Tangela Dahl / Bakari Kenyon: If using vaso (only need to measure limb vaso being performed on)      pre-treatment girth :       post-treatment girth :       measured at (landmark location) :      min []  Other:    Skin assessment post-treatment (if applicable):    []  intact    []  redness- no adverse reaction                 []redness - adverse reaction:         Therapeutic Procedures:   Tx Min Billable or 1:1 Min (if diff from Tx Min) Procedure, Rationale, Specifics   22  63150 Therapeutic Exercise (timed):  increase ROM, strength, coordination, balance, and proprioception to improve patient's ability to progress to PLOF and address remaining functional goals. (see flow sheet as applicable)     Details if applicable:       8  93731 Manual Therapy (timed):  decrease pain, increase ROM, and increase tissue extensibility to improve patient's ability to progress to PLOF and address remaining functional goals. The manual therapy interventions were performed at a separate and distinct time from the therapeutic activities interventions . (see flow sheet as applicable)     Details if applicable:  STM/DTM to medial/distal HS, patella mobs   45  MC BC Totals Reminder: bill using total billable min of TIMED therapeutic procedures (example: do not include dry needle or estim unattended, both untimed codes, in totals to left)  8-22 min = 1 unit; 23-37 min = 2 units; 38-52 min = 3 units; 53-67 min = 4 units; 68-82 min = 5 units   Total Total     TOTAL TREATMENT TIME:        38     [x]  Patient Education billed concurrently with other procedures   [x] Review HEP    [] Progressed/Changed HEP, detail:    [] Other detail:       Objective Information/Functional Measures/Assessment    Initiated treatment program per flow sheet. Reviewed HEP. TTP@ medial/distal HS and distal Itband. Pt denies pain with therex and demo's improved gait quality post treatment. Patient will continue to benefit from skilled PT / OT services to modify and progress therapeutic interventions, analyze and address functional mobility deficits, analyze and address ROM deficits, analyze and address strength deficits, analyze and address soft tissue restrictions, analyze and cue for proper movement patterns, analyze and modify for postural abnormalities, and analyze and address imbalance/dizziness to address functional deficits and attain remaining goals.     Progress toward goals / Updated goals:  []  See Progress Note/Recertification    Short Term Goals: To be accomplished in 5 treatments:  1. Pt will be compliant and independent with HEP in order to facilitate PT sessions and aid with self management             Eval Status:  Initiated             Current Status: Met, pt reports compliance 3/14/2023  2. Pt to tolerate 30 min or more of TE and/or Interventions w/o increased s/s             Eval Status:  Initiated             Current Status:     Long Term Goals: To be accomplished in 10 treatments:  1. Pt will report 50% improvement or better with left knee dysfunction to show a significant increase in ability to tolerate ADL             Eval Status:  Initiated             Current Status:  2. Pt will ambulate with turning and perform standing hip rotation without increased reports of pain at the               Eval Status:  Pain with turning and twisting             Current Status:  3. Pt will ambulate 500' (I) with little to no increased pain of the left knee or difficulty for carryover to good walking tolerance to return to walking on the [place] with little to no difficulty               Eval Status:  Increased pain and difficulty with walking about 100' or with walking on uneven surfaces             Current Status:  4.   Pt will improve FOTO score to 44 in 18 visits to show significant improvement in function for progress to boating and climbing ladders with little to no difficulty             Eval Status: FOTO = 18             Current Status:     PLAN  Yes  Continue plan of care  []  Upgrade activities as tolerated  []  Discharge due to :  []  Other:    Deven Hays PTA    3/14/2023    8:08 AM    Future Appointments   Date Time Provider Tito Abraham   3/14/2023  9:00 AM Deven Hays PTA [facility]   3/16/2023  8:30 AM Ron Wei PT [facility]   3/20/2023 11:00 AM Willi Velez PTA [facility]   3/23/2023  8:00 AM Willi Velez PTA [facility]   3/27/2023  8:30 AM Ron Wei PT [facility]   3/29/2023  8:00 AM Ron Wei, PT [facility]   5/10/2023  1:00 PM ZITA Camargo NP BS AMB   5/19/2023  8:15 AM Nicci Dill MD [facility] BS AMB

## 2023-03-14 NOTE — PROGRESS NOTES
Noemi Bocanegra presents today for   Chief Complaint   Patient presents with    Leg Swelling       Vitals:    03/14/23 1012   BP: 124/76   Site: Left Upper Arm   Position: Sitting   Pulse: 69   Resp: 16   Temp: 98.2 °F (36.8 °C)   TempSrc: Temporal   SpO2: 97%   Weight: 173 lb 3.2 oz (78.6 kg)   Height: 5' 2\" (1.575 m)        Is someone accompanying this pt? no    Is the patient using any DME equipment during OV? no    Depression Screening:  PHQ-9 Questionaire 3/14/2023   Little interest or pleasure in doing things 0   Feeling down, depressed, or hopeless 0   Trouble falling or staying asleep, or sleeping too much -   Feeling tired or having little energy -   Poor appetite or overeating -   Feeling bad about yourself - or that you are a failure or have let yourself or your family down -   Trouble concentrating on things, such as reading the newspaper or watching television -   Moving or speaking so slowly that other people could have noticed. Or the opposite - being so fidgety or restless that you have been moving around a lot more than usual -   PHQ-9 Total Score 0       Abuse Screening: AMB Abuse Screening 3/14/2023   Do you ever feel afraid of your partner? N   Are you in a relationship with someone who physically or mentally threatens you? N   Is it safe for you to go home? Y       Fall Screening  Fall Risk 3/14/2023   2 or more falls in past year? yes   Fall with injury in past year?  yes       Generalized Anxiety  URSZULA-7 SCREENING 3/14/2023   Feeling nervous, anxious, or on edge Not at all   Not being able to stop or control worrying Not at all   Worrying too much about different things Not at all   Trouble relaxing Not at all   Being so restless that it is hard to sit still Not at all   Becoming easily annoyed or irritable Not at all   Feeling afraid as if something awful might happen Not at all   URSZULA-7 Total Score 0   How difficult have these problems made it for you to do your work, take care of things at home, or get along with other people? Not difficult at all        Health Maintenance Due   Topic Date Due    Shingles vaccine (1 of 2) Never done    COVID-19 Vaccine (3 - Booster for Moderna series) 05/23/2021    Lipids  06/23/2022   . Health Maintenance reviewed and discussed and ordered per Provider. Vaccines Due   Screenings Due       Les Sam is updated on all HM    1. \"Have you been to the ER, urgent care clinic since your last visit? Hospitalized since your last visit? \" No    2. \"Have you seen or consulted any other health care providers outside of the [address] [place] [address] since your last visit? \" No     3. For patients aged 39-70: Has the patient had a colonoscopy / FIT/ Cologuard? Yes     If the patient is female:    4. For patients aged 41-77: Has the patient had a mammogram within the past 2 years? N/A    5. For patients aged 21-65: Has the patient had a pap smear?  N/A

## 2023-03-20 ENCOUNTER — HOSPITAL ENCOUNTER (OUTPATIENT)
Facility: HOSPITAL | Age: 73
Setting detail: RECURRING SERIES
Discharge: HOME | End: 2023-03-23
Payer: MEDICARE

## 2023-03-20 ENCOUNTER — HOSPITAL ENCOUNTER (OUTPATIENT)
Facility: HOSPITAL | Age: 73
Setting detail: SPECIMEN
Discharge: HOME | End: 2023-03-23
Payer: MEDICARE

## 2023-03-20 ENCOUNTER — NURSE ONLY (OUTPATIENT)
Facility: CLINIC | Age: 73
End: 2023-03-20
Payer: MEDICARE

## 2023-03-20 DIAGNOSIS — I10 ESSENTIAL (PRIMARY) HYPERTENSION: ICD-10-CM

## 2023-03-20 DIAGNOSIS — R00.0 TACHYCARDIA, UNSPECIFIED: ICD-10-CM

## 2023-03-20 DIAGNOSIS — R60.0 LOCALIZED EDEMA: Primary | ICD-10-CM

## 2023-03-20 DIAGNOSIS — R60.0 LOCALIZED EDEMA: ICD-10-CM

## 2023-03-20 LAB
25(OH)D3 SERPL-MCNC: 44 NG/ML (ref 30–100)
ALBUMIN SERPL-MCNC: 4.5 G/DL (ref 3.4–5)
ALBUMIN/GLOB SERPL: 1.7 (ref 0.8–1.7)
ALP SERPL-CCNC: 69 U/L (ref 45–117)
ALT SERPL-CCNC: 19 U/L (ref 13–56)
ANION GAP SERPL CALC-SCNC: 7 MMOL/L (ref 3–18)
AST SERPL-CCNC: 18 U/L (ref 10–38)
BILIRUB SERPL-MCNC: 0.5 MG/DL (ref 0.2–1)
BUN SERPL-MCNC: 15 MG/DL (ref 7–18)
BUN/CREAT SERPL: 16 (ref 12–20)
CALCIUM SERPL-MCNC: 9.7 MG/DL (ref 8.5–10.1)
CHLORIDE SERPL-SCNC: 102 MMOL/L (ref 100–111)
CO2 SERPL-SCNC: 27 MMOL/L (ref 21–32)
CREAT SERPL-MCNC: 0.93 MG/DL (ref 0.6–1.3)
ERYTHROCYTE [DISTWIDTH] IN BLOOD BY AUTOMATED COUNT: 12.3 % (ref 11.6–14.5)
GLOBULIN SER CALC-MCNC: 2.7 G/DL (ref 2–4)
GLUCOSE SERPL-MCNC: 93 MG/DL (ref 74–99)
HCT VFR BLD AUTO: 42.8 % (ref 35–45)
HGB BLD-MCNC: 13.7 G/DL (ref 12–16)
MCH RBC QN AUTO: 30.7 PG (ref 24–34)
MCHC RBC AUTO-ENTMCNC: 32 G/DL (ref 31–37)
MCV RBC AUTO: 96 FL (ref 78–100)
NRBC # BLD: 0 K/UL (ref 0–0.01)
NRBC BLD-RTO: 0 PER 100 WBC
PLATELET # BLD AUTO: 281 K/UL (ref 135–420)
PMV BLD AUTO: 9.9 FL (ref 9.2–11.8)
POTASSIUM SERPL-SCNC: 5 MMOL/L (ref 3.5–5.5)
PROT SERPL-MCNC: 7.2 G/DL (ref 6.4–8.2)
RBC # BLD AUTO: 4.46 M/UL (ref 4.2–5.3)
SODIUM SERPL-SCNC: 136 MMOL/L (ref 136–145)
T4 FREE SERPL-MCNC: 1.1 NG/DL (ref 0.7–1.5)
TSH SERPL DL<=0.05 MIU/L-ACNC: 0.77 UIU/ML (ref 0.36–3.74)
WBC # BLD AUTO: 4.6 K/UL (ref 4.6–13.2)

## 2023-03-20 PROCEDURE — 36415 COLL VENOUS BLD VENIPUNCTURE: CPT | Performed by: FAMILY MEDICINE

## 2023-03-20 PROCEDURE — 97035 APP MDLTY 1+ULTRASOUND EA 15: CPT

## 2023-03-20 PROCEDURE — 82306 VITAMIN D 25 HYDROXY: CPT

## 2023-03-20 PROCEDURE — 36415 COLL VENOUS BLD VENIPUNCTURE: CPT

## 2023-03-20 PROCEDURE — 85027 COMPLETE CBC AUTOMATED: CPT

## 2023-03-20 PROCEDURE — 97110 THERAPEUTIC EXERCISES: CPT

## 2023-03-20 PROCEDURE — 84439 ASSAY OF FREE THYROXINE: CPT

## 2023-03-20 PROCEDURE — 80053 COMPREHEN METABOLIC PANEL: CPT

## 2023-03-20 NOTE — PROGRESS NOTES
as tolerated  []  Discharge due to :  []  Other:    Aracely Bocanegra PTA    3/20/2023    10:38 AM    Future Appointments   Date Time Provider Tito Abraham   3/20/2023 11:00 AM Aracely Bocanegra PTA [facility]   3/23/2023  8:00 AM Aracely Bocanegra PTA [facility]   3/27/2023  8:30 AM Yung Washington PTA [facility]   3/29/2023  8:00 AM Stephen Wiseman PT [facility]   4/11/2023 10:45 AM Caty Hernandez MD [facility]   5/10/2023  1:00 PM ZITA Galan NP [facility] BS AMB

## 2023-03-20 NOTE — PROGRESS NOTES
Patient here today for NV lab draw, name and  verified venipuncture performed on patients right arm was successful patient tolerated well.

## 2023-03-22 ENCOUNTER — TELEPHONE (OUTPATIENT)
Facility: CLINIC | Age: 73
End: 2023-03-22

## 2023-03-22 ENCOUNTER — HOSPITAL ENCOUNTER (OUTPATIENT)
Facility: HOSPITAL | Age: 73
Discharge: HOME | End: 2023-03-25
Payer: MEDICARE

## 2023-03-22 DIAGNOSIS — R29.898 LEG HEAVINESS: ICD-10-CM

## 2023-03-22 DIAGNOSIS — M54.6 CHRONIC LEFT-SIDED THORACIC BACK PAIN: ICD-10-CM

## 2023-03-22 DIAGNOSIS — Z98.1 HISTORY OF THORACIC SPINAL FUSION: ICD-10-CM

## 2023-03-22 DIAGNOSIS — G89.29 CHRONIC LEFT-SIDED THORACIC BACK PAIN: ICD-10-CM

## 2023-03-22 DIAGNOSIS — R29.898 LEG WEAKNESS, BILATERAL: ICD-10-CM

## 2023-03-22 PROCEDURE — 6360000004 HC RX CONTRAST MEDICATION: Performed by: NURSE PRACTITIONER

## 2023-03-22 PROCEDURE — A9577 INJ MULTIHANCE: HCPCS | Performed by: NURSE PRACTITIONER

## 2023-03-22 PROCEDURE — 72157 MRI CHEST SPINE W/O & W/DYE: CPT

## 2023-03-22 RX ORDER — MELOXICAM 15 MG/1
TABLET ORAL
Qty: 90 TABLET | Refills: 3 | Status: SHIPPED | OUTPATIENT
Start: 2023-03-22

## 2023-03-22 RX ADMIN — GADOBENATE DIMEGLUMINE 15 ML: 529 INJECTION, SOLUTION INTRAVENOUS at 16:23

## 2023-03-22 NOTE — PROGRESS NOTES
reaction:           Therapeutic Procedures: Tx Min Billable or 1:1 Min (if diff from Tx Min) Procedure, Rationale, Specifics   20 13 [id number] Therapeutic Exercise (timed):  increase ROM, strength, coordination, balance, and proprioception to improve patient's ability to progress to PLOF and address remaining functional goals. (see flow sheet as applicable)     Details if applicable:       8  14579 Therapeutic Activity (timed):  use of dynamic activities replicating functional movements to increase ROM, strength, coordination, balance, and proprioception in order to improve patient's ability to progress to PLOF and address remaining functional goals. (see flow sheet as applicable)     Details if applicable:            Details if applicable:            Details if applicable:            Details if applicable:     29 13 [facility] Totals Reminder: bill using total billable min of TIMED therapeutic procedures (example: do not include dry needle or estim unattended, both untimed codes, in totals to left)  8-22 min = 1 unit; 23-37 min = 2 units; 38-52 min = 3 units; 53-67 min = 4 units; 68-82 min = 5 units   Total Total     TOTAL TREATMENT TIME:        38     [x]  Patient Education billed concurrently with other procedures   [x] Review HEP    [] Progressed/Changed HEP, detail:    [] Other detail:       Objective Information/Functional Measures/Assessment    Patient tolerated treatment session well today. Patient had no complaints with exercise program to accomplish increased functional strength. Patient continues to make steady progress toward goals and would benefit from continued skilled PT intervention to address remaining deficits outlined in goals below.  FOTO assessed today at 41/100       Patient will continue to benefit from skilled PT / OT services to modify and progress therapeutic interventions, analyze and address functional mobility deficits, analyze and address ROM deficits, analyze and address strength deficits,

## 2023-03-23 ENCOUNTER — HOSPITAL ENCOUNTER (OUTPATIENT)
Facility: HOSPITAL | Age: 73
Setting detail: RECURRING SERIES
Discharge: HOME | End: 2023-03-26
Payer: MEDICARE

## 2023-03-23 PROCEDURE — 97110 THERAPEUTIC EXERCISES: CPT

## 2023-03-23 PROCEDURE — 97035 APP MDLTY 1+ULTRASOUND EA 15: CPT

## 2023-03-27 ENCOUNTER — HOSPITAL ENCOUNTER (OUTPATIENT)
Facility: HOSPITAL | Age: 73
Setting detail: RECURRING SERIES
Discharge: HOME | End: 2023-03-30
Payer: MEDICARE

## 2023-03-27 ENCOUNTER — TELEPHONE (OUTPATIENT)
Facility: CLINIC | Age: 73
End: 2023-03-27

## 2023-03-27 DIAGNOSIS — M48.062 SPINAL STENOSIS, LUMBAR REGION WITH NEUROGENIC CLAUDICATION: ICD-10-CM

## 2023-03-27 DIAGNOSIS — G47.00 INSOMNIA, UNSPECIFIED TYPE: Primary | ICD-10-CM

## 2023-03-27 PROCEDURE — 97035 APP MDLTY 1+ULTRASOUND EA 15: CPT

## 2023-03-27 PROCEDURE — 97530 THERAPEUTIC ACTIVITIES: CPT

## 2023-03-27 PROCEDURE — 97110 THERAPEUTIC EXERCISES: CPT

## 2023-03-27 RX ORDER — TRAMADOL HYDROCHLORIDE 100 MG/1
100 TABLET, EXTENDED RELEASE ORAL DAILY
Qty: 90 TABLET | Refills: 0 | Status: SHIPPED | OUTPATIENT
Start: 2023-03-27 | End: 2023-06-25

## 2023-03-27 RX ORDER — ZOLPIDEM TARTRATE 5 MG/1
5 TABLET ORAL NIGHTLY PRN
Qty: 30 TABLET | Refills: 1 | Status: SHIPPED | OUTPATIENT
Start: 2023-03-27 | End: 2023-04-26

## 2023-03-27 NOTE — TELEPHONE ENCOUNTER
Patient came into office to request scripts for TRAMADOL & AMBIEN be sent to a different pharmacy as her current pharmacy ([phone]) is unable to fill them. Pt would like script sent to [facility] [place] on [address]. Please review and advise as soon as possible.

## 2023-03-27 NOTE — PROGRESS NOTES
[address]   4/11/2023 10:45 AM Renetta Kimbrough MD [facility] [address]   5/10/2023  1:00 PM ZITA Camarena NP [facility] BS AMB

## 2023-03-29 ENCOUNTER — HOSPITAL ENCOUNTER (OUTPATIENT)
Facility: HOSPITAL | Age: 73
Setting detail: RECURRING SERIES
Discharge: HOME | End: 2023-04-01
Payer: MEDICARE

## 2023-03-29 PROCEDURE — 97530 THERAPEUTIC ACTIVITIES: CPT

## 2023-03-29 PROCEDURE — 97140 MANUAL THERAPY 1/> REGIONS: CPT

## 2023-03-29 PROCEDURE — 97112 NEUROMUSCULAR REEDUCATION: CPT

## 2023-03-29 PROCEDURE — 97110 THERAPEUTIC EXERCISES: CPT

## 2023-03-29 NOTE — PROGRESS NOTES
PHYSICAL / OCCUPATIONAL THERAPY - DAILY TREATMENT NOTE (updated )    Patient Name: Don Rivas    Date: 3/29/2023    : 1950  Insurance: Payor: MEDICARE / Plan: MEDICARE PART A AND B / Product Type: *No Product type* /      Patient  verified Yes     Visit #   Current / Total 7 24   Time   In / Out 0800 0853   Pain   In / Out 3 2   Subjective Functional Status/Changes: Knee is swollen today. MRI shows HNP T10-11   Changes to:  Meds, Allergies, Med Hx, Sx Hx? If yes, update Summary List no       TREATMENT AREA =  Left knee pain [M25.562]    OBJECTIVE    Modalities Rationale:     decrease inflammation, decrease pain, and increase tissue extensibility to improve patient's ability to progress to PLOF and address remaining functional goals. min [] Estim Unattended, type/location:                                      []  w/ice    []  w/heat    min [] Estim Attended, type/location:                                     []  w/US     []  w/ice    []  w/heat    []  TENS insruct      min []  Mechanical Traction: type/lbs                   []  pro   []  sup   []  int   []  cont    []  before manual    []  after manual   8 min [x]  Ultrasound, settings/location: Pulsed at 10% 3.3Mhz. 1.3 W/cm2, Pes Anserine, Patella tendon      min []  Iontophoresis w/ dexamethasone, location:                                               []  take home patch       []  in clinic        min  unbilled []  Ice     []  Heat    location/position:     min []  Paraffin,  details:     min []  Vasopneumatic Device, press/temp:     min []  Brianna Galo / Marie Ledbetter: If using vaso (only need to measure limb vaso being performed on)      pre-treatment girth :       post-treatment girth :       measured at (landmark location) :      min []  Other:    Skin assessment post-treatment (if applicable):    []  intact    []  redness- no adverse reaction                 []redness - adverse reaction:         Therapeutic Procedures:   Tx Min Billable or

## 2023-04-03 ENCOUNTER — HOSPITAL ENCOUNTER (OUTPATIENT)
Facility: HOSPITAL | Age: 73
Setting detail: RECURRING SERIES
Discharge: HOME | End: 2023-04-06
Payer: MEDICARE

## 2023-04-03 PROCEDURE — 97112 NEUROMUSCULAR REEDUCATION: CPT

## 2023-04-03 PROCEDURE — 97110 THERAPEUTIC EXERCISES: CPT

## 2023-04-03 PROCEDURE — 97035 APP MDLTY 1+ULTRASOUND EA 15: CPT

## 2023-04-03 NOTE — PROGRESS NOTES
Progress Note/Recertification    Short Term Goals: To be accomplished in 5 treatments:  1. Pt will be compliant and independent with HEP in order to facilitate PT sessions and aid with self management             Eval Status:  Initiated             Current Status: Met, pt reports compliance 3/14/2023  2. Pt to tolerate 30 min or more of TE and/or Interventions w/o increased s/s             Eval Status:  Initiated             Current Status: Progressing at 40 min of treatment not increased knee pain 3/16/23     Long Term Goals: To be accomplished in 10 treatments:  1. Pt will report 50% improvement or better with left knee dysfunction to show a significant increase in ability to tolerate ADL             Eval Status:  Initiated             Current Status:  2. Pt will ambulate with turning and perform standing hip rotation without increased reports of pain at the               Eval Status:  Pain with turning and twisting             Current Status: Pt amb 2x200' with multiple turns with no increased s/s 3/29/23  3. Pt will ambulate 500' (I) with little to no increased pain of the left knee or difficulty for carryover to good walking tolerance to return to walking [place] with little to no difficulty               Eval Status:  Increased pain and difficulty with walking about 100' or with walking on uneven surfaces             Current Status:  Progressing at 2x200' with multiple turns with no increased s/s 3/29/23  4.   Pt will improve FOTO score to 44 in 18 visits to show significant improvement in function for progress to boating and climbing ladders with little to no difficulty             Eval Status: FOTO = 18             Current Status: Progressing FOTO = 41 3/23/23       PLAN  Yes  Continue plan of care  []  Upgrade activities as tolerated  []  Discharge due to :  []  Other:    Shana Flores, LASHELL    4/3/2023    12:43 PM    Future Appointments   Date Time Provider Tito Abraham   4/5/2023

## 2023-04-04 ENCOUNTER — TELEPHONE (OUTPATIENT)
Age: 73
End: 2023-04-04

## 2023-04-05 ENCOUNTER — TELEPHONE (OUTPATIENT)
Age: 73
End: 2023-04-05

## 2023-04-05 ENCOUNTER — HOSPITAL ENCOUNTER (OUTPATIENT)
Facility: HOSPITAL | Age: 73
Setting detail: RECURRING SERIES
Discharge: HOME | End: 2023-04-08
Payer: MEDICARE

## 2023-04-05 PROCEDURE — 97530 THERAPEUTIC ACTIVITIES: CPT

## 2023-04-05 PROCEDURE — 97112 NEUROMUSCULAR REEDUCATION: CPT

## 2023-04-05 PROCEDURE — 97110 THERAPEUTIC EXERCISES: CPT

## 2023-04-05 NOTE — PROGRESS NOTES
LAQ/SLR to 3# with good tolerance  - Pt amb 1x200' and 1x500' with a good pace and good tolerance at end of treatment session  - Good response to use of US and KT with decreased pain at 0/10 after treatment       Patient will continue to benefit from skilled PT / OT services to modify and progress therapeutic interventions, analyze and address functional mobility deficits, analyze and address ROM deficits, analyze and address strength deficits, analyze and address soft tissue restrictions, and analyze and cue for proper movement patterns to address functional deficits and attain remaining goals. Progress toward goals / Updated goals:  []  See Progress Note/Recertification    Short Term Goals: To be accomplished in 5 treatments:  1. Pt will be compliant and independent with HEP in order to facilitate PT sessions and aid with self management             Eval Status:  Initiated             Current Status: Met, pt reports compliance 3/14/2023  2. Pt to tolerate 30 min or more of TE and/or Interventions w/o increased s/s             Eval Status:  Initiated             Current Status: Progressing at 40 min of treatment not increased knee pain 3/16/23     Long Term Goals: To be accomplished in 10 treatments:  1. Pt will report 50% improvement or better with left knee dysfunction to show a significant increase in ability to tolerate ADL             Eval Status:  Initiated             Current Status:  2. Pt will ambulate with turning and perform standing hip rotation without increased reports of pain at the               Eval Status:  Pain with turning and twisting             Current Status: Pt amb 2x200' with multiple turns with no increased s/s 4/5/23  3.   Pt will ambulate 500' (I) with little to no increased pain of the left knee or difficulty for carryover to good walking tolerance to return to walking [place] the [place] d[place] with little to no difficulty               Eval Status:  Increased pain and difficulty with

## 2023-04-13 ENCOUNTER — APPOINTMENT (OUTPATIENT)
Facility: HOSPITAL | Age: 73
End: 2023-04-13
Payer: MEDICARE

## 2023-04-17 ENCOUNTER — HOSPITAL ENCOUNTER (OUTPATIENT)
Facility: HOSPITAL | Age: 73
Setting detail: RECURRING SERIES
Discharge: HOME | End: 2023-04-20
Payer: MEDICARE

## 2023-04-17 PROCEDURE — 97530 THERAPEUTIC ACTIVITIES: CPT

## 2023-04-17 PROCEDURE — 97112 NEUROMUSCULAR REEDUCATION: CPT

## 2023-04-17 PROCEDURE — 97110 THERAPEUTIC EXERCISES: CPT

## 2023-04-19 ENCOUNTER — HOSPITAL ENCOUNTER (OUTPATIENT)
Facility: HOSPITAL | Age: 73
Setting detail: RECURRING SERIES
Discharge: HOME | End: 2023-04-22
Payer: MEDICARE

## 2023-04-19 ENCOUNTER — APPOINTMENT (OUTPATIENT)
Facility: HOSPITAL | Age: 73
End: 2023-04-19
Payer: MEDICARE

## 2023-04-19 PROCEDURE — 97110 THERAPEUTIC EXERCISES: CPT

## 2023-04-19 PROCEDURE — 97140 MANUAL THERAPY 1/> REGIONS: CPT

## 2023-04-19 PROCEDURE — 97161 PT EVAL LOW COMPLEX 20 MIN: CPT

## 2023-04-19 NOTE — PROGRESS NOTES
PHYSICAL / OCCUPATIONAL THERAPY - DAILY TREATMENT NOTE (updated )    Patient Name: Maggy Fulling    Date: 2023    : 1950  Insurance: Payor: MEDICARE / Plan: MEDICARE PART A AND B / Product Type: *No Product type* /      Patient  verified Yes     Visit #   Current / Total 10 24   Time   In / Out 9:30 10:17   Pain   In / Out 4 3   Subjective Functional Status/Changes: Pt states left knee is full of fluid   Changes to:  Meds, Allergies, Med Hx, Sx Hx? If yes, update Summary List no       TREATMENT AREA =  Left knee pain [M25.562]    OBJECTIVE    Therapeutic Procedures: Tx Min Billable or 1:1 Min (if diff from Tx Min) Procedure, Rationale, Specifics   27 22 [id number] Therapeutic Exercise (timed):  increase ROM, strength, coordination, balance, and proprioception to improve patient's ability to progress to PLOF and address remaining functional goals. (see flow sheet as applicable)     Details if applicable:       10 10 05834 Neuromuscular Re-Education (timed):  improve balance, coordination, kinesthetic sense, posture, core stability and proprioception to improve patient's ability to develop conscious control of individual muscles and awareness of position of extremities in order to progress to PLOF and address remaining functional goals. (see flow sheet as applicable)     Details if applicable:     10 10 01928 Therapeutic Activity (timed):  use of dynamic activities replicating functional movements to increase ROM, strength, coordination, balance, and proprioception in order to improve patient's ability to progress to PLOF and address remaining functional goals.   (see flow sheet as applicable)     Details if applicable:     52 42 [facility] Totals Reminder: bill using total billable min of TIMED therapeutic procedures (example: do not include dry needle or estim unattended, both untimed codes, in totals to left)  8-22 min = 1 unit; 23-37 min = 2 units; 38-52 min = 3 units; 53-67 min = 4 units; 68-82 min =
Status:  Pain with turning and twisting             Current Status: Met, Pt amb 2x200' with multiple turns with no increased s/s 4/5/23  3. 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 Pt will improve FOTO score to 44 in 18 visits to show significant improvement in function for progress to boating and climbing ladders with little to no difficulty             Eval Status: FOTO = 18             Current Status: Not Met, Progressing FOTO = 41 3/23/23; Progressing, FOTO = 40 4/17/2023       Assessment/ Summary of Care: Patient has shown good progress with this treatment program. Pain as of last visit was 4/10. Patient has shown decreased pain and increased strength and mobility. Patient reports 50% improvement with overall involvement. FOTO score is 40. All STG/LTGs achieved as identified below.     Fall Risk Assessment: Patient demonstrates a low Fall Risk      RECOMMENDATIONS:  [x]Discontinue therapy: []Patient has reached or is progressing toward set goals      [x]Due to other medical complications      []Due to lack of appreciable progress towards set goals    Ministerio Garcia, PT 4/18/2023 8:23 PM

## 2023-04-19 NOTE — PROGRESS NOTES
treatment plan and certify that this therapy is necessary.     Physician's Signature:_________________________ Date:_________TIME:________                                      Leonor Johnson  Insurance: Payor: Teofilo Hero / Plan: MEDICARE PART A AND B / Product Type: *No Product type* /      ** Signature, Date and Time must be completed for valid certification **    Please sign and return to     [facility] at [address], [place], [facility] [address]  Phone: [phone]      Fax:  [phone]

## 2023-04-19 NOTE — PROGRESS NOTES
press/temp:    If using vaso (only need to measure limb vaso being performed on)      pre-treatment girth :       post-treatment girth :       measured at (landmark location) :      min []  Other:    Skin assessment post-treatment (if applicable):    []  intact    []  redness- no adverse reaction                  []redness - adverse reaction:          14 min [x]Eval     - untimed          Therapeutic Procedures: Tx Min Billable or 1:1 Min (if diff from Tx Min) Procedure, Rationale, Specifics   10  59107 Therapeutic Exercise (timed):  increase ROM, strength, coordination, balance, and proprioception to improve patient's ability to progress to PLOF and address remaining functional goals. (see flow sheet as applicable)     Details if applicable:     16  07084 Manual Therapy (timed):  decrease pain, increase ROM, increase tissue extensibility, and decrease trigger points to improve patient's ability to progress to PLOF and address remaining functional goals. The manual therapy interventions were performed at a separate and distinct time from the therapeutic activities interventions . (see flow sheet as applicable)     Details if applicable:  STM/DTM/TPR Left Tx paraspinal, Tx Rot mobs, (B) Rib Mobs, KT I-Strip for spacing at the Left Tx paraspinal            Details if applicable:            Details if applicable:            Details if applicable: Total    26 Total Reminder: MC/BC bill using total billable min of TIMED therapeutic procedures (example: do not include dry needle or estim unattended, both untimed codes, in totals to left)     [x]  Patient Education billed concurrently with other procedures     Other Objective/Functional Measures:    Access Code: [email or web address]  URL: [email or web address]  Date: 04/19/2023  Prepared by: Danielle Monroe    Exercises  - Corner Pec Major Stretch  - 2 x daily - 7 x weekly - 3 sets - 10 reps  - Standing Shoulder Posterior Capsule Stretch  - 2 x daily - 7 x

## 2023-04-24 ENCOUNTER — APPOINTMENT (OUTPATIENT)
Facility: HOSPITAL | Age: 73
End: 2023-04-24
Payer: MEDICARE

## 2023-04-24 ENCOUNTER — HOSPITAL ENCOUNTER (OUTPATIENT)
Facility: HOSPITAL | Age: 73
Setting detail: RECURRING SERIES
Discharge: HOME | End: 2023-04-27
Payer: MEDICARE

## 2023-04-24 PROCEDURE — 97035 APP MDLTY 1+ULTRASOUND EA 15: CPT

## 2023-04-24 PROCEDURE — 97110 THERAPEUTIC EXERCISES: CPT

## 2023-04-24 PROCEDURE — 97140 MANUAL THERAPY 1/> REGIONS: CPT

## 2023-04-24 NOTE — PROGRESS NOTES
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 If yes, update Summary List no       TREATMENT AREA =  Left knee pain [M25.562]    OBJECTIVE    Modalities Rationale:     decrease pain and increase tissue extensibility to improve patient's ability to progress to PLOF and address remaining functional goals. min [] Estim Unattended, type/location:                                      []  w/ice    []  w/heat    min [] Estim Attended, type/location:                                     []  w/US     []  w/ice    []  w/heat    []  TENS insruct      min []  Mechanical Traction: type/lbs                   []  pro   []  sup   []  int   []  cont    []  before manual    []  after manual    min []  Ultrasound, settings/location:      min []  Iontophoresis w/ dexamethasone, location:                                               []  take home patch       []  in clinic    min  unbill []  Ice     []  Heat    location/position:     min []  Paraffin,  details:     min []  Vasopneumatic Device, press/temp:     min []  Pony Beards / Rondal Drew: If using vaso (only need to measure limb vaso being performed on)      pre-treatment girth :       post-treatment girth :       measured at (landmark location) :      min []  Other:    Skin assessment post-treatment (if applicable):    []  intact    []  redness- no adverse reaction                 []redness - adverse reaction:         Therapeutic Procedures:     Tx Min Billable or 1:1 Min (if diff from [employer]) Procedure, Rationale,

## 2023-04-25 ENCOUNTER — OFFICE VISIT (OUTPATIENT)
Facility: CLINIC | Age: 73
End: 2023-04-25
Payer: MEDICARE

## 2023-04-25 VITALS
RESPIRATION RATE: 10 BRPM | HEIGHT: 62 IN | BODY MASS INDEX: 31.1 KG/M2 | TEMPERATURE: 98 F | WEIGHT: 169 LBS | HEART RATE: 79 BPM | SYSTOLIC BLOOD PRESSURE: 128 MMHG | DIASTOLIC BLOOD PRESSURE: 68 MMHG | OXYGEN SATURATION: 97 %

## 2023-04-25 DIAGNOSIS — R30.0 DYSURIA: ICD-10-CM

## 2023-04-25 DIAGNOSIS — I10 ESSENTIAL (PRIMARY) HYPERTENSION: Primary | ICD-10-CM

## 2023-04-25 DIAGNOSIS — M25.50 ARTHRALGIA, UNSPECIFIED JOINT: ICD-10-CM

## 2023-04-25 PROCEDURE — G8399 PT W/DXA RESULTS DOCUMENT: HCPCS | Performed by: FAMILY MEDICINE

## 2023-04-25 PROCEDURE — G8417 CALC BMI ABV UP PARAM F/U: HCPCS | Performed by: FAMILY MEDICINE

## 2023-04-25 PROCEDURE — 3074F SYST BP LT 130 MM HG: CPT | Performed by: FAMILY MEDICINE

## 2023-04-25 PROCEDURE — 99213 OFFICE O/P EST LOW 20 MIN: CPT | Performed by: FAMILY MEDICINE

## 2023-04-25 PROCEDURE — 3078F DIAST BP <80 MM HG: CPT | Performed by: FAMILY MEDICINE

## 2023-04-25 PROCEDURE — 1036F TOBACCO NON-USER: CPT | Performed by: FAMILY MEDICINE

## 2023-04-25 PROCEDURE — G8427 DOCREV CUR MEDS BY ELIG CLIN: HCPCS | Performed by: FAMILY MEDICINE

## 2023-04-25 PROCEDURE — 1090F PRES/ABSN URINE INCON ASSESS: CPT | Performed by: FAMILY MEDICINE

## 2023-04-25 PROCEDURE — 1123F ACP DISCUSS/DSCN MKR DOCD: CPT | Performed by: FAMILY MEDICINE

## 2023-04-25 PROCEDURE — 3017F COLORECTAL CA SCREEN DOC REV: CPT | Performed by: FAMILY MEDICINE

## 2023-04-25 ASSESSMENT — PATIENT HEALTH QUESTIONNAIRE - PHQ9
SUM OF ALL RESPONSES TO PHQ QUESTIONS 1-9: 0
SUM OF ALL RESPONSES TO PHQ QUESTIONS 1-9: 0
2. FEELING DOWN, DEPRESSED OR HOPELESS: 0
SUM OF ALL RESPONSES TO PHQ QUESTIONS 1-9: 0
SUM OF ALL RESPONSES TO PHQ QUESTIONS 1-9: 0

## 2023-04-25 ASSESSMENT — ENCOUNTER SYMPTOMS
RESPIRATORY NEGATIVE: 1
VOMITING: 0
COUGH: 0
NAUSEA: 0

## 2023-04-25 NOTE — PROGRESS NOTES
Chief Complaint   Patient presents with    Joint Pain    Urinary Pain     Pt in office for joint pain and urinary pain.

## 2023-04-25 NOTE — PROGRESS NOTES
Bayron Dewey is a 74 y.o. female  presents for chronic urinary problems and joint pains. No dysuria fever or chills    Chief Complaint   Patient presents with    Joint Pain    Urinary Pain        Allergies   Allergen Reactions    Celecoxib Swelling     Other reaction(s): unknown  Other reaction(s): unknown      Shellfish Allergy Other (See Comments)      Pt.denies     Shellfish-Derived Products Swelling     \"makes me feel puffy\"    Sulfa Antibiotics Hives and Swelling     Lips swelling  Other reaction(s): unknown    Adalimumab Other (See Comments)     Lupus  Other reaction(s): neurological reaction  Other reaction(s): neurological reaction, Other (comments)  Lupus      Adhesive Tape Dermatitis, Hives and Itching    Influenza Vaccines Hives    Iodine Itching     Takes benadryl and is OK. Contrast tolerated. Betadine ok. Ioversol Hives and Itching     Pt states when she gets iv contrast she itches a little from hives? Other reaction(s): rash/itching  Other reaction(s): mild rash/itching  Pt states when she gets iv contrast she itches a little from hives? Pt states when she gets iv contrast she itches a little from hives? Pt states when she gets iv contrast she itches a little from hives? Meloxicam     Nsaids Swelling    Penicillins Hives    Shrimp Extract Allergy Skin Test Other (See Comments)     Sodium puffy    Gabapentin Diarrhea and Palpitations     Chest and Abdominal pain       Current Outpatient Medications:     lidocaine (LIDODERM) 5 %, Place 1-2 patches onto the skin every 24 hours 12 hours on, 12 hours off., Disp: 60 patch, Rfl: 2    traMADol (ULTRAM ER) 100 MG extended release tablet, Take 1 tablet by mouth daily for 90 days. Max Daily Amount: 100 mg, Disp: 90 tablet, Rfl: 0    zolpidem (AMBIEN) 5 MG tablet, Take 1 tablet by mouth nightly as needed for Sleep for up to 30 days.  Max Daily Amount: 5 mg, Disp: 30 tablet, Rfl: 1    meloxicam (MOBIC) 15 MG tablet, TAKE 1 TABLET DAILY, Disp: 90

## 2023-04-26 ENCOUNTER — HOSPITAL ENCOUNTER (OUTPATIENT)
Facility: HOSPITAL | Age: 73
Setting detail: RECURRING SERIES
Discharge: HOME | End: 2023-04-29
Payer: MEDICARE

## 2023-04-26 ENCOUNTER — APPOINTMENT (OUTPATIENT)
Facility: HOSPITAL | Age: 73
End: 2023-04-26
Payer: MEDICARE

## 2023-04-26 PROCEDURE — 97110 THERAPEUTIC EXERCISES: CPT

## 2023-04-26 PROCEDURE — 97140 MANUAL THERAPY 1/> REGIONS: CPT

## 2023-04-26 NOTE — PROGRESS NOTES
PHYSICAL / OCCUPATIONAL THERAPY - DAILY TREATMENT NOTE (updated )    Patient Name: Marisela Vee    Date: 2023    : 1950  Insurance: Payor: MEDICARE / Plan: MEDICARE PART A AND B / Product Type: *No Product type* /      Patient  verified Yes     Visit #   Current / Total 3 24   Time   In / Out 10:10 10:57   Pain   In / Out 6 4-5   Subjective Functional Status/Changes: Pt reports having pain from doing too many chores at home   Changes to:  Meds, Allergies, Med Hx, Sx Hx? If yes, update Summary List no       TREATMENT AREA =  Left knee pain [M25.562]    OBJECTIVE    Therapeutic Procedures: Tx Min Billable or 1:1 Min (if diff from Tx Min) Procedure, Rationale, Specifics   32  06818 Therapeutic Exercise (timed):  increase ROM, strength, coordination, balance, and proprioception to improve patient's ability to progress to PLOF and address remaining functional goals. (see flow sheet as applicable)     Details if applicable:       15  68088 Manual Therapy (timed):  decrease pain, increase ROM, and increase tissue extensibility to improve patient's ability to progress to PLOF and address remaining functional goals. The manual therapy interventions were performed at a separate and distinct time from the therapeutic activities interventions .  (see flow sheet as applicable)     Details if applicable:  DTM/STM to left T/s and L/s paraspinals, left QL and sup glutes                  52  [facility] Totals Reminder: bill using total billable min of TIMED therapeutic procedures (example: do not include dry needle or estim unattended, both untimed codes, in totals to left)  8-22 min = 1 unit; 23-37 min = 2 units; 38-52 min = 3 units; 53-67 min = 4 units; 68-82 min = 5 units   Total Total     TOTAL TREATMENT TIME:        47     [x]  Patient Education billed concurrently with other procedures   [x] Review HEP    [] Progressed/Changed HEP, detail:    [] Other detail:       Objective Information/Functional

## 2023-05-01 ENCOUNTER — APPOINTMENT (OUTPATIENT)
Facility: HOSPITAL | Age: 73
End: 2023-05-01
Payer: MEDICARE

## 2023-05-04 ENCOUNTER — HOSPITAL ENCOUNTER (OUTPATIENT)
Facility: HOSPITAL | Age: 73
Setting detail: RECURRING SERIES
Discharge: HOME | End: 2023-05-07
Payer: MEDICARE

## 2023-05-04 PROCEDURE — 97110 THERAPEUTIC EXERCISES: CPT

## 2023-05-04 PROCEDURE — 97140 MANUAL THERAPY 1/> REGIONS: CPT

## 2023-05-04 PROCEDURE — 97112 NEUROMUSCULAR REEDUCATION: CPT

## 2023-05-08 ENCOUNTER — HOSPITAL ENCOUNTER (OUTPATIENT)
Facility: HOSPITAL | Age: 73
Setting detail: RECURRING SERIES
Discharge: HOME | End: 2023-05-11
Payer: MEDICARE

## 2023-05-08 PROCEDURE — 97112 NEUROMUSCULAR REEDUCATION: CPT

## 2023-05-08 PROCEDURE — 97035 APP MDLTY 1+ULTRASOUND EA 15: CPT

## 2023-05-08 PROCEDURE — 97110 THERAPEUTIC EXERCISES: CPT

## 2023-05-08 NOTE — PROGRESS NOTES
PHYSICAL / OCCUPATIONAL THERAPY - DAILY TREATMENT NOTE (updated )    Patient Name: Lizabeth Valencia    Date: 2023    : 1950  Insurance: Payor: MEDICARE / Plan: MEDICARE PART A AND B / Product Type: *No Product type* /      Patient  verified Yes     Visit #   Current / Total 5 24   Time   In / Out 1050 1130   Pain   In / Out 3 2   Subjective Functional Status/Changes: Pt noted increased cervical and left shd pain after last visit   Changes to:  Meds, Allergies, Med Hx, Sx Hx? If yes, update Summary List no       TREATMENT AREA =  Left knee pain [M25.562]    OBJECTIVE    Modalities Rationale:     decrease inflammation, decrease pain, and increase tissue extensibility to improve patient's ability to progress to PLOF and address remaining functional goals. min [] Estim Unattended, type/location:                                      []  w/ice    []  w/heat    min [] Estim Attended, type/location:                                     []  w/US     []  w/ice    []  w/heat    []  TENS insruct      min []  Mechanical Traction: type/lbs                   []  pro   []  sup   []  int   []  cont    []  before manual    []  after manual   10 min [x]  Ultrasound, settings/location: 1.5, 3MHz, continuous Thoracic and lumbar paraspinals and scar tissue    min []  Iontophoresis w/ dexamethasone, location:                                               []  take home patch       []  in clinic    min  unbill []  Ice     []  Heat    location/position:     min []  Paraffin,  details:     min []  Vasopneumatic Device, press/temp:     min []  Caro Macleod / Nunez Oar:     If using vaso (only need to measure limb vaso being performed on)      pre-treatment girth :       post-treatment girth :       measured at (landmark location) :      min []  Other:    Skin assessment post-treatment (if applicable):    []  intact    []  redness- no adverse reaction                 []redness - adverse reaction:         Therapeutic

## 2023-05-10 ENCOUNTER — OFFICE VISIT (OUTPATIENT)
Age: 73
End: 2023-05-10
Payer: MEDICARE

## 2023-05-10 ENCOUNTER — HOSPITAL ENCOUNTER (OUTPATIENT)
Facility: HOSPITAL | Age: 73
Setting detail: RECURRING SERIES
Discharge: HOME | End: 2023-05-13
Payer: MEDICARE

## 2023-05-10 VITALS
SYSTOLIC BLOOD PRESSURE: 129 MMHG | HEIGHT: 62 IN | BODY MASS INDEX: 30.91 KG/M2 | HEART RATE: 68 BPM | DIASTOLIC BLOOD PRESSURE: 76 MMHG | WEIGHT: 168 LBS | TEMPERATURE: 97 F | OXYGEN SATURATION: 97 %

## 2023-05-10 DIAGNOSIS — M54.6 CHRONIC LEFT-SIDED THORACIC BACK PAIN: Primary | ICD-10-CM

## 2023-05-10 DIAGNOSIS — M96.1 LUMBAR POST-LAMINECTOMY SYNDROME: ICD-10-CM

## 2023-05-10 DIAGNOSIS — G89.29 CHRONIC LEFT-SIDED THORACIC BACK PAIN: Primary | ICD-10-CM

## 2023-05-10 PROCEDURE — 97140 MANUAL THERAPY 1/> REGIONS: CPT

## 2023-05-10 PROCEDURE — 3017F COLORECTAL CA SCREEN DOC REV: CPT | Performed by: NURSE PRACTITIONER

## 2023-05-10 PROCEDURE — 3074F SYST BP LT 130 MM HG: CPT | Performed by: NURSE PRACTITIONER

## 2023-05-10 PROCEDURE — 3078F DIAST BP <80 MM HG: CPT | Performed by: NURSE PRACTITIONER

## 2023-05-10 PROCEDURE — 1090F PRES/ABSN URINE INCON ASSESS: CPT | Performed by: NURSE PRACTITIONER

## 2023-05-10 PROCEDURE — 97110 THERAPEUTIC EXERCISES: CPT

## 2023-05-10 PROCEDURE — 1036F TOBACCO NON-USER: CPT | Performed by: NURSE PRACTITIONER

## 2023-05-10 PROCEDURE — 99213 OFFICE O/P EST LOW 20 MIN: CPT | Performed by: NURSE PRACTITIONER

## 2023-05-10 PROCEDURE — 1123F ACP DISCUSS/DSCN MKR DOCD: CPT | Performed by: NURSE PRACTITIONER

## 2023-05-10 PROCEDURE — 97035 APP MDLTY 1+ULTRASOUND EA 15: CPT

## 2023-05-10 PROCEDURE — G8399 PT W/DXA RESULTS DOCUMENT: HCPCS | Performed by: NURSE PRACTITIONER

## 2023-05-10 PROCEDURE — G8417 CALC BMI ABV UP PARAM F/U: HCPCS | Performed by: NURSE PRACTITIONER

## 2023-05-10 PROCEDURE — G8428 CUR MEDS NOT DOCUMENT: HCPCS | Performed by: NURSE PRACTITIONER

## 2023-05-10 RX ORDER — ZOLPIDEM TARTRATE 5 MG/1
TABLET ORAL
COMMUNITY
Start: 2023-04-30

## 2023-05-10 RX ORDER — TOFACITINIB 5 MG/1
1 TABLET, FILM COATED ORAL 2 TIMES DAILY
COMMUNITY
Start: 2023-04-25

## 2023-05-10 NOTE — PROGRESS NOTES
Chief complaint   Chief Complaint   Patient presents with    Back Pain     Left sided thoracic     Lower Back Pain       History of Present Illness:  Hai Canela is a  74 y.o.  female who comes in today to follow-up on her left-sided thoracic pain. She has had a previous thoracic or lumbar fusion with left lower rib resection. She has a lot of scar tissue in the area. Last visit Dr. Birsa Roa put her in physical therapy which is ongoing. She says the massage and ultrasound do help. She was also given some Lidoderm patches to try but she is allergic to the adhesive. She states she can tolerate Salonpas for about 3 hours but after that she has to take it off. She states if she lays flat and takes Flexeril her pain can get better but she cannot lay flat 4 to 5 hours a day. She takes tramadol  mg through her PCP, Dr. Jamie Goldman. That does help but it does not last the whole day. She states she has trouble lifting her left arm to get things out of a cupboard she is left-handed. She has tried Voltaren gel and that does not really help either. She denies fever bowel bladder dysfunction. Physical Exam: Patient is a 75-year-old female well-developed well-nourished who is alert and oriented with a normal mood and affect. She has a full weightbearing antalgic gait. She did not use any assist device. She has 5 5 strength bilateral lower extremities. Negative straight leg raise. Assessment and Plan: This is a patient with lumbar postlaminectomy syndrome. She also has chronic left-sided thoracic pain. Her low back has been helped before with SI joint injections. She will let us know if she needs another injection in the right over that schedule of when she can have it repeated. She is going to speak with Dr. Jamie Goldman who gives her the tramadol ER and see if she can change it to IR and take it more often during the day. She will also continue Salonpas as needed. We will see her back as needed.     There

## 2023-05-10 NOTE — PROGRESS NOTES
Shelby Gamez presents today for   Chief Complaint   Patient presents with    Back Pain     Left sided thoracic     Lower Back Pain       Is someone accompanying this pt? no    Is the patient using any DME equipment during OV? no    Depression Screening:  PHQ-9 Questionaire 4/25/2023 3/14/2023 2/15/2023 8/29/2022 6/22/2022 6/8/2022 10/7/2021   Little interest or pleasure in doing things - 0 0 0 0 0 0   Feeling down, depressed, or hopeless 0 0 0 0 0 0 0   Trouble falling or staying asleep, or sleeping too much - - - - - 0 -   Feeling tired or having little energy - - - - - 0 -   Poor appetite or overeating - - - - - 0 -   Feeling bad about yourself - or that you are a failure or have let yourself or your family down - - - - - 0 -   Trouble concentrating on things, such as reading the newspaper or watching television - - - - - 0 -   Moving or speaking so slowly that other people could have noticed. Or the opposite - being so fidgety or restless that you have been moving around a lot more than usual - - - - - 0 -   PHQ-9 Total Score 0 0 0 0 0 0 0     PHQ Scores 4/25/2023 3/14/2023 2/15/2023 8/29/2022 6/22/2022 6/8/2022 1/27/2022   PHQ2 Score - 0 0 0 0 0 -   PHQ2 Score - - - - - - 0   PHQ9 Score 0 0 0 0 0 0 -       Abuse Screening: AMB Abuse Screening 3/14/2023   Do you ever feel afraid of your partner? N   Are you in a relationship with someone who physically or mentally threatens you? N   Is it safe for you to go home? Y       Coordination of Care:  1. Have you been to the ER, urgent care clinic since your last visit? no  Hospitalized since your last visit? no    2. Have you seen or consulted any other health care providers outside of the [address] [place] [address] since your last visit? Yes, physical therapy Include any pap smears or colon screening.  no

## 2023-05-10 NOTE — PROGRESS NOTES
PHYSICAL / OCCUPATIONAL THERAPY - DAILY TREATMENT NOTE (updated )    Patient Name: Salvador Gautam    Date: 5/10/2023    : 1950  Insurance: Payor: MEDICARE / Plan: MEDICARE PART A AND B / Product Type: *No Product type* /      Patient  verified Yes     Visit #   Current / Total 6 24   Time   In / Out 1010 1050   Pain   In / Out 4-5 3-4   Subjective Functional Status/Changes: Pt noted pulling pain in mid back upon arrival   Changes to:  Meds, Allergies, Med Hx, Sx Hx? If yes, update Summary List no       TREATMENT AREA =  Left knee pain [M25.562]    OBJECTIVE  Modalities Rationale:     decrease inflammation, decrease pain, and increase tissue extensibility to improve patient's ability to progress to PLOF and address remaining functional goals. min [] Estim Unattended, type/location:                                                 []  w/ice    []  w/heat     min [] Estim Attended, type/location:                                                []  w/US     []  w/ice    []  w/heat    []  TENS insruct       min []  Mechanical Traction: type/lbs                    []  pro   []  sup   []  int   []  cont    []  before manual    []  after manual   10 min [x]  Ultrasound, settings/location: 1.5, 3MHz, continuous Thoracic and lumbar paraspinals and scar tissue     min []  Iontophoresis w/ dexamethasone, location:                                                []  take home patch       []  in clinic     min  unbill []  Ice     []  Heat    location/position:       min []  Paraffin,  details:       min []  Vasopneumatic Device, press/temp:       min []  Betsy Andino / Muriel Webster:      If using vaso (only need to measure limb vaso being performed on)      pre-treatment girth :       post-treatment girth :       measured at (landmark location) :       min []  Other:     Skin assessment post-treatment (if applicable):    []  intact    []  redness- no adverse reaction                 []redness - adverse

## 2023-05-12 ENCOUNTER — HOSPITAL ENCOUNTER (OUTPATIENT)
Facility: HOSPITAL | Age: 73
Setting detail: RECURRING SERIES
Discharge: HOME | End: 2023-05-15
Payer: MEDICARE

## 2023-05-12 PROCEDURE — 97110 THERAPEUTIC EXERCISES: CPT

## 2023-05-12 PROCEDURE — 97140 MANUAL THERAPY 1/> REGIONS: CPT

## 2023-05-12 PROCEDURE — 97035 APP MDLTY 1+ULTRASOUND EA 15: CPT

## 2023-05-13 NOTE — PATIENT INSTRUCTIONS
15 Back Pain: Care Instructions  Your Care Instructions    Back pain has many possible causes. It is often related to problems with muscles and ligaments of the back. It may also be related to problems with the nerves, discs, or bones of the back. Moving, lifting, standing, sitting, or sleeping in an awkward way can strain the back. Sometimes you don't notice the injury until later. Arthritis is another common cause of back pain. Although it may hurt a lot, back pain usually improves on its own within several weeks. Most people recover in 12 weeks or less. Using good home treatment and being careful not to stress your back can help you feel better sooner. Follow-up care is a key part of your treatment and safety. Be sure to make and go to all appointments, and call your doctor if you are having problems. It's also a good idea to know your test results and keep a list of the medicines you take. How can you care for yourself at home? · Sit or lie in positions that are most comfortable and reduce your pain. Try one of these positions when you lie down:  ? Lie on your back with your knees bent and supported by large pillows. ? Lie on the floor with your legs on the seat of a sofa or chair. ? Lie on your side with your knees and hips bent and a pillow between your legs. ? Lie on your stomach if it does not make pain worse. · Do not sit up in bed, and avoid soft couches and twisted positions. Bed rest can help relieve pain at first, but it delays healing. Avoid bed rest after the first day of back pain. · Change positions every 30 minutes. If you must sit for long periods of time, take breaks from sitting. Get up and walk around, or lie in a comfortable position. · Try using a heating pad on a low or medium setting for 15 to 20 minutes every 2 or 3 hours. Try a warm shower in place of one session with the heating pad. · You can also try an ice pack for 10 to 15 minutes every 2 to 3 hours.  Put a thin cloth between the ice pack and your skin. · Take pain medicines exactly as directed. ? If the doctor gave you a prescription medicine for pain, take it as prescribed. ? If you are not taking a prescription pain medicine, ask your doctor if you can take an over-the-counter medicine. · Take short walks several times a day. You can start with 5 to 10 minutes, 3 or 4 times a day, and work up to longer walks. Walk on level surfaces and avoid hills and stairs until your back is better. · Return to work and other activities as soon as you can. Continued rest without activity is usually not good for your back. · To prevent future back pain, do exercises to stretch and strengthen your back and stomach. Learn how to use good posture, safe lifting techniques, and proper body mechanics. When should you call for help? Call your doctor now or seek immediate medical care if:    · You have new or worsening numbness in your legs.     · You have new or worsening weakness in your legs. (This could make it hard to stand up.)     · You lose control of your bladder or bowels.    Watch closely for changes in your health, and be sure to contact your doctor if:    · You have a fever, lose weight, or don't feel well.     · You do not get better as expected. Where can you learn more? Go to http://camilo-robert.info/. Enter C820 in the search box to learn more about \"Back Pain: Care Instructions. \"  Current as of: November 29, 2017  Content Version: 11.8  © 3679-7561 [employer]. Care instructions adapted under license by [employer] (which disclaims liability or warranty for this information). If you have questions about a medical condition or this instruction, always ask your healthcare professional. [place] [address] any warranty or liability for your use of this information.

## 2023-05-15 ENCOUNTER — HOSPITAL ENCOUNTER (OUTPATIENT)
Facility: HOSPITAL | Age: 73
Setting detail: RECURRING SERIES
Discharge: HOME | End: 2023-05-18
Payer: MEDICARE

## 2023-05-15 PROCEDURE — 97035 APP MDLTY 1+ULTRASOUND EA 15: CPT

## 2023-05-15 PROCEDURE — 97110 THERAPEUTIC EXERCISES: CPT

## 2023-05-15 PROCEDURE — 97140 MANUAL THERAPY 1/> REGIONS: CPT

## 2023-05-15 NOTE — PROGRESS NOTES
PHYSICAL / OCCUPATIONAL THERAPY - DAILY TREATMENT NOTE (updated )    Patient Name: Lillian Patch    Date: 5/15/2023    : 1950  Insurance: Payor: MEDICARE / Plan: MEDICARE PART A AND B / Product Type: *No Product type* /      Patient  verified Yes     Visit #   Current / Total 8 24   Time   In / Out 1055 1145   Pain   In / Out 6 4   Subjective Functional Status/Changes: Increased pain today at the left rib region   Changes to:  Meds, Allergies, Med Hx, Sx Hx? If yes, update Summary List no       TREATMENT AREA =  Left knee pain [M25.562]    OBJECTIVE    Modalities Rationale:     decrease inflammation, decrease pain, and increase tissue extensibility to improve patient's ability to progress to PLOF and address remaining functional goals. min [] Estim Unattended, type/location:                                      []  w/ice    []  w/heat    min [] Estim Attended, type/location:                                     []  w/US     []  w/ice    []  w/heat    []  TENS insruct      min []  Mechanical Traction: type/lbs                   []  pro   []  sup   []  int   []  cont    []  before manual    []  after manual   8 min [x]  Ultrasound, settings/location:  Pulsed at 20%, 1Mhz. 1.7 W/cm2 at Left Tx paraspinal and rib      min []  Iontophoresis w/ dexamethasone, location:                                               []  take home patch       []  in clinic        min  unbilled []  Ice     []  Heat    location/position:     min []  Paraffin,  details:     min []  Vasopneumatic Device, press/temp:     min []  Domi Camara / Amaris Mart: If using vaso (only need to measure limb vaso being performed on)      pre-treatment girth :       post-treatment girth :       measured at (landmark location) :      min []  Other:    Skin assessment post-treatment (if applicable):    [x]  intact    []  redness- no adverse reaction                 []redness - adverse reaction:         Therapeutic Procedures:   Tx Min Billable or

## 2023-05-16 ENCOUNTER — TELEPHONE (OUTPATIENT)
Facility: CLINIC | Age: 73
End: 2023-05-16

## 2023-05-16 DIAGNOSIS — M25.50 ARTHRALGIA, UNSPECIFIED JOINT: Primary | ICD-10-CM

## 2023-05-16 RX ORDER — TRAMADOL HYDROCHLORIDE 50 MG/1
50 TABLET ORAL EVERY 6 HOURS PRN
Qty: 120 TABLET | Refills: 1 | Status: SHIPPED | OUTPATIENT
Start: 2023-05-16 | End: 2023-06-15

## 2023-05-16 NOTE — TELEPHONE ENCOUNTER
----- Message from Leesa Vieira MD sent at 5/16/2023  8:14 AM EDT -----  Regarding: RE: Trammadol change needed  Contact: [phone]  It has been sent in.  ----- Message -----  From: Sharonda Carroll  Sent: 5/15/2023  11:42 AM EDT  To: Leesa Vieira MD  Subject: Lillia DowseVerne Aho change needed                        ----- Message -----  From: [employer]  Sent: 5/13/2023   1:08 PM EDT  To: [address] [facility] Clinical Staff  Subject: Trammadol change needed                          Have done pain management with Dr. Juliane Gastelum, injected SI joints, and now in PT with [facility]. I need to change to regular Tramadol 50 MG tablets, 200 mg a day, vs the extended release. Tramadol 100 XR tablet, prescription is almost impossible to fill locally or at [facility](they have no tramadol at all), and it now is not providing the coverage for discomfort I need. Dr Boyd Huge office suggested trammadol tablets 50 mg to provide coverage. Florentin Hilton have done all they can, injection wise for the lower back. I just did a real number falling. .which damaged the top and bottom of the spine. So if you can prescribe tramadol 50 mg tablets, then I should be able to obtain thru [facility] [employer].

## 2023-05-17 ENCOUNTER — HOSPITAL ENCOUNTER (OUTPATIENT)
Facility: HOSPITAL | Age: 73
Setting detail: RECURRING SERIES
Discharge: HOME | End: 2023-05-20
Payer: MEDICARE

## 2023-05-17 PROCEDURE — 97110 THERAPEUTIC EXERCISES: CPT

## 2023-05-17 PROCEDURE — 97140 MANUAL THERAPY 1/> REGIONS: CPT

## 2023-05-17 NOTE — PROGRESS NOTES
change to note  5.   Pt will improve FOTO score to 44 in 15 visits to show significant improvement for progress to good shoulder and thoracic spine function with daily activity life style             Eval Status: FOTO = 29             Current Status: Assess at 30 day eliana    PLAN  Yes  Continue plan of care  [x]  Upgrade activities as tolerated  []  Discharge due to :  []  Other:    Nancy Kovacs PT    5/17/2023    3:56 PM    Future Appointments   Date Time Provider Tito Abraham   5/19/2023 10:50 AM Nancy Kovacs PT [facility]

## 2023-05-19 ENCOUNTER — HOSPITAL ENCOUNTER (OUTPATIENT)
Facility: HOSPITAL | Age: 73
Setting detail: RECURRING SERIES
Discharge: HOME | End: 2023-05-22
Payer: MEDICARE

## 2023-05-19 PROCEDURE — 97110 THERAPEUTIC EXERCISES: CPT

## 2023-05-19 PROCEDURE — 97140 MANUAL THERAPY 1/> REGIONS: CPT

## 2023-05-25 ENCOUNTER — OFFICE VISIT (OUTPATIENT)
Facility: CLINIC | Age: 73
End: 2023-05-25
Payer: MEDICARE

## 2023-05-25 VITALS
WEIGHT: 168 LBS | BODY MASS INDEX: 30.73 KG/M2 | DIASTOLIC BLOOD PRESSURE: 80 MMHG | OXYGEN SATURATION: 99 % | SYSTOLIC BLOOD PRESSURE: 102 MMHG | HEART RATE: 67 BPM | RESPIRATION RATE: 16 BRPM | TEMPERATURE: 98.1 F

## 2023-05-25 DIAGNOSIS — K57.92 DIVERTICULITIS: Primary | ICD-10-CM

## 2023-05-25 DIAGNOSIS — R35.0 URINARY FREQUENCY: ICD-10-CM

## 2023-05-25 LAB
BILIRUBIN, URINE, POC: NEGATIVE
BLOOD URINE, POC: NEGATIVE
GLUCOSE URINE, POC: NEGATIVE
KETONES, URINE, POC: NEGATIVE
LEUKOCYTE ESTERASE, URINE, POC: NEGATIVE
NITRITE, URINE, POC: NEGATIVE
PH, URINE, POC: 7 (ref 4.6–8)
PROTEIN,URINE, POC: NEGATIVE
SPECIFIC GRAVITY, URINE, POC: 1.01 (ref 1–1.03)
URINALYSIS CLARITY, POC: CLEAR
URINALYSIS COLOR, POC: NORMAL
UROBILINOGEN, POC: NORMAL

## 2023-05-25 PROCEDURE — G8417 CALC BMI ABV UP PARAM F/U: HCPCS | Performed by: STUDENT IN AN ORGANIZED HEALTH CARE EDUCATION/TRAINING PROGRAM

## 2023-05-25 PROCEDURE — G8427 DOCREV CUR MEDS BY ELIG CLIN: HCPCS | Performed by: STUDENT IN AN ORGANIZED HEALTH CARE EDUCATION/TRAINING PROGRAM

## 2023-05-25 PROCEDURE — 3074F SYST BP LT 130 MM HG: CPT | Performed by: STUDENT IN AN ORGANIZED HEALTH CARE EDUCATION/TRAINING PROGRAM

## 2023-05-25 PROCEDURE — 1036F TOBACCO NON-USER: CPT | Performed by: STUDENT IN AN ORGANIZED HEALTH CARE EDUCATION/TRAINING PROGRAM

## 2023-05-25 PROCEDURE — 3078F DIAST BP <80 MM HG: CPT | Performed by: STUDENT IN AN ORGANIZED HEALTH CARE EDUCATION/TRAINING PROGRAM

## 2023-05-25 PROCEDURE — 1090F PRES/ABSN URINE INCON ASSESS: CPT | Performed by: STUDENT IN AN ORGANIZED HEALTH CARE EDUCATION/TRAINING PROGRAM

## 2023-05-25 PROCEDURE — 3017F COLORECTAL CA SCREEN DOC REV: CPT | Performed by: STUDENT IN AN ORGANIZED HEALTH CARE EDUCATION/TRAINING PROGRAM

## 2023-05-25 PROCEDURE — 81003 URINALYSIS AUTO W/O SCOPE: CPT | Performed by: STUDENT IN AN ORGANIZED HEALTH CARE EDUCATION/TRAINING PROGRAM

## 2023-05-25 PROCEDURE — 1123F ACP DISCUSS/DSCN MKR DOCD: CPT | Performed by: STUDENT IN AN ORGANIZED HEALTH CARE EDUCATION/TRAINING PROGRAM

## 2023-05-25 PROCEDURE — G8399 PT W/DXA RESULTS DOCUMENT: HCPCS | Performed by: STUDENT IN AN ORGANIZED HEALTH CARE EDUCATION/TRAINING PROGRAM

## 2023-05-25 PROCEDURE — 99214 OFFICE O/P EST MOD 30 MIN: CPT | Performed by: STUDENT IN AN ORGANIZED HEALTH CARE EDUCATION/TRAINING PROGRAM

## 2023-05-25 RX ORDER — METHENAMINE, SODIUM PHOSPHATE, MONOBASIC, MONOHYDRATE, PHENYL SALICYLATE, METHYLENE BLUE, AND HYOSCYAMINE SULFATE 120; 40.8; 36; 10; .12 MG/1; MG/1; MG/1; MG/1; MG/1
CAPSULE ORAL
COMMUNITY

## 2023-05-25 RX ORDER — METRONIDAZOLE 500 MG/1
500 TABLET ORAL 3 TIMES DAILY
Qty: 30 TABLET | Refills: 0 | Status: SHIPPED | OUTPATIENT
Start: 2023-05-25 | End: 2023-06-04

## 2023-05-25 RX ORDER — CIPROFLOXACIN 500 MG/1
500 TABLET, FILM COATED ORAL 2 TIMES DAILY
Qty: 20 TABLET | Refills: 0 | Status: SHIPPED | OUTPATIENT
Start: 2023-05-25 | End: 2023-06-04

## 2023-05-31 NOTE — ROUTINE PROCESS
TRANSFER - IN REPORT:    Verbal report received from [address] [place] [address] on Carlos Seat  being received from PACU for routine post - op. Report consisted of patients Situation, Background, Assessment and   Recommendations(SBAR). Information from the following report(s) SBAR, Kardex, OR Summary, Intake/Output and MAR was reviewed with the receiving nurse. Opportunity for questions and clarification was provided. Assessment to be completed upon patients arrival to unit and care assumed. Bcc Infundibulocystic Histology Text: There were aggregates of basaloid cells demonstrating an infundibulocystic pattern.

## 2023-07-21 ENCOUNTER — APPOINTMENT (OUTPATIENT)
Facility: HOSPITAL | Age: 73
End: 2023-07-21
Payer: MEDICARE

## 2023-07-21 ENCOUNTER — TELEPHONE (OUTPATIENT)
Facility: CLINIC | Age: 73
End: 2023-07-21

## 2023-07-21 ENCOUNTER — HOSPITAL ENCOUNTER (EMERGENCY)
Facility: HOSPITAL | Age: 73
Discharge: HOME | End: 2023-07-21
Attending: STUDENT IN AN ORGANIZED HEALTH CARE EDUCATION/TRAINING PROGRAM
Payer: MEDICARE

## 2023-07-21 VITALS
RESPIRATION RATE: 18 BRPM | BODY MASS INDEX: 29.44 KG/M2 | HEART RATE: 75 BPM | SYSTOLIC BLOOD PRESSURE: 117 MMHG | HEIGHT: 62 IN | TEMPERATURE: 98 F | DIASTOLIC BLOOD PRESSURE: 75 MMHG | OXYGEN SATURATION: 95 % | WEIGHT: 160 LBS

## 2023-07-21 DIAGNOSIS — R51.9 NONINTRACTABLE HEADACHE, UNSPECIFIED CHRONICITY PATTERN, UNSPECIFIED HEADACHE TYPE: Primary | ICD-10-CM

## 2023-07-21 LAB
ANION GAP SERPL CALC-SCNC: 6 MMOL/L (ref 3–18)
APPEARANCE UR: CLEAR
BASOPHILS # BLD: 0 K/UL (ref 0–0.1)
BASOPHILS NFR BLD: 1 % (ref 0–2)
BILIRUB UR QL: NEGATIVE
BUN SERPL-MCNC: 21 MG/DL (ref 7–18)
BUN/CREAT SERPL: 24 (ref 12–20)
CALCIUM SERPL-MCNC: 9.2 MG/DL (ref 8.5–10.1)
CHLORIDE SERPL-SCNC: 104 MMOL/L (ref 100–111)
CO2 SERPL-SCNC: 26 MMOL/L (ref 21–32)
COLOR UR: YELLOW
CREAT SERPL-MCNC: 0.87 MG/DL (ref 0.6–1.3)
DIFFERENTIAL METHOD BLD: ABNORMAL
EKG ATRIAL RATE: 69 BPM
EKG DIAGNOSIS: NORMAL
EKG P AXIS: -25 DEGREES
EKG P-R INTERVAL: 152 MS
EKG Q-T INTERVAL: 402 MS
EKG QRS DURATION: 68 MS
EKG QTC CALCULATION (BAZETT): 430 MS
EKG R AXIS: 35 DEGREES
EKG T AXIS: 15 DEGREES
EKG VENTRICULAR RATE: 69 BPM
EOSINOPHIL # BLD: 0.1 K/UL (ref 0–0.4)
EOSINOPHIL NFR BLD: 2 % (ref 0–5)
ERYTHROCYTE [DISTWIDTH] IN BLOOD BY AUTOMATED COUNT: 12 % (ref 11.6–14.5)
GLUCOSE SERPL-MCNC: 91 MG/DL (ref 74–99)
GLUCOSE UR STRIP.AUTO-MCNC: NEGATIVE MG/DL
HCT VFR BLD AUTO: 40.6 % (ref 35–45)
HGB BLD-MCNC: 13.5 G/DL (ref 12–16)
HGB UR QL STRIP: NEGATIVE
IMM GRANULOCYTES # BLD AUTO: 0 K/UL (ref 0–0.04)
IMM GRANULOCYTES NFR BLD AUTO: 0 % (ref 0–0.5)
KETONES UR QL STRIP.AUTO: ABNORMAL MG/DL
LEUKOCYTE ESTERASE UR QL STRIP.AUTO: NEGATIVE
LYMPHOCYTES # BLD: 2.1 K/UL (ref 0.9–3.6)
LYMPHOCYTES NFR BLD: 35 % (ref 21–52)
MAGNESIUM SERPL-MCNC: 2.1 MG/DL (ref 1.6–2.6)
MCH RBC QN AUTO: 31.8 PG (ref 24–34)
MCHC RBC AUTO-ENTMCNC: 33.3 G/DL (ref 31–37)
MCV RBC AUTO: 95.5 FL (ref 78–100)
MONOCYTES # BLD: 0.4 K/UL (ref 0.05–1.2)
MONOCYTES NFR BLD: 7 % (ref 3–10)
NEUTS SEG # BLD: 3.3 K/UL (ref 1.8–8)
NEUTS SEG NFR BLD: 55 % (ref 40–73)
NITRITE UR QL STRIP.AUTO: NEGATIVE
NRBC # BLD: 0 K/UL (ref 0–0.01)
NRBC BLD-RTO: 0 PER 100 WBC
PH UR STRIP: 5 (ref 5–8)
PLATELET # BLD AUTO: 289 K/UL (ref 135–420)
PMV BLD AUTO: 9.1 FL (ref 9.2–11.8)
POTASSIUM SERPL-SCNC: 4.1 MMOL/L (ref 3.5–5.5)
PROT UR STRIP-MCNC: NEGATIVE MG/DL
RBC # BLD AUTO: 4.25 M/UL (ref 4.2–5.3)
SODIUM SERPL-SCNC: 136 MMOL/L (ref 136–145)
SP GR UR REFRACTOMETRY: 1.01 (ref 1–1.03)
TROPONIN I SERPL HS-MCNC: 4 NG/L (ref 0–54)
TROPONIN I SERPL HS-MCNC: <3 NG/L (ref 0–54)
UROBILINOGEN UR QL STRIP.AUTO: 0.2 EU/DL (ref 0.2–1)
WBC # BLD AUTO: 6 K/UL (ref 4.6–13.2)

## 2023-07-21 PROCEDURE — 84484 ASSAY OF TROPONIN QUANT: CPT

## 2023-07-21 PROCEDURE — 93005 ELECTROCARDIOGRAM TRACING: CPT | Performed by: EMERGENCY MEDICINE

## 2023-07-21 PROCEDURE — 81003 URINALYSIS AUTO W/O SCOPE: CPT

## 2023-07-21 PROCEDURE — 71046 X-RAY EXAM CHEST 2 VIEWS: CPT

## 2023-07-21 PROCEDURE — 96375 TX/PRO/DX INJ NEW DRUG ADDON: CPT

## 2023-07-21 PROCEDURE — 85025 COMPLETE CBC W/AUTO DIFF WBC: CPT

## 2023-07-21 PROCEDURE — 96374 THER/PROPH/DIAG INJ IV PUSH: CPT

## 2023-07-21 PROCEDURE — 96361 HYDRATE IV INFUSION ADD-ON: CPT

## 2023-07-21 PROCEDURE — 93010 ELECTROCARDIOGRAM REPORT: CPT | Performed by: INTERNAL MEDICINE

## 2023-07-21 PROCEDURE — 80048 BASIC METABOLIC PNL TOTAL CA: CPT

## 2023-07-21 PROCEDURE — 83735 ASSAY OF MAGNESIUM: CPT

## 2023-07-21 PROCEDURE — 6360000002 HC RX W HCPCS: Performed by: EMERGENCY MEDICINE

## 2023-07-21 PROCEDURE — 6370000000 HC RX 637 (ALT 250 FOR IP): Performed by: EMERGENCY MEDICINE

## 2023-07-21 PROCEDURE — 99285 EMERGENCY DEPT VISIT HI MDM: CPT

## 2023-07-21 PROCEDURE — 70450 CT HEAD/BRAIN W/O DYE: CPT

## 2023-07-21 PROCEDURE — 2580000003 HC RX 258: Performed by: EMERGENCY MEDICINE

## 2023-07-21 PROCEDURE — 96376 TX/PRO/DX INJ SAME DRUG ADON: CPT

## 2023-07-21 RX ORDER — METOCLOPRAMIDE HYDROCHLORIDE 5 MG/ML
10 INJECTION INTRAMUSCULAR; INTRAVENOUS
Status: COMPLETED | OUTPATIENT
Start: 2023-07-21 | End: 2023-07-21

## 2023-07-21 RX ORDER — 0.9 % SODIUM CHLORIDE 0.9 %
500 INTRAVENOUS SOLUTION INTRAVENOUS ONCE
Status: COMPLETED | OUTPATIENT
Start: 2023-07-21 | End: 2023-07-21

## 2023-07-21 RX ORDER — DIPHENHYDRAMINE HYDROCHLORIDE 50 MG/ML
25 INJECTION INTRAMUSCULAR; INTRAVENOUS
Status: COMPLETED | OUTPATIENT
Start: 2023-07-21 | End: 2023-07-21

## 2023-07-21 RX ORDER — TRAMADOL HYDROCHLORIDE 50 MG/1
50 TABLET ORAL EVERY 6 HOURS PRN
COMMUNITY

## 2023-07-21 RX ORDER — ACETAMINOPHEN 500 MG
1000 TABLET ORAL
Status: COMPLETED | OUTPATIENT
Start: 2023-07-21 | End: 2023-07-21

## 2023-07-21 RX ADMIN — METOCLOPRAMIDE 10 MG: 5 INJECTION, SOLUTION INTRAMUSCULAR; INTRAVENOUS at 15:16

## 2023-07-21 RX ADMIN — ACETAMINOPHEN 1000 MG: 500 TABLET ORAL at 17:48

## 2023-07-21 RX ADMIN — SODIUM CHLORIDE 500 ML: 9 INJECTION, SOLUTION INTRAVENOUS at 15:19

## 2023-07-21 RX ADMIN — DIPHENHYDRAMINE HYDROCHLORIDE 25 MG: 50 INJECTION, SOLUTION INTRAMUSCULAR; INTRAVENOUS at 15:16

## 2023-07-21 RX ADMIN — DIPHENHYDRAMINE HYDROCHLORIDE 25 MG: 50 INJECTION, SOLUTION INTRAMUSCULAR; INTRAVENOUS at 17:48

## 2023-07-21 ASSESSMENT — ENCOUNTER SYMPTOMS
NAUSEA: 1
SHORTNESS OF BREATH: 0
ALLERGIC/IMMUNOLOGIC NEGATIVE: 1
VOMITING: 0

## 2023-07-21 ASSESSMENT — PAIN - FUNCTIONAL ASSESSMENT: PAIN_FUNCTIONAL_ASSESSMENT: 0-10

## 2023-07-21 ASSESSMENT — PAIN SCALES - GENERAL
PAINLEVEL_OUTOF10: 6
PAINLEVEL_OUTOF10: 7

## 2023-07-21 NOTE — ED TRIAGE NOTES
Headache for several days. Took BP and states it was 150/100      Patient states that she had epidural on 7/1, had a reaction, and has been having arm pain and headache since.

## 2023-07-21 NOTE — TELEPHONE ENCOUNTER
Patient called the office stating she has been having head ache and just not feeling well since she had an Epidural injection about 3 weeks ago. She was advised to contact the office that gave her the Epidural patient says she did and was told next time they may need to the Epidural higher. She says she has weakness in her arms and has also seen Dr. Robbin Dominguez who ordered an MRI but this is not scheduled until next week. Patient says she had taken her blood pressure and it is elevated at 168/100 and she just does not feel well and she knows something is wrong. She has been advised to go to the ER and agrees with this plan.

## 2023-07-21 NOTE — ED NOTES
Discharge instructions reviewed with patient. Patient verbalized understanding. Patient advised to follow up as directed on discharge instructions. Patient denies questions, needs or concerns at this time. Patient verbalized understanding. No s/sx of distress noted.         Nael Rizo RN  07/21/23 1209

## 2023-07-21 NOTE — ED NOTES
Patient states that headache is better than when she came in.       Swetha Gonzalez RN  07/21/23 8966

## 2023-07-21 NOTE — ED PROVIDER NOTES
[facility] EMERGENCY DEPT  EMERGENCY DEPARTMENT ENCOUNTER      Pt Name: Brooke Collins  MRN: [id number]  [address] [facility] 1950  Date of evaluation: 7/21/2023  Provider: ELIDA Ibarra    CHIEF COMPLAINT       Chief Complaint   Patient presents with    Headache         HISTORY OF PRESENT ILLNESS   (Location/Symptom, Timing/Onset, Context/Setting, Quality, Duration, Modifying Factors, Severity)  Note limiting factors. Brooke Collins is a 74 y.o. female who presents to the emergency department complaint of a headache now for almost 3 weeks. She had an epidural injection for her upper back pain and shoulder pain that she has not felt well since. She does feels fatigued her blood pressures have been labile as she has called back to the office where she received the epidural injection therefore scheduled her MRI to determine what is going on her in her shoulder for better assessment. Patient denies any fever nuchal rigidity but she has been complaining of feeling nauseous. Also has a history of hemorrhagic sick status does not know if this is contributory. The only medication that she takes for her blood pressure is her lisinopril 5 mg. Actually vomiting chest pain shortness of breath lightheadedness or dizziness. Denies ataxia or speech difficulties. No changes in vision reported. But because her blood pressures have been running high she wanted to come in and be evaluated. They stopped at an urgent care and was instructed to come to the emergency department. HPI    Nursing Notes were reviewed. REVIEW OF SYSTEMS    (2-9 systems for level 4, 10 or more for level 5)     Review of Systems   Constitutional:  Positive for fatigue. Negative for fever. HENT: Negative. Eyes:  Negative for visual disturbance. Respiratory:  Negative for shortness of breath. Cardiovascular:  Negative for chest pain. Gastrointestinal:  Positive for nausea. Negative for vomiting. Endocrine: Negative.     Genitourinary:

## 2023-07-21 NOTE — ED NOTES
Patient ambulated to bathroom and back. Steady gait.  States \"It's hard to explain, where I think I am in space isn't right\"     Enriqueta Adams RN  07/21/23 6935

## 2023-07-25 ENCOUNTER — HOSPITAL ENCOUNTER (OUTPATIENT)
Facility: HOSPITAL | Age: 73
Discharge: HOME | End: 2023-07-28
Attending: ORTHOPAEDIC SURGERY
Payer: MEDICARE

## 2023-07-25 DIAGNOSIS — M54.12 CERVICAL RADICULOPATHY: ICD-10-CM

## 2023-07-25 PROCEDURE — 72141 MRI NECK SPINE W/O DYE: CPT

## 2023-08-08 ENCOUNTER — OFFICE VISIT (OUTPATIENT)
Facility: CLINIC | Age: 73
End: 2023-08-08
Payer: MEDICARE

## 2023-08-08 VITALS
DIASTOLIC BLOOD PRESSURE: 82 MMHG | SYSTOLIC BLOOD PRESSURE: 134 MMHG | HEART RATE: 74 BPM | TEMPERATURE: 97.3 F | BODY MASS INDEX: 30.73 KG/M2 | HEIGHT: 62 IN | OXYGEN SATURATION: 98 % | WEIGHT: 167 LBS | RESPIRATION RATE: 18 BRPM

## 2023-08-08 DIAGNOSIS — I10 ESSENTIAL (PRIMARY) HYPERTENSION: ICD-10-CM

## 2023-08-08 DIAGNOSIS — Z00.00 MEDICARE ANNUAL WELLNESS VISIT, SUBSEQUENT: ICD-10-CM

## 2023-08-08 DIAGNOSIS — M48.062 SPINAL STENOSIS, LUMBAR REGION WITH NEUROGENIC CLAUDICATION: Primary | ICD-10-CM

## 2023-08-08 PROCEDURE — 1090F PRES/ABSN URINE INCON ASSESS: CPT | Performed by: FAMILY MEDICINE

## 2023-08-08 PROCEDURE — G8427 DOCREV CUR MEDS BY ELIG CLIN: HCPCS | Performed by: FAMILY MEDICINE

## 2023-08-08 PROCEDURE — G8417 CALC BMI ABV UP PARAM F/U: HCPCS | Performed by: FAMILY MEDICINE

## 2023-08-08 PROCEDURE — G8399 PT W/DXA RESULTS DOCUMENT: HCPCS | Performed by: FAMILY MEDICINE

## 2023-08-08 PROCEDURE — 3079F DIAST BP 80-89 MM HG: CPT | Performed by: FAMILY MEDICINE

## 2023-08-08 PROCEDURE — 1036F TOBACCO NON-USER: CPT | Performed by: FAMILY MEDICINE

## 2023-08-08 PROCEDURE — G0439 PPPS, SUBSEQ VISIT: HCPCS | Performed by: FAMILY MEDICINE

## 2023-08-08 PROCEDURE — 3075F SYST BP GE 130 - 139MM HG: CPT | Performed by: FAMILY MEDICINE

## 2023-08-08 PROCEDURE — 3017F COLORECTAL CA SCREEN DOC REV: CPT | Performed by: FAMILY MEDICINE

## 2023-08-08 PROCEDURE — 99213 OFFICE O/P EST LOW 20 MIN: CPT | Performed by: FAMILY MEDICINE

## 2023-08-08 PROCEDURE — 1123F ACP DISCUSS/DSCN MKR DOCD: CPT | Performed by: FAMILY MEDICINE

## 2023-08-08 RX ORDER — TRAMADOL HYDROCHLORIDE 50 MG/1
50 TABLET ORAL EVERY 6 HOURS PRN
Qty: 90 TABLET | Refills: 1 | Status: SHIPPED | OUTPATIENT
Start: 2023-08-08 | End: 2023-09-07

## 2023-08-08 ASSESSMENT — ANXIETY QUESTIONNAIRES
IF YOU CHECKED OFF ANY PROBLEMS ON THIS QUESTIONNAIRE, HOW DIFFICULT HAVE THESE PROBLEMS MADE IT FOR YOU TO DO YOUR WORK, TAKE CARE OF THINGS AT HOME, OR GET ALONG WITH OTHER PEOPLE: NOT DIFFICULT AT ALL
1. FEELING NERVOUS, ANXIOUS, OR ON EDGE: 0
6. BECOMING EASILY ANNOYED OR IRRITABLE: 0
7. FEELING AFRAID AS IF SOMETHING AWFUL MIGHT HAPPEN: 0
GAD7 TOTAL SCORE: 0
2. NOT BEING ABLE TO STOP OR CONTROL WORRYING: 0
3. WORRYING TOO MUCH ABOUT DIFFERENT THINGS: 0
4. TROUBLE RELAXING: 0
5. BEING SO RESTLESS THAT IT IS HARD TO SIT STILL: 0

## 2023-08-08 ASSESSMENT — ENCOUNTER SYMPTOMS
BACK PAIN: 1
COUGH: 0
VOMITING: 0
RESPIRATORY NEGATIVE: 1
NAUSEA: 0

## 2023-08-08 ASSESSMENT — PATIENT HEALTH QUESTIONNAIRE - PHQ9
SUM OF ALL RESPONSES TO PHQ QUESTIONS 1-9: 0
SUM OF ALL RESPONSES TO PHQ9 QUESTIONS 1 & 2: 0
1. LITTLE INTEREST OR PLEASURE IN DOING THINGS: 0
2. FEELING DOWN, DEPRESSED OR HOPELESS: 0
SUM OF ALL RESPONSES TO PHQ QUESTIONS 1-9: 0

## 2023-08-08 NOTE — PROGRESS NOTES
Leta Aguirre is a 74 y.o. female  presents for   Chief Complaint   Patient presents with    Medicare AW        Allergies   Allergen Reactions    Celecoxib Swelling     Other reaction(s): unknown  Other reaction(s): unknown      Shellfish Allergy Other (See Comments)      Pt.denies     Shellfish-Derived Products Swelling     \"makes me feel puffy\"    Sulfa Antibiotics Hives and Swelling     Lips swelling  Other reaction(s): unknown    Adalimumab Other (See Comments)     Lupus  Other reaction(s): neurological reaction  Other reaction(s): neurological reaction, Other (comments)  Lupus      Adhesive Tape Dermatitis, Hives and Itching    Influenza Vaccines Hives    Iodine Itching     Takes benadryl and is OK. Contrast tolerated. Betadine ok. Ioversol Hives and Itching     Pt states when she gets iv contrast she itches a little from hives? Other reaction(s): rash/itching  Other reaction(s): mild rash/itching  Pt states when she gets iv contrast she itches a little from hives? Pt states when she gets iv contrast she itches a little from hives? Pt states when she gets iv contrast she itches a little from hives? Lidoderm [Lidocaine] Other (See Comments)     Pt had an Allergic reaction to the patch/adhesive    Meloxicam     Nsaids Swelling    Penicillins Hives    Shrimp Extract Allergy Skin Test Other (See Comments)     Sodium puffy    Gabapentin Diarrhea and Palpitations     Chest and Abdominal pain       Current Outpatient Medications:     traMADol (ULTRAM) 50 MG tablet, Take 1 tablet by mouth every 6 hours as needed for Pain for up to 30 days.  Max Daily Amount: 200 mg, Disp: 90 tablet, Rfl: 1    Meth-Hyo-M Bl-Na Phos-Ph Sal (URIBEL) 118 MG CAPS, Uribel 118 mg-10 mg-40.8 mg-36 mg capsule, Disp: , Rfl:     zolpidem (AMBIEN) 5 MG tablet, take 1 tablet by mouth nightly if needed for sleep MAX DOSE 1 TABLET PER DAY, Disp: , Rfl:     XELJANZ 5 MG TABS, Take 1 tablet by mouth in the morning and at bedtime, Disp: ,

## 2023-08-28 RX ORDER — LISINOPRIL 5 MG/1
TABLET ORAL
Qty: 90 TABLET | Refills: 3 | Status: SHIPPED | OUTPATIENT
Start: 2023-08-28

## 2023-10-09 DIAGNOSIS — M48.062 SPINAL STENOSIS, LUMBAR REGION WITH NEUROGENIC CLAUDICATION: Primary | ICD-10-CM

## 2023-10-10 RX ORDER — TRAMADOL HYDROCHLORIDE 50 MG/1
50 TABLET ORAL EVERY 4 HOURS PRN
Qty: 18 TABLET | Refills: 0 | Status: SHIPPED | OUTPATIENT
Start: 2023-10-10 | End: 2023-10-13

## 2023-10-17 ENCOUNTER — OFFICE VISIT (OUTPATIENT)
Facility: CLINIC | Age: 73
End: 2023-10-17
Payer: MEDICARE

## 2023-10-17 DIAGNOSIS — M48.062 SPINAL STENOSIS, LUMBAR REGION WITH NEUROGENIC CLAUDICATION: Primary | ICD-10-CM

## 2023-10-17 PROCEDURE — G8427 DOCREV CUR MEDS BY ELIG CLIN: HCPCS | Performed by: FAMILY MEDICINE

## 2023-10-17 PROCEDURE — 1090F PRES/ABSN URINE INCON ASSESS: CPT | Performed by: FAMILY MEDICINE

## 2023-10-17 PROCEDURE — 1036F TOBACCO NON-USER: CPT | Performed by: FAMILY MEDICINE

## 2023-10-17 PROCEDURE — G8399 PT W/DXA RESULTS DOCUMENT: HCPCS | Performed by: FAMILY MEDICINE

## 2023-10-17 PROCEDURE — 99213 OFFICE O/P EST LOW 20 MIN: CPT | Performed by: FAMILY MEDICINE

## 2023-10-17 PROCEDURE — 3017F COLORECTAL CA SCREEN DOC REV: CPT | Performed by: FAMILY MEDICINE

## 2023-10-17 PROCEDURE — G8417 CALC BMI ABV UP PARAM F/U: HCPCS | Performed by: FAMILY MEDICINE

## 2023-10-17 PROCEDURE — G8484 FLU IMMUNIZE NO ADMIN: HCPCS | Performed by: FAMILY MEDICINE

## 2023-10-17 PROCEDURE — 1123F ACP DISCUSS/DSCN MKR DOCD: CPT | Performed by: FAMILY MEDICINE

## 2023-10-17 RX ORDER — TRAMADOL HYDROCHLORIDE 50 MG/1
50 TABLET ORAL EVERY 6 HOURS PRN
Qty: 90 TABLET | Refills: 3 | Status: SHIPPED | OUTPATIENT
Start: 2023-10-17 | End: 2024-01-15

## 2023-10-17 ASSESSMENT — ENCOUNTER SYMPTOMS
NAUSEA: 0
COUGH: 0
RESPIRATORY NEGATIVE: 1
VOMITING: 0

## 2023-10-17 ASSESSMENT — PATIENT HEALTH QUESTIONNAIRE - PHQ9
SUM OF ALL RESPONSES TO PHQ9 QUESTIONS 1 & 2: 0
2. FEELING DOWN, DEPRESSED OR HOPELESS: 0
SUM OF ALL RESPONSES TO PHQ QUESTIONS 1-9: 0
SUM OF ALL RESPONSES TO PHQ QUESTIONS 1-9: 0
1. LITTLE INTEREST OR PLEASURE IN DOING THINGS: 0
SUM OF ALL RESPONSES TO PHQ QUESTIONS 1-9: 0
SUM OF ALL RESPONSES TO PHQ QUESTIONS 1-9: 0

## 2024-01-16 RX ORDER — CYCLOBENZAPRINE HCL 10 MG
TABLET ORAL
Qty: 60 TABLET | Refills: 11 | Status: SHIPPED | OUTPATIENT
Start: 2024-01-16

## 2024-02-19 ENCOUNTER — TELEPHONE (OUTPATIENT)
Facility: CLINIC | Age: 74
End: 2024-02-19

## 2024-02-19 DIAGNOSIS — M48.062 SPINAL STENOSIS, LUMBAR REGION WITH NEUROGENIC CLAUDICATION: Primary | ICD-10-CM

## 2024-02-19 NOTE — TELEPHONE ENCOUNTER
Pt called to request a refill on the medication TRAMADOL. Informed pt that to my knowledge there are some medications that  will not be prescribing anymore and this is most likely one of them. Also I informed pt that I did not know plan of care as far as transitioning to another medication or a possible referral out to a specialist. Pt would still like message to be sent back and a call for further explanation. Please review and advise as soon as possible.

## 2024-02-20 ENCOUNTER — OFFICE VISIT (OUTPATIENT)
Facility: CLINIC | Age: 74
End: 2024-02-20
Payer: MEDICARE

## 2024-02-20 VITALS
HEART RATE: 71 BPM | WEIGHT: 166.38 LBS | TEMPERATURE: 97.3 F | BODY MASS INDEX: 30.43 KG/M2 | RESPIRATION RATE: 16 BRPM | DIASTOLIC BLOOD PRESSURE: 84 MMHG | OXYGEN SATURATION: 96 % | SYSTOLIC BLOOD PRESSURE: 120 MMHG

## 2024-02-20 DIAGNOSIS — R60.0 BILATERAL EDEMA OF LOWER EXTREMITY: Primary | ICD-10-CM

## 2024-02-20 PROCEDURE — 3017F COLORECTAL CA SCREEN DOC REV: CPT | Performed by: STUDENT IN AN ORGANIZED HEALTH CARE EDUCATION/TRAINING PROGRAM

## 2024-02-20 PROCEDURE — G8417 CALC BMI ABV UP PARAM F/U: HCPCS | Performed by: STUDENT IN AN ORGANIZED HEALTH CARE EDUCATION/TRAINING PROGRAM

## 2024-02-20 PROCEDURE — 1036F TOBACCO NON-USER: CPT | Performed by: STUDENT IN AN ORGANIZED HEALTH CARE EDUCATION/TRAINING PROGRAM

## 2024-02-20 PROCEDURE — 1090F PRES/ABSN URINE INCON ASSESS: CPT | Performed by: STUDENT IN AN ORGANIZED HEALTH CARE EDUCATION/TRAINING PROGRAM

## 2024-02-20 PROCEDURE — 3079F DIAST BP 80-89 MM HG: CPT | Performed by: STUDENT IN AN ORGANIZED HEALTH CARE EDUCATION/TRAINING PROGRAM

## 2024-02-20 PROCEDURE — 3074F SYST BP LT 130 MM HG: CPT | Performed by: STUDENT IN AN ORGANIZED HEALTH CARE EDUCATION/TRAINING PROGRAM

## 2024-02-20 PROCEDURE — G8427 DOCREV CUR MEDS BY ELIG CLIN: HCPCS | Performed by: STUDENT IN AN ORGANIZED HEALTH CARE EDUCATION/TRAINING PROGRAM

## 2024-02-20 PROCEDURE — G8484 FLU IMMUNIZE NO ADMIN: HCPCS | Performed by: STUDENT IN AN ORGANIZED HEALTH CARE EDUCATION/TRAINING PROGRAM

## 2024-02-20 PROCEDURE — 1123F ACP DISCUSS/DSCN MKR DOCD: CPT | Performed by: STUDENT IN AN ORGANIZED HEALTH CARE EDUCATION/TRAINING PROGRAM

## 2024-02-20 PROCEDURE — G8399 PT W/DXA RESULTS DOCUMENT: HCPCS | Performed by: STUDENT IN AN ORGANIZED HEALTH CARE EDUCATION/TRAINING PROGRAM

## 2024-02-20 PROCEDURE — 99214 OFFICE O/P EST MOD 30 MIN: CPT | Performed by: STUDENT IN AN ORGANIZED HEALTH CARE EDUCATION/TRAINING PROGRAM

## 2024-02-20 ASSESSMENT — PATIENT HEALTH QUESTIONNAIRE - PHQ9
SUM OF ALL RESPONSES TO PHQ QUESTIONS 1-9: 0
SUM OF ALL RESPONSES TO PHQ9 QUESTIONS 1 & 2: 0
SUM OF ALL RESPONSES TO PHQ QUESTIONS 1-9: 0
SUM OF ALL RESPONSES TO PHQ QUESTIONS 1-9: 0
1. LITTLE INTEREST OR PLEASURE IN DOING THINGS: 0
2. FEELING DOWN, DEPRESSED OR HOPELESS: 0

## 2024-02-20 NOTE — TELEPHONE ENCOUNTER
Spoke with patient, advised that Dr. Epps is no longer prescribing this medication. Patient asking what she is suppose to do now, advised that she can reach out to her Rheumatology doctor or Orthopedic doctor and inquire on if this is something they will prescribe as she is advising me she is on this medication for pain caused by rods in her spine and her RA. Patient asks if we can send a referral to pain management, advised that I can send this for her. Patient then states that she will need to be seen in the next two weeks, advised that I can not control when they get her in. Advised patient again that she should also try reaching out to her Rheumatology and Orthopedic doctor.

## 2024-02-20 NOTE — TELEPHONE ENCOUNTER
Referral faxed to [phone] to [facility] management clinic.     Babatunde Camp  [address]  [address]  [facility]   [address]

## 2024-02-20 NOTE — PROGRESS NOTES
\"Have you been to the ER, urgent care clinic since your last visit?  Hospitalized since your last visit?\"    NO    “Have you seen or consulted any other health care providers outside of [facility] since your last visit?”    NO    Chief Complaint   Patient presents with    Leg Swelling     Left leg swelling, knot inner left ankle                
rhinitis, Arthritis, Atrial fibrillation (HCC), Autoimmune disease (HCC), Back pain, Cancer (HCC), Chronic back pain, Chronic pain, Colitis, Diverticulitis, Failed back syndrome, GERD (gastroesophageal reflux disease), Hypertension, Ill-defined condition, Irritable bowel syndrome, Neuropathy, Osteoarthritis, Osteoporosis, Pneumonia, RA (rheumatoid arthritis) (HCC), Seizures ([facility]), and Urinary incontinence. Salma reports that she has never smoked. She has never used smokeless tobacco. She reports that she does not drink alcohol and does not use drugs. Salma has a past surgical history that includes Total shoulder arthroplasty (Left); orthopedic surgery (Bilateral); orthopedic surgery (Bilateral); orthopedic surgery (Left); orthopedic surgery (Right); lumbar fusion (12/03/2018); lumbar fusion; Colonoscopy; Breast reduction surgery; gyn; Salpingo-oophorectomy (Bilateral); Hysterectomy (1976); Cataract removal (Bilateral); other surgical history (Left, 1985); heent; gi; Urological Surgery; total hip arthroplasty (Bilateral); Appendectomy; abdominal wall defect repair; joint replacement; Small intestine surgery; eye surgery; Knee Arthroplasty; Total hip arthroplasty; Tonsillectomy; Hysterectomy, total abdominal; and Upper gastrointestinal endoscopy.         Problem List- Reviewed   Salma has ACP (advance care planning); Pain management contract agreement; Osteoporosis; DDD (degenerative disc disease), lumbar; Chronic left shoulder pain; Numbness and tingling of right arm; Lesion of hard palate; Spinal stenosis, thoracolumbar region; Fall at home; Elevated blood pressure reading without diagnosis of hypertension; SVT (supraventricular tachycardia); Nausea; Osteoarthritis of right hip; Sicca syndrome ([facility]); Spondylolisthesis of lumbar region; Osteopenia of multiple sites; Fibrosis of left subtalar joint; Spinal stenosis of lumbar region; Lumbar stenosis; Insomnia; History of lumbar fusion; Closed fracture of thoracic spine

## 2024-02-23 ENCOUNTER — OFFICE VISIT (OUTPATIENT)
Age: 74
End: 2024-02-23

## 2024-02-23 VITALS
BODY MASS INDEX: 31.06 KG/M2 | DIASTOLIC BLOOD PRESSURE: 70 MMHG | WEIGHT: 168.8 LBS | HEIGHT: 62 IN | SYSTOLIC BLOOD PRESSURE: 108 MMHG | HEART RATE: 72 BPM | OXYGEN SATURATION: 98 %

## 2024-02-23 DIAGNOSIS — I10 ESSENTIAL (PRIMARY) HYPERTENSION: ICD-10-CM

## 2024-02-23 DIAGNOSIS — I47.10 SUPRAVENTRICULAR TACHYCARDIA: ICD-10-CM

## 2024-02-23 DIAGNOSIS — Z01.818 PRE-OP EXAMINATION: Primary | ICD-10-CM

## 2024-02-23 DIAGNOSIS — M06.9 RHEUMATOID ARTHRITIS OF OTHER SITE, UNSPECIFIED WHETHER RHEUMATOID FACTOR PRESENT (HCC): ICD-10-CM

## 2024-02-23 DIAGNOSIS — R06.02 SHORTNESS OF BREATH: ICD-10-CM

## 2024-02-23 ASSESSMENT — PATIENT HEALTH QUESTIONNAIRE - PHQ9
1. LITTLE INTEREST OR PLEASURE IN DOING THINGS: 0
SUM OF ALL RESPONSES TO PHQ QUESTIONS 1-9: 0
SUM OF ALL RESPONSES TO PHQ9 QUESTIONS 1 & 2: 0
SUM OF ALL RESPONSES TO PHQ QUESTIONS 1-9: 0
2. FEELING DOWN, DEPRESSED OR HOPELESS: 0

## 2024-02-23 NOTE — PROGRESS NOTES
1. Have you been to the ER, urgent care clinic since your last visit?  Hospitalized since your last visit?     no    2. Have you seen or consulted any other health care providers outside of the [facility] since your last visit?  Include any pap smears or colon screening.      yes   
Hypertension Neg Hx        Past Medical History:   Diagnosis Date    Abdominal abscess 2009    Adverse effect of anesthesia      Be careful with intubation. 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 )    Irritable bowel syndrome     Neuropathy     bilateral hands/wrist    Oste[facility] in  records of [facility]    Osteoporosis     Pneumonia     RA (rheumatoid arthritis) (HCC)     Seizures (HCC) 06/01/2008    one episode- after high dose of epinepherine    Urinary incontinence        Past Surgical History:   Procedure Laterality Date    ABDOMINAL WALL DEFECT REPAIR      APPENDECTOMY      BREAST REDUCTION SURGERY      CATARACT REMOVAL Bilateral     COLONOSCOPY      EYE SURGERY      GI      repair rectal prolaspe    GYN      dermoid cyst    HEENT      tonsillectomy    HYSTERECTOMY (CERVIX STATUS UNKNOWN)  1976    HYSTERECTOMY, TOTAL ABDOMINAL (CERVIX REMOVED)      JOINT REPLACEMENT      KNEE ARTHROPLASTY      LUMBAR FUSION  12/03/2018    T-8    LUMBAR FUSION      x 2  L 3-4 , L-5-6    ORTHOPEDIC SURGERY Bilateral     CTR    ORTHOPEDIC SURGERY Bilateral     arthroplasty thumbs    ORTHOPEDIC SURGERY Left     Ankle     ORTHOPEDIC SURGERY Right     \"Nerve Release Elbow\"    OTHER SURGICAL HISTORY Left 1985    vein stripping    SALPINGO-OOPHORECTOMY Bilateral     SHOULDER ARTHROPLASTY Left     SMALL INTESTINE SURGERY      TONSILLECTOMY      TOTAL HIP ARTHROPLASTY Bilateral     TOTAL HIP ARTHROPLASTY      UPPER GASTROINTESTINAL ENDOSCOPY

## 2024-02-27 ENCOUNTER — HOSPITAL ENCOUNTER (OUTPATIENT)
Facility: HOSPITAL | Age: 74
Discharge: HOME | End: 2024-03-01
Payer: MEDICARE

## 2024-02-27 ENCOUNTER — TRANSCRIBE ORDERS (OUTPATIENT)
Facility: HOSPITAL | Age: 74
End: 2024-02-27

## 2024-02-27 DIAGNOSIS — Z01.812 PRE-OPERATIVE LABORATORY EXAMINATION: ICD-10-CM

## 2024-02-27 DIAGNOSIS — M06.9 RHEUMATIC JOINT DISEASE (HCC): ICD-10-CM

## 2024-02-27 DIAGNOSIS — M25.811 IMPINGEMENT OF RIGHT SHOULDER: ICD-10-CM

## 2024-02-27 DIAGNOSIS — M25.9 MULTIPLE JOINT COMPLAINTS: ICD-10-CM

## 2024-02-27 DIAGNOSIS — M25.811 IMPINGEMENT OF RIGHT SHOULDER: Primary | ICD-10-CM

## 2024-02-27 LAB
ALBUMIN SERPL-MCNC: 4.2 G/DL (ref 3.4–5)
ALBUMIN/GLOB SERPL: 1.5 (ref 0.8–1.7)
ALP SERPL-CCNC: 87 U/L (ref 45–117)
ALT SERPL-CCNC: 17 U/L (ref 13–56)
ANION GAP SERPL CALC-SCNC: 6 MMOL/L (ref 3–18)
AST SERPL-CCNC: 20 U/L (ref 10–38)
BASOPHILS # BLD: 0.1 K/UL (ref 0–0.1)
BASOPHILS NFR BLD: 1 % (ref 0–2)
BILIRUB SERPL-MCNC: 0.6 MG/DL (ref 0.2–1)
BUN SERPL-MCNC: 30 MG/DL (ref 7–18)
BUN/CREAT SERPL: 30 (ref 12–20)
CALCIUM SERPL-MCNC: 9.5 MG/DL (ref 8.5–10.1)
CHLORIDE SERPL-SCNC: 101 MMOL/L (ref 100–111)
CO2 SERPL-SCNC: 29 MMOL/L (ref 21–32)
CREAT SERPL-MCNC: 1 MG/DL (ref 0.6–1.3)
DIFFERENTIAL METHOD BLD: NORMAL
EOSINOPHIL # BLD: 0.2 K/UL (ref 0–0.4)
EOSINOPHIL NFR BLD: 4 % (ref 0–5)
ERYTHROCYTE [DISTWIDTH] IN BLOOD BY AUTOMATED COUNT: 12.8 % (ref 11.6–14.5)
GLOBULIN SER CALC-MCNC: 2.8 G/DL (ref 2–4)
GLUCOSE SERPL-MCNC: 100 MG/DL (ref 74–99)
HCT VFR BLD AUTO: 41.4 % (ref 35–45)
HGB BLD-MCNC: 13.6 G/DL (ref 12–16)
IMM GRANULOCYTES # BLD AUTO: 0 K/UL (ref 0–0.04)
IMM GRANULOCYTES NFR BLD AUTO: 0 % (ref 0–0.5)
LYMPHOCYTES # BLD: 2.6 K/UL (ref 0.9–3.6)
LYMPHOCYTES NFR BLD: 44 % (ref 21–52)
MCH RBC QN AUTO: 30.6 PG (ref 24–34)
MCHC RBC AUTO-ENTMCNC: 32.9 G/DL (ref 31–37)
MCV RBC AUTO: 93 FL (ref 78–100)
MONOCYTES # BLD: 0.5 K/UL (ref 0.05–1.2)
MONOCYTES NFR BLD: 8 % (ref 3–10)
NEUTS SEG # BLD: 2.5 K/UL (ref 1.8–8)
NEUTS SEG NFR BLD: 43 % (ref 40–73)
NRBC # BLD: 0 K/UL (ref 0–0.01)
NRBC BLD-RTO: 0 PER 100 WBC
PLATELET # BLD AUTO: 320 K/UL (ref 135–420)
PMV BLD AUTO: 9.3 FL (ref 9.2–11.8)
POTASSIUM SERPL-SCNC: 5.2 MMOL/L (ref 3.5–5.5)
PROT SERPL-MCNC: 7 G/DL (ref 6.4–8.2)
RBC # BLD AUTO: 4.45 M/UL (ref 4.2–5.3)
SODIUM SERPL-SCNC: 136 MMOL/L (ref 136–145)
WBC # BLD AUTO: 6 K/UL (ref 4.6–13.2)

## 2024-02-27 PROCEDURE — 36415 COLL VENOUS BLD VENIPUNCTURE: CPT

## 2024-02-27 PROCEDURE — 85025 COMPLETE CBC W/AUTO DIFF WBC: CPT

## 2024-02-27 PROCEDURE — 80053 COMPREHEN METABOLIC PANEL: CPT

## 2024-03-04 RX ORDER — MELOXICAM 15 MG/1
TABLET ORAL
Qty: 90 TABLET | Refills: 3 | OUTPATIENT
Start: 2024-03-04

## 2024-03-08 ENCOUNTER — HOSPITAL ENCOUNTER (OUTPATIENT)
Facility: HOSPITAL | Age: 74
Setting detail: SPECIMEN
Discharge: HOME | End: 2024-03-11
Attending: ORTHOPAEDIC SURGERY
Payer: MEDICARE

## 2024-03-08 PROCEDURE — 88305 TISSUE EXAM BY PATHOLOGIST: CPT

## 2024-03-13 DIAGNOSIS — G47.00 INSOMNIA, UNSPECIFIED TYPE: Primary | ICD-10-CM

## 2024-03-13 RX ORDER — ZOLPIDEM TARTRATE 5 MG/1
TABLET ORAL
Qty: 90 TABLET | Refills: 0 | Status: SHIPPED | OUTPATIENT
Start: 2024-03-13 | End: 2024-06-13

## 2024-04-10 ENCOUNTER — OFFICE VISIT (OUTPATIENT)
Facility: CLINIC | Age: 74
End: 2024-04-10
Payer: MEDICARE

## 2024-04-10 VITALS
HEART RATE: 74 BPM | DIASTOLIC BLOOD PRESSURE: 76 MMHG | BODY MASS INDEX: 30.27 KG/M2 | OXYGEN SATURATION: 99 % | WEIGHT: 165.5 LBS | RESPIRATION RATE: 16 BRPM | TEMPERATURE: 98.4 F | SYSTOLIC BLOOD PRESSURE: 120 MMHG

## 2024-04-10 DIAGNOSIS — I10 ESSENTIAL (PRIMARY) HYPERTENSION: ICD-10-CM

## 2024-04-10 DIAGNOSIS — R60.0 BILATERAL EDEMA OF LOWER EXTREMITY: Primary | ICD-10-CM

## 2024-04-10 PROCEDURE — 1036F TOBACCO NON-USER: CPT | Performed by: STUDENT IN AN ORGANIZED HEALTH CARE EDUCATION/TRAINING PROGRAM

## 2024-04-10 PROCEDURE — G8427 DOCREV CUR MEDS BY ELIG CLIN: HCPCS | Performed by: STUDENT IN AN ORGANIZED HEALTH CARE EDUCATION/TRAINING PROGRAM

## 2024-04-10 PROCEDURE — G8417 CALC BMI ABV UP PARAM F/U: HCPCS | Performed by: STUDENT IN AN ORGANIZED HEALTH CARE EDUCATION/TRAINING PROGRAM

## 2024-04-10 PROCEDURE — 99214 OFFICE O/P EST MOD 30 MIN: CPT | Performed by: STUDENT IN AN ORGANIZED HEALTH CARE EDUCATION/TRAINING PROGRAM

## 2024-04-10 PROCEDURE — 1123F ACP DISCUSS/DSCN MKR DOCD: CPT | Performed by: STUDENT IN AN ORGANIZED HEALTH CARE EDUCATION/TRAINING PROGRAM

## 2024-04-10 PROCEDURE — 3078F DIAST BP <80 MM HG: CPT | Performed by: STUDENT IN AN ORGANIZED HEALTH CARE EDUCATION/TRAINING PROGRAM

## 2024-04-10 PROCEDURE — 3074F SYST BP LT 130 MM HG: CPT | Performed by: STUDENT IN AN ORGANIZED HEALTH CARE EDUCATION/TRAINING PROGRAM

## 2024-04-10 PROCEDURE — G8399 PT W/DXA RESULTS DOCUMENT: HCPCS | Performed by: STUDENT IN AN ORGANIZED HEALTH CARE EDUCATION/TRAINING PROGRAM

## 2024-04-10 PROCEDURE — 1090F PRES/ABSN URINE INCON ASSESS: CPT | Performed by: STUDENT IN AN ORGANIZED HEALTH CARE EDUCATION/TRAINING PROGRAM

## 2024-04-10 RX ORDER — MELOXICAM 15 MG/1
1 TABLET ORAL
COMMUNITY

## 2024-04-10 RX ORDER — TRAMADOL HYDROCHLORIDE 50 MG/1
50 TABLET ORAL EVERY 6 HOURS PRN
COMMUNITY
Start: 2023-10-17

## 2024-04-10 RX ORDER — FUROSEMIDE 20 MG/1
20 TABLET ORAL DAILY
Qty: 90 TABLET | Refills: 3 | Status: SHIPPED | OUTPATIENT
Start: 2024-04-10

## 2024-04-10 SDOH — ECONOMIC STABILITY: INCOME INSECURITY: HOW HARD IS IT FOR YOU TO PAY FOR THE VERY BASICS LIKE FOOD, HOUSING, MEDICAL CARE, AND HEATING?: NOT VERY HARD

## 2024-04-10 SDOH — ECONOMIC STABILITY: FOOD INSECURITY: WITHIN THE PAST 12 MONTHS, YOU WORRIED THAT YOUR FOOD WOULD RUN OUT BEFORE YOU GOT MONEY TO BUY MORE.: NEVER TRUE

## 2024-04-10 SDOH — ECONOMIC STABILITY: HOUSING INSECURITY
IN THE LAST 12 MONTHS, WAS THERE A TIME WHEN YOU DID NOT HAVE A STEADY PLACE TO SLEEP OR SLEPT IN A SHELTER (INCLUDING NOW)?: NO

## 2024-04-10 SDOH — ECONOMIC STABILITY: FOOD INSECURITY: WITHIN THE PAST 12 MONTHS, THE FOOD YOU BOUGHT JUST DIDN'T LAST AND YOU DIDN'T HAVE MONEY TO GET MORE.: NEVER TRUE

## 2024-04-10 NOTE — PROGRESS NOTES
Salma Lopez (: 1950) is a 74 y.o. female, established patient, here for evaluation of the following chief complaint(s):  Leg Swelling (B/L leg and ankle swelling. Pt states she has taken her lasix but her legs hurt. )       Assessment/Plan:  1. Bilateral edema of lower extremity  Recurrent symptoms. Overall intolerant of compression stockings due to difficulty applying and discomfort. Good renal function. Will start with daily instead of prn use of lasix. Start lasix 20mg daily.   - furosemide (LASIX) 20 MG tablet; Take 1 tablet by mouth daily  Dispense: 90 tablet; Refill: 3    2. Essential (primary) hypertension  Due to starting daily lasix as above, it does not appear that she needs the low dose lisinopril anymore. Recommend stopping lisinopril 5mg daily      Return for your previously scheduled next visit.      Subjective/Objective:  HPI    Bilateral edema  - Worsening since our visit in February  - Has used prn use of lasix with some good response. Used rarely due to her concern about renal impact.   - Use stockings but just will have superior edema or discomfort. Difficulty applying due to hand strength.     Urinary/bowel incontinence  - following up with urology. Extensive surgical history      Physical Exam  Blood pressure 120/76, pulse 74, temperature 98.4 °F (36.9 °C), temperature source Tympanic, resp. rate 16, weight 75.1 kg (165 lb 8 oz), SpO2 99 %. Body mass index is 30.27 kg/m².  General appearance - Alert, NAD, normal appearance  Head - Atraumatic. Normocephalic.   Eyes - EOMI. Sclera white.   Ears - Hearing grossly normal.    Respiratory - LCTAB. Effort normal, without respiratory distress. No wheeze/rale/rhonchi  Heart - Normal rate, regular rhythm. No m/r/r  Neurological - No gross focal deficits. Speech normal. Alert and oriented. Mental status is at baseline.   Musculoskeletal - +2 pitting edema bilaterally. Grossly normal ROM, Gait normal.    Skin - normal coloration and normal

## 2024-04-10 NOTE — PROGRESS NOTES
\"Have you been to the ER, urgent care clinic since your last visit?  Hospitalized since your last visit?\"    NO    “Have you seen or consulted any other health care providers outside of [facility] since your last visit?”    NO    Chief Complaint   Patient presents with    Leg Swelling     B/L leg and ankle swelling. Pt states she has taken her lasix but her legs hurt.                  Click Here for Release of Records Request

## 2024-04-26 ENCOUNTER — TELEPHONE (OUTPATIENT)
Facility: CLINIC | Age: 74
End: 2024-04-26

## 2024-04-26 NOTE — TELEPHONE ENCOUNTER
I called pt to let her know I have scheduled her with Dr. Jules on Monday 4/29 at 11:00. V/m full although pt asked for no message to be left anyway.  Stated she would call back. Ill send One Month message too.

## 2024-04-29 ENCOUNTER — OFFICE VISIT (OUTPATIENT)
Facility: CLINIC | Age: 74
End: 2024-04-29
Payer: MEDICARE

## 2024-04-29 VITALS
HEART RATE: 67 BPM | DIASTOLIC BLOOD PRESSURE: 72 MMHG | OXYGEN SATURATION: 95 % | TEMPERATURE: 98.4 F | SYSTOLIC BLOOD PRESSURE: 134 MMHG

## 2024-04-29 DIAGNOSIS — R60.0 BILATERAL EDEMA OF LOWER EXTREMITY: Primary | ICD-10-CM

## 2024-04-29 PROCEDURE — 1036F TOBACCO NON-USER: CPT | Performed by: STUDENT IN AN ORGANIZED HEALTH CARE EDUCATION/TRAINING PROGRAM

## 2024-04-29 PROCEDURE — 3075F SYST BP GE 130 - 139MM HG: CPT | Performed by: STUDENT IN AN ORGANIZED HEALTH CARE EDUCATION/TRAINING PROGRAM

## 2024-04-29 PROCEDURE — 1090F PRES/ABSN URINE INCON ASSESS: CPT | Performed by: STUDENT IN AN ORGANIZED HEALTH CARE EDUCATION/TRAINING PROGRAM

## 2024-04-29 PROCEDURE — G8417 CALC BMI ABV UP PARAM F/U: HCPCS | Performed by: STUDENT IN AN ORGANIZED HEALTH CARE EDUCATION/TRAINING PROGRAM

## 2024-04-29 PROCEDURE — 3078F DIAST BP <80 MM HG: CPT | Performed by: STUDENT IN AN ORGANIZED HEALTH CARE EDUCATION/TRAINING PROGRAM

## 2024-04-29 PROCEDURE — 1123F ACP DISCUSS/DSCN MKR DOCD: CPT | Performed by: STUDENT IN AN ORGANIZED HEALTH CARE EDUCATION/TRAINING PROGRAM

## 2024-04-29 PROCEDURE — 99214 OFFICE O/P EST MOD 30 MIN: CPT | Performed by: STUDENT IN AN ORGANIZED HEALTH CARE EDUCATION/TRAINING PROGRAM

## 2024-04-29 PROCEDURE — G8399 PT W/DXA RESULTS DOCUMENT: HCPCS | Performed by: STUDENT IN AN ORGANIZED HEALTH CARE EDUCATION/TRAINING PROGRAM

## 2024-04-29 PROCEDURE — G8427 DOCREV CUR MEDS BY ELIG CLIN: HCPCS | Performed by: STUDENT IN AN ORGANIZED HEALTH CARE EDUCATION/TRAINING PROGRAM

## 2024-04-29 RX ORDER — LISINOPRIL 5 MG/1
5 TABLET ORAL DAILY
COMMUNITY

## 2024-04-29 NOTE — PROGRESS NOTES
Chief Complaint   Patient presents with    Leg Swelling     Bilateral going on for \"quite a while\" per patient. She says her left leg is worse that her right. She believes this is being caused her Mobic. She also has Rx for Lasix however she says this makes her calcium deposit disease flare up.      \"Have you been to the ER, urgent care clinic since your last visit?  Hospitalized since your last visit?\"    NO    “Have you seen or consulted any other [facility] providers [facility]?”    YES - When: approximately 2  weeks ago.  Where and Why: Pain Management, SI Joint Injection.            Click Here for Release of Records Request    
increases as the day progresses.   Her blood pressure was 158/100 during her SI joint injections, prompting her to resume lisinopril. She denies experiencing lightheadedness or dizziness. She takes tramadol 50 mg in the morning for pain management. She has previously tried diclofenac, which resulted in swelling. She spends half the day lying down. She takes 3,500mg tylenol daily total. She was previously on high-level gabapentin following her spinal surgery, which was discontinued due to an allergic reaction.   The patient denies alcohol intake.   The patient is allergic to GABAPENTIN.           Objective   Blood pressure 134/72, pulse 67, temperature 98.4 °F (36.9 °C), temperature source Tympanic, SpO2 95 %.There is no height or weight on file to calculate BMI.  Physical Exam  There is trace edema in the right ankle and leg.   General appearance - Alert, NAD, normal appearance.   Head - Atraumatic. Normocephalic.   Eyes - EOMI. Sclera white.   Respiratory - Pulmonary effort is normal. No respiratory distress.   Neurological - No gross focal deficits. Speech normal.   Psychiatric - Mood normal. Behavior normal.       Medical History- Reviewed Social History- Reviewed Surgical History- Reviewed   Salma has a past medical history of Abdominal abscess, Adverse effect of anesthesia, Allergic rhinitis, Arthritis, Atrial fibrillation (HCC), Autoimmune disease (HCC), Back pain, Cancer (HCC), Chronic back pain, Chronic pain, Colitis, Diverticulitis, Failed back syndrome, GERD (gastroesophageal reflux disease), Hypertension, Ill-defined condition, Irritable bowel syndrome, Neuropathy, Osteoarthritis, Osteoporosis, Pneumonia, RA (rheumatoid arthritis) ([facility]), Seizures ([facility]), and Urinary incontinence. Salma reports that she has never smoked. She has never used smokeless tobacco. She reports that she does not drink alcohol and does not use drugs. Salma has a past surgical history that includes Total shoulder arthroplasty (Left);

## 2024-05-09 ENCOUNTER — HOSPITAL ENCOUNTER (OUTPATIENT)
Facility: HOSPITAL | Age: 74
Discharge: HOME | End: 2024-05-09
Payer: MEDICARE

## 2024-05-09 DIAGNOSIS — R15.9 ENCOPRESIS WITHOUT CONSTIPATION AND OVERFLOW INCONTINENCE: ICD-10-CM

## 2024-05-09 DIAGNOSIS — N39.41 URGE INCONTINENCE: ICD-10-CM

## 2024-05-09 LAB
ANION GAP SERPL CALC-SCNC: 6 MMOL/L (ref 3–18)
BUN SERPL-MCNC: 26 MG/DL (ref 7–18)
BUN/CREAT SERPL: 24 (ref 12–20)
CALCIUM SERPL-MCNC: 9.3 MG/DL (ref 8.5–10.1)
CHLORIDE SERPL-SCNC: 104 MMOL/L (ref 100–111)
CO2 SERPL-SCNC: 26 MMOL/L (ref 21–32)
CREAT SERPL-MCNC: 1.08 MG/DL (ref 0.6–1.3)
ERYTHROCYTE [DISTWIDTH] IN BLOOD BY AUTOMATED COUNT: 13.6 % (ref 11.6–14.5)
GLUCOSE SERPL-MCNC: 97 MG/DL (ref 74–99)
HCT VFR BLD AUTO: 38.9 % (ref 35–45)
HGB BLD-MCNC: 12.8 G/DL (ref 12–16)
MCH RBC QN AUTO: 31.3 PG (ref 24–34)
MCHC RBC AUTO-ENTMCNC: 32.9 G/DL (ref 31–37)
MCV RBC AUTO: 95.1 FL (ref 78–100)
NRBC # BLD: 0 K/UL (ref 0–0.01)
NRBC BLD-RTO: 0 PER 100 WBC
PLATELET # BLD AUTO: 318 K/UL (ref 135–420)
PMV BLD AUTO: 9.3 FL (ref 9.2–11.8)
POTASSIUM SERPL-SCNC: 4.5 MMOL/L (ref 3.5–5.5)
RBC # BLD AUTO: 4.09 M/UL (ref 4.2–5.3)
SODIUM SERPL-SCNC: 136 MMOL/L (ref 136–145)
WBC # BLD AUTO: 8.4 K/UL (ref 4.6–13.2)

## 2024-05-09 PROCEDURE — 36415 COLL VENOUS BLD VENIPUNCTURE: CPT

## 2024-05-09 PROCEDURE — 85027 COMPLETE CBC AUTOMATED: CPT

## 2024-05-09 PROCEDURE — 80048 BASIC METABOLIC PNL TOTAL CA: CPT

## 2024-05-09 PROCEDURE — 93005 ELECTROCARDIOGRAM TRACING: CPT

## 2024-05-10 LAB
EKG ATRIAL RATE: 63 BPM
EKG DIAGNOSIS: NORMAL
EKG P AXIS: -2 DEGREES
EKG P-R INTERVAL: 132 MS
EKG Q-T INTERVAL: 408 MS
EKG QRS DURATION: 78 MS
EKG QTC CALCULATION (BAZETT): 417 MS
EKG R AXIS: 66 DEGREES
EKG T AXIS: 51 DEGREES
EKG VENTRICULAR RATE: 63 BPM

## 2024-05-21 ENCOUNTER — ANESTHESIA EVENT (OUTPATIENT)
Facility: HOSPITAL | Age: 74
End: 2024-05-21
Payer: MEDICARE

## 2024-06-04 ENCOUNTER — APPOINTMENT (OUTPATIENT)
Facility: HOSPITAL | Age: 74
End: 2024-06-04
Attending: UROLOGY
Payer: MEDICARE

## 2024-06-04 ENCOUNTER — HOSPITAL ENCOUNTER (OUTPATIENT)
Facility: HOSPITAL | Age: 74
Setting detail: OUTPATIENT SURGERY
Discharge: HOME | End: 2024-06-04
Attending: UROLOGY | Admitting: UROLOGY
Payer: MEDICARE

## 2024-06-04 ENCOUNTER — ANESTHESIA (OUTPATIENT)
Facility: HOSPITAL | Age: 74
End: 2024-06-04
Payer: MEDICARE

## 2024-06-04 VITALS
OXYGEN SATURATION: 96 % | DIASTOLIC BLOOD PRESSURE: 73 MMHG | HEART RATE: 74 BPM | HEIGHT: 62 IN | BODY MASS INDEX: 30.14 KG/M2 | SYSTOLIC BLOOD PRESSURE: 121 MMHG | WEIGHT: 163.8 LBS | TEMPERATURE: 97.2 F | RESPIRATION RATE: 16 BRPM

## 2024-06-04 DIAGNOSIS — N39.41 URGE INCONTINENCE: Primary | ICD-10-CM

## 2024-06-04 PROCEDURE — 6370000000 HC RX 637 (ALT 250 FOR IP): Performed by: SPECIALIST

## 2024-06-04 PROCEDURE — 2580000003 HC RX 258: Performed by: UROLOGY

## 2024-06-04 PROCEDURE — 3600000002 HC SURGERY LEVEL 2 BASE: Performed by: UROLOGY

## 2024-06-04 PROCEDURE — 2709999900 HC NON-CHARGEABLE SUPPLY: Performed by: UROLOGY

## 2024-06-04 PROCEDURE — C1883 ADAPT/EXT, PACING/NEURO LEAD: HCPCS | Performed by: UROLOGY

## 2024-06-04 PROCEDURE — 2500000003 HC RX 250 WO HCPCS: Performed by: UROLOGY

## 2024-06-04 PROCEDURE — 2500000003 HC RX 250 WO HCPCS: Performed by: NURSE ANESTHETIST, CERTIFIED REGISTERED

## 2024-06-04 PROCEDURE — C1897 LEAD, NEUROSTIM TEST KIT: HCPCS | Performed by: UROLOGY

## 2024-06-04 PROCEDURE — 7100000010 HC PHASE II RECOVERY - FIRST 15 MIN: Performed by: UROLOGY

## 2024-06-04 PROCEDURE — 3600000012 HC SURGERY LEVEL 2 ADDTL 15MIN: Performed by: UROLOGY

## 2024-06-04 PROCEDURE — 7100000011 HC PHASE II RECOVERY - ADDTL 15 MIN: Performed by: UROLOGY

## 2024-06-04 PROCEDURE — 6360000002 HC RX W HCPCS: Performed by: UROLOGY

## 2024-06-04 PROCEDURE — A4217 STERILE WATER/SALINE, 500 ML: HCPCS | Performed by: UROLOGY

## 2024-06-04 PROCEDURE — 77002 NEEDLE LOCALIZATION BY XRAY: CPT

## 2024-06-04 PROCEDURE — 3700000000 HC ANESTHESIA ATTENDED CARE: Performed by: UROLOGY

## 2024-06-04 PROCEDURE — 3700000001 HC ADD 15 MINUTES (ANESTHESIA): Performed by: UROLOGY

## 2024-06-04 PROCEDURE — C1778 LEAD, NEUROSTIMULATOR: HCPCS | Performed by: UROLOGY

## 2024-06-04 PROCEDURE — 6360000002 HC RX W HCPCS: Performed by: NURSE ANESTHETIST, CERTIFIED REGISTERED

## 2024-06-04 DEVICE — LEAD NERVE STIM 4.32 MM INTERSTIM SURESCAN: Type: IMPLANTABLE DEVICE | Site: BACK | Status: FUNCTIONAL

## 2024-06-04 RX ORDER — SODIUM CHLORIDE, SODIUM LACTATE, POTASSIUM CHLORIDE, CALCIUM CHLORIDE 600; 310; 30; 20 MG/100ML; MG/100ML; MG/100ML; MG/100ML
INJECTION, SOLUTION INTRAVENOUS CONTINUOUS
Status: CANCELLED | OUTPATIENT
Start: 2024-06-04

## 2024-06-04 RX ORDER — HYDROMORPHONE HYDROCHLORIDE 1 MG/ML
0.5 INJECTION, SOLUTION INTRAMUSCULAR; INTRAVENOUS; SUBCUTANEOUS EVERY 5 MIN PRN
Status: CANCELLED | OUTPATIENT
Start: 2024-06-04

## 2024-06-04 RX ORDER — FENTANYL CITRATE 50 UG/ML
25 INJECTION, SOLUTION INTRAMUSCULAR; INTRAVENOUS EVERY 5 MIN PRN
Status: CANCELLED | OUTPATIENT
Start: 2024-06-04

## 2024-06-04 RX ORDER — PHENYLEPHRINE HCL IN 0.9% NACL 1 MG/10 ML
SYRINGE (ML) INTRAVENOUS PRN
Status: DISCONTINUED | OUTPATIENT
Start: 2024-06-04 | End: 2024-06-04 | Stop reason: SDUPTHER

## 2024-06-04 RX ORDER — SODIUM CHLORIDE, SODIUM LACTATE, POTASSIUM CHLORIDE, CALCIUM CHLORIDE 600; 310; 30; 20 MG/100ML; MG/100ML; MG/100ML; MG/100ML
INJECTION, SOLUTION INTRAVENOUS CONTINUOUS
Status: DISCONTINUED | OUTPATIENT
Start: 2024-06-04 | End: 2024-06-04 | Stop reason: HOSPADM

## 2024-06-04 RX ORDER — KETAMINE HCL IN NACL, ISO-OSM 100MG/10ML
SYRINGE (ML) INJECTION PRN
Status: DISCONTINUED | OUTPATIENT
Start: 2024-06-04 | End: 2024-06-04 | Stop reason: SDUPTHER

## 2024-06-04 RX ORDER — ONDANSETRON 2 MG/ML
4 INJECTION INTRAMUSCULAR; INTRAVENOUS
Status: CANCELLED | OUTPATIENT
Start: 2024-06-04 | End: 2024-06-05

## 2024-06-04 RX ORDER — LABETALOL HYDROCHLORIDE 5 MG/ML
5 INJECTION, SOLUTION INTRAVENOUS
Status: CANCELLED | OUTPATIENT
Start: 2024-06-04

## 2024-06-04 RX ORDER — MIDAZOLAM HYDROCHLORIDE 1 MG/ML
INJECTION INTRAMUSCULAR; INTRAVENOUS PRN
Status: DISCONTINUED | OUTPATIENT
Start: 2024-06-04 | End: 2024-06-04 | Stop reason: SDUPTHER

## 2024-06-04 RX ORDER — TRAMADOL HYDROCHLORIDE 50 MG/1
50 TABLET ORAL EVERY 8 HOURS PRN
Qty: 15 TABLET | Refills: 0 | Status: SHIPPED | OUTPATIENT
Start: 2024-06-04 | End: 2024-06-09

## 2024-06-04 RX ORDER — EPHEDRINE SULFATE/0.9% NACL/PF 50 MG/5 ML
SYRINGE (ML) INTRAVENOUS PRN
Status: DISCONTINUED | OUTPATIENT
Start: 2024-06-04 | End: 2024-06-04 | Stop reason: SDUPTHER

## 2024-06-04 RX ORDER — SODIUM CHLORIDE 0.9 % (FLUSH) 0.9 %
5-40 SYRINGE (ML) INJECTION PRN
Status: CANCELLED | OUTPATIENT
Start: 2024-06-04

## 2024-06-04 RX ORDER — TRAMADOL HYDROCHLORIDE 50 MG/1
50 TABLET ORAL EVERY 6 HOURS PRN
Status: DISCONTINUED | OUTPATIENT
Start: 2024-06-04 | End: 2024-06-04 | Stop reason: HOSPADM

## 2024-06-04 RX ORDER — SODIUM CHLORIDE 9 MG/ML
INJECTION, SOLUTION INTRAVENOUS PRN
Status: CANCELLED | OUTPATIENT
Start: 2024-06-04

## 2024-06-04 RX ORDER — TRIMETHOPRIM 100 MG/1
100 TABLET ORAL 2 TIMES DAILY
Qty: 32 TABLET | Refills: 0 | Status: SHIPPED | OUTPATIENT
Start: 2024-06-04 | End: 2024-06-20

## 2024-06-04 RX ORDER — LIDOCAINE HYDROCHLORIDE 20 MG/ML
INJECTION, SOLUTION EPIDURAL; INFILTRATION; INTRACAUDAL; PERINEURAL PRN
Status: DISCONTINUED | OUTPATIENT
Start: 2024-06-04 | End: 2024-06-04 | Stop reason: SDUPTHER

## 2024-06-04 RX ORDER — GLYCOPYRROLATE 0.2 MG/ML
INJECTION INTRAMUSCULAR; INTRAVENOUS PRN
Status: DISCONTINUED | OUTPATIENT
Start: 2024-06-04 | End: 2024-06-04 | Stop reason: SDUPTHER

## 2024-06-04 RX ORDER — ONDANSETRON 2 MG/ML
INJECTION INTRAMUSCULAR; INTRAVENOUS PRN
Status: DISCONTINUED | OUTPATIENT
Start: 2024-06-04 | End: 2024-06-04 | Stop reason: SDUPTHER

## 2024-06-04 RX ORDER — ACETAMINOPHEN 325 MG/1
650 TABLET ORAL
Status: CANCELLED | OUTPATIENT
Start: 2024-06-04 | End: 2024-06-05

## 2024-06-04 RX ORDER — SODIUM CHLORIDE 0.9 % (FLUSH) 0.9 %
5-40 SYRINGE (ML) INJECTION EVERY 12 HOURS SCHEDULED
Status: CANCELLED | OUTPATIENT
Start: 2024-06-04

## 2024-06-04 RX ORDER — FENTANYL CITRATE 50 UG/ML
INJECTION, SOLUTION INTRAMUSCULAR; INTRAVENOUS PRN
Status: DISCONTINUED | OUTPATIENT
Start: 2024-06-04 | End: 2024-06-04 | Stop reason: SDUPTHER

## 2024-06-04 RX ORDER — DIPHENHYDRAMINE HYDROCHLORIDE 50 MG/ML
12.5 INJECTION INTRAMUSCULAR; INTRAVENOUS
Status: CANCELLED | OUTPATIENT
Start: 2024-06-04 | End: 2024-06-05

## 2024-06-04 RX ORDER — POLYMYXIN B SULFATE 500000 [IU]/1
INJECTION, POWDER, LYOPHILIZED, FOR SOLUTION INTRAMUSCULAR; INTRATHECAL; INTRAVENOUS; OPHTHALMIC PRN
Status: DISCONTINUED | OUTPATIENT
Start: 2024-06-04 | End: 2024-06-04 | Stop reason: ALTCHOICE

## 2024-06-04 RX ORDER — PROPOFOL 10 MG/ML
INJECTION, EMULSION INTRAVENOUS PRN
Status: DISCONTINUED | OUTPATIENT
Start: 2024-06-04 | End: 2024-06-04 | Stop reason: SDUPTHER

## 2024-06-04 RX ORDER — MIDAZOLAM HYDROCHLORIDE 2 MG/2ML
2 INJECTION, SOLUTION INTRAMUSCULAR; INTRAVENOUS
Status: CANCELLED | OUTPATIENT
Start: 2024-06-04 | End: 2024-06-05

## 2024-06-04 RX ORDER — NALOXONE HYDROCHLORIDE 0.4 MG/ML
INJECTION, SOLUTION INTRAMUSCULAR; INTRAVENOUS; SUBCUTANEOUS PRN
Status: CANCELLED | OUTPATIENT
Start: 2024-06-04

## 2024-06-04 RX ADMIN — SODIUM CHLORIDE, SODIUM LACTATE, POTASSIUM CHLORIDE, AND CALCIUM CHLORIDE: 600; 310; 30; 20 INJECTION, SOLUTION INTRAVENOUS at 08:03

## 2024-06-04 RX ADMIN — PROPOFOL 50 MCG/KG/MIN: 10 INJECTION, EMULSION INTRAVENOUS at 07:46

## 2024-06-04 RX ADMIN — FENTANYL CITRATE 25 MCG: 50 INJECTION, SOLUTION INTRAMUSCULAR; INTRAVENOUS at 07:42

## 2024-06-04 RX ADMIN — ONDANSETRON HYDROCHLORIDE 4 MG: 2 INJECTION INTRAMUSCULAR; INTRAVENOUS at 07:17

## 2024-06-04 RX ADMIN — PROPOFOL 30 MG: 10 INJECTION, EMULSION INTRAVENOUS at 08:14

## 2024-06-04 RX ADMIN — PROPOFOL 20 MG: 10 INJECTION, EMULSION INTRAVENOUS at 08:22

## 2024-06-04 RX ADMIN — PROPOFOL 10 MG: 10 INJECTION, EMULSION INTRAVENOUS at 08:17

## 2024-06-04 RX ADMIN — VANCOMYCIN HYDROCHLORIDE 1000 MG: 1 INJECTION, POWDER, LYOPHILIZED, FOR SOLUTION INTRAVENOUS at 06:43

## 2024-06-04 RX ADMIN — FENTANYL CITRATE 25 MCG: 50 INJECTION, SOLUTION INTRAMUSCULAR; INTRAVENOUS at 07:43

## 2024-06-04 RX ADMIN — PROPOFOL 20 MG: 10 INJECTION, EMULSION INTRAVENOUS at 08:16

## 2024-06-04 RX ADMIN — Medication 10 MG: at 07:41

## 2024-06-04 RX ADMIN — PROPOFOL 20 MG: 10 INJECTION, EMULSION INTRAVENOUS at 08:19

## 2024-06-04 RX ADMIN — PROPOFOL 30 MG: 10 INJECTION, EMULSION INTRAVENOUS at 07:12

## 2024-06-04 RX ADMIN — PROPOFOL 20 MG: 10 INJECTION, EMULSION INTRAVENOUS at 08:10

## 2024-06-04 RX ADMIN — Medication 10 MG: at 07:21

## 2024-06-04 RX ADMIN — MIDAZOLAM 2 MG: 1 INJECTION INTRAMUSCULAR; INTRAVENOUS at 07:03

## 2024-06-04 RX ADMIN — FENTANYL CITRATE 50 MCG: 50 INJECTION, SOLUTION INTRAMUSCULAR; INTRAVENOUS at 07:03

## 2024-06-04 RX ADMIN — Medication 10 MG: at 07:29

## 2024-06-04 RX ADMIN — SODIUM CHLORIDE, SODIUM LACTATE, POTASSIUM CHLORIDE, AND CALCIUM CHLORIDE: 600; 310; 30; 20 INJECTION, SOLUTION INTRAVENOUS at 05:51

## 2024-06-04 RX ADMIN — TRAMADOL HYDROCHLORIDE 50 MG: 50 TABLET ORAL at 09:08

## 2024-06-04 RX ADMIN — LIDOCAINE HYDROCHLORIDE 40 MG: 20 INJECTION, SOLUTION EPIDURAL; INFILTRATION; INTRACAUDAL; PERINEURAL at 07:12

## 2024-06-04 RX ADMIN — PROPOFOL 30 MG: 10 INJECTION, EMULSION INTRAVENOUS at 07:58

## 2024-06-04 RX ADMIN — GLYCOPYRROLATE 0.1 MG: 0.2 INJECTION INTRAMUSCULAR; INTRAVENOUS at 07:03

## 2024-06-04 RX ADMIN — Medication 50 MCG: at 07:34

## 2024-06-04 RX ADMIN — PROPOFOL 75 MCG/KG/MIN: 10 INJECTION, EMULSION INTRAVENOUS at 07:14

## 2024-06-04 ASSESSMENT — PAIN DESCRIPTION - LOCATION
LOCATION: BACK;HIP

## 2024-06-04 ASSESSMENT — PAIN DESCRIPTION - ORIENTATION: ORIENTATION: LOWER

## 2024-06-04 ASSESSMENT — PAIN DESCRIPTION - DESCRIPTORS
DESCRIPTORS: ACHING
DESCRIPTORS: ACHING;DULL
DESCRIPTORS: ACHING
DESCRIPTORS: ACHING

## 2024-06-04 ASSESSMENT — PAIN DESCRIPTION - PAIN TYPE
TYPE: CHRONIC PAIN;SURGICAL PAIN
TYPE: CHRONIC PAIN;SURGICAL PAIN

## 2024-06-04 ASSESSMENT — PAIN SCALES - GENERAL
PAINLEVEL_OUTOF10: 5
PAINLEVEL_OUTOF10: 7
PAINLEVEL_OUTOF10: 5
PAINLEVEL_OUTOF10: 0
PAINLEVEL_OUTOF10: 0

## 2024-06-04 ASSESSMENT — PAIN - FUNCTIONAL ASSESSMENT
PAIN_FUNCTIONAL_ASSESSMENT: ACTIVITIES ARE NOT PREVENTED
PAIN_FUNCTIONAL_ASSESSMENT: 0-10
PAIN_FUNCTIONAL_ASSESSMENT: ACTIVITIES ARE NOT PREVENTED

## 2024-06-04 ASSESSMENT — PAIN DESCRIPTION - FREQUENCY
FREQUENCY: INTERMITTENT
FREQUENCY: INTERMITTENT

## 2024-06-04 ASSESSMENT — PAIN DESCRIPTION - ONSET
ONSET: AWAKENED FROM SLEEP
ONSET: ON-GOING

## 2024-06-04 NOTE — H&P (VIEW-ONLY)
[facility]  [address]  [facility] [address]  Tel:  [phone]  Fax: [phone]    Patient : Salma Lopez   YOB: 1950                   Assessment/Plan  # Detail Type Description    1. Assessment Frequent fecal incontinence (R15.9).    Patient Plan This is getting significantly worse and is becoming a daily occurrence.  We discussed InterStim as an option for treatment.  InterStim stage I and then a second-stage a couple of weeks later was discussed in the operating room under sedation.  Risks of bleeding and infection and failure of the device to work were discussed.  We discussed that if it fails, then she would need the lead removed at this time of the 2nd procedure.  If it is successful then we will implant a permanent device at the time of the 2nd procedure.         2. Assessment Urge incontinence (N39.41).    Patient Plan She now has significant urge incontinence.  It is tough to treat with anticholinergics because she does have poor emptying as well.  However, in combination with the fecal incontinence, she should do well with InterStim.  Risks of bleeding and infection and failure for the device to work were all discussed.         3. Assessment Feeling of incomplete bladder emptying (R39.14).    Patient Plan She is actually voiding well with a PVR 48 mL despite the fact that she has to sometimes crit day to fully empty         4. Assessment Other chronic cystitis without hematuria (N30.20).    Patient Plan She is doing well.  Her urine is sent for culture today.  Typically, we would only treat bacteriuria if she has symptoms.  However, if she has a positive culture then we will treat it preoperatively    Plan Orders Urine Culture, Routine to be performed. Obtained on 04/18/2024.              Additional Visit Information    This 74 year old patient presents for UTI and Difficulty urinating.    History of Present Illness  1.  UTI   The onset was gradual.

## 2024-06-04 NOTE — DISCHARGE INSTRUCTIONS
signs of an infection such as:  Increased pain, swelling, warmth, or redness in the area.  Red streaks leading from the area.  Pus draining from the wound.  A new or higher fever.       Bleeding After Surgery: Care Instructions  Overview  After surgery, it is common to have some minor bruising or bleeding from the cut (incision) made by your doctor. But problems may occur that cause you to bleed too much in the surgery area.  An injury to a blood vessel can cause bleeding after surgery. Other causes include medicines such as aspirin or anticoagulants (blood thinners).  To help stop the bleeding, your doctor may have put pressure on the incision or sewn up or cauterized (sealed) the incision. Or you may have had surgery to stop bleeding inside the surgery area. Your doctor also may have given you medicines that help stop the bleeding.  How can you care for yourself at home?  If you have strips of tape on the incision, leave the tape on until it falls off. Or follow your doctor's instructions for removing the tape. Keep the area dry at all times.  You will have a dressing over the incision. A dressing helps the incision heal and protects it. Your doctor will tell you how to take care of this.  If you do not have tape on the incision, wash the area daily with warm, soapy water, and pat it dry. Don't use hydrogen peroxide or alcohol, which can slow healing.    When should you call for help?    Call your doctor now or seek immediate medical care if:    You are dizzy or lightheaded, or you feel like you may faint.     You have bleeding that starts again or gets worse, such as soaking one or more bandages over 2 to 4 hours, even after holding pressure on the area.         __________________________________________________________________________________________________________________________________

## 2024-06-04 NOTE — ANESTHESIA PRE PROCEDURE
Department of Anesthesiology  Preprocedure Note       Name:  Salma Lopez   Age:  74 y.o.  :  1950                                          MRN:  [id number]         Date:  2024      Surgeon: Surgeon(s):  Saran Mauro MD    Procedure: Procedure(s):  INTERSTIM STAGE I WITH C-ARM    Medications prior to admission:   Prior to Admission medications    Medication Sig Start Date End Date Taking? Authorizing Provider   Celecoxib (CELEBREX PO) Take by mouth    Ari Vides MD   Multiple Vitamins-Minerals (ONE-A-DAY WOMENS 50+ PO) Take by mouth    Ari Vides MD   lisinopril (PRINIVIL;ZESTRIL) 5 MG tablet Take 1 tablet by mouth daily    Ari Vides MD   traMADol (ULTRAM) 50 MG tablet Take 1 tablet by mouth daily. 10/17/23   Ari Vides MD   zolpidem (AMBIEN) 5 MG tablet take 1 tablet by mouth nightly if needed for sleep MAX DOSE 1 TABLET PER DAY 3/13/24 6/13/24  Jorge L Epps MD   cyclobenzaprine (FLEXERIL) 10 MG tablet TAKE 1 TABLET THREE TIMES A DAY AS NEEDED FOR MUSCLE SPASMS  Patient not taking: Reported on 2024   Jorge L Epps MD   XELJANZ 5 MG TABS Take 1 tablet by mouth in the morning and at bedtime 23   Ari Vides MD   acetaminophen (TYLENOL) 500 MG tablet Take 2 tablets by mouth every 6 hours as needed    Automatic Reconciliation, Ar   cetirizine (ZYRTEC) 10 MG tablet Take 1 tablet by mouth daily as needed    Automatic Reconciliation, Ar   vitamin D (CHOLECALCIFEROL) 25 MCG (1000 UT) TABS tablet Take 1 tablet by mouth daily    Automatic Reconciliation, Ar       Current medications:    Current Facility-Administered Medications   Medication Dose Route Frequency Provider Last Rate Last Admin    lactated ringers IV soln infusion   IntraVENous Continuous Saran Mauro  mL/hr at 24 0551 New Bag at 24 0551    vancomycin (VANCOCIN) 1,000 mg in sodium chloride 0.9 % 250 mL (vial-mate) IVPB  15 mg/kg IntraVENous On

## 2024-06-04 NOTE — PERIOP NOTE
Reviewed PTA medication list with patient/caregiver and patient/caregiver denies any additional medications.     Patient admits to having a responsible adult care for them at home for at least 24 hours after surgery.    Patient encouraged to use gown warming system and informed that using said warming gown to regulate body temperature prior to a procedure has been shown to help reduce the risks of blood clots and infection.    Patient's pharmacy of choice verified and documented in PTA medication section.    Dual skin assessment & fall risk band verification completed with Vilma JUAREZ RN.

## 2024-06-04 NOTE — H&P
today)  Medication Name Sig Description Start Date Stop Date Refilled Rx Elsewhere   tramadol  mg capsule 24h,extended release(25-75)  2021   N   Vitamin D3 50 mcg (2,000 unit) tablet  2021   N   lisinopril 5 mg tablet  2021   N   meloxicam 15 mg tablet  2021   N   Ambien 5 mg tablet  2021   N   multivitamin tablet  2021   N   Calcium 500  500 mg calcium (1,250 mg) tablet  2021   N   Xeljanz 5 mg tablet  2021   N   cyclobenzaprine ER 15 mg capsule,extended release 24 hr take 1 capsule by oral route  every day //   Y   Tylenol Extra Strength 500 mg tablet take 2 tablet by oral route  every 6 hours as needed //   Y       Medication Reconciliation  Medications reconciled today.      Allergies  Ingredient Reaction (Severity) Comment   ADALIMUMAB lupus syndrome    GABAPENTIN     PENICILLINS Urticaria    SULFA (SULFONAMIDE ANTIBIOTICS) Urticaria      Reviewed, no changes.      Family History   (Reviewed, updated)    Relationship Family Member Name  Age at Death Condition Onset Age Cause of Death   Brother  Y 85      Father  Y  stroke 78 Y   Mother  Y  seizure disorder 96 Y   Natural Brother    Diabetes  N   Natural Brother    prostate cancer  N   Sister  Y 70        Social History  (Reviewed, updated)  Tobacco use reviewed.    Preferred language is English.      Marital Status/Family/Social Support  Marital status:      Tobacco use status: Current non-smoker.    Smoking status: Never smoked tobacco.    Tobacco Screening  Patient has never used tobacco. Patient has not used tobacco in the last 30 days. Patient has not used smokeless tobacco in the last 30 days.    Smoking Status  Type Smoking Status Usage Per Day Years Used Pack Years Total Pack Years    Never smoked tobacco         Environmental History - Home Environment  Residence # Type of Residence Age of Building Time at Current Address Exposure in Home Relationship to Smoker   1 house 26 24 Years N

## 2024-06-04 NOTE — ANESTHESIA POSTPROCEDURE EVALUATION
.Post-Anesthesia Evaluation & Assessment    Visit Vitals  /74   Pulse 70   Temp 97 °F (36.1 °C) (Temporal)   Resp 18   Ht 1.575 m (5' 2.01\")   Wt 74.3 kg (163 lb 12.8 oz)   SpO2 95%   BMI 29.95 kg/m²       Nausea/Vomiting: no nausea    Post-operative hydration adequate.    Pain score (VAS): 0    Mental status & Level of consciousness: alert and oriented x 3    Neurological status: moves all extremities, sensation grossly intact    Pulmonary status: airway patent, no supplemental oxygen required    Complications related to anesthesia: none    Additional comments:

## 2024-06-04 NOTE — INTERVAL H&P NOTE
Update History & Physical    The patient's History and Physical of Madelyn 3, 2024 was reviewed with the patient and I examined the patient. There was no change. The surgical site was confirmed by the patient and me.     Plan: The risks, benefits, expected outcome, and alternative to the recommended procedure have been discussed with the patient. Patient understands and wants to proceed with the procedure.     Electronically signed by MISA FTICH MD on 6/4/2024 at 6:58 AM

## 2024-06-04 NOTE — PERIOP NOTE
Arrived to Phase 2 -alert and oriented - pt requesting pain meds for low back and hip pain - states has RA - assisted to recliner - pt repositioned to right side for comfort.

## 2024-06-04 NOTE — PERIOP NOTE
TRANSFER - IN REPORT:    Verbal report received from Dar RN on Salma E [place]  being received from OR for routine post-op      Report consisted of patient's Situation, Background, Assessment and   Recommendations(SBAR).     Information from the following report(s) Nurse Handoff Report, Adult Overview, Surgery Report, Intake/Output, and MAR was reviewed with the receiving nurse.    Opportunity for questions and clarification was provided.      Assessment completed upon patient's arrival to unit and care assumed.

## 2024-06-04 NOTE — OP NOTE
actual response can be found in the “findings” portion of this note.  At no point was there any calf rotation. I duplicated this to the right side.    The RIGHT side was chosen.    The guide wire was then placed through the foramen needle and needle was removed.    A small skin incision was made beside the wire. The dilator sheath was inserted over the wire, and correct placement was confirmed with fluoroscopy. The wire and obturator from the dilator was removed. The tined lead was placed into the dilator sheath to the appropriate location confirmed by fluoroscopy. All 4 leads from 0 to 3 were tested. The dilator sheath was then removed under real time fluoroscopy, confirming the tined lead remained in the appropriate location.    Next, a pocket was created in the right buttock in preparation to place the neurostimulator during stage II.    A tunneling device was used to bring the tined lead connection to the pocket.   The temporary lead was connected to the tined lead and secured with a hexnut screwdriver for a watertight connection.  A Prolene suture was used to secure the wire in place with a/    A new tunneling device was again used to tunnel the temporary lead connector to a location lateral to the insertion of the tined lead, across the midline. At this point, the pocket was irrigated with antibiotic solution and wound was closed using a 3-0 Vicryl for Andrew's fascia and a running 4-0 Vicryl for the subcuticular layer. The insertion point of the tined lead was also closed with a single 4-0 Vicryl suture.    At this point, the procedure ended. The patient was washed off, dried. Dermabond was placed on the incision and then dry sterile dressings were applied with OpSite dressings over it. The patient was rolled over to the supine position on the stretcher, awakened from IV sedation, transferred to the recovery room awake, alert, and stable condition.    All sponge, needle, and instrument counts were correct at

## 2024-06-07 ENCOUNTER — TELEPHONE (OUTPATIENT)
Facility: CLINIC | Age: 74
End: 2024-06-07

## 2024-06-07 NOTE — TELEPHONE ENCOUNTER
Pt called to informed  that she has tried the Spinal Simulator and is very happy with the results. Pt states it was focused on the S2 and S3 parts of her spine and it has helped with leg pain, controlling bladder issues and also leg swelling. Please review and advise as needed.

## 2024-06-11 ENCOUNTER — ANESTHESIA EVENT (OUTPATIENT)
Facility: HOSPITAL | Age: 74
End: 2024-06-11
Payer: MEDICARE

## 2024-06-18 ENCOUNTER — APPOINTMENT (OUTPATIENT)
Facility: HOSPITAL | Age: 74
End: 2024-06-18
Attending: UROLOGY
Payer: MEDICARE

## 2024-06-18 ENCOUNTER — ANESTHESIA (OUTPATIENT)
Facility: HOSPITAL | Age: 74
End: 2024-06-18
Payer: MEDICARE

## 2024-06-18 ENCOUNTER — HOSPITAL ENCOUNTER (OUTPATIENT)
Facility: HOSPITAL | Age: 74
Setting detail: OUTPATIENT SURGERY
Discharge: HOME | End: 2024-06-18
Attending: UROLOGY | Admitting: UROLOGY
Payer: MEDICARE

## 2024-06-18 VITALS
WEIGHT: 163.8 LBS | BODY MASS INDEX: 29.02 KG/M2 | HEART RATE: 59 BPM | OXYGEN SATURATION: 95 % | DIASTOLIC BLOOD PRESSURE: 62 MMHG | HEIGHT: 63 IN | RESPIRATION RATE: 18 BRPM | SYSTOLIC BLOOD PRESSURE: 124 MMHG | TEMPERATURE: 97.2 F

## 2024-06-18 DIAGNOSIS — R33.9 URINARY RETENTION WITH INCOMPLETE BLADDER EMPTYING: Primary | ICD-10-CM

## 2024-06-18 PROCEDURE — 7100000011 HC PHASE II RECOVERY - ADDTL 15 MIN: Performed by: UROLOGY

## 2024-06-18 PROCEDURE — 3600000002 HC SURGERY LEVEL 2 BASE: Performed by: UROLOGY

## 2024-06-18 PROCEDURE — 2580000003 HC RX 258: Performed by: UROLOGY

## 2024-06-18 PROCEDURE — 6360000002 HC RX W HCPCS: Performed by: UROLOGY

## 2024-06-18 PROCEDURE — 3600000012 HC SURGERY LEVEL 2 ADDTL 15MIN: Performed by: UROLOGY

## 2024-06-18 PROCEDURE — 2500000003 HC RX 250 WO HCPCS: Performed by: UROLOGY

## 2024-06-18 PROCEDURE — A4217 STERILE WATER/SALINE, 500 ML: HCPCS | Performed by: UROLOGY

## 2024-06-18 PROCEDURE — 6360000002 HC RX W HCPCS: Performed by: NURSE ANESTHETIST, CERTIFIED REGISTERED

## 2024-06-18 PROCEDURE — C1767 GENERATOR, NEURO NON-RECHARG: HCPCS | Performed by: UROLOGY

## 2024-06-18 PROCEDURE — 2500000003 HC RX 250 WO HCPCS: Performed by: NURSE ANESTHETIST, CERTIFIED REGISTERED

## 2024-06-18 PROCEDURE — 3700000001 HC ADD 15 MINUTES (ANESTHESIA): Performed by: UROLOGY

## 2024-06-18 PROCEDURE — 7100000010 HC PHASE II RECOVERY - FIRST 15 MIN: Performed by: UROLOGY

## 2024-06-18 PROCEDURE — 2709999900 HC NON-CHARGEABLE SUPPLY: Performed by: UROLOGY

## 2024-06-18 PROCEDURE — 3700000000 HC ANESTHESIA ATTENDED CARE: Performed by: UROLOGY

## 2024-06-18 PROCEDURE — C1787 PATIENT PROGR, NEUROSTIM: HCPCS | Performed by: UROLOGY

## 2024-06-18 DEVICE — NEUROSTIMULATOR INTERSTIM X RECHRGE FREE TORQ WRNCH PROD: Type: IMPLANTABLE DEVICE | Site: OTHER | Status: FUNCTIONAL

## 2024-06-18 RX ORDER — OXYCODONE HYDROCHLORIDE 5 MG/1
5 TABLET ORAL
Status: DISCONTINUED | OUTPATIENT
Start: 2024-06-18 | End: 2024-06-18 | Stop reason: HOSPADM

## 2024-06-18 RX ORDER — LABETALOL HYDROCHLORIDE 5 MG/ML
10 INJECTION, SOLUTION INTRAVENOUS
Status: CANCELLED | OUTPATIENT
Start: 2024-06-18

## 2024-06-18 RX ORDER — PROPOFOL 10 MG/ML
INJECTION, EMULSION INTRAVENOUS PRN
Status: DISCONTINUED | OUTPATIENT
Start: 2024-06-18 | End: 2024-06-18 | Stop reason: SDUPTHER

## 2024-06-18 RX ORDER — SODIUM CHLORIDE 0.9 % (FLUSH) 0.9 %
5-40 SYRINGE (ML) INJECTION EVERY 12 HOURS SCHEDULED
Status: CANCELLED | OUTPATIENT
Start: 2024-06-18

## 2024-06-18 RX ORDER — MIDAZOLAM HYDROCHLORIDE 1 MG/ML
INJECTION INTRAMUSCULAR; INTRAVENOUS PRN
Status: DISCONTINUED | OUTPATIENT
Start: 2024-06-18 | End: 2024-06-18 | Stop reason: SDUPTHER

## 2024-06-18 RX ORDER — LIDOCAINE HYDROCHLORIDE 20 MG/ML
INJECTION, SOLUTION EPIDURAL; INFILTRATION; INTRACAUDAL; PERINEURAL PRN
Status: DISCONTINUED | OUTPATIENT
Start: 2024-06-18 | End: 2024-06-18 | Stop reason: SDUPTHER

## 2024-06-18 RX ORDER — TRAMADOL HYDROCHLORIDE 50 MG/1
50 TABLET ORAL EVERY 6 HOURS PRN
Qty: 12 TABLET | Refills: 0 | Status: SHIPPED | OUTPATIENT
Start: 2024-06-18 | End: 2024-06-21

## 2024-06-18 RX ORDER — HYDROMORPHONE HYDROCHLORIDE 1 MG/ML
0.25 INJECTION, SOLUTION INTRAMUSCULAR; INTRAVENOUS; SUBCUTANEOUS EVERY 5 MIN PRN
Status: CANCELLED | OUTPATIENT
Start: 2024-06-18

## 2024-06-18 RX ORDER — DIPHENHYDRAMINE HYDROCHLORIDE 50 MG/ML
12.5 INJECTION INTRAMUSCULAR; INTRAVENOUS
Status: CANCELLED | OUTPATIENT
Start: 2024-06-18 | End: 2024-06-19

## 2024-06-18 RX ORDER — SODIUM CHLORIDE, SODIUM LACTATE, POTASSIUM CHLORIDE, CALCIUM CHLORIDE 600; 310; 30; 20 MG/100ML; MG/100ML; MG/100ML; MG/100ML
INJECTION, SOLUTION INTRAVENOUS CONTINUOUS
Status: CANCELLED | OUTPATIENT
Start: 2024-06-18

## 2024-06-18 RX ORDER — FENTANYL CITRATE 50 UG/ML
25 INJECTION, SOLUTION INTRAMUSCULAR; INTRAVENOUS EVERY 5 MIN PRN
Status: CANCELLED | OUTPATIENT
Start: 2024-06-18

## 2024-06-18 RX ORDER — ONDANSETRON 2 MG/ML
4 INJECTION INTRAMUSCULAR; INTRAVENOUS
Status: CANCELLED | OUTPATIENT
Start: 2024-06-18 | End: 2024-06-19

## 2024-06-18 RX ORDER — SODIUM CHLORIDE, SODIUM LACTATE, POTASSIUM CHLORIDE, CALCIUM CHLORIDE 600; 310; 30; 20 MG/100ML; MG/100ML; MG/100ML; MG/100ML
INJECTION, SOLUTION INTRAVENOUS CONTINUOUS
Status: DISCONTINUED | OUTPATIENT
Start: 2024-06-18 | End: 2024-06-18 | Stop reason: HOSPADM

## 2024-06-18 RX ORDER — ONDANSETRON 2 MG/ML
INJECTION INTRAMUSCULAR; INTRAVENOUS PRN
Status: DISCONTINUED | OUTPATIENT
Start: 2024-06-18 | End: 2024-06-18 | Stop reason: SDUPTHER

## 2024-06-18 RX ORDER — FENTANYL CITRATE 50 UG/ML
INJECTION, SOLUTION INTRAMUSCULAR; INTRAVENOUS PRN
Status: DISCONTINUED | OUTPATIENT
Start: 2024-06-18 | End: 2024-06-18 | Stop reason: SDUPTHER

## 2024-06-18 RX ORDER — MIDAZOLAM HYDROCHLORIDE 2 MG/2ML
2 INJECTION, SOLUTION INTRAMUSCULAR; INTRAVENOUS
Status: CANCELLED | OUTPATIENT
Start: 2024-06-18 | End: 2024-06-19

## 2024-06-18 RX ORDER — DROPERIDOL 2.5 MG/ML
0.62 INJECTION, SOLUTION INTRAMUSCULAR; INTRAVENOUS
Status: CANCELLED | OUTPATIENT
Start: 2024-06-18 | End: 2024-06-19

## 2024-06-18 RX ORDER — SODIUM CHLORIDE 0.9 % (FLUSH) 0.9 %
5-40 SYRINGE (ML) INJECTION PRN
Status: CANCELLED | OUTPATIENT
Start: 2024-06-18

## 2024-06-18 RX ORDER — SODIUM CHLORIDE 9 MG/ML
INJECTION, SOLUTION INTRAVENOUS PRN
Status: DISCONTINUED | OUTPATIENT
Start: 2024-06-18 | End: 2024-06-18 | Stop reason: HOSPADM

## 2024-06-18 RX ORDER — NALOXONE HYDROCHLORIDE 0.4 MG/ML
INJECTION, SOLUTION INTRAMUSCULAR; INTRAVENOUS; SUBCUTANEOUS PRN
Status: CANCELLED | OUTPATIENT
Start: 2024-06-18

## 2024-06-18 RX ORDER — GLYCOPYRROLATE 0.2 MG/ML
INJECTION INTRAMUSCULAR; INTRAVENOUS PRN
Status: DISCONTINUED | OUTPATIENT
Start: 2024-06-18 | End: 2024-06-18 | Stop reason: SDUPTHER

## 2024-06-18 RX ADMIN — FENTANYL CITRATE 25 MCG: 50 INJECTION, SOLUTION INTRAMUSCULAR; INTRAVENOUS at 07:47

## 2024-06-18 RX ADMIN — SODIUM CHLORIDE, POTASSIUM CHLORIDE, SODIUM LACTATE AND CALCIUM CHLORIDE: 600; 310; 30; 20 INJECTION, SOLUTION INTRAVENOUS at 06:29

## 2024-06-18 RX ADMIN — FENTANYL CITRATE 25 MCG: 50 INJECTION, SOLUTION INTRAMUSCULAR; INTRAVENOUS at 07:30

## 2024-06-18 RX ADMIN — VANCOMYCIN HYDROCHLORIDE 1000 MG: 1 INJECTION, POWDER, LYOPHILIZED, FOR SOLUTION INTRAVENOUS at 07:29

## 2024-06-18 RX ADMIN — FENTANYL CITRATE 25 MCG: 50 INJECTION, SOLUTION INTRAMUSCULAR; INTRAVENOUS at 08:05

## 2024-06-18 RX ADMIN — PROPOFOL 40 MG: 10 INJECTION, EMULSION INTRAVENOUS at 07:35

## 2024-06-18 RX ADMIN — ONDANSETRON HYDROCHLORIDE 4 MG: 2 INJECTION INTRAMUSCULAR; INTRAVENOUS at 07:37

## 2024-06-18 RX ADMIN — FENTANYL CITRATE 25 MCG: 50 INJECTION, SOLUTION INTRAMUSCULAR; INTRAVENOUS at 07:40

## 2024-06-18 RX ADMIN — PROPOFOL 100 MCG/KG/MIN: 10 INJECTION, EMULSION INTRAVENOUS at 07:36

## 2024-06-18 RX ADMIN — LIDOCAINE HYDROCHLORIDE 40 MG: 20 INJECTION, SOLUTION EPIDURAL; INFILTRATION; INTRACAUDAL; PERINEURAL at 07:35

## 2024-06-18 RX ADMIN — GLYCOPYRROLATE 0.1 MG: 0.2 INJECTION INTRAMUSCULAR; INTRAVENOUS at 07:27

## 2024-06-18 RX ADMIN — MIDAZOLAM 2 MG: 1 INJECTION INTRAMUSCULAR; INTRAVENOUS at 07:27

## 2024-06-18 ASSESSMENT — PAIN DESCRIPTION - DESCRIPTORS
DESCRIPTORS: DISCOMFORT
DESCRIPTORS: DISCOMFORT
DESCRIPTORS: OTHER (COMMENT)

## 2024-06-18 ASSESSMENT — PAIN DESCRIPTION - LOCATION
LOCATION: GENERALIZED
LOCATION: GENERALIZED

## 2024-06-18 ASSESSMENT — PAIN SCALES - GENERAL
PAINLEVEL_OUTOF10: 6
PAINLEVEL_OUTOF10: 4

## 2024-06-18 ASSESSMENT — PAIN - FUNCTIONAL ASSESSMENT
PAIN_FUNCTIONAL_ASSESSMENT: PREVENTS OR INTERFERES SOME ACTIVE ACTIVITIES AND ADLS
PAIN_FUNCTIONAL_ASSESSMENT: PREVENTS OR INTERFERES SOME ACTIVE ACTIVITIES AND ADLS

## 2024-06-18 ASSESSMENT — PAIN DESCRIPTION - FREQUENCY
FREQUENCY: CONTINUOUS
FREQUENCY: CONTINUOUS

## 2024-06-18 ASSESSMENT — PAIN DESCRIPTION - PAIN TYPE
TYPE: SURGICAL PAIN;CHRONIC PAIN
TYPE: CHRONIC PAIN;SURGICAL PAIN

## 2024-06-18 ASSESSMENT — PAIN DESCRIPTION - ONSET
ONSET: ON-GOING
ONSET: ON-GOING

## 2024-06-18 NOTE — PERIOP NOTE
Complained about right hip pain 10/10. And can't walk on it. Spoke with Dr. Mauro. Patient positioned herself for surgery. Just changed controllers and no leads. Ok to discharge.

## 2024-06-18 NOTE — ANESTHESIA PRE PROCEDURE
GI/Hepatic/Renal ROS       (-) liver disease and no renal disease       Endo/Other:    (+) : arthritis: OA and rheumatoid..    (-) diabetes mellitus               Abdominal:             Vascular:          Other Findings:             Anesthesia Plan      MAC     ASA 2       Induction: intravenous.    MIPS: Postoperative opioids intended and Prophylactic antiemetics administered.  Anesthetic plan and risks discussed with patient.      Plan discussed with CRNA.                    Eddie Fernandes MD   6/18/2024

## 2024-06-18 NOTE — PERIOP NOTE
TRANSFER - IN REPORT:    Verbal report received from RENATA Dodge on Salma E [place]  being received from OR for routine post-op      Report consisted of patient's Situation, Background, Assessment and   Recommendations(SBAR).     Information from the following report(s) Nurse Handoff Report, Adult Overview, Surgery Report, Intake/Output, and MAR was reviewed with the receiving nurse.    Opportunity for questions and clarification was provided.      Assessment completed upon patient's arrival to unit and care assumed.

## 2024-06-18 NOTE — ANESTHESIA POSTPROCEDURE EVALUATION
Department of Anesthesiology  Postprocedure Note    Patient: Salma Lopez  MRN: [id number]  YOB: 1950  Date of evaluation: 6/18/2024    Procedure Summary       Date: 06/18/24 Room / Location: [facility] / [facility] OR    Anesthesia Start: 0727 Anesthesia Stop: 0814    Procedure: INTERSTIM STAGE 2 W/CESAR Diagnosis:       Urge incontinence      Incontinence of feces, unspecified fecal incontinence type      (Urge incontinence [N39.41])      (Incontinence of feces, unspecified fecal incontinence type [R15.9])    Surgeons: Saran Mauro MD Responsible Provider: Eddie Fernandes MD    Anesthesia Type: MAC ASA Status: 2            Anesthesia Type: MAC    Abhilash Phase I:      Abhilash Phase II:      Anesthesia Post Evaluation    Patient location during evaluation: PACU  Patient participation: complete - patient participated  Level of consciousness: awake and alert  Airway patency: patent  Nausea & Vomiting: no nausea and no vomiting  Cardiovascular status: blood pressure returned to baseline  Respiratory status: acceptable  Hydration status: euvolemic  Pain management: adequate    No notable events documented.

## 2024-06-18 NOTE — DISCHARGE INSTRUCTIONS
your medicine.  To prevent dehydration, drink plenty of fluids. Choose water and other clear liquids until you feel better. If you have kidney, heart, or liver disease and have to limit fluids, talk with your doctor before you increase the amount of fluids you drink.  When you are able to eat, try clear soups, mild foods, and liquids until all symptoms are gone for 12 to 48 hours. Other good choices include dry toast, crackers, cooked cereal, and gelatin dessert, such as Jell-O.  Do not smoke. Smoking and being around smoke can make nausea worse.   When should you call for help?    Call your doctor now or seek immediate medical care if:    You have new or worse nausea or vomiting.     You are too sick to your stomach to drink any fluids.     You cannot keep down fluids.     You have symptoms of dehydration, such as:  Dry eyes and a dry mouth.  Passing only a little urine.  Feeling thirstier than usual.     You are dizzy or lightheaded, or you feel like you may faint.           Infection After Surgery: Care Instructions  Overview  After surgery, an infection is always possible. It doesn't mean that the surgery didn't go well.  How can you care for yourself at home?  Make sure your surgeon knows about the infection, especially if you saw another doctor about your symptoms.  If your doctor prescribed antibiotics, take them as directed. Do not stop taking them just because you feel better. You need to take the full course of antibiotics.  Keep the skin clean and dry.  You may have a bandage over the cut (incision). A bandage helps the incision heal and protects it. Your doctor will tell you how to take care of this. Keep it clean and dry. You may have drainage from the wound.     Call your doctor now or seek immediate medical care if:    You have signs of an infection such as:  Increased pain, swelling, warmth, or redness in the area.  Red streaks leading from the area.  Pus draining from the wound.  A new or higher

## 2024-06-18 NOTE — INTERVAL H&P NOTE
Update History & Physical    The patient's History and Physical of Madelyn 3, 2024 was reviewed with the patient and I examined the patient. There was a change.  She had her interstim stage 1 placed and saw significant improvement -- > 50% of her incontinence episodes are gone and she is essentially dry.  No troubles starting a stream and is voidng 300-800 ml at a time.  Less fecal incontinence.      . The surgical site was confirmed by the patient and me.     Plan: The risks, benefits, expected outcome, and alternative to the recommended procedure have been discussed with the patient. Patient understands and wants to proceed with the procedure.     Electronically signed by MISA FITCH MD on 6/18/2024 at 7:07 AM

## 2024-06-18 NOTE — PERIOP NOTE
Reviewed PTA medication list with patient/caregiver and patient/caregiver denies any additional medications.     Patient admits to having a responsible adult care for them at home for at least 24 hours after surgery.    Patient encouraged to use gown warming system and informed that using said warming gown to regulate body temperature prior to a procedure has been shown to help reduce the risks of blood clots and infection.    Patient's pharmacy of choice verified and documented in PTA medication section.    Dual skin assessment & fall risk band verification completed with Caty ALATORRE RN.

## 2024-06-18 NOTE — OP NOTE
Operative Note      Patient: Salma Lopez  YOB: 1950  MRN: [id number]    Date of Procedure: 6/18/2024    Pre-Op Diagnosis Codes:     * Urge incontinence [N39.41]     * Incontinence of feces, unspecified fecal incontinence type [R15.9]    Post-Op Diagnosis: Same       Procedure(s):  INTERSTIM STAGE 2: placement of interstim generator and intraoperative programming    Surgeon(s):  Saran Mauro MD    Assistant:   none    Anesthesia: MAC    Estimated Blood Loss (mL): Minimal    Complications: None    Specimens:   * No specimens in log *    Implants:  Implant Name Type Inv. Item Serial No.  Lot No. LRB No. Used Action   NEUROSTIMULATOR INTERSTIM X RECHRGE FREE TORQ WRNCH PROD - [id number]  NEUROSTIMULATOR INTERSTIM X RECHRGE FREE TORQ WRNCH PROD [id number] [id number]  N/A 1 Implanted         Drains: * No LDAs found *    Findings:  Infection Present At Time Of Surgery (PATOS) (choose all levels that have infection present):  No infection present  Other Findings: New device attached with excellent impedences    Detailed Description of Procedure:   After informed consent was obtained, the patient was taken to the operating room, was placed in the prone position after IV sedation was instituted.    The temporary lead connector was cut. A hemostat was placed on the temporary lead, and the patient was prepped and draped in usual surgical fashion. Local anesthetic was used to anesthetize the pocket. The pocket was then opened with a scalpel, and sharp and blunt dissection was used to open the pocket up and remove the previous sutures. The tined lead, attached to the temporary lead, was identified, brought into the wound. The two Prolene sutures were cut.  The temporary connector was opened by loosening the 1 hex nut with a hex nut wrench. The temporary lead was cut, removing the temporary lead connector. Next, the circulating nurse removed the temporary lead by pulling the hemostat out

## 2024-07-03 ENCOUNTER — OFFICE VISIT (OUTPATIENT)
Facility: CLINIC | Age: 74
End: 2024-07-03
Payer: MEDICARE

## 2024-07-03 VITALS
DIASTOLIC BLOOD PRESSURE: 88 MMHG | SYSTOLIC BLOOD PRESSURE: 122 MMHG | OXYGEN SATURATION: 98 % | WEIGHT: 160 LBS | HEART RATE: 75 BPM | BODY MASS INDEX: 29.44 KG/M2 | HEIGHT: 62 IN

## 2024-07-03 DIAGNOSIS — G47.00 INSOMNIA, UNSPECIFIED TYPE: Primary | ICD-10-CM

## 2024-07-03 DIAGNOSIS — R35.0 URINARY FREQUENCY: ICD-10-CM

## 2024-07-03 PROCEDURE — G8417 CALC BMI ABV UP PARAM F/U: HCPCS | Performed by: FAMILY MEDICINE

## 2024-07-03 PROCEDURE — G8399 PT W/DXA RESULTS DOCUMENT: HCPCS | Performed by: FAMILY MEDICINE

## 2024-07-03 PROCEDURE — 3074F SYST BP LT 130 MM HG: CPT | Performed by: FAMILY MEDICINE

## 2024-07-03 PROCEDURE — 1123F ACP DISCUSS/DSCN MKR DOCD: CPT | Performed by: FAMILY MEDICINE

## 2024-07-03 PROCEDURE — 99213 OFFICE O/P EST LOW 20 MIN: CPT | Performed by: FAMILY MEDICINE

## 2024-07-03 PROCEDURE — G8427 DOCREV CUR MEDS BY ELIG CLIN: HCPCS | Performed by: FAMILY MEDICINE

## 2024-07-03 PROCEDURE — 1090F PRES/ABSN URINE INCON ASSESS: CPT | Performed by: FAMILY MEDICINE

## 2024-07-03 PROCEDURE — 3079F DIAST BP 80-89 MM HG: CPT | Performed by: FAMILY MEDICINE

## 2024-07-03 PROCEDURE — 1036F TOBACCO NON-USER: CPT | Performed by: FAMILY MEDICINE

## 2024-07-03 RX ORDER — SULFAMETHOXAZOLE AND TRIMETHOPRIM 800; 160 MG/1; MG/1
1 TABLET ORAL 2 TIMES DAILY
Qty: 14 TABLET | Refills: 0 | Status: SHIPPED | OUTPATIENT
Start: 2024-07-03 | End: 2024-07-10

## 2024-07-03 RX ORDER — ZOLPIDEM TARTRATE 5 MG/1
5 TABLET ORAL
Qty: 30 TABLET | Refills: 3 | Status: SHIPPED | OUTPATIENT
Start: 2024-07-03 | End: 2024-10-01

## 2024-07-03 SDOH — ECONOMIC STABILITY: INCOME INSECURITY: HOW HARD IS IT FOR YOU TO PAY FOR THE VERY BASICS LIKE FOOD, HOUSING, MEDICAL CARE, AND HEATING?: NOT VERY HARD

## 2024-07-03 SDOH — ECONOMIC STABILITY: FOOD INSECURITY: WITHIN THE PAST 12 MONTHS, THE FOOD YOU BOUGHT JUST DIDN'T LAST AND YOU DIDN'T HAVE MONEY TO GET MORE.: NEVER TRUE

## 2024-07-03 SDOH — ECONOMIC STABILITY: FOOD INSECURITY: WITHIN THE PAST 12 MONTHS, YOU WORRIED THAT YOUR FOOD WOULD RUN OUT BEFORE YOU GOT MONEY TO BUY MORE.: NEVER TRUE

## 2024-07-03 ASSESSMENT — PATIENT HEALTH QUESTIONNAIRE - PHQ9
SUM OF ALL RESPONSES TO PHQ QUESTIONS 1-9: 0
2. FEELING DOWN, DEPRESSED OR HOPELESS: NOT AT ALL
SUM OF ALL RESPONSES TO PHQ QUESTIONS 1-9: 0
SUM OF ALL RESPONSES TO PHQ QUESTIONS 1-9: 0
1. LITTLE INTEREST OR PLEASURE IN DOING THINGS: NOT AT ALL
SUM OF ALL RESPONSES TO PHQ QUESTIONS 1-9: 0
SUM OF ALL RESPONSES TO PHQ9 QUESTIONS 1 & 2: 0

## 2024-07-03 ASSESSMENT — ENCOUNTER SYMPTOMS
RESPIRATORY NEGATIVE: 1
COUGH: 0
VOMITING: 0
NAUSEA: 0

## 2024-07-03 NOTE — PROGRESS NOTES
Chief Complaint   Patient presents with    Edema     Patient had a interstem x placed to help with bladder and bowel control. The leads are in S2/S3, final was placed on 06/18/24. She states that she finished her gram negative ABT treatment and she started having severe pain.      \"Have you been to the ER, urgent care clinic since your last visit?  Hospitalized since your last visit?\"    NO    “Have you seen or consulted any other health care providers outside of [facility] since your last visit?”    YES - When: approximately 1 months ago.  Where and Why: Urology.            Click Here for Release of Records Request

## 2024-07-03 NOTE — PROGRESS NOTES
Salma Lopez is a 74 y.o. female  presents for   Chief Complaint   Patient presents with    Edema     Patient had a interstem x placed to help with bladder and bowel control. The leads are in S2/S3, final was placed on 06/18/24. She states that she finished her gram negative ABT treatment and she started having severe pain.         Allergies   Allergen Reactions    Celecoxib Swelling    Sulfa Antibiotics Hives     Lips swelling      Adalimumab      Induced Lupus      Adhesive Tape Dermatitis, Hives and Itching    Influenza Vaccines Hives    Iodine Itching     Takes benadryl and is OK. Contrast tolerated. Betadine ok.    Ioversol Hives and Itching     Pt states when she gets iv contrast she itches a little from hives    Lidoderm [Lidocaine]      had a reaction to the patch/adhesive    Meloxicam     Nsaids Swelling    Penicillins Hives    Gabapentin Diarrhea and Palpitations     Chest and Abdominal pain       Current Outpatient Medications:     zolpidem (AMBIEN) 5 MG tablet, Take 1 tablet by mouth nightly for 90 days. Max Daily Amount: 5 mg, Disp: 30 tablet, Rfl: 3    sulfamethoxazole-trimethoprim (BACTRIM DS;SEPTRA DS) 800-160 MG per tablet, Take 1 tablet by mouth 2 times daily for 7 days, Disp: 14 tablet, Rfl: 0    Celecoxib (CELEBREX PO), Take by mouth as needed Indications: RA, Disp: , Rfl:     Multiple Vitamins-Minerals (ONE-A-DAY WOMENS 50+ PO), Take by mouth daily, Disp: , Rfl:     lisinopril (PRINIVIL;ZESTRIL) 5 MG tablet, Take 1 tablet by mouth daily Indications: HTN, Disp: , Rfl:     XELJANZ 5 MG TABS, Take 1 tablet by mouth in the morning and at bedtime Indications: RA, Disp: , Rfl:     acetaminophen (TYLENOL) 500 MG tablet, Take 2 tablets by mouth every 6 hours as needed, Disp: , Rfl:     cetirizine (ZYRTEC) 10 MG tablet, Take 1 tablet by mouth daily as needed, Disp: , Rfl:     vitamin D (CHOLECALCIFEROL) 25 MCG (1000 UT) TABS tablet, Take 1 tablet by mouth daily, Disp: , Rfl:    Patient Active Problem

## 2024-07-05 ENCOUNTER — OFFICE VISIT (OUTPATIENT)
Age: 74
End: 2024-07-05
Payer: MEDICARE

## 2024-07-05 VITALS
DIASTOLIC BLOOD PRESSURE: 82 MMHG | SYSTOLIC BLOOD PRESSURE: 116 MMHG | BODY MASS INDEX: 30.18 KG/M2 | WEIGHT: 164 LBS | HEART RATE: 69 BPM | HEIGHT: 62 IN | OXYGEN SATURATION: 97 %

## 2024-07-05 DIAGNOSIS — I47.10 SUPRAVENTRICULAR TACHYCARDIA (HCC): Primary | ICD-10-CM

## 2024-07-05 DIAGNOSIS — I10 ESSENTIAL (PRIMARY) HYPERTENSION: ICD-10-CM

## 2024-07-05 DIAGNOSIS — I87.2 VENOUS INSUFFICIENCY: ICD-10-CM

## 2024-07-05 DIAGNOSIS — M06.9 RHEUMATOID ARTHRITIS OF OTHER SITE, UNSPECIFIED WHETHER RHEUMATOID FACTOR PRESENT (HCC): ICD-10-CM

## 2024-07-05 DIAGNOSIS — R60.0 BILATERAL LOWER EXTREMITY EDEMA: ICD-10-CM

## 2024-07-05 PROCEDURE — 3074F SYST BP LT 130 MM HG: CPT | Performed by: NURSE PRACTITIONER

## 2024-07-05 PROCEDURE — 99214 OFFICE O/P EST MOD 30 MIN: CPT | Performed by: NURSE PRACTITIONER

## 2024-07-05 PROCEDURE — 1123F ACP DISCUSS/DSCN MKR DOCD: CPT | Performed by: NURSE PRACTITIONER

## 2024-07-05 PROCEDURE — 3079F DIAST BP 80-89 MM HG: CPT | Performed by: NURSE PRACTITIONER

## 2024-07-05 PROCEDURE — 1090F PRES/ABSN URINE INCON ASSESS: CPT | Performed by: NURSE PRACTITIONER

## 2024-07-05 PROCEDURE — G8399 PT W/DXA RESULTS DOCUMENT: HCPCS | Performed by: NURSE PRACTITIONER

## 2024-07-05 PROCEDURE — 1036F TOBACCO NON-USER: CPT | Performed by: NURSE PRACTITIONER

## 2024-07-05 PROCEDURE — G8427 DOCREV CUR MEDS BY ELIG CLIN: HCPCS | Performed by: NURSE PRACTITIONER

## 2024-07-05 PROCEDURE — G8417 CALC BMI ABV UP PARAM F/U: HCPCS | Performed by: NURSE PRACTITIONER

## 2024-07-05 RX ORDER — MELOXICAM 15 MG/1
15 TABLET ORAL DAILY
COMMUNITY

## 2024-07-05 RX ORDER — FUROSEMIDE 20 MG/1
20 TABLET ORAL 2 TIMES DAILY
COMMUNITY

## 2024-07-05 NOTE — PROGRESS NOTES
1. Have you been to the ER, urgent care clinic since your last visit?  Hospitalized since your last visit?     Yes When: 6/18 Where: [facility]  Reason for visit: STIMULIN        2.  Where do you normally have your labs drawn?       3. Do you need any refills today?   no    4. Which local pharmacy do you use (enter pharmacy)?   [facility]    5. Which mail order pharmacy do you use (enter pharmacy)?        6. Are you here for surgical clearance and if so who will be doing your     procedure/surgery (care team)?   no

## 2024-07-05 NOTE — PROGRESS NOTES
HISTORY OF PRESENT ILLNESS  Salma Lopez is a 72 y.o. female.    6/1/2021  Patient seen today for new patient evaluation.  She is referred here for evaluation of tachycardia.  For past few weeks she has been having episode of sudden tachycardia with heart rate going in 120s.  She is short of breath at that time.  Denies any dizziness or syncope.  She has history of rheumatoid arthritis with extensive joint problems including back problem that is being evaluated by Dr. Brandan Iyer.  She has no prior cardiac history.  She has history of hypertension on treatment.  2/2024  Patient seen for pre-op evaluation.  She is anticipating right shoulder arthroscopic surgery. She denies chest pain, shortness of breath, palpitations or edema.     Follow-up  Associated symptoms include shortness of breath. Pertinent negatives include no chest pain, no abdominal pain and no headaches.       Review of Systems   Constitutional: Negative for chills and fever.   HENT: Negative for nosebleeds.    Eyes: Negative for blurred vision and double vision.   Respiratory:  Negative for shortness of breath, cough, hemoptysis, sputum production and wheezing.    Cardiovascular: Positive leg swelling, Negative for palpitations, chest pain, orthopnea, claudication, and PND.   Gastrointestinal: Negative for abdominal pain, heartburn, nausea and vomiting.   Musculoskeletal: Negative for myalgias.   Skin: Negative for rash.   Neurological: Negative for dizziness, weakness and headaches.   Endo/Heme/Allergies: Does not bruise/bleed easily.     Family History   Problem Relation Age of Onset    Other Other         Orthopedic (Sister)    Osteoarthritis Sister     Heart Attack Brother 62    Stroke Neg Hx     Malig Hypertherm Neg Hx     Pseudochol. Deficiency Neg Hx     Delayed Awakening Neg Hx     Post-op Nausea/Vomiting Neg Hx     Emergence Delirium Neg Hx     Post-op Cognitive Dysfunction Neg Hx     Cancer Neg Hx     Diabetes Neg Hx     Heart Disease Neg

## 2024-07-14 ENCOUNTER — HOSPITAL ENCOUNTER (EMERGENCY)
Facility: HOSPITAL | Age: 74
Discharge: HOME | End: 2024-07-14
Attending: EMERGENCY MEDICINE
Payer: MEDICARE

## 2024-07-14 VITALS
OXYGEN SATURATION: 99 % | SYSTOLIC BLOOD PRESSURE: 103 MMHG | HEIGHT: 62 IN | HEART RATE: 93 BPM | TEMPERATURE: 98.1 F | RESPIRATION RATE: 18 BRPM | DIASTOLIC BLOOD PRESSURE: 84 MMHG | WEIGHT: 160 LBS | BODY MASS INDEX: 29.44 KG/M2

## 2024-07-14 DIAGNOSIS — M25.50 ARTHRALGIA, UNSPECIFIED JOINT: Primary | ICD-10-CM

## 2024-07-14 DIAGNOSIS — N39.0 URINARY TRACT INFECTION WITHOUT HEMATURIA, SITE UNSPECIFIED: ICD-10-CM

## 2024-07-14 LAB
ANION GAP SERPL CALC-SCNC: 7 MMOL/L (ref 3–18)
APPEARANCE UR: CLEAR
BACTERIA URNS QL MICRO: ABNORMAL /HPF
BASOPHILS # BLD: 0.1 K/UL (ref 0–0.1)
BASOPHILS NFR BLD: 1 % (ref 0–2)
BILIRUB UR QL: NEGATIVE
BUN SERPL-MCNC: 31 MG/DL (ref 7–18)
BUN/CREAT SERPL: 26 (ref 12–20)
CALCIUM SERPL-MCNC: 9.4 MG/DL (ref 8.5–10.1)
CHLORIDE SERPL-SCNC: 102 MMOL/L (ref 100–111)
CO2 SERPL-SCNC: 27 MMOL/L (ref 21–32)
COLOR UR: YELLOW
CREAT SERPL-MCNC: 1.17 MG/DL (ref 0.6–1.3)
DIFFERENTIAL METHOD BLD: ABNORMAL
EOSINOPHIL # BLD: 0.3 K/UL (ref 0–0.4)
EOSINOPHIL NFR BLD: 4 % (ref 0–5)
EPITH CASTS URNS QL MICRO: ABNORMAL /LPF (ref 0–5)
ERYTHROCYTE [DISTWIDTH] IN BLOOD BY AUTOMATED COUNT: 12.4 % (ref 11.6–14.5)
GLUCOSE SERPL-MCNC: 110 MG/DL (ref 74–99)
GLUCOSE UR STRIP.AUTO-MCNC: NEGATIVE MG/DL
HCT VFR BLD AUTO: 43.2 % (ref 35–45)
HGB BLD-MCNC: 14.1 G/DL (ref 12–16)
HGB UR QL STRIP: NEGATIVE
IMM GRANULOCYTES # BLD AUTO: 0 K/UL (ref 0–0.04)
IMM GRANULOCYTES NFR BLD AUTO: 0 % (ref 0–0.5)
KETONES UR QL STRIP.AUTO: NEGATIVE MG/DL
LEUKOCYTE ESTERASE UR QL STRIP.AUTO: ABNORMAL
LYMPHOCYTES # BLD: 2.4 K/UL (ref 0.9–3.6)
LYMPHOCYTES NFR BLD: 33 % (ref 21–52)
MCH RBC QN AUTO: 31.5 PG (ref 24–34)
MCHC RBC AUTO-ENTMCNC: 32.6 G/DL (ref 31–37)
MCV RBC AUTO: 96.6 FL (ref 78–100)
MONOCYTES # BLD: 0.5 K/UL (ref 0.05–1.2)
MONOCYTES NFR BLD: 7 % (ref 3–10)
NEUTS SEG # BLD: 4 K/UL (ref 1.8–8)
NEUTS SEG NFR BLD: 56 % (ref 40–73)
NITRITE UR QL STRIP.AUTO: NEGATIVE
NRBC # BLD: 0 K/UL (ref 0–0.01)
NRBC BLD-RTO: 0 PER 100 WBC
PH UR STRIP: 5 (ref 5–8)
PLATELET # BLD AUTO: 325 K/UL (ref 135–420)
PMV BLD AUTO: 8.9 FL (ref 9.2–11.8)
POTASSIUM SERPL-SCNC: 4.7 MMOL/L (ref 3.5–5.5)
PROT UR STRIP-MCNC: NEGATIVE MG/DL
RBC # BLD AUTO: 4.47 M/UL (ref 4.2–5.3)
RBC #/AREA URNS HPF: NEGATIVE /HPF (ref 0–5)
SODIUM SERPL-SCNC: 136 MMOL/L (ref 136–145)
SP GR UR REFRACTOMETRY: 1.01 (ref 1–1.03)
UROBILINOGEN UR QL STRIP.AUTO: 0.2 EU/DL (ref 0.2–1)
WBC # BLD AUTO: 7.2 K/UL (ref 4.6–13.2)
WBC URNS QL MICRO: ABNORMAL /HPF (ref 0–5)

## 2024-07-14 PROCEDURE — 80048 BASIC METABOLIC PNL TOTAL CA: CPT

## 2024-07-14 PROCEDURE — 85025 COMPLETE CBC W/AUTO DIFF WBC: CPT

## 2024-07-14 PROCEDURE — 99283 EMERGENCY DEPT VISIT LOW MDM: CPT

## 2024-07-14 PROCEDURE — 81001 URINALYSIS AUTO W/SCOPE: CPT

## 2024-07-14 PROCEDURE — 6370000000 HC RX 637 (ALT 250 FOR IP): Performed by: EMERGENCY MEDICINE

## 2024-07-14 RX ORDER — SULFAMETHOXAZOLE AND TRIMETHOPRIM 800; 160 MG/1; MG/1
1 TABLET ORAL ONCE
Status: COMPLETED | OUTPATIENT
Start: 2024-07-14 | End: 2024-07-14

## 2024-07-14 RX ORDER — SULFAMETHOXAZOLE AND TRIMETHOPRIM 800; 160 MG/1; MG/1
1 TABLET ORAL 2 TIMES DAILY
Qty: 14 TABLET | Refills: 0 | Status: SHIPPED | OUTPATIENT
Start: 2024-07-14 | End: 2024-07-21

## 2024-07-14 RX ADMIN — SULFAMETHOXAZOLE AND TRIMETHOPRIM 1 TABLET: 800; 160 TABLET ORAL at 03:00

## 2024-07-14 ASSESSMENT — PAIN DESCRIPTION - LOCATION: LOCATION: BACK;HIP

## 2024-07-14 ASSESSMENT — PAIN SCALES - GENERAL: PAINLEVEL_OUTOF10: 5

## 2024-07-14 ASSESSMENT — PAIN - FUNCTIONAL ASSESSMENT: PAIN_FUNCTIONAL_ASSESSMENT: 0-10

## 2024-07-14 NOTE — ED PROVIDER NOTES
[facility] EMERGENCY DEPT  EMERGENCY DEPARTMENT ENCOUNTER    Patient Name: Salma Lopez  MRN: [id number]  YOB: 1950  Provider: Mitch Espinosa MD  PCP: Jorge L Epps MD   Time/Date of evaluation: 1:41 AM EDT on 7/14/24    History of Presenting Illness     History Provided by: Patient  History is limited by: Nothing     HISTORY:   Salma Lopez is a 74 y.o. female presenting with primarily bilateral hip thigh and knee pain.  She is concerned about gram-negative occult infection.  This been going on for quite some time she has been on Bactrim twice and each time Bactrim has improved the pain.  This was apparently for treating a UTI.  Most recently had a 7-day course that started on the third of this month.  She says after she went off this her pain worsened.  She has a history of rheumatoid arthritis currently on Xeljanz.  She had a recent spinal stimulator placed back in June by Dr. Mauro for incontinence.  She has not any fevers.  No cough.  No current urinary symptoms.  No abdominal pain.  No chest pain or shortness of breath.    Nursing Notes were all reviewed and agreed with or any disagreements were addressed in the HPI.    Past History     PAST MEDICAL HISTORY:  Past Medical History:   Diagnosis Date    Allergic rhinitis     Zyrtec    Chronic back pain     hx 4 back surgery    Difficult intubation     Be careful with intubation. 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 Mela Mcfarland    Hypertension 1998    lisinopril daily. PCP Dr. Jorge L Epps    Lupus (HCC)     \"Medically induced Lupus from taking

## 2024-07-14 NOTE — ED TRIAGE NOTES
Pt arrives ambulatory to triage c/o bilateral hip pain, knee pain, back pain, and jaw pain. Pt satets she started taking Bactrim 4th of June after surgery. Pt states it helped with the pain, but when she came off of it, the pain started again. Pt states they then put her back on the bactrim and pain went away again. Pt states she ran out of meds on Wednesday morning. Pt states she also has history of RA.

## 2024-07-16 ENCOUNTER — OFFICE VISIT (OUTPATIENT)
Facility: CLINIC | Age: 74
End: 2024-07-16
Payer: MEDICARE

## 2024-07-16 VITALS
WEIGHT: 162 LBS | HEIGHT: 62 IN | SYSTOLIC BLOOD PRESSURE: 103 MMHG | HEART RATE: 68 BPM | DIASTOLIC BLOOD PRESSURE: 68 MMHG | OXYGEN SATURATION: 96 % | BODY MASS INDEX: 29.81 KG/M2

## 2024-07-16 DIAGNOSIS — R35.0 URINARY FREQUENCY: Primary | ICD-10-CM

## 2024-07-16 PROCEDURE — 1036F TOBACCO NON-USER: CPT | Performed by: FAMILY MEDICINE

## 2024-07-16 PROCEDURE — 99213 OFFICE O/P EST LOW 20 MIN: CPT | Performed by: FAMILY MEDICINE

## 2024-07-16 PROCEDURE — 1090F PRES/ABSN URINE INCON ASSESS: CPT | Performed by: FAMILY MEDICINE

## 2024-07-16 PROCEDURE — G8427 DOCREV CUR MEDS BY ELIG CLIN: HCPCS | Performed by: FAMILY MEDICINE

## 2024-07-16 PROCEDURE — 3074F SYST BP LT 130 MM HG: CPT | Performed by: FAMILY MEDICINE

## 2024-07-16 PROCEDURE — G8399 PT W/DXA RESULTS DOCUMENT: HCPCS | Performed by: FAMILY MEDICINE

## 2024-07-16 PROCEDURE — 3078F DIAST BP <80 MM HG: CPT | Performed by: FAMILY MEDICINE

## 2024-07-16 PROCEDURE — G8417 CALC BMI ABV UP PARAM F/U: HCPCS | Performed by: FAMILY MEDICINE

## 2024-07-16 PROCEDURE — 1123F ACP DISCUSS/DSCN MKR DOCD: CPT | Performed by: FAMILY MEDICINE

## 2024-07-16 RX ORDER — SULFAMETHOXAZOLE AND TRIMETHOPRIM 800; 160 MG/1; MG/1
1 TABLET ORAL 2 TIMES DAILY
Qty: 14 TABLET | Refills: 0 | Status: SHIPPED | OUTPATIENT
Start: 2024-07-16 | End: 2024-07-23

## 2024-07-16 SDOH — ECONOMIC STABILITY: FOOD INSECURITY: WITHIN THE PAST 12 MONTHS, YOU WORRIED THAT YOUR FOOD WOULD RUN OUT BEFORE YOU GOT MONEY TO BUY MORE.: NEVER TRUE

## 2024-07-16 SDOH — ECONOMIC STABILITY: INCOME INSECURITY: HOW HARD IS IT FOR YOU TO PAY FOR THE VERY BASICS LIKE FOOD, HOUSING, MEDICAL CARE, AND HEATING?: NOT VERY HARD

## 2024-07-16 SDOH — ECONOMIC STABILITY: FOOD INSECURITY: WITHIN THE PAST 12 MONTHS, THE FOOD YOU BOUGHT JUST DIDN'T LAST AND YOU DIDN'T HAVE MONEY TO GET MORE.: NEVER TRUE

## 2024-07-16 ASSESSMENT — PATIENT HEALTH QUESTIONNAIRE - PHQ9
2. FEELING DOWN, DEPRESSED OR HOPELESS: NOT AT ALL
SUM OF ALL RESPONSES TO PHQ9 QUESTIONS 1 & 2: 0
SUM OF ALL RESPONSES TO PHQ QUESTIONS 1-9: 0
SUM OF ALL RESPONSES TO PHQ QUESTIONS 1-9: 0
1. LITTLE INTEREST OR PLEASURE IN DOING THINGS: NOT AT ALL
SUM OF ALL RESPONSES TO PHQ QUESTIONS 1-9: 0
SUM OF ALL RESPONSES TO PHQ QUESTIONS 1-9: 0

## 2024-07-16 ASSESSMENT — ENCOUNTER SYMPTOMS
BACK PAIN: 1
VOMITING: 0
NAUSEA: 0
RESPIRATORY NEGATIVE: 1
COUGH: 0

## 2024-07-16 NOTE — PROGRESS NOTES
Chief Complaint   Patient presents with    Follow-Up from Hospital     Went to [facility] over the weekend after she finished ABT from last visit states that she was in pain again. She states that she saw the specialist on the 11th that did her surgery and they told her that there was no issue and they would not treat her. She is requesting long term ABT.      \"Have you been to the ER, urgent care clinic since your last visit?  Hospitalized since your last visit?\"    YES - When: approximately 3 days ago.  Where and Why: [facility]- need for ABT for gram negative infection.    “Have you seen or consulted any other health care providers outside of [facility] since your last visit?”    YES - When: approximately 1  weeks ago.  Where and Why: Urologist- follow up from surgery.            Click Here for Release of Records Request      
Awakening Neg Hx     Post-op Nausea/Vomiting Neg Hx     Emergence Delirium Neg Hx     Post-op Cognitive Dysfunction Neg Hx     Cancer Neg Hx     Diabetes Neg Hx     Heart Disease Neg Hx     Hypertension Neg Hx         Review of Systems   Constitutional: Negative.  Negative for fever.   Respiratory: Negative.  Negative for cough.    Cardiovascular:  Negative for chest pain.   Gastrointestinal:  Negative for nausea and vomiting.   Musculoskeletal:  Positive for back pain and gait problem.   Neurological:  Negative for weakness.   Psychiatric/Behavioral: Negative.  Negative for behavioral problems, decreased concentration and suicidal ideas.         Vitals:    07/16/24 0936   BP: 103/68   Pulse: 68   SpO2: 96%        Physical Exam  Constitutional:       Appearance: Normal appearance.   Cardiovascular:      Rate and Rhythm: Normal rate and regular rhythm.      Pulses: Normal pulses.      Heart sounds: Normal heart sounds. No murmur heard.  Pulmonary:      Effort: Pulmonary effort is normal.      Breath sounds: Normal breath sounds.   Skin:     Capillary Refill: Capillary refill takes less than 2 seconds.   Neurological:      General: No focal deficit present.      Mental Status: She is alert and oriented to person, place, and time.   Psychiatric:         Mood and Affect: Mood normal.         Behavior: Behavior normal.         Thought Content: Thought content normal.         Judgment: Judgment normal.          Assessment/Plan     Diagnosis Orders   1. Urinary frequency  sulfamethoxazole-trimethoprim (BACTRIM DS;SEPTRA DS) 800-160 MG per tablet        I have discussed the diagnosis with the patient and the intended plan of care as seen in the above orders. The patient has received an after-visit summary and questions were answered concerning future plans. I have discussed medication, side effects, and warnings with the patient in detail. The patient verbalized understanding and is in agreement with the plan of care. The

## 2024-07-17 ENCOUNTER — CARE COORDINATION (OUTPATIENT)
Facility: CLINIC | Age: 74
End: 2024-07-17

## 2024-07-17 NOTE — CARE COORDINATION
Ambulatory Care Coordination Note     2024 10:46 AM     Patient Current Location:  Home: [address] [place] [address] Pt  [facility] [address]     ACM contacted the patient by telephone. Verified name and  with patient as identifiers. Pt returned call to this ACM.        ACM: Tamie Cervantes RN     Challenges to be reviewed by the provider   Additional needs identified to be addressed with provider none at this time.                  Method of communication with provider: none.    Care Summary Note: Pt having pain & what pt calls an inflammatory response post spinal stimulator placement. Rec'd order for continued Bactrim from PCP yesterday, sees MD who placed stimulator today. Will follow up to see recommendations.     Offered patient enrollment in the Remote Patient Monitoring (RPM) program for in-home monitoring: Deferred at this time because pt discussed many other concerns ; will discuss at next outreach.     Assessments Completed:       Medications Reviewed:   Not completed during this call: but states she will continue to take Bactrim as it seems to have helped    Advance Care Planning:   Not reviewed during this call     Care Planning:   Not completed during this call    PCP/Specialist follow up:   Future Appointments         Provider Specialty Dept Phone    2024 2:00 PM [facility] Cardiology [phone]    10/3/2024 8:15 AM Jorge L Epps MD Family Medicine [phone]    2025 10:30 AM Rosemary Stokes, APRN - NP Cardiology [phone]            Follow Up:   Plan for next AC outreach in approximately  today  to complete:  - follow up appointment with Nj GEORGE(to discuss) .   Patient  is agreeable to this plan.

## 2024-07-17 NOTE — CARE COORDINATION
Ambulatory Care Coordination Note     2024 2:09 PM     Patient Current Location:  Home: [address] [place] [address] Pt  [facility] [address]     Patient contacted the ACM by telephone. Verified name and  with patient as identifiers.         ACM: Tamie Cervantes RN     Challenges to be reviewed by the provider   Additional needs identified to be addressed with provider No  none               Method of communication with provider: none.    Care Summary Note: Pt called this ACM post visit to Nj GEORGE to address worsening pain/r/o infection/inflammatory reponse. Isaac GEORGE will be reaching out after conferring.    Offered patient enrollment in the Remote Patient Monitoring (RPM) program for in-home monitoring: Deferred at this time.     Assessments Completed:   General Assessment    Do you have any symptoms that are causing concern?: Yes  Progression since Onset: Unchanged  Reported Symptoms: Pain          Medications Reviewed:   Pt taking new course of Bactrim as ordered.    Advance Care Planning:   Not reviewed during this call     Care Planning:   Not completed during this call    PCP/Specialist follow up:   Future Appointments         Provider Specialty Dept Phone    2024 2:00 PM [facility] [phone]    10/3/2024 8:15 AM Jorge L Epps MD Family Medicine [phone]    2025 10:30 AM Rosemary Stokes APRN - NP Cardiology [phone]            Follow Up:   Plan for next AC outreach in approximately 1 week to complete:  - goal progression.   Patient  is agreeable to this plan.

## 2024-07-17 NOTE — CARE COORDINATION
Ambulatory Care Coordination Note          7/17/2024 9:48 AM     Patient outreach attempt by this ACM today to offer care management services. ACM was unable to reach the patient by telephone today; voicemail full and unable to leave a message.      ACM: Tamie Cervantes RN     Care Summary Note:     PCP/Specialist follow up:   Future Appointments         Provider Specialty Dept Phone    7/22/2024 2:00 PM [facility] [phone]    10/3/2024 8:15 AM Jorge L Epps MD Family Medicine [phone]    1/6/2025 10:30 AM Rosemary Stokes, APRN - NP Cardiology [phone]            Follow Up:   Plan for next ACM outreach in approximately 1 week to complete:  - outreach attempt to offer care management services.

## 2024-07-18 ENCOUNTER — CARE COORDINATION (OUTPATIENT)
Facility: CLINIC | Age: 74
End: 2024-07-18

## 2024-07-18 NOTE — CARE COORDINATION
Ambulatory Care Coordination Note     2024 9:07 AM     Patient Current Location:  Home: [address] [place] [address] Pt  [facility] [address]     ACM contacted the patient by telephone. Verified name and  with patient as identifiers.         ACM: Tamie Cervantes RN     Challenges to be reviewed by the provider   Additional needs identified to be addressed with provider No  none               Method of communication with provider: none.    Care Summary Note: Pt texted regarding ongoing what she feels is an inflammatory response or possible infection. Within text she mentions the possible need to remove spinal stimulator. Pt called by this ACM, now say's she's willing to take the course of abx prescribed & evaluate after or earlier if needed.    Offered patient enrollment in the Remote Patient Monitoring (RPM) program for in-home monitoring: Deferred at this time because discussed the above only; will discuss at next outreach.     Assessments Completed:   General Assessment    Do you have any symptoms that are causing concern?: Yes  Progression since Onset: Unchanged  Reported Symptoms: Pain, Other          Medications Reviewed:   Now has  Bactrim which she has mentioned taking at half dose to prolong course, pt cautioned that this would not be an effective alternative & to take medication as prescribed.    Advance Care Planning:   Not reviewed during this call     Care Planning:   Not completed during this call    PCP/Specialist follow up:   Future Appointments         Provider Specialty Dept Phone    2024 2:00 PM [facility] [phone]    10/3/2024 8:15 AM Jorge L Epps MD Family Medicine [phone]    2025 10:30 AM Rosemary Stokes, ZITA - NP Cardiology [phone]            Follow Up:   Plan for next ACM outreach in approximately 1 week to complete:  - will continue to check in periodically until resolved .   Patient  is agreeable to this plan.    Ambulatory Care Coordination

## 2024-07-22 ENCOUNTER — CARE COORDINATION (OUTPATIENT)
Facility: CLINIC | Age: 74
End: 2024-07-22

## 2024-07-23 ENCOUNTER — CARE COORDINATION (OUTPATIENT)
Facility: CLINIC | Age: 74
End: 2024-07-23

## 2024-07-23 NOTE — CARE COORDINATION
Ambulatory Care Coordination Note     2024 8:34 AM     Patient Current Location:  Home: [facility] [place]  [facility] [address]     ACM contacted the patient by telephone after pt called this am. Verified name and  with patient as identifiers.         ACM: Tamie Cervantes RN     Challenges to be reviewed by the provider   Additional needs identified to be addressed with provider No  none               Method of communication with provider: none.    Care Summary Note: Pt called this ACM to make aware of increased pain since finishing Bactrim, states she has script & will refill today with hope of some relief & avoiding ED. States she's gotten an earlier appt with Shireen GEORGE & hopeful to find source of increased pain.    Offered patient enrollment in the Remote Patient Monitoring (RPM) program for in-home monitoring: Yes, but did not enroll at this time: declined to enroll in the program becausevery focused on pain .     Assessments Completed:   General Assessment    Do you have any symptoms that are causing concern?: Yes  Progression since Onset: Intermittent - Waxing/Waning  Reported Symptoms: Pain, Other (Comment: States her pain is worse now that course of Batrim finished, has script, will refill.)          Medications Reviewed:   Will refill Bactrim today.    Advance Care Planning:   Not reviewed during this call     Care Planning:    Goals Addressed                   This Visit's Progress     Conditions and Symptoms   On track     I will notify my provider of any symptoms that indicate a worsening of my condition.    Barriers: overwhelmed by complexity of regimen  Plan for overcoming my barriers: notify MD immediately if s/s infection or increased pain, participate as ability allows in addressing issues within POC  Confidence: 5/10  Anticipated Goal Completion Date: 120 days                 PCP/Specialist follow up:   Future Appointments         Provider Specialty Dept Phone    10/3/2024 8:15 AM

## 2024-07-24 ENCOUNTER — CARE COORDINATION (OUTPATIENT)
Facility: CLINIC | Age: 74
End: 2024-07-24

## 2024-07-24 NOTE — CARE COORDINATION
Ambulatory Care Coordination Note     2024 9:14 AM     Patient Current Location:  Home: [facility] [place]  [facility] [address]     ACM contacted the patient by telephone. Verified name and  with patient as identifiers.         ACM: Tamie Cervantes RN     Challenges to be reviewed by the provider   Additional needs identified to be addressed with provider No  none               Method of communication with provider: none.    Care Summary Note:   Pt says appt with Megan GEORGE went well, seems happy with recommendation to continue Bactrim & consult with ID.  Patient not offered enrollment in the Remote Patient Monitoring (RPM) program for in-home monitoring: Deferred at this time because discussed MD visit only; will discuss at next outreach.     Assessments Completed:       2024     9:05 AM   Amb Fall Risk Assessment and TUG Test   Do you feel unsteady or are you worried about falling?  yes   2 or more falls in past year? no   Fall with injury in past year? yes     and   General Assessment              Medications Reviewed:   Plans to continue Bactrim    Advance Care Planning:   Not reviewed during this call, reports spouse has beginnings of Alzheimer's & doesn't trust others with knowledge related to her complete medical history.     Care Planning:   Not completed during this call    PCP/Specialist follow up:   Future Appointments         Provider Specialty Dept Phone    10/3/2024 8:15 AM Jorge L Epps MD Family Medicine [phone]    2025 10:30 AM Rosemary Stokes APRN - NP Cardiology [phone]            Follow Up:   Plan for next ACM outreach in approximately 1-2 days  to complete:  - goal progression.   Patient  is agreeable to this plan.

## 2024-07-25 ENCOUNTER — OFFICE VISIT (OUTPATIENT)
Facility: CLINIC | Age: 74
End: 2024-07-25

## 2024-07-25 ENCOUNTER — CARE COORDINATION (OUTPATIENT)
Facility: CLINIC | Age: 74
End: 2024-07-25

## 2024-07-25 VITALS
HEIGHT: 62 IN | OXYGEN SATURATION: 95 % | DIASTOLIC BLOOD PRESSURE: 63 MMHG | WEIGHT: 164 LBS | BODY MASS INDEX: 30.18 KG/M2 | HEART RATE: 78 BPM | SYSTOLIC BLOOD PRESSURE: 98 MMHG

## 2024-07-25 DIAGNOSIS — R35.0 URINARY FREQUENCY: Primary | ICD-10-CM

## 2024-07-25 RX ORDER — SULFAMETHOXAZOLE AND TRIMETHOPRIM 800; 160 MG/1; MG/1
1 TABLET ORAL 2 TIMES DAILY
Qty: 60 TABLET | Refills: 0 | Status: SHIPPED | OUTPATIENT
Start: 2024-07-25 | End: 2024-08-24

## 2024-07-25 ASSESSMENT — ENCOUNTER SYMPTOMS
NAUSEA: 0
RESPIRATORY NEGATIVE: 1
COUGH: 0
VOMITING: 0

## 2024-07-25 NOTE — PROGRESS NOTES
Salma Lopez is a 74 y.o. female  presents for   Chief Complaint   Patient presents with    Other     Discuss visit with spinal specialist.     Medication Refill     Patient is requesting refill on Bactrim to get her to her visit that she states will be in the next week.         Allergies   Allergen Reactions    Celecoxib Swelling    Sulfa Antibiotics Hives     Lips swelling      Adalimumab      Induced Lupus      Adhesive Tape Dermatitis, Hives and Itching    Influenza Vaccines Hives    Iodine Itching     Takes benadryl and is OK. Contrast tolerated. Betadine ok.    Ioversol Hives and Itching     Pt states when she gets iv contrast she itches a little from hives    Lidoderm [Lidocaine]      had a reaction to the patch/adhesive    Meloxicam     Nsaids Swelling    Penicillins Hives    Gabapentin Diarrhea and Palpitations     Chest and Abdominal pain       Current Outpatient Medications:     sulfamethoxazole-trimethoprim (BACTRIM DS;SEPTRA DS) 800-160 MG per tablet, Take 1 tablet by mouth 2 times daily, Disp: 60 tablet, Rfl: 0    meloxicam (MOBIC) 15 MG tablet, Take 1 tablet by mouth daily, Disp: , Rfl:     furosemide (LASIX) 20 MG tablet, Take 1 tablet by mouth 2 times daily, Disp: , Rfl:     zolpidem (AMBIEN) 5 MG tablet, Take 1 tablet by mouth nightly for 90 days. Max Daily Amount: 5 mg, Disp: 30 tablet, Rfl: 3    Multiple Vitamins-Minerals (ONE-A-DAY WOMENS 50+ PO), Take by mouth daily, Disp: , Rfl:     lisinopril (PRINIVIL;ZESTRIL) 5 MG tablet, Take 1 tablet by mouth daily Indications: HTN, Disp: , Rfl:     XELJANZ 5 MG TABS, Take 1 tablet by mouth in the morning and at bedtime Indications: RA, Disp: , Rfl:     acetaminophen (TYLENOL) 500 MG tablet, Take 2 tablets by mouth every 6 hours as needed, Disp: , Rfl:     cetirizine (ZYRTEC) 10 MG tablet, Take 1 tablet by mouth daily as needed, Disp: , Rfl:     vitamin D (CHOLECALCIFEROL) 25 MCG (1000 UT) TABS tablet, Take 1 tablet by mouth daily, Disp: , Rfl:

## 2024-07-25 NOTE — PROGRESS NOTES
Chief Complaint   Patient presents with    Other     Discuss visit with spinal specialist.     Medication Refill     Patient is requesting refill on Bactrim to get her to her visit that she states will be in the next week.      \"Have you been to the ER, urgent care clinic since your last visit?  Hospitalized since your last visit?\"    NO    “Have you seen or consulted any other health care providers outside of [facility] since your last visit?”    YES - When: approximately 1 days ago.  Where and Why: Spinal specialist- back issues..            Click Here for Release of Records Request

## 2024-07-25 NOTE — CARE COORDINATION
Ambulatory Care Coordination Note     2024 9:19 AM     Patient Current Location:  Home: [address] [place] [address] Pt  [facility] [phone]     Patient contacted the ACM by telephone. Verified name and  with patient as identifiers.         ACM: Tamie Cervantes RN     Challenges to be reviewed by the provider   Additional needs identified to be addressed with provider No  none               Method of communication with provider: none.    Care Summary Note: Pt just finished appt with Shireen GEORGE. Optimistic that both berenice & Megan recommending  Similar course.    Assessments Completed:   General Assessment    Do you have any symptoms that are causing concern?: Yes  Progression since Onset: Intermittent - Waxing/Waning  Reported Symptoms: Pain, Other (Comment: Pain in hips, leg, back)          Medications Reviewed:   Patient denies any changes with medications and reports taking all medications as prescribed. Will continue with Bactrim until recommended to stop for cultures, then resume.    Advance Care Planning:   Not reviewed during this call     Care Planning:   Not completed during this call    PCP/Specialist follow up:   Future Appointments         Provider Specialty Dept Phone    10/3/2024 8:15 AM Jorge L Epps MD Family Medicine [phone]    2025 10:30 AM Rosemary Stokes, APRN - NP Cardiology [phone]            Follow Up:   Plan for next AC outreach in approximately 1 week to complete:  - CC Protocol assessments.   Patient  is agreeable to this plan.

## 2024-07-26 ENCOUNTER — CARE COORDINATION (OUTPATIENT)
Facility: CLINIC | Age: 74
End: 2024-07-26

## 2024-07-26 NOTE — CARE COORDINATION
Ambulatory Care Coordination Note     2024 8:34 AM     Patient Current Location:  Home: [address] [place] [phone]     Patient contacted the ACM by telephone. Verified name and  with patient as identifiers.         ACM: Tamie Cervantes RN     Challenges to be reviewed by the provider   Additional needs identified to be addressed with provider patient called & left message that she's developed a fever, low grade, taking tylenol to control & back on antibiotic Bactrim.(Just an FYI, I saw that she's back on Bactrim)   Encouraged her to call the office if she has any new or worsening symptoms.                 Method of communication with provider: chart routing.    Care Summary Note: Patient left message for this ACM yesterday afternoon that she's developed a low grade fever 'around 100' is concerned with multiple implants & RA diagnosis. Saw PCP yesterday, received Bactrim per recommendations of her ortho MD's & is back on antibiotic.      Assessments Completed:   General Assessment    Do you have any symptoms that are causing concern?: Yes  Progression since Onset: Intermittent - Waxing/Waning  Reported Symptoms: Fever          Medications Reviewed:   Bactrim & RA meds.    Advance Care Planning:   Not reviewed during this call     Care Planning:    Goals Addressed                   This Visit's Progress     Conditions and Symptoms   On track     I will notify my provider of any symptoms that indicate a worsening of my condition.    Barriers: overwhelmed by complexity of regimen  Plan for overcoming my barriers: notify MD immediately if s/s infection or increased pain, participate as ability allows in addressing issues within POC  Confidence: 5/10  Anticipated Goal Completion Date: 120 days                 PCP/Specialist follow up:   Future Appointments         Provider Specialty Dept Phone    10/3/2024 8:15 AM Jorge L Epps MD Family Medicine [phone]    2025 10:30 AM Kike

## 2024-08-01 ENCOUNTER — CLINICAL DOCUMENTATION (OUTPATIENT)
Facility: HOSPITAL | Age: 74
End: 2024-08-01

## 2024-08-02 ENCOUNTER — HOSPITAL ENCOUNTER (EMERGENCY)
Facility: HOSPITAL | Age: 74
Discharge: HOME | End: 2024-08-02
Attending: EMERGENCY MEDICINE
Payer: MEDICARE

## 2024-08-02 ENCOUNTER — CARE COORDINATION (OUTPATIENT)
Facility: CLINIC | Age: 74
End: 2024-08-02

## 2024-08-02 VITALS
WEIGHT: 165 LBS | RESPIRATION RATE: 16 BRPM | HEIGHT: 62 IN | DIASTOLIC BLOOD PRESSURE: 82 MMHG | OXYGEN SATURATION: 96 % | HEART RATE: 85 BPM | SYSTOLIC BLOOD PRESSURE: 110 MMHG | BODY MASS INDEX: 30.36 KG/M2 | TEMPERATURE: 98.2 F

## 2024-08-02 DIAGNOSIS — M54.10 RADICULAR PAIN OF BOTH LOWER EXTREMITIES: ICD-10-CM

## 2024-08-02 DIAGNOSIS — G89.29 ACUTE EXACERBATION OF CHRONIC LOW BACK PAIN: Primary | ICD-10-CM

## 2024-08-02 DIAGNOSIS — M54.50 ACUTE EXACERBATION OF CHRONIC LOW BACK PAIN: Primary | ICD-10-CM

## 2024-08-02 PROCEDURE — 99283 EMERGENCY DEPT VISIT LOW MDM: CPT

## 2024-08-02 PROCEDURE — 6370000000 HC RX 637 (ALT 250 FOR IP): Performed by: EMERGENCY MEDICINE

## 2024-08-02 RX ORDER — TRAMADOL HYDROCHLORIDE 50 MG/1
100 TABLET ORAL ONCE
Status: COMPLETED | OUTPATIENT
Start: 2024-08-02 | End: 2024-08-02

## 2024-08-02 RX ORDER — TRAMADOL HYDROCHLORIDE 50 MG/1
50 TABLET ORAL EVERY 6 HOURS PRN
Qty: 12 TABLET | Refills: 0 | Status: SHIPPED | OUTPATIENT
Start: 2024-08-02 | End: 2024-08-05

## 2024-08-02 RX ADMIN — TRAMADOL HYDROCHLORIDE 100 MG: 50 TABLET ORAL at 11:16

## 2024-08-02 ASSESSMENT — ENCOUNTER SYMPTOMS
TROUBLE SWALLOWING: 0
ABDOMINAL PAIN: 0
BACK PAIN: 1
NAUSEA: 0
VOMITING: 0
SHORTNESS OF BREATH: 0

## 2024-08-02 ASSESSMENT — PAIN - FUNCTIONAL ASSESSMENT
PAIN_FUNCTIONAL_ASSESSMENT: 0-10
PAIN_FUNCTIONAL_ASSESSMENT: 0-10

## 2024-08-02 ASSESSMENT — LIFESTYLE VARIABLES
HOW MANY STANDARD DRINKS CONTAINING ALCOHOL DO YOU HAVE ON A TYPICAL DAY: PATIENT DOES NOT DRINK
HOW OFTEN DO YOU HAVE A DRINK CONTAINING ALCOHOL: NEVER

## 2024-08-02 ASSESSMENT — PAIN SCALES - GENERAL
PAINLEVEL_OUTOF10: 7

## 2024-08-02 ASSESSMENT — PAIN DESCRIPTION - PAIN TYPE: TYPE: CHRONIC PAIN

## 2024-08-02 NOTE — CARE COORDINATION
Ambulatory Care Coordination Note     2024 8:39 AM     Patient Current Location:  Home: [address] [place] [address] Pt  [facility] [phone]     Patient contacted the ACM by telephone. Verified name and  with patient as identifiers.         ACM: Tamie Cervantes RN     Challenges to be reviewed by the provider   Additional needs identified to be addressed with provider Yes  Pt complaining of pain, requesting MRI today versus scheduled later this month.               Method of communication with provider: chart routing.    Care Summary Note: Patient complaining of pain(9-10) in back, legs, hips, offered to call pain management clinic to try for a sooner appt, patient declined. She would like MRI done today and not wanting to wait, says she knows 'Something's going on' ACM encouraged to call and see if we can get an earlier MRI appt, patient declined. Patient was adament on going to ED but says she'll try [facility] first.    Offered patient enrollment in the Remote Patient Monitoring (RPM) program for in-home monitoring:deferred.     Assessments Completed:   General Assessment    Do you have any symptoms that are causing concern?: Yes  Progression since Onset: Intermittent - Waxing/Waning  Reported Symptoms: Pain, Other (Comment: pt c/o pain, initially 'intolerable' later said it was 'tolerable' but wants an MRI today, not later this month as she says is scheduled.)          Medications Reviewed:   Not completed during this call:      Advance Care Planning:   Not reviewed during this call     Care Planning:   Not completed during this call    PCP/Specialist follow up:   Future Appointments         Provider Specialty Dept Phone    2024 11:00 AM [facility] DOSE RM 2 Radiology [phone]    2024 11:00 AM [facility] 2 Radiology [phone]    2024 11:00 AM [facility] 1 Radiology [phone]    2024 3:00 PM [facility] MRI RM 1 Radiology [phone]    2024 4:00 PM [facility] MRI [facility] 1 Radiology

## 2024-08-02 NOTE — ED TRIAGE NOTES
In June patient had spinal implant for bladder and bowel control, has had ongoing lower back pain and bilateral leg pain, patient has seen surgeon and rheumatologist without relief. Patient states the only medication that has helped is bactrim. Which she currently takes, she had elevated WBC's in urine. Patient states she has a history of UTI's, abdominal abscess, and diverticulitis.    Patient awoke with increased pain, increases when walking. Rating 7/10, sharp, nail driving feeling. Because of pain she had a fall from sitting position, Denies LOC, hit right buttocks.    Patient wants her pain treated, she has appointment with pain management August 12th, MRI scheduled August 20th, Nuclear Scan scheduled Tuesday.

## 2024-08-02 NOTE — CARE COORDINATION
Ambulatory Care Coordination Note     2024 4:32 PM     Patient Current Location:  Home: [address] [place] [phone]     ACM contacted the patient by telephone. Verified name and  with patient as identifiers.         ACM: Tamie Cervantes RN     Challenges to be reviewed by the provider   Additional needs identified to be addressed with provider No  none               Method of communication with provider: none.    Care Summary Note: Patient went to ED for pain, tramadol given. Says it makes the pain in hips, legs tolerable.         Assessments Completed:   General Assessment              Medications Reviewed:   Tramadol given in ED    Advance Care Planning:   Patient declined education     Care Planning:   Not completed during this call    PCP/Specialist follow up:   Future Appointments         Provider Specialty Dept Phone    2024 9:00 AM [facility] 2 Radiology [phone]    2024 11:00 AM [facility]SE [facility] 2 Radiology [phone]    2024 12:00 PM [facility] 2 Radiology [phone]    2024 11:00 AM [facility] 2 Radiology [phone]    2024 11:00 AM [facility] RM 1 Radiology [phone]    2024 7:00 AM [facility] RM 1 Radiology [phone]    2024 7:45 AM [facility] MRI RM 2 Radiology [phone]    10/3/2024 8:15 AM Jorge L Epps MD Family Medicine [phone]    2025 10:30 AM Rosemary Stokes, APRN - NP Cardiology [phone]            Follow Up:   Plan for next ACM outreach in approximately 1 week to complete:  - goal progression.   Patient  is agreeable to this plan.

## 2024-08-02 NOTE — ED PROVIDER NOTES
[facility] EMERGENCY DEPT  EMERGENCY DEPARTMENT ENCOUNTER      Pt Name: Salma Lopez  MRN: [id number]  Birthdate 1950  Date of evaluation: 8/2/2024  Provider: Anand Overton DO    CHIEF COMPLAINT       Chief Complaint   Patient presents with    Back Pain    Leg Pain         HISTORY OF PRESENT ILLNESS   (Location/Symptom, Timing/Onset, Context/Setting, Quality, Duration, Modifying Factors, Severity)  Note limiting factors.   Salma Lopez is a 74 y.o. female with past medical history of chronic back pain, multiple back surgeries, osteoporosis, rheumatoid arthritis who presents to the emergency department with moderate to severe low back pain which worsened today.  The pain is worse when she walks.  The pain is moderate to severe and 7 out of 10 pain scale.  She describes it as sharp pain.  She had a spinal implant for her bladder bowel control in June 2024.  She has had continuous bilateral low back pain and sometimes radiates to her bilateral legs.  Today's pain is only in her paraspinal lumbar region.  Her back specialist is Dr. Iyer and she is scheduled for an MRI and she also goes to pain management and has an appointment there on August 12.  She also sees a rheumatologist who recently prescribed her steroids still have not helped her chronic back pain.  No current bowel or bladder dysfunction, no chest pain, shortness of breath, flank pain, dysuria, flank pain, hematuria, dizziness, focal weakness, nausea, fever, and no other complaints.    The history is provided by the patient. No  was used.       Nursing Notes were reviewed.    REVIEW OF SYSTEMS    (2-9 systems for level 4, 10 or more for level 5)     Review of Systems   Constitutional:  Negative for chills, diaphoresis and fever.   HENT:  Negative for trouble swallowing.    Eyes:  Negative for visual disturbance.   Respiratory:  Negative for shortness of breath.    Cardiovascular:  Negative for chest pain.   Gastrointestinal:  Negative

## 2024-08-03 ENCOUNTER — TELEPHONE (OUTPATIENT)
Facility: CLINIC | Age: 74
End: 2024-08-03

## 2024-08-03 NOTE — TELEPHONE ENCOUNTER
On-call note  Date:  8/3/24    Time:  1011  Reason for call: Numbness and tingling to bilateral lower extremities    Patient reported numbness and tingling to bilateral lower extremities, and is unable to walk. Onset: 6/21/24, s/p spinal implant. States she was evaluated in the emergency room yesterday, and was given tylenol. States she is currently taking bactrim, and has appt with Infectious Disease on Tuesday, 8/6. States she is awaiting appt with new pain management provider.     Advised pt to to go to the nearest urgent care for further evaluation, and to call PCP office on Monday and schedule follow up appt with PCP. Patient stated she will continue to take tylenol, and wait to see her infectious disease doctor on Tuesday.

## 2024-08-05 ENCOUNTER — TELEPHONE (OUTPATIENT)
Facility: CLINIC | Age: 74
End: 2024-08-05

## 2024-08-05 ENCOUNTER — CARE COORDINATION (OUTPATIENT)
Facility: CLINIC | Age: 74
End: 2024-08-05

## 2024-08-05 NOTE — CARE COORDINATION
Ambulatory Care Coordination Note     2024 4:26 PM     Patient Current Location:  Home: [address] [place] [phone]     ACM contacted the patient by telephone. Verified name and  with patient as identifiers.         ACM: Tamie Cervantes RN     Challenges to be reviewed by the provider   Additional needs identified to be addressed with provider Yes  Reached out to TONEY Van MD today to try for an earlier appt, office staff says they have none. Is it possible to get a referral to [facility] in [facility]?               Method of communication with provider: chart routing.    Care Summary Note:    This ACM attempted to get an earlier appt with pain management, currently has scheduled for 2024 in [place].  Office cannot accommodate. Patient has bone scans 2024, this ACM attempting to find earlier pain management appt.     Assessments Completed:   No changes since last call    Medications Reviewed:   Not completed during this call:      Advance Care Planning:   Not reviewed during this call     Care Planning:   Not completed during this call    PCP/Specialist follow up:   Future Appointments         Provider Specialty Dept Phone    2024 9:00 AM [facility] 2 Radiology [phone]    2024 11:00 AM [facility] DOSE [facility] 2 Radiology [phone]    2024 12:00 PM [facility] 2 Radiology [phone]    2024 11:00 AM [facility] 2 Radiology [phone]    2024 11:00 AM [facility] 1 Radiology [phone]    2024 7:00 AM [facility] 1 Radiology [phone]    2024 7:45 AM [facility] RM 2 Radiology [phone]    10/3/2024 8:15 AM Jorge L Epps MD Family Medicine [phone]    2025 10:30 AM Rosemary Stokes, APRN - NP Cardiology [phone]            Follow Up:   Plan for next ACM outreach in approximately 1-2 days  to complete:  - addressing pain .   Patient  is agreeable to this plan.

## 2024-08-05 NOTE — TELEPHONE ENCOUNTER
Pt called requesting to speak to Brianda as soon as possible. Informed pt that Brianda was in patient care as she is the only clinical member in the office today. After speaking to Brianda briefly I was instructed to route a message to her and  for them to review at there soonest convenience. Made patient aware of this who states she is in a great deal of pain as she was seen in the ER on 8/2 and was only given tylenol. Please review and advise as needed.

## 2024-08-05 NOTE — TELEPHONE ENCOUNTER
Spoke with patient and advised that we would not be able to give her anything for the pain as we have already referred her to pain management and she would have to discuss with Dr. Iyer

## 2024-08-05 NOTE — TELEPHONE ENCOUNTER
Patient called back and to follow up on message so I reviewed chart and patient was given Tramadol, 12 pills, in ER and they did state to follow up Dr. Iyer for her back pain.  Kurtis let pt know this and she stated she would call Dr. Iyer's office.

## 2024-08-05 NOTE — CARE COORDINATION
Ambulatory Care Coordination Note     2024 9:08AM     Patient Current Location:  Home: [address] [place] [phone]     ACM contacted the patient by telephone. Verified name and  with patient as identifiers.         ACM: Tamie Cervantes RN     Challenges to be reviewed by the provider   Additional needs identified to be addressed with provider Yes, Patient states she's having increased pain, soreness that is sharp at times, 8-9 on pain scale. Also states she's having swelling to hips. States she's out of Tramadol that was given in ED on 2024 visit.             Method of communication with provider: chart routing.    Care Summary Note:   Returned call to patient, patient having increased swelling & pain to back/hips. Out of pain medication. Expresses conflict over next steps for implant, whether to remove or keep implanted.       Assessments Completed:   General Assessment              Medications Reviewed:   Not completed during this call: states still on Bactrim but only once a day, states not more Tramadol for pain.    Advance Care Planning:   Not reviewed during this call     Care Planning:   Not completed during this call    PCP/Specialist follow up:   Future Appointments         Provider Specialty Dept Phone    2024 9:00 AM [facility]SE [facility] 2 Radiology [phone]    2024 11:00 AM [facility] DOSE RM 2 Radiology [phone]    2024 12:00 PM [facility] RM 2 Radiology [phone]    2024 11:00 AM [facility] RM 2 Radiology [phone]    2024 11:00 AM [facility] NM RM 1 Radiology [phone]    2024 7:00 AM [facility] 1 Radiology [phone]    2024 7:45 AM [facility] RM 2 Radiology [phone]    10/3/2024 8:15 AM Jorge L Epps MD Family Medicine [phone]    2025 10:30 AM Rosemary Stokes APRN - NP Cardiology [phone]            Follow Up:   Plan for next ACM outreach in approximately 1-2 days  to complete:  - will follow up for pain assessment .   Patient

## 2024-08-05 NOTE — CARE COORDINATION
Ambulatory Care Coordination Note     8/5/2024 04:31     Patient Current Location:  Home: [address] [place] [address] Pt  [facility] [phone]     Patient contacted the ACM by telephone. Pt left voicemail.        ACM: Tamie Cervantes RN     Challenges to be reviewed by the provider   Additional needs identified to be addressed with provider No                 Method of communication with provider: None    Care Summary Note: Patient left message for this ACM stating increased pain/swelling.       Assessments Completed:   General Assessment    Do you have any symptoms that are causing concern?: Yes  Progression since Onset: Intermittent - Waxing/Waning  Reported Symptoms: Pain (Comment: Continues to c/o pain in hip (8-9 out of 10))          Medications Reviewed:   Not completed during this call: states she's put of Tramadol given in ED    Advance Care Planning:   Not reviewed during this call     Care Planning:    Goals Addressed                   This Visit's Progress     Conditions and Symptoms   On track     I will notify my provider of any symptoms that indicate a worsening of my condition.    Barriers: overwhelmed by complexity of regimen  Plan for overcoming my barriers: notify MD immediately if s/s infection or increased pain, participate as ability allows in addressing issues within POC  Confidence: 5/10  Anticipated Goal Completion Date: 120 days                 PCP/Specialist follow up:   Future Appointments         Provider Specialty Dept Phone    8/6/2024 9:00 AM [facility] DOSE RM 2 Radiology [phone]    8/6/2024 11:00 AM [facility] DOSE RM 2 Radiology [phone]    8/6/2024 12:00 PM [facility] NM RM 2 Radiology [phone]    8/8/2024 11:00 AM [facility] NM RM 2 Radiology [phone]    8/9/2024 11:00 AM [facility] NM RM 1 Radiology [phone]    8/14/2024 7:00 AM [facility] RM 1 Radiology [phone]    8/14/2024 7:45 AM [facility] MRI RM 2 Radiology [phone]    10/3/2024 8:15 AM Jorge L Epps MD [facility] [phone]

## 2024-08-06 ENCOUNTER — CARE COORDINATION (OUTPATIENT)
Facility: CLINIC | Age: 74
End: 2024-08-06

## 2024-08-06 ENCOUNTER — HOSPITAL ENCOUNTER (OUTPATIENT)
Facility: HOSPITAL | Age: 74
Discharge: HOME | End: 2024-08-09
Payer: MEDICARE

## 2024-08-06 ENCOUNTER — HOSPITAL ENCOUNTER (OUTPATIENT)
Facility: HOSPITAL | Age: 74
Discharge: HOME | End: 2024-08-09
Attending: ORTHOPAEDIC SURGERY
Payer: MEDICARE

## 2024-08-06 ENCOUNTER — TELEPHONE (OUTPATIENT)
Facility: CLINIC | Age: 74
End: 2024-08-06

## 2024-08-06 DIAGNOSIS — B99.9 INFECTION: ICD-10-CM

## 2024-08-06 DIAGNOSIS — M96.1 POST-LAMINECTOMY SYNDROME: ICD-10-CM

## 2024-08-06 DIAGNOSIS — M96.1 POSTLAMINECTOMY SYNDROME: ICD-10-CM

## 2024-08-06 PROCEDURE — 78831 RP LOCLZJ TUM SPECT 2 AREAS: CPT

## 2024-08-06 PROCEDURE — 78832 RP LOCLZJ TUM SPECT W/CT 2: CPT

## 2024-08-06 PROCEDURE — A9503 TC99M MEDRONATE: HCPCS | Performed by: ORTHOPAEDIC SURGERY

## 2024-08-06 PROCEDURE — A9556 GA67 GALLIUM: HCPCS | Performed by: ORTHOPAEDIC SURGERY

## 2024-08-06 PROCEDURE — 3430000000 HC RX DIAGNOSTIC RADIOPHARMACEUTICAL: Performed by: ORTHOPAEDIC SURGERY

## 2024-08-06 RX ORDER — TC 99M MEDRONATE 20 MG/10ML
27.5 INJECTION, POWDER, LYOPHILIZED, FOR SOLUTION INTRAVENOUS
Status: COMPLETED | OUTPATIENT
Start: 2024-08-06 | End: 2024-08-06

## 2024-08-06 RX ORDER — GALLIUM CITRATE GA-67 2 MCI/ML
5.5 INJECTION INTRAVENOUS
Status: COMPLETED | OUTPATIENT
Start: 2024-08-06 | End: 2024-08-06

## 2024-08-06 RX ADMIN — GALLIUM CITRATE GA-67 5.5 MILLICURIE: 2 INJECTION INTRAVENOUS at 14:10

## 2024-08-06 RX ADMIN — TC 99M MEDRONATE 27.5 MILLICURIE: 20 INJECTION, POWDER, LYOPHILIZED, FOR SOLUTION INTRAVENOUS at 09:15

## 2024-08-06 NOTE — TELEPHONE ENCOUNTER
Tamie called to request a additional referral be submitted for pain management. Informed 's nurse and Practice Manager who stated that she has an appt with pain management already and they feel another referral would not be of help as they most likely would not be able to get her in before the appt she already has set. Please review and advise as needed.   
Dr. Weiss

## 2024-08-07 ENCOUNTER — APPOINTMENT (OUTPATIENT)
Facility: HOSPITAL | Age: 74
End: 2024-08-07
Payer: MEDICARE

## 2024-08-07 ENCOUNTER — CARE COORDINATION (OUTPATIENT)
Facility: CLINIC | Age: 74
End: 2024-08-07

## 2024-08-07 ENCOUNTER — HOSPITAL ENCOUNTER (INPATIENT)
Facility: HOSPITAL | Age: 74
LOS: 3 days | Discharge: HOME | End: 2024-08-10
Attending: EMERGENCY MEDICINE | Admitting: FAMILY MEDICINE
Payer: MEDICARE

## 2024-08-07 ENCOUNTER — APPOINTMENT (OUTPATIENT)
Facility: HOSPITAL | Age: 74
End: 2024-08-07
Attending: ORTHOPAEDIC SURGERY
Payer: MEDICARE

## 2024-08-07 DIAGNOSIS — R33.9 URINARY RETENTION: Primary | ICD-10-CM

## 2024-08-07 DIAGNOSIS — M48.062 SPINAL STENOSIS OF LUMBAR REGION WITH NEUROGENIC CLAUDICATION: ICD-10-CM

## 2024-08-07 DIAGNOSIS — M48.061 SPINAL STENOSIS OF LUMBAR REGION WITHOUT NEUROGENIC CLAUDICATION: ICD-10-CM

## 2024-08-07 DIAGNOSIS — R60.0 BILATERAL LEG EDEMA: ICD-10-CM

## 2024-08-07 LAB
ANION GAP SERPL CALC-SCNC: 7 MMOL/L (ref 3–18)
APPEARANCE UR: CLEAR
BASOPHILS # BLD: 0 K/UL (ref 0–0.1)
BASOPHILS NFR BLD: 0 % (ref 0–2)
BILIRUB UR QL: NEGATIVE
BUN SERPL-MCNC: 28 MG/DL (ref 7–18)
BUN/CREAT SERPL: 18 (ref 12–20)
CALCIUM SERPL-MCNC: 8.5 MG/DL (ref 8.5–10.1)
CHLORIDE SERPL-SCNC: 96 MMOL/L (ref 100–111)
CO2 SERPL-SCNC: 26 MMOL/L (ref 21–32)
COLOR UR: YELLOW
CREAT SERPL-MCNC: 1.55 MG/DL (ref 0.6–1.3)
CRP SERPL-MCNC: 0.4 MG/DL (ref 0–0.3)
DIFFERENTIAL METHOD BLD: ABNORMAL
ECHO BSA: 1.81 M2
EOSINOPHIL # BLD: 0.2 K/UL (ref 0–0.4)
EOSINOPHIL NFR BLD: 1 % (ref 0–5)
ERYTHROCYTE [DISTWIDTH] IN BLOOD BY AUTOMATED COUNT: 13.1 % (ref 11.6–14.5)
ERYTHROCYTE [SEDIMENTATION RATE] IN BLOOD: 10 MM/HR (ref 0–30)
GLUCOSE SERPL-MCNC: 115 MG/DL (ref 74–99)
GLUCOSE UR STRIP.AUTO-MCNC: NEGATIVE MG/DL
HCT VFR BLD AUTO: 35.5 % (ref 35–45)
HGB BLD-MCNC: 12.1 G/DL (ref 12–16)
HGB UR QL STRIP: NEGATIVE
IMM GRANULOCYTES # BLD AUTO: 0 K/UL (ref 0–0.04)
IMM GRANULOCYTES NFR BLD AUTO: 0 % (ref 0–0.5)
KETONES UR QL STRIP.AUTO: NEGATIVE MG/DL
LEUKOCYTE ESTERASE UR QL STRIP.AUTO: NEGATIVE
LYMPHOCYTES # BLD: 1.6 K/UL (ref 0.9–3.6)
LYMPHOCYTES NFR BLD: 14 % (ref 21–52)
MCH RBC QN AUTO: 31.9 PG (ref 24–34)
MCHC RBC AUTO-ENTMCNC: 34.1 G/DL (ref 31–37)
MCV RBC AUTO: 93.7 FL (ref 78–100)
MONOCYTES # BLD: 0.7 K/UL (ref 0.05–1.2)
MONOCYTES NFR BLD: 6 % (ref 3–10)
NEUTS SEG # BLD: 8.7 K/UL (ref 1.8–8)
NEUTS SEG NFR BLD: 78 % (ref 40–73)
NITRITE UR QL STRIP.AUTO: NEGATIVE
NRBC # BLD: 0 K/UL (ref 0–0.01)
NRBC BLD-RTO: 0 PER 100 WBC
PH UR STRIP: 5.5 (ref 5–8)
PLATELET # BLD AUTO: 302 K/UL (ref 135–420)
PMV BLD AUTO: 8.3 FL (ref 9.2–11.8)
POTASSIUM SERPL-SCNC: 4.5 MMOL/L (ref 3.5–5.5)
PROT UR STRIP-MCNC: NEGATIVE MG/DL
RBC # BLD AUTO: 3.79 M/UL (ref 4.2–5.3)
SODIUM SERPL-SCNC: 129 MMOL/L (ref 136–145)
SP GR UR REFRACTOMETRY: 1.02 (ref 1–1.03)
UROBILINOGEN UR QL STRIP.AUTO: 0.2 EU/DL (ref 0.2–1)
WBC # BLD AUTO: 11.2 K/UL (ref 4.6–13.2)

## 2024-08-07 PROCEDURE — 72072 X-RAY EXAM THORAC SPINE 3VWS: CPT

## 2024-08-07 PROCEDURE — 85025 COMPLETE CBC W/AUTO DIFF WBC: CPT

## 2024-08-07 PROCEDURE — 72110 X-RAY EXAM L-2 SPINE 4/>VWS: CPT

## 2024-08-07 PROCEDURE — 6370000000 HC RX 637 (ALT 250 FOR IP): Performed by: FAMILY MEDICINE

## 2024-08-07 PROCEDURE — 6360000002 HC RX W HCPCS: Performed by: EMERGENCY MEDICINE

## 2024-08-07 PROCEDURE — 1100000000 HC RM PRIVATE

## 2024-08-07 PROCEDURE — 99223 1ST HOSP IP/OBS HIGH 75: CPT | Performed by: FAMILY MEDICINE

## 2024-08-07 PROCEDURE — 85652 RBC SED RATE AUTOMATED: CPT

## 2024-08-07 PROCEDURE — 2580000003 HC RX 258: Performed by: FAMILY MEDICINE

## 2024-08-07 PROCEDURE — 93970 EXTREMITY STUDY: CPT

## 2024-08-07 PROCEDURE — 99232 SBSQ HOSP IP/OBS MODERATE 35: CPT | Performed by: INTERNAL MEDICINE

## 2024-08-07 PROCEDURE — 6360000002 HC RX W HCPCS: Performed by: FAMILY MEDICINE

## 2024-08-07 PROCEDURE — 6370000000 HC RX 637 (ALT 250 FOR IP): Performed by: ORTHOPAEDIC SURGERY

## 2024-08-07 PROCEDURE — 80048 BASIC METABOLIC PNL TOTAL CA: CPT

## 2024-08-07 PROCEDURE — 93970 EXTREMITY STUDY: CPT | Performed by: INTERNAL MEDICINE

## 2024-08-07 PROCEDURE — 94761 N-INVAS EAR/PLS OXIMETRY MLT: CPT

## 2024-08-07 PROCEDURE — 72100 X-RAY EXAM L-S SPINE 2/3 VWS: CPT

## 2024-08-07 PROCEDURE — 6370000000 HC RX 637 (ALT 250 FOR IP): Performed by: EMERGENCY MEDICINE

## 2024-08-07 PROCEDURE — 2580000003 HC RX 258: Performed by: EMERGENCY MEDICINE

## 2024-08-07 PROCEDURE — 86140 C-REACTIVE PROTEIN: CPT

## 2024-08-07 PROCEDURE — 87040 BLOOD CULTURE FOR BACTERIA: CPT

## 2024-08-07 PROCEDURE — 81003 URINALYSIS AUTO W/O SCOPE: CPT

## 2024-08-07 PROCEDURE — 99285 EMERGENCY DEPT VISIT HI MDM: CPT

## 2024-08-07 RX ORDER — ONDANSETRON 2 MG/ML
4 INJECTION INTRAMUSCULAR; INTRAVENOUS EVERY 6 HOURS PRN
Status: DISCONTINUED | OUTPATIENT
Start: 2024-08-07 | End: 2024-08-10 | Stop reason: HOSPADM

## 2024-08-07 RX ORDER — TRAMADOL HYDROCHLORIDE 50 MG/1
50 TABLET ORAL
Status: COMPLETED | OUTPATIENT
Start: 2024-08-07 | End: 2024-08-07

## 2024-08-07 RX ORDER — VITAMIN B COMPLEX
1000 TABLET ORAL DAILY
Status: DISCONTINUED | OUTPATIENT
Start: 2024-08-07 | End: 2024-08-10 | Stop reason: HOSPADM

## 2024-08-07 RX ORDER — SULFAMETHOXAZOLE AND TRIMETHOPRIM 800; 160 MG/1; MG/1
1 TABLET ORAL 2 TIMES DAILY
Status: DISCONTINUED | OUTPATIENT
Start: 2024-08-07 | End: 2024-08-10 | Stop reason: HOSPADM

## 2024-08-07 RX ORDER — MELOXICAM 7.5 MG/1
15 TABLET ORAL DAILY
Status: DISCONTINUED | OUTPATIENT
Start: 2024-08-07 | End: 2024-08-10 | Stop reason: HOSPADM

## 2024-08-07 RX ORDER — SODIUM CHLORIDE 0.9 % (FLUSH) 0.9 %
5-40 SYRINGE (ML) INJECTION EVERY 12 HOURS SCHEDULED
Status: DISCONTINUED | OUTPATIENT
Start: 2024-08-07 | End: 2024-08-10 | Stop reason: HOSPADM

## 2024-08-07 RX ORDER — ONDANSETRON 4 MG/1
4 TABLET, ORALLY DISINTEGRATING ORAL EVERY 8 HOURS PRN
Status: DISCONTINUED | OUTPATIENT
Start: 2024-08-07 | End: 2024-08-10 | Stop reason: HOSPADM

## 2024-08-07 RX ORDER — ZOLPIDEM TARTRATE 5 MG/1
5 TABLET ORAL
Status: DISCONTINUED | OUTPATIENT
Start: 2024-08-07 | End: 2024-08-10 | Stop reason: HOSPADM

## 2024-08-07 RX ORDER — VITS A,C,E/LUTEIN/MINERALS 300MCG-200
1 TABLET ORAL DAILY
Status: DISCONTINUED | OUTPATIENT
Start: 2024-08-07 | End: 2024-08-07 | Stop reason: SDUPTHER

## 2024-08-07 RX ORDER — POLYETHYLENE GLYCOL 3350 17 G/17G
17 POWDER, FOR SOLUTION ORAL DAILY PRN
Status: DISCONTINUED | OUTPATIENT
Start: 2024-08-07 | End: 2024-08-10 | Stop reason: HOSPADM

## 2024-08-07 RX ORDER — SODIUM CHLORIDE 0.9 % (FLUSH) 0.9 %
5-40 SYRINGE (ML) INJECTION PRN
Status: DISCONTINUED | OUTPATIENT
Start: 2024-08-07 | End: 2024-08-10 | Stop reason: HOSPADM

## 2024-08-07 RX ORDER — CETIRIZINE HYDROCHLORIDE 10 MG/1
5 TABLET ORAL DAILY PRN
Status: DISCONTINUED | OUTPATIENT
Start: 2024-08-07 | End: 2024-08-10 | Stop reason: HOSPADM

## 2024-08-07 RX ORDER — CYCLOBENZAPRINE HCL 10 MG
10 TABLET ORAL
Status: COMPLETED | OUTPATIENT
Start: 2024-08-07 | End: 2024-08-07

## 2024-08-07 RX ORDER — 0.9 % SODIUM CHLORIDE 0.9 %
1000 INTRAVENOUS SOLUTION INTRAVENOUS ONCE
Status: COMPLETED | OUTPATIENT
Start: 2024-08-07 | End: 2024-08-07

## 2024-08-07 RX ORDER — ACETAMINOPHEN 650 MG/1
650 SUPPOSITORY RECTAL EVERY 6 HOURS PRN
Status: DISCONTINUED | OUTPATIENT
Start: 2024-08-07 | End: 2024-08-10 | Stop reason: HOSPADM

## 2024-08-07 RX ORDER — MULTIVITAMIN WITH IRON
1 TABLET ORAL DAILY
Status: DISCONTINUED | OUTPATIENT
Start: 2024-08-07 | End: 2024-08-10 | Stop reason: HOSPADM

## 2024-08-07 RX ORDER — MORPHINE SULFATE 4 MG/ML
4 INJECTION, SOLUTION INTRAMUSCULAR; INTRAVENOUS
Status: DISCONTINUED | OUTPATIENT
Start: 2024-08-07 | End: 2024-08-08

## 2024-08-07 RX ORDER — ACETAMINOPHEN 325 MG/1
650 TABLET ORAL EVERY 6 HOURS PRN
Status: DISCONTINUED | OUTPATIENT
Start: 2024-08-07 | End: 2024-08-10 | Stop reason: HOSPADM

## 2024-08-07 RX ORDER — FUROSEMIDE 20 MG/1
20 TABLET ORAL 2 TIMES DAILY
Status: DISCONTINUED | OUTPATIENT
Start: 2024-08-07 | End: 2024-08-10 | Stop reason: HOSPADM

## 2024-08-07 RX ORDER — FAMOTIDINE 20 MG/1
20 TABLET, FILM COATED ORAL
Status: COMPLETED | OUTPATIENT
Start: 2024-08-07 | End: 2024-08-07

## 2024-08-07 RX ORDER — MORPHINE SULFATE 2 MG/ML
2 INJECTION, SOLUTION INTRAMUSCULAR; INTRAVENOUS
Status: DISCONTINUED | OUTPATIENT
Start: 2024-08-07 | End: 2024-08-07 | Stop reason: ALTCHOICE

## 2024-08-07 RX ORDER — LISINOPRIL 5 MG/1
5 TABLET ORAL DAILY
Status: DISCONTINUED | OUTPATIENT
Start: 2024-08-07 | End: 2024-08-10 | Stop reason: HOSPADM

## 2024-08-07 RX ORDER — SODIUM CHLORIDE 9 MG/ML
INJECTION, SOLUTION INTRAVENOUS PRN
Status: DISCONTINUED | OUTPATIENT
Start: 2024-08-07 | End: 2024-08-10 | Stop reason: HOSPADM

## 2024-08-07 RX ORDER — MIDODRINE HYDROCHLORIDE 5 MG/1
5 TABLET ORAL
Status: DISCONTINUED | OUTPATIENT
Start: 2024-08-07 | End: 2024-08-10 | Stop reason: HOSPADM

## 2024-08-07 RX ORDER — CYCLOBENZAPRINE HCL 10 MG
10 TABLET ORAL 3 TIMES DAILY
Status: DISCONTINUED | OUTPATIENT
Start: 2024-08-07 | End: 2024-08-10 | Stop reason: HOSPADM

## 2024-08-07 RX ORDER — MORPHINE SULFATE 4 MG/ML
4 INJECTION, SOLUTION INTRAMUSCULAR; INTRAVENOUS
Status: COMPLETED | OUTPATIENT
Start: 2024-08-07 | End: 2024-08-07

## 2024-08-07 RX ORDER — TRAMADOL HYDROCHLORIDE 50 MG/1
50 TABLET ORAL EVERY 6 HOURS PRN
Status: DISCONTINUED | OUTPATIENT
Start: 2024-08-07 | End: 2024-08-10 | Stop reason: HOSPADM

## 2024-08-07 RX ORDER — SODIUM CHLORIDE 9 MG/ML
INJECTION, SOLUTION INTRAVENOUS CONTINUOUS
Status: DISCONTINUED | OUTPATIENT
Start: 2024-08-07 | End: 2024-08-10 | Stop reason: HOSPADM

## 2024-08-07 RX ADMIN — SULFAMETHOXAZOLE AND TRIMETHOPRIM 1 TABLET: 800; 160 TABLET ORAL at 08:47

## 2024-08-07 RX ADMIN — CYCLOBENZAPRINE 10 MG: 10 TABLET, FILM COATED ORAL at 08:47

## 2024-08-07 RX ADMIN — SODIUM CHLORIDE 1000 ML: 9 INJECTION, SOLUTION INTRAVENOUS at 03:55

## 2024-08-07 RX ADMIN — CYCLOBENZAPRINE 10 MG: 10 TABLET, FILM COATED ORAL at 05:52

## 2024-08-07 RX ADMIN — ACETAMINOPHEN 325MG 650 MG: 325 TABLET ORAL at 22:52

## 2024-08-07 RX ADMIN — MORPHINE SULFATE 4 MG: 4 INJECTION, SOLUTION INTRAMUSCULAR; INTRAVENOUS at 06:51

## 2024-08-07 RX ADMIN — SULFAMETHOXAZOLE AND TRIMETHOPRIM 1 TABLET: 800; 160 TABLET ORAL at 21:42

## 2024-08-07 RX ADMIN — MORPHINE SULFATE 4 MG: 4 INJECTION, SOLUTION INTRAMUSCULAR; INTRAVENOUS at 13:59

## 2024-08-07 RX ADMIN — FUROSEMIDE 20 MG: 20 TABLET ORAL at 08:47

## 2024-08-07 RX ADMIN — CYCLOBENZAPRINE 10 MG: 10 TABLET, FILM COATED ORAL at 14:00

## 2024-08-07 RX ADMIN — MORPHINE SULFATE 4 MG: 4 INJECTION, SOLUTION INTRAMUSCULAR; INTRAVENOUS at 19:17

## 2024-08-07 RX ADMIN — THERA TABS 1 TABLET: TAB at 10:58

## 2024-08-07 RX ADMIN — SODIUM CHLORIDE, PRESERVATIVE FREE 10 ML: 5 INJECTION INTRAVENOUS at 21:49

## 2024-08-07 RX ADMIN — SODIUM CHLORIDE: 9 INJECTION, SOLUTION INTRAVENOUS at 08:53

## 2024-08-07 RX ADMIN — SODIUM CHLORIDE: 9 INJECTION, SOLUTION INTRAVENOUS at 05:49

## 2024-08-07 RX ADMIN — ACETAMINOPHEN 325MG 650 MG: 325 TABLET ORAL at 10:58

## 2024-08-07 RX ADMIN — LISINOPRIL 5 MG: 5 TABLET ORAL at 08:47

## 2024-08-07 RX ADMIN — MORPHINE SULFATE 4 MG: 4 INJECTION, SOLUTION INTRAMUSCULAR; INTRAVENOUS at 22:06

## 2024-08-07 RX ADMIN — TRAMADOL HYDROCHLORIDE 50 MG: 50 TABLET ORAL at 04:52

## 2024-08-07 RX ADMIN — CHOLECALCIFEROL TAB 25 MCG (1000 UNIT) 1000 UNITS: 25 TAB at 08:47

## 2024-08-07 RX ADMIN — MELOXICAM 15 MG: 7.5 TABLET ORAL at 08:47

## 2024-08-07 RX ADMIN — SODIUM CHLORIDE: 9 INJECTION, SOLUTION INTRAVENOUS at 17:03

## 2024-08-07 RX ADMIN — ACETAMINOPHEN 325MG 650 MG: 325 TABLET ORAL at 17:01

## 2024-08-07 RX ADMIN — CYCLOBENZAPRINE 10 MG: 10 TABLET, FILM COATED ORAL at 21:42

## 2024-08-07 RX ADMIN — ZOLPIDEM TARTRATE 5 MG: 5 TABLET ORAL at 21:42

## 2024-08-07 RX ADMIN — SODIUM CHLORIDE, PRESERVATIVE FREE 10 ML: 5 INJECTION INTRAVENOUS at 08:48

## 2024-08-07 RX ADMIN — TRAMADOL HYDROCHLORIDE 50 MG: 50 TABLET ORAL at 17:45

## 2024-08-07 RX ADMIN — FAMOTIDINE 20 MG: 20 TABLET ORAL at 03:56

## 2024-08-07 RX ADMIN — THERA TABS 1 TABLET: TAB at 21:42

## 2024-08-07 ASSESSMENT — PAIN SCALES - GENERAL
PAINLEVEL_OUTOF10: 10
PAINLEVEL_OUTOF10: 4
PAINLEVEL_OUTOF10: 6
PAINLEVEL_OUTOF10: 10
PAINLEVEL_OUTOF10: 7
PAINLEVEL_OUTOF10: 0
PAINLEVEL_OUTOF10: 5
PAINLEVEL_OUTOF10: 0
PAINLEVEL_OUTOF10: 9
PAINLEVEL_OUTOF10: 0
PAINLEVEL_OUTOF10: 8
PAINLEVEL_OUTOF10: 9
PAINLEVEL_OUTOF10: 5
PAINLEVEL_OUTOF10: 8
PAINLEVEL_OUTOF10: 0
PAINLEVEL_OUTOF10: 0
PAINLEVEL_OUTOF10: 10
PAINLEVEL_OUTOF10: 8

## 2024-08-07 ASSESSMENT — PAIN DESCRIPTION - DESCRIPTORS
DESCRIPTORS: ACHING
DESCRIPTORS: THROBBING;TINGLING;PRESSURE
DESCRIPTORS: ACHING
DESCRIPTORS: ACHING;DISCOMFORT
DESCRIPTORS: ACHING

## 2024-08-07 ASSESSMENT — PAIN DESCRIPTION - ORIENTATION
ORIENTATION: LEFT;RIGHT;LOWER
ORIENTATION: LEFT;RIGHT
ORIENTATION: LEFT;LOWER
ORIENTATION: LEFT;RIGHT;LOWER
ORIENTATION: LEFT;RIGHT
ORIENTATION: RIGHT;LEFT
ORIENTATION: RIGHT;LEFT
ORIENTATION: LOWER
ORIENTATION: LOWER

## 2024-08-07 ASSESSMENT — PAIN DESCRIPTION - LOCATION
LOCATION: BACK
LOCATION: BACK;HIP;LEG
LOCATION: BACK
LOCATION: BACK;HIP
LOCATION: LEG;BACK;HIP
LOCATION: BACK;LEG
LOCATION: BACK;LEG
LOCATION: BACK
LOCATION: BACK
LOCATION: BACK;HIP;LEG

## 2024-08-07 ASSESSMENT — PAIN DESCRIPTION - PAIN TYPE
TYPE: CHRONIC PAIN

## 2024-08-07 ASSESSMENT — PAIN - FUNCTIONAL ASSESSMENT
PAIN_FUNCTIONAL_ASSESSMENT: ACTIVITIES ARE NOT PREVENTED
PAIN_FUNCTIONAL_ASSESSMENT: 0-10
PAIN_FUNCTIONAL_ASSESSMENT: ACTIVITIES ARE NOT PREVENTED
PAIN_FUNCTIONAL_ASSESSMENT: ACTIVITIES ARE NOT PREVENTED
PAIN_FUNCTIONAL_ASSESSMENT: PREVENTS OR INTERFERES WITH MANY ACTIVE NOT PASSIVE ACTIVITIES

## 2024-08-07 ASSESSMENT — LIFESTYLE VARIABLES
HOW OFTEN DO YOU HAVE A DRINK CONTAINING ALCOHOL: NEVER
HOW MANY STANDARD DRINKS CONTAINING ALCOHOL DO YOU HAVE ON A TYPICAL DAY: PATIENT DOES NOT DRINK

## 2024-08-07 ASSESSMENT — PAIN DESCRIPTION - ONSET
ONSET: ON-GOING
ONSET: ON-GOING
ONSET: AWAKENED FROM SLEEP
ONSET: ON-GOING
ONSET: ON-GOING

## 2024-08-07 ASSESSMENT — PAIN DESCRIPTION - FREQUENCY
FREQUENCY: CONTINUOUS

## 2024-08-07 ASSESSMENT — PAIN SCALES - WONG BAKER: WONGBAKER_NUMERICALRESPONSE: NO HURT

## 2024-08-07 NOTE — PROGRESS NOTES
MRI screening form needs to be filled out and faxed to  [phone] BEFORE MRI can be scheduled.  If unable to obtain information from patient , [facility] needs to be contacted . If patient is claustrophobic or will needs pain meds, please have ordered in advance in order to facilitate exam.

## 2024-08-07 NOTE — H&P
NAME: Salma Lopez   :  1950   MRN:  [id number]     Date:  2024     Patient PCP: Jorge L Epps MD  ________________________________________________________________________    My assessment of this patient's clinical condition and my plan of care is as follows.    Assessment / Plan:  Low back pain with radiation  Chronic bladder dysfunction with urinary retention status post recent sacral nerve stimulator placement  Hyponatremia  Acute renal insufficiency  HTN  RA  Spinal stenosis s/p multiple surgeries  Class 1 obesity    Admit to medical bed.  Ortho/spine surgery recommendations appreciated.  Additional lumbosacral spine imaging recommended, MRI ordered.   Resume analgesia and muscle relaxer.  Patient states that she thinks that she should be able to make it through MRI with additional analgesia.    Continue IV hydration.  Monitor lytes/renal indices, hold lisinopril and lasix for now going forward.  Resume other  home meds as appropriate.  PT/OT evaluations.  Patient able to ambulate with assistance.  Disposition recommendations to be determined.  Further plan pending results of pending testing and response to current treatment and overall clinical course.    Code Status: Full  DVT Prophylaxis: SCDs          Subjective:   CHIEF COMPLAINT: Back pain    HISTORY OF PRESENT ILLNESS:     Salma Lopez is a 74 y.o.   female with a history of multiple back surgeries who presented to the ED at [facility] earlier this morning for evaluation of back and lower extremity pain.  Patient had a sacral nerve stimulator placed last month and states that she immediately began having more issues with pain.  Reports having significant issues with simply being able to be comfortable on a day-to-day basis, whether sitting, standing or lying down.  She remains able to ambulate but is unsteady and has to hold on to things or be assisted with walking.  She also has had more difficulty voiding recently but this

## 2024-08-07 NOTE — PROGRESS NOTES
Advance Care Planning   Healthcare Decision Maker:    Primary Decision Maker: Antione Lopez - Spouse - [phone]    Today we documented Decision Maker(s) consistent with Legal Next of Kin hierarchy.       Spiritual Health Assessment/Progress Note  [facility]    Spiritual/Emotional Needs,  ,  ,      Name: Salma Lopez MRN: [id number]    Age: 74 y.o.     Sex: female   Language: English   Yazdanism: Rastafarian   Urinary retention     Date: 8/7/2024            Total Time Calculated: 10 min              Spiritual Assessment began in [address] MEDICAL        Referral/Consult From: Nurse   Encounter Overview/Reason: Spiritual/Emotional Needs  Service Provided For: Patient and family together    Vilma, Belief, Meaning:   Patient is connected with a vilma tradition or spiritual practice and has beliefs or practices that help with coping during difficult times  Family/Friends are connected with a vilma tradition or spiritual practice      Importance and Influence:  Patient has spiritual/personal beliefs that influence decisions regarding their health  Family/Friends have spiritual/personal beliefs that influence decisions regarding the patient's health    Community:  Patient is connected with a spiritual community and feels well-supported. Support system includes: Spouse/Partner  Family/Friends feel well-supported. Support system includes: Spouse/Partner    Assessment and Plan of Care:     Patient Interventions include: Affirmed coping skills/support systems  Family/Friends Interventions include: Affirmed coping skills/support systems    Patient Plan of Care: Spiritual Care available upon further referral  Family/Friends Plan of Care: Spiritual Care available upon further referral    Electronically signed by SONI Rodriguez on 8/7/2024 at 1:42 PM

## 2024-08-07 NOTE — FLOWSHEET NOTE
08/07/24 0225   Urine Assessment   Bladder Scan Volume (mL) 460 mL   $ Bladder scan $ Yes     Does not feel urge to void. Stated multiple times \"I have been told I have large bladder\".   Provider aware of scan

## 2024-08-07 NOTE — ED NOTES
requesting if CT can be complete prior to transport to [facility] if possible. Called CT who states they will come get her shortly.

## 2024-08-07 NOTE — CONSULTS
her leg weakness but it resolved but not certain what is causing her current symptomatology.  She has a functioning Arora.    My recommendations at this point would be to obtain an MRI of her cervical thoracic and lumbar spines to include her sacrum.    I have ordered a CT of her lumbar spine and sacrum.    I am not certain what artifact is going to be seen around her bladder stimulator device.    I would request a urology consultation as they would be more versed in the complications from sacral nerve root stimulation.  The urologist at [facility] place a device.    I do not see any clear source of infection the repetitive improvement with Bactrim makes me feel there may be some indolent infection present and I would consult infectious disease for an evaluation.  At this time I do not see any truly spinal pathology that would explain her symptoms.  I will await the results of the CT and MRI.    Past Medical History:   Diagnosis Date    Allergic rhinitis     Zyrtec    Chronic back pain     hx 4 back surgery    Difficult intubation     Be careful with intubation. 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 Mela Mcfarland    Hypertension 1998    lisinopril daily. PCP Dr. Jorge L Epps    Lupus ([facility])     \"Medically induced Lupus from taking Humira\"    Neuropathy     bilateral hands/wrist    Osteoporosis     OA in joints    Rheumatoid arthritis ([facility]) 1990    Xeljanz. [place] Rheumatolgoist Dr. Rylee Hyatt    Skin cancer 2022    BCC on back, treated Topically with Aldara    Urinary incontinence 1976    bladder stimulator  little from hives    Lidoderm [Lidocaine]      had a reaction to the patch/adhesive    Meloxicam     Nsaids Swelling    Penicillins Hives     Other Reaction(s): Unknown    Pneumococcal Vaccine Hives    Gabapentin Diarrhea and Palpitations     Chest and Abdominal pain    Other Reaction(s): afib       Review of Systems:  Pertinent items are noted in the History of Present Illness.    Objective:     Vitals:    08/07/24 0218 08/07/24 0452   BP: (!) 141/87 (!) 146/82   Pulse: 86 92   Resp: 19 20   Temp: 98.9 °F (37.2 °C)    TempSrc: Oral    SpO2: 99%    Weight: 74.8 kg (165 lb)    Height: 1.575 m (5' 2\")         Physical Exam:  Severe pain over sacrum left>right. Bilateral pseudo radicular leg pain, mild leg edema, -homans  Normal motor ehl ta h quads  No spine pain  No sensory deficit      Xrays of lumbar and thoracic spine without acute changes, T10 to L5 fusion present. L5/S1 chronic spondy present.  Assessment:       Sere sacral pain with leg radiation.  Uncertain etiology  May be related to sacral root stimulator  In urinary retention      Plan:   Recommend  Mri C T LS Sacrum  CT LS spine and pelvis  Venous duplex  Consult urology  Consult ID        Signed By: VANCE KRISHNAN MD     August 7, 2024

## 2024-08-07 NOTE — ED NOTES
CT scans attempted. Unable to complete d/t pain. Patient returned to unit. Transport arrived when patient returned to unit. Will have to be completed at [facility]

## 2024-08-07 NOTE — ED NOTES
[facility] transport attempting to get patient onto stretcher and load her into ambulance x20 minutes. Patient anxious, in severe pain, and unable to stay in stretcher.   Md ordered one time dose of 4mg Morphine as per order. Patient was able to sit in seated position on stretcher after dose and load out to ambulance.     CT scans were unable to be completed but noted the scans were cancelled anyways.      collected belongings.

## 2024-08-07 NOTE — ED PROVIDER NOTES
EMERGENCY DEPARTMENT HISTORY AND PHYSICAL EXAM    2:41 AM EDT seen at this time in room 8        Date: 8/7/2024  Patient Name: Salma Lopez    History of Presenting Illness     Chief Complaint   Patient presents with    Back Pain    Hip Pain    Leg Pain    Urinary Retention         History Provided By: patient    Additional History (Context): Salma Lopez is a 74 y.o. female presents with extensive spine surgery history, spinal stimulator for bowel bladder control problems, had a nuclear study done today ordered by Dr. Iyer, since that time she has been unable to void her bladder this is a new symptom.  Has weakness in the left leg greater than the right but not preventing her from walking and at her baseline also sciatica distribution pain in the left leg which is also not new.  No increased problems controlling her bowels but states she has a chronically weak sphincter has not noted any saddle anesthesia.  No fevers.  From some sparse notes that I can access seems they are evaluating for any infection of the spine stimulator..    PCP: Jorge L Epps MD    Chief Complaint:   Duration:    Timing:    Location:   Quality:   Severity:   Modifying Factors:   Associated Symptoms:       Current Facility-Administered Medications   Medication Dose Route Frequency Provider Last Rate Last Admin    sodium chloride 0.9 % bolus 1,000 mL  1,000 mL IntraVENous Once Olegario Odom MD        0.9 % sodium chloride infusion   IntraVENous Continuous Olegario Odom MD        famotidine (PEPCID) tablet 20 mg  20 mg Oral NOW Olegario Odom MD         Current Outpatient Medications   Medication Sig Dispense Refill    naloxone (NARCAN) 4 MG/0.1ML LIQD nasal spray 1 spray by Nasal route as needed for Opioid Reversal 1 each 0    sulfamethoxazole-trimethoprim (BACTRIM DS;SEPTRA DS) 800-160 MG per tablet Take 1 tablet by mouth 2 times daily 60 tablet 0    meloxicam (MOBIC) 15 MG tablet Take 1 tablet by mouth daily

## 2024-08-07 NOTE — CARE COORDINATION
Ambulatory Care Coordination Note     2024 8:53 AM     Patient Current Location:  [place]     Patient contacted the ACM by telephone. Verified name and  with patient as identifiers.         ACM: Tamie Cervantes RN     Challenges to be reviewed by the provider   Additional needs identified to be addressed with provider No, pt is currently hospitalized.  none               Method of communication with provider: none.    Care Summary Note: Patient called this ACM to make aware of inpatient status. Noted she left this ACM voicemail early am stating she wanted device out(spinal stimulator). Seen by Shireen GEORGE this am, multiple imaging exams scheduled today in addition to the exams yesterday which are currently not completely resulted. States she's unable to void & having spasms in her back & legs she attributes to recent implant as well as exams yesterday. Explained to patient that while she is welcome to call this ACM, the ACM role falls under outpatient population parameters.    Offered patient enrollment in the Remote Patient Monitoring (RPM) program for in-home monitoring: Deferred at this time because patient is inpatient; will discuss at next outreach.     Assessments Completed:   None.    Medications Reviewed:        Advance Care Planning:   Not reviewed during this call     Care Planning:   Not completed during this call    PCP/Specialist follow up:   Future Appointments         Provider Specialty Dept Phone    2024 11:00 AM [facility] Radiology [phone]    2024 11:00 AM [facility] Radiology [phone]    2024 7:00 AM [facility] Radiology [phone]    2024 7:45 AM [facility] RM 2 Radiology [phone]    10/3/2024 8:15 AM Jorge L Epps MD Family Medicine [phone]    2025 10:30 AM Rosemary Stokes, APRN - NP Cardiology [phone]            Follow Up:   Plan for next ACM outreach in approximately  on discharge  to complete:  - discharge assessment of situation .

## 2024-08-07 NOTE — ED TRIAGE NOTES
Pt c/o back pain, bilateral hip pain, bilateral leg pain, and urine retention since today.    Pt stated \"that she had a nuclear scan done and unable to urinate\".    Past Medical History:   Diagnosis Date    Allergic rhinitis     Zyrtec    Chronic back pain     hx 4 back surgery    Difficult intubation     Be careful with intubation. 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 Mela Mcfarland    Hypertension 1998    lisinopril daily. PCP Dr. Jorge L Epps    Lupus ([facility])     \"Medically induced Lupus from taking Humira\"    Neuropathy     bilateral hands/wrist    Osteoporosis     OA in joints    Rheumatoid arthritis ([facility]) 1990    Xelgtz. [employer] Rheumatolgoist Dr. Rylee Hyatt    Skin cancer 2022    BCC on back, treated Topically with Aldara    Urinary incontinence 1976    bladder stimulator implanted 2024. Urologist Dr. Mauro       Pt ambulated with crutches.

## 2024-08-07 NOTE — ED NOTES
Arora cath placed as per order. Allergies verified. Sterile technique maintained. Urine return and patient tolerated procedure well. Second nurse Leighann AMATO RN.

## 2024-08-07 NOTE — PROGRESS NOTES
[facility] Hospitalist Group  Progress Note    Patient: Salma Lopez Age: 74 y.o. : 1950 MR#: [id number] SSN: [id number]  Date/Time: 2024     C/C: Backache      Subjective:   HPI : Patient with history of chronic backache and multiple back surgeries now coming back again with similar complaint.          Review of Systems:  positive responses in bold type   Constitutional: Negative for fever, chills, diaphoresis and unexpected weight change.   HENT: Negative for ear pain, congestion, sore throat, rhinorrhea, drooling, trouble swallowing, neck pain and tinnitus.   Eyes: Negative for photophobia, pain, redness and visual disturbance.   Respiratory: negative for shortness of breath, cough, choking, chest tightness, wheezing or stridor.   Cardiovascular: Negative for chest pain, palpitations and leg swelling.   Gastrointestinal: Negative for nausea, vomiting, abdominal pain, diarrhea, constipation, blood in stool, abdominal distention and anal bleeding.   Genitourinary: Negative for dysuria, urgency, frequency, hematuria, flank pain and difficulty urinating.   Musculoskeletal: Severe backache  Skin: Negative for color change, rash and wound.   Neurological: Negative for dizziness, seizures, syncope, speech difficulty, light-headedness or headaches.   Hematological: Does not bruise/bleed easily.   Psychiatric/Behavioral: Negative for suicidal ideas, hallucinations, behavioral problems, self-injury or agitation     Assessment/Plan:     Low back pain with radiation  Chronic bladder dysfunction with urinary retention status post recent sacral nerve stimulator placement  Hyponatremia  Acute renal insufficiency  HTN  RA  Spinal stenosis s/p multiple surgeries  Class 1 obesity  Plan    -Patient was in severe pain hence they could not do MRI, patient agreed to give a try to MRI of spine with analgesia however patient's blood pressure was on the lower side could not give her morphine or

## 2024-08-07 NOTE — PROGRESS NOTES
Patient seen on arrival to floor from [facility].  Orders placed, plan d/w patient and .  Full note to follow.

## 2024-08-07 NOTE — ED NOTES
TRANSFER - OUT REPORT:    Verbal report given to Wendi AMATO RN on Salma Lopez  being transferred to [facility] for routine progression of patient care       Report consisted of patient's Situation, Background, Assessment and   Recommendations(SBAR).     Information from the following report(s) Nurse Handoff Report, ED Encounter Summary, Adult Overview, Intake/Output, MAR, and Recent Results was reviewed with the receiving nurse.    Marina Fall Assessment:    Presents to emergency department  because of falls (Syncope, seizure, or loss of consciousness): No  Age > 70: Yes  Altered Mental Status, Intoxication with alcohol or substance confusion (Disorientation, impaired judgment, poor safety awaremess, or inability to follow instructions): No     Nursing Judgement: Yes          Lines:   Peripheral IV 08/07/24 Left Forearm (Active)       Peripheral IV 08/07/24 Posterior;Right Hand (Active)        Opportunity for questions and clarification was provided.      Patient transported with:  [employer]

## 2024-08-08 ENCOUNTER — APPOINTMENT (OUTPATIENT)
Facility: HOSPITAL | Age: 74
End: 2024-08-08
Payer: MEDICARE

## 2024-08-08 ENCOUNTER — HOSPITAL ENCOUNTER (OUTPATIENT)
Facility: HOSPITAL | Age: 74
Discharge: HOME | End: 2024-08-11
Attending: ORTHOPAEDIC SURGERY

## 2024-08-08 LAB
ANION GAP SERPL CALC-SCNC: 4 MMOL/L (ref 3–18)
BASOPHILS # BLD: 0 K/UL (ref 0–0.1)
BASOPHILS NFR BLD: 0 % (ref 0–2)
BUN SERPL-MCNC: 22 MG/DL (ref 7–18)
BUN/CREAT SERPL: 18 (ref 12–20)
CALCIUM SERPL-MCNC: 8.4 MG/DL (ref 8.5–10.1)
CHLORIDE SERPL-SCNC: 106 MMOL/L (ref 100–111)
CO2 SERPL-SCNC: 25 MMOL/L (ref 21–32)
CREAT SERPL-MCNC: 1.22 MG/DL (ref 0.6–1.3)
DIFFERENTIAL METHOD BLD: ABNORMAL
EOSINOPHIL # BLD: 0.2 K/UL (ref 0–0.4)
EOSINOPHIL NFR BLD: 2 % (ref 0–5)
ERYTHROCYTE [DISTWIDTH] IN BLOOD BY AUTOMATED COUNT: 13.3 % (ref 11.6–14.5)
GLUCOSE SERPL-MCNC: 102 MG/DL (ref 74–99)
HCT VFR BLD AUTO: 32.8 % (ref 35–45)
HGB BLD-MCNC: 10.5 G/DL (ref 12–16)
IMM GRANULOCYTES # BLD AUTO: 0 K/UL (ref 0–0.04)
IMM GRANULOCYTES NFR BLD AUTO: 0 % (ref 0–0.5)
LYMPHOCYTES # BLD: 1.9 K/UL (ref 0.9–3.6)
LYMPHOCYTES NFR BLD: 28 % (ref 21–52)
MCH RBC QN AUTO: 31.9 PG (ref 24–34)
MCHC RBC AUTO-ENTMCNC: 32 G/DL (ref 31–37)
MCV RBC AUTO: 99.7 FL (ref 78–100)
MONOCYTES # BLD: 0.6 K/UL (ref 0.05–1.2)
MONOCYTES NFR BLD: 9 % (ref 3–10)
NEUTS SEG # BLD: 4.2 K/UL (ref 1.8–8)
NEUTS SEG NFR BLD: 60 % (ref 40–73)
NRBC # BLD: 0 K/UL (ref 0–0.01)
NRBC BLD-RTO: 0 PER 100 WBC
PLATELET # BLD AUTO: 269 K/UL (ref 135–420)
PMV BLD AUTO: 8.9 FL (ref 9.2–11.8)
POTASSIUM SERPL-SCNC: 4.6 MMOL/L (ref 3.5–5.5)
RBC # BLD AUTO: 3.29 M/UL (ref 4.2–5.3)
SODIUM SERPL-SCNC: 135 MMOL/L (ref 136–145)
WBC # BLD AUTO: 6.9 K/UL (ref 4.6–13.2)

## 2024-08-08 PROCEDURE — 72158 MRI LUMBAR SPINE W/O & W/DYE: CPT

## 2024-08-08 PROCEDURE — 51798 US URINE CAPACITY MEASURE: CPT

## 2024-08-08 PROCEDURE — 6360000002 HC RX W HCPCS: Performed by: INTERNAL MEDICINE

## 2024-08-08 PROCEDURE — 97166 OT EVAL MOD COMPLEX 45 MIN: CPT

## 2024-08-08 PROCEDURE — 51702 INSERT TEMP BLADDER CATH: CPT

## 2024-08-08 PROCEDURE — 2580000003 HC RX 258: Performed by: EMERGENCY MEDICINE

## 2024-08-08 PROCEDURE — 6370000000 HC RX 637 (ALT 250 FOR IP): Performed by: FAMILY MEDICINE

## 2024-08-08 PROCEDURE — 99232 SBSQ HOSP IP/OBS MODERATE 35: CPT | Performed by: INTERNAL MEDICINE

## 2024-08-08 PROCEDURE — 6370000000 HC RX 637 (ALT 250 FOR IP): Performed by: INTERNAL MEDICINE

## 2024-08-08 PROCEDURE — 97161 PT EVAL LOW COMPLEX 20 MIN: CPT

## 2024-08-08 PROCEDURE — A9575 INJ GADOTERATE MEGLUMI 0.1ML: HCPCS | Performed by: ORTHOPAEDIC SURGERY

## 2024-08-08 PROCEDURE — 87086 URINE CULTURE/COLONY COUNT: CPT

## 2024-08-08 PROCEDURE — 6360000002 HC RX W HCPCS: Performed by: FAMILY MEDICINE

## 2024-08-08 PROCEDURE — 97535 SELF CARE MNGMENT TRAINING: CPT

## 2024-08-08 PROCEDURE — 80048 BASIC METABOLIC PNL TOTAL CA: CPT

## 2024-08-08 PROCEDURE — 94761 N-INVAS EAR/PLS OXIMETRY MLT: CPT

## 2024-08-08 PROCEDURE — 2580000003 HC RX 258: Performed by: FAMILY MEDICINE

## 2024-08-08 PROCEDURE — 97530 THERAPEUTIC ACTIVITIES: CPT

## 2024-08-08 PROCEDURE — 85025 COMPLETE CBC W/AUTO DIFF WBC: CPT

## 2024-08-08 PROCEDURE — 6360000004 HC RX CONTRAST MEDICATION: Performed by: ORTHOPAEDIC SURGERY

## 2024-08-08 PROCEDURE — 72195 MRI PELVIS W/O DYE: CPT

## 2024-08-08 PROCEDURE — 6370000000 HC RX 637 (ALT 250 FOR IP): Performed by: ORTHOPAEDIC SURGERY

## 2024-08-08 PROCEDURE — 1100000000 HC RM PRIVATE

## 2024-08-08 PROCEDURE — 36415 COLL VENOUS BLD VENIPUNCTURE: CPT

## 2024-08-08 RX ORDER — HYDROMORPHONE HYDROCHLORIDE 2 MG/ML
2 INJECTION, SOLUTION INTRAMUSCULAR; INTRAVENOUS; SUBCUTANEOUS EVERY 6 HOURS PRN
Status: DISCONTINUED | OUTPATIENT
Start: 2024-08-08 | End: 2024-08-10 | Stop reason: HOSPADM

## 2024-08-08 RX ADMIN — SULFAMETHOXAZOLE AND TRIMETHOPRIM 1 TABLET: 800; 160 TABLET ORAL at 10:57

## 2024-08-08 RX ADMIN — TRAMADOL HYDROCHLORIDE 50 MG: 50 TABLET ORAL at 03:19

## 2024-08-08 RX ADMIN — ACETAMINOPHEN 325MG 650 MG: 325 TABLET ORAL at 22:13

## 2024-08-08 RX ADMIN — MORPHINE SULFATE 4 MG: 4 INJECTION, SOLUTION INTRAMUSCULAR; INTRAVENOUS at 08:31

## 2024-08-08 RX ADMIN — SODIUM CHLORIDE, PRESERVATIVE FREE 10 ML: 5 INJECTION INTRAVENOUS at 20:20

## 2024-08-08 RX ADMIN — CHOLECALCIFEROL TAB 25 MCG (1000 UNIT) 1000 UNITS: 25 TAB at 13:23

## 2024-08-08 RX ADMIN — SODIUM CHLORIDE: 9 INJECTION, SOLUTION INTRAVENOUS at 13:29

## 2024-08-08 RX ADMIN — HYDROMORPHONE HYDROCHLORIDE 2 MG: 2 INJECTION, SOLUTION INTRAMUSCULAR; INTRAVENOUS; SUBCUTANEOUS at 14:21

## 2024-08-08 RX ADMIN — ZOLPIDEM TARTRATE 5 MG: 5 TABLET ORAL at 21:54

## 2024-08-08 RX ADMIN — TRAMADOL HYDROCHLORIDE 50 MG: 50 TABLET ORAL at 21:57

## 2024-08-08 RX ADMIN — POLYETHYLENE GLYCOL 3350 17 G: 17 POWDER, FOR SOLUTION ORAL at 23:42

## 2024-08-08 RX ADMIN — CYCLOBENZAPRINE 10 MG: 10 TABLET, FILM COATED ORAL at 10:46

## 2024-08-08 RX ADMIN — THERA TABS 1 TABLET: TAB at 21:54

## 2024-08-08 RX ADMIN — MORPHINE SULFATE 4 MG: 4 INJECTION, SOLUTION INTRAMUSCULAR; INTRAVENOUS at 02:10

## 2024-08-08 RX ADMIN — SODIUM CHLORIDE, PRESERVATIVE FREE 10 ML: 5 INJECTION INTRAVENOUS at 10:58

## 2024-08-08 RX ADMIN — CYCLOBENZAPRINE 10 MG: 10 TABLET, FILM COATED ORAL at 20:14

## 2024-08-08 RX ADMIN — SULFAMETHOXAZOLE AND TRIMETHOPRIM 1 TABLET: 800; 160 TABLET ORAL at 20:14

## 2024-08-08 RX ADMIN — MORPHINE SULFATE 4 MG: 4 INJECTION, SOLUTION INTRAMUSCULAR; INTRAVENOUS at 05:15

## 2024-08-08 RX ADMIN — GADOTERATE MEGLUMINE 15 ML: 376.9 INJECTION INTRAVENOUS at 22:05

## 2024-08-08 RX ADMIN — HYDROMORPHONE HYDROCHLORIDE 2 MG: 2 INJECTION, SOLUTION INTRAMUSCULAR; INTRAVENOUS; SUBCUTANEOUS at 20:14

## 2024-08-08 RX ADMIN — MORPHINE SULFATE 4 MG: 4 INJECTION, SOLUTION INTRAMUSCULAR; INTRAVENOUS at 11:45

## 2024-08-08 RX ADMIN — MELOXICAM 15 MG: 7.5 TABLET ORAL at 10:57

## 2024-08-08 RX ADMIN — CYCLOBENZAPRINE 10 MG: 10 TABLET, FILM COATED ORAL at 14:20

## 2024-08-08 RX ADMIN — MIDODRINE HYDROCHLORIDE 5 MG: 5 TABLET ORAL at 10:57

## 2024-08-08 ASSESSMENT — PAIN SCALES - GENERAL
PAINLEVEL_OUTOF10: 7
PAINLEVEL_OUTOF10: 10
PAINLEVEL_OUTOF10: 0
PAINLEVEL_OUTOF10: 0
PAINLEVEL_OUTOF10: 5
PAINLEVEL_OUTOF10: 9
PAINLEVEL_OUTOF10: 9
PAINLEVEL_OUTOF10: 10
PAINLEVEL_OUTOF10: 4
PAINLEVEL_OUTOF10: 0
PAINLEVEL_OUTOF10: 0
PAINLEVEL_OUTOF10: 9
PAINLEVEL_OUTOF10: 8
PAINLEVEL_OUTOF10: 8

## 2024-08-08 ASSESSMENT — PAIN DESCRIPTION - ORIENTATION
ORIENTATION: MID;LOWER;UPPER
ORIENTATION: MID;LOWER
ORIENTATION: RIGHT;LEFT
ORIENTATION: RIGHT;LEFT
ORIENTATION: MID;LOWER
ORIENTATION: MID;UPPER;LOWER
ORIENTATION: RIGHT;LEFT
ORIENTATION: MID;LOWER;UPPER

## 2024-08-08 ASSESSMENT — PAIN DESCRIPTION - LOCATION
LOCATION: BACK
LOCATION: BACK;HIP
LOCATION: BACK;HIP
LOCATION: HIP
LOCATION: BACK

## 2024-08-08 ASSESSMENT — PAIN - FUNCTIONAL ASSESSMENT
PAIN_FUNCTIONAL_ASSESSMENT: ACTIVITIES ARE NOT PREVENTED
PAIN_FUNCTIONAL_ASSESSMENT: PREVENTS OR INTERFERES SOME ACTIVE ACTIVITIES AND ADLS
PAIN_FUNCTIONAL_ASSESSMENT: PREVENTS OR INTERFERES SOME ACTIVE ACTIVITIES AND ADLS
PAIN_FUNCTIONAL_ASSESSMENT: ACTIVITIES ARE NOT PREVENTED

## 2024-08-08 ASSESSMENT — PAIN DESCRIPTION - DESCRIPTORS
DESCRIPTORS: ACHING;DISCOMFORT
DESCRIPTORS: ACHING;DISCOMFORT
DESCRIPTORS: STABBING
DESCRIPTORS: ACHING
DESCRIPTORS: SHARP;SHOOTING
DESCRIPTORS: SHARP;SHOOTING
DESCRIPTORS: SPASM
DESCRIPTORS: ACHING

## 2024-08-08 ASSESSMENT — PAIN DESCRIPTION - PAIN TYPE
TYPE: CHRONIC PAIN

## 2024-08-08 ASSESSMENT — PAIN DESCRIPTION - FREQUENCY
FREQUENCY: CONTINUOUS

## 2024-08-08 ASSESSMENT — PAIN DESCRIPTION - ONSET
ONSET: ON-GOING

## 2024-08-08 NOTE — PLAN OF CARE
Problem: Pain  Goal: Verbalizes/displays adequate comfort level or baseline comfort level  8/8/2024 0539 by Erna Lui RN  Outcome: Progressing  8/8/2024 0538 by Erna Lui RN  Outcome: Progressing     Problem: Safety - Adult  Goal: Free from fall injury  8/8/2024 0539 by Erna Lui RN  Outcome: Progressing  8/8/2024 0538 by Erna Lui RN  Outcome: Progressing     Problem: ABCDS Injury Assessment  Goal: Absence of physical injury  8/8/2024 0539 by Erna Lui RN  Outcome: Progressing  8/8/2024 0538 by Erna Lui RN  Outcome: Progressing

## 2024-08-08 NOTE — CONSULTS
lymph nodes.      REVIEW OF LABS AND IMAGING:      Labs: Results:   Chemistry Recent Labs     08/07/24 0245 08/08/24 0215   * 135*   K 4.5 4.6   CL 96* 106   CO2 26 25   BUN 28* 22*      CBC w/Diff Recent Labs     08/07/24 0245 08/08/24 0215   WBC 11.2 6.9   RBC 3.79* 3.29*   HGB 12.1 10.5*   HCT 35.5 32.8*    269      Cultures Invalid input(s): \"CULT\"  [unfilled]      Urinalysis Potassium   Date Value Ref Range Status   08/08/2024 4.6 3.5 - 5.5 mmol/L Final     BUN   Date Value Ref Range Status   08/08/2024 22 (H) 7.0 - 18 MG/DL Final      PSA No results for input(s): \"PSA\" in the last 72 hours.   Coagulation Lab Results   Component Value Date/Time    INR 1.0 07/23/2021 10:27 AM           US Results (most recent):  @[employer](HNA2580:1)@       chest    CT Results (most recent):   @[employer](WRF4611:1)@       ABD      MRI Results (most recent):  @[employer](IMGxxxxx:1)@                                        1. Urinary retention    2. Bilateral leg edema

## 2024-08-08 NOTE — PLAN OF CARE
Problem: Pain  Goal: Verbalizes/displays adequate comfort level or baseline comfort level  8/8/2024 1226 by Kristen Robertson RN  Outcome: Progressing  8/8/2024 0539 by Erna Lui RN  Outcome: Progressing  8/8/2024 0538 by Erna Lui RN  Outcome: Progressing     Problem: Safety - Adult  Goal: Free from fall injury  8/8/2024 1226 by Kristen Robertson RN  Outcome: Progressing  8/8/2024 0539 by Erna Lui RN  Outcome: Progressing  8/8/2024 0538 by Erna Lui RN  Outcome: Progressing     Problem: ABCDS Injury Assessment  Goal: Absence of physical injury  8/8/2024 1226 by Kristen Robertson RN  Outcome: Progressing  8/8/2024 0539 by Erna Lui RN  Outcome: Progressing  8/8/2024 0538 by Erna Lui RN  Outcome: Progressing

## 2024-08-08 NOTE — PROGRESS NOTES
BLADDER STIMULATOR-INTERSTIM X MRI-Fully charged, pt has -patient could not tolerate first attempt at sacrum and lumbar spine-md informed 30 minutes of continuous scanning with 5 minute break is required per  guidelines so only 2 studies can be done at a time with a break in between. [email or web address] stated that patient is conditional to scan immediately after implant by phone at 1900 approximately 8/7/24 and confirmed that she is conditional to 1.5 and 3 ca following the conditions in the manual (scanned in to epic).
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
## 2024-08-08 NOTE — PROGRESS NOTES
Vss  Neuro intact  Continues with back pain  Unable to have CT MRI yesterday  Plan  CT   MRI  Urology evaluation

## 2024-08-08 NOTE — PROGRESS NOTES
consulted for patient's urinary retention and presence of sacral nerve  stimulator, case discussed with urologist .  They will see the patient, if there is any obvious abnormality causing patient to have urinary retention this will be managed, anything related to nerve stimulator patient should go back to her primary urologist.     -Continue PT OT            2024  -Patient was in severe pain hence they could not do MRI, patient agreed to give a try to MRI of spine with analgesia however patient's blood pressure was on the lower side could not give her morphine or similar medication for pain, IV fluids started, midodrine ordered, if the blood pressure remains stable then we can give analgesia and get MRI done meanwhile hold Lasix and other blood pressure medications      Dispo Plan: Home     Depend on Spine surgery and patient's ability to do MRI     Objective:       General:  Alert, cooperative, no acute distress   HEENT: No facial asymmetry, OWEN Samson, External ears - WNL    Cardiovascular: S1S2 - regular , No Murmur   Pulmonary: Equal expansion , No Use of accessory muscles , No Rales No Rhonchi    GI:  +BS in all four quadrants, soft, non-tender  Extremities:  No edema; 2+ dorsalis pedis pulses bilaterally  Neuro: Alert and oriented X 2.       DVT Prophylaxis:  []Lovenox  []Hep SQ  []SCDs  []Coumadin   []On Heparin gtt    [] Eliquis [] Xarelto     Vitals:         VS: /68   Pulse 87   Temp 97.8 °F (36.6 °C) (Oral)   Resp 18   Ht 1.575 m (5' 2\")   Wt 74.8 kg (165 lb)   SpO2 97%   BMI 30.18 kg/m²    Tmax/24hrs: Temp (24hrs), Av.2 °F (36.8 °C), Min:97.8 °F (36.6 °C), Max:98.6 °F (37 °C)        Medications:   Current Facility-Administered Medications   Medication Dose Route Frequency    HYDROmorphone HCl PF (DILAUDID) injection 2 mg  2 mg IntraVENous Q6H PRN    0.9 % sodium chloride infusion   IntraVENous Continuous    cetirizine (ZYRTEC) tablet 5 mg  5 mg Oral Daily PRN    [Held by provider]  furosemide (LASIX) tablet 20 mg  20 mg Oral BID    [Held by provider] lisinopril (PRINIVIL;ZESTRIL) tablet 5 mg  5 mg Oral Daily    meloxicam (MOBIC) tablet 15 mg  15 mg Oral Daily    sulfamethoxazole-trimethoprim (BACTRIM DS;SEPTRA DS) 800-160 MG per tablet 1 tablet  1 tablet Oral BID    Vitamin D (CHOLECALCIFEROL) tablet 1,000 Units  1,000 Units Oral Daily    Tofacitinib Citrate TABS 5 mg (Patient Supplied)  1 tablet Oral BID    zolpidem (AMBIEN) tablet 5 mg  5 mg Oral QHS    sodium chloride flush 0.9 % injection 5-40 mL  5-40 mL IntraVENous 2 times per day    sodium chloride flush 0.9 % injection 5-40 mL  5-40 mL IntraVENous PRN    0.9 % sodium chloride infusion   IntraVENous PRN    ondansetron (ZOFRAN-ODT) disintegrating tablet 4 mg  4 mg Oral Q8H PRN    Or    ondansetron (ZOFRAN) injection 4 mg  4 mg IntraVENous Q6H PRN    polyethylene glycol (GLYCOLAX) packet 17 g  17 g Oral Daily PRN    acetaminophen (TYLENOL) tablet 650 mg  650 mg Oral Q6H PRN    Or    acetaminophen (TYLENOL) suppository 650 mg  650 mg Rectal Q6H PRN    cyclobenzaprine (FLEXERIL) tablet 10 mg  10 mg Oral TID    traMADol (ULTRAM) tablet 50 mg  50 mg Oral Q6H PRN    multivitamin 1 tablet  1 tablet Oral Daily    midodrine (PROAMATINE) tablet 5 mg  5 mg Oral TID WC       Labs:    Recent Labs     08/07/24  0245 08/08/24  0215   WBC 11.2 6.9   HGB 12.1 10.5*   HCT 35.5 32.8*    269     Recent Labs     08/07/24  0245 08/08/24  0215   * 135*   K 4.5 4.6   CL 96* 106   CO2 26 25   BUN 28* 22*         Time spent on direct patient care >35 mints     Complexity : High complex - due to multiple medical issues outlined above.     CODE Status : Full code    Case discussed with:   [x] Family  []Nursing  []Case Management         Disclaimer: Sections of this note are dictated utilizing voice recognition software, which may have resulted in some phonetic based errors in grammar and contents. Even though attempts were made to correct all the

## 2024-08-08 NOTE — PLAN OF CARE
stayed sitting EOB post treatment session, all needs met and call button within reach.  Pt would continue to benefit from skilled PT services while in acute care setting.      DEFICITS/IMPAIRMENTS:    , Body Structures, Functions, Activity Limitations Requiring Skilled Therapeutic Intervention: Decreased functional mobility ;Decreased ADL status;Decreased tolerance to work activity;Decreased strength;Decreased endurance;Increased pain;Decreased balance    Patient will benefit from skilled intervention to address the above impairments.  Patient's rehabilitation potential/Therapy Prognosis: Good.  Factors which may influence rehabilitation potential include:   []         None noted  []         Mental ability/status  [x]         Medical condition  []         Home/family situation and support systems  []         Safety awareness  []         Pain tolerance/management  []         Other:      PLAN :  Recommendations and Planned Interventions:   [x]           Bed Mobility Training             [x]    Neuromuscular Re-Education  [x]           Transfer Training                   []    Orthotic/Prosthetic Training  [x]           Gait Training                          []    Modalities  [x]           Therapeutic Exercises           []    Edema Management/Control  [x]           Therapeutic Activities            [x]    Family Training/Education  [x]           Patient Education  []           Other (comment):    Frequency/Duration: Patient will be followed by physical therapy to address goals, 1-2 times per day/3-5 days per week to address goals.    Further Equipment Recommendations for Discharge:  none, pt has all necessary equipment    AMPAC: AM-PAC Inpatient Mobility Raw Score : 21      Current research shows that an AM-PAC score of 18 (14 without stairs) or greater is associated with a discharge to the patient's home setting.    This AMPAC score should be considered in conjunction with interdisciplinary team recommendations to  removal T 10 screws    HYSTERECTOMY (CERVIX STATUS UNKNOWN)  1976    LUMBAR FUSION  12/03/2018    Retroperitoneal retropleural approach L1-L2 with extreme lateral interbody fusion. T11 rib resection on left, placement of chest tube    LUMBAR FUSION      x 2  L 3-4 , L-5-6    LUMBAR LAMINECTOMY  07/08/2013    spine lumbar laminectomy with instrumentation    RECTAL PROLAPSE REPAIR      SALPINGO-OOPHORECTOMY Bilateral     SHOULDER ARTHROPLASTY Left     STIMULATOR SURGERY N/A 06/04/2024    Interstim Stage I- Dr. Mauro    STIMULATOR SURGERY N/A 6/18/2024    INTERSTIM STAGE 2 W/CESAR performed by Saran Mauro MD at [facility] MAIN OR    THUMB ARTHROSCOPY Bilateral     TONSILLECTOMY  1954    TOTAL HIP ARTHROPLASTY Bilateral 05/31/2017    left hip 10/25/2017    UPPER GASTROINTESTINAL ENDOSCOPY      VEIN LIGATION AND STRIPPING Left 1985    vein stripping       Home Situation:  Social/Functional History  Lives With: Spouse  Type of Home: House  Home Layout: Two level  Home Access: Stairs to enter with rails  Bathroom Shower/Tub: Tub/Shower unit, Walk-in shower  Bathroom Equipment: Shower chair, Grab bars in shower  Bathroom Accessibility: Accessible  Home Equipment: Walker - Rolling, Crutches  Has the patient had two or more falls in the past year or any fall with injury in the past year?: Unknown  ADL Assistance: Independent  Ambulation Assistance: Independent  Transfer Assistance: Independent  Active : Yes  Critical Behavior:  Orientation  Overall Orientation Status: Within Normal Limits  Orientation Level: Oriented X4  Cognition  Overall Cognitive Status: WNL    Strength:    Strength: Generally decreased, functional    Tone & Sensation:   Tone: Normal  Sensation: Intact    Range Of Motion:  AROM: Generally decreased, functional  PROM: Within functional limits    Functional Mobility:  Bed Mobility:     Bed Mobility Training  Bed Mobility Training: Yes  Supine to Sit: Stand-by assistance  Sit to Supine: Stand-by

## 2024-08-09 ENCOUNTER — APPOINTMENT (OUTPATIENT)
Facility: HOSPITAL | Age: 74
End: 2024-08-09
Payer: MEDICARE

## 2024-08-09 LAB
ANION GAP SERPL CALC-SCNC: 4 MMOL/L (ref 3–18)
BACTERIA SPEC CULT: NORMAL
BUN SERPL-MCNC: 11 MG/DL (ref 7–18)
BUN/CREAT SERPL: 13 (ref 12–20)
CALCIUM SERPL-MCNC: 8.4 MG/DL (ref 8.5–10.1)
CHLORIDE SERPL-SCNC: 102 MMOL/L (ref 100–111)
CO2 SERPL-SCNC: 27 MMOL/L (ref 21–32)
CREAT SERPL-MCNC: 0.85 MG/DL (ref 0.6–1.3)
GLUCOSE SERPL-MCNC: 103 MG/DL (ref 74–99)
POTASSIUM SERPL-SCNC: 4.3 MMOL/L (ref 3.5–5.5)
SERVICE CMNT-IMP: NORMAL
SODIUM SERPL-SCNC: 133 MMOL/L (ref 136–145)

## 2024-08-09 PROCEDURE — 6360000002 HC RX W HCPCS: Performed by: ORTHOPAEDIC SURGERY

## 2024-08-09 PROCEDURE — 74177 CT ABD & PELVIS W/CONTRAST: CPT

## 2024-08-09 PROCEDURE — 6360000004 HC RX CONTRAST MEDICATION: Performed by: INTERNAL MEDICINE

## 2024-08-09 PROCEDURE — 6360000002 HC RX W HCPCS: Performed by: INTERNAL MEDICINE

## 2024-08-09 PROCEDURE — 94761 N-INVAS EAR/PLS OXIMETRY MLT: CPT

## 2024-08-09 PROCEDURE — 72156 MRI NECK SPINE W/O & W/DYE: CPT

## 2024-08-09 PROCEDURE — A9575 INJ GADOTERATE MEGLUMI 0.1ML: HCPCS | Performed by: INTERNAL MEDICINE

## 2024-08-09 PROCEDURE — 36415 COLL VENOUS BLD VENIPUNCTURE: CPT

## 2024-08-09 PROCEDURE — 2580000003 HC RX 258: Performed by: EMERGENCY MEDICINE

## 2024-08-09 PROCEDURE — 1100000000 HC RM PRIVATE

## 2024-08-09 PROCEDURE — 99232 SBSQ HOSP IP/OBS MODERATE 35: CPT | Performed by: INTERNAL MEDICINE

## 2024-08-09 PROCEDURE — 6370000000 HC RX 637 (ALT 250 FOR IP): Performed by: FAMILY MEDICINE

## 2024-08-09 PROCEDURE — 6370000000 HC RX 637 (ALT 250 FOR IP): Performed by: ORTHOPAEDIC SURGERY

## 2024-08-09 PROCEDURE — 80048 BASIC METABOLIC PNL TOTAL CA: CPT

## 2024-08-09 PROCEDURE — 2580000003 HC RX 258: Performed by: ORTHOPAEDIC SURGERY

## 2024-08-09 PROCEDURE — 6360000004 HC RX CONTRAST MEDICATION: Performed by: ORTHOPAEDIC SURGERY

## 2024-08-09 PROCEDURE — 72157 MRI CHEST SPINE W/O & W/DYE: CPT

## 2024-08-09 PROCEDURE — 6370000000 HC RX 637 (ALT 250 FOR IP): Performed by: INTERNAL MEDICINE

## 2024-08-09 PROCEDURE — 2580000003 HC RX 258: Performed by: FAMILY MEDICINE

## 2024-08-09 PROCEDURE — 97535 SELF CARE MNGMENT TRAINING: CPT

## 2024-08-09 RX ORDER — DIPHENHYDRAMINE HYDROCHLORIDE 50 MG/ML
50 INJECTION INTRAMUSCULAR; INTRAVENOUS ONCE
Status: COMPLETED | OUTPATIENT
Start: 2024-08-09 | End: 2024-08-09

## 2024-08-09 RX ADMIN — SULFAMETHOXAZOLE AND TRIMETHOPRIM 1 TABLET: 800; 160 TABLET ORAL at 21:22

## 2024-08-09 RX ADMIN — MELOXICAM 15 MG: 7.5 TABLET ORAL at 08:18

## 2024-08-09 RX ADMIN — HYDROMORPHONE HYDROCHLORIDE 2 MG: 2 INJECTION, SOLUTION INTRAMUSCULAR; INTRAVENOUS; SUBCUTANEOUS at 18:30

## 2024-08-09 RX ADMIN — WATER 40 MG: 1 INJECTION INTRAMUSCULAR; INTRAVENOUS; SUBCUTANEOUS at 21:22

## 2024-08-09 RX ADMIN — CHOLECALCIFEROL TAB 25 MCG (1000 UNIT) 1000 UNITS: 25 TAB at 08:19

## 2024-08-09 RX ADMIN — THERA TABS 1 TABLET: TAB at 21:22

## 2024-08-09 RX ADMIN — CYCLOBENZAPRINE 10 MG: 10 TABLET, FILM COATED ORAL at 21:22

## 2024-08-09 RX ADMIN — DIPHENHYDRAMINE HYDROCHLORIDE 50 MG: 50 INJECTION, SOLUTION INTRAMUSCULAR; INTRAVENOUS at 08:20

## 2024-08-09 RX ADMIN — ACETAMINOPHEN 325MG 650 MG: 325 TABLET ORAL at 18:34

## 2024-08-09 RX ADMIN — MIDODRINE HYDROCHLORIDE 5 MG: 5 TABLET ORAL at 08:18

## 2024-08-09 RX ADMIN — WATER 40 MG: 1 INJECTION INTRAMUSCULAR; INTRAVENOUS; SUBCUTANEOUS at 08:19

## 2024-08-09 RX ADMIN — HYDROMORPHONE HYDROCHLORIDE 2 MG: 2 INJECTION, SOLUTION INTRAMUSCULAR; INTRAVENOUS; SUBCUTANEOUS at 11:06

## 2024-08-09 RX ADMIN — ZOLPIDEM TARTRATE 5 MG: 5 TABLET ORAL at 21:22

## 2024-08-09 RX ADMIN — SULFAMETHOXAZOLE AND TRIMETHOPRIM 1 TABLET: 800; 160 TABLET ORAL at 08:18

## 2024-08-09 RX ADMIN — SODIUM CHLORIDE, PRESERVATIVE FREE 10 ML: 5 INJECTION INTRAVENOUS at 08:29

## 2024-08-09 RX ADMIN — SODIUM CHLORIDE: 9 INJECTION, SOLUTION INTRAVENOUS at 07:13

## 2024-08-09 RX ADMIN — CYCLOBENZAPRINE 10 MG: 10 TABLET, FILM COATED ORAL at 08:19

## 2024-08-09 RX ADMIN — SODIUM CHLORIDE: 9 INJECTION, SOLUTION INTRAVENOUS at 16:16

## 2024-08-09 RX ADMIN — GADOTERATE MEGLUMINE 20 ML: 376.9 INJECTION INTRAVENOUS at 20:24

## 2024-08-09 RX ADMIN — IOPAMIDOL 100 ML: 612 INJECTION, SOLUTION INTRAVENOUS at 12:18

## 2024-08-09 RX ADMIN — CYCLOBENZAPRINE 10 MG: 10 TABLET, FILM COATED ORAL at 16:13

## 2024-08-09 RX ADMIN — HYDROMORPHONE HYDROCHLORIDE 2 MG: 2 INJECTION, SOLUTION INTRAMUSCULAR; INTRAVENOUS; SUBCUTANEOUS at 05:03

## 2024-08-09 RX ADMIN — SODIUM CHLORIDE, PRESERVATIVE FREE 10 ML: 5 INJECTION INTRAVENOUS at 21:23

## 2024-08-09 ASSESSMENT — PAIN DESCRIPTION - ORIENTATION
ORIENTATION: LEFT;MID;LOWER;UPPER
ORIENTATION: LOWER;MID;UPPER;LEFT;RIGHT
ORIENTATION: LEFT;RIGHT;MID;LOWER
ORIENTATION: LEFT;RIGHT;MID;LOWER;UPPER
ORIENTATION: RIGHT;LEFT;MID;LOWER
ORIENTATION: LEFT;RIGHT;MID;LOWER
ORIENTATION: MID;LOWER;UPPER
ORIENTATION: LEFT;RIGHT;MID;UPPER

## 2024-08-09 ASSESSMENT — PAIN DESCRIPTION - FREQUENCY
FREQUENCY: CONTINUOUS

## 2024-08-09 ASSESSMENT — PAIN DESCRIPTION - DESCRIPTORS
DESCRIPTORS: SHARP;SHOOTING
DESCRIPTORS: ACHING;DISCOMFORT
DESCRIPTORS: ACHING
DESCRIPTORS: SPASM;THROBBING
DESCRIPTORS: STABBING
DESCRIPTORS: THROBBING
DESCRIPTORS: STABBING
DESCRIPTORS: ACHING;DISCOMFORT

## 2024-08-09 ASSESSMENT — PAIN DESCRIPTION - PAIN TYPE
TYPE: CHRONIC PAIN

## 2024-08-09 ASSESSMENT — PAIN DESCRIPTION - LOCATION
LOCATION: BACK;HIP
LOCATION: HIP;BACK
LOCATION: HIP;BACK
LOCATION: BACK;HIP
LOCATION: BACK;HIP;ANKLE

## 2024-08-09 ASSESSMENT — PAIN - FUNCTIONAL ASSESSMENT
PAIN_FUNCTIONAL_ASSESSMENT: PREVENTS OR INTERFERES SOME ACTIVE ACTIVITIES AND ADLS

## 2024-08-09 ASSESSMENT — PAIN DESCRIPTION - ONSET
ONSET: ON-GOING

## 2024-08-09 ASSESSMENT — PAIN SCALES - GENERAL
PAINLEVEL_OUTOF10: 5
PAINLEVEL_OUTOF10: 6
PAINLEVEL_OUTOF10: 5
PAINLEVEL_OUTOF10: 4
PAINLEVEL_OUTOF10: 5
PAINLEVEL_OUTOF10: 7
PAINLEVEL_OUTOF10: 0
PAINLEVEL_OUTOF10: 3
PAINLEVEL_OUTOF10: 0
PAINLEVEL_OUTOF10: 7
PAINLEVEL_OUTOF10: 9

## 2024-08-09 NOTE — PLAN OF CARE
Problem: Pain  Goal: Verbalizes/displays adequate comfort level or baseline comfort level  Outcome: Progressing     Problem: Safety - Adult  Goal: Free from fall injury  Outcome: Progressing     Problem: ABCDS Injury Assessment  Goal: Absence of physical injury  Outcome: Progressing     Problem: Occupational Therapy - Adult  Goal: By Discharge: Performs self-care activities at highest level of function for planned discharge setting.  See evaluation for individualized goals.  Description: Occupational Therapy Goals:  Initiated 8/8/2024 to be met within 7-10 days.    1.  Patient will perform grooming with modified independence in stance at sink using RW with < 2 seated rest breaks.   2.  Patient will perform bathing with modified independence using adaptive equipment as needed.  3.  Patient will perform upper body dressing and lower body dressing  with modified independence using adaptive equipment as needed.  4.  Patient will perform toilet transfers with modified independence using LRAD with good balance.  5.  Patient will perform all aspects of toileting with modified independence.  6.  Patient will participate in upper extremity therapeutic exercise/activities with modified independence for 8-10 minutes to increase strength/endurance for ADLs.    7.  Patient will utilize energy conservation techniques during functional activities with min verbal cues.    PLOF: Mod I with ADLs, occasional assist from son for LB dressing tasks, Mod I with functional mobility using high walker or crutches     8/8/2024 1429 by Danielle Doll, OT  Outcome: Progressing  8/8/2024 1428 by Danielle Doll, OT  Outcome: Progressing     Problem: Physical Therapy - Adult  Goal: By Discharge: Performs mobility at highest level of function for planned discharge setting.  See evaluation for individualized goals.  Description: Initiated  8/8/24  to be met within 7-10 days.    1.  Patient will move from supine to sit and sit to supine  in bed

## 2024-08-09 NOTE — PROGRESS NOTES
Vss afeb  Neuro intact  Continued severe pain in sacrum and left leg  MRI of pelvis and lumbar spine reviewed- no evident etiology of symptoms . Await radiology reading  Bone gallium with evidence of arthritic change - no infection evident.  Pain inconsistently controlled  Plan  Watch cultures  Will order ct abdomen and pelvis- tolerates contrast but will order premedication  I will be on vacation next week- my PA is available and can contact me if issues arise.

## 2024-08-09 NOTE — CARE COORDINATION
CM sent out SNF referrals in [facility] and [facility] for discharge planning.         CM sent out Home Health referrals in [facility] for discharge planning.                 Guillermina Ruiz RN  Case Management [phone]

## 2024-08-09 NOTE — PROGRESS NOTES
[facility] Hospitalist Group  Progress Note    Patient: Salma Lopez Age: 74 y.o. : 1950 MR#: [id number] SSN: [id number]  Date/Time: 2024     C/C: Backache      Subjective:   HPI : Patient with history of chronic backache and multiple back surgeries now coming back again with similar complaint.          Review of Systems:  positive responses in bold type   Constitutional: Negative for fever, chills, diaphoresis and unexpected weight change.   HENT: Negative for ear pain, congestion, sore throat, rhinorrhea, drooling, trouble swallowing, neck pain and tinnitus.   Eyes: Negative for photophobia, pain, redness and visual disturbance.   Respiratory: negative for shortness of breath, cough, choking, chest tightness, wheezing or stridor.   Cardiovascular: Negative for chest pain, palpitations and leg swelling.   Gastrointestinal: Negative for nausea, vomiting, abdominal pain, diarrhea, constipation, blood in stool, abdominal distention and anal bleeding.   Genitourinary: Negative for dysuria, urgency, frequency, hematuria, flank pain and difficulty urinating.   Musculoskeletal: Severe backache  Skin: Negative for color change, rash and wound.   Neurological: Negative for dizziness, seizures, syncope, speech difficulty, light-headedness or headaches.   Hematological: Does not bruise/bleed easily.   Psychiatric/Behavioral: Negative for suicidal ideas, hallucinations, behavioral problems, self-injury or agitation     Assessment/Plan:     Low back pain with radiation  Chronic bladder dysfunction with urinary retention status post recent sacral nerve stimulator placement  Hyponatremia  Acute renal insufficiency  HTN  RA  Spinal stenosis s/p multiple surgeries  Class 1 obesity    Plan  2024  -Patient is more alert awake oriented and less in distress due to pain  -According to her she is trying to see how long she can go without ordering new dose of pain medication-   -So far all x-ray  08/07/24  0245 08/08/24  0215 08/09/24  0955   * 135* 133*   K 4.5 4.6 4.3   CL 96* 106 102   CO2 26 25 27   BUN 28* 22* 11         Time spent on direct patient care >35 mints     Complexity : High complex - due to multiple medical issues outlined above.     CODE Status : Full code    Case discussed with:   [x] Family  []Nursing  []Case Management         Disclaimer: Sections of this note are dictated utilizing voice recognition software, which may have resulted in some phonetic based errors in grammar and contents. Even though attempts were made to correct all the mistakes, some may have been missed, and remained in the body of the document. If questions arise, please contact our department.    Signed By: Little Xiong MD     August 9, 2024

## 2024-08-09 NOTE — PLAN OF CARE
Problem: Occupational Therapy - Adult  Goal: By Discharge: Performs self-care activities at highest level of function for planned discharge setting.  See evaluation for individualized goals.  Description: Occupational Therapy Goals:  Initiated 8/8/2024 to be met within 7-10 days.    1.  Patient will perform grooming with modified independence in stance at sink using RW with < 2 seated rest breaks.   2.  Patient will perform bathing with modified independence using adaptive equipment as needed.  3.  Patient will perform upper body dressing and lower body dressing  with modified independence using adaptive equipment as needed.  4.  Patient will perform toilet transfers with modified independence using LRAD with good balance.  5.  Patient will perform all aspects of toileting with modified independence.  6.  Patient will participate in upper extremity therapeutic exercise/activities with modified independence for 8-10 minutes to increase strength/endurance for ADLs.    7.  Patient will utilize energy conservation techniques during functional activities with min verbal cues.    PLOF: Mod I with ADLs, occasional assist from son for LB dressing tasks, Mod I with functional mobility using high walker or crutches     Outcome: Progressing   OCCUPATIONAL THERAPY TREATMENT    Patient: Salma Lopez (74 y.o. female)  Date: 8/9/2024  Diagnosis: Urinary retention [R33.9]  Bilateral leg edema [R60.0] Urinary retention      Precautions: General Precautions, Fall Risk    Chart, occupational therapy assessment, plan of care, and goals were reviewed.  ASSESSMENT:  Pt is pleasant and cooperative. Very talkative 2/2 extensive medical history.  Perseverating on finding \"hot spot\" device. Pt c/o LBP, b/l hip (R > L) and distal BLE pain. Reviewed lumbar precautions, proper body mechanics and \"log roll\" technique w/bed mobility to minimize c/o pain. Pt's impulsivity requires max vc's for safety w/EOB activity and functional transfer  Fair    ADL Intervention:  Grooming: Setup  Facial hygiene and oral care, seated EOB    Pain:  Intensity Pre-treatment: 4/10   Intensity Post-treatment: 4/10  Scale: Numeric Rating Scale  Location: Right Left Hip  Quality: Throbbing  Intervention(s): Repositioning     Activity Tolerance:    Good    After treatment:   []  Patient left in no apparent distress sitting up in chair  [x]  Patient left in no apparent distress seated EOB  [x]  Call bell left within reach  []  Nursing notified  []  Caregiver present  []  Bed alarm activated    COMMUNICATION/EDUCATION:   Patient Education  Education Given To: Patient  Education Provided: Plan of Care;Energy Conservation;Transfer Training;Precautions  Education Method: Verbal;Teach Back  Barriers to Learning: None  Education Outcome: Verbalized understanding    Thank you for this referral.  ANSELMO Nix  Minutes: 41

## 2024-08-10 VITALS
HEIGHT: 62 IN | BODY MASS INDEX: 30.36 KG/M2 | WEIGHT: 165 LBS | HEART RATE: 102 BPM | OXYGEN SATURATION: 90 % | RESPIRATION RATE: 18 BRPM | TEMPERATURE: 99.6 F | DIASTOLIC BLOOD PRESSURE: 66 MMHG | SYSTOLIC BLOOD PRESSURE: 116 MMHG

## 2024-08-10 PROCEDURE — 6370000000 HC RX 637 (ALT 250 FOR IP): Performed by: FAMILY MEDICINE

## 2024-08-10 PROCEDURE — 2580000003 HC RX 258: Performed by: FAMILY MEDICINE

## 2024-08-10 PROCEDURE — 99232 SBSQ HOSP IP/OBS MODERATE 35: CPT | Performed by: INTERNAL MEDICINE

## 2024-08-10 PROCEDURE — 94761 N-INVAS EAR/PLS OXIMETRY MLT: CPT

## 2024-08-10 PROCEDURE — 6360000002 HC RX W HCPCS: Performed by: INTERNAL MEDICINE

## 2024-08-10 PROCEDURE — 6360000002 HC RX W HCPCS: Performed by: ORTHOPAEDIC SURGERY

## 2024-08-10 PROCEDURE — 2580000003 HC RX 258: Performed by: ORTHOPAEDIC SURGERY

## 2024-08-10 PROCEDURE — 6370000000 HC RX 637 (ALT 250 FOR IP): Performed by: INTERNAL MEDICINE

## 2024-08-10 RX ORDER — CYCLOBENZAPRINE HCL 10 MG
10 TABLET ORAL 3 TIMES DAILY
Qty: 30 TABLET | Refills: 0 | Status: SHIPPED | OUTPATIENT
Start: 2024-08-10 | End: 2024-08-20

## 2024-08-10 RX ORDER — TRAMADOL HYDROCHLORIDE 50 MG/1
50 TABLET ORAL EVERY 6 HOURS PRN
Qty: 15 TABLET | Refills: 0 | Status: SHIPPED | OUTPATIENT
Start: 2024-08-10 | End: 2024-08-13

## 2024-08-10 RX ORDER — METHYLPREDNISOLONE 4 MG/1
TABLET ORAL
Qty: 1 KIT | Refills: 0 | Status: SHIPPED | OUTPATIENT
Start: 2024-08-10 | End: 2024-08-16

## 2024-08-10 RX ADMIN — WATER 40 MG: 1 INJECTION INTRAMUSCULAR; INTRAVENOUS; SUBCUTANEOUS at 11:09

## 2024-08-10 RX ADMIN — HYDROMORPHONE HYDROCHLORIDE 2 MG: 2 INJECTION, SOLUTION INTRAMUSCULAR; INTRAVENOUS; SUBCUTANEOUS at 00:30

## 2024-08-10 RX ADMIN — WATER 40 MG: 1 INJECTION INTRAMUSCULAR; INTRAVENOUS; SUBCUTANEOUS at 06:35

## 2024-08-10 RX ADMIN — CYCLOBENZAPRINE 10 MG: 10 TABLET, FILM COATED ORAL at 09:40

## 2024-08-10 RX ADMIN — MELOXICAM 15 MG: 7.5 TABLET ORAL at 09:40

## 2024-08-10 RX ADMIN — SODIUM CHLORIDE, PRESERVATIVE FREE 10 ML: 5 INJECTION INTRAVENOUS at 09:43

## 2024-08-10 RX ADMIN — MIDODRINE HYDROCHLORIDE 5 MG: 5 TABLET ORAL at 08:01

## 2024-08-10 RX ADMIN — MIDODRINE HYDROCHLORIDE 5 MG: 5 TABLET ORAL at 11:15

## 2024-08-10 RX ADMIN — HYDROMORPHONE HYDROCHLORIDE 2 MG: 2 INJECTION, SOLUTION INTRAMUSCULAR; INTRAVENOUS; SUBCUTANEOUS at 06:34

## 2024-08-10 RX ADMIN — SULFAMETHOXAZOLE AND TRIMETHOPRIM 1 TABLET: 800; 160 TABLET ORAL at 09:40

## 2024-08-10 RX ADMIN — CHOLECALCIFEROL TAB 25 MCG (1000 UNIT) 1000 UNITS: 25 TAB at 09:40

## 2024-08-10 RX ADMIN — ACETAMINOPHEN 325MG 650 MG: 325 TABLET ORAL at 11:15

## 2024-08-10 ASSESSMENT — PAIN DESCRIPTION - FREQUENCY
FREQUENCY: CONTINUOUS

## 2024-08-10 ASSESSMENT — PAIN DESCRIPTION - ONSET
ONSET: ON-GOING

## 2024-08-10 ASSESSMENT — PAIN - FUNCTIONAL ASSESSMENT
PAIN_FUNCTIONAL_ASSESSMENT: PREVENTS OR INTERFERES SOME ACTIVE ACTIVITIES AND ADLS
PAIN_FUNCTIONAL_ASSESSMENT: PREVENTS OR INTERFERES SOME ACTIVE ACTIVITIES AND ADLS
PAIN_FUNCTIONAL_ASSESSMENT: ACTIVITIES ARE NOT PREVENTED

## 2024-08-10 ASSESSMENT — PAIN DESCRIPTION - PAIN TYPE
TYPE: CHRONIC PAIN

## 2024-08-10 ASSESSMENT — PAIN DESCRIPTION - DESCRIPTORS
DESCRIPTORS: STABBING;THROBBING
DESCRIPTORS: ACHING;DISCOMFORT
DESCRIPTORS: STABBING;THROBBING
DESCRIPTORS: STABBING;THROBBING
DESCRIPTORS: ACHING;DISCOMFORT

## 2024-08-10 ASSESSMENT — PAIN SCALES - GENERAL
PAINLEVEL_OUTOF10: 4
PAINLEVEL_OUTOF10: 8
PAINLEVEL_OUTOF10: 4
PAINLEVEL_OUTOF10: 0
PAINLEVEL_OUTOF10: 4
PAINLEVEL_OUTOF10: 0
PAINLEVEL_OUTOF10: 4
PAINLEVEL_OUTOF10: 7

## 2024-08-10 ASSESSMENT — PAIN DESCRIPTION - LOCATION
LOCATION: BACK;HIP

## 2024-08-10 ASSESSMENT — PAIN DESCRIPTION - ORIENTATION
ORIENTATION: RIGHT;LEFT;MID;LOWER
ORIENTATION: PROXIMAL;LOWER
ORIENTATION: RIGHT;LEFT;LOWER;MID
ORIENTATION: POSTERIOR;LOWER
ORIENTATION: POSTERIOR;LOWER

## 2024-08-10 NOTE — DISCHARGE SUMMARY
[facility]    Discharge Summary    Patient: Salma Lopez MRN: [id number]  [facility]: [id number]    YOB: 1950  Age: 74 y.o.  Sex: female    DOA: 8/7/2024 LOS:  LOS: 3 days   Discharge Date:          Discharge Diagnoses:   Low back pain with radiation to Legs   Chronic bladder dysfunction with urinary retention status post recent sacral nerve stimulator placement  Hyponatremia  Acute renal insufficiency  HTN  RA  Spinal stenosis s/p multiple surgeries  Class 1 obesity      Discharge Condition: Fair    Discharge To: Home    Consults:  spine surgery     HPI: per Admitting MD \" HISTORY OF PRESENT ILLNESS:     Salma Lopez is a 74 y.o.   female with a history of multiple back surgeries who presented to the ED at [facility] earlier this morning for evaluation of back and lower extremity pain.  Patient had a sacral nerve stimulator placed last month and states that she immediately began having more issues with pain.  Reports having significant issues with simply being able to be comfortable on a day-to-day basis, whether sitting, standing or lying down.  She remains able to ambulate but is unsteady and has to hold on to things or be assisted with walking.  She also has had more difficulty voiding recently but this is been an issue for her in the past.  X-rays of the thoracic and lumbar spines were obtained on arrival and showed no acute pathology.  Patient has been referred for hospital admission for further evaluation.    Hospital Course:     Admitted with severe backache spasms unable to move ambulate-patient also has a history of S3 nerve stimulator for her urinary issues.    Patient was treated with PT OT and analgesia and steroids which will be given on a tapering dose in form of Medrol pack    On day of discharge patient is becoming more and more stable pain is less and well-tolerated          Physical Exam:  General : Stable  HEENT : NAD  RS: Clear  [facility]:

## 2024-08-10 NOTE — CARE COORDINATION
08/10/24 1130   Service Assessment   Patient Orientation Alert and Oriented   Cognition Alert   History Provided By Patient   Primary Caregiver Self   Accompanied By/Relationship Spouse   Support Systems Spouse/Significant Other   Patient's Healthcare Decision Maker is: Named in Scanned ACP Document   PCP Verified by CM Yes   Last Visit to PCP Within last 3 months   Prior Functional Level Independent in ADLs/IADLs   Current Functional Level Independent in ADLs/IADLs;Mobility   Can patient return to prior living arrangement Yes   Ability to make needs known: Good   Family able to assist with home care needs: Yes   Would you like for me to discuss the discharge plan with any other family members/significant others, and if so, who? Yes   Financial Resources Medicaid;Other (Comment)   Community Resources None   Social/Functional History   Lives With Spouse   Type of Home House   Home Layout Two level   Home Access Stairs to enter with rails   Bathroom Shower/Tub Tub/Shower unit;Walk-in shower   Bathroom Equipment Shower chair;Grab bars in shower   Bathroom Accessibility Accessible   Home Equipment Walker - Rolling;Crutches   ADL Assistance Independent   Homemaking Responsibilities Yes   Ambulation Assistance Independent   Transfer Assistance Independent   Active  Yes   Mode of Transportation Car   Occupation Retired   Discharge Planning   Type of Residence House   Living Arrangements Spouse/Significant Other   Current Services Prior To Admission None   Potential Assistance Needed N/A   DME Ordered? No   Potential Assistance Purchasing Medications No   Type of Home Care Services None   Patient expects to be discharged to: House   One/Two Story Residence Two story   Lift Chair Available No   History of falls? 0   Services At/After Discharge   Transition of Care Consult (CM Consult) Discharge Planning   [place] Resource Information Provided? No   Mode of Transport at Discharge Other (see comment)  (spouse)

## 2024-08-10 NOTE — PLAN OF CARE
Problem: Pain  Goal: Verbalizes/displays adequate comfort level or baseline comfort level  8/10/2024 1107 by Anna Green RN  Outcome: Adequate for Discharge  8/9/2024 2155 by Maddison Lerma RN  Outcome: Progressing  Flowsheets  Taken 8/9/2024 1830 by Kristen Robertson RN  Verbalizes/displays adequate comfort level or baseline comfort level:   Encourage patient to monitor pain and request assistance   Assess pain using appropriate pain scale  Taken 8/9/2024 1245 by Kristen Robertson RN  Verbalizes/displays adequate comfort level or baseline comfort level:   Encourage patient to monitor pain and request assistance   Assess pain using appropriate pain scale  Taken 8/9/2024 1136 by Kristen Robertson RN  Verbalizes/displays adequate comfort level or baseline comfort level:   Assess pain using appropriate pain scale   Encourage patient to monitor pain and request assistance  Taken 8/9/2024 0818 by Kristen Robertson RN  Verbalizes/displays adequate comfort level or baseline comfort level:   Encourage patient to monitor pain and request assistance   Assess pain using appropriate pain scale     Problem: Safety - Adult  Goal: Free from fall injury  8/10/2024 1107 by Anna Green RN  Outcome: Adequate for Discharge  8/9/2024 2155 by Maddison Lerma RN  Outcome: Progressing     Problem: ABCDS Injury Assessment  Goal: Absence of physical injury  8/10/2024 1107 by Anna Green RN  Outcome: Adequate for Discharge  8/9/2024 2155 by Maddison Lerma RN  Outcome: Progressing

## 2024-08-10 NOTE — PROGRESS NOTES
Pt written for discharge today, discharge instructions done at bedside with spouse present.  Answered all questions, VS stable, PIV removed and pt taken out via wheelchair.

## 2024-08-10 NOTE — PLAN OF CARE
Problem: Pain  Goal: Verbalizes/displays adequate comfort level or baseline comfort level  Outcome: Progressing  Flowsheets  Taken 8/9/2024 1830 by Kristen Robertson RN  Verbalizes/displays adequate comfort level or baseline comfort level:   Encourage patient to monitor pain and request assistance   Assess pain using appropriate pain scale  Taken 8/9/2024 1245 by Kristen Robertson RN  Verbalizes/displays adequate comfort level or baseline comfort level:   Encourage patient to monitor pain and request assistance   Assess pain using appropriate pain scale  Taken 8/9/2024 1136 by Kristen Robertson RN  Verbalizes/displays adequate comfort level or baseline comfort level:   Assess pain using appropriate pain scale   Encourage patient to monitor pain and request assistance  Taken 8/9/2024 0818 by Kristen Robertson RN  Verbalizes/displays adequate comfort level or baseline comfort level:   Encourage patient to monitor pain and request assistance   Assess pain using appropriate pain scale     Problem: Safety - Adult  Goal: Free from fall injury  Outcome: Progressing     Problem: ABCDS Injury Assessment  Goal: Absence of physical injury  Outcome: Progressing     Problem: Occupational Therapy - Adult  Goal: By Discharge: Performs self-care activities at highest level of function for planned discharge setting.  See evaluation for individualized goals.  Description: Occupational Therapy Goals:  Initiated 8/8/2024 to be met within 7-10 days.    1.  Patient will perform grooming with modified independence in stance at sink using RW with < 2 seated rest breaks.   2.  Patient will perform bathing with modified independence using adaptive equipment as needed.  3.  Patient will perform upper body dressing and lower body dressing  with modified independence using adaptive equipment as needed.  4.  Patient will perform toilet transfers with modified independence using LRAD with good balance.  5.  Patient will perform all aspects of toileting

## 2024-08-10 NOTE — CARE COORDINATION
08/10/24 1354   IMM Letter   IMM Letter given to Patient/Family/Significant other/Guardian/POA/by: Quin Yoo RN   IMM Letter date given: 08/10/24   IMM Letter time given: 1159     Medicare pt, has received and signed 2nd IMM letter informing them of their right to appeal the discharge. Signed copy has been placed in pt bedside chart. Pt waives the 4 hour window, ready to go home.    Quin Yoo BSN RN  Case Management

## 2024-08-12 ENCOUNTER — CARE COORDINATION (OUTPATIENT)
Facility: CLINIC | Age: 74
End: 2024-08-12

## 2024-08-12 DIAGNOSIS — M96.1 FAILED BACK SYNDROME, LUMBOSACRAL: ICD-10-CM

## 2024-08-12 DIAGNOSIS — R33.9 URINARY RETENTION: Primary | ICD-10-CM

## 2024-08-12 PROCEDURE — 1111F DSCHRG MED/CURRENT MED MERGE: CPT | Performed by: FAMILY MEDICINE

## 2024-08-12 RX ORDER — ESTRADIOL 10 UG/1
10 TABLET VAGINAL EVERY OTHER DAY
COMMUNITY
Start: 2023-12-19

## 2024-08-12 RX ORDER — DIPHENHYDRAMINE HCL 25 MG
25 CAPSULE ORAL EVERY 6 HOURS PRN
COMMUNITY

## 2024-08-12 RX ORDER — FOLIC ACID 1 MG/1
1 TABLET ORAL
COMMUNITY

## 2024-08-12 RX ORDER — IBUPROFEN 200 MG
1 CAPSULE ORAL
COMMUNITY

## 2024-08-12 RX ORDER — LIDOCAINE 50 MG/G
1 PATCH TOPICAL DAILY
COMMUNITY

## 2024-08-12 RX ORDER — OMEPRAZOLE 20 MG/1
1 CAPSULE, DELAYED RELEASE ORAL DAILY
COMMUNITY

## 2024-08-12 NOTE — CARE COORDINATION
Care Transitions Note    Initial Call - Call within 2 business days of discharge: Yes    Patient Current Location:  Home: [address] [place] [phone]    Care Transition Nurse contacted the patient by telephone to perform post hospital discharge assessment, verified name and  as identifiers. Provided introduction to self, and explanation of the Care Transition Nurse role.     Patient: Salma Lopez    Patient : 1950   MRN: [id number]    Reason for Admission: Low back pain with radiation to legs/Chronic bladder dysfunction with urinary retention  Discharge Date: 8/10/24  RURS: Readmission Risk Score: 10.9      Last Discharge Facility       Date Complaint Diagnosis Description Type Department Provider    24 Back Pain; Hip Pain; Leg Pain; Urinary Retention Urinary retention ... ED to Hosp-Admission (Discharged) (ADMITTED) [id number] Aguilera MD; JUAN Odom.            Was this an external facility discharge? No    Additional needs identified to be addressed with provider   No needs identified             Method of communication with provider: chart routing.    Patients top risk factors for readmission: medical condition-spinal stenosis s/p L4-T8 fusion (per patient), chronic bladder dysfunction    Interventions to address risk factors:   DME: Confirmed patient has an upright walker, multiple canes and grab bars installed. Patient voiced she has a large pike bag. Leg bag has a hole.    Care Summary Note: patient voiced she was seen by [facility] and Dr. DAWNA Van, pain management today. Patient voiced she will removed her pike on Wednesday night () and return to urology office Thursday morning for a voiding trial. Verbalized she has a bowel and bladder regimen. Bowel regimen is after breakfast. Drinks prune juice and massage stomach which causes a bowel movement. Dr. Van prescribed new pain medication however patient has not received new med and unsure of name. Patient

## 2024-08-12 NOTE — CARE COORDINATION
Ambulatory Care Coordination Note     2024 8:31 AM     Patient Current Location:  Home: [address] [place] [phone]     Patient contacted the ACM by telephone. Verified name and  with patient as identifiers.         ACM: Tamie Cervantes RN     Challenges to be reviewed by the provider   Additional needs identified to be addressed with provider No  none               Method of communication with provider: none.    Care Summary Note: Patient called this ACM  to make aware she was discharged yesterday from hospital for urinary retention. She still has a pike in place, she had many imaging studies, she states 'L5 is gone, I don't know what they're gonna do'. She's been in pain post bladder stim implant, she sees pain management today for ongoing sore, occassionally sharp pain 6-7.    Offered patient enrollment in the Remote Patient Monitoring (RPM) program for in-home monitoring: Yes, but did not enroll at this time: wants to discuss with caregiver.     Assessments Completed:   General Assessment    Do you have any symptoms that are causing concern?: Yes  Progression since Onset: Unchanged  Reported Symptoms: Pain (Comment: Patient has pain management appointment today. Reports continued, sore occassionally sharp pain to lower back, legs. 6-7.)          Medications Reviewed:   Patient denies any changes with medications and reports taking all medications as prescribed.    Advance Care Planning:   Not reviewed during this call     Care Planning:    Goals Addressed                   This Visit's Progress     Conditions and Symptoms   On track     I will notify my provider of any symptoms that indicate a worsening of my condition.    Barriers: overwhelmed by complexity of regimen  Plan for overcoming my barriers: notify MD immediately if s/s infection or increased pain, participate as ability allows in addressing issues within POC  Confidence: 5/10  Anticipated Goal Completion Date: 120

## 2024-08-14 ENCOUNTER — HOSPITAL ENCOUNTER (OUTPATIENT)
Facility: HOSPITAL | Age: 74
Discharge: HOME | End: 2024-08-17
Attending: ORTHOPAEDIC SURGERY

## 2024-08-14 DIAGNOSIS — M54.10 RADICULOPATHY, UNSPECIFIED SPINAL REGION: ICD-10-CM

## 2024-08-14 DIAGNOSIS — M96.1 POSTLAMINECTOMY SYNDROME: ICD-10-CM

## 2024-08-15 ENCOUNTER — TELEPHONE (OUTPATIENT)
Facility: CLINIC | Age: 74
End: 2024-08-15

## 2024-08-15 ENCOUNTER — CARE COORDINATION (OUTPATIENT)
Facility: CLINIC | Age: 74
End: 2024-08-15

## 2024-08-15 NOTE — TELEPHONE ENCOUNTER
Pt called stating that she was seen in the hospital and upon her discharge she was given a \"prednisone taper\" and does not feel like it did the trick and would like to know if  can prescribe her the same thing. Pt is aware that an appt may be needed. Please review and advise as needed.

## 2024-08-15 NOTE — CARE COORDINATION
Care Transitions Note    Follow Up Call     [place]:  [place] [address] [facility] [phone]    Patient   contacted the  Care Transition Nurse  by telephone. Verified name and  as identifiers.    Additional needs identified to be addressed with provider   Patient reported bilateral hip and leg pain. Unclear if this is related to recent surgery. Suggested scheduling sooner appt with Dr. Iyer. Patient voiced she will follow up with rheumatology Monday. Due to recent surgery and medical history advised patient to report to ED if condition worsens.                 Method of communication with provider: chart routing.    Care Summary Note: patient voiced she removed pike last night and was seen by urology today. Patient passed voiding trial and verbalized she has been urinating w/o difficulty today.     Patient reported she thinks she may be having an RA flare. C/o bilateral hip and leg pain. Reported they hurt constantly and she has been unable to sleep. Tylenol helps with pain but does want to take too much. Continues to take Roxicodone for back pain. Denied swelling or warmth to bilateral extremities. Has appt with rheumatology on . Patient reported she is allergic to nickel. Voiced it causes pus filled blisters on skin. Allergy list updated. Patient does have a spine stimulator and voiced the battery is nickel. Has one tab left of Medrol dose majo. CTN advised patient to follow up in ED for worsening pain.    Plan of care updates since last contact:  Appointment: CTN suggested contacting Dr. Iyer to schedule an appt due to new/worsening pain. Patient voiced she has an appt in  but will follow up with rheumatologist on .       Advance Care Planning:   Does patient have an Advance Directive: deferred at this time, will discuss on future follow up. .    Medication Review:  No changes since last call.     Remote Patient Monitoring:  Offered patient enrollment in the Remote

## 2024-08-16 ENCOUNTER — CARE COORDINATION (OUTPATIENT)
Facility: CLINIC | Age: 74
End: 2024-08-16

## 2024-08-16 ENCOUNTER — HOSPITAL ENCOUNTER (EMERGENCY)
Facility: HOSPITAL | Age: 74
Discharge: HOME | End: 2024-08-17
Attending: EMERGENCY MEDICINE
Payer: MEDICARE

## 2024-08-16 DIAGNOSIS — M54.50 CHRONIC BILATERAL LOW BACK PAIN WITHOUT SCIATICA: Primary | ICD-10-CM

## 2024-08-16 DIAGNOSIS — G89.29 CHRONIC BILATERAL LOW BACK PAIN WITHOUT SCIATICA: Primary | ICD-10-CM

## 2024-08-16 PROCEDURE — 6360000002 HC RX W HCPCS: Performed by: EMERGENCY MEDICINE

## 2024-08-16 PROCEDURE — 86140 C-REACTIVE PROTEIN: CPT

## 2024-08-16 PROCEDURE — 2580000003 HC RX 258: Performed by: EMERGENCY MEDICINE

## 2024-08-16 PROCEDURE — 85652 RBC SED RATE AUTOMATED: CPT

## 2024-08-16 PROCEDURE — 99284 EMERGENCY DEPT VISIT MOD MDM: CPT

## 2024-08-16 PROCEDURE — 96374 THER/PROPH/DIAG INJ IV PUSH: CPT

## 2024-08-16 PROCEDURE — 80048 BASIC METABOLIC PNL TOTAL CA: CPT

## 2024-08-16 PROCEDURE — 85025 COMPLETE CBC W/AUTO DIFF WBC: CPT

## 2024-08-16 RX ORDER — MORPHINE SULFATE 10 MG/ML
6 INJECTION, SOLUTION INTRAMUSCULAR; INTRAVENOUS
Status: COMPLETED | OUTPATIENT
Start: 2024-08-16 | End: 2024-08-17

## 2024-08-16 RX ORDER — BISACODYL 10 MG
10 SUPPOSITORY, RECTAL RECTAL
Status: COMPLETED | OUTPATIENT
Start: 2024-08-16 | End: 2024-08-17

## 2024-08-16 RX ADMIN — WATER 80 MG: 1 INJECTION INTRAMUSCULAR; INTRAVENOUS; SUBCUTANEOUS at 23:59

## 2024-08-16 ASSESSMENT — PAIN DESCRIPTION - LOCATION: LOCATION: BACK

## 2024-08-16 ASSESSMENT — PAIN - FUNCTIONAL ASSESSMENT: PAIN_FUNCTIONAL_ASSESSMENT: 0-10

## 2024-08-16 ASSESSMENT — PAIN DESCRIPTION - ORIENTATION: ORIENTATION: LOWER

## 2024-08-16 ASSESSMENT — PAIN SCALES - GENERAL: PAINLEVEL_OUTOF10: 7

## 2024-08-16 NOTE — TELEPHONE ENCOUNTER
Pt called stating that she has found out that the device implanted in her back from her most recent hospital stay has 2 things in it that she is allergic to (medical adhesive and nickel). Pt states she does not know what to do and she is in a lot of pain. Please review and advise as needed.

## 2024-08-16 NOTE — TELEPHONE ENCOUNTER
Spoke with  in regards to my message earlier today in which I was instructed to let patient know to call the facility who put the device in. Pt states she did see them on yesterday but will reach back out to them in regards to this as she is in a lot of pain. Made pt aware that if she did need to speak to  about this to please give me a call back so we add her on to see him when he returns. Pt expressed understanding. Please review and advise as needed.

## 2024-08-16 NOTE — CARE COORDINATION
Care Transitions Note    Follow Up Call     Patient Current Location:  Home: [address] [facility] [phone]    Patient   contacted the  Care Transition Nurse  by telephone. Verified name and  as identifiers.    Additional needs identified to be addressed with provider   Patient reported unrelieved back pain. CTN advised patient to report to ED if condition worsens or remains unrelieved.                  Method of communication with provider: chart routing.    Care Summary Note: Patient reported bilateral hip and leg pain. Patient voiced once she stopped the Prednisone pain increased. Pain medication not providing relief of reported pain.     Plan of care updates since last contact:  Patient advised to report to ED for evaluation for worsening pain.       Advance Care Planning:   Does patient have an Advance Directive: deferred at this time, will discuss on future follow up. .    Medication Review:  No changes since last call.     Remote Patient Monitoring:  Offered patient enrollment in the Remote Patient Monitoring (RPM) program for in-home monitoring: Patient is not eligible for RPM program because: patient does not have qualifying diagnosis.    Assessments:  No changes since last call    Follow Up Appointment:   Reviewed upcoming appointment(s).  Future Appointments         Provider Specialty Dept Phone    2024 1:00 PM Brandan Iyer MD      10/3/2024 8:15 AM Jorge L Epps MD Family Medicine [phone]    2025 10:30 AM Rosemary Stokes APRN - [facility] [phone]            Care Transition Nurse provided contact information.  Plan for follow-up call in 2-5 days based on severity of symptoms and risk factors.  Plan for next call: symptom management-assess condition/pain      Lin Fernandez RN

## 2024-08-17 VITALS
BODY MASS INDEX: 29.44 KG/M2 | OXYGEN SATURATION: 100 % | TEMPERATURE: 97.8 F | WEIGHT: 160 LBS | SYSTOLIC BLOOD PRESSURE: 115 MMHG | HEIGHT: 62 IN | HEART RATE: 73 BPM | DIASTOLIC BLOOD PRESSURE: 76 MMHG | RESPIRATION RATE: 17 BRPM

## 2024-08-17 LAB
ANION GAP SERPL CALC-SCNC: 10 MMOL/L (ref 3–18)
ANION GAP SERPL CALC-SCNC: 6 MMOL/L (ref 3–18)
BASOPHILS # BLD: 0.1 K/UL (ref 0–0.1)
BASOPHILS NFR BLD: 1 % (ref 0–2)
BUN SERPL-MCNC: 29 MG/DL (ref 7–18)
BUN SERPL-MCNC: 30 MG/DL (ref 7–18)
BUN/CREAT SERPL: 16 (ref 12–20)
BUN/CREAT SERPL: 17 (ref 12–20)
CALCIUM SERPL-MCNC: 8.6 MG/DL (ref 8.5–10.1)
CALCIUM SERPL-MCNC: 9 MG/DL (ref 8.5–10.1)
CHLORIDE SERPL-SCNC: 96 MMOL/L (ref 100–111)
CHLORIDE SERPL-SCNC: 98 MMOL/L (ref 100–111)
CO2 SERPL-SCNC: 25 MMOL/L (ref 21–32)
CO2 SERPL-SCNC: 25 MMOL/L (ref 21–32)
CREAT SERPL-MCNC: 1.72 MG/DL (ref 0.6–1.3)
CREAT SERPL-MCNC: 1.85 MG/DL (ref 0.6–1.3)
CRP SERPL-MCNC: <0.3 MG/DL (ref 0–0.3)
DIFFERENTIAL METHOD BLD: ABNORMAL
EOSINOPHIL # BLD: 0.3 K/UL (ref 0–0.4)
EOSINOPHIL NFR BLD: 3 % (ref 0–5)
ERYTHROCYTE [DISTWIDTH] IN BLOOD BY AUTOMATED COUNT: 13.5 % (ref 11.6–14.5)
ERYTHROCYTE [SEDIMENTATION RATE] IN BLOOD: 5 MM/HR (ref 0–30)
GLUCOSE SERPL-MCNC: 105 MG/DL (ref 74–99)
GLUCOSE SERPL-MCNC: 94 MG/DL (ref 74–99)
HCT VFR BLD AUTO: 39.8 % (ref 35–45)
HGB BLD-MCNC: 13.3 G/DL (ref 12–16)
IMM GRANULOCYTES # BLD AUTO: 0.1 K/UL (ref 0–0.04)
IMM GRANULOCYTES NFR BLD AUTO: 1 % (ref 0–0.5)
LYMPHOCYTES # BLD: 3.3 K/UL (ref 0.9–3.6)
LYMPHOCYTES NFR BLD: 36 % (ref 21–52)
MCH RBC QN AUTO: 31.9 PG (ref 24–34)
MCHC RBC AUTO-ENTMCNC: 33.4 G/DL (ref 31–37)
MCV RBC AUTO: 95.4 FL (ref 78–100)
MONOCYTES # BLD: 0.9 K/UL (ref 0.05–1.2)
MONOCYTES NFR BLD: 10 % (ref 3–10)
NEUTS SEG # BLD: 4.7 K/UL (ref 1.8–8)
NEUTS SEG NFR BLD: 50 % (ref 40–73)
NRBC # BLD: 0 K/UL (ref 0–0.01)
NRBC BLD-RTO: 0 PER 100 WBC
PLATELET # BLD AUTO: 414 K/UL (ref 135–420)
PMV BLD AUTO: 8.5 FL (ref 9.2–11.8)
POTASSIUM SERPL-SCNC: 4 MMOL/L (ref 3.5–5.5)
POTASSIUM SERPL-SCNC: 7.5 MMOL/L (ref 3.5–5.5)
RBC # BLD AUTO: 4.17 M/UL (ref 4.2–5.3)
SODIUM SERPL-SCNC: 127 MMOL/L (ref 136–145)
SODIUM SERPL-SCNC: 133 MMOL/L (ref 136–145)
WBC # BLD AUTO: 9.4 K/UL (ref 4.6–13.2)

## 2024-08-17 PROCEDURE — 6360000002 HC RX W HCPCS: Performed by: EMERGENCY MEDICINE

## 2024-08-17 PROCEDURE — 96375 TX/PRO/DX INJ NEW DRUG ADDON: CPT

## 2024-08-17 PROCEDURE — 80048 BASIC METABOLIC PNL TOTAL CA: CPT

## 2024-08-17 PROCEDURE — 6370000000 HC RX 637 (ALT 250 FOR IP): Performed by: EMERGENCY MEDICINE

## 2024-08-17 RX ORDER — TIZANIDINE 2 MG/1
4 TABLET ORAL ONCE
Status: COMPLETED | OUTPATIENT
Start: 2024-08-17 | End: 2024-08-17

## 2024-08-17 RX ORDER — PREDNISONE 50 MG/1
50 TABLET ORAL DAILY
Qty: 7 TABLET | Refills: 0 | Status: SHIPPED | OUTPATIENT
Start: 2024-08-17 | End: 2024-08-24

## 2024-08-17 RX ORDER — HYDROMORPHONE HYDROCHLORIDE 4 MG/1
4 TABLET ORAL EVERY 6 HOURS PRN
Qty: 20 TABLET | Refills: 0 | Status: SHIPPED | OUTPATIENT
Start: 2024-08-17 | End: 2024-08-24

## 2024-08-17 RX ORDER — TIZANIDINE 4 MG/1
4 TABLET ORAL NIGHTLY PRN
Qty: 10 TABLET | Refills: 0 | Status: SHIPPED | OUTPATIENT
Start: 2024-08-17

## 2024-08-17 RX ORDER — MAG HYDROX/ALUMINUM HYD/SIMETH 400-400-40
15 SUSPENSION, ORAL (FINAL DOSE FORM) ORAL EVERY 6 HOURS PRN
Qty: 355 ML | Refills: 0 | Status: SHIPPED | OUTPATIENT
Start: 2024-08-17

## 2024-08-17 RX ADMIN — BISACODYL 10 MG: 10 SUPPOSITORY RECTAL at 00:02

## 2024-08-17 RX ADMIN — HYDROMORPHONE HYDROCHLORIDE 0.5 MG: 1 INJECTION, SOLUTION INTRAMUSCULAR; INTRAVENOUS; SUBCUTANEOUS at 01:30

## 2024-08-17 RX ADMIN — ALUMINUM HYDROXIDE, MAGNESIUM HYDROXIDE, AND SIMETHICONE 40 ML: 1200; 120; 1200 SUSPENSION ORAL at 01:30

## 2024-08-17 RX ADMIN — MORPHINE SULFATE 6 MG: 10 INJECTION, SOLUTION INTRAMUSCULAR; INTRAVENOUS at 00:02

## 2024-08-17 RX ADMIN — TIZANIDINE 4 MG: 2 TABLET ORAL at 01:30

## 2024-08-17 ASSESSMENT — PAIN DESCRIPTION - LOCATION
LOCATION: BACK

## 2024-08-17 ASSESSMENT — PAIN DESCRIPTION - ORIENTATION
ORIENTATION: LOWER;MID
ORIENTATION: LOWER

## 2024-08-17 ASSESSMENT — PAIN SCALES - GENERAL
PAINLEVEL_OUTOF10: 9
PAINLEVEL_OUTOF10: 4
PAINLEVEL_OUTOF10: 9
PAINLEVEL_OUTOF10: 6
PAINLEVEL_OUTOF10: 7

## 2024-08-17 ASSESSMENT — PAIN - FUNCTIONAL ASSESSMENT
PAIN_FUNCTIONAL_ASSESSMENT: 0-10
PAIN_FUNCTIONAL_ASSESSMENT: 0-10
PAIN_FUNCTIONAL_ASSESSMENT: ACTIVITIES ARE NOT PREVENTED

## 2024-08-17 ASSESSMENT — PAIN DESCRIPTION - ONSET: ONSET: ON-GOING

## 2024-08-17 ASSESSMENT — PAIN DESCRIPTION - PAIN TYPE: TYPE: ACUTE PAIN

## 2024-08-17 ASSESSMENT — PAIN DESCRIPTION - DESCRIPTORS
DESCRIPTORS: ACHING
DESCRIPTORS: ACHING
DESCRIPTORS: PRESSURE;ACHING

## 2024-08-17 ASSESSMENT — PAIN DESCRIPTION - FREQUENCY: FREQUENCY: CONTINUOUS

## 2024-08-17 NOTE — ED TRIAGE NOTES
Pt arrived via Triage ambulatory c/o lower back pain that radiates down her legs, not able to move her bowels. States her last BM was 4 days ago. Pt has tried, Miralax, fleets, suppository and x-lax. Pt states she had a stimulator in for her bowels and urine. Has been able to urinate without difficulty. Pt states she was told to turn her stimulator off this morning.

## 2024-08-17 NOTE — ED NOTES
Patient discharged to home. IV removed. Discharge papers explained and given to patient. All personal belongings taken with patient from room. Patient escorted to ER exit via wheel chair.

## 2024-08-17 NOTE — ED PROVIDER NOTES
EMERGENCY DEPARTMENT HISTORY AND PHYSICAL EXAM      Date: 8/16/2024  Patient Name: Salma Lopez      History of Presenting Illness     Chief Complaint   Patient presents with    Back Pain    Constipation       Location/Duration/Severity/Modifying factors   Chief Complaint   Patient presents with    Back Pain    Constipation       HPI:  Salma Lopez is a 74 y.o. female with PMH significant for sacral spinal stimulator placement for bowel and bladder dysfunction in early June, degenerative disc disease, spondylolisthesis of lumbar region presents with ongoing pain in the bilateral low back, not controlled oxycodone prescribed.  Still having issues with constipation.  Was told by the representative to turn off the stimulator this morning.  Has had issues since placement of the stimulator in June.  Was seen this past week by Dr. Solano, urologist in [place] who placed the stimulator and told that the stimulator seems to be working well.  Was admitted and discharged 1 week ago for low back pain rating to the legs, chronic bladder dysfunction, concern for possible infection given low-grade fevers.  Back pain was causing her to be unable to move or ambulate.  She was treated with PT OT, analgesia and steroids discharged home.  Was discharged on oral steroid taper which seemed to be effective but then the pain returned after taper was completed.    PCP: Jorge L Epps MD    No current facility-administered medications for this encounter.     Current Outpatient Medications   Medication Sig Dispense Refill    HYDROmorphone (DILAUDID) 4 MG tablet Take 1 tablet by mouth every 6 hours as needed for Pain (Moderate to severe breakthrough pain if Tylenol 1 g, muscle relaxant, oral steroid not effective.  Would take in place of the oxycodone rather than in addition to) for up to 7 days. Max Daily Amount: 16 mg 20 tablet 0    tiZANidine (ZANAFLEX) 4 MG tablet Take 1 tablet by mouth nightly as needed (Muscle spasms) 10    BMP    Collection Time: 08/16/24 11:55 PM   Result Value Ref Range    Sodium 127 (L) 136 - 145 mmol/L    Potassium 7.5 (HH) 3.5 - 5.5 mmol/L    Chloride 96 (L) 100 - 111 mmol/L    CO2 25 21 - 32 mmol/L    Anion Gap 6 3.0 - 18 mmol/L    Glucose 94 74 - 99 mg/dL    BUN 30 (H) 7.0 - 18 MG/DL    Creatinine 1.85 (H) 0.6 - 1.3 MG/DL    BUN/Creatinine Ratio 16 12 - 20      Est, Glom Filt Rate 28 (L) >60 ml/min/1.73m2    Calcium 9.0 8.5 - 10.1 MG/DL   Sedimentation Rate    Collection Time: 08/16/24 11:55 PM   Result Value Ref Range    Sed Rate, Automated 5 0 - 30 mm/hr   C-Reactive Protein    Collection Time: 08/16/24 11:55 PM   Result Value Ref Range    CRP <0.3 0 - 0.3 mg/dL   BMP    Collection Time: 08/17/24 12:48 AM   Result Value Ref Range    Sodium 133 (L) 136 - 145 mmol/L    Potassium 4.0 3.5 - 5.5 mmol/L    Chloride 98 (L) 100 - 111 mmol/L    CO2 25 21 - 32 mmol/L    Anion Gap 10 3.0 - 18 mmol/L    Glucose 105 (H) 74 - 99 mg/dL    BUN 29 (H) 7.0 - 18 MG/DL    Creatinine 1.72 (H) 0.6 - 1.3 MG/DL    BUN/Creatinine Ratio 17 12 - 20      Est, Glom Filt Rate 30 (L) >60 ml/min/1.73m2    Calcium 8.6 8.5 - 10.1 MG/DL       Radiologic Studies -   No orders to display         Procedures and Critical Care     Performed by: DOROTHY JACKSON, DO    Procedures     DOROTHY JACKSON, DO    Medical Decision Making and ED Course   - I am the first and primary provider for this patient AND AM THE PRIMARY PROVIDER OF RECORD.    - I reviewed the vital signs, available nursing notes, past medical history, past surgical history, family history and social history.    - Initial assessment performed. The patients presenting problems have been discussed, and the staff are in agreement with the care plan formulated and outlined with them.  I have encouraged them to ask questions as they arise throughout their visit.    Vital Signs-Reviewed the patient's vital signs.    Patient Vitals for the past 12 hrs:   Temp Pulse Resp BP SpO2   08/17/24 0130

## 2024-08-17 NOTE — DISCHARGE INSTRUCTIONS
Can take 1/2-1 full tablet of the oral hydromorphone for moderate to severe pain relief instead of the oxycodone.  Can also take Tylenol 1 g for more mild to moderate pain, complete oral burst steroid course for 7 days

## 2024-08-19 ENCOUNTER — CARE COORDINATION (OUTPATIENT)
Facility: CLINIC | Age: 74
End: 2024-08-19

## 2024-08-19 NOTE — CARE COORDINATION
Care Transitions Note    Follow Up Call     Patient [place]  [facility] [address] [facility] [address]    Care Transition Nurse contacted the patient by telephone. Verified name and  as identifiers.    Additional needs identified to be addressed with provider   High priority: Patient reported last BM 6 days ago. Has also has been c/o unrelieved pain. Seen in [facility] ED on . Has tried enemas, Citrate magnesium, Senokot, and Miralax with no relief/results. Per ED note on , patient was instructed to turn off her spinal stimulator. Constipation/inability to move bowels was not addressed. Creatinine and BUN elevated. Unable to reach ortho office via phone. Patient also c/o bilateral hip and leg pain. Seen by Dr. Mauro on 8/15.                  Method of communication with provider: chart routing and phone.    Care Summary Note: Patient reported unrelieved back pain and constipation X 6 days. Seen in ED and treated for her pain on . Labs indicate elevation in Creatinine and BUN. No imaging performed. Seen in Dr. Mauro's office (urology) on 8/15. Per urology note, Payoffs was on and working at time of visit.    Plan of care updates since last contact:  Communication with providers: CTN attempted to reach Dr. Iyer's office X 4. Phone rings busy. No alternate number available. CTN contacted PCP office to request same day appt. Provided 2 patient identifiers. Was informed no availability today. Appt with Dr. Epps scheduled for  @ 11:30 AM. Patient made aware. Patient voiced she ha a pain management appt the same day at 1:45 PM in [place]. Patient voiced she will call to reschedule. CTN advised patient if condition worsens to report back to ED.       Advance Care Planning:   Does patient have an Advance Directive: deferred at this time, will discuss on future follow up. .    Medication Review:  Medications changed since last call, reviewed today.     Remote Patient Monitoring:  Offered

## 2024-08-20 ENCOUNTER — OFFICE VISIT (OUTPATIENT)
Facility: CLINIC | Age: 74
End: 2024-08-20
Payer: MEDICARE

## 2024-08-20 VITALS
BODY MASS INDEX: 28.89 KG/M2 | SYSTOLIC BLOOD PRESSURE: 108 MMHG | HEIGHT: 62 IN | HEART RATE: 100 BPM | OXYGEN SATURATION: 98 % | WEIGHT: 157 LBS | DIASTOLIC BLOOD PRESSURE: 72 MMHG

## 2024-08-20 DIAGNOSIS — C80.1 MALIGNANT (PRIMARY) NEOPLASM, UNSPECIFIED (HCC): ICD-10-CM

## 2024-08-20 DIAGNOSIS — Z09 HOSPITAL DISCHARGE FOLLOW-UP: Primary | ICD-10-CM

## 2024-08-20 DIAGNOSIS — F11.99 OPIOID USE, UNSPECIFIED WITH UNSPECIFIED OPIOID-INDUCED DISORDER (HCC): ICD-10-CM

## 2024-08-20 PROCEDURE — G8417 CALC BMI ABV UP PARAM F/U: HCPCS | Performed by: FAMILY MEDICINE

## 2024-08-20 PROCEDURE — 99214 OFFICE O/P EST MOD 30 MIN: CPT | Performed by: FAMILY MEDICINE

## 2024-08-20 PROCEDURE — 1111F DSCHRG MED/CURRENT MED MERGE: CPT | Performed by: FAMILY MEDICINE

## 2024-08-20 PROCEDURE — 1036F TOBACCO NON-USER: CPT | Performed by: FAMILY MEDICINE

## 2024-08-20 PROCEDURE — G8399 PT W/DXA RESULTS DOCUMENT: HCPCS | Performed by: FAMILY MEDICINE

## 2024-08-20 PROCEDURE — 3074F SYST BP LT 130 MM HG: CPT | Performed by: FAMILY MEDICINE

## 2024-08-20 PROCEDURE — 1123F ACP DISCUSS/DSCN MKR DOCD: CPT | Performed by: FAMILY MEDICINE

## 2024-08-20 PROCEDURE — 3078F DIAST BP <80 MM HG: CPT | Performed by: FAMILY MEDICINE

## 2024-08-20 PROCEDURE — G8427 DOCREV CUR MEDS BY ELIG CLIN: HCPCS | Performed by: FAMILY MEDICINE

## 2024-08-20 PROCEDURE — 1090F PRES/ABSN URINE INCON ASSESS: CPT | Performed by: FAMILY MEDICINE

## 2024-08-20 SDOH — ECONOMIC STABILITY: INCOME INSECURITY: HOW HARD IS IT FOR YOU TO PAY FOR THE VERY BASICS LIKE FOOD, HOUSING, MEDICAL CARE, AND HEATING?: NOT HARD AT ALL

## 2024-08-20 SDOH — ECONOMIC STABILITY: FOOD INSECURITY: WITHIN THE PAST 12 MONTHS, YOU WORRIED THAT YOUR FOOD WOULD RUN OUT BEFORE YOU GOT MONEY TO BUY MORE.: NEVER TRUE

## 2024-08-20 SDOH — ECONOMIC STABILITY: FOOD INSECURITY: WITHIN THE PAST 12 MONTHS, THE FOOD YOU BOUGHT JUST DIDN'T LAST AND YOU DIDN'T HAVE MONEY TO GET MORE.: NEVER TRUE

## 2024-08-20 ASSESSMENT — PATIENT HEALTH QUESTIONNAIRE - PHQ9
SUM OF ALL RESPONSES TO PHQ QUESTIONS 1-9: 0
SUM OF ALL RESPONSES TO PHQ9 QUESTIONS 1 & 2: 0
2. FEELING DOWN, DEPRESSED OR HOPELESS: NOT AT ALL
SUM OF ALL RESPONSES TO PHQ QUESTIONS 1-9: 0
1. LITTLE INTEREST OR PLEASURE IN DOING THINGS: NOT AT ALL
SUM OF ALL RESPONSES TO PHQ QUESTIONS 1-9: 0
SUM OF ALL RESPONSES TO PHQ QUESTIONS 1-9: 0

## 2024-08-20 ASSESSMENT — ENCOUNTER SYMPTOMS
VOMITING: 0
COUGH: 0
NAUSEA: 0
RESPIRATORY NEGATIVE: 1

## 2024-08-20 NOTE — PROGRESS NOTES
Chief Complaint   Patient presents with    Follow-Up from Hospital     08/16- acute kidney injury. Patient states that her MRI shows that her L5 and S1 are basically gone.      \"Have you been to the ER, urgent care clinic since your last visit?  Hospitalized since your last visit?\"    YES - When: approximately 10 days ago.  Where and Why: [facility]- acute pain.    “Have you seen or consulted any other health care providers outside of [facility] since your last visit?”    NO            Click Here for Release of Records Request    
patient in detail. The patient verbalized understanding and is in agreement with the plan of care. The patient will contact the office with any additional concerns.    lab results and schedule of future lab studies reviewed with patient    FLORES GONZALES MD

## 2024-08-21 ENCOUNTER — CARE COORDINATION (OUTPATIENT)
Facility: CLINIC | Age: 74
End: 2024-08-21

## 2024-08-21 NOTE — CARE COORDINATION
Care Transitions Note    Follow Up Call     Patient Current Location:  Home: [id number] [place] [phone]    [facility] Care Coordinator contacted the patient by telephone. Verified name and  as identifiers.    Additional needs identified to be addressed with provider   No needs identified                 Method of communication with provider: none.    Care Summary Note:   Spoke with patient.patient states she is not doing too well. Patient reports pain in back.  Patient states she is taking medications as prescribed. Received rx for Prednisone taper. She will start that tomorrow.  Patient followed up with pcp and pain management on 24  Patient states she will follow up with Dr. Iyer on 24.  Patient would like copy of OR report from . Patient advised to contact medical records at [facility].          Advance Care Planning:   Does patient have an Advance Directive: deferred at this time, will discuss on future follow up. .    Medication Review:  Changes reviewed    Remote Patient Monitoring:  Offered patient enrollment in the Remote Patient Monitoring (RPM) program for in-home monitoring: n/a    Assessments:  No changes since last call    Follow Up Appointment:   Reviewed upcoming appointment(s). and DECLAN appointment attended as scheduled   Future Appointments         Provider Specialty Dept Phone    2024 1:00 PM Brandan Iyer MD      10/3/2024 8:15 AM Jorge L Epps MD Family Medicine [phone]    2025 10:30 AM Rosemary Stokes APRN - [facility] [phone]            N Care Coordinator provided contact information.  Plan for follow-up call in 6-10 days based on severity of symptoms and risk factors.  Plan for next call: symptom management-how is pain and bms, acp      Joelle Mercado LPN

## 2024-08-24 ENCOUNTER — HOSPITAL ENCOUNTER (EMERGENCY)
Facility: HOSPITAL | Age: 74
Discharge: HOME | End: 2024-08-24
Attending: EMERGENCY MEDICINE
Payer: MEDICARE

## 2024-08-24 VITALS
OXYGEN SATURATION: 97 % | TEMPERATURE: 97.7 F | WEIGHT: 157 LBS | RESPIRATION RATE: 18 BRPM | HEART RATE: 104 BPM | SYSTOLIC BLOOD PRESSURE: 105 MMHG | BODY MASS INDEX: 28.89 KG/M2 | DIASTOLIC BLOOD PRESSURE: 84 MMHG | HEIGHT: 62 IN

## 2024-08-24 DIAGNOSIS — M62.838 MUSCLE SPASM OF RIGHT LOWER EXTREMITY: ICD-10-CM

## 2024-08-24 DIAGNOSIS — M62.830 SPASM OF MUSCLE OF LOWER BACK: Primary | ICD-10-CM

## 2024-08-24 PROCEDURE — 6370000000 HC RX 637 (ALT 250 FOR IP): Performed by: EMERGENCY MEDICINE

## 2024-08-24 PROCEDURE — 99283 EMERGENCY DEPT VISIT LOW MDM: CPT

## 2024-08-24 RX ORDER — DIAZEPAM 5 MG
5 TABLET ORAL EVERY 8 HOURS PRN
Qty: 12 TABLET | Refills: 0 | Status: SHIPPED | OUTPATIENT
Start: 2024-08-24 | End: 2024-08-29

## 2024-08-24 RX ORDER — DIAZEPAM 5 MG
10 TABLET ORAL ONCE
Status: COMPLETED | OUTPATIENT
Start: 2024-08-24 | End: 2024-08-24

## 2024-08-24 RX ADMIN — DIAZEPAM 10 MG: 5 TABLET ORAL at 21:29

## 2024-08-24 ASSESSMENT — PAIN SCALES - GENERAL: PAINLEVEL_OUTOF10: 9

## 2024-08-24 ASSESSMENT — PAIN - FUNCTIONAL ASSESSMENT: PAIN_FUNCTIONAL_ASSESSMENT: 0-10

## 2024-08-24 ASSESSMENT — PAIN DESCRIPTION - LOCATION: LOCATION: BACK;HIP;LEG

## 2024-08-24 NOTE — ED TRIAGE NOTES
A&O female with c/o muscle spasms in lower back, right hip, and bilat shins. Symptoms began after spinal stimulator inserted in June.

## 2024-08-25 NOTE — ED PROVIDER NOTES
stripping       FAMILY HISTORY:  Family History   Problem Relation Age of Onset    Other Other         Orthopedic (Sister)    Osteoarthritis Sister     Heart Attack Brother 62    Stroke Neg Hx     Malig Hypertherm Neg Hx     Pseudochol. Deficiency Neg Hx     Delayed Awakening Neg Hx     Post-op Nausea/Vomiting Neg Hx     Emergence Delirium Neg Hx     Post-op Cognitive Dysfunction Neg Hx     Cancer Neg Hx     Diabetes Neg Hx     Heart Disease Neg Hx     Hypertension Neg Hx        SOCIAL HISTORY:  Social History     Tobacco Use    Smoking status: Never    Smokeless tobacco: Never   Vaping Use    Vaping status: Never Used   Substance Use Topics    Alcohol use: No    Drug use: Never       MEDICATIONS:  No current facility-administered medications for this encounter.     Current Outpatient Medications   Medication Sig Dispense Refill    diazePAM (VALIUM) 5 MG tablet Take 1 tablet by mouth every 8 hours as needed (muscle spasm) for up to 5 days. Max Daily Amount: 15 mg 12 tablet 0    HYDROmorphone (DILAUDID) 4 MG tablet Take 1 tablet by mouth every 6 hours as needed for Pain (Moderate to severe breakthrough pain if Tylenol 1 g, muscle relaxant, oral steroid not effective.  Would take in place of the oxycodone rather than in addition to) for up to 7 days. Max Daily Amount: 16 mg 20 tablet 0    tiZANidine (ZANAFLEX) 4 MG tablet Take 1 tablet by mouth nightly as needed (Muscle spasms) 10 tablet 0    predniSONE (DELTASONE) 50 MG tablet Take 1 tablet by mouth daily for 7 days 7 tablet 0    aluminum & magnesium hydroxide-simethicone (MAALOX MAX) 400-400-40 MG/5ML SUSP Take 15 mLs by mouth every 6 hours as needed (upper abdominal pain) 355 mL 0    naloxone (NARCAN) 4 MG/0.1ML LIQD nasal spray 1 spray by Nasal route as needed for Opioid Reversal 1 each 0    diphenhydrAMINE (BENADRYL) 25 MG capsule Take 1 capsule by mouth every 6 hours as needed for Itching      omeprazole (PRILOSEC) 20 MG delayed release capsule Take 1 capsule by  mouth Daily      lidocaine (LIDODERM) 5 % Place 1 patch onto the skin daily      folic acid (FOLVITE) 1 MG tablet Take 1 tablet by mouth Every Day      YUVAFEM 10 MCG TABS vaginal tablet Place 1 tablet vaginally every other day suppository      calcium carbonate (OYSTER SHELL CALCIUM 500 MG) 1250 (500 Ca) MG tablet Take 1 tablet by mouth Every Day      sulfamethoxazole-trimethoprim (BACTRIM DS;SEPTRA DS) 800-160 MG per tablet Take 1 tablet by mouth 2 times daily 60 tablet 0    meloxicam (MOBIC) 15 MG tablet Take 1 tablet by mouth daily      furosemide (LASIX) 20 MG tablet Take 1 tablet by mouth daily      zolpidem (AMBIEN) 5 MG tablet Take 1 tablet by mouth nightly for 90 days. Max Daily Amount: 5 mg (Patient not taking: Reported on 8/20/2024) 30 tablet 3    Multiple Vitamins-Minerals (ONE-A-DAY WOMENS 50+ PO) Take by mouth daily      lisinopril (PRINIVIL;ZESTRIL) 5 MG tablet Take 1 tablet by mouth daily Indications: HTN      XELJANZ 5 MG TABS Take 1 tablet by mouth in the morning and at bedtime Indications: RA (Patient not taking: Reported on 8/12/2024)      cetirizine (ZYRTEC) 10 MG tablet Take 1 tablet by mouth daily as needed      vitamin D (CHOLECALCIFEROL) 25 MCG (1000 UT) TABS tablet Take 1 tablet by mouth daily         ALLERGIES:  Allergies   Allergen Reactions    Adhesive Tape Dermatitis, Hives and Itching    Celecoxib Swelling    Nickel Other (See Comments)     Causes pustules     Penicillin G Anaphylaxis    Pregabalin Angioedema    Sulfa Antibiotics Hives and Swelling     Lips swelling    Other Reaction(s): Unknown    Adalimumab      Induced Lupus    Other Reaction(s): Unknown    Influenza Vaccines Hives    Iodine Itching     Takes benadryl and is OK. Contrast tolerated. Betadine ok.    Ioversol Hives and Itching     Pt states when she gets iv contrast she itches a little from hives    Lidoderm [Lidocaine]      had a reaction to the patch/adhesive    Meloxicam     Nsaids Swelling    Penicillins Hives

## 2024-08-27 ENCOUNTER — CARE COORDINATION (OUTPATIENT)
Facility: CLINIC | Age: 74
End: 2024-08-27

## 2024-08-27 NOTE — CARE COORDINATION
Care Transitions Note    Follow Up Call     Patient Current Location:  Home: [address] [address] [address]    Care Transition Nurse contacted the patient by telephone. Verified name and  as identifiers.    Additional needs identified to be addressed with provider   No needs identified                 Method of communication with provider: none.    Care Summary Note: [facility]/o bilateral hip, leg and lower back pain. Prescribed Valium and told to take if other pain medication is not working. Patient reported she takes when she needs to get up and do something but for the most part has been laying in bed due to pain. Patient verbalized she is considering having spinal stimulator removed and has appts with Gary Iyer and [place] this week. Last BM was yesterday. Continues to have urinary incontinence which is unchanged. Denied falls.     Plan of care updates since last contact:  Patient requested contact information for [facility]'s patient advocate. Sent via text per patient's preference.       Advance Care Planning:   Does patient have an Advance Directive: deferred at this time, will discuss on future follow up. .    Medication Review:  Medications changed since last call, reviewed today.     Remote Patient Monitoring:  Offered patient enrollment in the Remote Patient Monitoring (RPM) program for in-home monitoring: Patient is not eligible for RPM program because: patient does not have qualifying diagnosis.    Assessments:  Care Transitions Subsequent and Final Call    Subsequent and Final Calls  Do you have any ongoing symptoms?: Yes  Onset of Patient-reported symptoms: Other  Patient-reported symptoms: Pain  Interventions for patient-reported symptoms: Other  Have your medications changed?: Yes  Patient Reports: prescribed Valium on   Do you currently have any active services?: No  Do you have any needs or concerns that I can assist you with?: No  Identified

## 2024-08-28 ENCOUNTER — OFFICE VISIT (OUTPATIENT)
Facility: CLINIC | Age: 74
End: 2024-08-28
Payer: MEDICARE

## 2024-08-28 VITALS
WEIGHT: 157 LBS | SYSTOLIC BLOOD PRESSURE: 115 MMHG | HEIGHT: 62 IN | OXYGEN SATURATION: 95 % | BODY MASS INDEX: 28.89 KG/M2 | DIASTOLIC BLOOD PRESSURE: 70 MMHG | HEART RATE: 93 BPM

## 2024-08-28 DIAGNOSIS — M25.50 ARTHRALGIA, UNSPECIFIED JOINT: Primary | ICD-10-CM

## 2024-08-28 PROCEDURE — 99213 OFFICE O/P EST LOW 20 MIN: CPT | Performed by: FAMILY MEDICINE

## 2024-08-28 PROCEDURE — 1090F PRES/ABSN URINE INCON ASSESS: CPT | Performed by: FAMILY MEDICINE

## 2024-08-28 PROCEDURE — 1123F ACP DISCUSS/DSCN MKR DOCD: CPT | Performed by: FAMILY MEDICINE

## 2024-08-28 PROCEDURE — 3074F SYST BP LT 130 MM HG: CPT | Performed by: FAMILY MEDICINE

## 2024-08-28 PROCEDURE — G8417 CALC BMI ABV UP PARAM F/U: HCPCS | Performed by: FAMILY MEDICINE

## 2024-08-28 PROCEDURE — G8427 DOCREV CUR MEDS BY ELIG CLIN: HCPCS | Performed by: FAMILY MEDICINE

## 2024-08-28 PROCEDURE — 1036F TOBACCO NON-USER: CPT | Performed by: FAMILY MEDICINE

## 2024-08-28 PROCEDURE — G8399 PT W/DXA RESULTS DOCUMENT: HCPCS | Performed by: FAMILY MEDICINE

## 2024-08-28 PROCEDURE — 1111F DSCHRG MED/CURRENT MED MERGE: CPT | Performed by: FAMILY MEDICINE

## 2024-08-28 PROCEDURE — 3078F DIAST BP <80 MM HG: CPT | Performed by: FAMILY MEDICINE

## 2024-08-28 RX ORDER — CELECOXIB 200 MG/1
200 CAPSULE ORAL DAILY
Qty: 60 CAPSULE | Refills: 1 | Status: SHIPPED | OUTPATIENT
Start: 2024-08-28

## 2024-08-28 RX ORDER — HYDROMORPHONE HYDROCHLORIDE 4 MG/1
4 TABLET ORAL
COMMUNITY
Start: 2024-08-21

## 2024-08-28 ASSESSMENT — ENCOUNTER SYMPTOMS
COUGH: 0
VOMITING: 0
NAUSEA: 0
BACK PAIN: 1
RESPIRATORY NEGATIVE: 1

## 2024-08-28 NOTE — PROGRESS NOTES
Salma Lopez is a 74 y.o. female  presents for   Chief Complaint   Patient presents with    Follow-Up from Hospital     Was seen for bone pain- patient was seen in the ED and was given Valium to go with the Dilaudid that was given by pain management. She states that it is not enough to help with the pain.         Allergies   Allergen Reactions    Adhesive Tape Dermatitis, Hives and Itching    Celecoxib Swelling    Nickel Other (See Comments)     Causes pustules     Penicillin G Anaphylaxis    Pregabalin Angioedema    Sulfa Antibiotics Hives and Swelling     Lips swelling    Other Reaction(s): Unknown    Adalimumab      Induced Lupus    Other Reaction(s): Unknown    Influenza Vaccines Hives    Iodine Itching     Takes benadryl and is OK. Contrast tolerated. Betadine ok.    Ioversol Hives and Itching     Pt states when she gets iv contrast she itches a little from hives    Lidoderm [Lidocaine]      had a reaction to the patch/adhesive    Meloxicam     Nsaids Swelling    Penicillins Hives     Other Reaction(s): Unknown    Pneumococcal Vaccine Hives    Gabapentin Diarrhea and Palpitations     Chest and Abdominal pain    Other Reaction(s): afib       Current Outpatient Medications:     HYDROmorphone (DILAUDID) 4 MG tablet, Take 1 tablet by mouth., Disp: , Rfl:     diazePAM (VALIUM) 5 MG tablet, Take 1 tablet by mouth every 8 hours as needed (muscle spasm) for up to 5 days. Max Daily Amount: 15 mg, Disp: 12 tablet, Rfl: 0    tiZANidine (ZANAFLEX) 4 MG tablet, Take 1 tablet by mouth nightly as needed (Muscle spasms), Disp: 10 tablet, Rfl: 0    aluminum & magnesium hydroxide-simethicone (MAALOX MAX) 400-400-40 MG/5ML SUSP, Take 15 mLs by mouth every 6 hours as needed (upper abdominal pain), Disp: 355 mL, Rfl: 0    naloxone (NARCAN) 4 MG/0.1ML LIQD nasal spray, 1 spray by Nasal route as needed for Opioid Reversal, Disp: 1 each, Rfl: 0    diphenhydrAMINE (BENADRYL) 25 MG capsule, Take 1 capsule by mouth every 6 hours as

## 2024-08-30 NOTE — PROGRESS NOTES
This note has been imported from an outside medical record to facilitate physician to physician communication in the patient's care.      Format may be different from original.       Infectious Disease Consult  History  Chief Complaint / Nature of Presenting Problem: Back pain  Referred by Dr. Iyer     History Of Present Illness: Ms. Lopez is a 74-year-old female with an extensive past medical recurrent infections and autoimmune disease as well as arthritis who is being referred to infectious disease clinic by Dr. Iyer secondary to chronic low back pain with stimulator in place which seemed to improve with Bactrim.  Patient reports that she has a significant history of recurrent gram-negative infections to include recurrent diverticulitis with abscess, recurrent UTIs, dental abscesses.  She has underlying rheumatoid arthritis and is currently followed by Dr. Hyatt.  She also has a history of urticaria and unspecified allergies for which she follows with an allergist in [place] she is unable to the recall the name of.  She has had dozens of surgeries and cystoscopies.  She follows with pain management for spinal injections due to her RA.  She notes that she had a bladder stimulator placed for chronic bladder dysfunction and urinary retention.  She had gotten a urinary tract infection and was started on Bactrim.  Allegedly after she had started the Bactrim, she started to feel much better.  When the Bactrim was stopped the pain returned.  She states that the pain wraps around her hips into her posterior thigh and down into her legs.  She is followed by Dr. Iyer for her chronic but low back pain.  Medication List:  Zyrtec 10 mg 1 p.o. daily  Lasix 20 mg 1 p.o. twice daily  Zestril 5 mg 1 p.o. daily  Mobic 15 mg 1 p.o. daily  Bactrim 1 tab p.o. twice daily  Vitamin D 1000 units 1 p.o. daily  Xeljanz 5 mg 1 p.o. twice daily  Ambien 5 mg 1 p.o. daily  Tramadol 50 mg po prn TID  Tylenol  Flexeril PRN  Allergy  Respiratory Rate: 15 Breaths per minute;  Physical Exam  General: This patient is well developed and in no acute distress.  Ears/Nose/Mouth/Throat: Hearing grossly intact.  Pharynx is not injected. Mouth without lesions.  Overall appearance normal with no scars, lesions or masses.  Respiratory: Normal respiratory effort.  No crackles, wheezes or ronchi.   Bases clear.  Cardiovascular: RRR, s1 and s2, no murmurs or gallops.   No peripheral edema.  Gastrointestinal: Soft, non-distended. Old surgical incision scars  Active bowel sounds throughout  Non-tender.   No hepato-splenomegaly.  Genitourinary: Deferred  Lymphatics: No palpable adenopathy.  Musculoskeletal: Digits/nails: No clubbing, cyanosis, inflammation or ischemia.  No joint deformity, swelling, redness or focal muscle weakness.  No effusions noted.   Muscle mass appropriate for age.  + decreased ROM to b/l hips  + pain to lumbar spine with bending, sitting, standing; some palpable muslce tension to lumbar paraspinal muscles  Skin: Warm and dry. No rashes noted on limited exam.  + stimulator pocket without inrudation, erythema  Neurological: No focal weakness. Cranial nerves grossly intact. Mentation clear. Speech clear. Gait stable. No tremors.   Good historian.  Psychiatric: Calm, appropriate, asks appropriate questions.  Labs/Radiology/Tests  Labs:  07/14/24 02:02  Sodium: 136  Potassium: 4.7  Chloride: 102  CARBON DIOXIDE: 27  BUN,BUNPL: 31 (H)  Creatinine: 1.17  Bun/Cre: 26 (H)  Anion Gap: 7  Est, Glom Filt Rate: 48 (L)  Glucose: 110 (H)  Calcium: 9.4  WBC: 7.2  RBC: 4.47  Hemoglobin Quant: 14.1  Hematocrit: 43.2  MCV: 96.6  MCH: 31.5  MCHC: 32.6  MPV: 8.9 (L)  RDW: 12.4  Platelet Count: 325  Neutrophils %: 56  Lymphocyte %: 33  Monocytes %: 7  Eosinophils %: 4  Basophils %: 1  Neutrophils Absolute: 4.0  Lymphocytes Absolute: 2.4  Monocytes Absolute: 0.5  Eosinophils Absolute: 0.3  Basophils Absolute: 0.1  Differential Type: AUTOMATED  Immature

## 2024-09-03 ENCOUNTER — CARE COORDINATION (OUTPATIENT)
Facility: CLINIC | Age: 74
End: 2024-09-03

## 2024-09-03 ENCOUNTER — HOSPITAL ENCOUNTER (OUTPATIENT)
Facility: HOSPITAL | Age: 74
Discharge: HOME | End: 2024-09-06
Payer: MEDICARE

## 2024-09-03 DIAGNOSIS — Z01.818 PRE-OP TESTING: Primary | ICD-10-CM

## 2024-09-03 LAB
ALBUMIN SERPL-MCNC: 3.8 G/DL (ref 3.4–5)
ALBUMIN/GLOB SERPL: 1.4 (ref 0.8–1.7)
ALP SERPL-CCNC: 73 U/L (ref 45–117)
ALT SERPL-CCNC: 29 U/L (ref 13–56)
ANION GAP SERPL CALC-SCNC: 8 MMOL/L (ref 3–18)
AST SERPL-CCNC: 30 U/L (ref 10–38)
BILIRUB SERPL-MCNC: 0.5 MG/DL (ref 0.2–1)
BUN SERPL-MCNC: 18 MG/DL (ref 7–18)
BUN/CREAT SERPL: 12 (ref 12–20)
CALCIUM SERPL-MCNC: 9 MG/DL (ref 8.5–10.1)
CHLORIDE SERPL-SCNC: 95 MMOL/L (ref 100–111)
CO2 SERPL-SCNC: 26 MMOL/L (ref 21–32)
CREAT SERPL-MCNC: 1.45 MG/DL (ref 0.6–1.3)
GLOBULIN SER CALC-MCNC: 2.7 G/DL (ref 2–4)
GLUCOSE SERPL-MCNC: 112 MG/DL (ref 74–99)
POTASSIUM SERPL-SCNC: 4.6 MMOL/L (ref 3.5–5.5)
PROT SERPL-MCNC: 6.5 G/DL (ref 6.4–8.2)
SODIUM SERPL-SCNC: 129 MMOL/L (ref 136–145)

## 2024-09-03 PROCEDURE — 80053 COMPREHEN METABOLIC PANEL: CPT

## 2024-09-03 PROCEDURE — 93005 ELECTROCARDIOGRAM TRACING: CPT | Performed by: ORTHOPAEDIC SURGERY

## 2024-09-04 ENCOUNTER — ANESTHESIA EVENT (OUTPATIENT)
Facility: HOSPITAL | Age: 74
End: 2024-09-04
Payer: MEDICARE

## 2024-09-04 ENCOUNTER — CLINICAL DOCUMENTATION (OUTPATIENT)
Facility: CLINIC | Age: 74
End: 2024-09-04

## 2024-09-04 LAB
BACTERIA SPEC CULT: NORMAL
BACTERIA SPEC CULT: NORMAL
EKG ATRIAL RATE: 90 BPM
EKG DIAGNOSIS: NORMAL
EKG P AXIS: 48 DEGREES
EKG P-R INTERVAL: 160 MS
EKG Q-T INTERVAL: 342 MS
EKG QRS DURATION: 64 MS
EKG QTC CALCULATION (BAZETT): 418 MS
EKG R AXIS: 13 DEGREES
EKG T AXIS: 19 DEGREES
EKG VENTRICULAR RATE: 90 BPM
SERVICE CMNT-IMP: NORMAL

## 2024-09-06 RX ORDER — TRANEXAMIC ACID 10 MG/ML
1000 INJECTION, SOLUTION INTRAVENOUS ONCE
Status: CANCELLED | OUTPATIENT
Start: 2024-09-06 | End: 2024-09-06

## 2024-09-11 ENCOUNTER — ANESTHESIA (OUTPATIENT)
Facility: HOSPITAL | Age: 74
End: 2024-09-11
Payer: MEDICARE

## 2024-09-11 ENCOUNTER — CARE COORDINATION (OUTPATIENT)
Facility: CLINIC | Age: 74
End: 2024-09-11

## 2024-09-11 ENCOUNTER — APPOINTMENT (OUTPATIENT)
Facility: HOSPITAL | Age: 74
End: 2024-09-11
Attending: ORTHOPAEDIC SURGERY
Payer: MEDICARE

## 2024-09-11 ENCOUNTER — HOSPITAL ENCOUNTER (OUTPATIENT)
Facility: HOSPITAL | Age: 74
Setting detail: OUTPATIENT SURGERY
Discharge: HOME | End: 2024-09-11
Attending: ORTHOPAEDIC SURGERY | Admitting: ORTHOPAEDIC SURGERY
Payer: MEDICARE

## 2024-09-11 VITALS
BODY MASS INDEX: 27.15 KG/M2 | WEIGHT: 147.56 LBS | DIASTOLIC BLOOD PRESSURE: 75 MMHG | TEMPERATURE: 97.9 F | SYSTOLIC BLOOD PRESSURE: 114 MMHG | OXYGEN SATURATION: 97 % | HEART RATE: 108 BPM | HEIGHT: 62 IN | RESPIRATION RATE: 16 BRPM

## 2024-09-11 DIAGNOSIS — T85.192A FAILURE OF SPINAL CORD STIMULATOR, INITIAL ENCOUNTER (HCC): Primary | ICD-10-CM

## 2024-09-11 LAB — SODIUM SERPL-SCNC: 133 MMOL/L (ref 136–145)

## 2024-09-11 PROCEDURE — 36415 COLL VENOUS BLD VENIPUNCTURE: CPT

## 2024-09-11 PROCEDURE — 7100000001 HC PACU RECOVERY - ADDTL 15 MIN: Performed by: ORTHOPAEDIC SURGERY

## 2024-09-11 PROCEDURE — 3600000012 HC SURGERY LEVEL 2 ADDTL 15MIN: Performed by: ORTHOPAEDIC SURGERY

## 2024-09-11 PROCEDURE — 3700000001 HC ADD 15 MINUTES (ANESTHESIA): Performed by: ORTHOPAEDIC SURGERY

## 2024-09-11 PROCEDURE — 6360000002 HC RX W HCPCS: Performed by: ORTHOPAEDIC SURGERY

## 2024-09-11 PROCEDURE — 2709999900 HC NON-CHARGEABLE SUPPLY: Performed by: ORTHOPAEDIC SURGERY

## 2024-09-11 PROCEDURE — 87102 FUNGUS ISOLATION CULTURE: CPT

## 2024-09-11 PROCEDURE — 87205 SMEAR GRAM STAIN: CPT

## 2024-09-11 PROCEDURE — 7100000011 HC PHASE II RECOVERY - ADDTL 15 MIN: Performed by: ORTHOPAEDIC SURGERY

## 2024-09-11 PROCEDURE — 2580000003 HC RX 258: Performed by: ORTHOPAEDIC SURGERY

## 2024-09-11 PROCEDURE — 3700000000 HC ANESTHESIA ATTENDED CARE: Performed by: ORTHOPAEDIC SURGERY

## 2024-09-11 PROCEDURE — 6360000002 HC RX W HCPCS: Performed by: NURSE ANESTHETIST, CERTIFIED REGISTERED

## 2024-09-11 PROCEDURE — 6360000002 HC RX W HCPCS: Performed by: ANESTHESIOLOGY

## 2024-09-11 PROCEDURE — 2500000003 HC RX 250 WO HCPCS: Performed by: NURSE ANESTHETIST, CERTIFIED REGISTERED

## 2024-09-11 PROCEDURE — 7100000010 HC PHASE II RECOVERY - FIRST 15 MIN: Performed by: ORTHOPAEDIC SURGERY

## 2024-09-11 PROCEDURE — 87075 CULTR BACTERIA EXCEPT BLOOD: CPT

## 2024-09-11 PROCEDURE — 87070 CULTURE OTHR SPECIMN AEROBIC: CPT

## 2024-09-11 PROCEDURE — 7100000000 HC PACU RECOVERY - FIRST 15 MIN: Performed by: ORTHOPAEDIC SURGERY

## 2024-09-11 PROCEDURE — 84295 ASSAY OF SERUM SODIUM: CPT

## 2024-09-11 PROCEDURE — 2500000003 HC RX 250 WO HCPCS: Performed by: ORTHOPAEDIC SURGERY

## 2024-09-11 PROCEDURE — 3600000002 HC SURGERY LEVEL 2 BASE: Performed by: ORTHOPAEDIC SURGERY

## 2024-09-11 RX ORDER — IPRATROPIUM BROMIDE AND ALBUTEROL SULFATE 2.5; .5 MG/3ML; MG/3ML
1 SOLUTION RESPIRATORY (INHALATION)
Status: DISCONTINUED | OUTPATIENT
Start: 2024-09-11 | End: 2024-09-11 | Stop reason: HOSPADM

## 2024-09-11 RX ORDER — OXYCODONE HYDROCHLORIDE 5 MG/1
10 TABLET ORAL PRN
Status: DISCONTINUED | OUTPATIENT
Start: 2024-09-11 | End: 2024-09-11 | Stop reason: HOSPADM

## 2024-09-11 RX ORDER — SODIUM CHLORIDE 9 MG/ML
INJECTION, SOLUTION INTRAVENOUS PRN
Status: DISCONTINUED | OUTPATIENT
Start: 2024-09-11 | End: 2024-09-11 | Stop reason: HOSPADM

## 2024-09-11 RX ORDER — SODIUM CHLORIDE 0.9 % (FLUSH) 0.9 %
5-40 SYRINGE (ML) INJECTION EVERY 12 HOURS SCHEDULED
Status: DISCONTINUED | OUTPATIENT
Start: 2024-09-11 | End: 2024-09-11 | Stop reason: HOSPADM

## 2024-09-11 RX ORDER — SODIUM CHLORIDE 0.9 % (FLUSH) 0.9 %
5-40 SYRINGE (ML) INJECTION PRN
Status: DISCONTINUED | OUTPATIENT
Start: 2024-09-11 | End: 2024-09-11 | Stop reason: HOSPADM

## 2024-09-11 RX ORDER — VANCOMYCIN HYDROCHLORIDE 1 G/20ML
INJECTION, POWDER, LYOPHILIZED, FOR SOLUTION INTRAVENOUS PRN
Status: DISCONTINUED | OUTPATIENT
Start: 2024-09-11 | End: 2024-09-11 | Stop reason: ALTCHOICE

## 2024-09-11 RX ORDER — HYDROMORPHONE HYDROCHLORIDE 1 MG/ML
0.25 INJECTION, SOLUTION INTRAMUSCULAR; INTRAVENOUS; SUBCUTANEOUS EVERY 5 MIN PRN
Status: DISCONTINUED | OUTPATIENT
Start: 2024-09-11 | End: 2024-09-11 | Stop reason: HOSPADM

## 2024-09-11 RX ORDER — ONDANSETRON 2 MG/ML
INJECTION INTRAMUSCULAR; INTRAVENOUS PRN
Status: DISCONTINUED | OUTPATIENT
Start: 2024-09-11 | End: 2024-09-11 | Stop reason: SDUPTHER

## 2024-09-11 RX ORDER — DEXAMETHASONE SODIUM PHOSPHATE 4 MG/ML
INJECTION, SOLUTION INTRA-ARTICULAR; INTRALESIONAL; INTRAMUSCULAR; INTRAVENOUS; SOFT TISSUE PRN
Status: DISCONTINUED | OUTPATIENT
Start: 2024-09-11 | End: 2024-09-11 | Stop reason: SDUPTHER

## 2024-09-11 RX ORDER — GLYCOPYRROLATE 0.2 MG/ML
INJECTION INTRAMUSCULAR; INTRAVENOUS PRN
Status: DISCONTINUED | OUTPATIENT
Start: 2024-09-11 | End: 2024-09-11 | Stop reason: SDUPTHER

## 2024-09-11 RX ORDER — ROCURONIUM BROMIDE 10 MG/ML
INJECTION, SOLUTION INTRAVENOUS PRN
Status: DISCONTINUED | OUTPATIENT
Start: 2024-09-11 | End: 2024-09-11 | Stop reason: SDUPTHER

## 2024-09-11 RX ORDER — HYDROMORPHONE HYDROCHLORIDE 1 MG/ML
INJECTION, SOLUTION INTRAMUSCULAR; INTRAVENOUS; SUBCUTANEOUS PRN
Status: DISCONTINUED | OUTPATIENT
Start: 2024-09-11 | End: 2024-09-11 | Stop reason: SDUPTHER

## 2024-09-11 RX ORDER — LIDOCAINE HYDROCHLORIDE 20 MG/ML
INJECTION, SOLUTION EPIDURAL; INFILTRATION; INTRACAUDAL; PERINEURAL PRN
Status: DISCONTINUED | OUTPATIENT
Start: 2024-09-11 | End: 2024-09-11 | Stop reason: SDUPTHER

## 2024-09-11 RX ORDER — NALOXONE HYDROCHLORIDE 0.4 MG/ML
INJECTION, SOLUTION INTRAMUSCULAR; INTRAVENOUS; SUBCUTANEOUS PRN
Status: DISCONTINUED | OUTPATIENT
Start: 2024-09-11 | End: 2024-09-11 | Stop reason: HOSPADM

## 2024-09-11 RX ORDER — CHLORHEXIDINE GLUCONATE 40 MG/ML
SOLUTION TOPICAL DAILY PRN
Status: DISCONTINUED | OUTPATIENT
Start: 2024-09-11 | End: 2024-09-11 | Stop reason: HOSPADM

## 2024-09-11 RX ORDER — OXYCODONE HYDROCHLORIDE 5 MG/1
5 TABLET ORAL PRN
Status: DISCONTINUED | OUTPATIENT
Start: 2024-09-11 | End: 2024-09-11 | Stop reason: HOSPADM

## 2024-09-11 RX ORDER — MIDAZOLAM HYDROCHLORIDE 1 MG/ML
INJECTION INTRAMUSCULAR; INTRAVENOUS PRN
Status: DISCONTINUED | OUTPATIENT
Start: 2024-09-11 | End: 2024-09-11 | Stop reason: SDUPTHER

## 2024-09-11 RX ORDER — FENTANYL CITRATE 50 UG/ML
25 INJECTION, SOLUTION INTRAMUSCULAR; INTRAVENOUS EVERY 5 MIN PRN
Status: COMPLETED | OUTPATIENT
Start: 2024-09-11 | End: 2024-09-11

## 2024-09-11 RX ORDER — METOCLOPRAMIDE HYDROCHLORIDE 5 MG/ML
10 INJECTION INTRAMUSCULAR; INTRAVENOUS
Status: DISCONTINUED | OUTPATIENT
Start: 2024-09-11 | End: 2024-09-11 | Stop reason: HOSPADM

## 2024-09-11 RX ORDER — MIDAZOLAM HYDROCHLORIDE 2 MG/2ML
INJECTION, SOLUTION INTRAMUSCULAR; INTRAVENOUS
Status: COMPLETED
Start: 2024-09-11 | End: 2024-09-11

## 2024-09-11 RX ORDER — HYDROMORPHONE HYDROCHLORIDE 1 MG/ML
0.5 INJECTION, SOLUTION INTRAMUSCULAR; INTRAVENOUS; SUBCUTANEOUS EVERY 5 MIN PRN
Status: COMPLETED | OUTPATIENT
Start: 2024-09-11 | End: 2024-09-11

## 2024-09-11 RX ORDER — PROPOFOL 10 MG/ML
INJECTION, EMULSION INTRAVENOUS PRN
Status: DISCONTINUED | OUTPATIENT
Start: 2024-09-11 | End: 2024-09-11 | Stop reason: SDUPTHER

## 2024-09-11 RX ORDER — OXYCODONE HYDROCHLORIDE 5 MG/1
5 TABLET ORAL EVERY 6 HOURS PRN
Qty: 28 TABLET | Refills: 0 | Status: SHIPPED | OUTPATIENT
Start: 2024-09-11 | End: 2024-09-18

## 2024-09-11 RX ORDER — FENTANYL CITRATE 50 UG/ML
INJECTION, SOLUTION INTRAMUSCULAR; INTRAVENOUS
Status: DISCONTINUED
Start: 2024-09-11 | End: 2024-09-11 | Stop reason: HOSPADM

## 2024-09-11 RX ORDER — BUPIVACAINE HYDROCHLORIDE AND EPINEPHRINE 2.5; 5 MG/ML; UG/ML
INJECTION, SOLUTION EPIDURAL; INFILTRATION; INTRACAUDAL; PERINEURAL PRN
Status: DISCONTINUED | OUTPATIENT
Start: 2024-09-11 | End: 2024-09-11 | Stop reason: ALTCHOICE

## 2024-09-11 RX ORDER — SODIUM CHLORIDE, SODIUM LACTATE, POTASSIUM CHLORIDE, CALCIUM CHLORIDE 600; 310; 30; 20 MG/100ML; MG/100ML; MG/100ML; MG/100ML
INJECTION, SOLUTION INTRAVENOUS CONTINUOUS
Status: DISCONTINUED | OUTPATIENT
Start: 2024-09-11 | End: 2024-09-11 | Stop reason: HOSPADM

## 2024-09-11 RX ADMIN — MIDAZOLAM 2 MG: 1 INJECTION INTRAMUSCULAR; INTRAVENOUS at 10:49

## 2024-09-11 RX ADMIN — FENTANYL CITRATE 25 MCG: 50 INJECTION INTRAMUSCULAR; INTRAVENOUS at 10:33

## 2024-09-11 RX ADMIN — SODIUM CHLORIDE, POTASSIUM CHLORIDE, SODIUM LACTATE AND CALCIUM CHLORIDE: 600; 310; 30; 20 INJECTION, SOLUTION INTRAVENOUS at 10:37

## 2024-09-11 RX ADMIN — HYDROMORPHONE HYDROCHLORIDE 0.5 MG: 1 INJECTION, SOLUTION INTRAMUSCULAR; INTRAVENOUS; SUBCUTANEOUS at 11:11

## 2024-09-11 RX ADMIN — Medication 10 MG: at 09:38

## 2024-09-11 RX ADMIN — FENTANYL CITRATE 25 MCG: 50 INJECTION INTRAMUSCULAR; INTRAVENOUS at 11:00

## 2024-09-11 RX ADMIN — FENTANYL CITRATE 25 MCG: 50 INJECTION INTRAMUSCULAR; INTRAVENOUS at 11:06

## 2024-09-11 RX ADMIN — HYDROMORPHONE HYDROCHLORIDE 1 MG: 1 INJECTION, SOLUTION INTRAMUSCULAR; INTRAVENOUS; SUBCUTANEOUS at 10:28

## 2024-09-11 RX ADMIN — MIDAZOLAM 2 MG: 1 INJECTION INTRAMUSCULAR; INTRAVENOUS at 09:38

## 2024-09-11 RX ADMIN — SUGAMMADEX 150 MG: 100 INJECTION, SOLUTION INTRAVENOUS at 10:18

## 2024-09-11 RX ADMIN — PROPOFOL 150 MG: 10 INJECTION, EMULSION INTRAVENOUS at 09:42

## 2024-09-11 RX ADMIN — ROCURONIUM BROMIDE 40 MG: 10 INJECTION, SOLUTION INTRAVENOUS at 09:42

## 2024-09-11 RX ADMIN — SODIUM CHLORIDE, POTASSIUM CHLORIDE, SODIUM LACTATE AND CALCIUM CHLORIDE: 600; 310; 30; 20 INJECTION, SOLUTION INTRAVENOUS at 08:04

## 2024-09-11 RX ADMIN — VANCOMYCIN HYDROCHLORIDE 1000 MG: 1 INJECTION, POWDER, LYOPHILIZED, FOR SOLUTION INTRAVENOUS at 09:46

## 2024-09-11 RX ADMIN — HYDROMORPHONE HYDROCHLORIDE 0.5 MG: 1 INJECTION, SOLUTION INTRAMUSCULAR; INTRAVENOUS; SUBCUTANEOUS at 10:44

## 2024-09-11 RX ADMIN — DEXAMETHASONE SODIUM PHOSPHATE 8 MG: 4 INJECTION INTRA-ARTICULAR; INTRALESIONAL; INTRAMUSCULAR; INTRAVENOUS; SOFT TISSUE at 09:54

## 2024-09-11 RX ADMIN — HYDROMORPHONE HYDROCHLORIDE 0.5 MG: 1 INJECTION, SOLUTION INTRAMUSCULAR; INTRAVENOUS; SUBCUTANEOUS at 11:16

## 2024-09-11 RX ADMIN — Medication 30 MG: at 09:57

## 2024-09-11 RX ADMIN — HYDROMORPHONE HYDROCHLORIDE 0.5 MG: 1 INJECTION, SOLUTION INTRAMUSCULAR; INTRAVENOUS; SUBCUTANEOUS at 10:49

## 2024-09-11 RX ADMIN — ONDANSETRON 4 MG: 2 INJECTION INTRAMUSCULAR; INTRAVENOUS at 09:54

## 2024-09-11 RX ADMIN — LIDOCAINE HYDROCHLORIDE 100 MG: 20 INJECTION, SOLUTION EPIDURAL; INFILTRATION; INTRACAUDAL; PERINEURAL at 09:42

## 2024-09-11 RX ADMIN — FENTANYL CITRATE 25 MCG: 50 INJECTION INTRAMUSCULAR; INTRAVENOUS at 10:39

## 2024-09-11 RX ADMIN — Medication 10 MG: at 09:42

## 2024-09-11 RX ADMIN — GLYCOPYRROLATE 0.2 MG: 0.2 INJECTION INTRAMUSCULAR; INTRAVENOUS at 09:38

## 2024-09-11 ASSESSMENT — PAIN DESCRIPTION - DESCRIPTORS
DESCRIPTORS: DULL
DESCRIPTORS: ACHING
DESCRIPTORS: ACHING;SHARP;STABBING
DESCRIPTORS: SHARP;STABBING
DESCRIPTORS: ACHING;SHARP;STABBING
DESCRIPTORS: ACHING
DESCRIPTORS: DULL
DESCRIPTORS: ACHING
DESCRIPTORS: ACHING;SHARP;STABBING
DESCRIPTORS: ACHING
DESCRIPTORS: ACHING

## 2024-09-11 ASSESSMENT — PAIN DESCRIPTION - ONSET
ONSET: ON-GOING

## 2024-09-11 ASSESSMENT — PAIN SCALES - GENERAL
PAINLEVEL_OUTOF10: 0
PAINLEVEL_OUTOF10: 0
PAINLEVEL_OUTOF10: 6
PAINLEVEL_OUTOF10: 6
PAINLEVEL_OUTOF10: 10
PAINLEVEL_OUTOF10: 10
PAINLEVEL_OUTOF10: 6
PAINLEVEL_OUTOF10: 0
PAINLEVEL_OUTOF10: 0
PAINLEVEL_OUTOF10: 8
PAINLEVEL_OUTOF10: 10
PAINLEVEL_OUTOF10: 5
PAINLEVEL_OUTOF10: 8

## 2024-09-11 ASSESSMENT — PAIN - FUNCTIONAL ASSESSMENT
PAIN_FUNCTIONAL_ASSESSMENT: PREVENTS OR INTERFERES SOME ACTIVE ACTIVITIES AND ADLS
PAIN_FUNCTIONAL_ASSESSMENT: INTOLERABLE, UNABLE TO DO ANY ACTIVE OR PASSIVE ACTIVITIES
PAIN_FUNCTIONAL_ASSESSMENT: ACTIVITIES ARE NOT PREVENTED
PAIN_FUNCTIONAL_ASSESSMENT: 0-10
PAIN_FUNCTIONAL_ASSESSMENT: INTOLERABLE, UNABLE TO DO ANY ACTIVE OR PASSIVE ACTIVITIES
PAIN_FUNCTIONAL_ASSESSMENT: INTOLERABLE, UNABLE TO DO ANY ACTIVE OR PASSIVE ACTIVITIES
PAIN_FUNCTIONAL_ASSESSMENT: ACTIVITIES ARE NOT PREVENTED
PAIN_FUNCTIONAL_ASSESSMENT: ACTIVITIES ARE NOT PREVENTED
PAIN_FUNCTIONAL_ASSESSMENT: INTOLERABLE, UNABLE TO DO ANY ACTIVE OR PASSIVE ACTIVITIES
PAIN_FUNCTIONAL_ASSESSMENT: PREVENTS OR INTERFERES WITH MANY ACTIVE NOT PASSIVE ACTIVITIES
PAIN_FUNCTIONAL_ASSESSMENT: ACTIVITIES ARE NOT PREVENTED
PAIN_FUNCTIONAL_ASSESSMENT: INTOLERABLE, UNABLE TO DO ANY ACTIVE OR PASSIVE ACTIVITIES
PAIN_FUNCTIONAL_ASSESSMENT: PREVENTS OR INTERFERES SOME ACTIVE ACTIVITIES AND ADLS
PAIN_FUNCTIONAL_ASSESSMENT: PREVENTS OR INTERFERES WITH MANY ACTIVE NOT PASSIVE ACTIVITIES
PAIN_FUNCTIONAL_ASSESSMENT: ACTIVITIES ARE NOT PREVENTED

## 2024-09-11 ASSESSMENT — LIFESTYLE VARIABLES: SMOKING_STATUS: 0

## 2024-09-11 ASSESSMENT — PAIN DESCRIPTION - LOCATION
LOCATION: BACK

## 2024-09-11 ASSESSMENT — PAIN DESCRIPTION - FREQUENCY
FREQUENCY: CONTINUOUS
FREQUENCY: INTERMITTENT
FREQUENCY: INTERMITTENT
FREQUENCY: CONTINUOUS
FREQUENCY: INTERMITTENT
FREQUENCY: CONTINUOUS

## 2024-09-11 ASSESSMENT — PAIN DESCRIPTION - PAIN TYPE
TYPE: SURGICAL PAIN
TYPE: SURGICAL PAIN;CHRONIC PAIN
TYPE: SURGICAL PAIN

## 2024-09-12 LAB
BACTERIA SPEC CULT: NORMAL
SERVICE CMNT-IMP: NORMAL

## 2024-09-14 LAB
BACTERIA SPEC CULT: ABNORMAL
BACTERIA SPEC CULT: ABNORMAL
GRAM STN SPEC: ABNORMAL
GRAM STN SPEC: ABNORMAL
SERVICE CMNT-IMP: ABNORMAL

## 2024-09-16 ENCOUNTER — CARE COORDINATION (OUTPATIENT)
Facility: CLINIC | Age: 74
End: 2024-09-16

## 2024-09-16 LAB
BACTERIA SPEC CULT: NORMAL
SERVICE CMNT-IMP: NORMAL

## 2024-09-17 ENCOUNTER — TELEPHONE (OUTPATIENT)
Age: 74
End: 2024-09-17

## 2024-09-21 ENCOUNTER — CLINICAL DOCUMENTATION (OUTPATIENT)
Facility: CLINIC | Age: 74
End: 2024-09-21

## 2024-09-22 ENCOUNTER — HOSPITAL ENCOUNTER (EMERGENCY)
Facility: HOSPITAL | Age: 74
Discharge: HOME | End: 2024-09-22
Attending: EMERGENCY MEDICINE
Payer: MEDICARE

## 2024-09-22 ENCOUNTER — APPOINTMENT (OUTPATIENT)
Facility: HOSPITAL | Age: 74
End: 2024-09-22
Payer: MEDICARE

## 2024-09-22 VITALS
HEIGHT: 62 IN | WEIGHT: 145 LBS | HEART RATE: 90 BPM | BODY MASS INDEX: 26.68 KG/M2 | OXYGEN SATURATION: 96 % | SYSTOLIC BLOOD PRESSURE: 129 MMHG | DIASTOLIC BLOOD PRESSURE: 74 MMHG | TEMPERATURE: 97.5 F | RESPIRATION RATE: 20 BRPM

## 2024-09-22 DIAGNOSIS — M25.551 RIGHT HIP PAIN: Primary | ICD-10-CM

## 2024-09-22 PROCEDURE — 6360000002 HC RX W HCPCS: Performed by: EMERGENCY MEDICINE

## 2024-09-22 PROCEDURE — 99284 EMERGENCY DEPT VISIT MOD MDM: CPT

## 2024-09-22 PROCEDURE — 72100 X-RAY EXAM L-S SPINE 2/3 VWS: CPT

## 2024-09-22 PROCEDURE — 73502 X-RAY EXAM HIP UNI 2-3 VIEWS: CPT

## 2024-09-22 PROCEDURE — 96372 THER/PROPH/DIAG INJ SC/IM: CPT

## 2024-09-22 RX ORDER — HYDROMORPHONE HYDROCHLORIDE 1 MG/ML
1 INJECTION, SOLUTION INTRAMUSCULAR; INTRAVENOUS; SUBCUTANEOUS
Status: COMPLETED | OUTPATIENT
Start: 2024-09-22 | End: 2024-09-22

## 2024-09-22 RX ADMIN — HYDROMORPHONE HYDROCHLORIDE 1 MG: 1 INJECTION, SOLUTION INTRAMUSCULAR; INTRAVENOUS; SUBCUTANEOUS at 06:36

## 2024-09-22 ASSESSMENT — PAIN DESCRIPTION - LOCATION: LOCATION: HIP;BACK

## 2024-09-22 ASSESSMENT — PAIN DESCRIPTION - ORIENTATION: ORIENTATION: LEFT;RIGHT;LOWER

## 2024-09-22 ASSESSMENT — PAIN SCALES - GENERAL: PAINLEVEL_OUTOF10: 9

## 2024-09-23 ENCOUNTER — CARE COORDINATION (OUTPATIENT)
Facility: CLINIC | Age: 74
End: 2024-09-23

## 2024-09-23 LAB
BACTERIA SPEC CULT: NORMAL
SERVICE CMNT-IMP: NORMAL

## 2024-09-25 ENCOUNTER — OFFICE VISIT (OUTPATIENT)
Facility: CLINIC | Age: 74
End: 2024-09-25
Payer: MEDICARE

## 2024-09-25 ENCOUNTER — CARE COORDINATION (OUTPATIENT)
Facility: CLINIC | Age: 74
End: 2024-09-25

## 2024-09-25 VITALS
TEMPERATURE: 97.6 F | HEIGHT: 62 IN | DIASTOLIC BLOOD PRESSURE: 68 MMHG | WEIGHT: 142.2 LBS | OXYGEN SATURATION: 98 % | BODY MASS INDEX: 26.17 KG/M2 | SYSTOLIC BLOOD PRESSURE: 104 MMHG | HEART RATE: 104 BPM

## 2024-09-25 DIAGNOSIS — G89.18 POSTOPERATIVE PAIN: Primary | ICD-10-CM

## 2024-09-25 DIAGNOSIS — R89.5 POSITIVE CULTURE FINDINGS IN WOUND: ICD-10-CM

## 2024-09-25 LAB
BACTERIA SPEC CULT: ABNORMAL
BACTERIA SPEC CULT: ABNORMAL
BACTERIA SPEC CULT: NORMAL
GRAM STN SPEC: ABNORMAL
GRAM STN SPEC: ABNORMAL
SERVICE CMNT-IMP: ABNORMAL
SERVICE CMNT-IMP: NORMAL

## 2024-09-25 PROCEDURE — 3074F SYST BP LT 130 MM HG: CPT | Performed by: STUDENT IN AN ORGANIZED HEALTH CARE EDUCATION/TRAINING PROGRAM

## 2024-09-25 PROCEDURE — 1036F TOBACCO NON-USER: CPT | Performed by: STUDENT IN AN ORGANIZED HEALTH CARE EDUCATION/TRAINING PROGRAM

## 2024-09-25 PROCEDURE — G8399 PT W/DXA RESULTS DOCUMENT: HCPCS | Performed by: STUDENT IN AN ORGANIZED HEALTH CARE EDUCATION/TRAINING PROGRAM

## 2024-09-25 PROCEDURE — 1123F ACP DISCUSS/DSCN MKR DOCD: CPT | Performed by: STUDENT IN AN ORGANIZED HEALTH CARE EDUCATION/TRAINING PROGRAM

## 2024-09-25 PROCEDURE — 1090F PRES/ABSN URINE INCON ASSESS: CPT | Performed by: STUDENT IN AN ORGANIZED HEALTH CARE EDUCATION/TRAINING PROGRAM

## 2024-09-25 PROCEDURE — 99214 OFFICE O/P EST MOD 30 MIN: CPT | Performed by: STUDENT IN AN ORGANIZED HEALTH CARE EDUCATION/TRAINING PROGRAM

## 2024-09-25 PROCEDURE — 3078F DIAST BP <80 MM HG: CPT | Performed by: STUDENT IN AN ORGANIZED HEALTH CARE EDUCATION/TRAINING PROGRAM

## 2024-09-25 PROCEDURE — G8427 DOCREV CUR MEDS BY ELIG CLIN: HCPCS | Performed by: STUDENT IN AN ORGANIZED HEALTH CARE EDUCATION/TRAINING PROGRAM

## 2024-09-25 PROCEDURE — G8417 CALC BMI ABV UP PARAM F/U: HCPCS | Performed by: STUDENT IN AN ORGANIZED HEALTH CARE EDUCATION/TRAINING PROGRAM

## 2024-09-27 ENCOUNTER — HOSPITAL ENCOUNTER (EMERGENCY)
Facility: HOSPITAL | Age: 74
Discharge: HOME | End: 2024-09-28
Attending: EMERGENCY MEDICINE
Payer: MEDICARE

## 2024-09-27 DIAGNOSIS — G89.29 CHRONIC PAIN OF BOTH LOWER EXTREMITIES: Primary | ICD-10-CM

## 2024-09-27 DIAGNOSIS — M79.604 CHRONIC PAIN OF BOTH LOWER EXTREMITIES: Primary | ICD-10-CM

## 2024-09-27 DIAGNOSIS — L20.81 ATOPIC NEURODERMATITIS: ICD-10-CM

## 2024-09-27 DIAGNOSIS — M79.605 CHRONIC PAIN OF BOTH LOWER EXTREMITIES: Primary | ICD-10-CM

## 2024-09-27 PROCEDURE — 85025 COMPLETE CBC W/AUTO DIFF WBC: CPT

## 2024-09-27 PROCEDURE — 83880 ASSAY OF NATRIURETIC PEPTIDE: CPT

## 2024-09-27 PROCEDURE — 80053 COMPREHEN METABOLIC PANEL: CPT

## 2024-09-27 PROCEDURE — 99284 EMERGENCY DEPT VISIT MOD MDM: CPT

## 2024-09-27 ASSESSMENT — PAIN DESCRIPTION - ORIENTATION: ORIENTATION: RIGHT

## 2024-09-27 ASSESSMENT — PAIN SCALES - GENERAL: PAINLEVEL_OUTOF10: 6

## 2024-09-27 ASSESSMENT — PAIN DESCRIPTION - LOCATION: LOCATION: LEG

## 2024-09-27 ASSESSMENT — PAIN - FUNCTIONAL ASSESSMENT: PAIN_FUNCTIONAL_ASSESSMENT: 0-10

## 2024-09-28 VITALS
TEMPERATURE: 98.6 F | SYSTOLIC BLOOD PRESSURE: 115 MMHG | HEIGHT: 62 IN | DIASTOLIC BLOOD PRESSURE: 69 MMHG | WEIGHT: 142 LBS | HEART RATE: 107 BPM | BODY MASS INDEX: 26.13 KG/M2 | OXYGEN SATURATION: 97 % | RESPIRATION RATE: 16 BRPM

## 2024-09-28 LAB
ALBUMIN SERPL-MCNC: 3.8 G/DL (ref 3.4–5)
ALBUMIN/GLOB SERPL: 1.5 (ref 0.8–1.7)
ALP SERPL-CCNC: 76 U/L (ref 45–117)
ALT SERPL-CCNC: 17 U/L (ref 13–56)
ANION GAP SERPL CALC-SCNC: 11 MMOL/L (ref 3–18)
AST SERPL-CCNC: 22 U/L (ref 10–38)
BASOPHILS # BLD: 0 K/UL (ref 0–0.1)
BASOPHILS NFR BLD: 1 % (ref 0–2)
BILIRUB SERPL-MCNC: 0.4 MG/DL (ref 0.2–1)
BUN SERPL-MCNC: 17 MG/DL (ref 7–18)
BUN/CREAT SERPL: 17 (ref 12–20)
CALCIUM SERPL-MCNC: 9 MG/DL (ref 8.5–10.1)
CHLORIDE SERPL-SCNC: 97 MMOL/L (ref 100–111)
CO2 SERPL-SCNC: 24 MMOL/L (ref 21–32)
CREAT SERPL-MCNC: 1 MG/DL (ref 0.6–1.3)
DIFFERENTIAL METHOD BLD: ABNORMAL
EOSINOPHIL # BLD: 0.1 K/UL (ref 0–0.4)
EOSINOPHIL NFR BLD: 1 % (ref 0–5)
ERYTHROCYTE [DISTWIDTH] IN BLOOD BY AUTOMATED COUNT: 12.7 % (ref 11.6–14.5)
GLOBULIN SER CALC-MCNC: 2.5 G/DL (ref 2–4)
GLUCOSE SERPL-MCNC: 121 MG/DL (ref 74–99)
HCT VFR BLD AUTO: 35.2 % (ref 35–45)
HGB BLD-MCNC: 12.1 G/DL (ref 12–16)
IMM GRANULOCYTES # BLD AUTO: 0 K/UL (ref 0–0.04)
IMM GRANULOCYTES NFR BLD AUTO: 0 % (ref 0–0.5)
LYMPHOCYTES # BLD: 2.4 K/UL (ref 0.9–3.6)
LYMPHOCYTES NFR BLD: 35 % (ref 21–52)
MCH RBC QN AUTO: 32 PG (ref 24–34)
MCHC RBC AUTO-ENTMCNC: 34.4 G/DL (ref 31–37)
MCV RBC AUTO: 93.1 FL (ref 78–100)
MONOCYTES # BLD: 0.8 K/UL (ref 0.05–1.2)
MONOCYTES NFR BLD: 12 % (ref 3–10)
NEUTS SEG # BLD: 3.6 K/UL (ref 1.8–8)
NEUTS SEG NFR BLD: 52 % (ref 40–73)
NRBC # BLD: 0 K/UL (ref 0–0.01)
NRBC BLD-RTO: 0 PER 100 WBC
NT PRO BNP: 123 PG/ML (ref 0–1800)
PLATELET # BLD AUTO: 400 K/UL (ref 135–420)
PMV BLD AUTO: 9 FL (ref 9.2–11.8)
POTASSIUM SERPL-SCNC: 3.9 MMOL/L (ref 3.5–5.5)
PROT SERPL-MCNC: 6.3 G/DL (ref 6.4–8.2)
RBC # BLD AUTO: 3.78 M/UL (ref 4.2–5.3)
SODIUM SERPL-SCNC: 132 MMOL/L (ref 136–145)
WBC # BLD AUTO: 6.9 K/UL (ref 4.6–13.2)

## 2024-09-28 RX ORDER — HYDROCORTISONE 2.5 %
CREAM (GRAM) TOPICAL
Qty: 1 EACH | Refills: 1 | Status: SHIPPED | OUTPATIENT
Start: 2024-09-28

## 2024-09-28 RX ORDER — HYDROCORTISONE VALERATE CREAM 2 MG/G
CREAM TOPICAL 2 TIMES DAILY
Status: DISCONTINUED | OUTPATIENT
Start: 2024-09-28 | End: 2024-09-28 | Stop reason: HOSPADM

## 2024-09-28 ASSESSMENT — PAIN SCALES - GENERAL: PAINLEVEL_OUTOF10: 6

## 2024-09-28 ASSESSMENT — PAIN DESCRIPTION - LOCATION: LOCATION: BACK

## 2024-09-28 ASSESSMENT — PAIN DESCRIPTION - ORIENTATION: ORIENTATION: OTHER (COMMENT)

## 2024-09-28 NOTE — ED NOTES
Pt under the care of pain management takes dilaudid . Family at bedside pt placed on cardiac monitoring

## 2024-09-28 NOTE — ED NOTES
Pt to ED reports fall x 2 days ago seen for fall , pt denies CP, SOB. Pt report chronic swelling x June wore since fall to right leg. Pt arrived tachycardic

## 2024-09-28 NOTE — ED NOTES
At bedside pt resting eyes closed respirations even and unlabored at this time. Cardiac monitor in place, VSS , NAD noted at this time. Voiced no issues or concerns at this time. Pillow provided for comfort, family at bedside

## 2024-09-29 NOTE — ED TRIAGE NOTES
A&O female with chronic lower back and bilat leg pain. Recent increase in swelling in bilat ankles. Pain in right lower leg s/p fall 2 days ago.  
peripheral neuropathy, chronic back pain, contact dermatitis, atopic neurodermatitis    Risk of Complications, Morbidity, and/or Mortality  Presenting problems: low  Diagnostic procedures: low  Management options: low  General comments:           No orders to display       10:37 PM  Upon re-evaluation the patient's symptoms have improved. Pt has non-toxic appearance and condition is stable for discharge. Patient was informed of tests & results, instructed to f/u with PCP and return to the ED upon worsening of symptoms. All questions and concerns were addressed.       Procedures    FINAL IMPRESSION      1. Atopic dermatitis, unspecified type    2.      Chronic  lower back pain    DISPOSITION/PLAN     DISPOSITION Decision To Discharge 09/28/2024 12:48:30 AM    Condition at Disposition: Data Unavailable    DISCHARGE MEDICATIONS:    Hydrocortisone Creasm    Continue current analgesia for her chronic back pain    Discharge Medication List as of 9/28/2024 12:47 AM           PATIENT REFERRED TO:  Jorge L Epps MD  [address] [address]  [address]  [place] [address]  [phone]    In 5 days        (Please note that portions of this note were completed with a voice recognitionprogram.  Efforts were made to edit the dictations but occasionally words are mis-transcribed.)    Roger Byrnes MD(electronically signed)  Attending Emergency Physician

## 2024-09-30 LAB
BACTERIA SPEC CULT: NORMAL
SERVICE CMNT-IMP: NORMAL

## 2024-10-02 ENCOUNTER — CARE COORDINATION (OUTPATIENT)
Facility: CLINIC | Age: 74
End: 2024-10-02

## 2024-10-02 NOTE — CARE COORDINATION
antibiotics are necessary.       Assessments Completed:   General Assessment    Do you have any symptoms that are causing concern?: Yes  Progression since Onset: Unchanged  Reported Symptoms: Other, Pain (Comment: pt reports being unable to walk or sit in car to ride due to pain & says she has a rare wound infection that ID says would get worse with antibiotics.)          Medications Reviewed:   Patient denies any changes with medications and reports taking all medications as prescribed.  Was given hydrocortisone 2.5% for dermatitis.  Advance Care Planning:   Patient declined education     Care Planning:    Goals Addressed                   This Visit's Progress     Conditions and Symptoms   No change     I will notify my provider of any symptoms that indicate a worsening of my condition.    Barriers: overwhelmed by complexity of regimen  Plan for overcoming my barriers: notify MD immediately if s/s infection or increased pain, participate as ability allows in addressing issues within POC  Confidence: 5/10  Anticipated Goal Completion Date: 120 days         Medication Management   On track     I will take my medication as directed.  I will notify my provider of any problems with medications, like adverse effects or side effects.  I will notify my provider/Care Coordinator if I am unable to afford my medications.  I will notify my provider for advice before I stop taking any of my medication.         Reduce risk for hospitalization   Worsening     I will make choices related to my condition and in my everyday care that will reduce my risk of hospitalization.               PCP/Specialist follow up:   Future Appointments         Provider Specialty Dept Phone    10/3/2024 8:15 AM Jorge L Epps MD Family Medicine [phone]    1/6/2025 10:30 AM Rosemary Stokes APRN - NP Cardiology [phone]            Follow Up:   Plan for next [facility] outreach in approximately 2 weeks to complete:  - CC Protocol assessments  -

## 2024-10-03 ENCOUNTER — OFFICE VISIT (OUTPATIENT)
Facility: CLINIC | Age: 74
End: 2024-10-03

## 2024-10-03 VITALS
SYSTOLIC BLOOD PRESSURE: 110 MMHG | OXYGEN SATURATION: 98 % | BODY MASS INDEX: 26.61 KG/M2 | TEMPERATURE: 97.5 F | HEART RATE: 103 BPM | DIASTOLIC BLOOD PRESSURE: 72 MMHG | HEIGHT: 62 IN | WEIGHT: 144.6 LBS

## 2024-10-03 DIAGNOSIS — Z00.00 MEDICARE ANNUAL WELLNESS VISIT, SUBSEQUENT: Primary | ICD-10-CM

## 2024-10-03 ASSESSMENT — PATIENT HEALTH QUESTIONNAIRE - PHQ9
3. TROUBLE FALLING OR STAYING ASLEEP: NEARLY EVERY DAY
SUM OF ALL RESPONSES TO PHQ QUESTIONS 1-9: 18
SUM OF ALL RESPONSES TO PHQ9 QUESTIONS 1 & 2: 6
1. LITTLE INTEREST OR PLEASURE IN DOING THINGS: NEARLY EVERY DAY
5. POOR APPETITE OR OVEREATING: NEARLY EVERY DAY
SUM OF ALL RESPONSES TO PHQ QUESTIONS 1-9: 18
SUM OF ALL RESPONSES TO PHQ QUESTIONS 1-9: 18
4. FEELING TIRED OR HAVING LITTLE ENERGY: NEARLY EVERY DAY
10. IF YOU CHECKED OFF ANY PROBLEMS, HOW DIFFICULT HAVE THESE PROBLEMS MADE IT FOR YOU TO DO YOUR WORK, TAKE CARE OF THINGS AT HOME, OR GET ALONG WITH OTHER PEOPLE: VERY DIFFICULT
6. FEELING BAD ABOUT YOURSELF - OR THAT YOU ARE A FAILURE OR HAVE LET YOURSELF OR YOUR FAMILY DOWN: NOT AT ALL
9. THOUGHTS THAT YOU WOULD BE BETTER OFF DEAD, OR OF HURTING YOURSELF: NOT AT ALL
SUM OF ALL RESPONSES TO PHQ QUESTIONS 1-9: 18
8. MOVING OR SPEAKING SO SLOWLY THAT OTHER PEOPLE COULD HAVE NOTICED. OR THE OPPOSITE, BEING SO FIGETY OR RESTLESS THAT YOU HAVE BEEN MOVING AROUND A LOT MORE THAN USUAL: MORE THAN HALF THE DAYS
2. FEELING DOWN, DEPRESSED OR HOPELESS: NEARLY EVERY DAY
7. TROUBLE CONCENTRATING ON THINGS, SUCH AS READING THE NEWSPAPER OR WATCHING TELEVISION: SEVERAL DAYS

## 2024-10-03 NOTE — PROGRESS NOTES
Medicare Annual Wellness Visit    Salma Lopez is here for Medicare AWV and Follow-up (Patient here for ER follow up and to have a form completed)    Assessment & Plan   Medicare annual wellness visit, subsequent  Recommendations for Preventive Services Due: see orders and patient instructions/AVS.  Recommended screening schedule for the next 5-10 years is provided to the patient in written form: see Patient Instructions/AVS.     No follow-ups on file.     Subjective       Patient's complete Health Risk Assessment and screening values have been reviewed and are found in Flowsheets. The following problems were reviewed today and where indicated follow up appointments were made and/or referrals ordered.    Positive Risk Factor Screenings with Interventions:    Fall Risk:  Do you feel unsteady or are you worried about falling? : (!) yes  2 or more falls in past year?: (!) yes  Fall with injury in past year?: (!) yes     Interventions:    Reviewed medications, home hazards, visual acuity, and co-morbidities that can increase risk for falls  Patient declines any further evaluation or treatment     Depression:  PHQ-2 Score: 6  PHQ-9 Total Score: 18  Total Score Interpretation: 15-19 = moderately severe depression  Interventions:  Patient declines any further evaluation or treatment      Controlled Medication Review:      Today's Pain Level: No data recorded     Opioid Risk: (High risk score >=55) Opioid risk score: 78      Last PDMP Brandan as Reviewed:  Review User Review Instant Review Result   MICHEL SHERMAN 9/25/2024  9:10 AM @   Reviewed PDMP [1]         Self-assessment of health:  In general, how would you say your health is?: (!) Poor    Interventions:  Patient declines any further evaluation or treatment    General HRA Questions:  Select all that apply: (!) New or Increased Pain  Interventions - Pain:  See above      Inactivity:  On average, how many days per week do you engage in moderate to strenuous exercise (like

## 2024-10-03 NOTE — PROGRESS NOTES
Chief Complaint   Patient presents with    Medicare AWV    Follow-up     Patient here for ER follow up and to have a form completed         \"Have you been to the ER, urgent care clinic since your last visit?  Hospitalized since your last visit?\"    YES - When: approximately 2  weeks ago.  Where and Why:  ER 9/27/24.    “Have you seen or consulted any other health care providers outside our system since your last visit?”    NO

## 2024-10-07 ENCOUNTER — HOSPITAL ENCOUNTER (OUTPATIENT)
Facility: HOSPITAL | Age: 74
Setting detail: RECURRING SERIES
Discharge: HOME | End: 2024-10-10
Payer: MEDICARE

## 2024-10-07 LAB
BACTERIA SPEC CULT: NORMAL
SERVICE CMNT-IMP: NORMAL

## 2024-10-07 PROCEDURE — 97530 THERAPEUTIC ACTIVITIES: CPT

## 2024-10-07 PROCEDURE — 97535 SELF CARE MNGMENT TRAINING: CPT

## 2024-10-07 PROCEDURE — 97110 THERAPEUTIC EXERCISES: CPT

## 2024-10-07 PROCEDURE — 97162 PT EVAL MOD COMPLEX 30 MIN: CPT

## 2024-10-07 NOTE — PROGRESS NOTES
[facility] [place]  [address], [place]  [address]  Phone: [phone]      Fax:  [phone]    Plan of Care/ Statement of Necessity for Physical Therapy Services    Patient name: Salma Lopez Start of Care: 10/7/2024   Referral source: Sandro Van MD : 1950    Medical Diagnosis: Other low back pain [M54.59]       Onset Date:24    Treatment Diagnosis:      M54.59  OTHER LOWER BACK PAIN                           Prior Hospitalization: see medical history Provider#: [id number]   Medications: Verified on Patient Summary List     Comorbidities: Musculoskeletal disorders, Neurologic condition, Social determinants of health: Transportation, and Complications related to surgery    Prior Level of Function: walking without AD, sailing     The Plan of Care and following information is based on the information from the initial evaluation.    If an interpreting service is utilized for treatment of this patient, the contents of this document represent the material reviewed with the patient via the .     Assessment/ key information: Pt is a 73yo female presenting to therapy with c/c left posterior hip and low back pain as well as right LE weakness since spinal cord simulator placed on 24. Pt has history of multiple back surgeries with reporting fused from T8-S1 and residual left LE weakness. Pt also has bilateral CHRIS and RA. Pain increases to 9/10 with movement. Pain decreases to 8/10 with changing positions, pain meds. Pt demonstrates decreased bilateral LE and hip strength, increased time needed for bed mobility and transfers with using RW for gait and only able to complete short distances currently, TUG score of 32 seconds and 30 sec STS test of 10 reps with bilateral UE assist indicating falls risk, TTP over left gluts and piriformis, incisions still healing, and unable to complete full assessment since pt unable to lay supine. . Pt would

## 2024-10-07 NOTE — PROGRESS NOTES
PT DAILY TREATMENT NOTE/LUMBAR EVAL     Patient Name: Salma Lopez    Date: 10/7/2024    : 1950  Insurance: Payor: MEDICARE / Plan: MEDICARE PART A AND B / Product Type: *No Product type* /      Patient  verified yes     Visit #   Current / Total 1 24   Time   In / Out 1040 1135   Pain   In / Out 9 9   Subjective Functional Status/Changes: I was fine until  when they messed up the surgery and was not able to move my right leg and it got infected. The trial run went great but they messed up the real one       Treatment Area: Other low back pain [M54.59]    If an interpreting service is utilized for treatment of this patient, the contents of this document represent the material reviewed with the patient via the .     SUBJECTIVE  Pain Level (0-10 scale): 9  [x]constant []intermittent []improving []worsening []no change since onset    Any medication changes, allergies to medications, adverse drug reactions, diagnosis change, or new procedure performed?: [x] No    [] Yes (see summary sheet for update)  Subjective functional status/changes:     PLOF: left hand dominant sailing, walking without AD   Mechanism of Injury:  for bladder stim trial went great, permant batteries for bladder stim  unable to move right LE post op. Left LE unable to move for last few years after low back surgeries  Current symptoms/Complaints: hospital -, gram negative bacteria. Bilateral LE swelling and significant hypersensitivity bilateral LE below knee. Last fall yesterday walking to bathroom LOB. Walks with RW around home and has rails everywhere.  Pain across low back. Burning below knees on right in shins and left posterior hip pain. 9/10 max pain, 7-8/10 least with changing positions, meds  Previous Treatment/Compliance: yes for lumbar sx   PMHx/Surgical Hx: osteoporesis, RA, anxiety, HTN, multiple lumbar sx with fusion T8-S1, bilateral CHRIS, left shoulder TSA, right shoulder scope

## 2024-10-14 LAB
BACTERIA SPEC CULT: NORMAL
SERVICE CMNT-IMP: NORMAL

## 2024-10-16 ENCOUNTER — HOSPITAL ENCOUNTER (OUTPATIENT)
Facility: HOSPITAL | Age: 74
Setting detail: RECURRING SERIES
Discharge: HOME | End: 2024-10-19
Payer: MEDICARE

## 2024-10-16 ENCOUNTER — CARE COORDINATION (OUTPATIENT)
Facility: CLINIC | Age: 74
End: 2024-10-16

## 2024-10-16 PROCEDURE — 97110 THERAPEUTIC EXERCISES: CPT

## 2024-10-16 PROCEDURE — 97530 THERAPEUTIC ACTIVITIES: CPT

## 2024-10-16 PROCEDURE — 97112 NEUROMUSCULAR REEDUCATION: CPT

## 2024-10-16 NOTE — PROGRESS NOTES
PHYSICAL / OCCUPATIONAL THERAPY - DAILY TREATMENT NOTE     Patient Name: Salma Lopez    Date: 10/16/2024    : 1950  Insurance: Payor: MEDICARE / Plan: MEDICARE PART A AND B / Product Type: *No Product type* /      Patient  verified Yes     Visit #   Current / Total 2 24   Time   In / Out 1032 1120   Pain   In / Out 9 9   Subjective Functional Status/Changes: Today is not a good day    Changes to:  Allergies, Med Hx, Sx Hx?   no       TREATMENT AREA =  Other low back pain [M54.59]    If an interpreting service is utilized for treatment of this patient, the contents of this document represent the material reviewed with the patient via the .     OBJECTIVE      Therapeutic Procedures:  Tx Min Billable or 1:1 Min (if diff from Tx Min) Procedure, Rationale, Specifics   18  37392 Therapeutic Exercise (timed):  increase ROM, strength, coordination, balance, and proprioception to improve patient's ability to progress to PLOF and address remaining functional goals. (see flow sheet as applicable)    Details if applicable:       15  67941 Neuromuscular Re-Education (timed):  improve balance, coordination, kinesthetic sense, posture, core stability and proprioception to improve patient's ability to develop conscious control of individual muscles and awareness of position of extremities in order to progress to PLOF and address remaining functional goals. (see flow sheet as applicable)    Details if applicable:     15  41081 Therapeutic Activity (timed):  use of dynamic activities replicating functional movements to increase ROM, strength, coordination, balance, and proprioception in order to improve patient's ability to progress to PLOF and address remaining functional goals.  (see flow sheet as applicable)     Details if applicable:           Details if applicable:            Details if applicable:     48  [facility] Totals Reminder: bill using total billable min of TIMED therapeutic procedures (example: do not

## 2024-10-16 NOTE — CARE COORDINATION
Ambulatory Care Coordination Note     10/16/2024 9:31 AM     Patient Current Location:  Home: [address]  [facility] [place] [facility] [address]     ACM contacted the patient by telephone. Verified name and  with patient as identifiers.         ACM: Tamie Cervantes RN     Challenges to be reviewed by the provider   Additional needs identified to be addressed with provider No  none               Method of communication with provider: none.    Has the patient been seen in the ED since your last call? no    Care Summary Note: Pt continues to have c/o pain to hips & lower back as well as weakness to LE's. Says that her spinal cord is swollen & she's feeling that nobody is sure of what to do. Reports that she's frustrated & now using a wheelchair to get around. She's being seen by PT today, reports they have a plan for improvement. Pt has dilaudid for pain & says it's the max dose that pain management will give.(4mg Q6hrs, PRN)       Assessments Completed:   General Assessment    Do you have any symptoms that are causing concern?: Yes  Progression since Onset: Unchanged  Reported Symptoms: Other, Pain, Weakness (Comment: pt continues to c/o pain to hip/lower back, weakness in LE's)          Medications Reviewed:   Patient denies any changes with medications and reports taking all medications as prescribed.    Advance Care Planning:   Not on file; education provided     Care Planning:    Goals Addressed                   This Visit's Progress     Conditions and Symptoms   On track     I will notify my provider of any symptoms that indicate a worsening of my condition.    Barriers: overwhelmed by complexity of regimen  Plan for overcoming my barriers: notify MD immediately if s/s infection or increased pain, participate as ability allows in addressing issues within POC  Confidence: 5/10  Anticipated Goal Completion Date: 120 days         Medication Management   On track     I will take my medication as directed.  I will notify

## 2024-10-18 ENCOUNTER — HOSPITAL ENCOUNTER (OUTPATIENT)
Facility: HOSPITAL | Age: 74
Setting detail: RECURRING SERIES
Discharge: HOME | End: 2024-10-21
Payer: MEDICARE

## 2024-10-18 PROCEDURE — 97140 MANUAL THERAPY 1/> REGIONS: CPT

## 2024-10-18 PROCEDURE — 97112 NEUROMUSCULAR REEDUCATION: CPT

## 2024-10-18 PROCEDURE — 97110 THERAPEUTIC EXERCISES: CPT

## 2024-10-18 NOTE — PROGRESS NOTES
PHYSICAL / OCCUPATIONAL THERAPY - DAILY TREATMENT NOTE     Patient Name: Salma Lopez    Date: 10/18/2024    : 1950  Insurance: Payor: MEDICARE / Plan: MEDICARE PART A AND B / Product Type: *No Product type* /      Patient  verified Yes     Visit #   Current / Total 3 24   Time   In / Out 916 1000   Pain   In / Out 9 9   Subjective Functional Status/Changes: I am still in a lot of pain and don't knwo what todo   Changes to:  Allergies, Med Hx, Sx Hx?   no       TREATMENT AREA =  Other low back pain [M54.59]    If an interpreting service is utilized for treatment of this patient, the contents of this document represent the material reviewed with the patient via the .     OBJECTIVE    Therapeutic Procedures:  Tx Min Billable or 1:1 Min (if diff from Tx Min) Procedure, Rationale, Specifics   21  47566 Therapeutic Exercise (timed):  increase ROM, strength, coordination, balance, and proprioception to improve patient's ability to progress to PLOF and address remaining functional goals. (see flow sheet as applicable)    Details if applicable:       15  03883 Neuromuscular Re-Education (timed):  improve balance, coordination, kinesthetic sense, posture, core stability and proprioception to improve patient's ability to develop conscious control of individual muscles and awareness of position of extremities in order to progress to PLOF and address remaining functional goals. (see flow sheet as applicable)    Details if applicable:     8  34371 Manual Therapy (timed):  decrease pain, increase ROM, and increase tissue extensibility to improve patient's ability to progress to PLOF and address remaining functional goals.  The manual therapy interventions were performed at a separate and distinct time from the therapeutic activities interventions . Details: STM to left gluts and piriformis in right s/l, manual right illiac crest depression in left /sl       Details if applicable:           Details if

## 2024-10-21 ENCOUNTER — HOSPITAL ENCOUNTER (OUTPATIENT)
Facility: HOSPITAL | Age: 74
Discharge: HOME | End: 2024-10-24
Attending: ORTHOPAEDIC SURGERY
Payer: MEDICARE

## 2024-10-21 ENCOUNTER — HOSPITAL ENCOUNTER (OUTPATIENT)
Facility: HOSPITAL | Age: 74
Discharge: HOME | End: 2024-10-24
Payer: MEDICARE

## 2024-10-21 DIAGNOSIS — M25.50 ARTHRALGIA, UNSPECIFIED JOINT: ICD-10-CM

## 2024-10-21 DIAGNOSIS — M47.9 SPONDYLOSIS: ICD-10-CM

## 2024-10-21 DIAGNOSIS — M43.10 SPONDYLOLISTHESIS, UNSPECIFIED SPINAL REGION: ICD-10-CM

## 2024-10-21 LAB
CRP SERPL-MCNC: <0.3 MG/DL (ref 0–0.3)
ERYTHROCYTE [DISTWIDTH] IN BLOOD BY AUTOMATED COUNT: 12.6 % (ref 11.6–14.5)
ERYTHROCYTE [SEDIMENTATION RATE] IN BLOOD: 8 MM/HR (ref 0–30)
HCT VFR BLD AUTO: 38.5 % (ref 35–45)
HGB BLD-MCNC: 12.2 G/DL (ref 12–16)
MCH RBC QN AUTO: 31.9 PG (ref 24–34)
MCHC RBC AUTO-ENTMCNC: 31.7 G/DL (ref 31–37)
MCV RBC AUTO: 100.8 FL (ref 78–100)
NRBC # BLD: 0 K/UL (ref 0–0.01)
NRBC BLD-RTO: 0 PER 100 WBC
PLATELET # BLD AUTO: 350 K/UL (ref 135–420)
PMV BLD AUTO: 9.2 FL (ref 9.2–11.8)
RBC # BLD AUTO: 3.82 M/UL (ref 4.2–5.3)
WBC # BLD AUTO: 6.6 K/UL (ref 4.6–13.2)

## 2024-10-21 PROCEDURE — 36415 COLL VENOUS BLD VENIPUNCTURE: CPT

## 2024-10-21 PROCEDURE — 85027 COMPLETE CBC AUTOMATED: CPT

## 2024-10-21 PROCEDURE — 72148 MRI LUMBAR SPINE W/O DYE: CPT

## 2024-10-21 PROCEDURE — 86140 C-REACTIVE PROTEIN: CPT

## 2024-10-21 PROCEDURE — 85652 RBC SED RATE AUTOMATED: CPT

## 2024-10-22 RX ORDER — CELECOXIB 200 MG/1
200 CAPSULE ORAL DAILY
Qty: 90 CAPSULE | Refills: 1 | Status: SHIPPED | OUTPATIENT
Start: 2024-10-22

## 2024-10-23 ENCOUNTER — HOSPITAL ENCOUNTER (OUTPATIENT)
Facility: HOSPITAL | Age: 74
Setting detail: RECURRING SERIES
Discharge: HOME | End: 2024-10-26
Payer: MEDICARE

## 2024-10-23 PROCEDURE — 97530 THERAPEUTIC ACTIVITIES: CPT

## 2024-10-23 PROCEDURE — 97110 THERAPEUTIC EXERCISES: CPT

## 2024-10-23 PROCEDURE — 97140 MANUAL THERAPY 1/> REGIONS: CPT

## 2024-10-23 NOTE — PROGRESS NOTES
push off at times with transfers progressing 10/23/24     Long Term Goals: LTG- To be accomplished in 24 visits:  1.  Pt will report max pain 3/10 to complete ADLs with increased ease.   Eval: 9/10 max pain      2.  Pt will be able to complete 12 reps with 30 sec STS without UE assist to demonstrate improved functional LE strength for transfers.  Eval:10 reps with bilateral UE assist and LE pushoff      3.  Pt TUG score will improve to 15 seconds with LRAD to demonstrate decreased falls risk with walking around home  Eval:31.93 seconds with RW     4.  Pt ILYA disability score will improve to < 70  to show improvement in subjective function.  Eval:88    PLAN  Yes  Continue plan of care  []  Upgrade activities as tolerated  []  Discharge due to :  []  Other:    Sola Smalls PT    10/23/2024    10:12 AM    Future Appointments   Date Time Provider Department Center   10/23/2024 10:30 AM Sola Smalls PT [facility]   10/25/2024 10:30 AM Sola Smalls PT [facility]   1/6/2025 10:30 AM Rosemary Stokes APRN - [facility]

## 2024-10-25 ENCOUNTER — HOSPITAL ENCOUNTER (OUTPATIENT)
Facility: HOSPITAL | Age: 74
Setting detail: RECURRING SERIES
End: 2024-10-25
Payer: MEDICARE

## 2024-10-30 ENCOUNTER — CARE COORDINATION (OUTPATIENT)
Facility: CLINIC | Age: 74
End: 2024-10-30

## 2024-10-30 ENCOUNTER — HOSPITAL ENCOUNTER (OUTPATIENT)
Facility: HOSPITAL | Age: 74
Setting detail: RECURRING SERIES
Discharge: HOME | End: 2024-11-02
Payer: MEDICARE

## 2024-10-30 PROCEDURE — 97140 MANUAL THERAPY 1/> REGIONS: CPT

## 2024-10-30 PROCEDURE — 97530 THERAPEUTIC ACTIVITIES: CPT

## 2024-10-30 PROCEDURE — 97110 THERAPEUTIC EXERCISES: CPT

## 2024-10-30 NOTE — PROGRESS NOTES
PHYSICAL / OCCUPATIONAL THERAPY - DAILY TREATMENT NOTE     Patient Name: Salma Lopez    Date: 10/30/2024    : 1950  Insurance: Payor: MEDICARE / Plan: MEDICARE PART A AND B / Product Type: *No Product type* /      Patient  verified Yes     Visit #   Current / Total 5 24   Time   In / Out 240 320   Pain   In / Out 9 10   Subjective Functional Status/Changes: I try to get the right leg to move but it won't. I am walking as much as I can. I am really sore today. I have been trying to wean off some of the oxy   Changes to:  Allergies, Med Hx, Sx Hx?   no       TREATMENT AREA =  Other low back pain [M54.59]    If an interpreting service is utilized for treatment of this patient, the contents of this document represent the material reviewed with the patient via the .     OBJECTIVE      Therapeutic Procedures:  Tx Min Billable or 1:1 Min (if diff from Tx Min) Procedure, Rationale, Specifics   10  97965 Manual Therapy (timed):  decrease pain, increase ROM, and increase tissue extensibility to improve patient's ability to progress to PLOF and address remaining functional goals.  The manual therapy interventions were performed at a separate and distinct time from the therapeutic activities interventions . Details: STM to left piriformis in right s/l     Details if applicable:       15  03402 Therapeutic Activity (timed):  use of dynamic activities replicating functional movements to increase ROM, strength, coordination, balance, and proprioception in order to improve patient's ability to progress to PLOF and address remaining functional goals.  (see flow sheet as applicable)    Details if applicable:     15  26602 Therapeutic Exercise (timed):  increase ROM, strength, coordination, balance, and proprioception to improve patient's ability to progress to PLOF and address remaining functional goals. (see flow sheet as applicable)     Details if applicable:           Details if applicable:

## 2024-10-30 NOTE — CARE COORDINATION
Ambulatory Care Coordination Note     10/30/2024 9:19 AM     Patient Current Location:  Home: [address] [place] [address] Pt  [facility] [id number]     ACM contacted the patient by telephone. Verified name and  with patient as identifiers.         ACM: Tamie Cervantes RN     Challenges to be reviewed by the provider   Additional needs identified to be addressed with provider Yes, pt requesting referral to neurologist to assess whether she has any nerve damage.                 Method of communication with provider: routing.    Has the patient been seen in the ED since your last call? no    Care Summary Note: Pt reports Lyrica has helped burning in LE's, she seems happy about this, of course. She continues to report skeletal pain, reports unchanged & states she has weakness for which she has bought a wheelchair. Discussed continuing to exercise LE's as wheelchair could lead to further weakness, verbalizes understanding & states she does walk too at home & has gotten some exercises from PT.      Reports unable to tell if she is unable to tell if she has to urinate since stimulator removal but goes if she senses her abdomen in 'full'. When asked if she feels there are any symptoms she feels aren't being addressed at this time, she only mentions wanting to see a neurologist to assess whether she has any nerve damage.    Offered patient enrollment in the Remote Patient Monitoring (RPM) program for in-home monitoring: Yes, but did not enroll at this time: already monitoring with home equipment.     Assessments Completed:   General Assessment    Do you have any symptoms that are causing concern?: Yes  Progression since Onset: Unchanged, Intermittent - Waxing/Waning  Reported Symptoms: Pain, Weakness (Comment: Pt reports pain to LE's improved with Lyrica, continues to report weakness to LE's.)          Medications Reviewed:   Patient denies any changes with medications and reports taking all medications as prescribed.  Now taking

## 2024-10-31 DIAGNOSIS — M48.062 SPINAL STENOSIS, LUMBAR REGION WITH NEUROGENIC CLAUDICATION: Primary | ICD-10-CM

## 2024-11-01 ENCOUNTER — HOSPITAL ENCOUNTER (OUTPATIENT)
Facility: HOSPITAL | Age: 74
Setting detail: RECURRING SERIES
Discharge: HOME | End: 2024-11-04
Payer: MEDICARE

## 2024-11-01 PROCEDURE — 97140 MANUAL THERAPY 1/> REGIONS: CPT

## 2024-11-01 PROCEDURE — 97112 NEUROMUSCULAR REEDUCATION: CPT

## 2024-11-01 PROCEDURE — 97110 THERAPEUTIC EXERCISES: CPT

## 2024-11-01 NOTE — PROGRESS NOTES
PHYSICAL / OCCUPATIONAL THERAPY - DAILY TREATMENT NOTE     Patient Name: Salma Lopez    Date: 2024    : 1950  Insurance: Payor: MEDICARE / Plan: MEDICARE PART A AND B / Product Type: *No Product type* /      Patient  verified Yes     Visit #   Current / Total 6 24   Time   In / Out 230 310   Pain   In / Out 8-9 6   Subjective Functional Status/Changes: My back has been hurting since last time.    Changes to:  Allergies, Med Hx, Sx Hx?   no       TREATMENT AREA =  Other low back pain [M54.59]    If an interpreting service is utilized for treatment of this patient, the contents of this document represent the material reviewed with the patient via the .     OBJECTIVE    Therapeutic Procedures:  Tx Min Billable or 1:1 Min (if diff from Tx Min) Procedure, Rationale, Specifics   15  80354 Therapeutic Exercise (timed):  increase ROM, strength, coordination, balance, and proprioception to improve patient's ability to progress to PLOF and address remaining functional goals. (see flow sheet as applicable)    Details if applicable:       15  51928 Neuromuscular Re-Education (timed):  improve balance, coordination, kinesthetic sense, posture, core stability and proprioception to improve patient's ability to develop conscious control of individual muscles and awareness of position of extremities in order to progress to PLOF and address remaining functional goals. (see flow sheet as applicable)    Details if applicable:     10  22923 Manual Therapy (timed):  decrease pain, increase ROM, and increase tissue extensibility to improve patient's ability to progress to PLOF and address remaining functional goals.  The manual therapy interventions were performed at a separate and distinct time from the therapeutic activities interventions . Details: STM to left piriformis, pelvic PNF       Details if applicable:           Details if applicable:            Details if applicable:     40  [facility] BC Totals Reminder:

## 2024-11-01 NOTE — PROGRESS NOTES
[facility] [place]  [address], [address]  [place]  [address]  Phone: [phone]      Fax:  [phone]    Progress Note  Patient name: Salma Lopez Start of Care: 10/7/24   Referral source: Sandro Van MD : 1950    Medical Diagnosis: Other low back pain [M54.59]  Payor: MEDICARE / Plan: MEDICARE PART A AND B / Product Type: *No Product type* /  Onset Date:24    Treatment Diagnosis: M54.59  OTHER LOWER BACK PAIN        Prior Hospitalization: see medical history Provider#: [id number]   Medications: Verified on Patient summary List   Comorbidities:Musculoskeletal disorders, Neurologic condition, Social determinants of health: Transportation, and Complications related to surgery     Prior Level of Function: walking without AD, sailing     Reporting Period: 10/7/24-24    Visits from Start of Care: 6    Missed Visits: 0    If an interpreting service is utilized for treatment of this patient, the contents of this document represent the material reviewed with the patient via the .     Goals/Measure of Progress: To be achieved in 8 weeks:    Short Term Goals: STG- To be accomplished in 12 visits  1.  Pt will be independent with HEP to encourage prophylaxis.  Eval: HEP dispensed   PN compliance goal MET 10/23/24     2. Pt will report max pain 7/10 to be able to complete ADLs with increased ease  Eval: 9/10 max pain  PN 9/10 no change 24     3. Pt will be able to complete 5 STS without LE pushoff to demonstrate improving functional LE strength for transfers  Eval: LE pushoff and bilateral UE assist for all 10 reps with 30 sec STS test   PN able to complete 5 without LE from standard chair with UE assist goal MET 10/30/24     Long Term Goals: LTG- To be accomplished in 24 visits:  1.  Pt will report max pain 3/10 to complete ADLs with increased ease.   Eval: 9/10 max pain   PN 9/10 no change 24     2.  Pt will be able to complete 12 reps with 30

## 2024-11-12 ENCOUNTER — TELEPHONE (OUTPATIENT)
Facility: CLINIC | Age: 74
End: 2024-11-12

## 2024-11-12 NOTE — TELEPHONE ENCOUNTER
----- Message from Guille JAZMINE sent at 11/12/2024  9:28 AM EST -----  Regarding: ECC Escalation To Practice  ECC Escalation To Practice      Type of Escalation: Red Flag Symptom  --------------------------------------------------------------------------------------------------------------------------    Information for Provider: pt said that both of her legs are in pain and its also swelling   Patient is looking for appointment for: Symptom Swelling  Reasons for Message: Patient disconnected     Additional Information pt said that both of her legs are in pain and its also swelling and its been going on since June 18 and would like to get seen ASAP  --------------------------------------------------------------------------------------------------------------------------    Relationship to Patient: Self     Call Back Info: OK to leave message on voicemail  Preferred Call Back Number: Phone [phone] (home)

## 2024-11-13 ENCOUNTER — CARE COORDINATION (OUTPATIENT)
Facility: CLINIC | Age: 74
End: 2024-11-13

## 2024-11-13 DIAGNOSIS — M25.50 ARTHRALGIA, UNSPECIFIED JOINT: ICD-10-CM

## 2024-11-13 DIAGNOSIS — G47.00 INSOMNIA, UNSPECIFIED TYPE: Primary | ICD-10-CM

## 2024-11-14 ENCOUNTER — OFFICE VISIT (OUTPATIENT)
Facility: CLINIC | Age: 74
End: 2024-11-14

## 2024-11-14 VITALS
WEIGHT: 145 LBS | BODY MASS INDEX: 26.68 KG/M2 | DIASTOLIC BLOOD PRESSURE: 62 MMHG | HEART RATE: 99 BPM | HEIGHT: 62 IN | OXYGEN SATURATION: 96 % | TEMPERATURE: 97.6 F | SYSTOLIC BLOOD PRESSURE: 98 MMHG

## 2024-11-14 DIAGNOSIS — N39.0 URINARY TRACT INFECTION WITHOUT HEMATURIA, SITE UNSPECIFIED: Primary | ICD-10-CM

## 2024-11-14 RX ORDER — ZOLPIDEM TARTRATE 5 MG/1
5 TABLET ORAL NIGHTLY PRN
COMMUNITY
Start: 2024-10-15 | End: 2024-11-14 | Stop reason: SDUPTHER

## 2024-11-14 RX ORDER — CIPROFLOXACIN 250 MG/1
250 TABLET, FILM COATED ORAL 2 TIMES DAILY
Qty: 20 TABLET | Refills: 0 | Status: SHIPPED | OUTPATIENT
Start: 2024-11-14 | End: 2024-11-24

## 2024-11-14 ASSESSMENT — ENCOUNTER SYMPTOMS
VOMITING: 0
RESPIRATORY NEGATIVE: 1
COUGH: 0
NAUSEA: 0

## 2024-11-14 NOTE — PROGRESS NOTES
Chief Complaint   Patient presents with    Leg Pain     Patient states she continues to have pain in her legs. She states pain is in her bones. She also c/o her legs starting to feel cold and hard. She has had more trouble walking.        \"Have you been to the ER, urgent care clinic since your last visit?  Hospitalized since your last visit?\"    NO    “Have you seen or consulted any other health care providers outside our system since your last visit?”    YES - When: approximately 2 months ago.  Where and Why: Pain Management .

## 2024-11-14 NOTE — PROGRESS NOTES
Salma Lopez is a 74 y.o. female  presents for   Chief Complaint   Patient presents with    Leg Pain     Patient states she continues to have pain in her legs. She states pain is in her bones. She also c/o her legs starting to feel cold and hard. She has had more trouble walking.         Allergies   Allergen Reactions    Adhesive Tape Hives, Itching and Dermatitis     Maybe tape from iv's or from tape after surgery    Celecoxib Swelling    Nickel Other (See Comments)     Causes pustules     Penicillin G Anaphylaxis    Pregabalin Angioedema    Sulfa Antibiotics Hives and Swelling     Lips swelling    Other Reaction(s): Unknown    Adalimumab      Induced Lupus    Other Reaction(s): Unknown    Influenza Vaccines Hives    Iodine Itching     Takes benadryl and is OK. Contrast tolerated. Betadine ok.    Ioversol Hives and Itching     Pt states when she gets iv contrast she itches a little from hives    Lidoderm [Lidocaine]      had a reaction to the patch/adhesive    Meloxicam     Nsaids Swelling    Penicillins Hives     Other Reaction(s): Unknown    Pneumococcal Vaccine Hives    Gabapentin Diarrhea and Palpitations     Chest and Abdominal pain    Other Reaction(s): afib       Current Outpatient Medications:     ciprofloxacin (CIPRO) 250 MG tablet, Take 1 tablet by mouth 2 times daily for 10 days, Disp: 20 tablet, Rfl: 0    celecoxib (CELEBREX) 200 MG capsule, take 1 capsule by mouth once daily, Disp: 90 capsule, Rfl: 1    hydrocortisone 2.5 % cream, Apply topically 2 times daily x 10 days  Disp: 28 g, Disp: 1 each, Rfl: 1    cyclobenzaprine (FLEXERIL) 5 MG tablet, Take 1 tablet by mouth 3 times daily as needed for Muscle spasms, Disp: , Rfl:     HYDROmorphone (DILAUDID) 4 MG tablet, Take 1 tablet by mouth every 6 hours as needed for Pain., Disp: , Rfl:     aluminum & magnesium hydroxide-simethicone (MAALOX MAX) 400-400-40 MG/5ML SUSP, Take 15 mLs by mouth every 6 hours as needed (upper abdominal pain), Disp: 355 mL, Rfl:

## 2024-11-15 ENCOUNTER — CARE COORDINATION (OUTPATIENT)
Facility: CLINIC | Age: 74
End: 2024-11-15

## 2024-11-15 RX ORDER — CYCLOBENZAPRINE HCL 5 MG
5 TABLET ORAL 3 TIMES DAILY PRN
Qty: 90 TABLET | Refills: 0 | Status: SHIPPED | OUTPATIENT
Start: 2024-11-15

## 2024-11-15 RX ORDER — LISINOPRIL 5 MG/1
5 TABLET ORAL DAILY
Qty: 90 TABLET | Refills: 0 | Status: SHIPPED | OUTPATIENT
Start: 2024-11-15

## 2024-11-15 RX ORDER — CELECOXIB 200 MG/1
200 CAPSULE ORAL 2 TIMES DAILY
Qty: 180 CAPSULE | Refills: 0 | Status: SHIPPED | OUTPATIENT
Start: 2024-11-15

## 2024-11-15 RX ORDER — ZOLPIDEM TARTRATE 5 MG/1
5 TABLET ORAL NIGHTLY PRN
Qty: 90 TABLET | Refills: 0 | Status: SHIPPED | OUTPATIENT
Start: 2024-11-15 | End: 2025-02-13

## 2024-11-15 NOTE — CARE COORDINATION
within POC  Confidence: 5/10  Anticipated Goal Completion Date: 120 days         Medication Management   On track     I will take my medication as directed.  I will notify my provider of any problems with medications, like adverse effects or side effects.  I will notify my provider/Care Coordinator if I am unable to afford my medications.  I will notify my provider for advice before I stop taking any of my medication.         Reduce risk for hospitalization   Improving     I will make choices related to my condition and in my everyday care that will reduce my risk of hospitalization.               PCP/Specialist follow up:   Future Appointments         Provider Specialty Dept Phone    12/4/2024 8:30 AM Sola Smalls, PT Physical Therapy [phone]    12/6/2024 9:10 AM Sola Smalls PT Physical Therapy [phone]    12/10/2024 11:10 AM Sola Smalls, PT Physical Therapy [phone]    12/12/2024 11:50 AM Sola Smalls, PT Physical Therapy [phone]    12/17/2024 11:10 AM Sola Smalls PT Physical Therapy [phone]    12/19/2024 11:10 AM Sola Smalls, PT Physical Therapy [phone]    1/6/2025 10:30 AM Rosemary Stokes APRN - NP Cardiology [phone]            Follow Up:   Plan for next AC outreach in approximately 3 weeks to complete:  - disease specific assessments  - medication review   - advance care planning   - goal progression.   Patient  is agreeable to this plan.

## 2024-11-19 ENCOUNTER — CARE COORDINATION (OUTPATIENT)
Facility: CLINIC | Age: 74
End: 2024-11-19

## 2024-11-19 NOTE — CARE COORDINATION
Ambulatory Care Coordination Note     2024 9:23 AM     Patient Current Location:  Home: [address] [place] [address] Pt  [facility] [address]     Patient contacted the ACM by telephone. Verified name and  with patient as identifiers.         ACM: Tamie Cervantes RN     Challenges to be reviewed by the provider   Additional needs identified to be addressed with provider none  none               Method of communication with provider:  pt will address at her appt or explore rental locally .    Utilization: Has the patient been seen in the ED since your last call? no    Care Summary Note: Pt calling to inquire about the possibility of getting a hospital bed post back surgery(surgery hasn't been scheduled yet) she will address at next PCP visit. She's fine to explore with insurance or rent locally.    Offered patient enrollment in the Remote Patient Monitoring (RPM) program for in-home monitoring: Yes, but did not enroll at this time: already monitoring with home equipment.     Assessments Completed:   Hypertension - Encounter Level    Symptom course: stable      ,   General Assessment    Do you have any symptoms that are causing concern?: Yes  Progression since Onset: Unchanged  Reported Symptoms: Other (Comment: muscle spasms, all over but particularly back & legs)          Medications Reviewed:   Patient denies any changes with medications and reports taking all medications as prescribed.    Advance Care Planning:   Pt states she has one, but it's not filed.     Care Planning:    Goals Addressed                   This Visit's Progress     Conditions and Symptoms   On track     I will notify my provider of any symptoms that indicate a worsening of my condition.    Barriers: overwhelmed by complexity of regimen  Plan for overcoming my barriers: notify MD immediately if s/s infection or increased pain, participate as ability allows in addressing issues within POC  Confidence: 5/10  Anticipated Goal Completion Date: 120

## 2024-11-26 ENCOUNTER — CARE COORDINATION (OUTPATIENT)
Facility: CLINIC | Age: 74
End: 2024-11-26

## 2024-11-26 NOTE — CARE COORDINATION
Ambulatory Care Coordination Note     2024 1:47 PM     Patient Current Location:  Home: [address] [place] [address] Pt  [facility] [address]     Patient contacted the ACM by telephone. Verified name and  with patient as identifiers.         ACM: Tamie Cervantes RN     Challenges to be reviewed by the provider   Additional needs identified to be addressed with provider Yes  Pt seen in ED yesterday for further back/hip pain  (7-8) & weakness, also urinary hesitancy.    Reports [facility] scheduling surgery, says unsure of how to get to hospital.   RN at [facility] office says they're working on arranging, unsure of when but will call pt or this ACM with further info as it becomes available.               Method of communication with provider: chart routing.    Utilization: Has the patient been seen in the ED since your last call? Yes,   Discharge Date: 2024  Discharge Facility: [facility]  Reason for ED Visit: back pain/weakness  Visit Diagnosis:   failed back syndrome, lumbosacral   Acute exacerbation of chronic low back pain       Number of ED visits in the last 6 months: 9      Do you have any ongoing symptoms? Yes, my symptoms have worsened.   Current symptoms: back pain/weakness.    Did you call your PCP prior to going to the ED? No, did not call the PCP office.     Review of Discharge Instructions:   [x] AVS discharge instructions  [x] Right Care, Right Place, Right Time document  [] Medication changes  [] Follow up appointments  [x] Referral follow up [facility]  []        Care Summary Note: See above note       Assessments Completed:   General Assessment    Do you have any symptoms that are causing concern?: Yes  Reported Symptoms: Pain, Weakness, Other (Comment: pt c/o pain/weakness in back/hips, urinary hesitancy)          Medications Reviewed:   Patient denies any changes with medications and reports taking all medications as prescribed.    Advance Care Planning:   Needs updating & filed.     Care

## 2024-11-27 ENCOUNTER — CARE COORDINATION (OUTPATIENT)
Facility: CLINIC | Age: 74
End: 2024-11-27

## 2024-11-27 ENCOUNTER — TELEPHONE (OUTPATIENT)
Facility: CLINIC | Age: 74
End: 2024-11-27

## 2024-11-27 ENCOUNTER — TELEPHONE (OUTPATIENT)
Facility: HOSPITAL | Age: 74
End: 2024-11-27

## 2024-11-27 NOTE — CARE COORDINATION
Ambulatory Care Coordination Note     2024 9:17 AM     Patient Current Location:  Home: [address] [place] [address] Pt  [facility] [address]     Patient contacted the ACM by telephone. Verified name and  with patient as identifiers.         ACM: Tamie Cervantes RN     Challenges to be reviewed by the provider   Additional needs identified to be addressed with provider No  none               Method of communication with provider: none.    Utilization: Patient has not had any utilization since our last call.     Care Summary Note: Pt calls this morning to discuss upcoming appts, unsure if she can make 2 on the same day. Read times back to pt, agrees she can probably make them both.     Additionally, says she can't make upcoming PT appointments. Says she will call PT to inquire about home visits as she feels she can't get out of bed. Discussed yesterday DVT prevention, wearing compression legwear & moving legs, pt reports her current stockings have become too big but will order from [employer].     Pt also inquiring about current dilaudid prescription, pt being followed by pain management, encouraged reaching out to the clinic to address.       Assessments Completed:   General Assessment    Do you have any symptoms that are causing concern?: Yes  Progression since Onset: Unchanged  Reported Symptoms: Pain, Weakness (Comment: continued pain/weakness to back/hips)          Medications Reviewed:   Patient denies any changes with medications and reports taking all medications as prescribed.    Advance Care Planning:   Reviewed and needs to be updated & filed.     Care Planning:    Goals Addressed                   This Visit's Progress     Conditions and Symptoms   On track     I will notify my provider of any symptoms that indicate a worsening of my condition.    Barriers: overwhelmed by complexity of regimen  Plan for overcoming my barriers: notify MD immediately if s/s infection or increased pain, participate as ability

## 2024-11-27 NOTE — TELEPHONE ENCOUNTER
Pt called to report going to ER since twisting back resulting in significant pain. pt reporting unable to get out of bed due to pain. Pt wanting to cancel rest of appointments with PT planning to DC at this time. Pt reporting getting surgery soon.

## 2024-11-27 NOTE — TELEPHONE ENCOUNTER
Pt called to inform  that she will be having back surgery sometime soon (appt date pending). Therefore after she requested to speak to Sola on the physical therapy side of the office. After which sola requested a message be sent to  to request a order home health order as patient will be sending a [employer] message about this as well at some point today. Please review and advise as needed.

## 2024-11-28 NOTE — PROGRESS NOTES
Gabby Kramer is a 68 y.o. female  presents for refill of meds. No new complaints. HPI:   Patient has osteoarthritis , primarily affecting the back. Pain control:           Current : well controlled   4/10           Worst pain over last week        7/10           Least pain over the last week   4/10  Activities of Daily Living:            well controlled            Are you able to do your normal daily activities?   intermittent or no  Patient Goals            Functional:  yes            Pain: less  Adverse side effects:             Since starting your pain medicine are you experiencing any of the following?              ( Examples: Constipation, fatigue, lapses in memory, depression or sexual                        Dysfunction)   No   Is a Pain management Contract in the patient's chart? no  Aberrant behaviors:              none(Early refill requests, lost prescriptions, lost medicine)  Pill count:             Is it consistent with patient's prescription? {Yes/No:77675:L: \"yes\"  Last urine drug screen:     [facility] Prescription Monitoring Program check performed:  yes        Treatment Care Team:  Patient Care Team:  Pooja Coburn MD as PCP - General (Family Practice)  Erick Leonard MD (Gastroenterology)  Follow up contact scheduled for 1-4 weeks: yes      Allergies   Allergen Reactions    Celebrex [Celecoxib] Swelling    Shellfish Derived Swelling     \"makes me feel puffy\"    Sulfa (Sulfonamide Antibiotics) Hives and Swelling     Lips swelling    Gabapentin Diarrhea and Palpitations     Chest and Abdominal pain    Humira [Adalimumab] Other (comments)     Lupus    Iodine Itching    Optiray 320 [Ioversol] Hives and Itching     Pt states when she gets iv contrast she itches a little from hives?     Penicillins Hives       Patient Active Problem List   Diagnosis Code    DDD (degenerative disc disease), lumbar M51.36    Spondylolisthesis of lumbar region M43.16    Status post lumbar surgery Z98.890    LABOR NOTE    S:  Complaints: No.  Epidural working:  yes  Resident to bedside for gasca balloon placement    O: /67   Pulse 105   Temp 98.5 °F (36.9 °C) (Oral)   Resp 20   LMP 03/12/2024 (Exact Date)   SpO2 100%   Breastfeeding No     FHT: 140bpm; moderate variability; +accels/+ decels; Cat 1 (reassuring)  CTX: q 3 minutes, pit @ 8  SVE: 1/50/-3; gasca balloon placed    TIMELINE:  2345: 0.5/40/-4  0640: 1/50/-3; gasca bloon placed    PLAN:    Continue Close Maternal/Fetal Monitoring  Pitocin Augmentation per protocol  Patient noted to have decel to the 100s while gasca balloon was being placed - BP noted to be 70s/40s status post epidural placement  Anesthesia aware and to come and evaluate patient  Recheck 2-4 hours or PRN    Sania Vega MD  Obstetrics and Gynecology - PGY2      Preop cardiovascular exam Z01.810    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) M06.9    Sicca syndrome ([facility]) M35.00    Osteoarthritis of right hip M16.11    Pain management contract agreement Z02.89    ACP (advance care planning) Z71.89    Chronic left shoulder pain M25.512, G89.29    Osteopenia M85.80    Osteopenia of multiple sites M85.89    Osteoarthritis of left hip M16.12    Closed fracture of thoracic spine without spinal cord lesion ([facility] S22.009A    Spinal stenosis, thoracolumbar region M48.05    Lumbar spine instability M53.2X6    Spinal stenosis of lumbar region with neurogenic claudication M48.062    Spinal stenosis of lumbar region M48.061    Lumbar stenosis M48.061    Nausea R11.0    Acute bilateral thoracic back pain M54.6    Anxiety F41.9    Chest pain R07.9    Essential hypertension I10     Past Medical History:   Diagnosis Date    Abdominal abscess 2009    Arthritis     Rheumatoid    Autoimmune disease ([facility]     Medically induced Lupus    Back pain     Chronic pain     lower back    Colitis     Diverticulitis     Failed back syndrome     GERD (gastroesophageal reflux disease)     Hypertension     when on cyclosporins    Ill-defined condition     large torus roof of mouth    Irritable bowel syndrome     Neuropathy     bilateral hands/wrist    Osteoporosis     Pneumonia     RA (rheumatoid arthritis) ([facility])     Seizures ([facility] 6-1-2008    one episode- after high dose of epinepherine     Social History     Socioeconomic History    Marital status:      Spouse name: Not on file    Number of children: Not on file    Years of education: Not on file    Highest education level: Not on file   Tobacco Use    Smoking status: Never Smoker    Smokeless tobacco: Never Used   Substance and Sexual Activity    Alcohol use: No    Drug use: No    Sexual activity: Never   Other Topics Concern     Family History   Problem Relation Age of Onset    Other Other         Orthopedic (Sister)   [facility] Arthritis-osteo Sister     Heart Attack Brother 58    Cancer Neg Hx     Diabetes Neg Hx     Heart Disease Neg Hx     Hypertension Neg Hx     Stroke Neg Hx     Malignant Hyperthermia Neg Hx     Pseudocholinesterase Deficiency Neg Hx     Delayed Awakening Neg Hx     Post-op Nausea/Vomiting Neg Hx     Emergence Delirium Neg Hx     Post-op Cognitive Dysfunction Neg Hx         ROS    Vitals:    06/07/19 1301   BP: 124/68   Pulse: (!) 59   Resp: 12   Temp: 98.4 °F (36.9 °C)   TempSrc: Oral   SpO2: 96%   PainSc:   3   PainLoc: Back       Physical Exam    Assessment/Plan      ICD-10-CM ICD-9-CM    1. Acute bilateral thoracic back pain M54.6 724.1 traMADol (ULTRAM) 50 mg tablet   2. Medicare annual wellness visit, subsequent Z00.00 V70.0    3. ACP (advance care planning) Z71.89 V65.49      I have discussed the diagnosis with the patient and the intended plan of care as seen in the above orders. The patient has received an after-visit summary and questions were answered concerning future plans. I have discussed medication, side effects, and warnings with the patient in detail. The patient verbalized understanding and is in agreement with the plan of care. The patient will contact the office with any additional concerns.     lab results and schedule of future lab studies reviewed with patient    Ara Hamm MD

## 2024-11-29 ENCOUNTER — CARE COORDINATION (OUTPATIENT)
Facility: CLINIC | Age: 74
End: 2024-11-29

## 2024-11-29 DIAGNOSIS — M25.50 ARTHRALGIA, UNSPECIFIED JOINT: ICD-10-CM

## 2024-11-29 RX ORDER — CELECOXIB 200 MG/1
CAPSULE ORAL
Refills: 0 | OUTPATIENT
Start: 2024-11-29

## 2024-11-29 NOTE — CARE COORDINATION
Ambulatory Care Coordination Note     2024 11:32 AM     Patient Current Location:  Home: [facility] [address]  [place] [address]     ACM contacted the patient by telephone(returned call). Verified name and  with patient as identifiers.         ACM: Tamie Cervantes RN     Challenges to be reviewed by the provider   Additional needs identified to be addressed with provider No  none               Method of communication with provider: none.    Utilization: Patient has not had any utilization since our last call.     Care Summary Note: Pt left message of getting an earlier surgery date, concerned that her cardiac clearance appt is after that date, when speaking with pt, she reports that she's now gotten a new cardiology appt for the clearance.     Assessments Completed:   General Assessment    Do you have any symptoms that are causing concern?: Yes  Progression since Onset: Unchanged  Reported Symptoms: Pain, Other, Weakness (Comment: pain/weakness hips/legs)          Medications Reviewed:   Patient denies any changes with medications and reports taking all medications as prescribed.    Advance Care Planning:   Patient declined education     Care Planning:    Goals Addressed                   This Visit's Progress     Conditions and Symptoms   On track     I will notify my provider of any symptoms that indicate a worsening of my condition.    Barriers: overwhelmed by complexity of regimen  Plan for overcoming my barriers: notify MD immediately if s/s infection or increased pain, participate as ability allows in addressing issues within POC  Confidence: 5/10  Anticipated Goal Completion Date: 120 days         Medication Management   No change     I will take my medication as directed.  I will notify my provider of any problems with medications, like adverse effects or side effects.  I will notify my provider/Care Coordinator if I am unable to afford my medications.  I will notify my provider for advice

## 2024-12-03 ENCOUNTER — OFFICE VISIT (OUTPATIENT)
Age: 74
End: 2024-12-03
Payer: MEDICARE

## 2024-12-03 VITALS
HEIGHT: 62 IN | BODY MASS INDEX: 24.66 KG/M2 | WEIGHT: 134 LBS | OXYGEN SATURATION: 94 % | HEART RATE: 86 BPM | SYSTOLIC BLOOD PRESSURE: 129 MMHG | DIASTOLIC BLOOD PRESSURE: 78 MMHG

## 2024-12-03 DIAGNOSIS — I10 ESSENTIAL (PRIMARY) HYPERTENSION: Primary | ICD-10-CM

## 2024-12-03 DIAGNOSIS — M48.062 SPINAL STENOSIS, LUMBAR REGION WITH NEUROGENIC CLAUDICATION: Primary | ICD-10-CM

## 2024-12-03 DIAGNOSIS — I87.2 VENOUS INSUFFICIENCY: ICD-10-CM

## 2024-12-03 DIAGNOSIS — Z01.810 PREOP CARDIOVASCULAR EXAM: ICD-10-CM

## 2024-12-03 DIAGNOSIS — I47.10 SUPRAVENTRICULAR TACHYCARDIA (HCC): Primary | ICD-10-CM

## 2024-12-03 DIAGNOSIS — I10 ESSENTIAL (PRIMARY) HYPERTENSION: ICD-10-CM

## 2024-12-03 PROCEDURE — 93000 ELECTROCARDIOGRAM COMPLETE: CPT | Performed by: INTERNAL MEDICINE

## 2024-12-03 PROCEDURE — 1160F RVW MEDS BY RX/DR IN RCRD: CPT | Performed by: INTERNAL MEDICINE

## 2024-12-03 PROCEDURE — 3074F SYST BP LT 130 MM HG: CPT | Performed by: INTERNAL MEDICINE

## 2024-12-03 PROCEDURE — 1123F ACP DISCUSS/DSCN MKR DOCD: CPT | Performed by: INTERNAL MEDICINE

## 2024-12-03 PROCEDURE — 1125F AMNT PAIN NOTED PAIN PRSNT: CPT | Performed by: INTERNAL MEDICINE

## 2024-12-03 PROCEDURE — 99214 OFFICE O/P EST MOD 30 MIN: CPT | Performed by: INTERNAL MEDICINE

## 2024-12-03 PROCEDURE — 3078F DIAST BP <80 MM HG: CPT | Performed by: INTERNAL MEDICINE

## 2024-12-03 PROCEDURE — 1036F TOBACCO NON-USER: CPT | Performed by: INTERNAL MEDICINE

## 2024-12-03 PROCEDURE — G8420 CALC BMI NORM PARAMETERS: HCPCS | Performed by: INTERNAL MEDICINE

## 2024-12-03 PROCEDURE — G8399 PT W/DXA RESULTS DOCUMENT: HCPCS | Performed by: INTERNAL MEDICINE

## 2024-12-03 PROCEDURE — 1159F MED LIST DOCD IN RCRD: CPT | Performed by: INTERNAL MEDICINE

## 2024-12-03 PROCEDURE — 1090F PRES/ABSN URINE INCON ASSESS: CPT | Performed by: INTERNAL MEDICINE

## 2024-12-03 PROCEDURE — G8484 FLU IMMUNIZE NO ADMIN: HCPCS | Performed by: INTERNAL MEDICINE

## 2024-12-03 PROCEDURE — G8427 DOCREV CUR MEDS BY ELIG CLIN: HCPCS | Performed by: INTERNAL MEDICINE

## 2024-12-03 RX ORDER — LISINOPRIL 5 MG/1
5 TABLET ORAL DAILY
Qty: 90 TABLET | Refills: 3 | Status: SHIPPED | OUTPATIENT
Start: 2024-12-03

## 2024-12-03 RX ORDER — PREGABALIN 50 MG/1
50 CAPSULE ORAL 2 TIMES DAILY
COMMUNITY

## 2024-12-03 ASSESSMENT — ENCOUNTER SYMPTOMS
GASTROINTESTINAL NEGATIVE: 1
RESPIRATORY NEGATIVE: 1
EYES NEGATIVE: 1

## 2024-12-03 NOTE — PROGRESS NOTES
1. Have you been to the ER, urgent care clinic since your last visit?  Hospitalized since your last visit?     Yes [facility] for back pain      2.  Where do you normally have your labs drawn?   [facility]    3. Do you need any refills today?   lisinopril    4. Which local pharmacy do you use (enter pharmacy)?   [facility]    5. Which mail order pharmacy do you use (enter pharmacy)?   [facility]     6. Are you here for surgical clearance and if so who will be doing your     procedure/surgery (care team)?   Yes Dr. Brandan Iyer for back surgery 21/11/24      
T 10 screws    HYSTERECTOMY (CERVIX STATUS UNKNOWN)  1976    LUMBAR FUSION  12/03/2018    Retroperitoneal retropleural approach L1-L2 with extreme lateral interbody fusion. T11 rib resection on left, placement of chest tube    LUMBAR FUSION      x 2  L 3-4 , L-5-6    LUMBAR LAMINECTOMY  07/08/2013    spine lumbar laminectomy with instrumentation    RECTAL PROLAPSE REPAIR      SALPINGO-OOPHORECTOMY Bilateral     SHOULDER ARTHROPLASTY Left     STIMULATOR SURGERY N/A 06/04/2024    Interstim Stage I- Dr. Mauro    STIMULATOR SURGERY N/A 6/18/2024    INTERSTIM STAGE 2 W/CESAR performed by Saran Mauro MD at [facility] MAIN OR    STIMULATOR SURGERY N/A 9/11/2024    REMOVAL SPINAL CORD STIMULATOR WITH C-ARM performed by Brandan Iyer MD at [facility] MAIN OR    THUMB ARTHROSCOPY Bilateral     TONSILLECTOMY  1954    TOTAL HIP ARTHROPLASTY Bilateral 05/31/2017    left hip 10/25/2017    UPPER GASTROINTESTINAL ENDOSCOPY      VEIN LIGATION AND STRIPPING Left 1985    vein stripping       Vitals:    12/03/24 1024   BP: 129/78   Site: Left Upper Arm   Position: Sitting   Cuff Size: Medium Adult   Pulse: 86   SpO2: 94%   Weight: 60.8 kg (134 lb)   Height: 1.575 m (5' 2\")          Diagnostic Studies:  I have reviewed the relevant tests done on the patient and show as follows  EKG tracings reviewed by me today.      Ms. Lopez has a reminder for a \"due or due soon\" health maintenance. I have asked that she contact her primary care provider for follow-up on this health maintenance.                Interpretation Summary 6/2021    LV: Estimated LVEF is 55 - 60%. Normal cavity size, wall thickness and systolic function (ejection fraction normal). Wall motion: normal. Mild (grade 1) left ventricular diastolic dysfunction.  LA: Left Atrium volume index is 27.72 mL/m2.  TV: Right Ventricular Arterial Pressure (RVSP) is 25 mmHg. Pulmonary hypertension not suggested by Doppler findings.        6/2021  Event monitor-rare PAC PVC on 122 beat SVT in

## 2024-12-04 ENCOUNTER — CARE COORDINATION (OUTPATIENT)
Facility: CLINIC | Age: 74
End: 2024-12-04

## 2024-12-04 ENCOUNTER — APPOINTMENT (OUTPATIENT)
Facility: HOSPITAL | Age: 74
End: 2024-12-04
Payer: MEDICARE

## 2024-12-04 SDOH — SOCIAL STABILITY: SOCIAL INSECURITY: WITHIN THE LAST YEAR, HAVE YOU BEEN HUMILIATED OR EMOTIONALLY ABUSED IN OTHER WAYS BY YOUR PARTNER OR EX-PARTNER?: NO

## 2024-12-04 SDOH — SOCIAL STABILITY: SOCIAL NETWORK
DO YOU BELONG TO ANY CLUBS OR ORGANIZATIONS SUCH AS CHURCH GROUPS UNIONS, FRATERNAL OR ATHLETIC GROUPS, OR SCHOOL GROUPS?: YES

## 2024-12-04 SDOH — SOCIAL STABILITY: SOCIAL INSECURITY
WITHIN THE LAST YEAR, HAVE YOU BEEN KICKED, HIT, SLAPPED, OR OTHERWISE PHYSICALLY HURT BY YOUR PARTNER OR EX-PARTNER?: NO

## 2024-12-04 SDOH — ECONOMIC STABILITY: FOOD INSECURITY: WITHIN THE PAST 12 MONTHS, THE FOOD YOU BOUGHT JUST DIDN'T LAST AND YOU DIDN'T HAVE MONEY TO GET MORE.: NEVER TRUE

## 2024-12-04 SDOH — HEALTH STABILITY: MENTAL HEALTH: HOW OFTEN DO YOU HAVE A DRINK CONTAINING ALCOHOL?: NEVER

## 2024-12-04 SDOH — SOCIAL STABILITY: SOCIAL INSECURITY
WITHIN THE LAST YEAR, HAVE TO BEEN RAPED OR FORCED TO HAVE ANY KIND OF SEXUAL ACTIVITY BY YOUR PARTNER OR EX-PARTNER?: NO

## 2024-12-04 SDOH — ECONOMIC STABILITY: FOOD INSECURITY: WITHIN THE PAST 12 MONTHS, YOU WORRIED THAT YOUR FOOD WOULD RUN OUT BEFORE YOU GOT MONEY TO BUY MORE.: NEVER TRUE

## 2024-12-04 SDOH — HEALTH STABILITY: MENTAL HEALTH
STRESS IS WHEN SOMEONE FEELS TENSE, NERVOUS, ANXIOUS, OR CAN'T SLEEP AT NIGHT BECAUSE THEIR MIND IS TROUBLED. HOW STRESSED ARE YOU?: RATHER MUCH

## 2024-12-04 SDOH — ECONOMIC STABILITY: INCOME INSECURITY: HOW HARD IS IT FOR YOU TO PAY FOR THE VERY BASICS LIKE FOOD, HOUSING, MEDICAL CARE, AND HEATING?: NOT HARD AT ALL

## 2024-12-04 SDOH — SOCIAL STABILITY: SOCIAL NETWORK: ARE YOU MARRIED, WIDOWED, DIVORCED, SEPARATED, NEVER MARRIED, OR LIVING WITH A PARTNER?: MARRIED

## 2024-12-04 SDOH — HEALTH STABILITY: PHYSICAL HEALTH: ON AVERAGE, HOW MANY DAYS PER WEEK DO YOU ENGAGE IN MODERATE TO STRENUOUS EXERCISE (LIKE A BRISK WALK)?: 0 DAYS

## 2024-12-04 SDOH — ECONOMIC STABILITY: INCOME INSECURITY: IN THE LAST 12 MONTHS, WAS THERE A TIME WHEN YOU WERE NOT ABLE TO PAY THE MORTGAGE OR RENT ON TIME?: NO

## 2024-12-04 SDOH — HEALTH STABILITY: MENTAL HEALTH: HOW MANY STANDARD DRINKS CONTAINING ALCOHOL DO YOU HAVE ON A TYPICAL DAY?: PATIENT DOES NOT DRINK

## 2024-12-04 SDOH — SOCIAL STABILITY: SOCIAL NETWORK
IN A TYPICAL WEEK, HOW MANY TIMES DO YOU TALK ON THE PHONE WITH FAMILY, FRIENDS, OR NEIGHBORS?: MORE THAN THREE TIMES A WEEK

## 2024-12-04 SDOH — SOCIAL STABILITY: SOCIAL NETWORK

## 2024-12-04 SDOH — SOCIAL STABILITY: SOCIAL INSECURITY: WITHIN THE LAST YEAR, HAVE YOU BEEN AFRAID OF YOUR PARTNER OR EX-PARTNER?: NO

## 2024-12-04 SDOH — ECONOMIC STABILITY: TRANSPORTATION INSECURITY
IN THE PAST 12 MONTHS, HAS LACK OF TRANSPORTATION KEPT YOU FROM MEETINGS, WORK, OR FROM GETTING THINGS NEEDED FOR DAILY LIVING?: NO

## 2024-12-04 SDOH — HEALTH STABILITY: PHYSICAL HEALTH: ON AVERAGE, HOW MANY MINUTES DO YOU ENGAGE IN EXERCISE AT THIS LEVEL?: 0 MIN

## 2024-12-04 SDOH — SOCIAL STABILITY: SOCIAL NETWORK: HOW OFTEN DO YOU ATTENT MEETINGS OF THE CLUB OR ORGANIZATION YOU BELONG TO?: MORE THAN 4 TIMES PER YEAR

## 2024-12-04 SDOH — ECONOMIC STABILITY: TRANSPORTATION INSECURITY
IN THE PAST 12 MONTHS, HAS THE LACK OF TRANSPORTATION KEPT YOU FROM MEDICAL APPOINTMENTS OR FROM GETTING MEDICATIONS?: NO

## 2024-12-04 NOTE — CARE COORDINATION
Ambulatory Care Coordination Note     2024 9:37 AM     Patient Current Location:  Home: [address]  [place] [address]     ACM contacted the patient by telephone. Verified name and  with patient as identifiers.         ACM: Tamie Cervantes RN     Challenges to be reviewed by the provider   Additional needs identified to be addressed with provider No  none               Method of communication with provider: none.    Utilization: Has the patient been seen in the ED since your last call? no    Care Summary Note: Pt was seen yesterday by cardiologist, she was taken by medical transport for surgical clearance. She's experiencing the same ongoing issues with her back & hips. She's scheduled  to address surgically with Shireen GEORGE. She's been in good spirits under the circumstances & hopeful this surgery will improve her circumstances. She is being seen by pain management to address her pain.    Offered patient enrollment in the Remote Patient Monitoring (RPM) program for in-home monitoring: Yes, but did not enroll at this time: already monitoring with home equipment.     Assessments Completed:   General Assessment    Do you have any symptoms that are causing concern?: Yes  Progression since Onset: Unchanged  Reported Symptoms: Pain (Comment: back & hip pain ongoing)          Medications Reviewed:   Patient denies any changes with medications and reports taking all medications as prescribed.    Advance Care Planning:   Reviewed and needs to be updated     Care Planning:    Goals Addressed                   This Visit's Progress     Conditions and Symptoms   On track     I will notify my provider of any symptoms that indicate a worsening of my condition.    Barriers: overwhelmed by complexity of regimen  Plan for overcoming my barriers: notify MD immediately if s/s infection or increased pain, participate as ability allows in addressing issues within POC  Confidence: 5/10  Anticipated Goal

## 2024-12-06 ENCOUNTER — APPOINTMENT (OUTPATIENT)
Facility: HOSPITAL | Age: 74
End: 2024-12-06
Payer: MEDICARE

## 2024-12-09 ENCOUNTER — ANESTHESIA EVENT (OUTPATIENT)
Facility: HOSPITAL | Age: 74
DRG: 428 | End: 2024-12-09
Payer: MEDICARE

## 2024-12-10 ENCOUNTER — TELEPHONE (OUTPATIENT)
Facility: CLINIC | Age: 74
End: 2024-12-10

## 2024-12-10 ENCOUNTER — APPOINTMENT (OUTPATIENT)
Facility: HOSPITAL | Age: 74
End: 2024-12-10
Payer: MEDICARE

## 2024-12-10 ENCOUNTER — CARE COORDINATION (OUTPATIENT)
Facility: CLINIC | Age: 74
End: 2024-12-10

## 2024-12-10 NOTE — TELEPHONE ENCOUNTER
Patient is scheduled for surgery with Dr. Iyer tomorrow, 12/11.  Dr. Epps was out of the office until 12/11/24 but per Dr. Epps verbally he is able to clear her for surgery.  I spoke with Dr. Iyer's office and they are aware of this and stated it would be ok to send a letter stating that on letterhead until Dr. Epps is able to sign the form tomorrow am.  Letter was typed and faxed to Dr. Iyer's office at [phone] which is the fax # given to me by the staff member I spoke with. They are also faxing the form to us.

## 2024-12-10 NOTE — H&P
in the morning and at bedtime Indications: RA      cetirizine (ZYRTEC) 10 MG tablet Take 1 tablet by mouth daily as needed for Allergies or Rhinitis      vitamin D (CHOLECALCIFEROL) 25 MCG (1000 UT) TABS tablet Take 1 tablet by mouth daily         ROS:  Denies chills, fever,night sweats,  bowel or bladder dysfunction, unexplained weight loss/weight gain, chest pain, sob or anxiety.    Physical Examination    Gen: Well developed, well nourished 74 y.o. female Good strength her EHL, tib ant, hams, quads, deltoids, biceps, triceps. She has severe mechanical back pain with much difficulty sitting or standing.     Assessment and Plan    Due to the pt's persistent symptoms unrelieved by conservative measure Salma Lopez is being admitted to undergo surgical intervention. The post-operative plan of care consists of physical therapy, home health and a 2 week f/u office visit.  The risks, benefits, complications and alternatives to surgery have been discussed in detail with the patient.  The patient understands and agrees to proceed.

## 2024-12-10 NOTE — CARE COORDINATION
Ambulatory Care Coordination Note     12/10/2024 9:54 AM     Patient Current Location:  Home: [address] [place] [address] [address]  [facility] [address]     ACM contacted the patient by telephone after pt called & called back. Verified name and  with patient as identifiers.         ACM: Tamie Cervantes RN     Challenges to be reviewed by the provider   Additional needs identified to be addressed with provider No  none               Method of communication with provider: none.    Utilization: Has the patient been seen in the ED since your last call? no    Care Summary Note: Pt worried about getting surgical clearance from PCP today. Surgeon office, PCP & PCP RN aware this needed & working on this, will address this afternoon if not handled.    Offered patient enrollment in the Remote Patient Monitoring (RPM) program for in-home monitoring: Yes, but did not enroll at this time: already monitoring with home equipment.     Assessments Completed:   General Assessment    Do you have any symptoms that are causing concern?: Yes  Progression since Onset: Unchanged  Reported Symptoms: Pain, Other (Comment: LE & hip pain)          Medications Reviewed:   Patient denies any changes with medications and reports taking all medications as prescribed.    Advance Care Planning:   Patient declined education     Care Planning:    Goals Addressed                   This Visit's Progress     Conditions and Symptoms   On track     I will notify my provider of any symptoms that indicate a worsening of my condition.    Barriers: overwhelmed by complexity of regimen  Plan for overcoming my barriers: notify MD immediately if s/s infection or increased pain, participate as ability allows in addressing issues within POC  Confidence: 5/10  Anticipated Goal Completion Date: 120 days         Medication Management   On track     I will take my medication as directed.  I will notify my provider of any problems with medications, like adverse effects or side

## 2024-12-10 NOTE — DISCHARGE INSTRUCTIONS
ice on your back to decrease pain and swelling.  Do NOT use heat.    MEDICATIONS:  If you had fusion surgery DO NOT TAKE non-steroidal anti-inflammatory (NSAID) medications, such as Motrin, Aleve, Advil Naprosyn, Ibuprofen or aspirin.   Take Tylenol/Acetaminophen every 4-6 hours for pain. Do not take more than 3000 mg each day. (Do not take Tylenol/Acetaminophen if you have liver problems).  Take your prescribed narcotic pain medication as needed for pain that is not tolerable.    Eat food before you take any pain medication to avoid nausea.    If you need a medication refill, please call the office during working hours at least 2 days before your prescription runs out. Do not wait until your bottle is empty to call for a refill.         NUTRITION:  Eat healthy to help your wound to heal.    Eat a healthy balanced diet to help your wound to heal. Protein supplements should be considered if you are eating less than 50% of your meal.   Drink plenty of water to stay hydrated.    DRIVING & RETURN TO WORK:   You will be told at your follow up appointment if it is safe for you to drive or return to work and will be provided with a udezow-jx-pixw-note if needed (please ask).   NEVER drive while taking narcotic medication.    WHEN TO CALL THE OFFICE:  If you have severe pain unrelieved by the medications, new numbness or tingling in your legs;        If you have a fever of 101.0°F or greater   If you notice increased swelling, redness, or increased drainage from the incision    If you are not able to urinate  If you are not able to control your bowels       [facility] number is [phone].  They are open from 8:00am to 5:00pm Mon - Fri. After 5:00pm, or on weekends/holidays, please call the answering service at [phone] for a call back.

## 2024-12-11 ENCOUNTER — HOSPITAL ENCOUNTER (INPATIENT)
Facility: HOSPITAL | Age: 74
LOS: 1 days | Discharge: HOME HEALTH | DRG: 428 | End: 2024-12-12
Attending: ORTHOPAEDIC SURGERY | Admitting: ORTHOPAEDIC SURGERY
Payer: MEDICARE

## 2024-12-11 ENCOUNTER — ANESTHESIA (OUTPATIENT)
Facility: HOSPITAL | Age: 74
DRG: 428 | End: 2024-12-11
Payer: MEDICARE

## 2024-12-11 ENCOUNTER — APPOINTMENT (OUTPATIENT)
Facility: HOSPITAL | Age: 74
DRG: 428 | End: 2024-12-11
Attending: ORTHOPAEDIC SURGERY
Payer: MEDICARE

## 2024-12-11 DIAGNOSIS — M43.25 FUSION OF SPINE OF THORACOLUMBAR REGION: ICD-10-CM

## 2024-12-11 DIAGNOSIS — M48.062 PSEUDOCLAUDICATION SYNDROME: ICD-10-CM

## 2024-12-11 DIAGNOSIS — Z98.1 S/P LUMBAR FUSION: Primary | ICD-10-CM

## 2024-12-11 DIAGNOSIS — M43.16 SPONDYLOLISTHESIS OF LUMBAR REGION: ICD-10-CM

## 2024-12-11 PROBLEM — M43.17 SPONDYLOLISTHESIS AT L5-S1 LEVEL: Status: ACTIVE | Noted: 2024-12-11

## 2024-12-11 LAB
ABO + RH BLD: NORMAL
BLOOD GROUP ANTIBODIES SERPL: NORMAL
EST. AVERAGE GLUCOSE BLD GHB EST-MCNC: 111 MG/DL
GLUCOSE BLD STRIP.AUTO-MCNC: 123 MG/DL (ref 70–110)
GLUCOSE BLD STRIP.AUTO-MCNC: 135 MG/DL (ref 70–110)
HBA1C MFR BLD: 5.5 % (ref 4.2–5.6)
SPECIMEN EXP DATE BLD: NORMAL

## 2024-12-11 PROCEDURE — 2709999900 HC NON-CHARGEABLE SUPPLY: Performed by: ORTHOPAEDIC SURGERY

## 2024-12-11 PROCEDURE — 6360000002 HC RX W HCPCS: Performed by: ORTHOPAEDIC SURGERY

## 2024-12-11 PROCEDURE — 6360000002 HC RX W HCPCS: Performed by: SPECIALIST

## 2024-12-11 PROCEDURE — 87102 FUNGUS ISOLATION CULTURE: CPT

## 2024-12-11 PROCEDURE — 2580000003 HC RX 258: Performed by: NURSE ANESTHETIST, CERTIFIED REGISTERED

## 2024-12-11 PROCEDURE — 2580000003 HC RX 258: Performed by: ORTHOPAEDIC SURGERY

## 2024-12-11 PROCEDURE — 86900 BLOOD TYPING SEROLOGIC ABO: CPT

## 2024-12-11 PROCEDURE — 2500000003 HC RX 250 WO HCPCS: Performed by: NURSE ANESTHETIST, CERTIFIED REGISTERED

## 2024-12-11 PROCEDURE — 0SG00A0 FUSION OF LUMBAR VERTEBRAL JOINT WITH INTERBODY FUSION DEVICE, ANTERIOR APPROACH, ANTERIOR COLUMN, OPEN APPROACH: ICD-10-PCS | Performed by: ORTHOPAEDIC SURGERY

## 2024-12-11 PROCEDURE — 3600000012 HC SURGERY LEVEL 2 ADDTL 15MIN: Performed by: ORTHOPAEDIC SURGERY

## 2024-12-11 PROCEDURE — 82962 GLUCOSE BLOOD TEST: CPT

## 2024-12-11 PROCEDURE — 77002 NEEDLE LOCALIZATION BY XRAY: CPT

## 2024-12-11 PROCEDURE — 6360000002 HC RX W HCPCS: Performed by: NURSE ANESTHETIST, CERTIFIED REGISTERED

## 2024-12-11 PROCEDURE — 3600000002 HC SURGERY LEVEL 2 BASE: Performed by: ORTHOPAEDIC SURGERY

## 2024-12-11 PROCEDURE — 3700000001 HC ADD 15 MINUTES (ANESTHESIA): Performed by: ORTHOPAEDIC SURGERY

## 2024-12-11 PROCEDURE — 36415 COLL VENOUS BLD VENIPUNCTURE: CPT

## 2024-12-11 PROCEDURE — 97161 PT EVAL LOW COMPLEX 20 MIN: CPT

## 2024-12-11 PROCEDURE — 0SG70JZ FUSION OF RIGHT SACROILIAC JOINT WITH SYNTHETIC SUBSTITUTE, OPEN APPROACH: ICD-10-PCS | Performed by: ORTHOPAEDIC SURGERY

## 2024-12-11 PROCEDURE — 7100000000 HC PACU RECOVERY - FIRST 15 MIN: Performed by: ORTHOPAEDIC SURGERY

## 2024-12-11 PROCEDURE — 0SG80JZ FUSION OF LEFT SACROILIAC JOINT WITH SYNTHETIC SUBSTITUTE, OPEN APPROACH: ICD-10-PCS | Performed by: ORTHOPAEDIC SURGERY

## 2024-12-11 PROCEDURE — 0QP004Z REMOVAL OF INTERNAL FIXATION DEVICE FROM LUMBAR VERTEBRA, OPEN APPROACH: ICD-10-PCS | Performed by: ORTHOPAEDIC SURGERY

## 2024-12-11 PROCEDURE — C1713 ANCHOR/SCREW BN/BN,TIS/BN: HCPCS | Performed by: ORTHOPAEDIC SURGERY

## 2024-12-11 PROCEDURE — 97530 THERAPEUTIC ACTIVITIES: CPT

## 2024-12-11 PROCEDURE — 2500000003 HC RX 250 WO HCPCS: Performed by: ORTHOPAEDIC SURGERY

## 2024-12-11 PROCEDURE — 7100000001 HC PACU RECOVERY - ADDTL 15 MIN: Performed by: ORTHOPAEDIC SURGERY

## 2024-12-11 PROCEDURE — 2720000010 HC SURG SUPPLY STERILE: Performed by: ORTHOPAEDIC SURGERY

## 2024-12-11 PROCEDURE — 86850 RBC ANTIBODY SCREEN: CPT

## 2024-12-11 PROCEDURE — 6370000000 HC RX 637 (ALT 250 FOR IP): Performed by: ORTHOPAEDIC SURGERY

## 2024-12-11 PROCEDURE — 83036 HEMOGLOBIN GLYCOSYLATED A1C: CPT

## 2024-12-11 PROCEDURE — 87075 CULTR BACTERIA EXCEPT BLOOD: CPT

## 2024-12-11 PROCEDURE — 0SG04J1 FUSION OF LUMBAR VERTEBRAL JOINT WITH SYNTHETIC SUBSTITUTE, POSTERIOR APPROACH, POSTERIOR COLUMN, PERCUTANEOUS ENDOSCOPIC APPROACH: ICD-10-PCS | Performed by: ORTHOPAEDIC SURGERY

## 2024-12-11 PROCEDURE — 87070 CULTURE OTHR SPECIMN AEROBIC: CPT

## 2024-12-11 PROCEDURE — C1821 INTERSPINOUS IMPLANT: HCPCS | Performed by: ORTHOPAEDIC SURGERY

## 2024-12-11 PROCEDURE — 86901 BLOOD TYPING SEROLOGIC RH(D): CPT

## 2024-12-11 PROCEDURE — 1100000000 HC RM PRIVATE

## 2024-12-11 PROCEDURE — 3700000000 HC ANESTHESIA ATTENDED CARE: Performed by: ORTHOPAEDIC SURGERY

## 2024-12-11 PROCEDURE — 87205 SMEAR GRAM STAIN: CPT

## 2024-12-11 DEVICE — TI ALLOY RAD ROD
Type: IMPLANTABLE DEVICE | Site: SPINE LUMBAR | Status: FUNCTIONAL
Brand: XIA 3

## 2024-12-11 DEVICE — SCREW SPNL L20MM DIA4MM SCLEROTIC TI ALLY ST LOK FN TIP: Type: IMPLANTABLE DEVICE | Site: SPINE LUMBAR | Status: FUNCTIONAL

## 2024-12-11 DEVICE — SCREW SPNL L25MM DIA4MM TI ALLY ST LOK FULL THRD FN TIP DBL: Type: IMPLANTABLE DEVICE | Site: SPINE LUMBAR | Status: FUNCTIONAL

## 2024-12-11 DEVICE — BONE GRAFT KIT 7510200 INFUSE SMALL
Type: IMPLANTABLE DEVICE | Site: SPINE LUMBAR | Status: FUNCTIONAL
Brand: INFUSE® BONE GRAFT

## 2024-12-11 DEVICE — CANNULATED POLYAXIAL SCREW
Type: IMPLANTABLE DEVICE | Site: SPINE LUMBAR | Status: FUNCTIONAL
Brand: XIA 3 SYSTEM - SERRATO

## 2024-12-11 DEVICE — BIOACTIVE BONE GRAFT SUBSTITUTE, FOAM PACK; BETA-TRICALCIUM PHOSPHATE, TYPE I BOVINE COLLAGEN, AND BIOACTIVE GLASS
Type: IMPLANTABLE DEVICE | Site: SPINE LUMBAR | Status: FUNCTIONAL
Brand: VITOSS BA2X

## 2024-12-11 DEVICE — GRAFT BNE XL: Type: IMPLANTABLE DEVICE | Site: SPINE LUMBAR | Status: FUNCTIONAL

## 2024-12-11 DEVICE — SPACER SPNL M H15X10.9MM 10DEG 4.3CC ANTR LUM INTBDY FUS: Type: IMPLANTABLE DEVICE | Site: SPINE LUMBAR | Status: FUNCTIONAL

## 2024-12-11 DEVICE — POLYAXIAL SCREW
Type: IMPLANTABLE DEVICE | Site: SPINE LUMBAR | Status: FUNCTIONAL
Brand: XIA 3 SYSTEM - SERRATO

## 2024-12-11 DEVICE — GRAFT BNE PTTY LG 11 CC FIBERGRAFT BG: Type: IMPLANTABLE DEVICE | Site: SPINE LUMBAR | Status: FUNCTIONAL

## 2024-12-11 DEVICE — BLOCKER
Type: IMPLANTABLE DEVICE | Site: SPINE LUMBAR | Status: FUNCTIONAL
Brand: XIA 3

## 2024-12-11 RX ORDER — HYDROMORPHONE HYDROCHLORIDE 1 MG/ML
0.25 INJECTION, SOLUTION INTRAMUSCULAR; INTRAVENOUS; SUBCUTANEOUS EVERY 5 MIN PRN
Status: DISCONTINUED | OUTPATIENT
Start: 2024-12-11 | End: 2024-12-11 | Stop reason: HOSPADM

## 2024-12-11 RX ORDER — POLYETHYLENE GLYCOL 3350 17 G/17G
17 POWDER, FOR SOLUTION ORAL DAILY
Status: DISCONTINUED | OUTPATIENT
Start: 2024-12-11 | End: 2024-12-12 | Stop reason: HOSPADM

## 2024-12-11 RX ORDER — SODIUM CHLORIDE 0.9 % (FLUSH) 0.9 %
5-40 SYRINGE (ML) INJECTION PRN
Status: DISCONTINUED | OUTPATIENT
Start: 2024-12-11 | End: 2024-12-11 | Stop reason: HOSPADM

## 2024-12-11 RX ORDER — LABETALOL HYDROCHLORIDE 5 MG/ML
10 INJECTION, SOLUTION INTRAVENOUS
Status: DISCONTINUED | OUTPATIENT
Start: 2024-12-11 | End: 2024-12-11 | Stop reason: HOSPADM

## 2024-12-11 RX ORDER — OXYCODONE HYDROCHLORIDE 5 MG/1
5 TABLET ORAL EVERY 4 HOURS PRN
Status: DISCONTINUED | OUTPATIENT
Start: 2024-12-11 | End: 2024-12-12 | Stop reason: HOSPADM

## 2024-12-11 RX ORDER — NALOXONE HYDROCHLORIDE 0.4 MG/ML
INJECTION, SOLUTION INTRAMUSCULAR; INTRAVENOUS; SUBCUTANEOUS PRN
Status: DISCONTINUED | OUTPATIENT
Start: 2024-12-11 | End: 2024-12-11 | Stop reason: HOSPADM

## 2024-12-11 RX ORDER — PREGABALIN 50 MG/1
50 CAPSULE ORAL 2 TIMES DAILY
Status: DISCONTINUED | OUTPATIENT
Start: 2024-12-11 | End: 2024-12-12

## 2024-12-11 RX ORDER — DIPHENHYDRAMINE HYDROCHLORIDE 50 MG/ML
12.5 INJECTION INTRAMUSCULAR; INTRAVENOUS
Status: DISCONTINUED | OUTPATIENT
Start: 2024-12-11 | End: 2024-12-11 | Stop reason: HOSPADM

## 2024-12-11 RX ORDER — DEXTROSE MONOHYDRATE 100 MG/ML
INJECTION, SOLUTION INTRAVENOUS CONTINUOUS PRN
Status: DISCONTINUED | OUTPATIENT
Start: 2024-12-11 | End: 2024-12-12 | Stop reason: HOSPADM

## 2024-12-11 RX ORDER — ONDANSETRON 2 MG/ML
4 INJECTION INTRAMUSCULAR; INTRAVENOUS EVERY 6 HOURS PRN
Status: DISCONTINUED | OUTPATIENT
Start: 2024-12-11 | End: 2024-12-12 | Stop reason: HOSPADM

## 2024-12-11 RX ORDER — DROPERIDOL 2.5 MG/ML
0.62 INJECTION, SOLUTION INTRAMUSCULAR; INTRAVENOUS
Status: DISCONTINUED | OUTPATIENT
Start: 2024-12-11 | End: 2024-12-11 | Stop reason: HOSPADM

## 2024-12-11 RX ORDER — SODIUM CHLORIDE, SODIUM LACTATE, POTASSIUM CHLORIDE, CALCIUM CHLORIDE 600; 310; 30; 20 MG/100ML; MG/100ML; MG/100ML; MG/100ML
INJECTION, SOLUTION INTRAVENOUS
Status: DISCONTINUED | OUTPATIENT
Start: 2024-12-11 | End: 2024-12-11 | Stop reason: SDUPTHER

## 2024-12-11 RX ORDER — BISACODYL 5 MG/1
5 TABLET, DELAYED RELEASE ORAL DAILY
Status: DISCONTINUED | OUTPATIENT
Start: 2024-12-11 | End: 2024-12-12 | Stop reason: HOSPADM

## 2024-12-11 RX ORDER — TRANEXAMIC ACID 10 MG/ML
INJECTION, SOLUTION INTRAVENOUS
Status: DISCONTINUED | OUTPATIENT
Start: 2024-12-11 | End: 2024-12-11 | Stop reason: SDUPTHER

## 2024-12-11 RX ORDER — GLUCAGON 1 MG/ML
1 KIT INJECTION PRN
Status: DISCONTINUED | OUTPATIENT
Start: 2024-12-11 | End: 2024-12-12 | Stop reason: HOSPADM

## 2024-12-11 RX ORDER — EPHEDRINE SULFATE 5 MG/ML
INJECTION INTRAVENOUS
Status: DISCONTINUED | OUTPATIENT
Start: 2024-12-11 | End: 2024-12-11 | Stop reason: SDUPTHER

## 2024-12-11 RX ORDER — METAXALONE 800 MG/1
800 TABLET ORAL EVERY 8 HOURS PRN
Status: DISCONTINUED | OUTPATIENT
Start: 2024-12-11 | End: 2024-12-12 | Stop reason: HOSPADM

## 2024-12-11 RX ORDER — OXYCODONE HYDROCHLORIDE 5 MG/1
10 TABLET ORAL EVERY 4 HOURS PRN
Status: DISCONTINUED | OUTPATIENT
Start: 2024-12-11 | End: 2024-12-12 | Stop reason: HOSPADM

## 2024-12-11 RX ORDER — SODIUM CHLORIDE 9 MG/ML
INJECTION, SOLUTION INTRAVENOUS CONTINUOUS
Status: DISCONTINUED | OUTPATIENT
Start: 2024-12-11 | End: 2024-12-11 | Stop reason: SDUPTHER

## 2024-12-11 RX ORDER — ROCURONIUM BROMIDE 10 MG/ML
INJECTION, SOLUTION INTRAVENOUS
Status: DISCONTINUED | OUTPATIENT
Start: 2024-12-11 | End: 2024-12-11 | Stop reason: SDUPTHER

## 2024-12-11 RX ORDER — VANCOMYCIN HYDROCHLORIDE 1 G/20ML
INJECTION, POWDER, LYOPHILIZED, FOR SOLUTION INTRAVENOUS PRN
Status: DISCONTINUED | OUTPATIENT
Start: 2024-12-11 | End: 2024-12-11 | Stop reason: ALTCHOICE

## 2024-12-11 RX ORDER — INSULIN LISPRO 100 [IU]/ML
0-4 INJECTION, SOLUTION INTRAVENOUS; SUBCUTANEOUS
Status: DISCONTINUED | OUTPATIENT
Start: 2024-12-11 | End: 2024-12-12 | Stop reason: HOSPADM

## 2024-12-11 RX ORDER — DIPHENHYDRAMINE HCL 25 MG
25 CAPSULE ORAL EVERY 6 HOURS PRN
Status: DISCONTINUED | OUTPATIENT
Start: 2024-12-11 | End: 2024-12-11 | Stop reason: SDUPTHER

## 2024-12-11 RX ORDER — DIPHENHYDRAMINE HCL 25 MG
25 CAPSULE ORAL EVERY 6 HOURS PRN
Status: DISCONTINUED | OUTPATIENT
Start: 2024-12-11 | End: 2024-12-12 | Stop reason: HOSPADM

## 2024-12-11 RX ORDER — ACETAMINOPHEN 500 MG
1000 TABLET ORAL EVERY 6 HOURS PRN
COMMUNITY

## 2024-12-11 RX ORDER — HYDROMORPHONE HYDROCHLORIDE 1 MG/ML
0.25 INJECTION, SOLUTION INTRAMUSCULAR; INTRAVENOUS; SUBCUTANEOUS
Status: DISCONTINUED | OUTPATIENT
Start: 2024-12-11 | End: 2024-12-12 | Stop reason: HOSPADM

## 2024-12-11 RX ORDER — CETIRIZINE HYDROCHLORIDE 10 MG/1
10 TABLET ORAL DAILY PRN
Status: DISCONTINUED | OUTPATIENT
Start: 2024-12-11 | End: 2024-12-12 | Stop reason: HOSPADM

## 2024-12-11 RX ORDER — BUPIVACAINE HYDROCHLORIDE AND EPINEPHRINE 2.5; 5 MG/ML; UG/ML
INJECTION, SOLUTION EPIDURAL; INFILTRATION; INTRACAUDAL; PERINEURAL PRN
Status: DISCONTINUED | OUTPATIENT
Start: 2024-12-11 | End: 2024-12-11 | Stop reason: ALTCHOICE

## 2024-12-11 RX ORDER — CALCIUM CARBONATE 500 MG/1
500 TABLET, CHEWABLE ORAL DAILY
Status: DISCONTINUED | OUTPATIENT
Start: 2024-12-12 | End: 2024-12-12 | Stop reason: HOSPADM

## 2024-12-11 RX ORDER — SODIUM CHLORIDE 9 MG/ML
INJECTION, SOLUTION INTRAVENOUS PRN
Status: DISCONTINUED | OUTPATIENT
Start: 2024-12-11 | End: 2024-12-11 | Stop reason: HOSPADM

## 2024-12-11 RX ORDER — TRANEXAMIC ACID 10 MG/ML
1000 INJECTION, SOLUTION INTRAVENOUS ONCE
Status: DISCONTINUED | OUTPATIENT
Start: 2024-12-11 | End: 2024-12-11

## 2024-12-11 RX ORDER — DIPHENHYDRAMINE HYDROCHLORIDE 50 MG/ML
25 INJECTION INTRAMUSCULAR; INTRAVENOUS EVERY 6 HOURS PRN
Status: DISCONTINUED | OUTPATIENT
Start: 2024-12-11 | End: 2024-12-12 | Stop reason: HOSPADM

## 2024-12-11 RX ORDER — ONDANSETRON 2 MG/ML
INJECTION INTRAMUSCULAR; INTRAVENOUS
Status: DISCONTINUED | OUTPATIENT
Start: 2024-12-11 | End: 2024-12-11 | Stop reason: SDUPTHER

## 2024-12-11 RX ORDER — VITAMIN B COMPLEX
1000 TABLET ORAL EVERY MORNING
Status: DISCONTINUED | OUTPATIENT
Start: 2024-12-12 | End: 2024-12-12 | Stop reason: HOSPADM

## 2024-12-11 RX ORDER — PANTOPRAZOLE SODIUM 40 MG/1
40 TABLET, DELAYED RELEASE ORAL
Status: DISCONTINUED | OUTPATIENT
Start: 2024-12-12 | End: 2024-12-12 | Stop reason: HOSPADM

## 2024-12-11 RX ORDER — FENTANYL CITRATE 50 UG/ML
25 INJECTION, SOLUTION INTRAMUSCULAR; INTRAVENOUS EVERY 5 MIN PRN
Status: DISCONTINUED | OUTPATIENT
Start: 2024-12-11 | End: 2024-12-11 | Stop reason: HOSPADM

## 2024-12-11 RX ORDER — MIDAZOLAM HYDROCHLORIDE 1 MG/ML
INJECTION, SOLUTION INTRAMUSCULAR; INTRAVENOUS
Status: DISCONTINUED | OUTPATIENT
Start: 2024-12-11 | End: 2024-12-11 | Stop reason: SDUPTHER

## 2024-12-11 RX ORDER — PROPOFOL 10 MG/ML
INJECTION, EMULSION INTRAVENOUS
Status: DISCONTINUED | OUTPATIENT
Start: 2024-12-11 | End: 2024-12-11 | Stop reason: SDUPTHER

## 2024-12-11 RX ORDER — HYDROMORPHONE HYDROCHLORIDE 4 MG/1
4 TABLET ORAL EVERY 4 HOURS PRN
Qty: 42 TABLET | Refills: 0 | Status: SHIPPED | OUTPATIENT
Start: 2024-12-12 | End: 2024-12-19

## 2024-12-11 RX ORDER — ACETAMINOPHEN 325 MG/1
650 TABLET ORAL EVERY 6 HOURS
Status: DISCONTINUED | OUTPATIENT
Start: 2024-12-11 | End: 2024-12-12 | Stop reason: HOSPADM

## 2024-12-11 RX ORDER — SODIUM CHLORIDE 0.9 % (FLUSH) 0.9 %
5-40 SYRINGE (ML) INJECTION EVERY 12 HOURS SCHEDULED
Status: DISCONTINUED | OUTPATIENT
Start: 2024-12-11 | End: 2024-12-11 | Stop reason: HOSPADM

## 2024-12-11 RX ORDER — ESTRADIOL 10 UG/1
10 INSERT VAGINAL EVERY OTHER DAY
Status: DISCONTINUED | OUTPATIENT
Start: 2024-12-11 | End: 2024-12-12 | Stop reason: HOSPADM

## 2024-12-11 RX ORDER — PHENYLEPHRINE HCL IN 0.9% NACL 1 MG/10 ML
SYRINGE (ML) INTRAVENOUS
Status: DISCONTINUED | OUTPATIENT
Start: 2024-12-11 | End: 2024-12-11 | Stop reason: SDUPTHER

## 2024-12-11 RX ORDER — LIDOCAINE HYDROCHLORIDE 20 MG/ML
INJECTION, SOLUTION EPIDURAL; INFILTRATION; INTRACAUDAL; PERINEURAL
Status: DISCONTINUED | OUTPATIENT
Start: 2024-12-11 | End: 2024-12-11 | Stop reason: SDUPTHER

## 2024-12-11 RX ORDER — ONDANSETRON 4 MG/1
4 TABLET, ORALLY DISINTEGRATING ORAL EVERY 8 HOURS PRN
Status: DISCONTINUED | OUTPATIENT
Start: 2024-12-11 | End: 2024-12-12 | Stop reason: HOSPADM

## 2024-12-11 RX ORDER — CYCLOBENZAPRINE HCL 10 MG
5 TABLET ORAL 3 TIMES DAILY PRN
Status: DISCONTINUED | OUTPATIENT
Start: 2024-12-11 | End: 2024-12-12 | Stop reason: HOSPADM

## 2024-12-11 RX ORDER — LISINOPRIL 5 MG/1
5 TABLET ORAL DAILY
Status: DISCONTINUED | OUTPATIENT
Start: 2024-12-11 | End: 2024-12-12 | Stop reason: HOSPADM

## 2024-12-11 RX ORDER — FAMOTIDINE 20 MG/1
20 TABLET, FILM COATED ORAL DAILY
Status: DISCONTINUED | OUTPATIENT
Start: 2024-12-11 | End: 2024-12-12 | Stop reason: HOSPADM

## 2024-12-11 RX ORDER — OXYCODONE HYDROCHLORIDE 5 MG/1
5 TABLET ORAL
Status: DISCONTINUED | OUTPATIENT
Start: 2024-12-11 | End: 2024-12-11 | Stop reason: HOSPADM

## 2024-12-11 RX ORDER — DEXAMETHASONE SODIUM PHOSPHATE 4 MG/ML
INJECTION, SOLUTION INTRA-ARTICULAR; INTRALESIONAL; INTRAMUSCULAR; INTRAVENOUS; SOFT TISSUE
Status: DISCONTINUED | OUTPATIENT
Start: 2024-12-11 | End: 2024-12-11 | Stop reason: SDUPTHER

## 2024-12-11 RX ORDER — ONDANSETRON 2 MG/ML
4 INJECTION INTRAMUSCULAR; INTRAVENOUS
Status: DISCONTINUED | OUTPATIENT
Start: 2024-12-11 | End: 2024-12-11 | Stop reason: HOSPADM

## 2024-12-11 RX ORDER — MAGNESIUM HYDROXIDE/ALUMINUM HYDROXICE/SIMETHICONE 120; 1200; 1200 MG/30ML; MG/30ML; MG/30ML
30 SUSPENSION ORAL EVERY 6 HOURS PRN
Status: DISCONTINUED | OUTPATIENT
Start: 2024-12-11 | End: 2024-12-12 | Stop reason: HOSPADM

## 2024-12-11 RX ORDER — HYDROMORPHONE HYDROCHLORIDE 1 MG/ML
0.5 INJECTION, SOLUTION INTRAMUSCULAR; INTRAVENOUS; SUBCUTANEOUS
Status: DISCONTINUED | OUTPATIENT
Start: 2024-12-11 | End: 2024-12-12 | Stop reason: HOSPADM

## 2024-12-11 RX ORDER — SODIUM CHLORIDE 0.9 % (FLUSH) 0.9 %
5-40 SYRINGE (ML) INJECTION EVERY 12 HOURS SCHEDULED
Status: DISCONTINUED | OUTPATIENT
Start: 2024-12-11 | End: 2024-12-12 | Stop reason: HOSPADM

## 2024-12-11 RX ORDER — SODIUM CHLORIDE, SODIUM LACTATE, POTASSIUM CHLORIDE, CALCIUM CHLORIDE 600; 310; 30; 20 MG/100ML; MG/100ML; MG/100ML; MG/100ML
INJECTION, SOLUTION INTRAVENOUS CONTINUOUS
Status: DISCONTINUED | OUTPATIENT
Start: 2024-12-11 | End: 2024-12-11 | Stop reason: HOSPADM

## 2024-12-11 RX ORDER — ZOLPIDEM TARTRATE 5 MG/1
5 TABLET ORAL NIGHTLY PRN
Status: DISCONTINUED | OUTPATIENT
Start: 2024-12-11 | End: 2024-12-12 | Stop reason: HOSPADM

## 2024-12-11 RX ORDER — FOLIC ACID 1 MG/1
1000 TABLET ORAL DAILY
Status: DISCONTINUED | OUTPATIENT
Start: 2024-12-12 | End: 2024-12-12 | Stop reason: HOSPADM

## 2024-12-11 RX ORDER — SODIUM CHLORIDE 450 MG/100ML
INJECTION, SOLUTION INTRAVENOUS CONTINUOUS
Status: DISCONTINUED | OUTPATIENT
Start: 2024-12-11 | End: 2024-12-12 | Stop reason: HOSPADM

## 2024-12-11 RX ADMIN — MIDAZOLAM 2 MG: 1 INJECTION INTRAMUSCULAR; INTRAVENOUS at 08:25

## 2024-12-11 RX ADMIN — PHENYLEPHRINE HYDROCHLORIDE 100 MCG/MIN: 10 INJECTION INTRAVENOUS at 11:47

## 2024-12-11 RX ADMIN — TRANEXAMIC ACID 1 G: 10 INJECTION, SOLUTION INTRAVENOUS at 11:57

## 2024-12-11 RX ADMIN — ONDANSETRON 4 MG: 2 INJECTION INTRAMUSCULAR; INTRAVENOUS at 11:54

## 2024-12-11 RX ADMIN — HYDROMORPHONE HYDROCHLORIDE 0.5 MG: 1 INJECTION, SOLUTION INTRAMUSCULAR; INTRAVENOUS; SUBCUTANEOUS at 16:49

## 2024-12-11 RX ADMIN — SODIUM CHLORIDE, SODIUM LACTATE, POTASSIUM CHLORIDE, AND CALCIUM CHLORIDE: 600; 310; 30; 20 INJECTION, SOLUTION INTRAVENOUS at 11:29

## 2024-12-11 RX ADMIN — BISACODYL 5 MG: 5 TABLET, COATED ORAL at 13:50

## 2024-12-11 RX ADMIN — Medication 100 MCG: at 09:47

## 2024-12-11 RX ADMIN — VANCOMYCIN HYDROCHLORIDE 1000 MG: 1 INJECTION, POWDER, LYOPHILIZED, FOR SOLUTION INTRAVENOUS at 07:43

## 2024-12-11 RX ADMIN — Medication 100 MCG: at 11:02

## 2024-12-11 RX ADMIN — ROCURONIUM BROMIDE 50 MG: 10 INJECTION, SOLUTION INTRAVENOUS at 08:31

## 2024-12-11 RX ADMIN — SODIUM CHLORIDE, SODIUM LACTATE, POTASSIUM CHLORIDE, AND CALCIUM CHLORIDE: 600; 310; 30; 20 INJECTION, SOLUTION INTRAVENOUS at 09:42

## 2024-12-11 RX ADMIN — ROCURONIUM BROMIDE 20 MG: 10 INJECTION, SOLUTION INTRAVENOUS at 09:19

## 2024-12-11 RX ADMIN — Medication 200 MCG: at 10:32

## 2024-12-11 RX ADMIN — Medication 100 MCG: at 11:09

## 2024-12-11 RX ADMIN — EPHEDRINE SULFATE 10 MG: 5 INJECTION INTRAVENOUS at 09:38

## 2024-12-11 RX ADMIN — Medication 100 MCG: at 09:23

## 2024-12-11 RX ADMIN — OXYCODONE 10 MG: 5 TABLET ORAL at 16:00

## 2024-12-11 RX ADMIN — Medication 100 MCG: at 11:35

## 2024-12-11 RX ADMIN — WATER 2000 MG: 1 INJECTION INTRAMUSCULAR; INTRAVENOUS; SUBCUTANEOUS at 16:49

## 2024-12-11 RX ADMIN — Medication 100 MCG: at 09:20

## 2024-12-11 RX ADMIN — Medication 200 MCG: at 10:48

## 2024-12-11 RX ADMIN — WATER 2000 MG: 1 INJECTION INTRAMUSCULAR; INTRAVENOUS; SUBCUTANEOUS at 08:56

## 2024-12-11 RX ADMIN — Medication 20 MG: at 10:27

## 2024-12-11 RX ADMIN — POLYETHYLENE GLYCOL 3350 17 G: 17 POWDER, FOR SOLUTION ORAL at 13:51

## 2024-12-11 RX ADMIN — SODIUM CHLORIDE: 9 INJECTION, SOLUTION INTRAVENOUS at 06:41

## 2024-12-11 RX ADMIN — VANCOMYCIN HYDROCHLORIDE 1000 MG: 1 INJECTION, POWDER, LYOPHILIZED, FOR SOLUTION INTRAVENOUS at 22:20

## 2024-12-11 RX ADMIN — ROCURONIUM BROMIDE 20 MG: 10 INJECTION, SOLUTION INTRAVENOUS at 10:37

## 2024-12-11 RX ADMIN — HYDROMORPHONE HYDROCHLORIDE 0.5 MG: 1 INJECTION, SOLUTION INTRAMUSCULAR; INTRAVENOUS; SUBCUTANEOUS at 13:50

## 2024-12-11 RX ADMIN — Medication 200 MCG: at 10:43

## 2024-12-11 RX ADMIN — FAMOTIDINE 20 MG: 20 TABLET, FILM COATED ORAL at 13:51

## 2024-12-11 RX ADMIN — Medication 100 MCG: at 10:00

## 2024-12-11 RX ADMIN — ACETAMINOPHEN 650 MG: 325 TABLET ORAL at 19:52

## 2024-12-11 RX ADMIN — ACETAMINOPHEN 650 MG: 325 TABLET ORAL at 13:50

## 2024-12-11 RX ADMIN — METAXALONE 800 MG: 800 TABLET ORAL at 13:50

## 2024-12-11 RX ADMIN — TRANEXAMIC ACID 1 G: 10 INJECTION, SOLUTION INTRAVENOUS at 08:45

## 2024-12-11 RX ADMIN — LIDOCAINE HYDROCHLORIDE 60 MG: 20 INJECTION, SOLUTION EPIDURAL; INFILTRATION; INTRACAUDAL; PERINEURAL at 08:31

## 2024-12-11 RX ADMIN — DEXAMETHASONE SODIUM PHOSPHATE 8 MG: 4 INJECTION INTRA-ARTICULAR; INTRALESIONAL; INTRAMUSCULAR; INTRAVENOUS; SOFT TISSUE at 09:00

## 2024-12-11 RX ADMIN — Medication 100 MCG: at 11:10

## 2024-12-11 RX ADMIN — ROCURONIUM BROMIDE 20 MG: 10 INJECTION, SOLUTION INTRAVENOUS at 11:27

## 2024-12-11 RX ADMIN — PREGABALIN 50 MG: 50 CAPSULE ORAL at 13:50

## 2024-12-11 RX ADMIN — Medication 100 MCG: at 08:37

## 2024-12-11 RX ADMIN — Medication 100 MCG: at 11:26

## 2024-12-11 RX ADMIN — Medication 30 MG: at 09:02

## 2024-12-11 RX ADMIN — EPHEDRINE SULFATE 10 MG: 5 INJECTION INTRAVENOUS at 10:43

## 2024-12-11 RX ADMIN — HYDROMORPHONE HYDROCHLORIDE 0.25 MG: 1 INJECTION, SOLUTION INTRAMUSCULAR; INTRAVENOUS; SUBCUTANEOUS at 12:43

## 2024-12-11 RX ADMIN — ONDANSETRON 4 MG: 2 INJECTION INTRAMUSCULAR; INTRAVENOUS at 13:53

## 2024-12-11 RX ADMIN — EPHEDRINE SULFATE 10 MG: 5 INJECTION INTRAVENOUS at 09:57

## 2024-12-11 RX ADMIN — PROPOFOL 30 MG: 10 INJECTION, EMULSION INTRAVENOUS at 09:18

## 2024-12-11 RX ADMIN — HYDROMORPHONE HYDROCHLORIDE 1 MG: 1 INJECTION, SOLUTION INTRAMUSCULAR; INTRAVENOUS; SUBCUTANEOUS at 08:30

## 2024-12-11 RX ADMIN — PROPOFOL 120 MG: 10 INJECTION, EMULSION INTRAVENOUS at 08:31

## 2024-12-11 RX ADMIN — EPHEDRINE SULFATE 10 MG: 5 INJECTION INTRAVENOUS at 09:45

## 2024-12-11 RX ADMIN — Medication 100 MCG: at 09:25

## 2024-12-11 RX ADMIN — HYDROMORPHONE HYDROCHLORIDE 0.25 MG: 1 INJECTION, SOLUTION INTRAMUSCULAR; INTRAVENOUS; SUBCUTANEOUS at 12:37

## 2024-12-11 RX ADMIN — PREGABALIN 50 MG: 50 CAPSULE ORAL at 20:59

## 2024-12-11 RX ADMIN — HYDROMORPHONE HYDROCHLORIDE 0.5 MG: 1 INJECTION, SOLUTION INTRAMUSCULAR; INTRAVENOUS; SUBCUTANEOUS at 19:51

## 2024-12-11 RX ADMIN — METAXALONE 800 MG: 800 TABLET ORAL at 22:19

## 2024-12-11 RX ADMIN — Medication 200 MCG: at 08:58

## 2024-12-11 RX ADMIN — ROCURONIUM BROMIDE 30 MG: 10 INJECTION, SOLUTION INTRAVENOUS at 10:06

## 2024-12-11 RX ADMIN — Medication 100 MCG: at 09:33

## 2024-12-11 ASSESSMENT — PAIN - FUNCTIONAL ASSESSMENT
PAIN_FUNCTIONAL_ASSESSMENT: 0-10
PAIN_FUNCTIONAL_ASSESSMENT: ACTIVITIES ARE NOT PREVENTED
PAIN_FUNCTIONAL_ASSESSMENT: PREVENTS OR INTERFERES SOME ACTIVE ACTIVITIES AND ADLS
PAIN_FUNCTIONAL_ASSESSMENT: ACTIVITIES ARE NOT PREVENTED

## 2024-12-11 ASSESSMENT — PAIN DESCRIPTION - LOCATION
LOCATION: BACK;ABDOMEN
LOCATION: BACK
LOCATION: ABDOMEN;BACK
LOCATION: ABDOMEN;BACK
LOCATION: BACK;ABDOMEN
LOCATION: ABDOMEN;BACK

## 2024-12-11 ASSESSMENT — PAIN DESCRIPTION - PAIN TYPE
TYPE: SURGICAL PAIN

## 2024-12-11 ASSESSMENT — PAIN DESCRIPTION - ORIENTATION
ORIENTATION: RIGHT
ORIENTATION: ANTERIOR;POSTERIOR

## 2024-12-11 ASSESSMENT — PAIN SCALES - GENERAL
PAINLEVEL_OUTOF10: 4
PAINLEVEL_OUTOF10: 7
PAINLEVEL_OUTOF10: 5
PAINLEVEL_OUTOF10: 2
PAINLEVEL_OUTOF10: 7
PAINLEVEL_OUTOF10: 8
PAINLEVEL_OUTOF10: 8
PAINLEVEL_OUTOF10: 9
PAINLEVEL_OUTOF10: 10

## 2024-12-11 ASSESSMENT — PAIN DESCRIPTION - ONSET
ONSET: ON-GOING

## 2024-12-11 ASSESSMENT — PAIN DESCRIPTION - DESCRIPTORS
DESCRIPTORS: JABBING
DESCRIPTORS: ACHING;SORE
DESCRIPTORS: ACHING
DESCRIPTORS: ACHING;SORE

## 2024-12-11 ASSESSMENT — PAIN DESCRIPTION - FREQUENCY
FREQUENCY: CONTINUOUS

## 2024-12-11 NOTE — ANESTHESIA PRE PROCEDURE
Department of Anesthesiology  Preprocedure Note       Name:  Salma Lopez   Age:  74 y.o.  :  1950                                          MRN:  [id number]         Date:  2024      Surgeon: Surgeon(s):  Brandan Iyer MD    Procedure: Procedure(s):  ANTERIOR LUMBAR INTERBODY FUSION LUMBAR FIVE/SACRAL ONE,, REVISION POSTERIOR LUMBAR LAMINECTOMY FUSION LUMBAR FOUR/SACRAL TWO REMOVAL HARDWARE LUMBAR FOUR/FIVE WITH C-ARM DR. MALHOTRA TO ASSIST  **INPATIENT STATUS DUE TO MEDICARE** \"SPEC POP\"    Medications prior to admission:   Prior to Admission medications    Medication Sig Start Date End Date Taking? Authorizing Provider   acetaminophen (TYLENOL) 500 MG tablet Take 2 tablets by mouth every 6 hours as needed for Pain   Yes ProviderAri MD   HYDROmorphone (DILAUDID) 4 MG tablet Take 1 tablet by mouth every 4 hours as needed for Pain for up to 7 days. Max Daily Amount: 24 mg 24 Yes Brandan Iyer MD   pregabalin (LYRICA) 50 MG capsule Take 1 capsule by mouth 2 times daily.   Yes ProviderAri MD   lisinopril (PRINIVIL;ZESTRIL) 5 MG tablet Take 1 tablet by mouth daily Indications: HTN  Patient taking differently: Take 1 tablet by mouth every morning Indications: HTN 12/3/24  Yes Sparkle Hill MD   zolpidem (AMBIEN) 5 MG tablet Take 1 tablet by mouth nightly as needed for Sleep for up to 90 days. Max Daily Amount: 5 mg 11/15/24 2/13/25 Yes Jorge L Epps MD   cyclobenzaprine (FLEXERIL) 5 MG tablet Take 1 tablet by mouth 3 times daily as needed for Muscle spasms 11/15/24  Yes Jorge L Epps MD   YUVAFEM 10 MCG TABS vaginal tablet Place 1 tablet vaginally every other day Indications: Vaginal Dryness suppository 23  Yes Ari Vides MD   XELJANZ 5 MG TABS Take 1 tablet by mouth in the morning and at bedtime Indications: RA   Yes Ari Vides MD   celecoxib (CELEBREX) 200 MG capsule Take 1 capsule by mouth 2 times daily 11/15/24   Jorge L Epps

## 2024-12-11 NOTE — PERIOP NOTE
Family updated regarding Pt's condition and care progress.  Instructed to go to [facility] second floor waiting area.

## 2024-12-11 NOTE — PERIOP NOTE
No MRSA test performed. Patient treated as if MRSA +. Vancomycin and Ancef ordered. Isolation precautions.

## 2024-12-11 NOTE — PLAN OF CARE
Problem: Safety - Adult  Goal: Free from fall injury  Outcome: Progressing     Problem: Skin/Tissue Integrity  Goal: Absence of new skin breakdown  Description: 1.  Monitor for areas of redness and/or skin breakdown  2.  Assess vascular access sites hourly  3.  Every 4-6 hours minimum:  Change oxygen saturation probe site  4.  Every 4-6 hours:  If on nasal continuous positive airway pressure, respiratory therapy assess nares and determine need for appliance change or resting period.  Outcome: Progressing     Problem: Pain  Goal: Verbalizes/displays adequate comfort level or baseline comfort level  Outcome: Progressing     Problem: Discharge Planning  Goal: Discharge to home or other facility with appropriate resources  Outcome: Progressing

## 2024-12-11 NOTE — BRIEF OP NOTE
Brief Postoperative Note      Patient: Salma Lopez  YOB: 1950  MRN: [id number]    Date of Procedure: 12/11/2024    Pre-Op Diagnosis Codes:      * Spondylolisthesis of lumbar region [M43.16]     * Pseudoclaudication syndrome [M48.062]    Post-Op Diagnosis: Same       Procedure(s):  ANTERIOR LUMBAR INTERBODY FUSION LUMBAR FIVE/SACRAL ONE,, REVISION POSTERIOR LUMBAR LAMINECTOMY FUSION LUMBAR FOUR/SACRAL TWO REMOVAL HARDWARE LUMBAR FOUR/FIVE WITH C-ARM DR. MALHOTRA TO ASSIST  **INPATIENT STATUS DUE TO MEDICARE** \"SPEC POP\"    Surgeon(s):  Brandan Iyer MD McKenzie, Chad M, MD    Assistant:  * No surgical staff found *    Anesthesia: General    Estimated Blood Loss (mL): 200     Complications: None    Specimens:   ID Type Source Tests Collected by Time Destination   1 : tissue ( disc) lumbar spine Tissue Spine CULTURE, ANAEROBIC, CULTURE, FUNGUS, CULTURE, TISSUE Brandan Iyer MD 12/11/2024 0925    2 : SWAB LUMBAR SPINE Swab Spine CULTURE, ANAEROBIC, CULTURE, FUNGUS Brandan Iyer MD 12/11/2024 1047        Implants:  Implant Name Type Inv. Item Serial No.  Lot No. LRB No. Used Action   KIT GRAFT BONE SM RHBMP-2 4.2MG INJ 5ML CONTAIN NDL 20GA - [id number]  KIT GRAFT BONE SM RHBMP-2 4.2MG INJ 5ML CONTAIN NDL 20GA  [employer] SPINALGRAFT TECH-WD [id number] N/A 1 Implanted   SCREW SPNL L25MM DIA4MM TI ALLY ST JC FULL THRD FN TIP DBL - [id number]  SCREW SPNL L25MM DIA4MM TI ALLY ST JC FULL THRD FN TIP DBL  [employer] SYNTHES SPINE-WD [id number] N/A 1 Implanted   SPACER SPNL M H15X10.9MM 10DEG 4.3CC ANTR LUM INTBDY FUS - [id number]  SPACER SPNL M H15X10.9MM 10DEG 4.3CC ANTR LUM INTBDY FUS  [employer] DEPUY SYNTHES SPINE-WD [id number] N/A 1 Implanted   SCREW SPNL L25MM DIA4MM TI ALLY ST JC FULL THRD FN TIP DBL - [id number]  SCREW SPNL L25MM DIA4MM TI ALLY ST JC FULL THRD FN TIP DBL  [employer] DEPU[employer] SYNTHES SPINE-WD [id number] N/A 1 Implanted   GRAFT BNE PTTY LG 11 CC FIBERGRAFT BG - [id number]  GRAFT BNE PTTY

## 2024-12-11 NOTE — ANESTHESIA POSTPROCEDURE EVALUATION
Post-Anesthesia Evaluation and Assessment    Cardiovascular Function/Vital Signs/Pain Score:      Patient is status post Procedure(s):  ANTERIOR LUMBAR INTERBODY FUSION LUMBAR FIVE/SACRAL ONE,, REVISION POSTERIOR LUMBAR LAMINECTOMY FUSION LUMBAR FOUR/SACRAL TWO REMOVAL HARDWARE LUMBAR FOUR/FIVE WITH C-ARM DR. MALHOTRA TO ASSIST  **INPATIENT STATUS DUE TO MEDICARE** \"SPEC POP\".    Nausea/Vomiting: Controlled.    Postoperative hydration reviewed and adequate.    Pain:  Managed    Mental Status and Level of Consciousness: Baseline and appropriate for discharge.    Adequate oxygenation and airway patent.    Complications related to anesthesia: None    Post-anesthesia assessment completed. No concerns.    Patient has met all discharge requirements.    Signed By: Calixto Zhao MD    December 11, 2024

## 2024-12-11 NOTE — PERIOP NOTE
TRANSFER - OUT REPORT:    Verbal report given to Beth YANEZ on Salma Lopez  being transferred to [facility] for routine progression of patient care       Report consisted of patient's Situation, Background, Assessment and   Recommendations(SBAR).     Information from the following report(s) Nurse Handoff Report was reviewed with the receiving nurse.           Lines:   Peripheral IV 12/11/24 Right;Anterior Forearm (Active)   Site Assessment Clean, dry & intact 12/11/24 1310   Line Status Infusing 12/11/24 1310   Line Care Connections checked and tightened 12/11/24 1310   Phlebitis Assessment No symptoms 12/11/24 1310   Infiltration Assessment 0 12/11/24 1310   Alcohol Cap Used No 12/11/24 1310   Dressing Status Clean, dry & intact 12/11/24 1310   Dressing Type Transparent 12/11/24 1310        Opportunity for questions and clarification was provided.      Patient transported with:  Registered Nurse

## 2024-12-11 NOTE — INTERVAL H&P NOTE
Update History & Physical    The patient's History and Physical of December 10, 2024 was reviewed with the patient and I examined the patient. There was no change. The surgical site was confirmed by the patient and me.     Plan: The risks, benefits, expected outcome, and alternative to the recommended procedure have been discussed with the patient. Patient understands and wants to proceed with the procedure.     Electronically signed by VANCE KRISHNAN MD on 12/11/2024 at 6:32 AM

## 2024-12-11 NOTE — PERIOP NOTE
TRANSFER - IN REPORT:    Verbal report received from OR nurse and CRNA on Salma Clarkia  being received from OR for routine progression of patient care      Report consisted of patient's Situation, Background, Assessment and   Recommendations(SBAR).     Information from the following report(s) Nurse Handoff Report was reviewed with the receiving nurse.    Opportunity for questions and clarification was provided.      Assessment completed upon patient's arrival to unit and care assumed.

## 2024-12-12 ENCOUNTER — APPOINTMENT (OUTPATIENT)
Facility: HOSPITAL | Age: 74
End: 2024-12-12
Payer: MEDICARE

## 2024-12-12 ENCOUNTER — CARE COORDINATION (OUTPATIENT)
Facility: CLINIC | Age: 74
End: 2024-12-12

## 2024-12-12 VITALS
TEMPERATURE: 97.9 F | DIASTOLIC BLOOD PRESSURE: 69 MMHG | HEART RATE: 100 BPM | SYSTOLIC BLOOD PRESSURE: 107 MMHG | BODY MASS INDEX: 24.84 KG/M2 | HEIGHT: 62 IN | WEIGHT: 135 LBS | OXYGEN SATURATION: 98 % | RESPIRATION RATE: 18 BRPM

## 2024-12-12 LAB
ANION GAP SERPL CALC-SCNC: 7 MMOL/L (ref 3–18)
BUN SERPL-MCNC: 14 MG/DL (ref 7–18)
BUN/CREAT SERPL: 19 (ref 12–20)
CALCIUM SERPL-MCNC: 8.5 MG/DL (ref 8.5–10.1)
CHLORIDE SERPL-SCNC: 105 MMOL/L (ref 100–111)
CO2 SERPL-SCNC: 26 MMOL/L (ref 21–32)
CREAT SERPL-MCNC: 0.72 MG/DL (ref 0.6–1.3)
ERYTHROCYTE [DISTWIDTH] IN BLOOD BY AUTOMATED COUNT: 12 % (ref 11.6–14.5)
GLUCOSE BLD STRIP.AUTO-MCNC: 104 MG/DL (ref 70–110)
GLUCOSE BLD STRIP.AUTO-MCNC: 114 MG/DL (ref 70–110)
GLUCOSE SERPL-MCNC: 115 MG/DL (ref 74–99)
HCT VFR BLD AUTO: 29.2 % (ref 35–45)
HGB BLD-MCNC: 9.3 G/DL (ref 12–16)
MCH RBC QN AUTO: 30.9 PG (ref 24–34)
MCHC RBC AUTO-ENTMCNC: 31.8 G/DL (ref 31–37)
MCV RBC AUTO: 97 FL (ref 78–100)
NRBC # BLD: 0 K/UL (ref 0–0.01)
NRBC BLD-RTO: 0 PER 100 WBC
PLATELET # BLD AUTO: 280 K/UL (ref 135–420)
PMV BLD AUTO: 9.5 FL (ref 9.2–11.8)
POTASSIUM SERPL-SCNC: 4.1 MMOL/L (ref 3.5–5.5)
RBC # BLD AUTO: 3.01 M/UL (ref 4.2–5.3)
SODIUM SERPL-SCNC: 138 MMOL/L (ref 136–145)
WBC # BLD AUTO: 9.4 K/UL (ref 4.6–13.2)

## 2024-12-12 PROCEDURE — 6370000000 HC RX 637 (ALT 250 FOR IP): Performed by: ORTHOPAEDIC SURGERY

## 2024-12-12 PROCEDURE — 97535 SELF CARE MNGMENT TRAINING: CPT

## 2024-12-12 PROCEDURE — 80048 BASIC METABOLIC PNL TOTAL CA: CPT

## 2024-12-12 PROCEDURE — 2580000003 HC RX 258: Performed by: ORTHOPAEDIC SURGERY

## 2024-12-12 PROCEDURE — 36415 COLL VENOUS BLD VENIPUNCTURE: CPT

## 2024-12-12 PROCEDURE — 85027 COMPLETE CBC AUTOMATED: CPT

## 2024-12-12 PROCEDURE — 97530 THERAPEUTIC ACTIVITIES: CPT

## 2024-12-12 PROCEDURE — 97116 GAIT TRAINING THERAPY: CPT

## 2024-12-12 PROCEDURE — 6360000002 HC RX W HCPCS: Performed by: ORTHOPAEDIC SURGERY

## 2024-12-12 PROCEDURE — 82962 GLUCOSE BLOOD TEST: CPT

## 2024-12-12 PROCEDURE — 97165 OT EVAL LOW COMPLEX 30 MIN: CPT

## 2024-12-12 RX ORDER — PREGABALIN 50 MG/1
50 CAPSULE ORAL 2 TIMES DAILY
Qty: 90 CAPSULE | Refills: 3 | Status: SHIPPED | OUTPATIENT
Start: 2024-12-12 | End: 2025-06-10

## 2024-12-12 RX ORDER — PREGABALIN 25 MG/1
25 CAPSULE ORAL 2 TIMES DAILY
Status: DISCONTINUED | OUTPATIENT
Start: 2024-12-12 | End: 2024-12-12 | Stop reason: HOSPADM

## 2024-12-12 RX ORDER — METAXALONE 800 MG/1
800 TABLET ORAL 3 TIMES DAILY
Qty: 30 TABLET | Refills: 0 | Status: SHIPPED | OUTPATIENT
Start: 2024-12-12 | End: 2024-12-22

## 2024-12-12 RX ADMIN — CHOLECALCIFEROL TAB 25 MCG (1000 UNIT) 1000 UNITS: 25 TAB at 08:51

## 2024-12-12 RX ADMIN — FOLIC ACID 1000 MCG: 1 TABLET ORAL at 08:51

## 2024-12-12 RX ADMIN — FAMOTIDINE 20 MG: 20 TABLET, FILM COATED ORAL at 08:51

## 2024-12-12 RX ADMIN — ACETAMINOPHEN 650 MG: 325 TABLET ORAL at 14:31

## 2024-12-12 RX ADMIN — PREGABALIN 25 MG: 25 CAPSULE ORAL at 08:50

## 2024-12-12 RX ADMIN — BISACODYL 5 MG: 5 TABLET, COATED ORAL at 08:53

## 2024-12-12 RX ADMIN — CYCLOBENZAPRINE HYDROCHLORIDE 5 MG: 10 TABLET, FILM COATED ORAL at 11:19

## 2024-12-12 RX ADMIN — POLYETHYLENE GLYCOL 3350 17 G: 17 POWDER, FOR SOLUTION ORAL at 08:50

## 2024-12-12 RX ADMIN — WATER 2000 MG: 1 INJECTION INTRAMUSCULAR; INTRAVENOUS; SUBCUTANEOUS at 00:00

## 2024-12-12 RX ADMIN — PANTOPRAZOLE SODIUM 40 MG: 40 TABLET, DELAYED RELEASE ORAL at 06:03

## 2024-12-12 RX ADMIN — ACETAMINOPHEN 650 MG: 325 TABLET ORAL at 08:51

## 2024-12-12 RX ADMIN — OXYCODONE 10 MG: 5 TABLET ORAL at 08:34

## 2024-12-12 RX ADMIN — LISINOPRIL 5 MG: 5 TABLET ORAL at 08:51

## 2024-12-12 RX ADMIN — SODIUM CHLORIDE, PRESERVATIVE FREE 10 ML: 5 INJECTION INTRAVENOUS at 08:51

## 2024-12-12 RX ADMIN — METAXALONE 800 MG: 800 TABLET ORAL at 08:37

## 2024-12-12 RX ADMIN — ANTACID TABLETS 500 MG: 500 TABLET, CHEWABLE ORAL at 08:51

## 2024-12-12 RX ADMIN — OXYCODONE 10 MG: 5 TABLET ORAL at 00:00

## 2024-12-12 RX ADMIN — OXYCODONE 10 MG: 5 TABLET ORAL at 12:33

## 2024-12-12 RX ADMIN — HYDROMORPHONE HYDROCHLORIDE 0.5 MG: 1 INJECTION, SOLUTION INTRAMUSCULAR; INTRAVENOUS; SUBCUTANEOUS at 01:58

## 2024-12-12 RX ADMIN — ACETAMINOPHEN 650 MG: 325 TABLET ORAL at 01:59

## 2024-12-12 RX ADMIN — OXYCODONE 10 MG: 5 TABLET ORAL at 16:16

## 2024-12-12 ASSESSMENT — PAIN DESCRIPTION - FREQUENCY
FREQUENCY: CONTINUOUS
FREQUENCY: CONTINUOUS

## 2024-12-12 ASSESSMENT — PAIN DESCRIPTION - LOCATION
LOCATION: BACK;ABDOMEN
LOCATION: BACK
LOCATION: BACK;ABDOMEN

## 2024-12-12 ASSESSMENT — PAIN DESCRIPTION - DESCRIPTORS
DESCRIPTORS: STABBING
DESCRIPTORS: SORE
DESCRIPTORS: STABBING;TENDER
DESCRIPTORS: ACHING;SORE;TIGHTNESS;THROBBING
DESCRIPTORS: STABBING

## 2024-12-12 ASSESSMENT — PAIN DESCRIPTION - PAIN TYPE
TYPE: SURGICAL PAIN

## 2024-12-12 ASSESSMENT — PAIN SCALES - GENERAL
PAINLEVEL_OUTOF10: 8
PAINLEVEL_OUTOF10: 8
PAINLEVEL_OUTOF10: 4
PAINLEVEL_OUTOF10: 7
PAINLEVEL_OUTOF10: 4
PAINLEVEL_OUTOF10: 4
PAINLEVEL_OUTOF10: 8
PAINLEVEL_OUTOF10: 8

## 2024-12-12 ASSESSMENT — PAIN DESCRIPTION - ORIENTATION
ORIENTATION: MID
ORIENTATION: RIGHT
ORIENTATION: MID
ORIENTATION: POSTERIOR
ORIENTATION: RIGHT

## 2024-12-12 ASSESSMENT — PAIN DESCRIPTION - ONSET
ONSET: ON-GOING
ONSET: ON-GOING

## 2024-12-12 NOTE — CARE COORDINATION
5 star rating FOC presented to patient. Patient chose [facility]. [facility]

## 2024-12-12 NOTE — DISCHARGE SUMMARY
Discharge/Transfer  Summary     Patient: Salma Lopez MRN: [id number]  SSN: [id number]    YOB: 1950  Age: 74 y.o.  Sex: female       Admit Date: 12/11/2024    Discharge Date: 12/12/2024      Admission Diagnoses: Spondylolisthesis of lumbar region [M43.16]  Pseudoclaudication syndrome [M48.062]  Spondylolisthesis at L5-S1 level [M43.17]    Discharge Diagnoses:      Discharge Condition: Good      Surgery: ANTERIOR LUMBAR INTERBODY FUSION LUMBAR FIVE/SACRAL ONE,, REVISION POSTERIOR LUMBAR LAMINECTOMY FUSION LUMBAR FOUR/SACRAL TWO REMOVAL HARDWARE LUMBAR FOUR/FIVE WITH C-ARM DR. MALHOTRA TO ASSIST  **INPATIENT STATUS DUE TO MEDICARE** \"SPEC POP\": 51076 (CPT®)     Procedure(s) (LRB):  ANTERIOR LUMBAR INTERBODY FUSION LUMBAR FIVE/SACRAL ONE,, REVISION POSTERIOR LUMBAR LAMINECTOMY FUSION LUMBAR FOUR/SACRAL TWO REMOVAL HARDWARE LUMBAR FOUR/FIVE WITH C-ARM DR. MALHOTRA TO ASSIST  **INPATIENT STATUS DUE TO MEDICARE** \"SPEC POP\" (N/A)       Hospital Course: benign      Disposition: home    Discharge Medications:   Current Discharge Medication List        START taking these medications    Details   metaxalone (SKELAXIN) 800 MG tablet Take 1 tablet by mouth 3 times daily for 10 days  Qty: 30 tablet, Refills: 0           CONTINUE these medications which have CHANGED    Details   pregabalin (LYRICA) 50 MG capsule Take 1 capsule by mouth 2 times daily for 180 days. Max Daily Amount: 100 mg  Qty: 90 capsule, Refills: 3    Associated Diagnoses: Fusion of spine of thoracolumbar region      HYDROmorphone (DILAUDID) 4 MG tablet Take 1 tablet by mouth every 4 hours as needed for Pain for up to 7 days. Max Daily Amount: 24 mg  Qty: 42 tablet, Refills: 0    Comments: Reduce doses taken as pain becomes manageable  Associated Diagnoses: S/P lumbar fusion           CONTINUE these medications which have NOT CHANGED    Details   acetaminophen (TYLENOL) 500 MG tablet Take 2 tablets by mouth every 6 hours as needed for Pain

## 2024-12-12 NOTE — CARE COORDINATION
Ambulatory Care Coordination Note     2024 4:43 PM     Patient Current Location:  [place]     ACM contacted the patient by telephone. Verified name and  with patient as identifiers.         ACM: Tamie Cervantes RN     Challenges to be reviewed by the provider   Additional needs identified to be addressed with provider No  none               Method of communication with provider: none.    Utilization: Has the patient been seen in the ED since your last call? no    Care Summary Note: Pt texted this ACM to say she was discharged from surgical facility post surgery. Reports that her surgery went well, has no c/o at this time & feels good that therapies have been arranged as well as medications & F/U appts. Discharge paperwork in Epic. Encouraged pt to reach out with any issues to PCP or myself on Mon, .    Offered patient enrollment in the Remote Patient Monitoring (RPM) program for in-home monitoring: Yes, but did not enroll at this time: already monitoring with home equipment.     Assessments Completed:   General Assessment    Do you have any symptoms that are causing concern?: No          Medications Reviewed:   Pt reports having medications arranged in discharge paperwork.    Advance Care Planning:   Not reviewed during this call     Care Planning:    Goals Addressed                   This Visit's Progress     Conditions and Symptoms   On track     I will notify my provider of any symptoms that indicate a worsening of my condition.    Barriers: overwhelmed by complexity of regimen  Plan for overcoming my barriers: notify MD immediately if s/s infection or increased pain, participate as ability allows in addressing issues within POC  Confidence: 5/10  Anticipated Goal Completion Date: 120 days         Medication Management   On track     I will take my medication as directed.  I will notify my provider of any problems with medications, like adverse effects or side effects.  I will notify my

## 2024-12-13 ENCOUNTER — CARE COORDINATION (OUTPATIENT)
Facility: CLINIC | Age: 74
End: 2024-12-13

## 2024-12-13 DIAGNOSIS — R33.9 URINARY RETENTION: ICD-10-CM

## 2024-12-13 DIAGNOSIS — M48.05 SPINAL STENOSIS, THORACOLUMBAR REGION: ICD-10-CM

## 2024-12-13 DIAGNOSIS — M51.362 DEGENERATION OF INTERVERTEBRAL DISC OF LUMBAR REGION WITH DISCOGENIC BACK PAIN AND LOWER EXTREMITY PAIN: ICD-10-CM

## 2024-12-13 DIAGNOSIS — I10 ESSENTIAL HYPERTENSION: Primary | ICD-10-CM

## 2024-12-13 DIAGNOSIS — K11.5 SIALOLITHIASIS: ICD-10-CM

## 2024-12-13 DIAGNOSIS — Z02.89 PAIN MANAGEMENT CONTRACT AGREEMENT: ICD-10-CM

## 2024-12-13 PROCEDURE — 1111F DSCHRG MED/CURRENT MED MERGE: CPT | Performed by: FAMILY MEDICINE

## 2024-12-13 NOTE — OP NOTE
Operative Note      Patient: Salma Lopez  YOB: 1950  MRN: [id number]    Date of Procedure: 12/11/2024    Pre-Op Diagnosis Codes:      * Spondylolisthesis of lumbar region [M43.16]     * Pseudoclaudication syndrome [M48.062]    Post-Op Diagnosis: Same       Procedure(s):  ANTERIOR LUMBAR INTERBODY FUSION LUMBAR FIVE/SACRAL ONE,, REVISION POSTERIOR LUMBAR LAMINECTOMY FUSION LUMBAR FOUR/SACRAL TWO REMOVAL HARDWARE LUMBAR FOUR/FIVE WITH C-ARM DR. MALHOTRA TO ASSIST  **INPATIENT STATUS DUE TO MEDICARE** \"SPEC POP\"    Surgeon(s):  Brandan Iyer MD McKenzie, Chad M, MD    Assistant:   * No surgical staff found *    Anesthesia: General    Estimated Blood Loss (mL): 200     Complications: None    Specimens:   ID Type Source Tests Collected by Time Destination   1 : tissue ( disc) lumbar spine Tissue Spine CULTURE, ANAEROBIC, CULTURE, FUNGUS, CULTURE, TISSUE Brandan Iyer MD 12/11/2024 0925    2 : SWAB LUMBAR SPINE Swab Spine CULTURE, ANAEROBIC, CULTURE, FUNGUS Brandan Iyer MD 12/11/2024 1047        Implants:  Implant Name Type Inv. Item Serial No.  Lot No. LRB No. Used Action   KIT GRAFT BONE SM RHBMP-2 4.2MG INJ 5ML CONTAIN NDL 20GA - [id number]  KIT GRAFT BONE SM RHBMP-2 4.2MG INJ 5ML CONTAIN NDL 20GA  [employer] SPINALGRAFT TECH-WD [id number] N/A 1 Implanted   SCREW SPNL L25MM DIA4MM TI ALLY ST JC FULL THRD FN TIP DBL - [id number]  SCREW SPNL L25MM DIA4MM TI ALLY ST JC FULL THRD FN TIP DBL  [employer] SYNTHES SPINE-WD [id number] N/A 1 Implanted   SPACER SPNL M H15X10.9MM 10DEG 4.3CC ANTR LUM INTBDY FUS - [id number]  SPACER SPNL M H15X10.9MM 10DEG 4.3CC ANTR LUM INTBDY FUS  [employer] SYNTHES SPINE-WD [id number] N/A 1 Implanted   SCREW SPNL L25MM DIA4MM TI ALLY ST JC FULL THRD FN TIP DBL - [id number]  SCREW SPNL L25MM DIA4MM TI ALLY ST JC FULL THRD FN TIP DBL  [employer] DEPU[employer] SYNTHES SPINE-WD [id number] N/A 1 Implanted   GRAFT BNE PTTY LG 11 CC FIBERGRAFT BG - [id number]  GRAFT BNE PTTY LG 11 CC 
Draining;Patent 12/12/24 0800   Output (mL) 900 mL 12/12/24 0726   Discontinuation Reason Per provider order 12/11/24 0908       Findings:  Infection Present At Time Of Surgery (PATOS) (choose all levels that have infection present):  No infection present  Other Findings: Uncomplicated exposure of the L5-S1 disc space    Detailed Description of Procedure:   Patient was brought to the operating room had appropriate antibiotics and general anesthesia Arora catheter was placed.  Examined under fluoroscopy by Dr. Iyer.  The abdomen was prepped draped usual standard fashion followed by CC compliant guidelines for aseptic technique.  Made a standstill style incision below the umbilicus into the left.  Carried this down to the fascia.  Incised the fascia in a left paramedian fashion exposing the rectus muscle.  Followed this to the midline and then identified the preperitoneal space and followed this laterally into the retroperitoneum.  Identified the psoas muscle then the vascular pedicle then the prominence of the L5-S1 disc.  Carefully took down the soft tissue structures and placed a self-retaining tractor.  We placed a pen to eliana the spot.  Took down several descending vessels across the disc base with double clipping and dividing.  Carefully dissected off all the soft tissue.   refer to Dr. Iyer's dictation for the discectomy and implant.  At the end of the procedure reexamine the vascular pedicle is intact there was no bleeding no thrombosis allowed the retroperitoneum to sit back in his normal position.  There was no violation of the peritoneum.  I closed the overlying fascia with interrupted #1 Ethibond.  Irrigated again and placed interrupted 2-0 Monocryl for the fascia 3-0 Monocryl interrupted for the subcu.  4 Monocryl and Dermabond glue for the skin.  She tolerated the procedure well.  No complications were encountered.  Special thanks to Dr. Iyre for allowing me to participate in care of this very nice

## 2024-12-13 NOTE — CARE COORDINATION
Ambulatory Care Coordination Note     12/13/2024 9:39 AM     Patient outreach attempt by this ACM today to perform hospital follow up. ACM was unable to reach the patient by telephone today;   left voice message requesting a return phone call to this ACM.     ACM: Mitch Sinclair RN     PCP/Specialist follow up:   Future Appointments         Provider Specialty Dept Phone    6/10/2025 10:30 AM Rosemary Stokes, APRN - NP Cardiology [phone]            Follow Up:   Plan for next ACM outreach in approximately 1-2 days  to complete:  - hospital follow up.

## 2024-12-13 NOTE — PROGRESS NOTES
Pharmacy Note - Renal dose adjustment made per P/T protocol    Original order:  Pepcid 20 mg PO/IV q 12 h    CrCl cannot be calculated (Patient's most recent lab result is older than the maximum 30 days allowed.).    No results for input(s): \"BUN\", \"CREATININE\" in the last 72 hours.  Per [employer] CrCl ~ 38 ml/min  Renally adjusted order:  Pepcid 20 mg PO/IV daily    Please call inpatient pharmacy with any questions.    Thank you,  OMER BEE [facility]  12/11/2024 1:30 PM    
1925- Bedside and Verbal shift change report given to RENATA Bear (oncoming nurse) by RENATA Ch (offgoing nurse). Report included the following information Nurse Handoff Report, Adult Overview, Surgery Report, Intake/Output, MAR, and Recent Results.      1951- Patient A&Ox4, RA. Denies chest pain and SOB. With honey comb  dressing to R lower back,CDI and on lower mid abdomen, old drainage noted .  Denies numbness/tingling/calf pain.  Pain 7/10 with a tolerable level of 5/10. With sequential compression device bilaterally. Pt educated on unit routine, IS use, q2h rounds, and pain management. Pt verbalized understanding, no concerns voiced. Call bell and telephone within reach, bed in lowest position. Pt encouraged to call for assistance via call bell/zone phone.     0615- Encouraged to ambulate. Assisted pt to dangle in bed but pt felt dizzy.    0635- Informed Dr. Iyer that Arora catheter was not removed because pt was not able to stand up, pt felt dizzy upon trying to dangle in bed  and has soft BP.    0738- Bedside and Verbal shift change report given to RENATA Gaston (oncoming nurse) by RENATA Bear (offgoing nurse). Report included the following information Nurse Handoff Report, Adult Overview, Surgery Report, Intake/Output, MAR, and Recent Results.    
Bedside and Verbal shift change report given to RENATA Bear (oncoming nurse) by CELI Mccord RN (offgoing nurse). Report included the following information Nurse Handoff Report, Adult Overview, Surgery Report, Intake/Output, MAR, and Recent Results.    
Care management specialist assisting . This writer sent referral to [facility] via [facility] for review and consideration. Will continue to follow.   
Discharge instructions communicated to patient and spouse.  All questions answered, verbalized understanding.  Copy of instructions with e scripts to pharmacy of choice noted, all personal belongings packed and remain at side.  
Physical Therapy Goals:  Continued 12/12/2024 to be met within 7-10 days.  Short Term Goals  Short Term Goal 1: Patient will perform supine to/from sit with SBA  Short Term Goal 2: Patient will perform sit to/from stand with SB-CGA in preperation for gait progression  Short Term Goal 3: Patient will ambulate 100 ft with RW and SB-CGA to progress OOB mobility  Short Term Goal 4: Patient will negotiate 3 stairs with handrails and CGA to simulate home entry    []  Patient has met MD dania escobar for d/c home   []  Recommend 24 hour adult care   [x]  Benefit from additional acute PT session(s)      PHYSICAL THERAPY TREATMENT    Patient: Salma Lopez (74 y.o. female)  Date: 12/12/2024  Diagnosis: Spondylolisthesis of lumbar region [M43.16]  Pseudoclaudication syndrome [M48.062]  Spondylolisthesis at L5-S1 level [M43.17] Spondylolisthesis at L5-S1 level  Procedure(s) (LRB):  ANTERIOR LUMBAR INTERBODY FUSION LUMBAR FIVE/SACRAL ONE,, REVISION POSTERIOR LUMBAR LAMINECTOMY FUSION LUMBAR FOUR/SACRAL TWO REMOVAL HARDWARE LUMBAR FOUR/FIVE WITH C-ARM DR. MALHOTRA TO ASSIST  **INPATIENT STATUS DUE TO MEDICARE** \"SPEC POP\" (N/A) 2 Days Post-Op  Precautions: Fall Risk,Spinal Precautions: No Bending, No Lifting, No Twisting  PLOF: Independent ambulation without AD    ASSESSMENT:  Pt performed supine to sit with CGA, sit to stand with CGA. Patient ambulated 30 feet with RW, GB applied, CGA. Pt refused further ambulating due to pain. Pt educated on icing, elevation, positioning, home safety, home exercise program, and activity recommendations. Wll continue to progress mobility on nest session.     Progression toward goals:   []      Improving appropriately and progressing toward goals  [x]      Improving slowly and progressing toward goals  []      Not making progress toward goals and plan of care will be adjusted     PLAN:  Patient continues to benefit from skilled intervention to address the above impairments.  Continue 
Physical Therapy Goals:  Initiated 12/11/2024 to be met within 7-10 days.  Short Term Goals  Short Term Goal 1: Patient will perform supine to/from sit with SBA  Short Term Goal 2: Patient will perform sit to/from stand with SB-CGA in preperation for gait progression  Short Term Goal 3: Patient will ambulate 100 ft with RW and SB-CGA to progress OOB mobility  Short Term Goal 4: Patient will negotiate 3 stairs with handrails and CGA to simulate home entry    []  Patient has met MD dania escobar for d/c home   []  Recommend HH with 24 hour adult care   [x]  Benefit from additional acute PT session(s)    PHYSICAL THERAPY EVALUATION    Patient: Salma Lopez (74 y.o. female)  Date: 12/11/2024  Primary Diagnosis: Spondylolisthesis of lumbar region [M43.16]  Pseudoclaudication syndrome [M48.062]  Spondylolisthesis at L5-S1 level [M43.17]  Procedure(s) (LRB):  ANTERIOR LUMBAR INTERBODY FUSION LUMBAR FIVE/SACRAL ONE,, REVISION POSTERIOR LUMBAR LAMINECTOMY FUSION LUMBAR FOUR/SACRAL TWO REMOVAL HARDWARE LUMBAR FOUR/FIVE WITH C-ARM DR. MALHOTRA TO ASSIST  **INPATIENT STATUS DUE TO MEDICARE** \"SPEC POP\" (N/A) 2 Days Post-Op   Precautions: Fall Risk, Spinal Precautions: No Bending, No Lifting, No Twisting   PLOF: Independent ambulation without AD however since November pt reports she has been using w/c and a diaper for toileting    ASSESSMENT :  Based on the objective data described below, the patient presents with lower extremity weakness, decreased gait quality and endurance, impaired bed mobility and transfers, decreased AROM/flexibility, and overall limitations in functional mobility s/p surgery noted above. Required significantly increased time, education and encouragement to attempt mobility. Patient only able to roll in bed with SBA. Pt educated on icing, elevation, positioning, home safety, home exercise program, and activity recommendations.     Patient will benefit from skilled intervention to address the above 
Physical Therapy Goals:  Initiated 12/12/2024 to be met within 7-10 days.  Short Term Goals  Short Term Goal 1: Patient will perform supine to/from sit with SBA  Short Term Goal 2: Patient will perform sit to/from stand with SB-CGA in preperation for gait progression  Short Term Goal 3: Patient will ambulate 100 ft with RW and SB-CGA to progress OOB mobility  Short Term Goal 4: Patient will negotiate 3 stairs with handrails and CGA to simulate home entry    [x]  Patient has met MD dania escobar for d/c home   [x]  Recommend 24 hour adult care   []  Benefit from additional acute PT session to address:        PHYSICAL THERAPY TREATMENT    Patient: Salma Lopez (74 y.o. female)  Date: 12/12/2024  Diagnosis: Spondylolisthesis of lumbar region [M43.16]  Pseudoclaudication syndrome [M48.062]  Spondylolisthesis at L5-S1 level [M43.17] Spondylolisthesis at L5-S1 level  Procedure(s) (LRB):  ANTERIOR LUMBAR INTERBODY FUSION LUMBAR FIVE/SACRAL ONE,, REVISION POSTERIOR LUMBAR LAMINECTOMY FUSION LUMBAR FOUR/SACRAL TWO REMOVAL HARDWARE LUMBAR FOUR/FIVE WITH C-ARM DR. MALHOTRA TO ASSIST  **INPATIENT STATUS DUE TO MEDICARE** \"SPEC POP\" (N/A) 2 Days Post-Op  Precautions: Fall Risk, Spinal Precautions: No Bending, No Lifting, No Twisting    ASSESSMENT:  Patient motivated to participate with PT due to wanting to d/c home today. Pt performed sit to stand with CGA. Patient ambulated 100 feet with RW, GB applied, CGA. Pt negotiated 5 stairs with handrails and CGA. Pt educated on icing, elevation, positioning, home safety, home exercise program, and activity recommendations.     Progression toward goals:   [x]      Improving appropriately and progressing toward goals  []      Improving slowly and progressing toward goals  []      Not making progress toward goals and plan of care will be adjusted     PLAN:  Patient continues to benefit from skilled intervention to address the above impairments.  Continue treatment per established 
Received client from PACU in satisfactory condition. Client is a pt of Dr. Iyer. Pt had a ANTERIOR LUMBAR INTERBODY FUSION LUMBAR FIVE/SACRAL ONE,, REVISION POSTERIOR LUMBAR LAMINECTOMY FUSION LUMBAR FOUR/SACRAL TWO REMOVAL HARDWARE LUMBAR FOUR/FIVE WITH C-ARM DR. MALHOTRA TO ASSIST today. Client is A/O X 4. Client is calm and cooperative. Pt states she has numbness to bilateral lower legs. Palpated Radial,Posterior Tibial and Dorsalis Pedis pulses equal in rate and rhythm bilaterally-+2. Capillary Refill less than 3 seconds. Skin is warm , dry and skin color is appropriate to race. Client is negative for JVD.  Bibasilar breath sounds clear bilaterally. No use of accessory muscles. Bowel sounds hypoactive to all 4 quadrants. Abdomen is soft and tender due to surgery. Client has a pike cahter post-operatively. Pike is patent and draining. No bladder distention evident. No complaints of bladder discomfort. Client has a honeycomb dressings to the lower back and abdomen. Dressings are clean, dry, and intact. No other skin integrity issues present. Sequential compression device applied. Client has 20 gauge PIV present in right forearm and running LR @125ml/hr until bag from PACU is done infusing. Clients pain is 10/10 on 0-10 scale. Waiting on pharmacy to verify medications. Client oriented to call bell use as well as bed use. Client oriented to phone and how to order meals. Call bell within reach. Bed in low position. Three side rails up. Dual skin assessment performed with CHAPARRITA Martinez RN. No skin breakdown noted at this time.   
SW received a call from OR team regarding upcoming surgery and possible transport issues. Patient stated that she has neighbors that can transport patient to acute setting in wheelchair and then in van where she can lie flat. Patient needing assist to get into hospital for surgery in am. Spouse to reach out to [facility] upon arrival for assist getting into surgical area. SW explained that patient would have an out of pocket expense with medical transport to [facility]. Patient aware and stated that she will have neighbors assist with transport. Patient stated that she is going to be admitted after surgery and will likely need assist upon dc. CM team to follow.   
TRANSFER - IN REPORT:    Verbal report received from CHAPARRITA Martinez RN on Salma Ilion  being received from PACU for routine post-op      Report consisted of patient's Situation, Background, Assessment and   Recommendations(SBAR).     Information from the following report(s) Nurse Handoff Report, Adult Overview, Surgery Report, Intake/Output, MAR, and Recent Results was reviewed with the receiving nurse.    Opportunity for questions and clarification was provided.      Assessment completed upon patient's arrival to unit and care assumed.    
VS labile  Afebrile  Neuro improved  Incisional pain , but pre op painimproved  Labs ok  Plan   mobilize as bp allows  Dc home when patient and  and PT OT confident of ability to return home  
Dynamic: Constant support;Fair    Strength: BUE     Strength: Generally decreased, functional    Tone & Sensation: BUE                Range of Motion: BUE        AROM: Generally decreased, functional  PROM: Generally decreased, functional      Functional Mobility and Transfers for ADLs:  Bed Mobility:     Bed Mobility Training  Bed Mobility Training: Yes  Interventions: Safety awareness training;Verbal cues  Supine to Sit: Contact-guard assistance  Sit to Supine: Contact-guard assistance  Scooting: Stand-by assistance  Transfers:                 Transfer Training  Transfer Training: Yes  Interventions: Safety awareness training;Verbal cues  Stand to Sit: Contact-guard assistance  Stand Pivot Transfers: Contact-guard assistance  Toilet Transfer: Contact-guard assistance    ADL Assessment:                  UE Dressing: Supervision  LE Dressing: Minimal assistance  Toileting: Contact guard assistance    ADL Intervention:    Therapeutic Exercise/Neuromuscular Re-education:    Pain:  Pain level pre-treatment: NR/10   Pain level post-treatment: NR/10   Pain Intervention(s): Rest,  Repositioning   Response to intervention: Nurse notified regarding pt's performance with therapy.     Activity Tolerance:   Activity Tolerance: Patient limited by pain, Patient Tolerated treatment well  Please refer to the flowsheet for vital signs taken during this treatment.    After treatment:   [x] Patient left in no apparent distress sitting up in chair  [] Patient left in no apparent distress in bed  [x] Call bell left within reach  [x] Nursing notified  [] Caregiver present  [] Bed alarm activated    COMMUNICATION/EDUCATION:   Patient Education  Education Given To: Patient  Education Provided: Fall Prevention Strategies;Precautions;Plan of Care;ADL Adaptive Strategies;Transfer Training;Equipment;Energy Conservation;Role of Therapy  Education Method: Demonstration;Verbal  Barriers to Learning: None  Education Outcome: Verbalized 
urinal?   [] 1 [] 2 [x] 3 [] 4   4. Putting on and taking off regular upper body clothing?   [] 1 [] 2 [x] 3 [] 4   5. Taking care of personal grooming such as brushing teeth?   [] 1 [] 2 [x] 3 [] 4   6. Eating meals?   [] 1 [] 2 [] 3 [x] 4       Current research shows that an AM-PAC score of 18 or greater is associated with a discharge to the patient's home setting.  Based on an AM-PAC score of 19/24 and their current ADL deficits; it is recommended that the patient have 2-3 sessions per week of Occupational Therapy at d/c to increase the patient's independence.

## 2024-12-13 NOTE — CARE COORDINATION
hours as needed for Itching or Allergies       omeprazole (PRILOSEC) 20 MG delayed release capsule Take 1 capsule by mouth daily as needed (GERD) Indications: GERD       folic acid (FOLVITE) 1 MG tablet Take 1 tablet by mouth Every Day       YUVAFEM 10 MCG TABS vaginal tablet Place 1 tablet vaginally every other day Indications: Vaginal Dryness suppository       calcium carbonate (OYSTER SHELL CALCIUM 500 MG) 1250 (500 Ca) MG tablet Take 1 tablet by mouth Every Day       Multiple Vitamins-Minerals (ONE-A-DAY WOMENS 50+ PO) Take by mouth daily       cetirizine (ZYRTEC) 10 MG tablet Take 1 tablet by mouth daily as needed for Allergies or Rhinitis       vitamin D (CHOLECALCIFEROL) 25 MCG (1000 UT) TABS tablet Take 1 tablet by mouth daily       celecoxib (CELEBREX) 200 MG capsule Take 1 capsule by mouth 2 times daily (Patient not taking: Reported on 12/13/2024)  180 capsule 0    XELJANZ 5 MG TABS Take 1 tablet by mouth in the morning and at bedtime Indications: RA (Patient not taking: Reported on 12/13/2024)        No current facility-administered medications for this visit.   , and 1111F entered: yes    Advance Care Planning:   Not reviewed during this call     Care Planning:    Goals Addressed                   This Visit's Progress     Conditions and Symptoms   On track     I will notify my provider of any symptoms that indicate a worsening of my condition.    Barriers: overwhelmed by complexity of regimen  Plan for overcoming my barriers: notify MD immediately if s/s infection or increased pain, participate as ability allows in addressing issues within POC  Confidence: 5/10  Anticipated Goal Completion Date: 120 days         Medication Management   On track     I will take my medication as directed.  I will notify my provider of any problems with medications, like adverse effects or side effects.  I will notify my provider/Care Coordinator if I am unable to afford my medications.  I will notify my provider for

## 2024-12-15 LAB
BACTERIA SPEC CULT: NORMAL
BACTERIA SPEC CULT: NORMAL
GRAM STN SPEC: NORMAL
GRAM STN SPEC: NORMAL
SERVICE CMNT-IMP: NORMAL
SERVICE CMNT-IMP: NORMAL

## 2024-12-16 ENCOUNTER — TELEPHONE (OUTPATIENT)
Facility: CLINIC | Age: 74
End: 2024-12-16

## 2024-12-16 RX ORDER — CYCLOBENZAPRINE HCL 5 MG
5 TABLET ORAL 3 TIMES DAILY PRN
Qty: 90 TABLET | Refills: 0 | Status: SHIPPED | OUTPATIENT
Start: 2024-12-16 | End: 2024-12-18 | Stop reason: SDUPTHER

## 2024-12-16 RX ORDER — CYCLOBENZAPRINE HCL 5 MG
5 TABLET ORAL 3 TIMES DAILY PRN
Qty: 90 TABLET | Refills: 0 | Status: SHIPPED | OUTPATIENT
Start: 2024-12-16 | End: 2024-12-16 | Stop reason: SDUPTHER

## 2024-12-16 RX ORDER — CYCLOBENZAPRINE HCL 10 MG
TABLET ORAL
Refills: 0 | OUTPATIENT
Start: 2024-12-16

## 2024-12-16 NOTE — TELEPHONE ENCOUNTER
Mr. Lopez contacted office for the following prescriptions to be filled:    Medication requested :   cyclobenzaprine (FLEXERIL) 5 MG tablet   PCP: Dr. Epps  Pharmacy or Print: [facility]  Mail order or Local pharmacy   cyclobenzaprine (FLEXERIL) 5 MG tablet     Scheduled appointment if not seen by current providers in office: LOV 11/14/24 upcoming 12/18/24.

## 2024-12-16 NOTE — TELEPHONE ENCOUNTER
Patient  came into the office requesting an RX for yclobenzaprine (FLEXERIL) 5 MG table to be resent to [facility] .Please review and advise

## 2024-12-17 ENCOUNTER — APPOINTMENT (OUTPATIENT)
Facility: HOSPITAL | Age: 74
End: 2024-12-17
Payer: MEDICARE

## 2024-12-17 ENCOUNTER — CARE COORDINATION (OUTPATIENT)
Facility: CLINIC | Age: 74
End: 2024-12-17

## 2024-12-17 NOTE — CARE COORDINATION
Ambulatory Care Coordination Note     2024 9:51 AM     Patient Current Location:  Home: [id number]  [facility] [address]     ACM contacted the patient by telephone. Verified name and  with patient as identifiers.         ACM: Tamie Cervantes RN     Challenges to be reviewed by the provider   Additional needs identified to be addressed with provider No                  Method of communication with provider: none.    Utilization: Has the patient been seen in the ED since your last call? no    Care Summary Note: Pt reports doing very well post surgery. Reports pain that was constant & severe(8-10)is nearly resolved(2)& she's able to sit up & walk which had been difficult at best prior to surgery. She's now more optimistic about PT results & has F/U appts with both PCP & surgeons office.    Offered patient enrollment in the Remote Patient Monitoring (RPM) program for in-home monitoring: Yes, but did not enroll at this time: already monitoring with home equipment.     Assessments Completed:   General Assessment    Do you have any symptoms that are causing concern?: No          Medications Reviewed:   Patient denies any changes with medications and reports taking all medications as prescribed.    Advance Care Planning:   Patient declined education     Care Planning:    Goals Addressed                   This Visit's Progress     Conditions and Symptoms   On track     I will notify my provider of any symptoms that indicate a worsening of my condition.    Barriers: overwhelmed by complexity of regimen  Plan for overcoming my barriers: notify MD immediately if s/s infection or increased pain, participate as ability allows in addressing issues within POC  Confidence: 5/10  Anticipated Goal Completion Date: 120 days         Medication Management   On track     I will take my medication as directed.  I will notify my provider of any problems with medications, like adverse effects or side effects.  I will

## 2024-12-18 ENCOUNTER — TELEMEDICINE (OUTPATIENT)
Facility: CLINIC | Age: 74
End: 2024-12-18

## 2024-12-18 DIAGNOSIS — G89.18 POSTOPERATIVE PAIN: ICD-10-CM

## 2024-12-18 DIAGNOSIS — D64.9 ANEMIA, UNSPECIFIED TYPE: ICD-10-CM

## 2024-12-18 DIAGNOSIS — Z09 HOSPITAL DISCHARGE FOLLOW-UP: Primary | ICD-10-CM

## 2024-12-18 RX ORDER — CYCLOBENZAPRINE HCL 5 MG
5 TABLET ORAL 3 TIMES DAILY PRN
Qty: 90 TABLET | Refills: 0 | Status: SHIPPED | OUTPATIENT
Start: 2024-12-18

## 2024-12-18 RX ORDER — CYCLOBENZAPRINE HCL 5 MG
TABLET ORAL
Refills: 0 | OUTPATIENT
Start: 2024-12-18

## 2024-12-18 NOTE — PROGRESS NOTES
Salma Lopez, was evaluated through a synchronous (real-time) audio-video encounter. The patient (or guardian if applicable) is aware that this is a billable service, which includes applicable co-pays. This Virtual Visit was conducted with patient's (and/or legal guardian's) consent. Patient identification was verified, and a caregiver was present when appropriate.   The patient was located at Home: [id number] [place] [id number]  Provider was located at Facility (Appt Dept): [address] [place] [address]  [address]  [place] [address]  Confirm you are appropriately licensed, registered, or certified to deliver care in the [facility] where the patient is located as indicated above. If you are not or unsure, please re-schedule the visit: Yes, I confirm.     Salma Lopez (:  1950) is a Established patient, presenting virtually for evaluation of the following:      Below is the assessment and plan developed based on review of pertinent history, physical exam, labs, studies, and medications.     Assessment & Plan  Hospital discharge follow-up   Continue follow up care in bed    Orders:    BS - [facility] - [facility]    Postoperative pain   h    Orders:    cyclobenzaprine (FLEXERIL) 5 MG tablet; Take 1 tablet by mouth 3 times daily as needed for Muscle spasms    Anemia, unspecified type   Continue Iron supplements           No follow-ups on file.       Subjective   HPI  Review of Systems       Objective   Patient-Reported Vitals  No data recorded     Physical Exam             --FLORES GONZALES MD

## 2024-12-19 ENCOUNTER — APPOINTMENT (OUTPATIENT)
Facility: HOSPITAL | Age: 74
End: 2024-12-19
Payer: MEDICARE

## 2024-12-23 ENCOUNTER — TELEPHONE (OUTPATIENT)
Facility: CLINIC | Age: 74
End: 2024-12-23

## 2024-12-23 ENCOUNTER — HOSPITAL ENCOUNTER (OUTPATIENT)
Facility: HOSPITAL | Age: 74
Setting detail: SPECIMEN
Discharge: HOME | End: 2024-12-26
Payer: MEDICARE

## 2024-12-23 DIAGNOSIS — R32 URINARY INCONTINENCE, UNSPECIFIED TYPE: ICD-10-CM

## 2024-12-23 DIAGNOSIS — R32 URINARY INCONTINENCE, UNSPECIFIED TYPE: Primary | ICD-10-CM

## 2024-12-23 PROCEDURE — 87077 CULTURE AEROBIC IDENTIFY: CPT

## 2024-12-23 PROCEDURE — 87086 URINE CULTURE/COLONY COUNT: CPT

## 2024-12-23 PROCEDURE — 87186 SC STD MICRODIL/AGAR DIL: CPT

## 2024-12-23 NOTE — TELEPHONE ENCOUNTER
Patient called requesting a call back from clinical regarding a medical question. Please review and advise. She can be reached at [phone].

## 2024-12-23 NOTE — TELEPHONE ENCOUNTER
Pt would like to know if a short term supply of the medication FLEXERIL could be sent to her local pharmacy as there has been a delay in her express scripts delivery. Pt would like the medication sent to [facility] [address]. Please review and advise as needed.

## 2024-12-23 NOTE — TELEPHONE ENCOUNTER
Called and spoke with patient who states that she has a visit scheduled with Dr. Iyer tomorrow but she is having issues with her bladder. She states that over the weekend she has had multiple instances where she was unable to control her bladder. She states that today she has had the same issue. She states that Dr. Iyer does not believe this has anything to do with her surgery but actually may be something with a UTI. Patient was advised that I would need an order to do a urine on her and Mich is currently out of the office. Advised that I would speak with Dr. Jules and see if he would be willing to ok an order for patient.     Dr. Jules ok'd urine culture. Patient advised and states that she will come by this afternoon for sample.

## 2024-12-26 ENCOUNTER — CARE COORDINATION (OUTPATIENT)
Facility: CLINIC | Age: 74
End: 2024-12-26

## 2024-12-26 NOTE — CARE COORDINATION
Ambulatory Care Coordination Note     2024 8:25 AM     Patient Current Location:  Home: [id number]  [facility] [address]     ACM contacted the patient by telephone. Verified name and  with patient as identifiers.         ACM: Tamie Cervantes RN     Challenges to be reviewed by the provider   Additional needs identified to be addressed with provider No  none               Method of communication with provider: none.    Utilization: Has the patient been seen in the ED since your last call? no    Care Summary Note: Pt in great spirits, reports she is able to walk & is 'mostly' pain free. She says she is grateful to have made the progress she's made, that she can now feel more in her legs, 'not pin pricks but definitely pressure' she optimistic about attending 'my old physical therapist' at [facility], was very pleased in her exercises in the past, that they are helpful for her current situation.    Offered patient enrollment in the Remote Patient Monitoring (RPM) program for in-home monitoring: Yes, but did not enroll at this time: already monitoring with home equipment.     Assessments Completed:   Hypertension - Encounter Level    Symptom course: stable      ,   General Assessment    Do you have any symptoms that are causing concern?: No          Medications Reviewed:   Patient denies any changes with medications and reports taking all medications as prescribed.    Advance Care Planning:   Patient declined education     Care Planning:    Goals Addressed                   This Visit's Progress     Conditions and Symptoms   On track     I will notify my provider of any symptoms that indicate a worsening of my condition.    Barriers: overwhelmed by complexity of regimen  Plan for overcoming my barriers: notify MD immediately if s/s infection or increased pain, participate as ability allows in addressing issues within POC  Confidence: 5/10  Anticipated Goal Completion Date: 120 days         Medication

## 2024-12-27 DIAGNOSIS — R35.0 URINARY FREQUENCY: Primary | ICD-10-CM

## 2024-12-27 LAB
BACTERIA SPEC CULT: ABNORMAL
CC UR VC: ABNORMAL
SERVICE CMNT-IMP: ABNORMAL

## 2024-12-27 RX ORDER — CIPROFLOXACIN 500 MG/1
500 TABLET, FILM COATED ORAL 2 TIMES DAILY
Qty: 14 TABLET | Refills: 0 | Status: SHIPPED | OUTPATIENT
Start: 2024-12-27 | End: 2025-01-03

## 2024-12-30 ENCOUNTER — HOSPITAL ENCOUNTER (OUTPATIENT)
Facility: HOSPITAL | Age: 74
Setting detail: RECURRING SERIES
Discharge: HOME | End: 2025-01-02
Payer: MEDICARE

## 2024-12-30 PROCEDURE — 97535 SELF CARE MNGMENT TRAINING: CPT

## 2024-12-30 PROCEDURE — 97110 THERAPEUTIC EXERCISES: CPT

## 2024-12-30 PROCEDURE — 97162 PT EVAL MOD COMPLEX 30 MIN: CPT

## 2024-12-30 NOTE — PROGRESS NOTES
Situation: with , stairs with bilateral HA  Pt Goals: \"walk safely.\"      OBJECTIVE/EXAMINATION        24 min [x]Eval        - untimed        Therapeutic Procedures:  Tx Min Billable or 1:1 Min (if diff from Tx Min) Procedure, Rationale, Specifics   10  19453 Therapeutic Exercise (timed):  increase ROM, strength, coordination, balance, and proprioception to improve patient's ability to progress to PLOF and address remaining functional goals. (see flow sheet as applicable)     Details if applicable:     15  47890 Self Care/Home Management (timed):  improve patient knowledge and understanding of home injury/symptom/pain management, positioning, posture/ergonomics, home safety, activity modification, transfer techniques, and joint protection strategies  to improve patient's ability to progress to PLOF and address remaining functional goals.  (see flow sheet as applicable)     Details if applicable:            Details if applicable:            Details if applicable:            Details if applicable:     Total    25 Total Reminder: MC/BC bill using total billable min of TIMED therapeutic procedures (example: do not include dry needle or estim unattended, both untimed codes, in totals to left)     [x]  Patient Education billed concurrently with other procedures     Other Objective/Functional Measures: OBJECTIVE  Posture:  Lateral Shift: [] R    [] L     [] +  [] -  Kyphosis: [] Increased [] Decreased   []  WNL  Lordosis:  [] Increased [] Decreased   [] WNL  Pelvic symmetry: [] WNL    [] Other:    Gait:  [] Normal     [x] Abnormal: RW decreased rj, increased right knee flexion, short step length     Active Movements: [] N/A   [x] Too acute   [] Other:  ROM  AROM Comments   Forward flexion      Extension     SB right     SB left     Right trunk rotation      Left trunk rotation          Strength   L(0-5) R (0-5) N/T   Hip Flexion  4 4 []   Knee Extension  4 4 []   Knee Flexion  3+ 3+ []   Great Toe Extension 3 3 
[id number]  Phone: [phone]      Fax:  [phone]

## 2025-01-02 ENCOUNTER — HOSPITAL ENCOUNTER (OUTPATIENT)
Facility: HOSPITAL | Age: 75
Setting detail: RECURRING SERIES
Discharge: HOME | End: 2025-01-05
Payer: MEDICARE

## 2025-01-02 PROCEDURE — 97530 THERAPEUTIC ACTIVITIES: CPT

## 2025-01-02 PROCEDURE — 97112 NEUROMUSCULAR REEDUCATION: CPT

## 2025-01-02 PROCEDURE — 97110 THERAPEUTIC EXERCISES: CPT

## 2025-01-02 NOTE — PROGRESS NOTES
PHYSICAL / OCCUPATIONAL THERAPY - DAILY TREATMENT NOTE     Patient Name: Salma Lopez    Date: 2025    : 1950  Insurance: Payor: MEDICARE / Plan: MEDICARE PART A AND B / Product Type: *No Product type* /      Patient  verified Yes     Visit #   Current / Total 2 24   Time   In / Out 112 205   Pain   In / Out 2 2   Subjective Functional Status/Changes: Pain is better. Leg still bothers me   Changes to:  Allergies, Med Hx, Sx Hx?   no       TREATMENT AREA =  Other abnormalities of gait and mobility [R26.89]    If an interpreting service is utilized for treatment of this patient, the contents of this document represent the material reviewed with the patient via the .     OBJECTIVE      Therapeutic Procedures:  Tx Min Billable or 1:1 Min (if diff from Tx Min) Procedure, Rationale, Specifics   13  17987 Therapeutic Exercise (timed):  increase ROM, strength, coordination, balance, and proprioception to improve patient's ability to progress to PLOF and address remaining functional goals. (see flow sheet as applicable)    Details if applicable:       15  23045 Neuromuscular Re-Education (timed):  improve balance, coordination, kinesthetic sense, posture, core stability and proprioception to improve patient's ability to develop conscious control of individual muscles and awareness of position of extremities in order to progress to PLOF and address remaining functional goals. (see flow sheet as applicable)    Details if applicable:     25  23530 Therapeutic Activity (timed):  use of dynamic activities replicating functional movements to increase ROM, strength, coordination, balance, and proprioception in order to improve patient's ability to progress to PLOF and address remaining functional goals.  (see flow sheet as applicable)     Details if applicable:           Details if applicable:            Details if applicable:     53  [facility] Totals Reminder: bill using total billable min of TIMED

## 2025-01-07 ENCOUNTER — OFFICE VISIT (OUTPATIENT)
Facility: CLINIC | Age: 75
End: 2025-01-07
Payer: MEDICARE

## 2025-01-07 VITALS
TEMPERATURE: 98 F | DIASTOLIC BLOOD PRESSURE: 60 MMHG | BODY MASS INDEX: 26.31 KG/M2 | HEIGHT: 62 IN | HEART RATE: 86 BPM | SYSTOLIC BLOOD PRESSURE: 106 MMHG | WEIGHT: 143 LBS

## 2025-01-07 DIAGNOSIS — Z85.828 HISTORY OF BASAL CELL CANCER: ICD-10-CM

## 2025-01-07 DIAGNOSIS — N39.0 URINARY TRACT INFECTION WITHOUT HEMATURIA, SITE UNSPECIFIED: Primary | ICD-10-CM

## 2025-01-07 DIAGNOSIS — M79.671 RIGHT FOOT PAIN: ICD-10-CM

## 2025-01-07 DIAGNOSIS — M06.9 RHEUMATOID ARTHRITIS OF OTHER SITE, UNSPECIFIED WHETHER RHEUMATOID FACTOR PRESENT (HCC): ICD-10-CM

## 2025-01-07 LAB
BILIRUBIN, URINE, POC: NEGATIVE
BLOOD URINE, POC: NEGATIVE
GLUCOSE URINE, POC: NEGATIVE
KETONES, URINE, POC: NEGATIVE
LEUKOCYTE ESTERASE, URINE, POC: NEGATIVE
NITRITE, URINE, POC: NEGATIVE
PH, URINE, POC: 5.5 (ref 4.6–8)
PROTEIN,URINE, POC: NORMAL
SPECIFIC GRAVITY, URINE, POC: 1.01 (ref 1–1.03)
URINALYSIS CLARITY, POC: CLEAR
URINALYSIS COLOR, POC: YELLOW
UROBILINOGEN, POC: NORMAL

## 2025-01-07 PROCEDURE — 99214 OFFICE O/P EST MOD 30 MIN: CPT | Performed by: STUDENT IN AN ORGANIZED HEALTH CARE EDUCATION/TRAINING PROGRAM

## 2025-01-07 PROCEDURE — 1123F ACP DISCUSS/DSCN MKR DOCD: CPT | Performed by: STUDENT IN AN ORGANIZED HEALTH CARE EDUCATION/TRAINING PROGRAM

## 2025-01-07 PROCEDURE — 1036F TOBACCO NON-USER: CPT | Performed by: STUDENT IN AN ORGANIZED HEALTH CARE EDUCATION/TRAINING PROGRAM

## 2025-01-07 PROCEDURE — 1159F MED LIST DOCD IN RCRD: CPT | Performed by: STUDENT IN AN ORGANIZED HEALTH CARE EDUCATION/TRAINING PROGRAM

## 2025-01-07 PROCEDURE — G8417 CALC BMI ABV UP PARAM F/U: HCPCS | Performed by: STUDENT IN AN ORGANIZED HEALTH CARE EDUCATION/TRAINING PROGRAM

## 2025-01-07 PROCEDURE — 3074F SYST BP LT 130 MM HG: CPT | Performed by: STUDENT IN AN ORGANIZED HEALTH CARE EDUCATION/TRAINING PROGRAM

## 2025-01-07 PROCEDURE — G8427 DOCREV CUR MEDS BY ELIG CLIN: HCPCS | Performed by: STUDENT IN AN ORGANIZED HEALTH CARE EDUCATION/TRAINING PROGRAM

## 2025-01-07 PROCEDURE — 1111F DSCHRG MED/CURRENT MED MERGE: CPT | Performed by: STUDENT IN AN ORGANIZED HEALTH CARE EDUCATION/TRAINING PROGRAM

## 2025-01-07 PROCEDURE — 3078F DIAST BP <80 MM HG: CPT | Performed by: STUDENT IN AN ORGANIZED HEALTH CARE EDUCATION/TRAINING PROGRAM

## 2025-01-07 PROCEDURE — G8399 PT W/DXA RESULTS DOCUMENT: HCPCS | Performed by: STUDENT IN AN ORGANIZED HEALTH CARE EDUCATION/TRAINING PROGRAM

## 2025-01-07 PROCEDURE — 1090F PRES/ABSN URINE INCON ASSESS: CPT | Performed by: STUDENT IN AN ORGANIZED HEALTH CARE EDUCATION/TRAINING PROGRAM

## 2025-01-07 PROCEDURE — M1308 PR FLU IMMUNIZE NO ADMIN: HCPCS | Performed by: STUDENT IN AN ORGANIZED HEALTH CARE EDUCATION/TRAINING PROGRAM

## 2025-01-07 PROCEDURE — 81003 URINALYSIS AUTO W/O SCOPE: CPT | Performed by: STUDENT IN AN ORGANIZED HEALTH CARE EDUCATION/TRAINING PROGRAM

## 2025-01-07 PROCEDURE — G2211 COMPLEX E/M VISIT ADD ON: HCPCS | Performed by: STUDENT IN AN ORGANIZED HEALTH CARE EDUCATION/TRAINING PROGRAM

## 2025-01-07 RX ORDER — HYDROMORPHONE HYDROCHLORIDE 4 MG/1
4 TABLET ORAL EVERY 8 HOURS PRN
COMMUNITY
Start: 2025-01-03

## 2025-01-07 ASSESSMENT — PATIENT HEALTH QUESTIONNAIRE - PHQ9
6. FEELING BAD ABOUT YOURSELF - OR THAT YOU ARE A FAILURE OR HAVE LET YOURSELF OR YOUR FAMILY DOWN: NOT AT ALL
10. IF YOU CHECKED OFF ANY PROBLEMS, HOW DIFFICULT HAVE THESE PROBLEMS MADE IT FOR YOU TO DO YOUR WORK, TAKE CARE OF THINGS AT HOME, OR GET ALONG WITH OTHER PEOPLE: SOMEWHAT DIFFICULT
SUM OF ALL RESPONSES TO PHQ QUESTIONS 1-9: 7
3. TROUBLE FALLING OR STAYING ASLEEP: SEVERAL DAYS
SUM OF ALL RESPONSES TO PHQ9 QUESTIONS 1 & 2: 0
SUM OF ALL RESPONSES TO PHQ QUESTIONS 1-9: 7
1. LITTLE INTEREST OR PLEASURE IN DOING THINGS: NOT AT ALL
9. THOUGHTS THAT YOU WOULD BE BETTER OFF DEAD, OR OF HURTING YOURSELF: NOT AT ALL
5. POOR APPETITE OR OVEREATING: NEARLY EVERY DAY
SUM OF ALL RESPONSES TO PHQ QUESTIONS 1-9: 7
7. TROUBLE CONCENTRATING ON THINGS, SUCH AS READING THE NEWSPAPER OR WATCHING TELEVISION: NOT AT ALL
8. MOVING OR SPEAKING SO SLOWLY THAT OTHER PEOPLE COULD HAVE NOTICED. OR THE OPPOSITE, BEING SO FIGETY OR RESTLESS THAT YOU HAVE BEEN MOVING AROUND A LOT MORE THAN USUAL: NOT AT ALL
4. FEELING TIRED OR HAVING LITTLE ENERGY: NEARLY EVERY DAY
2. FEELING DOWN, DEPRESSED OR HOPELESS: NOT AT ALL
SUM OF ALL RESPONSES TO PHQ QUESTIONS 1-9: 7

## 2025-01-07 NOTE — PROGRESS NOTES
Salma Lopez (: 1950) is a 74 y.o. female, established patient, here for evaluation of the following chief complaint(s):  Urinary Tract Infection (Patient states she was treated for UTI and is here for follow up to make sure this has cleared up. ) and Foot Problem (Patient states her right foot is swollen and red at the bottom. )        Assessment & Plan  1. Right foot pain - Symptoms suggest potential Charcot foot, associated with severe neuropathy, leading to bone collapse and misalignment. Weight loss may contribute to the absence of significant fat pad in the foot. No signs of infection or cellulitis.  - Ordered x-ray of the right foot    2. Urinary tract infection - UTI appears resolved based on urinalysis. Completed a week's course of Ciprofloxacin, effective against Serratia infection.  - No further action required  - Advised to maintain adequate hydration    3. Rheumatoid arthritis - Currently off RA medications due to recent gram-negative infection.  - Will resume in a few weeks to prevent infection spread  - No acute signs otherwise requiring a change in plan  - Continue follow up with Rheum    4. Basal cell carcinoma - History of basal cell carcinoma, successfully removed, no current issues    Follow-up in approximately 1 month to fully establish with me      Results  - Urine culture:    - Serriatia showed sensitivity to Cipro  1. Urinary tract infection without hematuria, site unspecified  -     AMB POC URINALYSIS DIP STICK AUTO W/O MICRO  2. Right foot pain  -     XR FOOT RIGHT (MIN 3 VIEWS); Future  3. Rheumatoid arthritis of other site, unspecified whether rheumatoid factor present (HCC)  4. History of basal cell cancer           Subjective   History of Present Illness  The patient presents for evaluation of right foot pain, urinary tract infection    She reports a history of bunion repair on her right foot, resulting in a protruding ball on the ankle and lack of sensation in her toes due

## 2025-01-07 NOTE — PROGRESS NOTES
Chief Complaint   Patient presents with    Urinary Tract Infection     Patient states she was treated for UTI and is here for follow up to make sure this has cleared up.     Foot Problem     Patient states her right foot is swollen and red at the bottom.        \"Have you been to the ER, urgent care clinic since your last visit?  Hospitalized since your last visit?\"    NO    “Have you seen or consulted any other health care providers outside our system since your last visit?”    NO

## 2025-01-08 ENCOUNTER — HOSPITAL ENCOUNTER (OUTPATIENT)
Facility: HOSPITAL | Age: 75
Setting detail: RECURRING SERIES
Discharge: HOME | End: 2025-01-11
Payer: MEDICARE

## 2025-01-08 ENCOUNTER — CARE COORDINATION (OUTPATIENT)
Facility: CLINIC | Age: 75
End: 2025-01-08

## 2025-01-08 PROCEDURE — 97112 NEUROMUSCULAR REEDUCATION: CPT

## 2025-01-08 PROCEDURE — 97110 THERAPEUTIC EXERCISES: CPT

## 2025-01-08 PROCEDURE — 97530 THERAPEUTIC ACTIVITIES: CPT

## 2025-01-08 NOTE — PROGRESS NOTES
PHYSICAL / OCCUPATIONAL THERAPY - DAILY TREATMENT NOTE     Patient Name: Salma Lopez    Date: 2025    : 1950  Insurance: Payor: MEDICARE / Plan: MEDICARE PART A AND B / Product Type: *No Product type* /      Patient  verified Yes     Visit #   Current / Total 3 24   Time   In / Out 152 232   Pain   In / Out 0 0   Subjective Functional Status/Changes: Good. No pain in the back but my feet are really bothering me. They did order an xray for my right foot   Changes to:  Allergies, Med Hx, Sx Hx?   no       TREATMENT AREA =  Other abnormalities of gait and mobility [R26.89]    If an interpreting service is utilized for treatment of this patient, the contents of this document represent the material reviewed with the patient via the .     OBJECTIVE  Therapeutic Procedures:  Tx Min Billable or 1:1 Min (if diff from Tx Min) Procedure, Rationale, Specifics   10  00725 Therapeutic Exercise (timed):  increase ROM, strength, coordination, balance, and proprioception to improve patient's ability to progress to PLOF and address remaining functional goals. (see flow sheet as applicable)    Details if applicable:       20  54044 Neuromuscular Re-Education (timed):  improve balance, coordination, kinesthetic sense, posture, core stability and proprioception to improve patient's ability to develop conscious control of individual muscles and awareness of position of extremities in order to progress to PLOF and address remaining functional goals. (see flow sheet as applicable)    Details if applicable:     10  76676 Therapeutic Activity (timed):  use of dynamic activities replicating functional movements to increase ROM, strength, coordination, balance, and proprioception in order to improve patient's ability to progress to PLOF and address remaining functional goals.  (see flow sheet as applicable)     Details if applicable:           Details if applicable:            Details if applicable:     40  [facility]

## 2025-01-08 NOTE — CARE COORDINATION
Ambulatory Care Coordination Note     2025 9:08 AM     Patient Current Location:  Home: [address] [place] [address] Pt  [facility] [address]     ACM contacted the patient by telephone. Verified name and  with patient as identifiers.         ACM: Tamie Cervantes RN     Challenges to be reviewed by the provider   Additional needs identified to be addressed with provider No  none               Method of communication with provider: none.    Utilization: Has the patient been seen in the ED since your last call? no    Care Summary Note: Pt reports doing well other than recent UTI which is being treated with Cipro. She reports her ROM has improved significantly, mostly due to being in much less pain, she reports being on the exercise bike for 15 min at a time, she's walking well with her walker, mostly bcse of an overabundance of caution & reports that balance is her biggest issue at present. She is participating in outpatient PT & continues to see & hope for improvement.    Offered patient enrollment in the Remote Patient Monitoring (RPM) program for in-home monitoring: Yes, but did not enroll at this time: already monitoring with home equipment.     Assessments Completed:   General Assessment    Do you have any symptoms that are causing concern?: Yes  Progression since Onset: Gradually Improving  Reported Symptoms: Other (Comment: UTI, being treated)          UTI being treated with Cipro.    Medications Reviewed:   Patient denies any changes with medications and reports taking all medications as prescribed.    Advance Care Planning:   Reviewed.     Care Planning:    Goals Addressed                   This Visit's Progress     Conditions and Symptoms   On track     I will notify my provider of any symptoms that indicate a worsening of my condition.    Barriers: overwhelmed by complexity of regimen  Plan for overcoming my barriers: notify MD immediately if s/s infection or increased pain, participate as ability allows in

## 2025-01-10 ENCOUNTER — HOSPITAL ENCOUNTER (OUTPATIENT)
Facility: HOSPITAL | Age: 75
Setting detail: RECURRING SERIES
Discharge: HOME | End: 2025-01-13
Payer: MEDICARE

## 2025-01-10 PROCEDURE — 97116 GAIT TRAINING THERAPY: CPT

## 2025-01-10 PROCEDURE — 97530 THERAPEUTIC ACTIVITIES: CPT

## 2025-01-10 PROCEDURE — 97110 THERAPEUTIC EXERCISES: CPT

## 2025-01-10 NOTE — PROGRESS NOTES
PHYSICAL / OCCUPATIONAL THERAPY - DAILY TREATMENT NOTE     Patient Name: Salma Lopez    Date: 1/10/2025    : 1950  Insurance: Payor: MEDICARE / Plan: MEDICARE PART A AND B / Product Type: *No Product type* /      Patient  verified Yes     Visit #   Current / Total 4 24   Time   In / Out 1116 1156   Pain   In / Out 0 0   Subjective Functional Status/Changes: I was definetly sore after last time .   Changes to:  Allergies, Med Hx, Sx Hx?   no       TREATMENT AREA =  Other abnormalities of gait and mobility [R26.89]    If an interpreting service is utilized for treatment of this patient, the contents of this document represent the material reviewed with the patient via the .     OBJECTIVE        Therapeutic Procedures:  Tx Min Billable or 1:1 Min (if diff from Tx Min) Procedure, Rationale, Specifics   15  60961 Therapeutic Exercise (timed):  increase ROM, strength, coordination, balance, and proprioception to improve patient's ability to progress to PLOF and address remaining functional goals. (see flow sheet as applicable)    Details if applicable:       15  86316 Therapeutic Activity (timed):  use of dynamic activities replicating functional movements to increase ROM, strength, coordination, balance, and proprioception in order to improve patient's ability to progress to PLOF and address remaining functional goals.  (see flow sheet as applicable)    Details if applicable:     10  46675 Gait Training (timed):    50 feet with one parallel bar (assistive device) over even surfaces with supervision level of assist. Cuing for sequence with practicing prior to using cane.  To improve safety and dynamic movement with household/community ambulation.  (see flow sheet as applicable)     Details if applicable:           Details if applicable:            Details if applicable:     40  [facility] Totals Reminder: bill using total billable min of TIMED therapeutic procedures (example: do not include dry needle or

## 2025-01-14 ENCOUNTER — HOSPITAL ENCOUNTER (OUTPATIENT)
Facility: HOSPITAL | Age: 75
Setting detail: RECURRING SERIES
Discharge: HOME | End: 2025-01-17
Payer: MEDICARE

## 2025-01-14 PROCEDURE — 97110 THERAPEUTIC EXERCISES: CPT

## 2025-01-14 PROCEDURE — 97112 NEUROMUSCULAR REEDUCATION: CPT

## 2025-01-14 PROCEDURE — 97530 THERAPEUTIC ACTIVITIES: CPT

## 2025-01-14 NOTE — PROGRESS NOTES
PHYSICAL / OCCUPATIONAL THERAPY - DAILY TREATMENT NOTE     Patient Name: Salma Lopez    Date: 2025    : 1950  Insurance: Payor: MEDICARE / Plan: MEDICARE PART A AND B / Product Type: *No Product type* /      Patient  verified Yes     Visit #   Current / Total 5 24   Time   In / Out 1116 1202   Pain   In / Out 1 2   Subjective Functional Status/Changes: Left hip is hurting a little since starting to use the SPC. Got the xray results back, bone spurs and osteopenia.    Changes to:  Allergies, Med Hx, Sx Hx?   no       TREATMENT AREA =  Other abnormalities of gait and mobility [R26.89]    If an interpreting service is utilized for treatment of this patient, the contents of this document represent the material reviewed with the patient via the .     OBJECTIVE    Therapeutic Procedures:  Tx Min Billable or 1:1 Min (if diff from Tx Min) Procedure, Rationale, Specifics   21 15 15522 Therapeutic Exercise (timed):  increase ROM, strength, coordination, balance, and proprioception to improve patient's ability to progress to PLOF and address remaining functional goals. (see flow sheet as applicable)    Details if applicable:       10 10 84611 Manual Therapy (timed):  decrease pain, increase ROM, and increase tissue extensibility to improve patient's ability to progress to PLOF and address remaining functional goals.  The manual therapy interventions were performed at a separate and distinct time from the therapeutic activities interventions . Details: STM to left piriformis in right s/l     Details if applicable:     15 15 61466 Neuromuscular Re-Education (timed):  improve balance, coordination, kinesthetic sense, posture, core stability and proprioception to improve patient's ability to develop conscious control of individual muscles and awareness of position of extremities in order to progress to PLOF and address remaining functional goals. (see flow sheet as applicable)     Details if applicable:

## 2025-01-16 ENCOUNTER — HOSPITAL ENCOUNTER (OUTPATIENT)
Facility: HOSPITAL | Age: 75
Setting detail: RECURRING SERIES
Discharge: HOME | End: 2025-01-19
Payer: MEDICARE

## 2025-01-16 PROCEDURE — 97110 THERAPEUTIC EXERCISES: CPT

## 2025-01-16 PROCEDURE — 97530 THERAPEUTIC ACTIVITIES: CPT

## 2025-01-16 PROCEDURE — 97112 NEUROMUSCULAR REEDUCATION: CPT

## 2025-01-16 NOTE — PROGRESS NOTES
procedures   [x] Review HEP    [] Progressed/Changed HEP, detail:    [] Other detail:       Objective Information/Functional Measures/Assessment    Pt tolerated session well with reporting no increase in pain post session. Held off on standing exercises due to pt request with completing table exercises. Challenged with reverse clams on right and still unable to use TB due to strength. Improving control with right great toe however unable to complete active DF against gravity.     Patient will continue to benefit from skilled PT / OT services to modify and progress therapeutic interventions, analyze and address functional mobility deficits, analyze and address strength deficits, analyze and cue for proper movement patterns, analyze and modify for postural abnormalities, analyze and address imbalance/dizziness, and instruct in home and community integration to address functional deficits and attain remaining goals.    Progress toward goals / Updated goals:  []  See Progress Note/Recertification    Short Term Goals: STG- To be accomplished in 12 visits  1.  Pt will be independent with HEP to encourage prophylaxis.  Eval: HEP dispensed   Current: compliance per pt report goal MET 1/8/25     2. Pt will be able to complete TUG < 25 seconds with LRAD to demonstrate improved functional LE strength and endurance for walking  Eval: 32 sec with RW   Current: 17.4 sec with RW goal MET 1/10/25     3. Pt will be able to complete 8 reps in 30 sec without UE assist to demonstrate improved functional LE strength for transfers in home  Eval: 9 reps with WBOS, UE assist   Current: improving control with STS from lower height however still needing UE assist 1/16/25     Long Term Goals: LTG- To be accomplished in 24 visits:  1.  Pt will report max pain 2/10 to complete ADLs with increased ease.   Eval: 10/10 max pain  Current: 1-2/10 pain since starting to use SPC progressing well 1/14/25     2.  Pt will be able to TUG in 15 seconds or

## 2025-01-17 ENCOUNTER — CARE COORDINATION (OUTPATIENT)
Facility: CLINIC | Age: 75
End: 2025-01-17

## 2025-01-17 NOTE — CARE COORDINATION
regimen  Plan for overcoming my barriers: notify MD immediately if s/s infection or increased pain, participate as ability allows in addressing issues within POC  Confidence: 5/10  Anticipated Goal Completion Date: 120 days         Medication Management   On track     I will take my medication as directed.  I will notify my provider of any problems with medications, like adverse effects or side effects.  I will notify my provider/Care Coordinator if I am unable to afford my medications.  I will notify my provider for advice before I stop taking any of my medication.         Reduce risk for hospitalization   Improving     I will make choices related to my condition and in my everyday care that will reduce my risk of hospitalization.               PCP/Specialist follow up:   Future Appointments         Provider Specialty Dept Phone    1/22/2025 11:10 AM Sola Smalls PT Physical Therapy [phone]    1/24/2025 11:10 AM Sola Smalls PT Physical Therapy [phone]    1/29/2025 11:10 AM Sola Smalls PT Physical Therapy [phone]    1/31/2025 11:10 AM Sola Smalls PT Physical Therapy [phone]    2/10/2025 10:15 AM Omar Jules MD Family Medicine [phone]    6/10/2025 10:30 AM Rosemary Stokes APRN - [facility] [phone]            Follow Up:   No further Ambulatory Care Management follow-up scheduled at this time.  Patient  has Ambulatory Care Manager's contact information for any further questions, concerns or needs.

## 2025-01-17 NOTE — CARE COORDINATION
Ambulatory Care Coordination Note     1/17/2025 2:54 PM     Patient outreach attempt by this ACM today to perform care management follow up . ACM was unable to reach the patient by telephone today;   voicemail full and unable to leave a message.      ACM: Tamie Cervantes RN     Care Summary Note:     PCP/Specialist follow up:   Future Appointments         Provider Specialty Dept Phone    1/22/2025 11:10 AM Sola Smalls, PT Physical Therapy [phone]    1/24/2025 11:10 AM Sola Smalls, PT Physical Therapy [phone]    1/29/2025 11:10 AM Sola Smalls, PT Physical Therapy [phone]    1/31/2025 11:10 AM Sola Smalls, PT Physical Therapy [phone]    2/10/2025 10:15 AM Omar Jules MD Family Medicine [phone]    6/10/2025 10:30 AM Rosemary Stokes, APRN - NP Cardiology [phone]            Follow Up:   Plan for next ACM outreach in approximately 2 weeks to complete:  - CC Protocol assessments  - disease specific assessments  - SDOH assessments  - medication review  - advance care planning  - goal progression.

## 2025-01-22 ENCOUNTER — TRANSCRIBE ORDERS (OUTPATIENT)
Facility: HOSPITAL | Age: 75
End: 2025-01-22

## 2025-01-22 ENCOUNTER — HOSPITAL ENCOUNTER (OUTPATIENT)
Facility: HOSPITAL | Age: 75
Setting detail: RECURRING SERIES
End: 2025-01-22
Payer: MEDICARE

## 2025-01-22 ENCOUNTER — TELEPHONE (OUTPATIENT)
Facility: HOSPITAL | Age: 75
End: 2025-01-22

## 2025-01-22 DIAGNOSIS — M54.10 RADICULOPATHY, UNSPECIFIED SPINAL REGION: ICD-10-CM

## 2025-01-22 DIAGNOSIS — M21.379 FOOT-DROP, UNSPECIFIED LATERALITY: Primary | ICD-10-CM

## 2025-01-22 DIAGNOSIS — Z98.1 S/P SPINAL FUSION: ICD-10-CM

## 2025-01-22 NOTE — TELEPHONE ENCOUNTER
called pt back to discuss AFO, water therapy and going to the [facility] in [facility]

## 2025-01-22 NOTE — PROGRESS NOTES
PHYSICAL / OCCUPATIONAL THERAPY - DAILY TREATMENT NOTE     Patient Name: Salma Lopez    Date: 2025    : 1950  Insurance: Payor: MEDICARE / Plan: MEDICARE PART A AND B / Product Type: *No Product type* /      Patient  verified Yes     Visit #   Current / Total 7 24   Time   In / Out *** ***   Pain   In / Out *** ***   Subjective Functional Status/Changes: ***   Changes to:  Allergies, Med Hx, Sx Hx?   no       TREATMENT AREA =  Other abnormalities of gait and mobility [R26.89]    If an interpreting service is utilized for treatment of this patient, the contents of this document represent the material reviewed with the patient via the .     OBJECTIVE      Therapeutic Procedures:  Tx Min Billable or 1:1 Min (if diff from Tx Min) Procedure, Rationale, Specifics   ***  {[facility] Procedures (Optional):23561}    Details if applicable:       ***  {[facility] Procedures (Optional):77473}    Details if applicable:     ***  {[facility] Procedures (Optional):85826}     Details if applicable:       {[facility] Procedures (Optional):13544}    Details if applicable:       {[facility] Procedures (Optional):93316}     Details if applicable:     ***  [facility] Totals Reminder: bill using total billable min of TIMED therapeutic procedures (example: do not include dry needle or estim unattended, both untimed codes, in totals to left)  8-22 min = 1 unit; 23-37 min = 2 units; 38-52 min = 3 units; 53-67 min = 4 units; 68-82 min = 5 units   Total Total     TOTAL TREATMENT TIME:        ***     [x]  Patient Education billed concurrently with other procedures   [x] Review HEP    [] Progressed/Changed HEP, detail:    [] Other detail:       Objective Information/Functional Measures/Assessment    ***    Patient will continue to benefit from skilled PT / OT services to {[facility]:25232} to address functional deficits and attain remaining goals.    Progress toward goals / Updated goals:  []

## 2025-01-23 ENCOUNTER — APPOINTMENT (OUTPATIENT)
Facility: HOSPITAL | Age: 75
End: 2025-01-23
Payer: MEDICARE

## 2025-01-24 ENCOUNTER — APPOINTMENT (OUTPATIENT)
Facility: HOSPITAL | Age: 75
End: 2025-01-24
Payer: MEDICARE

## 2025-01-24 ENCOUNTER — HOSPITAL ENCOUNTER (OUTPATIENT)
Facility: HOSPITAL | Age: 75
Setting detail: RECURRING SERIES
Discharge: HOME | End: 2025-01-27
Payer: MEDICARE

## 2025-01-24 PROCEDURE — 97110 THERAPEUTIC EXERCISES: CPT

## 2025-01-24 PROCEDURE — 97530 THERAPEUTIC ACTIVITIES: CPT

## 2025-01-24 PROCEDURE — 97140 MANUAL THERAPY 1/> REGIONS: CPT

## 2025-01-24 NOTE — PROGRESS NOTES
PHYSICAL / OCCUPATIONAL THERAPY - DAILY TREATMENT NOTE     Patient Name: Salma Lopez    Date: 2025    : 1950  Insurance: Payor: MEDICARE / Plan: MEDICARE PART A AND B / Product Type: *No Product type* /      Patient  verified Yes     Visit #   Current / Total 7 24   Time   In / Out 1113 1155   Pain   In / Out 0 0   Subjective Functional Status/Changes: Left knee is sore and bruised and the left hip is tight. I am fearful of falling again.    Changes to:  Allergies, Med Hx, Sx Hx?   no       TREATMENT AREA =  Other abnormalities of gait and mobility [R26.89]    If an interpreting service is utilized for treatment of this patient, the contents of this document represent the material reviewed with the patient via the .     OBJECTIVE    Therapeutic Procedures:  Tx Min Billable or 1:1 Min (if diff from Tx Min) Procedure, Rationale, Specifics   12  02822 Therapeutic Exercise (timed):  increase ROM, strength, coordination, balance, and proprioception to improve patient's ability to progress to PLOF and address remaining functional goals. (see flow sheet as applicable)    Details if applicable:       20  78296 Therapeutic Activity (timed):  use of dynamic activities replicating functional movements to increase ROM, strength, coordination, balance, and proprioception in order to improve patient's ability to progress to PLOF and address remaining functional goals.  (see flow sheet as applicable)    Details if applicable:     10  91610 Manual Therapy (timed):  decrease pain, increase ROM, and increase tissue extensibility to improve patient's ability to progress to PLOF and address remaining functional goals.  The manual therapy interventions were performed at a separate and distinct time from the therapeutic activities interventions . Details: STM to left gluts and piriformis      Details if applicable:           Details if applicable:            Details if applicable:     42   [facility] Totals

## 2025-01-27 ENCOUNTER — TRANSCRIBE ORDERS (OUTPATIENT)
Facility: HOSPITAL | Age: 75
End: 2025-01-27

## 2025-01-27 ENCOUNTER — HOSPITAL ENCOUNTER (OUTPATIENT)
Facility: HOSPITAL | Age: 75
Discharge: HOME | End: 2025-01-30
Payer: MEDICARE

## 2025-01-27 DIAGNOSIS — M54.10 RADICULOPATHY, UNSPECIFIED SPINAL REGION: ICD-10-CM

## 2025-01-27 DIAGNOSIS — M21.371 RIGHT FOOT DROP: Primary | ICD-10-CM

## 2025-01-27 DIAGNOSIS — Z98.1 S/P SPINAL FUSION: ICD-10-CM

## 2025-01-27 DIAGNOSIS — M21.379 FOOT-DROP, UNSPECIFIED LATERALITY: ICD-10-CM

## 2025-01-27 DIAGNOSIS — M21.371 RIGHT FOOT DROP: ICD-10-CM

## 2025-01-27 PROCEDURE — 72131 CT LUMBAR SPINE W/O DYE: CPT

## 2025-01-27 PROCEDURE — 72128 CT CHEST SPINE W/O DYE: CPT

## 2025-01-27 RX ORDER — CYCLOBENZAPRINE HCL 10 MG
10 TABLET ORAL 3 TIMES DAILY PRN
Qty: 90 TABLET | Refills: 1 | Status: SHIPPED | OUTPATIENT
Start: 2025-01-27

## 2025-01-27 NOTE — TELEPHONE ENCOUNTER
Med was sent in 12/18/24 90 no refills to be taken TID as needed by Dr Epps. Please review and advise.

## 2025-01-29 ENCOUNTER — HOSPITAL ENCOUNTER (OUTPATIENT)
Facility: HOSPITAL | Age: 75
Setting detail: RECURRING SERIES
Discharge: HOME | End: 2025-02-01
Payer: MEDICARE

## 2025-01-29 PROCEDURE — 97530 THERAPEUTIC ACTIVITIES: CPT

## 2025-01-29 PROCEDURE — 97110 THERAPEUTIC EXERCISES: CPT

## 2025-01-29 NOTE — PROGRESS NOTES
[facility] [place]  [address], [address]  [place]  [address]  Phone: [phone]      Fax:  [phone]    Progress Note  Patient name: Salma Lopez Start of Care: 24   Referral source: Jorge L Epps MD : 1950    Medical Diagnosis: Other abnormalities of gait and mobility [R26.89]  Payor: MEDICARE / Plan: MEDICARE PART A AND B / Product Type: *No Product type* /  Onset Date:DOS 24    Treatment Diagnosis: R26.89  Abnormalities of gait and mobility     Prior Hospitalization: see medical history Provider#: [id number]   Comorbidities: Musculoskeletal disorders and Complications related to surgery     Prior Level of Function: walking without AD    Reporting Period: 24-25    Visits from Start of Care: 8    Missed Visits: 0      If an interpreting service is utilized for treatment of this patient, the contents of this document represent the material reviewed with the patient via the .     Goals/Measure of Progress: To be achieved in 16 visits     Short Term Goals: STG- To be accomplished in 12 visits  1.  Pt will be independent with HEP to encourage prophylaxis.  Eval: HEP dispensed   PN compliance per pt report goal MET 25     2. Pt will be able to complete TUG < 25 seconds with LRAD to demonstrate improved functional LE strength and endurance for walking  Eval: 32 sec with RW   PN: 13 seconds RW goal MET 25     3. Pt will be able to complete 8 reps in 30 sec without UE assist to demonstrate improved functional LE strength for transfers in home  Eval: 9 reps with WBOS, UE assist   PN: 5 reps in 17 seconds due to fatigue with UE assist still needed regression 25     Long Term Goals: LTG- To be accomplished in 24 visits:  1.  Pt will report max pain 2/10 to complete ADLs with increased ease.   Eval: 10/10 max pain  PN: 4/10 max pain in left ribs no pain in low back progressing well 25     2.  Pt will be able to TUG in 15

## 2025-01-29 NOTE — PROGRESS NOTES
PHYSICAL / OCCUPATIONAL THERAPY - DAILY TREATMENT NOTE     Patient Name: Salma Lopez    Date: 2025    : 1950  Insurance: Payor: MEDICARE / Plan: MEDICARE PART A AND B / Product Type: *No Product type* /      Patient  verified Yes     Visit #   Current / Total 8 24   Time   In / Out 1113 1155   Pain   In / Out 0 0   Subjective Functional Status/Changes: I am really tired and don't know why.    Changes to:  Allergies, Med Hx, Sx Hx?   no       TREATMENT AREA =  Other abnormalities of gait and mobility [R26.89]    If an interpreting service is utilized for treatment of this patient, the contents of this document represent the material reviewed with the patient via the .     OBJECTIVE    Therapeutic Procedures:  Tx Min Billable or 1:1 Min (if diff from Tx Min) Procedure, Rationale, Specifics   25  11988 Therapeutic Exercise (timed):  increase ROM, strength, coordination, balance, and proprioception to improve patient's ability to progress to PLOF and address remaining functional goals. (see flow sheet as applicable)    Details if applicable:       17  17415 Therapeutic Activity (timed):  use of dynamic activities replicating functional movements to increase ROM, strength, coordination, balance, and proprioception in order to improve patient's ability to progress to PLOF and address remaining functional goals.  (see flow sheet as applicable)    Details if applicable:            Details if applicable:           Details if applicable:            Details if applicable:     42  [facility] Totals Reminder: bill using total billable min of TIMED therapeutic procedures (example: do not include dry needle or estim unattended, both untimed codes, in totals to left)  8-22 min = 1 unit; 23-37 min = 2 units; 38-52 min = 3 units; 53-67 min = 4 units; 68-82 min = 5 units   Total Total     TOTAL TREATMENT TIME:        42     [x]  Patient Education billed concurrently with other procedures   [x] Review HEP    []

## 2025-01-31 ENCOUNTER — HOSPITAL ENCOUNTER (OUTPATIENT)
Facility: HOSPITAL | Age: 75
Setting detail: RECURRING SERIES
End: 2025-01-31
Payer: MEDICARE

## 2025-01-31 PROCEDURE — 97112 NEUROMUSCULAR REEDUCATION: CPT

## 2025-01-31 PROCEDURE — 97530 THERAPEUTIC ACTIVITIES: CPT

## 2025-01-31 PROCEDURE — 97110 THERAPEUTIC EXERCISES: CPT

## 2025-01-31 NOTE — PROGRESS NOTES
PHYSICAL / OCCUPATIONAL THERAPY - DAILY TREATMENT NOTE     Patient Name: Salma Lopez    Date: 2025    : 1950  Insurance: Payor: MEDICARE / Plan: MEDICARE PART A AND B / Product Type: *No Product type* /      Patient  verified Yes     Visit #   Current / Total 9 24   Time   In / Out 1112 1156   Pain   In / Out 0 0   Subjective Functional Status/Changes: I got my CT results. I am still tired and have the follow up on Monday for blood work.    Changes to:  Allergies, Med Hx, Sx Hx?   no       TREATMENT AREA =  Other abnormalities of gait and mobility [R26.89]    If an interpreting service is utilized for treatment of this patient, the contents of this document represent the material reviewed with the patient via the .     OBJECTIVE      Therapeutic Procedures:  Tx Min Billable or 1:1 Min (if diff from Tx Min) Procedure, Rationale, Specifics   14 10 07708 Therapeutic Exercise (timed):  increase ROM, strength, coordination, balance, and proprioception to improve patient's ability to progress to PLOF and address remaining functional goals. (see flow sheet as applicable)    Details if applicable:       15 15 88609 Neuromuscular Re-Education (timed):  improve balance, coordination, kinesthetic sense, posture, core stability and proprioception to improve patient's ability to develop conscious control of individual muscles and awareness of position of extremities in order to progress to PLOF and address remaining functional goals. (see flow sheet as applicable)    Details if applicable:     15 15 37975 Therapeutic Activity (timed):  use of dynamic activities replicating functional movements to increase ROM, strength, coordination, balance, and proprioception in order to improve patient's ability to progress to PLOF and address remaining functional goals.  (see flow sheet as applicable)     Details if applicable:           Details if applicable:            Details if applicable:     44 40 [facility]

## 2025-02-03 ENCOUNTER — HOSPITAL ENCOUNTER (OUTPATIENT)
Facility: HOSPITAL | Age: 75
Setting detail: RECURRING SERIES
Discharge: HOME | End: 2025-02-06
Payer: MEDICARE

## 2025-02-03 ENCOUNTER — OFFICE VISIT (OUTPATIENT)
Facility: CLINIC | Age: 75
End: 2025-02-03
Payer: MEDICARE

## 2025-02-03 ENCOUNTER — HOSPITAL ENCOUNTER (OUTPATIENT)
Facility: HOSPITAL | Age: 75
Setting detail: SPECIMEN
Discharge: HOME | End: 2025-02-06
Payer: MEDICARE

## 2025-02-03 VITALS
SYSTOLIC BLOOD PRESSURE: 108 MMHG | HEART RATE: 74 BPM | RESPIRATION RATE: 16 BRPM | DIASTOLIC BLOOD PRESSURE: 82 MMHG | BODY MASS INDEX: 26.88 KG/M2 | OXYGEN SATURATION: 99 % | WEIGHT: 147 LBS

## 2025-02-03 DIAGNOSIS — M81.0 AGE-RELATED OSTEOPOROSIS WITHOUT CURRENT PATHOLOGICAL FRACTURE: ICD-10-CM

## 2025-02-03 DIAGNOSIS — R53.83 OTHER FATIGUE: Primary | ICD-10-CM

## 2025-02-03 DIAGNOSIS — R79.89 OTHER SPECIFIED ABNORMAL FINDINGS OF BLOOD CHEMISTRY: ICD-10-CM

## 2025-02-03 DIAGNOSIS — F11.99 OPIOID USE, UNSPECIFIED WITH UNSPECIFIED OPIOID-INDUCED DISORDER (HCC): ICD-10-CM

## 2025-02-03 DIAGNOSIS — R53.83 OTHER FATIGUE: ICD-10-CM

## 2025-02-03 LAB
25(OH)D3 SERPL-MCNC: 34.4 NG/ML (ref 30–100)
ALBUMIN SERPL-MCNC: 3.8 G/DL (ref 3.4–5)
ALBUMIN/GLOB SERPL: 1.3 (ref 0.8–1.7)
ALP SERPL-CCNC: 123 U/L (ref 45–117)
ALT SERPL-CCNC: 22 U/L (ref 13–56)
ANION GAP SERPL CALC-SCNC: 5 MMOL/L (ref 3–18)
AST SERPL-CCNC: 17 U/L (ref 10–38)
BASOPHILS # BLD: 0.02 K/UL (ref 0–0.1)
BASOPHILS NFR BLD: 0.3 % (ref 0–2)
BILIRUB SERPL-MCNC: 0.4 MG/DL (ref 0.2–1)
BUN SERPL-MCNC: 15 MG/DL (ref 7–18)
BUN/CREAT SERPL: 21 (ref 12–20)
CALCIUM SERPL-MCNC: 9 MG/DL (ref 8.5–10.1)
CHLORIDE SERPL-SCNC: 103 MMOL/L (ref 100–111)
CHOLEST SERPL-MCNC: 168 MG/DL
CO2 SERPL-SCNC: 29 MMOL/L (ref 21–32)
CREAT SERPL-MCNC: 0.7 MG/DL (ref 0.6–1.3)
DIFFERENTIAL METHOD BLD: ABNORMAL
EOSINOPHIL # BLD: 0.12 K/UL (ref 0–0.4)
EOSINOPHIL NFR BLD: 2 % (ref 0–5)
ERYTHROCYTE [DISTWIDTH] IN BLOOD BY AUTOMATED COUNT: 13.8 % (ref 11.6–14.5)
GLOBULIN SER CALC-MCNC: 3 G/DL (ref 2–4)
GLUCOSE SERPL-MCNC: 116 MG/DL (ref 74–99)
HCT VFR BLD AUTO: 38.7 % (ref 35–45)
HDLC SERPL-MCNC: 65 MG/DL (ref 40–60)
HDLC SERPL: 2.6 (ref 0–5)
HGB BLD-MCNC: 12.2 G/DL (ref 12–16)
IMM GRANULOCYTES # BLD AUTO: 0.02 K/UL (ref 0–0.04)
IMM GRANULOCYTES NFR BLD AUTO: 0.3 % (ref 0–0.5)
LDLC SERPL CALC-MCNC: 90.4 MG/DL (ref 0–100)
LIPID PANEL: ABNORMAL
LYMPHOCYTES # BLD: 2.09 K/UL (ref 0.9–3.6)
LYMPHOCYTES NFR BLD: 35.4 % (ref 21–52)
MCH RBC QN AUTO: 30.4 PG (ref 24–34)
MCHC RBC AUTO-ENTMCNC: 31.5 G/DL (ref 31–37)
MCV RBC AUTO: 96.5 FL (ref 78–100)
MONOCYTES # BLD: 0.45 K/UL (ref 0.05–1.2)
MONOCYTES NFR BLD: 7.6 % (ref 3–10)
NEUTS SEG # BLD: 3.21 K/UL (ref 1.8–8)
NEUTS SEG NFR BLD: 54.4 % (ref 40–73)
NRBC # BLD: 0 K/UL (ref 0–0.01)
NRBC BLD-RTO: 0 PER 100 WBC
PLATELET # BLD AUTO: 376 K/UL (ref 135–420)
PMV BLD AUTO: 9.8 FL (ref 9.2–11.8)
POTASSIUM SERPL-SCNC: 3.9 MMOL/L (ref 3.5–5.5)
PROT SERPL-MCNC: 6.8 G/DL (ref 6.4–8.2)
RBC # BLD AUTO: 4.01 M/UL (ref 4.2–5.3)
SODIUM SERPL-SCNC: 137 MMOL/L (ref 136–145)
T4 FREE SERPL-MCNC: 0.9 NG/DL (ref 0.7–1.5)
TRIGL SERPL-MCNC: 63 MG/DL
TSH SERPL DL<=0.05 MIU/L-ACNC: 0.75 UIU/ML (ref 0.36–3.74)
VLDLC SERPL CALC-MCNC: 12.6 MG/DL
WBC # BLD AUTO: 5.9 K/UL (ref 4.6–13.2)

## 2025-02-03 PROCEDURE — G8417 CALC BMI ABV UP PARAM F/U: HCPCS | Performed by: STUDENT IN AN ORGANIZED HEALTH CARE EDUCATION/TRAINING PROGRAM

## 2025-02-03 PROCEDURE — 84443 ASSAY THYROID STIM HORMONE: CPT

## 2025-02-03 PROCEDURE — 1159F MED LIST DOCD IN RCRD: CPT | Performed by: STUDENT IN AN ORGANIZED HEALTH CARE EDUCATION/TRAINING PROGRAM

## 2025-02-03 PROCEDURE — G2211 COMPLEX E/M VISIT ADD ON: HCPCS | Performed by: STUDENT IN AN ORGANIZED HEALTH CARE EDUCATION/TRAINING PROGRAM

## 2025-02-03 PROCEDURE — 97116 GAIT TRAINING THERAPY: CPT

## 2025-02-03 PROCEDURE — 85025 COMPLETE CBC W/AUTO DIFF WBC: CPT

## 2025-02-03 PROCEDURE — 84439 ASSAY OF FREE THYROXINE: CPT

## 2025-02-03 PROCEDURE — 99214 OFFICE O/P EST MOD 30 MIN: CPT | Performed by: STUDENT IN AN ORGANIZED HEALTH CARE EDUCATION/TRAINING PROGRAM

## 2025-02-03 PROCEDURE — 3074F SYST BP LT 130 MM HG: CPT | Performed by: STUDENT IN AN ORGANIZED HEALTH CARE EDUCATION/TRAINING PROGRAM

## 2025-02-03 PROCEDURE — 97110 THERAPEUTIC EXERCISES: CPT

## 2025-02-03 PROCEDURE — G8399 PT W/DXA RESULTS DOCUMENT: HCPCS | Performed by: STUDENT IN AN ORGANIZED HEALTH CARE EDUCATION/TRAINING PROGRAM

## 2025-02-03 PROCEDURE — 97112 NEUROMUSCULAR REEDUCATION: CPT

## 2025-02-03 PROCEDURE — 82306 VITAMIN D 25 HYDROXY: CPT

## 2025-02-03 PROCEDURE — G8427 DOCREV CUR MEDS BY ELIG CLIN: HCPCS | Performed by: STUDENT IN AN ORGANIZED HEALTH CARE EDUCATION/TRAINING PROGRAM

## 2025-02-03 PROCEDURE — 3079F DIAST BP 80-89 MM HG: CPT | Performed by: STUDENT IN AN ORGANIZED HEALTH CARE EDUCATION/TRAINING PROGRAM

## 2025-02-03 PROCEDURE — 80061 LIPID PANEL: CPT

## 2025-02-03 PROCEDURE — 80053 COMPREHEN METABOLIC PANEL: CPT

## 2025-02-03 PROCEDURE — 1123F ACP DISCUSS/DSCN MKR DOCD: CPT | Performed by: STUDENT IN AN ORGANIZED HEALTH CARE EDUCATION/TRAINING PROGRAM

## 2025-02-03 PROCEDURE — 1036F TOBACCO NON-USER: CPT | Performed by: STUDENT IN AN ORGANIZED HEALTH CARE EDUCATION/TRAINING PROGRAM

## 2025-02-03 PROCEDURE — 1090F PRES/ABSN URINE INCON ASSESS: CPT | Performed by: STUDENT IN AN ORGANIZED HEALTH CARE EDUCATION/TRAINING PROGRAM

## 2025-02-03 NOTE — PROGRESS NOTES
Salma Lopez (: 1950) is a 74 y.o. female, established patient, here for evaluation of the following chief complaint(s):  Fatigue (Patient states she has been extremely tired lately.)        Assessment & Plan  1. Fatigue: Likely multifactorial.  Could be attributed to methocarbamol, Lyrica, Ambien, and Dilaudid. Hemoglobin 9.3 post-surgery suggests anemia.  - Order updated blood work  - Order thyroid function tests  -Reduce polypharmacy as able    2. Foot drop.  - Scheduled for a brace  - Engaging in balance exercises with improved walking speed    3. Hypotension: Reported BP 96/50 with dizziness.  - Advised to stop lisinopril  - Stay hydrated  - Increase salt intake    4. Osteopenia.  - Follow up with Dr. Iyer for a bone density scan    5.  Chronic opioid use  -Managed by pain management.  Secondary to several considerable arthritic conditions.  No acute signs today requiring change in plan other than wean as able as above.    Follow-up  - Follow-up appointment on the 10th for establishment of care visit    Results  - Laboratory Studies:    - Hemoglobin: 9.3 (post-surgery)    - Imaging (CT scan):    - Old compression fracture    - Disc protrusions at T9-10, T10-11    - Lumbar extensive postsurgical changes  1. Other fatigue  -     CBC with Auto Differential; Future  -     Comprehensive Metabolic Panel; Future  -     TSH + Free T4 Panel; Future  -     Lipid Panel; Future  -     Vitamin D 25 Hydroxy; Future  2. Opioid use, unspecified with unspecified opioid-induced disorder (HCC)  3. Other specified abnormal findings of blood chemistry  -     Lipid Panel; Future  4. Age-related osteoporosis without current pathological fracture  -     Vitamin D 25 Hydroxy; Future    Return for your previously scheduled next visit.         Subjective   History of Present Illness  The patient presents with fatigue, foot drop, hypotension, and osteopenia.    She reports severe fatigue requiring naps and has discontinued

## 2025-02-03 NOTE — PROGRESS NOTES
PHYSICAL / OCCUPATIONAL THERAPY - DAILY TREATMENT NOTE     Patient Name: Salma Lopez    Date: 2/3/2025    : 1950  Insurance: Payor: MEDICARE / Plan: MEDICARE PART A AND B / Product Type: *No Product type* /      Patient  verified Yes     Visit #   Current / Total 10 24   Time   In / Out 1112 1155   Pain   In / Out 0 0   Subjective Functional Status/Changes: Walking better    Changes to:  Allergies, Med Hx, Sx Hx?   no       TREATMENT AREA =  Other abnormalities of gait and mobility [R26.89]    If an interpreting service is utilized for treatment of this patient, the contents of this document represent the material reviewed with the patient via the .     OBJECTIVE      Therapeutic Procedures:  Tx Min Billable or 1:1 Min (if diff from Tx Min) Procedure, Rationale, Specifics   18  88726 Therapeutic Exercise (timed):  increase ROM, strength, coordination, balance, and proprioception to improve patient's ability to progress to PLOF and address remaining functional goals. (see flow sheet as applicable)    Details if applicable:       15  88026 Neuromuscular Re-Education (timed):  improve balance, coordination, kinesthetic sense, posture, core stability and proprioception to improve patient's ability to develop conscious control of individual muscles and awareness of position of extremities in order to progress to PLOF and address remaining functional goals. (see flow sheet as applicable)    Details if applicable:     10  37530 Gait Training (timed):    150 feet with SPC (assistive device) over even surfaces with CGA level of assist. Cuing for left arm swing.  To improve safety and dynamic movement with household/community ambulation.  (see flow sheet as applicable)     Details if applicable:  TB for right foot to improve DF, SPC in right hand          Details if applicable:            Details if applicable:     43  [facility] Totals Reminder: bill using total billable min of TIMED therapeutic procedures

## 2025-02-07 ENCOUNTER — HOSPITAL ENCOUNTER (OUTPATIENT)
Facility: HOSPITAL | Age: 75
Setting detail: RECURRING SERIES
Discharge: HOME | End: 2025-02-10
Payer: MEDICARE

## 2025-02-07 PROCEDURE — 97112 NEUROMUSCULAR REEDUCATION: CPT

## 2025-02-07 PROCEDURE — 97530 THERAPEUTIC ACTIVITIES: CPT

## 2025-02-07 PROCEDURE — 97110 THERAPEUTIC EXERCISES: CPT

## 2025-02-07 NOTE — PROGRESS NOTES
PHYSICAL / OCCUPATIONAL THERAPY - DAILY TREATMENT NOTE     Patient Name: Salma Lopez    Date: 2025    : 1950  Insurance: Payor: MEDICARE / Plan: MEDICARE PART A AND B / Product Type: *No Product type* /      Patient  verified Yes     Visit #   Current / Total 11 24   Time   In / Out 1105 1200   Pain   In / Out 0 0   Subjective Functional Status/Changes: Good. I got new shoes    Changes to:  Allergies, Med Hx, Sx Hx?   no       TREATMENT AREA =  Other abnormalities of gait and mobility [R26.89]    If an interpreting service is utilized for treatment of this patient, the contents of this document represent the material reviewed with the patient via the .     OBJECTIVE    Therapeutic Procedures:  Tx Min Billable or 1:1 Min (if diff from Tx Min) Procedure, Rationale, Specifics   25  69944 Therapeutic Exercise (timed):  increase ROM, strength, coordination, balance, and proprioception to improve patient's ability to progress to PLOF and address remaining functional goals. (see flow sheet as applicable)    Details if applicable:       15  67484 Neuromuscular Re-Education (timed):  improve balance, coordination, kinesthetic sense, posture, core stability and proprioception to improve patient's ability to develop conscious control of individual muscles and awareness of position of extremities in order to progress to PLOF and address remaining functional goals. (see flow sheet as applicable)    Details if applicable:     15  36592 Therapeutic Activity (timed):  use of dynamic activities replicating functional movements to increase ROM, strength, coordination, balance, and proprioception in order to improve patient's ability to progress to PLOF and address remaining functional goals.  (see flow sheet as applicable)     Details if applicable:           Details if applicable:            Details if applicable:     55  [facility] Totals Reminder: bill using total billable min of TIMED therapeutic procedures

## 2025-02-10 ENCOUNTER — HOSPITAL ENCOUNTER (OUTPATIENT)
Facility: HOSPITAL | Age: 75
Setting detail: RECURRING SERIES
Discharge: HOME | End: 2025-02-13
Payer: MEDICARE

## 2025-02-10 ENCOUNTER — OFFICE VISIT (OUTPATIENT)
Facility: CLINIC | Age: 75
End: 2025-02-10

## 2025-02-10 VITALS
WEIGHT: 147 LBS | HEART RATE: 71 BPM | RESPIRATION RATE: 16 BRPM | OXYGEN SATURATION: 98 % | BODY MASS INDEX: 27.05 KG/M2 | HEIGHT: 62 IN | SYSTOLIC BLOOD PRESSURE: 124 MMHG | DIASTOLIC BLOOD PRESSURE: 76 MMHG | TEMPERATURE: 98 F

## 2025-02-10 DIAGNOSIS — M06.9 RHEUMATOID ARTHRITIS, INVOLVING UNSPECIFIED SITE, UNSPECIFIED WHETHER RHEUMATOID FACTOR PRESENT (HCC): ICD-10-CM

## 2025-02-10 DIAGNOSIS — N30.21 CHRONIC CYSTITIS WITH HEMATURIA: ICD-10-CM

## 2025-02-10 DIAGNOSIS — M96.1 FAILED BACK SYNDROME, LUMBOSACRAL: ICD-10-CM

## 2025-02-10 DIAGNOSIS — Z00.00 ENCOUNTER FOR MEDICAL EXAMINATION TO ESTABLISH CARE: Primary | ICD-10-CM

## 2025-02-10 DIAGNOSIS — F41.9 ANXIETY: ICD-10-CM

## 2025-02-10 PROBLEM — S22.009A CLOSED FRACTURE OF THORACIC SPINE WITHOUT SPINAL CORD LESION (HCC): Status: RESOLVED | Noted: 2018-10-05 | Resolved: 2025-02-10

## 2025-02-10 PROCEDURE — 97530 THERAPEUTIC ACTIVITIES: CPT

## 2025-02-10 PROCEDURE — 97110 THERAPEUTIC EXERCISES: CPT

## 2025-02-10 PROCEDURE — 97116 GAIT TRAINING THERAPY: CPT

## 2025-02-10 RX ORDER — METHOCARBAMOL 750 MG/1
750 TABLET, FILM COATED ORAL 3 TIMES DAILY
COMMUNITY
Start: 2025-01-20

## 2025-02-10 NOTE — PROGRESS NOTES
Salma Lopez (: 1950) is a 74 y.o. female, new patient, here for evaluation of the following chief complaint(s):  Establish Care        Assessment & Plan  1.  Establish care-previously saw Dr. Epps for chronic care and myself acute problems as needed.  Given Dr. Epps's limited schedule, she desires to establish me moving forward.  Extensive time was spent today reviewing history, feeding, mass, consolidating medication list, reviewing potential medication interactions, charting.      2. Rheumatoid Arthritis: Stable. Diagnosed at 33. Not taking Xeljanz, off methotrexate due to adverse reactions. Avoiding NSAIDs until April per Dr. Iyer.  - Continue current management  - Follow up with rheumatologist    3. Anxiety: Stable.  - No changes needed    4. Anemia: Resolved. Hemoglobin increased from 9.3 to 12.2. Improvement attributed to increased protein intake and recovery from recent operation.  - Continue current diet  - Monitor hemoglobin every 6 months    5. Cystitis: History of cystitis with occasional hematuria. Bladder biopsy benign.  - No further urology consultations desired  -May benefit from medication such as amitriptyline in the future for her Pain and chronic cystitis    5.  Back pain: Tapering off methocarbamol and Lyrica. Weaning off Dilaudid.    - Continue gradual tapering to avoid withdrawal  - PRN medications remain as needed  - Continue follow-up with Dr. Iyer    Follow-up  - Follow up in 6 months      Results  - Laboratory Studies:    - Vitamin D level normal    - Cholesterol excellent    - Thyroid level good    - Kidney and liver function good    - Hemoglobin: 12.2    - Imaging:    - CT scan showed moderate spinal stenosis and foraminal stenosis at T10, T11  1. Encounter for medical examination to establish care  2. Chronic cystitis with hematuria  3. Rheumatoid arthritis, involving unspecified site, unspecified whether rheumatoid factor present (HCC)  4. Anxiety  5. Failed back

## 2025-02-10 NOTE — PROGRESS NOTES
2/21/2025 11:10 AM Sola Smalls PT [facility]   2/26/2025 11:10 AM Sola Smalls PT [facility]   2/28/2025 11:10 AM Sola Smalls PT [facility]   6/10/2025 10:30 AM Rosemary Stokes, APRN - NP [facility]

## 2025-02-18 ENCOUNTER — TELEPHONE (OUTPATIENT)
Facility: CLINIC | Age: 75
End: 2025-02-18

## 2025-02-19 ENCOUNTER — APPOINTMENT (OUTPATIENT)
Facility: HOSPITAL | Age: 75
End: 2025-02-19
Payer: MEDICARE

## 2025-02-21 ENCOUNTER — HOSPITAL ENCOUNTER (OUTPATIENT)
Facility: HOSPITAL | Age: 75
Setting detail: RECURRING SERIES
Discharge: HOME | End: 2025-02-24
Payer: MEDICARE

## 2025-02-21 PROCEDURE — 97110 THERAPEUTIC EXERCISES: CPT

## 2025-02-21 PROCEDURE — 97112 NEUROMUSCULAR REEDUCATION: CPT

## 2025-02-21 PROCEDURE — 97116 GAIT TRAINING THERAPY: CPT

## 2025-02-21 NOTE — PROGRESS NOTES
PHYSICAL / OCCUPATIONAL THERAPY - DAILY TREATMENT NOTE     Patient Name: Salma Lopez    Date: 2025    : 1950  Insurance: Payor: MEDICARE / Plan: MEDICARE PART A AND B / Product Type: *No Product type* /      Patient  verified Yes     Visit #   Current / Total 13 24   Time   In / Out 1112 1203   Pain   In / Out 0 0   Subjective Functional Status/Changes: Good.    Changes to:  Allergies, Med Hx, Sx Hx?   no       TREATMENT AREA =  Other abnormalities of gait and mobility [R26.89]    If an interpreting service is utilized for treatment of this patient, the contents of this document represent the material reviewed with the patient via the .     OBJECTIVE      Therapeutic Procedures:  Tx Min Billable or 1:1 Min (if diff from Tx Min) Procedure, Rationale, Specifics   31  27464 Therapeutic Exercise (timed):  increase ROM, strength, coordination, balance, and proprioception to improve patient's ability to progress to PLOF and address remaining functional goals. (see flow sheet as applicable)    Details if applicable:       10 10   68425 Neuromuscular Re-Education (timed):  improve balance, coordination, kinesthetic sense, posture, core stability and proprioception to improve patient's ability to develop conscious control of individual muscles and awareness of position of extremities in order to progress to PLOF and address remaining functional goals. (see flow sheet as applicable)  Details if applicable:     10 10 28917 Gait Training (timed):    100ft x3 feet with SPC (assistive device) over even surfaces with CGA to supervision level of assist. Cuing for increasing gait speed and increasing right step length to decrease left lumbar pain.  To improve safety and dynamic movement with household/community ambulation.  (see flow sheet as applicable)     Details if applicable:  with right AFO with improved right foot clearance, gait speed and less falls risk with pt reporting more confidence in

## 2025-02-26 ENCOUNTER — HOSPITAL ENCOUNTER (OUTPATIENT)
Facility: HOSPITAL | Age: 75
Setting detail: RECURRING SERIES
Discharge: HOME | End: 2025-03-01
Payer: MEDICARE

## 2025-02-26 PROCEDURE — 97110 THERAPEUTIC EXERCISES: CPT

## 2025-02-26 PROCEDURE — 97140 MANUAL THERAPY 1/> REGIONS: CPT

## 2025-02-26 PROCEDURE — 97530 THERAPEUTIC ACTIVITIES: CPT

## 2025-02-26 NOTE — PROGRESS NOTES
[facility] [place]  [address], [address]  [place]  [address]  Phone: [phone]      Fax:  [phone]    Progress Note  Patient name: Salma Lopez Start of Care: 24   Referral source: Jorge L Epps MD : 1950    Medical Diagnosis: Other abnormalities of gait and mobility [R26.89]  Payor: MEDICARE / Plan: MEDICARE PART A AND B / Product Type: *No Product type* /  Onset Date:DOS 24    Treatment Diagnosis:  R26.89  Abnormalities of gait and mobility     Prior Hospitalization: see medical history Provider#: [id number]   Comorbidities: Musculoskeletal disorders and Complications related to surgery     Prior Level of Function: walking without AD    Reporting Period: 25-25    Visits from Start of Care: 14    Missed Visits: 0      If an interpreting service is utilized for treatment of this patient, the contents of this document represent the material reviewed with the patient via the .     Goals/Measure of Progress: To be achieved in 4 weeks:    Short Term Goals: STG- To be accomplished in 12 visits  1.  Pt will be independent with HEP to encourage prophylaxis.  Eval: HEP dispensed   PN compliance per pt report goal MET 25     2. Pt will be able to complete TUG < 25 seconds with LRAD to demonstrate improved functional LE strength and endurance for walking  Eval: 32 sec with RW   PN: 13 seconds RW goal MET 25     3. Pt will be able to complete 8 reps in 30 sec without UE assist to demonstrate improved functional LE strength for transfers in home  Eval: 9 reps with WBOS, UE assist   PN:13 reps with UE assist however significant improvement in control and form progressing well 25     Long Term Goals: LTG- To be accomplished in 24 visits:  1.  Pt will report max pain 2/10 to complete ADLs with increased ease.   Eval: 10/10 max pain  PN:0/10 max pain goal MET 25     2.  Pt will be able to TUG in 15 seconds or less without AD to

## 2025-02-26 NOTE — PROGRESS NOTES
PHYSICAL / OCCUPATIONAL THERAPY - DAILY TREATMENT NOTE     Patient Name: Salma Lopez    Date: 2025    : 1950  Insurance: Payor: MEDICARE / Plan: MEDICARE PART A AND B / Product Type: *No Product type* /      Patient  verified Yes     Visit #   Current / Total 14 24   Time   In / Out 1111 1155   Pain   In / Out 0 0   Subjective Functional Status/Changes: My left hip is hurting today after reaching for something. The AFO is great but I am really sore in the calf. I had the follow up with Dr. Iyer and he said it will take melania to heal. He was impressed with the AFO.    Changes to:  Allergies, Med Hx, Sx Hx?   yes - celebrex for RA        TREATMENT AREA =  Other abnormalities of gait and mobility [R26.89]    If an interpreting service is utilized for treatment of this patient, the contents of this document represent the material reviewed with the patient via the .     OBJECTIVE  Therapeutic Procedures:  Tx Min Billable or 1:1 Min (if diff from Tx Min) Procedure, Rationale, Specifics   26  57597 Therapeutic Exercise (timed):  increase ROM, strength, coordination, balance, and proprioception to improve patient's ability to progress to PLOF and address remaining functional goals. (see flow sheet as applicable)    Details if applicable:       8  15341 Manual Therapy (timed):  decrease pain, increase ROM, and increase tissue extensibility to improve patient's ability to progress to PLOF and address remaining functional goals.  The manual therapy interventions were performed at a separate and distinct time from the therapeutic activities interventions . Details: STM to left gluts and piriformis in right s/l     Details if applicable:     10  80038 Therapeutic Activity (timed):  use of dynamic activities replicating functional movements to increase ROM, strength, coordination, balance, and proprioception in order to improve patient's ability to progress to PLOF and address remaining functional

## 2025-02-28 ENCOUNTER — HOSPITAL ENCOUNTER (OUTPATIENT)
Facility: HOSPITAL | Age: 75
Setting detail: RECURRING SERIES
End: 2025-02-28
Payer: MEDICARE

## 2025-02-28 PROCEDURE — 97140 MANUAL THERAPY 1/> REGIONS: CPT

## 2025-02-28 PROCEDURE — 97110 THERAPEUTIC EXERCISES: CPT

## 2025-02-28 PROCEDURE — 97116 GAIT TRAINING THERAPY: CPT

## 2025-02-28 NOTE — PROGRESS NOTES
PHYSICAL / OCCUPATIONAL THERAPY - DAILY TREATMENT NOTE     Patient Name: Salma Lopez    Date: 2025    : 1950  Insurance: Payor: MEDICARE / Plan: MEDICARE PART A AND B / Product Type: *No Product type* /      Patient  verified Yes     Visit #   Current / Total 15 24   Time   In / Out 1119 1200   Pain   In / Out 0 0   Subjective Functional Status/Changes: Left hip is sore.    Changes to:  Allergies, Med Hx, Sx Hx?   no       TREATMENT AREA =  Other abnormalities of gait and mobility [R26.89]    If an interpreting service is utilized for treatment of this patient, the contents of this document represent the material reviewed with the patient via the .     OBJECTIVE      Therapeutic Procedures:  Tx Min Billable or 1:1 Min (if diff from Tx Min) Procedure, Rationale, Specifics   21  47730 Therapeutic Exercise (timed):  increase ROM, strength, coordination, balance, and proprioception to improve patient's ability to progress to PLOF and address remaining functional goals. (see flow sheet as applicable)    Details if applicable:       10  28354 Manual Therapy (timed):  decrease pain, increase ROM, and increase tissue extensibility to improve patient's ability to progress to PLOF and address remaining functional goals.  The manual therapy interventions were performed at a separate and distinct time from the therapeutic activities interventions . Details: STM to left hip in right s/l    Details if applicable:     10  30877 Gait Training (timed):    200 feet with SPC (assistive device) over even surfaces with supervision level of assist. Cuing for increasing step length and right arm swing.  To improve safety and dynamic movement with household/community ambulation.  (see flow sheet as applicable)     Details if applicable:           Details if applicable:            Details if applicable:     41  [facility] Totals Reminder: bill using total billable min of TIMED therapeutic procedures (example: do not

## 2025-03-05 ENCOUNTER — HOSPITAL ENCOUNTER (OUTPATIENT)
Facility: HOSPITAL | Age: 75
Setting detail: RECURRING SERIES
Discharge: HOME | End: 2025-03-08
Payer: MEDICARE

## 2025-03-05 PROCEDURE — 97530 THERAPEUTIC ACTIVITIES: CPT

## 2025-03-05 PROCEDURE — 97140 MANUAL THERAPY 1/> REGIONS: CPT

## 2025-03-05 PROCEDURE — 97112 NEUROMUSCULAR REEDUCATION: CPT

## 2025-03-05 PROCEDURE — 97110 THERAPEUTIC EXERCISES: CPT

## 2025-03-05 NOTE — PROGRESS NOTES
patient's ability to progress to PLOF and address remaining functional goals.  The manual therapy interventions were performed at a separate and distinct time from the therapeutic activities interventions . Details: STM to left QL and piriformis in right s/l     Details if applicable:            Details if applicable:     56  [facility] Totals Reminder: bill using total billable min of TIMED therapeutic procedures (example: do not include dry needle or estim unattended, both untimed codes, in totals to left)  8-22 min = 1 unit; 23-37 min = 2 units; 38-52 min = 3 units; 53-67 min = 4 units; 68-82 min = 5 units   Total Total     TOTAL TREATMENT TIME:        56     [x]  Patient Education billed concurrently with other procedures   [x] Review HEP    [] Progressed/Changed HEP, detail:    [] Other detail:       Objective Information/Functional Measures/Assessment    Pt reporting less left hip pain with adding in slight heel lift on left side and PT noting decreased right trunk rotation in stance and symmetrical illiac crests. Pt continues to demonstrate left lateral lean with gait without UE support with adding in hip 3 way, lateral steps, and step ups to improve strength with SLS with gait. Initiated DL leg press today as well to improve quad strength for STS transfers with no pain only fatigue reported. Pt gaining more confidence with walking with SPC however slow rj still noted.     Patient will continue to benefit from skilled PT / OT services to modify and progress therapeutic interventions, analyze and address functional mobility deficits, analyze and address strength deficits, analyze and cue for proper movement patterns, analyze and modify for postural abnormalities, analyze and address imbalance/dizziness, and instruct in home and community integration to address functional deficits and attain remaining goals.    Progress toward goals / Updated goals:  []  See Progress Note/Recertification    Short Term Goals:

## 2025-03-07 ENCOUNTER — HOSPITAL ENCOUNTER (OUTPATIENT)
Facility: HOSPITAL | Age: 75
Setting detail: RECURRING SERIES
Discharge: HOME | End: 2025-03-10
Payer: MEDICARE

## 2025-03-07 PROCEDURE — 97110 THERAPEUTIC EXERCISES: CPT

## 2025-03-07 PROCEDURE — 97112 NEUROMUSCULAR REEDUCATION: CPT

## 2025-03-07 PROCEDURE — 97140 MANUAL THERAPY 1/> REGIONS: CPT

## 2025-03-07 NOTE — PROGRESS NOTES
PHYSICAL / OCCUPATIONAL THERAPY - DAILY TREATMENT NOTE     Patient Name: Salma Lopez    Date: 3/7/2025    : 1950  Insurance: Payor: MEDICARE / Plan: MEDICARE PART A AND B / Product Type: *No Product type* /      Patient  verified Yes     Visit #   Current / Total 17 24   Time   In / Out 1029 1125   Pain   In / Out 0 0   Subjective Functional Status/Changes: My back is really sore from washing the dogs the other day. I probably shouldn't have done that.    Changes to:  Allergies, Med Hx, Sx Hx?   no       TREATMENT AREA =  Other abnormalities of gait and mobility [R26.89]    If an interpreting service is utilized for treatment of this patient, the contents of this document represent the material reviewed with the patient via the .     OBJECTIVE    Therapeutic Procedures:  Tx Min Billable or 1:1 Min (if diff from Tx Min) Procedure, Rationale, Specifics   31  99710 Therapeutic Exercise (timed):  increase ROM, strength, coordination, balance, and proprioception to improve patient's ability to progress to PLOF and address remaining functional goals. (see flow sheet as applicable)    Details if applicable:       15  14167 Neuromuscular Re-Education (timed):  improve balance, coordination, kinesthetic sense, posture, core stability and proprioception to improve patient's ability to develop conscious control of individual muscles and awareness of position of extremities in order to progress to PLOF and address remaining functional goals. (see flow sheet as applicable)    Details if applicable:     10  96532 Manual Therapy (timed):  decrease pain, increase ROM, and increase tissue extensibility to improve patient's ability to progress to PLOF and address remaining functional goals.  The manual therapy interventions were performed at a separate and distinct time from the therapeutic activities interventions . Details: STM to left gluts and piriformis in right s/l       Details if applicable:

## 2025-03-12 ENCOUNTER — HOSPITAL ENCOUNTER (OUTPATIENT)
Facility: HOSPITAL | Age: 75
Setting detail: RECURRING SERIES
Discharge: HOME | End: 2025-03-15
Payer: MEDICARE

## 2025-03-12 PROCEDURE — 97140 MANUAL THERAPY 1/> REGIONS: CPT

## 2025-03-12 PROCEDURE — 97530 THERAPEUTIC ACTIVITIES: CPT

## 2025-03-12 PROCEDURE — 97110 THERAPEUTIC EXERCISES: CPT

## 2025-03-12 PROCEDURE — 97112 NEUROMUSCULAR REEDUCATION: CPT

## 2025-03-12 NOTE — PROGRESS NOTES
PHYSICAL / OCCUPATIONAL THERAPY - DAILY TREATMENT NOTE     Patient Name: Salma Lopez    Date: 3/12/2025    : 1950  Insurance: Payor: MEDICARE / Plan: MEDICARE PART A AND B / Product Type: *No Product type* /      Patient  verified Yes     Visit #   Current / Total 18 24   Time   In / Out 1015 1115   Pain   In / Out 0 0   Subjective Functional Status/Changes: The muscle in my left bottom is really sore. I was able to lift my left leg into the car without using my arms for the first time.    Changes to:  Allergies, Med Hx, Sx Hx?   no       TREATMENT AREA =  Other abnormalities of gait and mobility [R26.89]    If an interpreting service is utilized for treatment of this patient, the contents of this document represent the material reviewed with the patient via the .     OBJECTIVE      Therapeutic Procedures:  Tx Min Billable or 1:1 Min (if diff from Tx Min) Procedure, Rationale, Specifics   15  97587 Therapeutic Exercise (timed):  increase ROM, strength, coordination, balance, and proprioception to improve patient's ability to progress to PLOF and address remaining functional goals. (see flow sheet as applicable)    Details if applicable:       25  65612 Neuromuscular Re-Education (timed):  improve balance, coordination, kinesthetic sense, posture, core stability and proprioception to improve patient's ability to develop conscious control of individual muscles and awareness of position of extremities in order to progress to PLOF and address remaining functional goals. (see flow sheet as applicable)    Details if applicable:     10  09854 Therapeutic Activity (timed):  use of dynamic activities replicating functional movements to increase ROM, strength, coordination, balance, and proprioception in order to improve patient's ability to progress to PLOF and address remaining functional goals.  (see flow sheet as applicable)     Details if applicable:     10  89597 Manual Therapy (timed):  decrease

## 2025-03-14 ENCOUNTER — HOSPITAL ENCOUNTER (OUTPATIENT)
Facility: HOSPITAL | Age: 75
Setting detail: RECURRING SERIES
Discharge: HOME | End: 2025-03-17
Payer: MEDICARE

## 2025-03-14 PROCEDURE — 97530 THERAPEUTIC ACTIVITIES: CPT

## 2025-03-14 PROCEDURE — 97110 THERAPEUTIC EXERCISES: CPT

## 2025-03-14 PROCEDURE — 97112 NEUROMUSCULAR REEDUCATION: CPT

## 2025-03-14 NOTE — PROGRESS NOTES
significant improvement in control and form progressing well 2/26/25  Current: 11 reps without UE from 21in progressing well 3/7/25     Long Term Goals: LTG- To be accomplished in 24 visits:  1.  Pt will report max pain 2/10 to complete ADLs with increased ease.   Eval: 10/10 max pain  PN:0/10 max pain goal MET 2/26/25     2.  Pt will be able to TUG in 15 seconds or less without AD to be able to increase independence with walking short distances at home.  Eval:32 seconds with RW   PN 13 sec, 12sec, 10sec  ( average 11.7 sec) goal MET  2/26/25     3.  Pt will be able to complete SLS bilaterally > 3 seconds to demonstrate improved stability for walking without AD  Eval:unable right, 1 sec left   PN: 1 sec right, 1-2 sec left progressing 2/26/25  Current: 1-2 sec bilaterally progressing 3/12/25     4.  Pt will be able to complete 12 reps with 30 sec STS test without UE assist to demonstrate improved functional LE strength for transfers  Eval:9 reps with WBOS, UE assist   PN: 13 reps with UE assist however significant improvement in control and form progressing well 2/26/25     5. Pt will be able to walk > 150ft without AD to return to PLOF  Eval: short distances with RW   PN: able to walk > 150ft with SPC with CGA progressing 2/26/25  Current: entered clinic with SPC today with mod I almost MET 3/14/25    PLAN  Yes  Continue plan of care  []  Upgrade activities as tolerated  []  Discharge due to :  []  Other:    Sola Smalls PT    3/14/2025    8:53 AM    Future Appointments   Date Time Provider Department Center   3/14/2025 10:30 AM Sola Smalls PT [facility]   3/17/2025 11:10 AM Sola Smalls PT [facility]   3/19/2025 10:30 AM Bakari Fernández PT [facility]   3/26/2025 11:10 AM Sola Smalls PT [facility]   3/28/2025 11:10 AM Sola Smalls PT [facility]   5/19/2025  9:00 AM Justine Johnson MD [facility]   6/10/2025 10:30 AM Rosemary Stokes, ZITA - NP [facility]

## 2025-03-17 ENCOUNTER — HOSPITAL ENCOUNTER (OUTPATIENT)
Facility: HOSPITAL | Age: 75
Setting detail: RECURRING SERIES
Discharge: HOME | End: 2025-03-20
Payer: MEDICARE

## 2025-03-17 PROCEDURE — 97112 NEUROMUSCULAR REEDUCATION: CPT

## 2025-03-17 PROCEDURE — 97530 THERAPEUTIC ACTIVITIES: CPT

## 2025-03-17 PROCEDURE — 97110 THERAPEUTIC EXERCISES: CPT

## 2025-03-17 NOTE — PROGRESS NOTES
PHYSICAL / OCCUPATIONAL THERAPY - DAILY TREATMENT NOTE     Patient Name: Salma Lopez    Date: 3/17/2025    : 1950  Insurance: Payor: MEDICARE / Plan: MEDICARE PART A AND B / Product Type: *No Product type* /      Patient  verified Yes     Visit #   Current / Total 20 24   Time   In / Out 1119 1200   Pain   In / Out 0 0   Subjective Functional Status/Changes: Let hip is sore but no pain. I have been using the SPC unless I go somewhere crowded then I use my walker.    Changes to:  Allergies, Med Hx, Sx Hx?   no       TREATMENT AREA =  Other abnormalities of gait and mobility [R26.89]    If an interpreting service is utilized for treatment of this patient, the contents of this document represent the material reviewed with the patient via the .     OBJECTIVE      Therapeutic Procedures:  Tx Min Billable or 1:1 Min (if diff from Tx Min) Procedure, Rationale, Specifics   11  91246 Therapeutic Exercise (timed):  increase ROM, strength, coordination, balance, and proprioception to improve patient's ability to progress to PLOF and address remaining functional goals. (see flow sheet as applicable)    Details if applicable:       20  76240 Neuromuscular Re-Education (timed):  improve balance, coordination, kinesthetic sense, posture, core stability and proprioception to improve patient's ability to develop conscious control of individual muscles and awareness of position of extremities in order to progress to PLOF and address remaining functional goals. (see flow sheet as applicable)    Details if applicable:     10  78803 Therapeutic Activity (timed):  use of dynamic activities replicating functional movements to increase ROM, strength, coordination, balance, and proprioception in order to improve patient's ability to progress to PLOF and address remaining functional goals.  (see flow sheet as applicable)     Details if applicable:           Details if applicable:            Details if applicable:

## 2025-03-18 DIAGNOSIS — R60.0 BILATERAL EDEMA OF LOWER EXTREMITY: ICD-10-CM

## 2025-03-18 RX ORDER — FUROSEMIDE 20 MG/1
20 TABLET ORAL DAILY
Qty: 90 TABLET | Refills: 3 | OUTPATIENT
Start: 2025-03-18

## 2025-03-19 ENCOUNTER — HOSPITAL ENCOUNTER (OUTPATIENT)
Facility: HOSPITAL | Age: 75
Setting detail: RECURRING SERIES
Discharge: HOME | End: 2025-03-22
Payer: MEDICARE

## 2025-03-19 PROCEDURE — 97110 THERAPEUTIC EXERCISES: CPT

## 2025-03-19 PROCEDURE — 97112 NEUROMUSCULAR REEDUCATION: CPT

## 2025-03-19 PROCEDURE — 97530 THERAPEUTIC ACTIVITIES: CPT

## 2025-03-19 NOTE — PROGRESS NOTES
3/28/2025 11:10 AM Sola Smalls, PT [facility]   5/19/2025  9:00 AM Justine Johnson MD [facility]   6/10/2025 10:30 AM Rosemary Stokes, APRN - NP [facility] AM[facility]

## 2025-03-26 ENCOUNTER — HOSPITAL ENCOUNTER (OUTPATIENT)
Facility: HOSPITAL | Age: 75
Setting detail: RECURRING SERIES
Discharge: HOME | End: 2025-03-29
Payer: MEDICARE

## 2025-03-26 PROCEDURE — 97140 MANUAL THERAPY 1/> REGIONS: CPT

## 2025-03-26 PROCEDURE — 97112 NEUROMUSCULAR REEDUCATION: CPT

## 2025-03-26 PROCEDURE — 97110 THERAPEUTIC EXERCISES: CPT

## 2025-03-26 NOTE — PROGRESS NOTES
PHYSICAL / OCCUPATIONAL THERAPY - DAILY TREATMENT NOTE     Patient Name: Salma Lopez    Date: 3/26/2025    : 1950  Insurance: Payor: MEDICARE / Plan: MEDICARE PART A AND B / Product Type: *No Product type* /      Patient  verified Yes     Visit #   Current / Total 22 24   Time   In / Out 1108 1155   Pain   In / Out 0 0   Subjective Functional Status/Changes: Bottom is sore from sitting down too hard on the chair at home practicing the exercise.   Changes to:  Allergies, Med Hx, Sx Hx?   no       TREATMENT AREA =  Other abnormalities of gait and mobility [R26.89]    If an interpreting service is utilized for treatment of this patient, the contents of this document represent the material reviewed with the patient via the .     OBJECTIVE    Therapeutic Procedures:  Tx Min Billable or 1:1 Min (if diff from Tx Min) Procedure, Rationale, Specifics   17 12 70943 Therapeutic Exercise (timed):  increase ROM, strength, coordination, balance, and proprioception to improve patient's ability to progress to PLOF and address remaining functional goals. (see flow sheet as applicable)    Details if applicable:       20  00021 Neuromuscular Re-Education (timed):  improve balance, coordination, kinesthetic sense, posture, core stability and proprioception to improve patient's ability to develop conscious control of individual muscles and awareness of position of extremities in order to progress to PLOF and address remaining functional goals. (see flow sheet as applicable)    Details if applicable:     10 10 05144 Manual Therapy (timed):  decrease pain, increase ROM, and increase tissue extensibility to improve patient's ability to progress to PLOF and address remaining functional goals.  The manual therapy interventions were performed at a separate and distinct time from the therapeutic activities interventions . Details: STM to left gluts and piriformis in s/l      Details if applicable:           Details if 
DGI score of 15/24 and very challenged with SLS. Pt reporting continued difficulty with stairs at home and curb with unable to complete without UE assist due to LOB.  Pt would benefit from continued skilled PT services to address remaining unmet goals and above deficits to allow pt to complete ADLs with increased ease and less pain.     Patient would benefit from the continuation of skilled rehab interventions for functional progress to achieving above stated clinically significant goals.    New Certification Period: 3/26/2025 - 06/24/25        Sola Smalls, PT 3/26/2025 8:26 AM    ________________________________________________________________________  I certify that the above Therapy Services are being furnished while the patient is under my care. I agree with the treatment plan and certify that this therapy is necessary.    [] I have read the above and request that my patient continue as recommended.  [] I have read the above report and request that my patient continue therapy with the following changes/special instructions: _______________________________________  [] I have read the above report and request that my patient be discharged from therapy    Physician's Signature:____________________ Date:_________ TIME:________    ** Signature, Date and Time must be completed for valid certification **    Please sign and return to:     [facility] at [address]  [place]  [address]  Phone: [phone]      Fax:  [phone]

## 2025-03-28 ENCOUNTER — HOSPITAL ENCOUNTER (OUTPATIENT)
Facility: HOSPITAL | Age: 75
Setting detail: RECURRING SERIES
Discharge: HOME | End: 2025-03-31
Payer: MEDICARE

## 2025-03-28 PROCEDURE — 97530 THERAPEUTIC ACTIVITIES: CPT

## 2025-03-28 PROCEDURE — 97140 MANUAL THERAPY 1/> REGIONS: CPT

## 2025-03-28 PROCEDURE — 97112 NEUROMUSCULAR REEDUCATION: CPT

## 2025-03-28 PROCEDURE — 97110 THERAPEUTIC EXERCISES: CPT

## 2025-03-28 NOTE — PROGRESS NOTES
PHYSICAL / OCCUPATIONAL THERAPY - DAILY TREATMENT NOTE     Patient Name: Salma Lopez    Date: 3/28/2025    : 1950  Insurance: Payor: MEDICARE / Plan: MEDICARE PART A AND B / Product Type: *No Product type* /      Patient  verified Yes     Visit #   Current / Total 23 48   Time   In / Out 10:15 11:10   Pain   In / Out 0 0   Subjective Functional Status/Changes: Pt reports that she is having continued left hip pain upon arrival today.   Changes to:  Allergies, Med Hx, Sx Hx?   no       TREATMENT AREA =  Other abnormalities of gait and mobility [R26.89]    If an interpreting service is utilized for treatment of this patient, the contents of this document represent the material reviewed with the patient via the .       Therapeutic Procedures:  Tx Min Billable or 1:1 Min (if diff from Tx Min) Procedure, Rationale, Specifics   15  63435 Therapeutic Exercise (timed):  increase ROM, strength, coordination, balance, and proprioception to improve patient's ability to progress to PLOF and address remaining functional goals. (see flow sheet as applicable)    Details if applicable:       15  18385 Therapeutic Activity (timed):  use of dynamic activities replicating functional movements to increase ROM, strength, coordination, balance, and proprioception in order to improve patient's ability to progress to PLOF and address remaining functional goals.  (see flow sheet as applicable)    Details if applicable:     15  64219 Neuromuscular Re-Education (timed):  improve balance, coordination, kinesthetic sense, posture, core stability and proprioception to improve patient's ability to develop conscious control of individual muscles and awareness of position of extremities in order to progress to PLOF and address remaining functional goals. (see flow sheet as applicable)     Details if applicable:     10  01439 Manual Therapy (timed):  decrease pain to improve patient's ability to progress to PLOF and address

## 2025-04-02 ENCOUNTER — HOSPITAL ENCOUNTER (OUTPATIENT)
Facility: HOSPITAL | Age: 75
Setting detail: RECURRING SERIES
Discharge: HOME | End: 2025-04-05
Payer: MEDICARE

## 2025-04-02 PROCEDURE — 97140 MANUAL THERAPY 1/> REGIONS: CPT

## 2025-04-02 PROCEDURE — 97110 THERAPEUTIC EXERCISES: CPT

## 2025-04-02 PROCEDURE — 97112 NEUROMUSCULAR REEDUCATION: CPT

## 2025-04-02 NOTE — PROGRESS NOTES
PHYSICAL / OCCUPATIONAL THERAPY - DAILY TREATMENT NOTE     Patient Name: Salma Lopez    Date: 2025    : 1950  Insurance: Payor: MEDICARE / Plan: MEDICARE PART A AND B / Product Type: *No Product type* /      Patient  verified Yes     Visit #   Current / Total 24 48   Time   In / Out 949 1036   Pain   In / Out 1 0   Subjective Functional Status/Changes: [place] the [place]. My left hip is still hurting   Changes to:  Allergies, Med Hx, Sx Hx?   no       TREATMENT AREA =  Other abnormalities of gait and mobility [R26.89]    If an interpreting service is utilized for treatment of this patient, the contents of this document represent the material reviewed with the patient via the .     OBJECTIVE    Therapeutic Procedures:  Tx Min Billable or 1:1 Min (if diff from Tx Min) Procedure, Rationale, Specifics   17 10 46089 Therapeutic Exercise (timed):  increase ROM, strength, coordination, balance, and proprioception to improve patient's ability to progress to PLOF and address remaining functional goals. (see flow sheet as applicable)    Details if applicable:       20  80775 Neuromuscular Re-Education (timed):  improve balance, coordination, kinesthetic sense, posture, core stability and proprioception to improve patient's ability to develop conscious control of individual muscles and awareness of position of extremities in order to progress to PLOF and address remaining functional goals. (see flow sheet as applicable)    Details if applicable:     10 10 94207 Manual Therapy (timed):  decrease pain, increase ROM, and increase tissue extensibility to improve patient's ability to progress to PLOF and address remaining functional goals.  The manual therapy interventions were performed at a separate and distinct time from the therapeutic activities interventions . Details: STM to left paraspinals and superior gluts in right s/l       Details if applicable:

## 2025-04-04 ENCOUNTER — PATIENT MESSAGE (OUTPATIENT)
Facility: CLINIC | Age: 75
End: 2025-04-04

## 2025-04-04 ENCOUNTER — HOSPITAL ENCOUNTER (OUTPATIENT)
Facility: HOSPITAL | Age: 75
Setting detail: RECURRING SERIES
Discharge: HOME | End: 2025-04-07
Payer: MEDICARE

## 2025-04-04 DIAGNOSIS — G47.00 INSOMNIA, UNSPECIFIED TYPE: Primary | ICD-10-CM

## 2025-04-04 PROCEDURE — 97110 THERAPEUTIC EXERCISES: CPT

## 2025-04-04 PROCEDURE — 97112 NEUROMUSCULAR REEDUCATION: CPT

## 2025-04-04 PROCEDURE — 97530 THERAPEUTIC ACTIVITIES: CPT

## 2025-04-04 NOTE — PROGRESS NOTES
procedures (example: do not include dry needle or estim unattended, both untimed codes, in totals to left)  8-22 min = 1 unit; 23-37 min = 2 units; 38-52 min = 3 units; 53-67 min = 4 units; 68-82 min = 5 units   Total Total     TOTAL TREATMENT TIME:        48     Charge Capture    [x]  Patient Education billed concurrently with other procedures   [x] Review HEP    [] Progressed/Changed HEP, detail:    [] Other detail:       Objective Information/Functional Measures/Assessment    Pt reporting increased right foot pain today with focusing on ankle and foot mobility exercises. Most challenged with BAPS CW/CCW movements with decreased coordination and fatigue noted. Added zhou disc sitting extension with TB to improve upright posture as well.     Patient will continue to benefit from skilled PT / OT services to modify and progress therapeutic interventions, analyze and address functional mobility deficits, analyze and address ROM deficits, analyze and address strength deficits, analyze and cue for proper movement patterns, analyze and modify for postural abnormalities, analyze and address imbalance/dizziness, and instruct in home and community integration to address functional deficits and attain remaining goals.    Progress toward goals / Updated goals:  []  See Progress Note/Recertification    Short Term Goals: STG- To be accomplished in 12 visits  1.  Pt will be independent with HEP to encourage prophylaxis.  Eval: HEP dispensed   PN compliance per pt report goal MET 1/29/25     2. Pt will be able to complete TUG < 25 seconds with LRAD to demonstrate improved functional LE strength and endurance for walking  Eval: 32 sec with RW   PN: 13 seconds RW goal MET 1/29/25     3. Pt will be able to complete 8 reps in 30 sec without UE assist to demonstrate improved functional LE strength for transfers in home  Eval: 9 reps with WBOS, UE assist   PN:15 reps without UE assist goal MET 3/26/25     Long Term Goals: LTG- To be

## 2025-04-09 ENCOUNTER — HOSPITAL ENCOUNTER (OUTPATIENT)
Facility: HOSPITAL | Age: 75
Setting detail: RECURRING SERIES
Discharge: HOME | End: 2025-04-12
Payer: MEDICARE

## 2025-04-09 PROCEDURE — 97116 GAIT TRAINING THERAPY: CPT

## 2025-04-09 PROCEDURE — 97110 THERAPEUTIC EXERCISES: CPT

## 2025-04-09 PROCEDURE — 97112 NEUROMUSCULAR REEDUCATION: CPT

## 2025-04-09 NOTE — PROGRESS NOTES
Details if applicable:           Details if applicable:            Details if applicable:     42   BC Totals Reminder: bill using total billable min of TIMED therapeutic procedures (example: do not include dry needle or estim unattended, both untimed codes, in totals to left)  8-22 min = 1 unit; 23-37 min = 2 units; 38-52 min = 3 units; 53-67 min = 4 units; 68-82 min = 5 units   Total Total     TOTAL TREATMENT TIME:        42     Charge Capture    [x]  Patient Education billed concurrently with other procedures   [x] Review HEP    [] Progressed/Changed HEP, detail:    [] Other detail:       Objective Information/Functional Measures/Assessment    CGA needed with floor to stand tranfers with able to complete with use of UE on table and chair. Education to have chair in central place in home to able to assist getting out floor. Cues needed to increase number of steps versus step length to improve gait speed without LOB. Plan to work on small step ups without UE assist to improve balance with curbs     Patient will continue to benefit from skilled PT / OT services to modify and progress therapeutic interventions, analyze and address functional mobility deficits, analyze and address strength deficits, analyze and cue for proper movement patterns, analyze and modify for postural abnormalities, analyze and address imbalance/dizziness, and instruct in home and community integration to address functional deficits and attain remaining goals.    Progress toward goals / Updated goals:  []  See Progress Note/Recertification    Short Term Goals: STG- To be accomplished in 12 visits  1.  Pt will be independent with HEP to encourage prophylaxis.  Eval: HEP dispensed   PN compliance per pt report goal MET 1/29/25     2. Pt will be able to complete TUG < 25 seconds with LRAD to demonstrate improved functional LE strength and endurance for walking  Eval: 32 sec with RW   PN: 13 seconds RW goal MET 1/29/25     3. Pt will be able

## 2025-04-11 ENCOUNTER — TELEPHONE (OUTPATIENT)
Facility: HOSPITAL | Age: 75
End: 2025-04-11

## 2025-04-11 ENCOUNTER — HOSPITAL ENCOUNTER (OUTPATIENT)
Facility: HOSPITAL | Age: 75
Setting detail: RECURRING SERIES
Discharge: HOME | End: 2025-04-14
Payer: MEDICARE

## 2025-04-11 PROCEDURE — 97116 GAIT TRAINING THERAPY: CPT

## 2025-04-11 PROCEDURE — 97140 MANUAL THERAPY 1/> REGIONS: CPT

## 2025-04-11 PROCEDURE — 97110 THERAPEUTIC EXERCISES: CPT

## 2025-04-11 NOTE — TELEPHONE ENCOUNTER
Pt came into the office to request another message be sent to  as she has not received a response yet. Please review and advise as needed.

## 2025-04-11 NOTE — PROGRESS NOTES
PHYSICAL / OCCUPATIONAL THERAPY - DAILY TREATMENT NOTE     Patient Name: Salma Lopez    Date: 2025    : 1950  Insurance: Payor: MEDICARE / Plan: MEDICARE PART A AND B / Product Type: *No Product type* /      Patient  verified Yes     Visit #   Current / Total 27 48   Time   In / Out 948 1034   Pain   In / Out 0 0   Subjective Functional Status/Changes: Hip is sore. I realized that I can't feel my bottom when I am sitting because I was sitting on a dog toy and didn't know.    Changes to:  Allergies, Med Hx, Sx Hx?   no       TREATMENT AREA =  Other abnormalities of gait and mobility [R26.89]    If an interpreting service is utilized for treatment of this patient, the contents of this document represent the material reviewed with the patient via the .     OBJECTIVE    Therapeutic Procedures:  Tx Min Billable or 1:1 Min (if diff from Tx Min) Procedure, Rationale, Specifics   16 10 20151 Therapeutic Exercise (timed):  increase ROM, strength, coordination, balance, and proprioception to improve patient's ability to progress to PLOF and address remaining functional goals. (see flow sheet as applicable)    Details if applicable:       10 10 58848 Manual Therapy (timed):  decrease pain, increase ROM, and increase tissue extensibility to improve patient's ability to progress to PLOF and address remaining functional goals.  The manual therapy interventions were performed at a separate and distinct time from the therapeutic activities interventions . Details: STM to left gluts     Details if applicable:      16879 Gait Training (timed):    50x10 feet with no AD (assistive device) over even surfaces with CGA level of assist. Cuing for increasing step frequency, arm swing anterior/posterior.  To improve safety and dynamic movement with household/community ambulation.  (see flow sheet as applicable)     Details if applicable:  sidestepping, backwards walking         Details if applicable:

## 2025-04-12 RX ORDER — ESTRADIOL 10 UG/1
10 TABLET VAGINAL
Qty: 24 TABLET | Refills: 3 | Status: SHIPPED | OUTPATIENT
Start: 2025-04-14

## 2025-04-12 RX ORDER — ZOLPIDEM TARTRATE 5 MG/1
5 TABLET ORAL NIGHTLY PRN
Qty: 30 TABLET | Refills: 0 | Status: SHIPPED | OUTPATIENT
Start: 2025-04-12 | End: 2025-07-11

## 2025-04-14 ENCOUNTER — APPOINTMENT (OUTPATIENT)
Facility: HOSPITAL | Age: 75
End: 2025-04-14
Payer: MEDICARE

## 2025-04-16 ENCOUNTER — APPOINTMENT (OUTPATIENT)
Facility: HOSPITAL | Age: 75
End: 2025-04-16
Payer: MEDICARE

## 2025-04-17 RX ORDER — ESTRADIOL 10 UG/1
TABLET VAGINAL
Qty: 24 TABLET | Refills: 3 | OUTPATIENT
Start: 2025-04-17

## 2025-04-18 ENCOUNTER — HOSPITAL ENCOUNTER (OUTPATIENT)
Facility: HOSPITAL | Age: 75
Setting detail: RECURRING SERIES
Discharge: HOME | End: 2025-04-21
Payer: MEDICARE

## 2025-04-18 PROCEDURE — 97110 THERAPEUTIC EXERCISES: CPT

## 2025-04-18 PROCEDURE — 97112 NEUROMUSCULAR REEDUCATION: CPT

## 2025-04-18 PROCEDURE — 97530 THERAPEUTIC ACTIVITIES: CPT

## 2025-04-18 NOTE — PROGRESS NOTES
PHYSICAL / OCCUPATIONAL THERAPY - DAILY TREATMENT NOTE     Patient Name: Salma Lopez    Date: 2025    : 1950  Insurance: Payor: MEDICARE / Plan: MEDICARE PART A AND B / Product Type: *No Product type* /      Patient  verified Yes     Visit #   Current / Total 28 48   Time   In / Out 9:28 10:21   Pain   In / Out 0 0   Subjective Functional Status/Changes: Reports that she still has some concerns about her balance.   Changes to:  Allergies, Med Hx, Sx Hx?   no       TREATMENT AREA =  Other abnormalities of gait and mobility [R26.89]    If an interpreting service is utilized for treatment of this patient, the contents of this document represent the material reviewed with the patient via the .         Therapeutic Procedures:  Tx Min Billable or 1:1 Min (if diff from Tx Min) Procedure, Rationale, Specifics   15  57603 Therapeutic Exercise (timed):  increase ROM, strength, coordination, balance, and proprioception to improve patient's ability to progress to PLOF and address remaining functional goals. (see flow sheet as applicable)    Details if applicable:       30  48927 Therapeutic Activity (timed):  use of dynamic activities replicating functional movements to increase ROM, strength, coordination, balance, and proprioception in order to improve patient's ability to progress to PLOF and address remaining functional goals.  (see flow sheet as applicable)    Details if applicable:     8  41139 Neuromuscular Re-Education (timed):  improve balance, coordination, kinesthetic sense, posture, core stability and proprioception to improve patient's ability to develop conscious control of individual muscles and awareness of position of extremities in order to progress to PLOF and address remaining functional goals. (see flow sheet as applicable)     Details if applicable:           Details if applicable:            Details if applicable:     53  [facility] Totals Reminder: bill using total billable min of

## 2025-04-22 ENCOUNTER — HOSPITAL ENCOUNTER (OUTPATIENT)
Facility: HOSPITAL | Age: 75
Setting detail: RECURRING SERIES
Discharge: HOME | End: 2025-04-25
Payer: MEDICARE

## 2025-04-22 PROCEDURE — 97530 THERAPEUTIC ACTIVITIES: CPT

## 2025-04-22 PROCEDURE — 97112 NEUROMUSCULAR REEDUCATION: CPT

## 2025-04-22 NOTE — PROGRESS NOTES
PHYSICAL / OCCUPATIONAL THERAPY - DAILY TREATMENT NOTE     Patient Name: Salma Lopez    Date: 2025    : 1950  Insurance: Payor: MEDICARE / Plan: MEDICARE PART A AND B / Product Type: *No Product type* /      Patient  verified Yes     Visit #   Current / Total 29 48   Time   In / Out 11:10 11:50   Pain   In / Out 0 0   Subjective Functional Status/Changes: Pt reports that she feels better ab more stable compared to her last session. States that she had a fall in her kitchen on some liquid on  but she is ok.   Changes to:  Allergies, Med Hx, Sx Hx?   no       TREATMENT AREA =  Other abnormalities of gait and mobility [R26.89]    If an interpreting service is utilized for treatment of this patient, the contents of this document represent the material reviewed with the patient via the .     Therapeutic Procedures:  Tx Min Billable or 1:1 Min (if diff from Tx Min) Procedure, Rationale, Specifics   30  [id number] Therapeutic Activity (timed):  use of dynamic activities replicating functional movements to increase ROM, strength, coordination, balance, and proprioception in order to improve patient's ability to progress to PLOF and address remaining functional goals.  (see flow sheet as applicable)    Details if applicable:       10  63841 Neuromuscular Re-Education (timed):  improve balance, coordination, kinesthetic sense, posture, core stability and proprioception to improve patient's ability to develop conscious control of individual muscles and awareness of position of extremities in order to progress to PLOF and address remaining functional goals. (see flow sheet as applicable)    Details if applicable:            Details if applicable:           Details if applicable:            Details if applicable:     40  [facility] Totals Reminder: bill using total billable min of TIMED therapeutic procedures (example: do not include dry needle or estim unattended, both untimed codes, in totals to

## 2025-04-22 NOTE — PROGRESS NOTES
[facility]  [address], [address]  [place]  [address]  Phone: [phone]      Fax:  [phone]    Progress Note  Patient name: Salma Lopez Start of Care: 2024   Referral source: Jorge L Epps MD : 1950    Medical Diagnosis: Other abnormalities of gait and mobility   Payor: MEDICARE / Plan: MEDICARE PART A AND B / Product Type: *No Product type* /  Onset Date:DOS 24     Treatment Diagnosis: Other abnormalities of gait and mobility    Prior Hospitalization: see medical history Provider#: [id number]   Comorbidities: Musculoskeletal disorders and Complications related to surgery   Prior Level of Function:see medical history     Reporting Period: 3/26/2025-2025    Visits from Start of Care: 29    Missed Visits: 0      If an interpreting service is utilized for treatment of this patient, the contents of this document represent the material reviewed with the patient via the .     Short Term Goals: STG- To be accomplished in 12 visits  1.  Pt will be independent with HEP to encourage prophylaxis.  Eval: HEP dispensed   PN compliance per pt report goal MET 25     2. Pt will be able to complete TUG < 25 seconds with LRAD to demonstrate improved functional LE strength and endurance for walking  Eval: 32 sec with RW   PN: 13 seconds RW goal MET 25     3. Pt will be able to complete 8 reps in 30 sec without UE assist to demonstrate improved functional LE strength for transfers in home  Eval: 9 reps with WBOS, UE assist   PN:15 reps without UE assist goal MET 3/26/25     Long Term Goals: LTG- To be accomplished in 24 visits:  1.  Pt will report max pain 2/10 to complete ADLs with increased ease.   Eval: 10/10 max pain  PN:0/10 max pain goal MET 25     2.  Pt will be able to TUG in 15 seconds or less without AD to be able to increase independence with walking short distances at home.  Eval:32 seconds with RW   PN 13 sec, 12sec,

## 2025-04-23 ENCOUNTER — APPOINTMENT (OUTPATIENT)
Facility: HOSPITAL | Age: 75
End: 2025-04-23
Payer: MEDICARE

## 2025-04-25 ENCOUNTER — APPOINTMENT (OUTPATIENT)
Facility: HOSPITAL | Age: 75
End: 2025-04-25
Payer: MEDICARE

## 2025-04-29 ENCOUNTER — HOSPITAL ENCOUNTER (OUTPATIENT)
Facility: HOSPITAL | Age: 75
Setting detail: RECURRING SERIES
Discharge: HOME | End: 2025-05-02
Payer: MEDICARE

## 2025-04-29 PROCEDURE — 97140 MANUAL THERAPY 1/> REGIONS: CPT

## 2025-04-29 PROCEDURE — 97112 NEUROMUSCULAR REEDUCATION: CPT

## 2025-04-29 PROCEDURE — 97110 THERAPEUTIC EXERCISES: CPT

## 2025-04-29 NOTE — PROGRESS NOTES
PHYSICAL / OCCUPATIONAL THERAPY - DAILY TREATMENT NOTE     Patient Name: Salma Lopez    Date: 2025    : 1950  Insurance: Payor: MEDICARE / Plan: MEDICARE PART A AND B / Product Type: *No Product type* /      Patient  verified Yes     Visit #   Current / Total 30 48   Time   In / Out 1110 1152   Pain   In / Out 0 0   Subjective Functional Status/Changes: I am tired after the boat show for 4 days. My legs are swollen from the standing/walking and 3 hour car drive    Changes to:  Allergies, Med Hx, Sx Hx?   no       TREATMENT AREA =  Other abnormalities of gait and mobility [R26.89]    If an interpreting service is utilized for treatment of this patient, the contents of this document represent the material reviewed with the patient via the .     OBJECTIVE    Therapeutic Procedures:  Tx Min Billable or 1:1 Min (if diff from Tx Min) Procedure, Rationale, Specifics   12  42139 Therapeutic Exercise (timed):  increase ROM, strength, coordination, balance, and proprioception to improve patient's ability to progress to PLOF and address remaining functional goals. (see flow sheet as applicable)    Details if applicable:       20  01587 Neuromuscular Re-Education (timed):  improve balance, coordination, kinesthetic sense, posture, core stability and proprioception to improve patient's ability to develop conscious control of individual muscles and awareness of position of extremities in order to progress to PLOF and address remaining functional goals. (see flow sheet as applicable)    Details if applicable:     10  74129 Manual Therapy (timed):  decrease pain, increase ROM, and increase tissue extensibility to improve patient's ability to progress to PLOF and address remaining functional goals.  The manual therapy interventions were performed at a separate and distinct time from the therapeutic activities interventions . Details: STM to left gluts and piriformis in right s/l       Details if

## 2025-04-30 ENCOUNTER — APPOINTMENT (OUTPATIENT)
Facility: HOSPITAL | Age: 75
End: 2025-04-30
Payer: MEDICARE

## 2025-05-01 ENCOUNTER — HOSPITAL ENCOUNTER (OUTPATIENT)
Facility: HOSPITAL | Age: 75
Setting detail: RECURRING SERIES
Discharge: HOME | End: 2025-05-04
Payer: MEDICARE

## 2025-05-01 PROCEDURE — 97110 THERAPEUTIC EXERCISES: CPT

## 2025-05-01 PROCEDURE — 97140 MANUAL THERAPY 1/> REGIONS: CPT

## 2025-05-01 PROCEDURE — 97112 NEUROMUSCULAR REEDUCATION: CPT

## 2025-05-01 NOTE — PROGRESS NOTES
PHYSICAL / OCCUPATIONAL THERAPY - DAILY TREATMENT NOTE     Patient Name: Salma Lopez    Date: 2025    : 1950  Insurance: Payor: MEDICARE / Plan: MEDICARE PART A AND B / Product Type: *No Product type* /      Patient  verified Yes     Visit #   Current / Total 31 48   Time   In / Out 1107 1152   Pain   In / Out 0 0   Subjective Functional Status/Changes: My foot is really swollen, can we do stuff on the table today. I see the podiatrist next week   Changes to:  Allergies, Med Hx, Sx Hx?   no       TREATMENT AREA =  Other abnormalities of gait and mobility [R26.89]    If an interpreting service is utilized for treatment of this patient, the contents of this document represent the material reviewed with the patient via the .     OBJECTIVE        Therapeutic Procedures:  Tx Min Billable or 1:1 Min (if diff from Tx Min) Procedure, Rationale, Specifics   15  52903 Therapeutic Exercise (timed):  increase ROM, strength, coordination, balance, and proprioception to improve patient's ability to progress to PLOF and address remaining functional goals. (see flow sheet as applicable)    Details if applicable:       20  92785 Neuromuscular Re-Education (timed):  improve balance, coordination, kinesthetic sense, posture, core stability and proprioception to improve patient's ability to develop conscious control of individual muscles and awareness of position of extremities in order to progress to PLOF and address remaining functional goals. (see flow sheet as applicable)    Details if applicable:     10  82729 Manual Therapy (timed):  decrease pain, increase ROM, and increase tissue extensibility to improve patient's ability to progress to PLOF and address remaining functional goals.  The manual therapy interventions were performed at a separate and distinct time from the therapeutic activities interventions . Details: STM to left gluts and piriformis in right s/l       Details if applicable:

## 2025-05-05 ENCOUNTER — HOSPITAL ENCOUNTER (OUTPATIENT)
Facility: HOSPITAL | Age: 75
Setting detail: RECURRING SERIES
End: 2025-05-05
Payer: MEDICARE

## 2025-05-09 ENCOUNTER — HOSPITAL ENCOUNTER (OUTPATIENT)
Facility: HOSPITAL | Age: 75
Setting detail: RECURRING SERIES
Discharge: HOME | End: 2025-05-12
Payer: MEDICARE

## 2025-05-09 PROCEDURE — 97530 THERAPEUTIC ACTIVITIES: CPT

## 2025-05-09 PROCEDURE — 97110 THERAPEUTIC EXERCISES: CPT

## 2025-05-09 PROCEDURE — 97140 MANUAL THERAPY 1/> REGIONS: CPT

## 2025-05-09 NOTE — PROGRESS NOTES
in 30 sec without UE assist to demonstrate improved functional LE strength for transfers in home  Eval: 9 reps with WBOS, UE assist   PN:15 reps without UE assist goal MET 3/26/25     Long Term Goals: LTG- To be accomplished in 24 visits:  1.  Pt will report max pain 2/10 to complete ADLs with increased ease.   Eval: 10/10 max pain  PN:0/10 max pain goal MET 2/26/25     2.  Pt will be able to TUG in 15 seconds or less without AD to be able to increase independence with walking short distances at home.  Eval:32 seconds with RW   PN 13 sec, 12sec, 10sec  ( average 11.7 sec) goal MET  2/26/25     3.  Pt will be able to complete SLS bilaterally > 3 seconds to demonstrate improved stability for walking without AD  Eval:unable right, 1 sec left   PN 4/22/2025: 1 sec bilaterally, progressing but still unable to perform without UE assist  Current: unable due to increase foot pain regression 5/9/25     4.  Pt will be able to complete 12 reps with 30 sec STS test without UE assist to demonstrate improved functional LE strength for transfers  Eval:9 reps with WBOS, UE assist   PN: 15 reps without UE assist goal MET 3/26/25     5. Pt will be able to walk > 150ft without AD to return to PLOF  Eval: short distances with RW   Current: 5 laps without AD however LOB at times with SBA from therapist progressing 4/4/25  PN 4/22/225: Able to ambulate in clinic without use of AD, use of PT SBA while wearing gait belt. Some LOB observed, able to recover with PT assist on gait belt  Current: increased pain in right foot today however able to walk with rollator SPC with mod I progressing 5/1/25     Updated goals 3/26/25:  Pt will be able to manage curb without UE or second person assist to be able to walk into appointments independently  Current: able to complete hurdles with CGA and SPC with no LOB progressing 4/18/25  PN 4/22/2025: Able to step over hurdles with arms raised to help balance and navigates rails using BUEs on rails,

## 2025-05-12 ENCOUNTER — HOSPITAL ENCOUNTER (OUTPATIENT)
Facility: HOSPITAL | Age: 75
Setting detail: RECURRING SERIES
Discharge: HOME | End: 2025-05-15
Payer: MEDICARE

## 2025-05-12 ENCOUNTER — OFFICE VISIT (OUTPATIENT)
Facility: CLINIC | Age: 75
End: 2025-05-12
Payer: MEDICARE

## 2025-05-12 VITALS
RESPIRATION RATE: 14 BRPM | SYSTOLIC BLOOD PRESSURE: 130 MMHG | OXYGEN SATURATION: 100 % | HEART RATE: 66 BPM | TEMPERATURE: 97.6 F | DIASTOLIC BLOOD PRESSURE: 80 MMHG

## 2025-05-12 DIAGNOSIS — M79.671 RIGHT FOOT PAIN: Primary | ICD-10-CM

## 2025-05-12 DIAGNOSIS — M79.89 SWELLING OF RIGHT FOOT: ICD-10-CM

## 2025-05-12 PROCEDURE — 3079F DIAST BP 80-89 MM HG: CPT | Performed by: STUDENT IN AN ORGANIZED HEALTH CARE EDUCATION/TRAINING PROGRAM

## 2025-05-12 PROCEDURE — 1159F MED LIST DOCD IN RCRD: CPT | Performed by: STUDENT IN AN ORGANIZED HEALTH CARE EDUCATION/TRAINING PROGRAM

## 2025-05-12 PROCEDURE — 97110 THERAPEUTIC EXERCISES: CPT

## 2025-05-12 PROCEDURE — 97140 MANUAL THERAPY 1/> REGIONS: CPT

## 2025-05-12 PROCEDURE — G8399 PT W/DXA RESULTS DOCUMENT: HCPCS | Performed by: STUDENT IN AN ORGANIZED HEALTH CARE EDUCATION/TRAINING PROGRAM

## 2025-05-12 PROCEDURE — 3075F SYST BP GE 130 - 139MM HG: CPT | Performed by: STUDENT IN AN ORGANIZED HEALTH CARE EDUCATION/TRAINING PROGRAM

## 2025-05-12 PROCEDURE — G2211 COMPLEX E/M VISIT ADD ON: HCPCS | Performed by: STUDENT IN AN ORGANIZED HEALTH CARE EDUCATION/TRAINING PROGRAM

## 2025-05-12 PROCEDURE — G8417 CALC BMI ABV UP PARAM F/U: HCPCS | Performed by: STUDENT IN AN ORGANIZED HEALTH CARE EDUCATION/TRAINING PROGRAM

## 2025-05-12 PROCEDURE — 99213 OFFICE O/P EST LOW 20 MIN: CPT | Performed by: STUDENT IN AN ORGANIZED HEALTH CARE EDUCATION/TRAINING PROGRAM

## 2025-05-12 PROCEDURE — 1090F PRES/ABSN URINE INCON ASSESS: CPT | Performed by: STUDENT IN AN ORGANIZED HEALTH CARE EDUCATION/TRAINING PROGRAM

## 2025-05-12 PROCEDURE — 1036F TOBACCO NON-USER: CPT | Performed by: STUDENT IN AN ORGANIZED HEALTH CARE EDUCATION/TRAINING PROGRAM

## 2025-05-12 PROCEDURE — 1123F ACP DISCUSS/DSCN MKR DOCD: CPT | Performed by: STUDENT IN AN ORGANIZED HEALTH CARE EDUCATION/TRAINING PROGRAM

## 2025-05-12 PROCEDURE — G8427 DOCREV CUR MEDS BY ELIG CLIN: HCPCS | Performed by: STUDENT IN AN ORGANIZED HEALTH CARE EDUCATION/TRAINING PROGRAM

## 2025-05-12 PROCEDURE — 97530 THERAPEUTIC ACTIVITIES: CPT

## 2025-05-12 NOTE — PROGRESS NOTES
Have you been to the ER, urgent care clinic since your last visit?  Hospitalized since your last visit?   NO    Have you seen or consulted any other health care providers outside our system since your last visit?   YES - When: approximately 1 days ago.  Where and Why: Dr. Khan.

## 2025-05-12 NOTE — PROGRESS NOTES
PHYSICAL / OCCUPATIONAL THERAPY - DAILY TREATMENT NOTE     Patient Name: Salma Lopez    Date: 2025    : 1950  Insurance: Payor: MEDICARE / Plan: MEDICARE PART A AND B / Product Type: *No Product type* /      Patient  verified Yes     Visit #   Current / Total 33 48   Time   In / Out 957 1035   Pain   In / Out 0 0   Subjective Functional Status/Changes: My foot hurts. I see Jules today. Look I am walking    Changes to:  Allergies, Med Hx, Sx Hx?   no       TREATMENT AREA =  Other abnormalities of gait and mobility [R26.89]    If an interpreting service is utilized for treatment of this patient, the contents of this document represent the material reviewed with the patient via the .     OBJECTIVE      Therapeutic Procedures:  Tx Min Billable or 1:1 Min (if diff from Tx Min) Procedure, Rationale, Specifics   18  50858 Therapeutic Exercise (timed):  increase ROM, strength, coordination, balance, and proprioception to improve patient's ability to progress to PLOF and address remaining functional goals. (see flow sheet as applicable)    Details if applicable:       10  86995 Therapeutic Activity (timed):  use of dynamic activities replicating functional movements to increase ROM, strength, coordination, balance, and proprioception in order to improve patient's ability to progress to PLOF and address remaining functional goals.  (see flow sheet as applicable)    Details if applicable:     10  59036 Manual Therapy (timed):  decrease pain, increase ROM, and increase tissue extensibility to improve patient's ability to progress to PLOF and address remaining functional goals.  The manual therapy interventions were performed at a separate and distinct time from the therapeutic activities interventions . Details: STM to left piriformis in right s/l       Details if applicable:           Details if applicable:            Details if applicable:     38   [facility] Totals Reminder: bill using total billable

## 2025-05-12 NOTE — PROGRESS NOTES
Salma Lopez (: 1950) is a 75 y.o. female, established patient, here for evaluation of the following chief complaint(s):  Foot Pain (Right foot pain.Patient states when she sits for a length of time her right foot swells and turns bright red and it hurts.)        Assessment & Plan  1. Right foot swelling and pain: Chronic.  - Likely due to inflammation or venous insufficiency, rather than nerve damage.  - Discussed use of compression stockings covering toes to manage swelling.  - Referral to Dr. Westfall, orthopedic specialist, initiated for further evaluation and management.    Follow-up  - Referral to Dr. Cox, orthopedic specialist.    Results  - Imaging:    - X-ray of the right foot: Reviewed by Dr. Tran, no actionable findings  1. Right foot pain  -     [facility] - KEYANA Westfall MD, Orthopedic Surgery(General/Foot & Ankle), [place] ([facility])  2. Swelling of right foot  -     [facility] KEYANA Wells MD, Orthopedic Surgery(General/Foot & Ankle), [place] ([facility])    Return if symptoms worsen or fail to improve.         Subjective   History of Present Illness  The patient presents for evaluation of right foot swelling.    She reports altered sensation in her legs, requiring conscious effort to move them. She attributes right foot issues to nerve damage, resulting in leg swelling, especially when the foot is dependent during sitting. The swelling is accompanied by color change (red to blue) and coldness in the toes, along with soreness. She describes a crawling sensation on her legs with sharp pains. She has lost sensation in her feet, confirmed by pinprick test, but retains feeling in her heels, which she uses for positioning while walking. No history of clots in the leg. She experiences pain in the sole during ambulation and is concerned about worsening condition. Previous toe surgery by a podiatrist did not alleviate symptoms. Compression garment is beneficial. Currently on

## 2025-05-14 ENCOUNTER — HOSPITAL ENCOUNTER (OUTPATIENT)
Facility: HOSPITAL | Age: 75
Setting detail: RECURRING SERIES
Discharge: HOME | End: 2025-05-17
Payer: MEDICARE

## 2025-05-14 PROCEDURE — 97530 THERAPEUTIC ACTIVITIES: CPT

## 2025-05-14 PROCEDURE — 97110 THERAPEUTIC EXERCISES: CPT

## 2025-05-14 NOTE — PROGRESS NOTES
concurrently with other procedures   [x] Review HEP    [] Progressed/Changed HEP, detail:    [] Other detail:       Objective Information/Functional Measures/Assessment    Strengthening program continued today. Continues to arrive to clinic with no AD, and is able to maintain balance with PT SBA. Focus on core/BLE strengthening today with pt reporting no inc pain while performing. Expressed apprehension with standing exercises for with standing exercises due to continued foot pain.     Patient will continue to benefit from skilled PT / OT services to modify and progress therapeutic interventions, analyze and address functional mobility deficits, analyze and address ROM deficits, analyze and address strength deficits, and analyze and address soft tissue restrictions to address functional deficits and attain remaining goals.    Progress toward goals / Updated goals:  []  See Progress Note/Recertification    Short Term Goals: STG- To be accomplished in 12 visits  1.  Pt will be independent with HEP to encourage prophylaxis.  Eval: HEP dispensed   PN compliance per pt report goal MET 1/29/25     2. Pt will be able to complete TUG < 25 seconds with LRAD to demonstrate improved functional LE strength and endurance for walking  Eval: 32 sec with RW   PN: 13 seconds RW goal MET 1/29/25     3. Pt will be able to complete 8 reps in 30 sec without UE assist to demonstrate improved functional LE strength for transfers in home  Eval: 9 reps with WBOS, UE assist   PN:15 reps without UE assist goal MET 3/26/25     Long Term Goals: LTG- To be accomplished in 24 visits:  1.  Pt will report max pain 2/10 to complete ADLs with increased ease.   Eval: 10/10 max pain  PN:0/10 max pain goal MET 2/26/25     2.  Pt will be able to TUG in 15 seconds or less without AD to be able to increase independence with walking short distances at home.  Eval:32 seconds with RW   PN 13 sec, 12sec, 10sec  ( average 11.7 sec) goal MET  2/26/25     3.  Pt

## 2025-05-15 ENCOUNTER — TELEPHONE (OUTPATIENT)
Facility: CLINIC | Age: 75
End: 2025-05-15

## 2025-05-19 ENCOUNTER — OFFICE VISIT (OUTPATIENT)
Age: 75
End: 2025-05-19
Payer: MEDICARE

## 2025-05-19 VITALS
DIASTOLIC BLOOD PRESSURE: 80 MMHG | BODY MASS INDEX: 27.23 KG/M2 | RESPIRATION RATE: 18 BRPM | TEMPERATURE: 96.9 F | OXYGEN SATURATION: 100 % | HEIGHT: 62 IN | SYSTOLIC BLOOD PRESSURE: 132 MMHG | HEART RATE: 58 BPM | WEIGHT: 148 LBS

## 2025-05-19 DIAGNOSIS — M21.372 BILATERAL FOOT-DROP: ICD-10-CM

## 2025-05-19 DIAGNOSIS — M21.371 BILATERAL FOOT-DROP: ICD-10-CM

## 2025-05-19 DIAGNOSIS — G62.9 NEUROPATHY: ICD-10-CM

## 2025-05-19 DIAGNOSIS — M48.00 CENTRAL STENOSIS OF SPINAL CANAL: Primary | ICD-10-CM

## 2025-05-19 PROCEDURE — 1159F MED LIST DOCD IN RCRD: CPT | Performed by: STUDENT IN AN ORGANIZED HEALTH CARE EDUCATION/TRAINING PROGRAM

## 2025-05-19 PROCEDURE — 1125F AMNT PAIN NOTED PAIN PRSNT: CPT | Performed by: STUDENT IN AN ORGANIZED HEALTH CARE EDUCATION/TRAINING PROGRAM

## 2025-05-19 PROCEDURE — 99204 OFFICE O/P NEW MOD 45 MIN: CPT | Performed by: STUDENT IN AN ORGANIZED HEALTH CARE EDUCATION/TRAINING PROGRAM

## 2025-05-19 PROCEDURE — 1123F ACP DISCUSS/DSCN MKR DOCD: CPT | Performed by: STUDENT IN AN ORGANIZED HEALTH CARE EDUCATION/TRAINING PROGRAM

## 2025-05-19 PROCEDURE — 1036F TOBACCO NON-USER: CPT | Performed by: STUDENT IN AN ORGANIZED HEALTH CARE EDUCATION/TRAINING PROGRAM

## 2025-05-19 PROCEDURE — 1090F PRES/ABSN URINE INCON ASSESS: CPT | Performed by: STUDENT IN AN ORGANIZED HEALTH CARE EDUCATION/TRAINING PROGRAM

## 2025-05-19 PROCEDURE — G8427 DOCREV CUR MEDS BY ELIG CLIN: HCPCS | Performed by: STUDENT IN AN ORGANIZED HEALTH CARE EDUCATION/TRAINING PROGRAM

## 2025-05-19 PROCEDURE — 3079F DIAST BP 80-89 MM HG: CPT | Performed by: STUDENT IN AN ORGANIZED HEALTH CARE EDUCATION/TRAINING PROGRAM

## 2025-05-19 PROCEDURE — G8399 PT W/DXA RESULTS DOCUMENT: HCPCS | Performed by: STUDENT IN AN ORGANIZED HEALTH CARE EDUCATION/TRAINING PROGRAM

## 2025-05-19 PROCEDURE — 3075F SYST BP GE 130 - 139MM HG: CPT | Performed by: STUDENT IN AN ORGANIZED HEALTH CARE EDUCATION/TRAINING PROGRAM

## 2025-05-19 PROCEDURE — G8417 CALC BMI ABV UP PARAM F/U: HCPCS | Performed by: STUDENT IN AN ORGANIZED HEALTH CARE EDUCATION/TRAINING PROGRAM

## 2025-05-19 RX ORDER — HYDROMORPHONE HYDROCHLORIDE 2 MG/1
2 TABLET ORAL 3 TIMES DAILY PRN
COMMUNITY
Start: 2025-04-22

## 2025-05-19 ASSESSMENT — ENCOUNTER SYMPTOMS
SHORTNESS OF BREATH: 0
NAUSEA: 0
VOMITING: 0
COUGH: 0
BACK PAIN: 0

## 2025-05-19 NOTE — PROGRESS NOTES
surgery.     TECHNIQUE: A spiral acquisition of the lumbar spine was obtained. No intravenous  contrast was administered.     All CTs at this facility are performed using dose optimization techniques as  appropriate for performed exam which included adjustments in the MA and/or KV  according to the patient's size (including appropriate matching for the  site-specific examinations), or use of reconstruction technique.     FINDINGS: The patient is status post posterior spinal fixation with pedicle  screws at the T11, T12, L2, and L3 levels. The screws are connected to bilateral  metallic rods. There is a spacer in the L1-2 intervertebral disc space and bony  fusion of the L1 and L2 vertebral bodies. There also appears to be bony fusion  of the posterior elements at the L2 and L3 levels. There is a spacer in the L3-4  intervertebral disc space. There appears to be bony fusion of the L3 and L4  vertebral bodies.      The patient is status post posterior spinal fixation with pedicle screws at the  L4, L5, and S1 levels. There are also screws extending into the right and left  iliac bones. The screws are connected to bilateral metallic rods. The left L4  screw extends along the lateral aspect of the left L4 pedicle and left lateral  aspect of the L4 vertebral body. There is a spacer in the L5-S1 intervertebral  disc space. There are screws extending into the inferior endplate of the L5  vertebral body and S1 level of the sacrum. Laminectomies have been performed at  the L3 and L4 levels. There is a suggestion of a fluid collection posterior to  the thecal sac at the L3 and L4 levels. The fluid collection was better  visualized on the MRI done on 10/21/2024. There is bony fusion of the posterior  elements at the L3-4 level.     There are screw tracks present in the T10 vertebral body and pedicles.     At the T12-L1 level, the central canal and neural foramina are patent.     At the L1-2 level, the central canal and neural

## 2025-05-21 ENCOUNTER — HOSPITAL ENCOUNTER (OUTPATIENT)
Facility: HOSPITAL | Age: 75
Setting detail: RECURRING SERIES
Discharge: HOME | End: 2025-05-24
Payer: MEDICARE

## 2025-05-21 PROCEDURE — 97110 THERAPEUTIC EXERCISES: CPT

## 2025-05-21 PROCEDURE — 97530 THERAPEUTIC ACTIVITIES: CPT

## 2025-05-21 NOTE — PROGRESS NOTES
[facility]  [address]  [place]  [address]  Phone: [phone]      Fax:  [phone]    Progress Note  Patient name: Salma Lopez Start of Care: 2024   Referral source: Jorge L Epps MD : 1950    Medical Diagnosis: Other abnormalities of gait and mobility   Payor: MEDICARE / Plan: MEDICARE PART A AND B / Product Type: *No Product type* /  Onset Date:DOS 2024    Treatment Diagnosis: Other abnormalities of gait and mobility    Prior Hospitalization: see medical history Provider#: [id number]   Comorbidities: Musculoskeletal disorders and Complications related to surgery   Prior Level of Function:see medical history     Reporting Period: 2025-2025    Visits from Start of Care: 35    Missed Visits: 1      If an interpreting service is utilized for treatment of this patient, the contents of this document represent the material reviewed with the patient via the .     Goals/Measure of Progress:     Short Term Goals: STG- To be accomplished in 12 visits  1.  Pt will be independent with HEP to encourage prophylaxis.  Eval: HEP dispensed   PN compliance per pt report goal MET 25     2. Pt will be able to complete TUG < 25 seconds with LRAD to demonstrate improved functional LE strength and endurance for walking  Eval: 32 sec with RW   PN: 13 seconds RW goal MET 25     3. Pt will be able to complete 8 reps in 30 sec without UE assist to demonstrate improved functional LE strength for transfers in home  Eval: 9 reps with WBOS, UE assist   PN:15 reps without UE assist goal MET 3/26/25     Long Term Goals: LTG- To be accomplished in 24 visits:  1.  Pt will report max pain 2/10 to complete ADLs with increased ease.   Eval: 10/10 max pain  PN:0/10 max pain goal MET 25     2.  Pt will be able to TUG in 15 seconds or less without AD to be able to increase independence with walking short distances at home.  Eval:32 seconds

## 2025-05-21 NOTE — PROGRESS NOTES
PHYSICAL / OCCUPATIONAL THERAPY - DAILY TREATMENT NOTE     Patient Name: Salma Lopez    Date: 2025    : 1950  Insurance: Payor: MEDICARE / Plan: MEDICARE PART A AND B / Product Type: *No Product type* /      Patient  verified Yes     Visit #   Current / Total 35 48   Time   In / Out 10:29 11:12   Pain   In / Out 0 0   Subjective Functional Status/Changes: Reports continued improvements in her walking.   Changes to:  Allergies, Med Hx, Sx Hx?   no       TREATMENT AREA =  Other abnormalities of gait and mobility [R26.89]    If an interpreting service is utilized for treatment of this patient, the contents of this document represent the material reviewed with the patient via the .         Therapeutic Procedures:  Tx Min Billable or 1:1 Min (if diff from Tx Min) Procedure, Rationale, Specifics   30  77203 Therapeutic Activity (timed):  use of dynamic activities replicating functional movements to increase ROM, strength, coordination, balance, and proprioception in order to improve patient's ability to progress to PLOF and address remaining functional goals.  (see flow sheet as applicable)    Details if applicable:       12  69880 Therapeutic Exercise (timed):  increase ROM, strength, coordination, balance, and proprioception to improve patient's ability to progress to PLOF and address remaining functional goals. (see flow sheet as applicable)    Details if applicable:            Details if applicable:           Details if applicable:            Details if applicable:     42  [facility] Totals Reminder: bill using total billable min of TIMED therapeutic procedures (example: do not include dry needle or estim unattended, both untimed codes, in totals to left)  8-22 min = 1 unit; 23-37 min = 2 units; 38-52 min = 3 units; 53-67 min = 4 units; 68-82 min = 5 units   Total Total     TOTAL TREATMENT TIME:        42         Objective:    Left ankle:     ROM  Plantarflexion - 65 deg  Dorsiflexion - 5

## 2025-05-23 ENCOUNTER — HOSPITAL ENCOUNTER (OUTPATIENT)
Facility: HOSPITAL | Age: 75
Setting detail: RECURRING SERIES
Discharge: HOME | End: 2025-05-26
Payer: MEDICARE

## 2025-05-23 ENCOUNTER — TRANSCRIBE ORDERS (OUTPATIENT)
Facility: HOSPITAL | Age: 75
End: 2025-05-23

## 2025-05-23 DIAGNOSIS — M54.16 LUMBAR RADICULOPATHY: Primary | ICD-10-CM

## 2025-05-23 DIAGNOSIS — M96.1 LUMBAR POST-LAMINECTOMY SYNDROME: ICD-10-CM

## 2025-05-23 PROCEDURE — 97530 THERAPEUTIC ACTIVITIES: CPT

## 2025-05-23 PROCEDURE — 97110 THERAPEUTIC EXERCISES: CPT

## 2025-05-23 PROCEDURE — 97112 NEUROMUSCULAR REEDUCATION: CPT

## 2025-05-23 NOTE — PROGRESS NOTES
PHYSICAL / OCCUPATIONAL THERAPY - DAILY TREATMENT NOTE     Patient Name: Salma Lopez    Date: 2025    : 1950  Insurance: Payor: MEDICARE / Plan: MEDICARE PART A AND B / Product Type: *No Product type* /      Patient  verified Yes     Visit #   Current / Total 36 48   Time   In / Out 11:15 12:00   Pain   In / Out 0 0   Subjective Functional Status/Changes: I think I want to be DC next Wednesday    Changes to:  Allergies, Med Hx, Sx Hx?   no       TREATMENT AREA =  Other abnormalities of gait and mobility [R26.89]    If an interpreting service is utilized for treatment of this patient, the contents of this document represent the material reviewed with the patient via the .     OBJECTIVE      Therapeutic Procedures:  Tx Min Billable or 1:1 Min (if diff from Tx Min) Procedure, Rationale, Specifics   25  62519 Neuromuscular Re-Education (timed):  improve balance, coordination, kinesthetic sense, posture, core stability and proprioception to improve patient's ability to develop conscious control of individual muscles and awareness of position of extremities in order to progress to PLOF and address remaining functional goals. (see flow sheet as applicable)    Details if applicable:       12  91381 Therapeutic Activity (timed):  use of dynamic activities replicating functional movements to increase ROM, strength, coordination, balance, and proprioception in order to improve patient's ability to progress to PLOF and address remaining functional goals.  (see flow sheet as applicable)    Details if applicable:     8  47807 Therapeutic Exercise (timed):  increase ROM, strength, coordination, balance, and proprioception to improve patient's ability to progress to PLOF and address remaining functional goals. (see flow sheet as applicable)     Details if applicable:           Details if applicable:            Details if applicable:     45  [facility] Totals Reminder: bill using total billable min of TIMED

## 2025-05-28 ENCOUNTER — HOSPITAL ENCOUNTER (OUTPATIENT)
Facility: HOSPITAL | Age: 75
Setting detail: RECURRING SERIES
Discharge: HOME | End: 2025-05-31
Payer: MEDICARE

## 2025-05-28 PROCEDURE — 97530 THERAPEUTIC ACTIVITIES: CPT

## 2025-05-28 PROCEDURE — 97110 THERAPEUTIC EXERCISES: CPT

## 2025-05-28 PROCEDURE — 97112 NEUROMUSCULAR REEDUCATION: CPT

## 2025-05-28 NOTE — PROGRESS NOTES
PHYSICAL / OCCUPATIONAL THERAPY - DAILY TREATMENT NOTE     Patient Name: Salma Lopez    Date: 2025    : 1950  Insurance: Payor: MEDICARE / Plan: MEDICARE PART A AND B / Product Type: *No Product type* /      Patient  verified Yes     Visit #   Current / Total 37 48   Time   In / Out 1113 1155   Pain   In / Out 0 0   Subjective Functional Status/Changes: \"I'm feeling a little off today just from a UTI but overall walking has been better\"   Changes to:  Allergies, Med Hx, Sx Hx?   no       TREATMENT AREA =  Other abnormalities of gait and mobility [R26.89]    If an interpreting service is utilized for treatment of this patient, the contents of this document represent the material reviewed with the patient via the .     OBJECTIVE      Therapeutic Procedures:  Tx Min Billable or 1:1 Min (if diff from Tx Min) Procedure, Rationale, Specifics   20  27458 Neuromuscular Re-Education (timed):  improve balance, coordination, kinesthetic sense, posture, core stability and proprioception to improve patient's ability to develop conscious control of individual muscles and awareness of position of extremities in order to progress to PLOF and address remaining functional goals. (see flow sheet as applicable)    Details if applicable:       12  69982 Therapeutic Activity (timed):  use of dynamic activities replicating functional movements to increase ROM, strength, coordination, balance, and proprioception in order to improve patient's ability to progress to PLOF and address remaining functional goals.  (see flow sheet as applicable)    Details if applicable:     10  94563 Therapeutic Exercise (timed):  increase ROM, strength, coordination, balance, and proprioception to improve patient's ability to progress to PLOF and address remaining functional goals. (see flow sheet as applicable)     Details if applicable:           Details if applicable:            Details if applicable:     42  [facility] Totals 
Physical Therapy Discharge Instructions      [facility]  [address], [address]  [place]  [address]  Phone: [phone]      Fax:  [phone]    Patient: Salma Lopez  : 1950      Continue Home Exercise Program 1 times per day for 4 weeks, then decrease to 3-4 times per week      Follow up with MD:     [] Upon completion of therapy     [x] As needed      Recommendations:     [x]   Return to activity with home program    []   Return to activity with the following modifications:       []Post Rehab Program    []Join Independent aquatic program     []Return to/join local gym        Additional Comments:          Bakari Fernández, PT 2025 11:16 AM    
bilaterally > 3 seconds to demonstrate improved stability for walking without AD  Eval:unable right, 1 sec left   DC 5/28/25: 4 sec left, 3 sec right, almost MET       4.  Pt will be able to complete 12 reps with 30 sec STS test without UE assist to demonstrate improved functional LE strength for transfers  Eval:9 reps with WBOS, UE assist   PN: 15 reps without UE assist goal MET 3/26/25     5. Pt will be able to walk > 150ft without AD to return to PLOF  Eval: short distances with RW   PN 5/28/2025: able to ambulate without use of AD, don't need to look around the find a chair, MET      Updated goals 3/26/25:  Pt will be able to manage curb without UE or second person assist to be able to walk into appointments independently  PN 5/28/2025: states she feels need to look down when descending stairs but needs to use HR, partially MET       2. Pt DGI score will improve to 21/24 to demonstrate decreased falls risk with walking in community   DC 5/28/25 14/24, progressing     Assessment/ Summary of Care:   Pt reporting PT has been helping and she feels ready to be DC to HEP. DGI score has improved, as well as SLS bilaterally. Focused on foot clearance and not shuffling during ambulation without AD with no noted LOB today. Plan to DC today due to pt request and progress towards goals with continuing of HEP at home to make further gains towards goals.     RECOMMENDATIONS:  []Discontinue therapy: [x]Patient has reached or is progressing toward set goals      []Patient is non-compliant or has abdicated      []Due to lack of appreciable progress towards set goals    Bakari Fernández, PT 5/28/2025 11:16 AM

## 2025-05-30 ENCOUNTER — APPOINTMENT (OUTPATIENT)
Facility: HOSPITAL | Age: 75
End: 2025-05-30
Payer: MEDICARE

## 2025-06-09 ENCOUNTER — HOSPITAL ENCOUNTER (OUTPATIENT)
Age: 75
Discharge: HOME | End: 2025-06-12
Payer: MEDICARE

## 2025-06-09 DIAGNOSIS — M96.1 LUMBAR POST-LAMINECTOMY SYNDROME: ICD-10-CM

## 2025-06-09 DIAGNOSIS — M54.16 LUMBAR RADICULOPATHY: ICD-10-CM

## 2025-06-09 PROCEDURE — 72148 MRI LUMBAR SPINE W/O DYE: CPT

## 2025-06-10 ENCOUNTER — OFFICE VISIT (OUTPATIENT)
Facility: CLINIC | Age: 75
End: 2025-06-10
Payer: MEDICARE

## 2025-06-10 ENCOUNTER — OFFICE VISIT (OUTPATIENT)
Age: 75
End: 2025-06-10
Payer: MEDICARE

## 2025-06-10 VITALS
BODY MASS INDEX: 28.94 KG/M2 | WEIGHT: 158.2 LBS | SYSTOLIC BLOOD PRESSURE: 130 MMHG | HEART RATE: 57 BPM | OXYGEN SATURATION: 96 % | DIASTOLIC BLOOD PRESSURE: 73 MMHG

## 2025-06-10 VITALS
OXYGEN SATURATION: 98 % | SYSTOLIC BLOOD PRESSURE: 146 MMHG | RESPIRATION RATE: 16 BRPM | HEART RATE: 65 BPM | TEMPERATURE: 98 F | DIASTOLIC BLOOD PRESSURE: 86 MMHG

## 2025-06-10 DIAGNOSIS — R68.83 CHILLS (WITHOUT FEVER): ICD-10-CM

## 2025-06-10 DIAGNOSIS — R60.0 BILATERAL LOWER EXTREMITY EDEMA: ICD-10-CM

## 2025-06-10 DIAGNOSIS — I10 ESSENTIAL (PRIMARY) HYPERTENSION: ICD-10-CM

## 2025-06-10 DIAGNOSIS — I47.10 SUPRAVENTRICULAR TACHYCARDIA: Primary | ICD-10-CM

## 2025-06-10 DIAGNOSIS — M51.369 DEGENERATION OF INTERVERTEBRAL DISC OF LUMBAR REGION, UNSPECIFIED WHETHER PAIN PRESENT: ICD-10-CM

## 2025-06-10 DIAGNOSIS — I87.2 VENOUS INSUFFICIENCY: ICD-10-CM

## 2025-06-10 DIAGNOSIS — R53.83 OTHER FATIGUE: Primary | ICD-10-CM

## 2025-06-10 LAB
BILIRUBIN, URINE, POC: NEGATIVE
BLOOD URINE, POC: NEGATIVE
GLUCOSE URINE, POC: NEGATIVE
KETONES, URINE, POC: NEGATIVE
LEUKOCYTE ESTERASE, URINE, POC: NEGATIVE
NITRITE, URINE, POC: NEGATIVE
PH, URINE, POC: 7 (ref 4.6–8)
PROTEIN,URINE, POC: NEGATIVE
SPECIFIC GRAVITY, URINE, POC: 1.01 (ref 1–1.03)
URINALYSIS CLARITY, POC: CLEAR
URINALYSIS COLOR, POC: YELLOW
UROBILINOGEN, POC: NORMAL

## 2025-06-10 PROCEDURE — G8399 PT W/DXA RESULTS DOCUMENT: HCPCS | Performed by: NURSE PRACTITIONER

## 2025-06-10 PROCEDURE — 3077F SYST BP >= 140 MM HG: CPT | Performed by: STUDENT IN AN ORGANIZED HEALTH CARE EDUCATION/TRAINING PROGRAM

## 2025-06-10 PROCEDURE — 1036F TOBACCO NON-USER: CPT | Performed by: STUDENT IN AN ORGANIZED HEALTH CARE EDUCATION/TRAINING PROGRAM

## 2025-06-10 PROCEDURE — G2211 COMPLEX E/M VISIT ADD ON: HCPCS | Performed by: STUDENT IN AN ORGANIZED HEALTH CARE EDUCATION/TRAINING PROGRAM

## 2025-06-10 PROCEDURE — 1090F PRES/ABSN URINE INCON ASSESS: CPT | Performed by: STUDENT IN AN ORGANIZED HEALTH CARE EDUCATION/TRAINING PROGRAM

## 2025-06-10 PROCEDURE — 99213 OFFICE O/P EST LOW 20 MIN: CPT | Performed by: NURSE PRACTITIONER

## 2025-06-10 PROCEDURE — 1090F PRES/ABSN URINE INCON ASSESS: CPT | Performed by: NURSE PRACTITIONER

## 2025-06-10 PROCEDURE — 81003 URINALYSIS AUTO W/O SCOPE: CPT | Performed by: STUDENT IN AN ORGANIZED HEALTH CARE EDUCATION/TRAINING PROGRAM

## 2025-06-10 PROCEDURE — 1159F MED LIST DOCD IN RCRD: CPT | Performed by: STUDENT IN AN ORGANIZED HEALTH CARE EDUCATION/TRAINING PROGRAM

## 2025-06-10 PROCEDURE — 1125F AMNT PAIN NOTED PAIN PRSNT: CPT | Performed by: NURSE PRACTITIONER

## 2025-06-10 PROCEDURE — G8427 DOCREV CUR MEDS BY ELIG CLIN: HCPCS | Performed by: STUDENT IN AN ORGANIZED HEALTH CARE EDUCATION/TRAINING PROGRAM

## 2025-06-10 PROCEDURE — 1160F RVW MEDS BY RX/DR IN RCRD: CPT | Performed by: NURSE PRACTITIONER

## 2025-06-10 PROCEDURE — G8417 CALC BMI ABV UP PARAM F/U: HCPCS | Performed by: STUDENT IN AN ORGANIZED HEALTH CARE EDUCATION/TRAINING PROGRAM

## 2025-06-10 PROCEDURE — 1123F ACP DISCUSS/DSCN MKR DOCD: CPT | Performed by: STUDENT IN AN ORGANIZED HEALTH CARE EDUCATION/TRAINING PROGRAM

## 2025-06-10 PROCEDURE — G8399 PT W/DXA RESULTS DOCUMENT: HCPCS | Performed by: STUDENT IN AN ORGANIZED HEALTH CARE EDUCATION/TRAINING PROGRAM

## 2025-06-10 PROCEDURE — 3079F DIAST BP 80-89 MM HG: CPT | Performed by: STUDENT IN AN ORGANIZED HEALTH CARE EDUCATION/TRAINING PROGRAM

## 2025-06-10 PROCEDURE — 3075F SYST BP GE 130 - 139MM HG: CPT | Performed by: NURSE PRACTITIONER

## 2025-06-10 PROCEDURE — G8417 CALC BMI ABV UP PARAM F/U: HCPCS | Performed by: NURSE PRACTITIONER

## 2025-06-10 PROCEDURE — 99213 OFFICE O/P EST LOW 20 MIN: CPT | Performed by: STUDENT IN AN ORGANIZED HEALTH CARE EDUCATION/TRAINING PROGRAM

## 2025-06-10 PROCEDURE — 3078F DIAST BP <80 MM HG: CPT | Performed by: NURSE PRACTITIONER

## 2025-06-10 PROCEDURE — 1159F MED LIST DOCD IN RCRD: CPT | Performed by: NURSE PRACTITIONER

## 2025-06-10 PROCEDURE — 1123F ACP DISCUSS/DSCN MKR DOCD: CPT | Performed by: NURSE PRACTITIONER

## 2025-06-10 PROCEDURE — G8427 DOCREV CUR MEDS BY ELIG CLIN: HCPCS | Performed by: NURSE PRACTITIONER

## 2025-06-10 PROCEDURE — 1036F TOBACCO NON-USER: CPT | Performed by: NURSE PRACTITIONER

## 2025-06-10 SDOH — ECONOMIC STABILITY: FOOD INSECURITY: WITHIN THE PAST 12 MONTHS, THE FOOD YOU BOUGHT JUST DIDN'T LAST AND YOU DIDN'T HAVE MONEY TO GET MORE.: PATIENT DECLINED

## 2025-06-10 SDOH — ECONOMIC STABILITY: FOOD INSECURITY: WITHIN THE PAST 12 MONTHS, YOU WORRIED THAT YOUR FOOD WOULD RUN OUT BEFORE YOU GOT MONEY TO BUY MORE.: PATIENT DECLINED

## 2025-06-10 SDOH — ECONOMIC STABILITY: INCOME INSECURITY: IN THE LAST 12 MONTHS, WAS THERE A TIME WHEN YOU WERE NOT ABLE TO PAY THE MORTGAGE OR RENT ON TIME?: PATIENT DECLINED

## 2025-06-10 SDOH — ECONOMIC STABILITY: TRANSPORTATION INSECURITY
IN THE PAST 12 MONTHS, HAS THE LACK OF TRANSPORTATION KEPT YOU FROM MEDICAL APPOINTMENTS OR FROM GETTING MEDICATIONS?: PATIENT DECLINED

## 2025-06-10 ASSESSMENT — PATIENT HEALTH QUESTIONNAIRE - PHQ9
2. FEELING DOWN, DEPRESSED OR HOPELESS: NOT AT ALL
SUM OF ALL RESPONSES TO PHQ QUESTIONS 1-9: 0
SUM OF ALL RESPONSES TO PHQ9 QUESTIONS 1 & 2: 0
SUM OF ALL RESPONSES TO PHQ QUESTIONS 1-9: 0
1. LITTLE INTEREST OR PLEASURE IN DOING THINGS: NOT AT ALL
SUM OF ALL RESPONSES TO PHQ QUESTIONS 1-9: 0
SUM OF ALL RESPONSES TO PHQ QUESTIONS 1-9: 0
1. LITTLE INTEREST OR PLEASURE IN DOING THINGS: NOT AT ALL
1. LITTLE INTEREST OR PLEASURE IN DOING THINGS: NOT AT ALL
SUM OF ALL RESPONSES TO PHQ QUESTIONS 1-9: 0
SUM OF ALL RESPONSES TO PHQ QUESTIONS 1-9: 0
2. FEELING DOWN, DEPRESSED OR HOPELESS: NOT AT ALL
SUM OF ALL RESPONSES TO PHQ QUESTIONS 1-9: 0
2. FEELING DOWN, DEPRESSED OR HOPELESS: NOT AT ALL
SUM OF ALL RESPONSES TO PHQ QUESTIONS 1-9: 0

## 2025-06-10 ASSESSMENT — ENCOUNTER SYMPTOMS
GASTROINTESTINAL NEGATIVE: 1
RESPIRATORY NEGATIVE: 1
EYES NEGATIVE: 1

## 2025-06-10 NOTE — PROGRESS NOTES
1. Have you been to the ER, urgent care clinic since your last visit?  Hospitalized since your last visit?     No    2. Have you seen or consulted any other health care providers outside of the [facility] since your last visit?  Include any pap smears or colon screening.      Yes Dr Rehan Santiago ortho

## 2025-06-10 NOTE — PROGRESS NOTES
Have you been to the ER, urgent care clinic since your last visit?  Hospitalized since your last visit?   NO    Have you seen or consulted any other health care providers outside our system since your last visit?   NO             
guardian, if applicable) and other individuals in attendance with the patient were advised that Artificial Intelligence will be utilized during this visit to record and process the conversation to generate a clinical note. The patient (or guardian, if applicable) and other individuals in attendance at the appointment consented to the use of AI, including the recording.      An electronic signature was used to authenticate this note.    --Omar Jules MD

## 2025-06-10 NOTE — PROGRESS NOTES
Salma Lopez is a 75 y.o. year old female.    Follow-up of SVT, hypertension  History of venous insufficiency, rheumatoid arthritis      6/1/2021  Patient seen today for new patient evaluation.  She is referred here for evaluation of tachycardia.  For past few weeks she has been having episode of sudden tachycardia with heart rate going in 120s.  She is short of breath at that time.  Denies any dizziness or syncope.  She has history of rheumatoid arthritis with extensive joint problems including back problem that is being evaluated by Dr. Brandan Iyer.  She has no prior cardiac history.  She has history of hypertension on treatment.  2/2024  Patient seen for pre-op evaluation.  She is anticipating right shoulder arthroscopic surgery. She denies chest pain, shortness of breath, palpitations or edema.   12/3/2024 seen for preop clearance for back surgery.  Denies any chest pain dyspnea.  Used to have edema which has improved and she has lost about 15 pounds in the last 6 months.  No dizziness or palpitations.  6/10/2024  Seen following back surgery denies recurrent of SVT denies chest pain shortness of breath palpitations or edema.  She is now walking after back surgery wearing compression stockings which have controlled lower extremity edema          Review of Systems   Constitutional: Negative.    HENT: Negative.     Eyes: Negative.    Respiratory: Negative.     Cardiovascular:  Positive for leg swelling.   Gastrointestinal: Negative.    Endocrine: Negative.    Genitourinary: Negative.    Musculoskeletal: Negative.    Neurological: Negative.    Psychiatric/Behavioral: Negative.     All other systems reviewed and are negative.        Physical Exam  Vitals and nursing note reviewed.   Constitutional:       Appearance: Normal appearance.      Comments:   Seen on stretcher as she cannot walk well due to back issues.   HENT:      Head: Normocephalic and atraumatic.      Nose: Nose normal.   Eyes:      Conjunctiva/sclera:

## 2025-06-30 ENCOUNTER — OFFICE VISIT (OUTPATIENT)
Age: 75
End: 2025-06-30
Payer: MEDICARE

## 2025-06-30 DIAGNOSIS — R60.9 EDEMA, UNSPECIFIED TYPE: ICD-10-CM

## 2025-06-30 DIAGNOSIS — R22.41 FOOT MASS, RIGHT: Primary | ICD-10-CM

## 2025-06-30 PROCEDURE — 1123F ACP DISCUSS/DSCN MKR DOCD: CPT | Performed by: ORTHOPAEDIC SURGERY

## 2025-06-30 PROCEDURE — 1125F AMNT PAIN NOTED PAIN PRSNT: CPT | Performed by: ORTHOPAEDIC SURGERY

## 2025-06-30 PROCEDURE — 1160F RVW MEDS BY RX/DR IN RCRD: CPT | Performed by: ORTHOPAEDIC SURGERY

## 2025-06-30 PROCEDURE — G8417 CALC BMI ABV UP PARAM F/U: HCPCS | Performed by: ORTHOPAEDIC SURGERY

## 2025-06-30 PROCEDURE — 99214 OFFICE O/P EST MOD 30 MIN: CPT | Performed by: ORTHOPAEDIC SURGERY

## 2025-06-30 PROCEDURE — G8427 DOCREV CUR MEDS BY ELIG CLIN: HCPCS | Performed by: ORTHOPAEDIC SURGERY

## 2025-06-30 PROCEDURE — 1159F MED LIST DOCD IN RCRD: CPT | Performed by: ORTHOPAEDIC SURGERY

## 2025-06-30 PROCEDURE — 1090F PRES/ABSN URINE INCON ASSESS: CPT | Performed by: ORTHOPAEDIC SURGERY

## 2025-06-30 PROCEDURE — G8399 PT W/DXA RESULTS DOCUMENT: HCPCS | Performed by: ORTHOPAEDIC SURGERY

## 2025-06-30 PROCEDURE — 1036F TOBACCO NON-USER: CPT | Performed by: ORTHOPAEDIC SURGERY

## 2025-06-30 NOTE — PATIENT INSTRUCTIONS
If we order a Diagnostic test (such as MRI or CT) during your office visit please see below:     Coordination of Care will be calling you to schedule your diagnostic test. If you have not heard from Coordination of Care within 2 business days, please call [phone].     Once you have a date scheduled for your diagnostic test, you will need to contact our office to schedule a follow up appointment about 4 days following the exam, as this is when the physician will review your diagnostic test results with you. You can contact our office to schedule appointment by phone at [phone], or you can send a message via [email or web address] to request an appointment.

## 2025-06-30 NOTE — PROGRESS NOTES
AMBULATORY PROGRESS NOTE      Patient: Salma Lopez             MRN: [id number]     SSN: [id number] There is no height or weight on file to calculate BMI.  YOB: 1950     AGE: 75 y.o.       EX: female    PCP: Omar Jules MD       IMPRESSION //  DIAGNOSIS AND TREATMENT PLAN      Salma Lopez has a diagnosis of:      Salma Lopez is here for assessment.  Clinically, she has some tenderness to the plantar portion of the right first TMT region.  I did review her x-rays, her x-rays, from [place], from January 2025.  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    DIAGNOSES    1. Foot mass, right    2. Edema, unspecified type          PLAN:    1. Right Forefoot MRI WO Contrast to assess plantar first TMT region and plantar mass  2. Consider custom orthotic to offload first TMT joint  3. Order BLE PVL Venous studies    RTO after diagnostics     Orders Placed This Encounter    MRI FOOT RIGHT WO CONTRAST     Standing Status:   Future     Expected Date:   6/30/2025     Expiration Date:   6/30/2026     What is the area of interest?:   Forefoot     Reason for exam::   assess plantar first TMT region     What is the sedation requirement?:   None    Vascular duplex lower extremity venous bilateral     Standing Status:   Future     Expected Date:   6/30/2025     Expiration Date:   6/30/2026     Scheduling Instructions:      Ángel SMITH venous        Patient Instructions   If we order a Diagnostic test (such as MRI or CT) during your office visit please see below:     Coordination of Care

## 2025-07-02 ENCOUNTER — TELEPHONE (OUTPATIENT)
Facility: CLINIC | Age: 75
End: 2025-07-02

## 2025-07-02 NOTE — TELEPHONE ENCOUNTER
Patient called stating her sister , recently broke her neck, would like to fly out of state, 8 hour travel distance, would like to know if Dr. Jules thinks  the travel is to far due to her condition. PLEASE REVIEW AND ADVISE ASAP.

## 2025-07-03 ENCOUNTER — HOSPITAL ENCOUNTER (OUTPATIENT)
Age: 75
Discharge: HOME | End: 2025-07-03
Payer: MEDICARE

## 2025-07-03 DIAGNOSIS — R22.41 FOOT MASS, RIGHT: ICD-10-CM

## 2025-07-03 PROCEDURE — 73718 MRI LOWER EXTREMITY W/O DYE: CPT

## 2025-07-07 NOTE — TELEPHONE ENCOUNTER
Spoke with patient who has been made aware Dr. Jules is okay with her flying however recommends that she does not travel alone if possible and to get up a couple of times during her 8 hour flight to stretch and move around to promote blood flow. Patient has expressed understanding.

## 2025-07-14 ENCOUNTER — HOSPITAL ENCOUNTER (OUTPATIENT)
Age: 75
Discharge: HOME | End: 2025-07-16
Payer: MEDICARE

## 2025-07-14 DIAGNOSIS — R60.9 EDEMA, UNSPECIFIED TYPE: ICD-10-CM

## 2025-07-14 PROCEDURE — 93970 EXTREMITY STUDY: CPT

## 2025-07-15 PROCEDURE — 93970 EXTREMITY STUDY: CPT | Performed by: SURGERY

## 2025-07-16 ENCOUNTER — OFFICE VISIT (OUTPATIENT)
Facility: CLINIC | Age: 75
End: 2025-07-16

## 2025-07-16 VITALS — DIASTOLIC BLOOD PRESSURE: 80 MMHG | HEART RATE: 63 BPM | TEMPERATURE: 98.3 F | SYSTOLIC BLOOD PRESSURE: 116 MMHG

## 2025-07-16 DIAGNOSIS — M21.371 FOOT DROP, BILATERAL: ICD-10-CM

## 2025-07-16 DIAGNOSIS — Z78.0 OSTEOPENIA AFTER MENOPAUSE: ICD-10-CM

## 2025-07-16 DIAGNOSIS — R60.0 BILATERAL LOWER EXTREMITY EDEMA: Primary | ICD-10-CM

## 2025-07-16 DIAGNOSIS — M21.372 FOOT DROP, BILATERAL: ICD-10-CM

## 2025-07-16 DIAGNOSIS — M85.80 OSTEOPENIA AFTER MENOPAUSE: ICD-10-CM

## 2025-07-16 RX ORDER — FUROSEMIDE 20 MG/1
20 TABLET ORAL DAILY PRN
Qty: 60 TABLET | Refills: 0 | Status: SHIPPED | OUTPATIENT
Start: 2025-07-16

## 2025-07-16 NOTE — PATIENT INSTRUCTIONS
To schedule appointments for CT, MRI, Ultrasound, Vascular Ultrasound, Mammography, Bone Density, and X-ray, please call Central Scheduling at [phone].

## 2025-07-16 NOTE — PROGRESS NOTES
Chief Complaint   Patient presents with    Leg Swelling     Patient states she continues to have pain and swelling in both of her legs.        Have you been to the ER, urgent care clinic since your last visit?  Hospitalized since your last visit?   NO    Have you seen or consulted any other health care providers outside our system since your last visit?   NO

## 2025-07-16 NOTE — PROGRESS NOTES
Salma Lopez (: 1950) is a 75 y.o. female, established patient, here for evaluation of the following chief complaint(s):  Leg Swelling (Patient states she continues to have pain and swelling in both of her legs. )        Assessment & Plan  1. Foot drop: Chronic.  Upcoming EMG.  Discussed potential central versus peripheral etiologies.  I have a suspicion about peroneal nerve injury  - EMG planned.  - Continues using braces.    2. Lower extremity swelling: Chronic.  - Ultrasound ruled out clots/obstructions.  - Possible venous insufficiency.  - Advised compression stockings and leg elevation.  - Prescribed Lasix 20 mg PRN.    3. Osteopenia: Diagnosed in 2017.  - Limited bone density assessment.  - Formal DEXA scan to be scheduled.  - Continues Vitamin D3.    4. Arthritis: Chronic.  - Joint soreness, especially in lower extremities.  - Tenderness on exam.  - Continues Celebrex for inflammation.    Follow-up  - EMG results to be reviewed.    Results  - Imaging:    - MRI of T3 and T4: Disk issues noted  1. Bilateral lower extremity edema  -     furosemide (LASIX) 20 MG tablet; Take 1 tablet by mouth daily as needed (lower extremity edema), Disp-60 tablet, R-0Normal  2. Osteopenia after menopause  -     DEXA BONE DENSITY AXIAL SKELETON; Future  3. Foot drop, bilateral    No follow-ups on file.         Subjective   History of Present Illness  The patient presents for evaluation of foot drop, lower extremity swelling, and osteopenia.    She reports difficulty lifting her arms and issues with her legs, attributed to disc problems at T3 and T4. Recent MRI showed disk issues in that area. She is under the care of a neurologist who plans to conduct an EMG test. She has undergone bilateral hip replacement surgery and is considering shoulder replacement surgery due to mobility issues. She discontinued Xeljanz due to abnormal lab results and ineffectiveness. She is not on osteoporosis medication as her doctor did not

## 2025-07-21 ENCOUNTER — HOSPITAL ENCOUNTER (OUTPATIENT)
Facility: HOSPITAL | Age: 75
Discharge: HOME | End: 2025-07-24
Attending: STUDENT IN AN ORGANIZED HEALTH CARE EDUCATION/TRAINING PROGRAM
Payer: MEDICARE

## 2025-07-21 DIAGNOSIS — Z78.0 OSTEOPENIA AFTER MENOPAUSE: ICD-10-CM

## 2025-07-21 DIAGNOSIS — M85.80 OSTEOPENIA AFTER MENOPAUSE: ICD-10-CM

## 2025-07-21 PROCEDURE — 77081 DXA BONE DENSITY APPENDICULR: CPT

## 2025-08-06 ENCOUNTER — PROCEDURE VISIT (OUTPATIENT)
Age: 75
End: 2025-08-06

## 2025-08-06 VITALS
BODY MASS INDEX: 28.17 KG/M2 | HEART RATE: 65 BPM | WEIGHT: 159 LBS | HEIGHT: 63 IN | OXYGEN SATURATION: 97 % | TEMPERATURE: 97.3 F

## 2025-08-06 DIAGNOSIS — R29.898 BILATERAL LEG WEAKNESS: Primary | ICD-10-CM

## 2025-08-06 DIAGNOSIS — R94.131 ABNORMAL EMG: ICD-10-CM

## 2025-08-06 DIAGNOSIS — G62.9 NEUROPATHY: ICD-10-CM

## 2025-08-06 DIAGNOSIS — M54.16 LUMBAR RADICULOPATHY: ICD-10-CM

## 2025-08-15 ENCOUNTER — PATIENT MESSAGE (OUTPATIENT)
Age: 75
End: 2025-08-15

## 2025-08-28 ENCOUNTER — OFFICE VISIT (OUTPATIENT)
Facility: CLINIC | Age: 75
End: 2025-08-28

## 2025-08-28 VITALS
HEART RATE: 78 BPM | HEIGHT: 63 IN | DIASTOLIC BLOOD PRESSURE: 70 MMHG | WEIGHT: 160 LBS | OXYGEN SATURATION: 97 % | SYSTOLIC BLOOD PRESSURE: 118 MMHG | BODY MASS INDEX: 28.35 KG/M2 | TEMPERATURE: 97.7 F

## 2025-08-28 DIAGNOSIS — R60.0 BILATERAL EDEMA OF LOWER EXTREMITY: Primary | ICD-10-CM

## 2025-08-28 DIAGNOSIS — M25.511 CHRONIC RIGHT SHOULDER PAIN: ICD-10-CM

## 2025-08-28 DIAGNOSIS — M54.17 LUMBOSACRAL RADICULOPATHY AT L5: ICD-10-CM

## 2025-08-28 DIAGNOSIS — G89.29 CHRONIC RIGHT SHOULDER PAIN: ICD-10-CM

## 2025-08-28 RX ORDER — BUMETANIDE 1 MG/1
1 TABLET ORAL DAILY
Qty: 90 TABLET | Refills: 1 | Status: SHIPPED | OUTPATIENT
Start: 2025-08-28

## 2025-09-02 ENCOUNTER — HOSPITAL ENCOUNTER (OUTPATIENT)
Age: 75
Discharge: HOME | End: 2025-09-02
Payer: MEDICARE

## 2025-09-02 ENCOUNTER — OFFICE VISIT (OUTPATIENT)
Age: 75
End: 2025-09-02
Payer: MEDICARE

## 2025-09-02 VITALS
TEMPERATURE: 96.7 F | BODY MASS INDEX: 26.19 KG/M2 | OXYGEN SATURATION: 98 % | DIASTOLIC BLOOD PRESSURE: 67 MMHG | WEIGHT: 147.8 LBS | HEART RATE: 66 BPM | HEIGHT: 63 IN | SYSTOLIC BLOOD PRESSURE: 105 MMHG

## 2025-09-02 DIAGNOSIS — G62.9 NEUROPATHY: ICD-10-CM

## 2025-09-02 DIAGNOSIS — M48.00 CENTRAL STENOSIS OF SPINAL CANAL: ICD-10-CM

## 2025-09-02 DIAGNOSIS — M21.372 BILATERAL FOOT-DROP: ICD-10-CM

## 2025-09-02 DIAGNOSIS — M21.371 BILATERAL FOOT-DROP: ICD-10-CM

## 2025-09-02 DIAGNOSIS — G62.9 NEUROPATHY: Primary | ICD-10-CM

## 2025-09-02 LAB
EST. AVERAGE GLUCOSE BLD GHB EST-MCNC: 113 MG/DL
HBA1C MFR BLD: 5.6 % (ref 4.2–5.6)
VIT B12 SERPL-MCNC: 377 PG/ML (ref 211–911)

## 2025-09-02 PROCEDURE — 1159F MED LIST DOCD IN RCRD: CPT | Performed by: STUDENT IN AN ORGANIZED HEALTH CARE EDUCATION/TRAINING PROGRAM

## 2025-09-02 PROCEDURE — 1160F RVW MEDS BY RX/DR IN RCRD: CPT | Performed by: STUDENT IN AN ORGANIZED HEALTH CARE EDUCATION/TRAINING PROGRAM

## 2025-09-02 PROCEDURE — G8417 CALC BMI ABV UP PARAM F/U: HCPCS | Performed by: STUDENT IN AN ORGANIZED HEALTH CARE EDUCATION/TRAINING PROGRAM

## 2025-09-02 PROCEDURE — 99215 OFFICE O/P EST HI 40 MIN: CPT | Performed by: STUDENT IN AN ORGANIZED HEALTH CARE EDUCATION/TRAINING PROGRAM

## 2025-09-02 PROCEDURE — 3078F DIAST BP <80 MM HG: CPT | Performed by: STUDENT IN AN ORGANIZED HEALTH CARE EDUCATION/TRAINING PROGRAM

## 2025-09-02 PROCEDURE — 82784 ASSAY IGA/IGD/IGG/IGM EACH: CPT

## 2025-09-02 PROCEDURE — G8427 DOCREV CUR MEDS BY ELIG CLIN: HCPCS | Performed by: STUDENT IN AN ORGANIZED HEALTH CARE EDUCATION/TRAINING PROGRAM

## 2025-09-02 PROCEDURE — 84425 ASSAY OF VITAMIN B-1: CPT

## 2025-09-02 PROCEDURE — 1036F TOBACCO NON-USER: CPT | Performed by: STUDENT IN AN ORGANIZED HEALTH CARE EDUCATION/TRAINING PROGRAM

## 2025-09-02 PROCEDURE — 84155 ASSAY OF PROTEIN SERUM: CPT

## 2025-09-02 PROCEDURE — 1123F ACP DISCUSS/DSCN MKR DOCD: CPT | Performed by: STUDENT IN AN ORGANIZED HEALTH CARE EDUCATION/TRAINING PROGRAM

## 2025-09-02 PROCEDURE — 86334 IMMUNOFIX E-PHORESIS SERUM: CPT

## 2025-09-02 PROCEDURE — 3074F SYST BP LT 130 MM HG: CPT | Performed by: STUDENT IN AN ORGANIZED HEALTH CARE EDUCATION/TRAINING PROGRAM

## 2025-09-02 PROCEDURE — G8399 PT W/DXA RESULTS DOCUMENT: HCPCS | Performed by: STUDENT IN AN ORGANIZED HEALTH CARE EDUCATION/TRAINING PROGRAM

## 2025-09-02 PROCEDURE — 1090F PRES/ABSN URINE INCON ASSESS: CPT | Performed by: STUDENT IN AN ORGANIZED HEALTH CARE EDUCATION/TRAINING PROGRAM

## 2025-09-02 PROCEDURE — 82607 VITAMIN B-12: CPT

## 2025-09-02 PROCEDURE — 84165 PROTEIN E-PHORESIS SERUM: CPT

## 2025-09-02 PROCEDURE — 83036 HEMOGLOBIN GLYCOSYLATED A1C: CPT

## 2025-09-02 PROCEDURE — 36415 COLL VENOUS BLD VENIPUNCTURE: CPT

## 2025-09-02 RX ORDER — CALCIUM CARBONATE 500(1250)
500 TABLET ORAL DAILY
COMMUNITY

## 2025-09-02 ASSESSMENT — ENCOUNTER SYMPTOMS
COUGH: 0
BACK PAIN: 1
SHORTNESS OF BREATH: 0
NAUSEA: 0
VOMITING: 0

## 2025-09-03 ENCOUNTER — HOSPITAL ENCOUNTER (OUTPATIENT)
Age: 75
Discharge: HOME | End: 2025-09-03
Payer: MEDICARE

## 2025-09-03 PROCEDURE — 82175 ASSAY OF ARSENIC: CPT

## 2025-09-03 PROCEDURE — 83825 ASSAY OF MERCURY: CPT

## 2025-09-03 PROCEDURE — 36415 COLL VENOUS BLD VENIPUNCTURE: CPT

## 2025-09-03 PROCEDURE — 83655 ASSAY OF LEAD: CPT

## 2025-09-05 LAB
ALBUMIN SERPL ELPH-MCNC: 4.1 G/DL (ref 2.9–4.4)
ALBUMIN/GLOB SERPL: 1.6 (ref 0.7–1.7)
ALPHA1 GLOB SERPL ELPH-MCNC: 0.3 G/DL (ref 0–0.4)
ALPHA2 GLOB SERPL ELPH-MCNC: 0.6 G/DL (ref 0.4–1)
B-GLOBULIN SERPL ELPH-MCNC: 1 G/DL (ref 0.7–1.3)
GAMMA GLOB SERPL ELPH-MCNC: 0.8 G/DL (ref 0.4–1.8)
GLOBULIN SER-MCNC: 2.6 G/DL (ref 2.2–3.9)
IGA SERPL-MCNC: 151 MG/DL (ref 64–422)
IGG SERPL-MCNC: 705 MG/DL (ref 586–1602)
IGM SERPL-MCNC: 195 MG/DL (ref 26–217)
INTERPRETATION SERPL IEP-IMP: NORMAL
M PROTEIN SERPL ELPH-MCNC: NORMAL G/DL
PROT SERPL-MCNC: 6.7 G/DL (ref 6–8.5)
VIT B1 BLD-SCNC: 102.1 NMOL/L (ref 66.5–200)

## 2025-09-07 LAB
ARSENIC BLD-MCNC: 1 UG/L (ref 0–9)
HISPANIC?: NORMAL
LEAD BLD-MCNC: 1.4 UG/DL (ref 0–3.4)
MERCURY BLD-MCNC: 1.7 UG/L (ref 0–14.9)
RACE: NORMAL
SPECIMEN SOURCE: NORMAL
TEST PURPOSE: NORMAL

## (undated) DEVICE — INTENDED FOR TISSUE SEPARATION, AND OTHER PROCEDURES THAT REQUIRE A SHARP SURGICAL BLADE TO PUNCTURE OR CUT.: Brand: BARD-PARKER SAFETY BLADES SIZE 10, STERILE

## (undated) DEVICE — GEL BAG FOR WRAPS --

## (undated) DEVICE — INTENDED FOR TISSUE SEPARATION, AND OTHER PROCEDURES THAT REQUIRE A SHARP SURGICAL BLADE TO PUNCTURE OR CUT.: Brand: BARD-PARKER ® CARBON RIB-BACK BLADES

## (undated) DEVICE — INTENDED FOR TISSUE SEPARATION, AND OTHER PROCEDURES THAT REQUIRE A SHARP SURGICAL BLADE TO PUNCTURE OR CUT.: Brand: BARD-PARKER SAFETY BLADES SIZE 20, STERILE

## (undated) DEVICE — SOLUTION IRRIG 500ML 0.9% SOD CHLO USP POUR PLAS BTL

## (undated) DEVICE — SPONGE LAP 18X18IN STRL -- 5/PK

## (undated) DEVICE — JACKSON TABLE POSITIONER KIT: Brand: MEDLINE INDUSTRIES, INC.

## (undated) DEVICE — APPLIER RMFG CLP LIG MED 23.8 --

## (undated) DEVICE — AGENT HEMSTAT 1GM PORCINE GEL ABSRB PWD FOR CONT OOZING

## (undated) DEVICE — STIMULATOR NERVE 4.32 MM PERC EXTN KT INTERSTIM QUAD

## (undated) DEVICE — BANDAGE ADH W0.75XL3IN UNIV WVN FAB NAT GEN USE STRP N ADH

## (undated) DEVICE — REM POLYHESIVE ADULT PATIENT RETURN ELECTRODE: Brand: VALLEYLAB

## (undated) DEVICE — GARMENT,MEDLINE,DVT,INT,CALF,MED, GEN2: Brand: MEDLINE

## (undated) DEVICE — (D)BNDG ADHESIVE FABRIC 3/4X3 -- DISC BY MFR USE ITEM 357960

## (undated) DEVICE — STERILE POLYISOPRENE POWDER-FREE SURGICAL GLOVES WITH EMOLLIENT COATING: Brand: PROTEXIS

## (undated) DEVICE — 3M™ BAIR PAWS FLEX™ WARMING GOWN, STANDARD, 20 PER CASE 81003: Brand: BAIR PAWS™

## (undated) DEVICE — SOLUTION IV 1000ML 0.9% SOD CHL

## (undated) DEVICE — APPLICATOR MEDICATED 26 CC SOLUTION HI LT ORNG CHLORAPREP

## (undated) DEVICE — INSTRUMENT BATTERY

## (undated) DEVICE — CATHETER IV 14GA L1.75IN OD2.146MM ID1.740MM ORNG VIALON

## (undated) DEVICE — SUTURE VICRYL + SZ 2-0 L27IN ABSRB UD CT-2 L26MM 1/2 CIR TAPR VCP269H

## (undated) DEVICE — NDL SPNE MANAN 22GX6IN --

## (undated) DEVICE — WATERPROOF, BACTERIA PROOF DRESSING WITH ABSORBENT SEE THROUGH PAD: Brand: OPSITE POST-OP VISIBLE 15X10CM CTN 20

## (undated) DEVICE — SHEET,DRAPE,70X100,STERILE: Brand: MEDLINE

## (undated) DEVICE — SUTURE VICRYL + SZ 3-0 L27IN ABSRB UD L26MM SH 1/2 CIR VCP416H

## (undated) DEVICE — ORTHOLOCK(R) EX-PIN 4 MM X 150 MM

## (undated) DEVICE — Z DISCONTINUED PER MEDLINE USE 2718072 DRESSING FOAM W5XL12.5CM SIL ADH THN BORDED CNFRM LO PROF

## (undated) DEVICE — AIRLIFE™ ADULT CUSHION NASAL CANNULA 14 FOOT (4.3) CRUSH-RESISTANT OXYGEN TUBING, AND U/CONNECT-IT ADAPTER: Brand: AIRLIFE™

## (undated) DEVICE — SUT VCRL + 2-0 36IN CT1 UD --

## (undated) DEVICE — ADHESIVE SKIN CLOSURE WND 8.661X1.5 IN 22 CM LIQUIBAND SECUR

## (undated) DEVICE — SUTURE MCRYL SZ 4-0 L18IN ABSRB UD L19MM PS-2 3/8 CIR PRIM Y496G

## (undated) DEVICE — SOL IRR STRL H2O 1500ML BTL --

## (undated) DEVICE — Device

## (undated) DEVICE — DRAPE,CHEST,FENES,15X10,STERIL: Brand: MEDLINE

## (undated) DEVICE — MEDIA CONTRAST 10ML 200MG/ML 41%

## (undated) DEVICE — DERMABOND SKIN ADH 0.7ML -- DERMABOND ADVANCED 12/BX

## (undated) DEVICE — SUTURE VICRYL + SZ 4-0 L27IN ABSRB UD PS-2 3/8 CIR REV CUT VCP426H

## (undated) DEVICE — STRIP,CLOSURE,WOUND,MEDI-STRIP,1/2X4: Brand: MEDLINE

## (undated) DEVICE — THIS PRODUCT IS SINGLE USE AND INTENDED TO BE USED FOR BLUNT DISSECTION OF TISSUE.: Brand: ASPEN® ENDOSCOPIC KITTNER, SINGLE TIP

## (undated) DEVICE — PACK PROCEDURE SURG ANTR HIP

## (undated) DEVICE — STERILE POLYISOPRENE POWDER-FREE SURGICAL GLOVES: Brand: PROTEXIS

## (undated) DEVICE — SUT VCRL + 1 36IN CT1 VIO --

## (undated) DEVICE — SPONGE GZ W4XL4IN COT 12 PLY TYP VII WVN C FLD DSGN STERILE

## (undated) DEVICE — SUTURE MCRYL SZ 3-0 L27IN ABSRB UD L24MM PS-1 3/8 CIR PRIM Y936H

## (undated) DEVICE — APPLIER CLP L L13IN TI MULT RNG HNDL 20 CLP STR LIGACLP

## (undated) DEVICE — DRAPE C-ARMOUR C-ARM KIT --

## (undated) DEVICE — 3M™ TEGADERM™ TRANSPARENT FILM DRESSING FRAME STYLE, 1626W, 4 IN X 4-3/4 IN (10 CM X 12 CM), 50/CT 4CT/CASE: Brand: 3M™ TEGADERM™

## (undated) DEVICE — DRAPE XR C ARM 41X74IN LF --

## (undated) DEVICE — STRYKER PERFORMANCE SERIES SAGITTAL BLADE: Brand: STRYKER PERFORMANCE SERIES

## (undated) DEVICE — 1LYRTR 16FR10ML 100%SILI SNAP: Brand: MEDLINE INDUSTRIES, INC.

## (undated) DEVICE — SOLUTION IV 250ML 0.9% SOD CHL CLR INJ FLX BG CONT PRT CLSR

## (undated) DEVICE — GLOVE ORANGE PI 7 1/2   MSG9075

## (undated) DEVICE — DRESSING FOAM SELF ADH 20X10 CM ABSORBENT MEPILEX BORDER

## (undated) DEVICE — GOWN,AURORA,NONRNF,XL,30/CS: Brand: MEDLINE

## (undated) DEVICE — SYRINGE MED 10ML TRNSLUC BRL PLUNG BLK MRK POLYPR CTRL

## (undated) DEVICE — STAPLER SKIN SQ 30 ABSRB STPL DISP INSORB

## (undated) DEVICE — SUTURE NRLN 2-0 L18IN NONABSORBABLE BLK TF L13MM 1/2 CIR N104T

## (undated) DEVICE — SYRINGE MED 30ML STD CLR PLAS LUERLOCK TIP N CTRL DISP

## (undated) DEVICE — MEDI-VAC NON-CONDUCTIVE SUCTION TUBING: Brand: CARDINAL HEALTH

## (undated) DEVICE — PROGRAMMER SMART COMM W/HANDSET F/SACRAL NRVE STIMULATORS

## (undated) DEVICE — SPIROMETER INCENT 2500ML W ONE W VLV

## (undated) DEVICE — STERILE LATEX POWDER-FREE SURGICAL GLOVESWITH NITRILE COATING: Brand: PROTEXIS

## (undated) DEVICE — DRAPE,UTILITY,XL,4/PK,STERILE: Brand: MEDLINE

## (undated) DEVICE — TRAY MYEL SFTY +

## (undated) DEVICE — 3M™ IOBAN™ 2 ANTIMICROBIAL INCISE DRAPE 6650EZ: Brand: IOBAN™ 2

## (undated) DEVICE — ARGYLE FRAZIER SURGICAL SUCTION INSTRUMENT 10 FR/CH (3.3 MM): Brand: ARGYLE

## (undated) DEVICE — SUTURE MCRYL SZ 3-0 L27IN ABSRB UD L19MM PS-2 3/8 CIR PRIM Y427H

## (undated) DEVICE — 1010 S-DRAPE TOWEL DRAPE 10/BX: Brand: STERI-DRAPE™

## (undated) DEVICE — SOL IRRIGATION INJ NACL 0.9% 500ML BTL

## (undated) DEVICE — WRAP COMPR BK

## (undated) DEVICE — BLANKET WRM AD W50XL85.8IN PACU FULL BODY FORC AIR

## (undated) DEVICE — KENDALL SCD EXPRESS SLEEVES, KNEE LENGTH, MEDIUM: Brand: KENDALL SCD

## (undated) DEVICE — 3M™ TEGADERM™ TRANSPARENT FILM DRESSING FRAME STYLE, 1627, 4 IN X 10 IN (10 CM X 25 CM), 20/CT 4CT/CASE: Brand: 3M™ TEGADERM™

## (undated) DEVICE — SUTURE MONOCRYL SZ 4-0 L27IN ABSRB UD L24MM PS-1 3/8 CIR PRIM Y935H

## (undated) DEVICE — PLUS HANDPIECE WITH SPRAY TIP: Brand: SURGILAV

## (undated) DEVICE — ELECTRODE PT RET AD L9FT HI MOIST COND ADH HYDRGEL CORDED

## (undated) DEVICE — COAXIAL FEMORAL CANAL TIP

## (undated) DEVICE — SUTURE PERMA HND SZ 0 L18IN NONABSORBABLE BLK L30MM PSL REV 580H

## (undated) DEVICE — AVANOS* SHORT BEVEL NEEDLE: Brand: AVANOS

## (undated) DEVICE — STOCKING COMPR L L29-31IN REG 19MMHG ANK 9-10IN CALF

## (undated) DEVICE — NEUROSTIMULATOR EXT SM LTWT SGL BTTN H2O RESIST WIRELESS

## (undated) DEVICE — HYPODERMIC SAFETY NEEDLE: Brand: MAGELLAN

## (undated) DEVICE — MASTISOL ADHESIVE LIQ 2/3ML

## (undated) DEVICE — SET EXTN 26ML L86IN TBNG DIA0054IN MINIBOR BACKCHECK VLV

## (undated) DEVICE — TRAY CATH OD16FR SIL URIN M STATLOK STBL DEV SURSTP

## (undated) DEVICE — (D)PREP SKN CHLRAPRP APPL 26ML -- CONVERT TO ITEM 371833

## (undated) DEVICE — SUTURE STRATAFIX SPRL MCRYL + SZ 4-0 L12IN ABSRB UD PS-2 SXMP1B117

## (undated) DEVICE — STYLET SURG VIPER PRIM

## (undated) DEVICE — (D)NDL SPNE 22GX15CM -- DISC BY MFR W/NO SUB

## (undated) DEVICE — AGENT HEMSTAT 1GM CLLGN MICFIB ACT ABSRB FLOUR AVIT

## (undated) DEVICE — GLOVE ORANGE PI 8   MSG9080

## (undated) DEVICE — SPONGE,NEURO,0.5"X0.5",XR,STRL,10/PK: Brand: MEDLINE

## (undated) DEVICE — SWAB CULT LIQ STUART AGR AERB MOD IN BRK SGL RAYON TIP PLAS

## (undated) DEVICE — C-ARMOR C-ARM EQUIPMENT COVERS CLEAR STERILE UNIVERSAL FIT 12 PER CASE: Brand: C-ARMOR

## (undated) DEVICE — SYSTEM SKIN CLSR 22CM DERMBND PRINEO

## (undated) DEVICE — SURGIFOAM SPNG SZ 100

## (undated) DEVICE — SUTURE MONOCRYL SZ 3-0 L27IN ABSRB UD PS-2 3/8 CIR REV CUT NDL MCP427H

## (undated) DEVICE — MARKER,SKIN,WI/RULER AND LABELS: Brand: MEDLINE

## (undated) DEVICE — KIT DIL PRB K WIRE DISP FOR EXTRM LUM INTBDY FUS

## (undated) DEVICE — GLOVE SURG SZ 85 L12IN FNGR THK79MIL GRN LTX FREE

## (undated) DEVICE — SYR 10ML LUER LOK 1/5ML GRAD --

## (undated) DEVICE — SUTURE VCRL SZ 2-0 L18IN ABSRB VLT CT-1 L36MM 1/2 CIR J739D

## (undated) DEVICE — (D)SYR 10ML 1/5ML GRAD NSAF -- PKGING CHANGE USE ITEM 338027

## (undated) DEVICE — NDL PRT INJ NSAF BLNT 18GX1.5 --

## (undated) DEVICE — 3M™ DURAPORE™ SURGICAL TAPE 1538-3, 3 INCH X 10 YARD (7,5CM X 9,1M), 4 ROLLS/BOX: Brand: 3M™ DURAPORE™

## (undated) DEVICE — DRAPE C ARM UNIV W41XL74IN CLR PLAS XR VELC CLSR POLY STRP

## (undated) DEVICE — KIT CLN UP BON SECOURS MARYV

## (undated) DEVICE — 3.0MM PRECISION NEURO (MATCH HEAD)

## (undated) DEVICE — SINGLE PORT MANIFOLD: Brand: NEPTUNE 2

## (undated) DEVICE — 2.1MM X 19.6MM METAL CUTTING HELIOCOIDAL RASP

## (undated) DEVICE — PREP SKN CHLRAPRP APL 26ML STR --

## (undated) DEVICE — SYRINGE MED 20ML STD CLR PLAS LUERLOCK TIP N CTRL DISP

## (undated) DEVICE — 450 ML BOTTLE OF 0.05% CHLORHEXIDINE GLUCONATE IN 99.95% STERILE WATER FOR IRRIGATION, USP AND APPLICATOR.: Brand: IRRISEPT ANTIMICROBIAL WOUND LAVAGE

## (undated) DEVICE — 6619 2 PTNT ISO SYS INCISE AREA&LT;(&GT;&&LT;)&GT;P: Brand: STERI-DRAPE™ IOBAN™ 2

## (undated) DEVICE — SYR LR LCK 1ML GRAD NSAF 30ML --

## (undated) DEVICE — SOL INJ L R 1000ML BG --

## (undated) DEVICE — APPLICATOR BNDG 1MM ADH PREMIERPRO EXOFIN

## (undated) DEVICE — SUTURE ETHLN SZ 3-0 L30IN NONABSORBABLE BLK L30MM PSLX 3/8 1691H

## (undated) DEVICE — NEEDLE HYPO 22GA L1.5IN BLK S STL HUB POLYPR SHLD REG BVL

## (undated) DEVICE — INTENDED FOR TISSUE SEPARATION, AND OTHER PROCEDURES THAT REQUIRE A SHARP SURGICAL BLADE TO PUNCTURE OR CUT.: Brand: BARD-PARKER SAFETY BLADES SIZE 15, STERILE

## (undated) DEVICE — AEGIS 1" DISK 4MM HOLE, PEEL OPEN: Brand: MEDLINE

## (undated) DEVICE — SUTURE ETHIBOND EXCEL SZ 1 L30IN NONABSORBABLE GRN L36MM CT-1 X425H

## (undated) DEVICE — SUTURE MONOCRYL SZ 2-0 L27IN ABSRB UD SH L26MM TAPERPOINT NDL Y417H

## (undated) DEVICE — SUTURE STRATAFIX SYMMETRIC PDS + SZ 2-0 L18IN ABSRB VLT SXPP1A403

## (undated) DEVICE — OPTIFOAM GENTLE SA, POSTOP, 4X8: Brand: MEDLINE

## (undated) DEVICE — MINOR: Brand: MEDLINE INDUSTRIES, INC.

## (undated) DEVICE — SWAB CULT SGL AMIES W/O CHAR FOR THRT VAG SKIN HRT CULTSWAB

## (undated) DEVICE — KIT RETRCT SHIM DISP FOR MINIMAL ACCS SYS MAXCESS 4

## (undated) DEVICE — PACK PROCEDURE SURG LAMINECTOMY SPINE CUST

## (undated) DEVICE — SUTURE STRATAFIX SYMMETRIC PDS + SZ 1 L18IN ABSRB VLT L48MM SXPP1A400

## (undated) DEVICE — SLEEVE PROTCT THRD LCK FOR SYNFIX EVOLUTION SYS

## (undated) DEVICE — FLEX ADVANTAGE 3000CC: Brand: FLEX ADVANTAGE

## (undated) DEVICE — SLEEVE PROTCT FOR SCRDRVR AND AWL SYNFIX EVOLUTION SYS

## (undated) DEVICE — FEMORAL CANAL TIP

## (undated) DEVICE — BLADE SAW 1.27X13X90 MM FOR LG BNE

## (undated) DEVICE — SPONGE,DISSECTOR,ROUND CHERRY,XR,ST,5/PK: Brand: MEDLINE

## (undated) DEVICE — X-RAY SPONGES,12 PLY: Brand: DERMACEA

## (undated) DEVICE — 3.2MM X 18.3MM METAL CUTTING HELIOCOIDAL RASP

## (undated) DEVICE — SPONGE DISSECT PNUT SM 3/8IN -- 5/PK